# Patient Record
Sex: FEMALE | Race: BLACK OR AFRICAN AMERICAN | NOT HISPANIC OR LATINO | Employment: OTHER | URBAN - METROPOLITAN AREA
[De-identification: names, ages, dates, MRNs, and addresses within clinical notes are randomized per-mention and may not be internally consistent; named-entity substitution may affect disease eponyms.]

---

## 2017-03-10 ENCOUNTER — OFFICE VISIT (OUTPATIENT)
Dept: URGENT CARE | Facility: CLINIC | Age: 47
End: 2017-03-10
Payer: MEDICARE

## 2017-03-10 VITALS
SYSTOLIC BLOOD PRESSURE: 96 MMHG | HEART RATE: 72 BPM | WEIGHT: 95 LBS | RESPIRATION RATE: 12 BRPM | OXYGEN SATURATION: 94 % | HEIGHT: 60 IN | BODY MASS INDEX: 18.65 KG/M2 | DIASTOLIC BLOOD PRESSURE: 60 MMHG | TEMPERATURE: 98.1 F

## 2017-03-10 DIAGNOSIS — H04.201 EYE TEARING, RIGHT: ICD-10-CM

## 2017-03-10 PROCEDURE — 4004F PT TOBACCO SCREEN RCVD TLK: CPT | Mod: 8P | Performed by: NURSE PRACTITIONER

## 2017-03-10 PROCEDURE — 99213 OFFICE O/P EST LOW 20 MIN: CPT | Performed by: NURSE PRACTITIONER

## 2017-03-10 PROCEDURE — G8432 DEP SCR NOT DOC, RNG: HCPCS | Performed by: NURSE PRACTITIONER

## 2017-03-10 PROCEDURE — G8420 CALC BMI NORM PARAMETERS: HCPCS | Performed by: NURSE PRACTITIONER

## 2017-03-10 PROCEDURE — G8484 FLU IMMUNIZE NO ADMIN: HCPCS | Performed by: NURSE PRACTITIONER

## 2017-03-10 ASSESSMENT — ENCOUNTER SYMPTOMS
DIZZINESS: 0
CHILLS: 0
FEVER: 0
PHOTOPHOBIA: 0
MYALGIAS: 0
SHORTNESS OF BREATH: 0
HEADACHES: 1
EYE REDNESS: 0
DIARRHEA: 0
NAUSEA: 0
DOUBLE VISION: 0
BLURRED VISION: 0
VOMITING: 0
COUGH: 0
PALPITATIONS: 0
EYE DISCHARGE: 1

## 2017-03-11 NOTE — PROGRESS NOTES
Subjective:      Gayla Escudero is a 46 y.o. female who presents with Conjunctivitis    Chief Complaint   Patient presents with   • Conjunctivitis     pink, discharge, watery since thursday afternoon      Started a few days ago   Watering from eye   no trauma  No vision changes  No FB            Conjunctivitis  Associated symptoms include headaches. Pertinent negatives include no chest pain, chills, coughing, fever, myalgias, nausea, rash or vomiting.       Review of Systems   Constitutional: Negative for fever, chills and malaise/fatigue.   Eyes: Positive for discharge. Negative for blurred vision, double vision, photophobia and redness.   Respiratory: Negative for cough and shortness of breath.    Cardiovascular: Negative for chest pain and palpitations.   Gastrointestinal: Negative for nausea, vomiting and diarrhea.   Musculoskeletal: Negative for myalgias.   Skin: Negative for rash.   Neurological: Positive for headaches. Negative for dizziness.     Past Medical History   Diagnosis Date   • Muscular disease      Family history reviewed and not related to this visit  Social History     Social History   • Marital Status: Single     Spouse Name: N/A   • Number of Children: N/A   • Years of Education: N/A     Occupational History   • Not on file.     Social History Main Topics   • Smoking status: Not on file   • Smokeless tobacco: Not on file   • Alcohol Use: Not on file   • Drug Use: Not on file   • Sexual Activity: Not on file     Other Topics Concern   • Not on file     Social History Narrative   • No narrative on file            Objective:     BP 96/60 mmHg  Pulse 72  Temp(Src) 36.7 °C (98.1 °F)  Resp 12  Ht 1.524 m (5')  Wt 43.092 kg (95 lb)  BMI 18.55 kg/m2  SpO2 94%  Breastfeeding? No     Physical Exam   Constitutional: She is oriented to person, place, and time. She appears well-developed and well-nourished. No distress.   HENT:   Head: Normocephalic and atraumatic.   Eyes: Conjunctivae and lids are  normal. Right eye exhibits discharge (slight increase tears). Right eye exhibits no exudate. No foreign body present in the right eye. Right conjunctiva is not injected. Right conjunctiva has no hemorrhage. Right eye exhibits normal extraocular motion.   Musculoskeletal: Normal range of motion.   Neurological: She is alert and oriented to person, place, and time.   Skin: Skin is warm and dry.   Psychiatric: She has a normal mood and affect. Her behavior is normal. Judgment and thought content normal.   Nursing note and vitals reviewed.              Assessment/Plan:   1. Eye tearing, right  Saline drop  Discussed unable to test eye pressures in urgent care  Needs to be done at ED or eye Md  No obvious source of tearing from external exam   To ed or eye MD    Please make an appointment for follow up with your primary care provider or make appointment to establish with a primary care. Return for any perception of change in condition, worsening of symptoms, such as perception of worse pain, worsening fever, not getting better, or any other concerns. Please go to the nearest emergency room for any shortness of breath or chest pain or for any of the emergent conditions we have discussed. Patient and/or family states understanding to plan of care, and discharge instructions. Different diagnosis were discussed and signs/symptoms were discussed to educate on.

## 2017-04-06 ENCOUNTER — OFFICE VISIT (OUTPATIENT)
Dept: URGENT CARE | Facility: CLINIC | Age: 47
End: 2017-04-06
Payer: MEDICARE

## 2017-04-06 VITALS
TEMPERATURE: 98 F | SYSTOLIC BLOOD PRESSURE: 110 MMHG | DIASTOLIC BLOOD PRESSURE: 70 MMHG | HEART RATE: 72 BPM | OXYGEN SATURATION: 95 % | RESPIRATION RATE: 18 BRPM

## 2017-04-06 DIAGNOSIS — J02.9 PHARYNGITIS, UNSPECIFIED ETIOLOGY: ICD-10-CM

## 2017-04-06 LAB
INT CON NEG: NORMAL
INT CON POS: NORMAL
S PYO AG THROAT QL: NEGATIVE

## 2017-04-06 PROCEDURE — G8420 CALC BMI NORM PARAMETERS: HCPCS | Performed by: FAMILY MEDICINE

## 2017-04-06 PROCEDURE — 99214 OFFICE O/P EST MOD 30 MIN: CPT | Performed by: FAMILY MEDICINE

## 2017-04-06 PROCEDURE — 1036F TOBACCO NON-USER: CPT | Performed by: FAMILY MEDICINE

## 2017-04-06 PROCEDURE — G8432 DEP SCR NOT DOC, RNG: HCPCS | Performed by: FAMILY MEDICINE

## 2017-04-06 PROCEDURE — 87880 STREP A ASSAY W/OPTIC: CPT | Performed by: FAMILY MEDICINE

## 2017-04-06 RX ORDER — AMOXICILLIN 400 MG/5ML
400 POWDER, FOR SUSPENSION ORAL 2 TIMES DAILY
Qty: 1 QUANTITY SUFFICIENT | Refills: 0 | Status: SHIPPED | OUTPATIENT
Start: 2017-04-06 | End: 2017-04-16

## 2017-04-06 NOTE — MR AVS SNAPSHOT
Gayla Escudero   2017 11:30 AM   Office Visit   MRN: [id number]    Department:  [facility]   Dept Phone:  [phone]    Description:  Female : 1970   Provider:  Ruth Rushing D.O.           Reason for Visit     Pharyngitis           Allergies as of 2017     Allergen Noted Reactions    Tramadol 2014       Effects her MD      You were diagnosed with     Pharyngitis, unspecified etiology   [[id number]]         Vital Signs     Blood Pressure Pulse Temperature Respirations Oxygen Saturation Smoking Status    110/70 mmHg 72 36.7 °C (98 °F) 18 95% Never Smoker       Basic Information     Date Of Birth Sex Race Ethnicity Preferred Language    1970 Female Black or  Non- English      Health Maintenance        Date Due Completion Dates    IMM DTaP/Tdap/Td Vaccine (1 - Tdap) 1989 ---    PAP SMEAR 1991 ---    MAMMOGRAM 2010 ---            Results     POCT Rapid Strep A      Component    Rapid Strep Screen    NEGATIVE    Internal Control Positive    Valid    Internal Control Negative    Valid                        Current Immunizations     Tuberculin Skin Test 2015  4:25 PM      Below and/or attached are the medications your provider expects you to take. Review all of your home medications and newly ordered medications with your provider and/or pharmacist. Follow medication instructions as directed by your provider and/or pharmacist. Please keep your medication list with you and share with your provider. Update the information when medications are discontinued, doses are changed, or new medications (including over-the-counter products) are added; and carry medication information at all times in the event of emergency situations     Allergies:  TRAMADOL - (reactions not documented)               Medications  Valid as of: 2017 - 11:51 AM    Generic Name Brand Name Tablet Size Instructions for use    Amoxicillin (Recon Susp) AMOXIL 400  MG/5ML Take 5 mL by mouth 2 times a day for 10 days.        Cyclobenzaprine HCl (Tab) FLEXERIL 10 MG Take 1 Tab by mouth 3 times a day as needed.        Promethazine HCl (Syrup) PHENERGAN 6.25 MG/5ML Take 5 mL by mouth 4 times a day as needed for Nausea/Vomiting.        .                 Medicines prescribed today were sent to:     [id number] - [place], [place] - [address]    [address] [place] [address]    Phone: [phone] Fax: [phone]    Open 24 Hours?: No      Medication refill instructions:       If your prescription bottle indicates you have medication refills left, it is not necessary to call your provider’s office. Please contact your pharmacy and they will refill your medication.    If your prescription bottle indicates you do not have any refills left, you may request refills at any time through one of the following ways: The online [employer] system (except Urgent Care), by calling your provider’s office, or by asking your pharmacy to contact your provider’s office with a refill request. Medication refills are processed only during regular business hours and may not be available until the next business day. Your provider may request additional information or to have a follow-up visit with you prior to refilling your medication.   *Please Note: Medication refills are assigned a new Rx number when refilled electronically. Your pharmacy may indicate that no refills were authorized even though a new prescription for the same medication is available at the pharmacy. Please request the medicine by name with the pharmacy before contacting your provider for a refill.           [employer] Access Code: Activation code not generated  Current [employer] Status: Active

## 2017-04-06 NOTE — PROGRESS NOTES
HPI: Gayla Escudero is a 46 y.o. female who presents with   Chief Complaint   Patient presents with   • Pharyngitis    sore throat, headache, vomiting for 2 days. No fevers, some body aches have been present. No significant cough or nasal congestion    Worsened by: activity, laying supine at night, first thing in the morning, when exposed to outside allergens  Improved by: OTC symptomatic medictions      PMH:  has a past medical history of Muscular disease.  MEDS:   Current outpatient prescriptions:   •  cyclobenzaprine (FLEXERIL) 10 MG Tab, Take 1 Tab by mouth 3 times a day as needed., Disp: 30 Tab, Rfl: 0  •  promethazine (PHENERGAN) 6.25 MG/5ML Syrup, Take 5 mL by mouth 4 times a day as needed for Nausea/Vomiting., Disp: 120 mL, Rfl: 0  ALLERGIES:   Allergies   Allergen Reactions   • Tramadol      Effects her MD     SURGHX: No past surgical history on file.  SOCHX:    FH: Family history was reviewed, no pertinent findings to report    PE:  Vitals  Gen AOx4, NAD  HEENT: moist mucus membranes, no pain or pressure with percussion of frontal, maxillary or ethmoid sinuses.  Bilateral conjunciva clear without erythema or exudate,  Bilateral TM's clear without bulge, fluid or loss of landmarks, moderate pharyngeal erythema without tonsillar exudate but with bilateral tonsillar enlargement present  Neck: supple, no cervical lymphadenopathy, no signs of menigismus  CV/PULM: RRR no murmurs, no rales ronchi or wheezes, no signs of resp distress  Abd soft nontender, bs present  Skin no rashes  Extremities -c/c/e  Neuro appropriate affect,     Diagnostics: Rapid strep negative    A/P  1. Pharyngitis, unspecified etiology  POCT Rapid Strep A    amoxicillin (AMOXIL) 400 MG/5ML suspension     Follow-up with primary care provider within 4-5 days, emergency room precautions discussed.  Patient and/or family appears understanding of information.

## 2017-07-16 ENCOUNTER — OFFICE VISIT (OUTPATIENT)
Dept: URGENT CARE | Facility: CLINIC | Age: 47
End: 2017-07-16
Payer: MEDICARE

## 2017-07-16 VITALS
WEIGHT: 95 LBS | RESPIRATION RATE: 12 BRPM | TEMPERATURE: 98.4 F | SYSTOLIC BLOOD PRESSURE: 100 MMHG | OXYGEN SATURATION: 95 % | HEART RATE: 72 BPM | BODY MASS INDEX: 18.65 KG/M2 | HEIGHT: 60 IN | DIASTOLIC BLOOD PRESSURE: 62 MMHG

## 2017-07-16 DIAGNOSIS — L72.3 SEBACEOUS CYST OF RIGHT AXILLA: ICD-10-CM

## 2017-07-16 PROCEDURE — 99213 OFFICE O/P EST LOW 20 MIN: CPT | Performed by: NURSE PRACTITIONER

## 2017-07-16 RX ORDER — CLINDAMYCIN HYDROCHLORIDE 300 MG/1
300 CAPSULE ORAL 3 TIMES DAILY
Qty: 30 CAP | Refills: 0 | Status: SHIPPED | OUTPATIENT
Start: 2017-07-16 | End: 2017-07-26

## 2017-07-16 ASSESSMENT — ENCOUNTER SYMPTOMS: FEVER: 0

## 2017-07-16 NOTE — MR AVS SNAPSHOT
Gayla Escudero   2017 1:30 PM   Office Visit   MRN: [id number]    Department:  [facility]   Dept Phone:  [phone]    Description:  Female : 1970   Provider:  THANH Varghese           Reason for Visit     Cyst or abscess under armpit and back, hurting      Allergies as of 2017     Allergen Noted Reactions    Tramadol 2014       Effects her MD      You were diagnosed with     Sebaceous cyst of right axilla   [[id number]]         Vital Signs     Blood Pressure Pulse Temperature Respirations Height Weight    100/62 mmHg 72 36.9 °C (98.4 °F) 12 1.524 m (5') 43.092 kg (95 lb)    Body Mass Index Oxygen Saturation Breastfeeding? Smoking Status          18.55 kg/m2 95% No Never Smoker         Basic Information     Date Of Birth Sex Race Ethnicity Preferred Language    1970 Female Black or  Non- English      Health Maintenance        Date Due Completion Dates    IMM DTaP/Tdap/Td Vaccine (1 - Tdap) 1989 ---    PAP SMEAR 1991 ---    MAMMOGRAM 2010 ---    IMM INFLUENZA (1) 2017 ---            Current Immunizations     Tuberculin Skin Test 2015  4:25 PM      Below and/or attached are the medications your provider expects you to take. Review all of your home medications and newly ordered medications with your provider and/or pharmacist. Follow medication instructions as directed by your provider and/or pharmacist. Please keep your medication list with you and share with your provider. Update the information when medications are discontinued, doses are changed, or new medications (including over-the-counter products) are added; and carry medication information at all times in the event of emergency situations     Allergies:  TRAMADOL - (reactions not documented)               Medications  Valid as of: 2017 -  2:11 PM    Generic Name Brand Name Tablet Size Instructions for use    Clindamycin HCl (Cap) CLEOCIN 300 MG Take 1  Cap by mouth 3 times a day for 10 days.        Cyclobenzaprine HCl (Tab) FLEXERIL 10 MG Take 1 Tab by mouth 3 times a day as needed.        Promethazine HCl (Syrup) PHENERGAN 6.25 MG/5ML Take 5 mL by mouth 4 times a day as needed for Nausea/Vomiting.        .                 Medicines prescribed today were sent to:     [facility][id number] - [place], [place] - [address]    [address] [place] [address]    Phone: [phone] Fax: [phone]    Open 24 Hours?: No      Medication refill instructions:       If your prescription bottle indicates you have medication refills left, it is not necessary to call your provider’s office. Please contact your pharmacy and they will refill your medication.    If your prescription bottle indicates you do not have any refills left, you may request refills at any time through one of the following ways: The online [employer] system (except Urgent Care), by calling your provider’s office, or by asking your pharmacy to contact your provider’s office with a refill request. Medication refills are processed only during regular business hours and may not be available until the next business day. Your provider may request additional information or to have a follow-up visit with you prior to refilling your medication.   *Please Note: Medication refills are assigned a new Rx number when refilled electronically. Your pharmacy may indicate that no refills were authorized even though a new prescription for the same medication is available at the pharmacy. Please request the medicine by name with the pharmacy before contacting your provider for a refill.           [employer] Access Code: Activation code not generated  Current [employer] Status: Active

## 2017-07-16 NOTE — PROGRESS NOTES
Subjective:      Gayla Escudero is a 46 y.o. female who presents with Cyst            Cyst  This is a new problem. Episode onset: She reports that she has had a cyst under her right arm for several weeks and it is very painful. She also thinks that she has one on her back over her right shoulder blade but that one comes and goes. The problem occurs constantly. The problem has been gradually worsening. Pertinent negatives include no fever. Associated symptoms comments: Denies any drainage from the cyst under her arm or the one on her back. Exacerbated by: touching the cyst is painful or moving her right arm too much. She has tried nothing for the symptoms.       Review of Systems   Constitutional: Negative for fever.   Skin:        Cyst right armpit and right shoulder   All other systems reviewed and are negative.    Past Medical History   Diagnosis Date   • Muscular disease     No past surgical history on file.   Social History     Social History   • Marital Status: Single     Spouse Name: N/A   • Number of Children: N/A   • Years of Education: N/A     Occupational History   • Not on file.     Social History Main Topics   • Smoking status: Never Smoker    • Smokeless tobacco: Never Used   • Alcohol Use: Not on file   • Drug Use: Not on file   • Sexual Activity: Not on file     Other Topics Concern   • Not on file     Social History Narrative          Objective:     /62 mmHg  Pulse 72  Temp(Src) 36.9 °C (98.4 °F)  Resp 12  Ht 1.524 m (5')  Wt 43.092 kg (95 lb)  BMI 18.55 kg/m2  SpO2 95%  Breastfeeding? No     Physical Exam   Constitutional: She is oriented to person, place, and time. Vital signs are normal. She appears well-developed and well-nourished.   HENT:   Head: Normocephalic and atraumatic.   Eyes: EOM are normal. Pupils are equal, round, and reactive to light.   Neck: Normal range of motion.   Cardiovascular: Normal rate and regular rhythm.    Pulmonary/Chest: Effort normal.   Musculoskeletal:  Normal range of motion.   Neurological: She is alert and oriented to person, place, and time.   Skin: Skin is warm and dry.        Psychiatric: She has a normal mood and affect. Her speech is normal and behavior is normal. Thought content normal.   Vitals reviewed.              Assessment/Plan:     1. Sebaceous cyst of right axilla  - clindamycin (CLEOCIN) 300 MG Cap; Take 1 Cap by mouth 3 times a day for 10 days.  Dispense: 30 Cap; Refill: 0    Discussed with patient that the cyst present in her axilla is very small and I would not recommend an I&D today. She states she had a larger cyst in her left armpit a few years ago and that one was drained but it was much bigger than the one she has now. I advised her that a course of antibiotics should help to resolve the cyst, if it does not improve return to .   I chose clinda for her tx because she needs small pills or suspension, I discussed with her that she can check with the pharmacist to see if she can break open these caps and take the powder with food, she V/U and agrees  If she has any issue with the ABX I advised her to contact me, she understands  Warm compresses to right axilla 3 times per day  Supportive care, differential diagnoses, and indications for immediate follow-up discussed with patient.    Pathogenesis of diagnosis discussed including typical length and natural progression.      Instructed to return to  or nearest emergency department if symptoms fail to improve, for any change in condition, further concerns, or new concerning symptoms.  Patient states understanding of the plan of care and discharge instructions.

## 2017-07-26 ENCOUNTER — TELEPHONE (OUTPATIENT)
Dept: MEDICAL GROUP | Facility: MEDICAL CENTER | Age: 47
End: 2017-07-26

## 2017-07-26 NOTE — TELEPHONE ENCOUNTER
NEW PATIENT VISIT PRE-VISIT PLANNING    1.  E[employer] Patient is checked in Patient Demographics? NO    2.  Immunizations were updated in Epic using WebIZ?: No WebIZ record       •  Web Iz Recommendations:     3.  Is this appointment scheduled as a Hospital Follow-Up? No    4.  Patient is due for the following Health Maintenance Topics:   Health Maintenance Due   Topic Date Due   • Annual Wellness Visit  1970   • IMM DTaP/Tdap/Td Vaccine (1 - Tdap) 12/06/1989   • PAP SMEAR  12/06/1991   • MAMMOGRAM  12/06/2010           5.  Reviewed/Updated the following with patient:       •   Preferred Pharmacy? NO       •   Preferred Lab? NO       •   Medications? NO       •   Social History? NO       •   Family History? NO    6.  Updated Care Team?       •   DME Company (gait device, O2, CPAP, etc.) NO       •   Other Specialists (eye doctor, derm, GYN, cardiology, endo, etc): NO    7.  Patient was informed to arrive 15 min prior to their scheduled appointment and bring in their medication bottles.

## 2017-07-27 ENCOUNTER — OFFICE VISIT (OUTPATIENT)
Dept: MEDICAL GROUP | Facility: MEDICAL CENTER | Age: 47
End: 2017-07-27
Payer: MEDICARE

## 2017-07-27 VITALS
SYSTOLIC BLOOD PRESSURE: 112 MMHG | WEIGHT: 100 LBS | OXYGEN SATURATION: 98 % | HEART RATE: 82 BPM | DIASTOLIC BLOOD PRESSURE: 70 MMHG | BODY MASS INDEX: 19.63 KG/M2 | HEIGHT: 60 IN | TEMPERATURE: 98.2 F

## 2017-07-27 DIAGNOSIS — R22.31 MASS OF RIGHT AXILLA: ICD-10-CM

## 2017-07-27 DIAGNOSIS — Z76.89 ENCOUNTER TO ESTABLISH CARE: ICD-10-CM

## 2017-07-27 DIAGNOSIS — Z12.31 VISIT FOR SCREENING MAMMOGRAM: ICD-10-CM

## 2017-07-27 PROCEDURE — 99214 OFFICE O/P EST MOD 30 MIN: CPT | Performed by: PHYSICIAN ASSISTANT

## 2017-07-27 RX ORDER — SULFAMETHOXAZOLE AND TRIMETHOPRIM 200; 40 MG/5ML; MG/5ML
8 SUSPENSION ORAL 2 TIMES DAILY
Qty: 460 ML | Refills: 0 | Status: SHIPPED | OUTPATIENT
Start: 2017-07-27 | End: 2017-08-06

## 2017-07-27 RX ORDER — SULFAMETHOXAZOLE AND TRIMETHOPRIM 200; 40 MG/5ML; MG/5ML
8 SUSPENSION ORAL 2 TIMES DAILY
Qty: 460 ML | Refills: 0 | Status: SHIPPED | OUTPATIENT
Start: 2017-07-27 | End: 2017-07-27 | Stop reason: SDUPTHER

## 2017-07-27 RX ORDER — SULFAMETHOXAZOLE AND TRIMETHOPRIM 200; 40 MG/5ML; MG/5ML
8 SUSPENSION ORAL 2 TIMES DAILY
Qty: 180 ML | Refills: 0 | Status: SHIPPED | OUTPATIENT
Start: 2017-07-27 | End: 2017-07-27 | Stop reason: SDUPTHER

## 2017-07-27 ASSESSMENT — PATIENT HEALTH QUESTIONNAIRE - PHQ9: CLINICAL INTERPRETATION OF PHQ2 SCORE: 0

## 2017-07-27 NOTE — PROGRESS NOTES
Subjective:   Gayla Escudero is a 46 y.o. female here today for painful area of her axilla on the right side and shoulder area.    Mass of right axilla  This is a 46-year-old female accompanied by her . She is here today playing of pain of her right exam for a few days. Also has pain on her back of the right shoulder. Complains of tenderness. She's had similar issues and abscesses on her body in the past. She stated when she had one that was drained. Denies any fevers.    Visit for screening mammogram  She's never had a mammogram.       Current medicines (including changes today)  Current Outpatient Prescriptions   Medication Sig Dispense Refill   • sulfamethoxazole-trimethoprim 200-40 mg/5 mL (BACTRIM,SEPTRA) 200-40 MG/5ML Suspension Take 23 mL by mouth 2 times a day for 10 days. 460 mL 0   • cyclobenzaprine (FLEXERIL) 10 MG Tab Take 1 Tab by mouth 3 times a day as needed. 30 Tab 0   • promethazine (PHENERGAN) 6.25 MG/5ML Syrup Take 5 mL by mouth 4 times a day as needed for Nausea/Vomiting. 120 mL 0     No current facility-administered medications for this visit.     She  has a past medical history of Muscular disease.    ROS   No chest pain, no shortness of breath, no abdominal pain and all other systems were reviewed and are negative.       Objective:     Blood pressure 112/70, pulse 82, temperature 36.8 °C (98.2 °F), height 1.524 m (5'), weight 45.36 kg (100 lb), SpO2 98 %. Body mass index is 19.53 kg/(m^2).   Physical Exam:  Constitutional: Alert, no distress.  Skin: Warm, dry, good turgor, no rashes in visible areas. There is a tender superficial nonerythematous mass that is at approximately 12:00 position of the axilla. Areas approximately 4 mm in diameter. Slight induration. No flocculence noted. On the back right shoulder there is some acne scarring with no significant lesion but do feel an area that is tender for her is approximately 3-4 mm in diameter with slight induration but no flocculence.  Eye:  Equal, round and reactive, conjunctiva clear, lids normal.  ENMT: Lips without lesions, good dentition, oropharynx clear.  Neck: Trachea midline, no masses.   Lymph: No cervical or supraclavicular lymphadenopathy  Respiratory: Unlabored respiratory effort, lungs appear clear, no wheezes.  Cardiovascular: Regular rate and rhythm, no edema.  Psych: Alert and oriented x3, normal affect and mood.        Assessment and Plan:   The following treatment plan was discussed    1. Mass of right axilla  Acute, new onset condition. Given the potential for developing a firm cold will provide Bactrim as directed for 10 days. She does not swallow tablets and will provide her liquid course. Advised to apply warm compresses. A follow-up at urgent care or return for possible I&D if needed.  - sulfamethoxazole-trimethoprim 200-40 mg/5 mL (BACTRIM,SEPTRA) 200-40 MG/5ML Suspension; Take 23 mL by mouth 2 times a day for 10 days.  Dispense: 460 mL; Refill: 0    2. Visit for screening mammogram  Order mammogram.  - MA-SCREEN MAMMO W/CAD-BILAT    3. Encounter to establish care  We'll obtain previous medical records.      Followup: Return if symptoms worsen or fail to improve.    Please note that this dictation was created using voice recognition software. I have made every reasonable attempt to correct obvious errors, but I expect that there are errors of grammar and possibly content that I did not discover before finalizing the note.

## 2017-07-27 NOTE — ASSESSMENT & PLAN NOTE
This is a 46-year-old female accompanied by her . She is here today playing of pain of her right exam for a few days. Also has pain on her back of the right shoulder. Complains of tenderness. She's had similar issues and abscesses on her body in the past. She stated when she had one that was drained. Denies any fevers.

## 2017-07-27 NOTE — Clinical Note
[facility]  Adithya Martinez PA-C  [address]  [place] [address]  Fax: [phone]   Authorization for Release/Disclosure of   Protected Health Information   Name: GAYLA ESCUDERO : 1970 SSN: [id number]   Address: [address] [place] [address] Phone:    [phone] (home)    I authorize the entity listed below to release/disclose the PHI below to:   [facility]/Adithya Martinez PA-C and Adithya Martinez PA-C   Provider or Entity Name:     Address   City, State, Zip   Phone:      Fax:     Reason for request: continuity of care   Information to be released:    [  ] LAST COLONOSCOPY,  including any PATH REPORT and follow-up  [  ] LAST FIT/COLOGUARD RESULT [  ] LAST DEXA  [  ] LAST MAMMOGRAM  [  ] LAST PAP  [  ] LAST LABS [  ] RETINA EXAM REPORT  [  ] IMMUNIZATION RECORDS  [ XXX ] Release all info      [  ] Check here and initial the line next to each item to release ALL health information INCLUDING  _____ Care and treatment for drug and / or alcohol abuse  _____ HIV testing, infection status, or AIDS  _____ Genetic Testing    DATES OF SERVICE OR TIME PERIOD TO BE DISCLOSED: _____________  I understand and acknowledge that:  * This Authorization may be revoked at any time by you in writing, except if your health information has already been used or disclosed.  * Your health information that will be used or disclosed as a result of you signing this authorization could be re-disclosed by the recipient. If this occurs, your re-disclosed health information may no longer be protected by State or Federal laws.  * You may refuse to sign this Authorization. Your refusal will not affect your ability to obtain treatment.  * This Authorization becomes effective upon signing and will  on (date) __________.      If no date is indicated, this Authorization will  one (1) year from the signature date.    Name: Gayla Escudero    Signature:   Date:     2017       PLEASE FAX REQUESTED RECORDS  BACK TO: [phone]

## 2017-07-27 NOTE — MR AVS SNAPSHOT
Gayla Escudero   2017 2:00 PM   Office Visit   MRN: [id number]    Department:  [place] [address]   Dept Phone:  [phone]    Description:  Female : 1970   Provider:  Adithya Martinez PA-C           Reason for Visit     Establish Care     Abscess under right armpit and back       Allergies as of 2017     Allergen Noted Reactions    Tramadol 2014       Effects her MD      You were diagnosed with     Mass of right axilla   [0926714]       Visit for screening mammogram   [[id number]]       Encounter to establish care   [[id number]]         Vital Signs     Blood Pressure Pulse Temperature Height Weight Body Mass Index    112/70 mmHg 82 36.8 °C (98.2 °F) 1.524 m (5') 45.36 kg (100 lb) 19.53 kg/m2    Oxygen Saturation Smoking Status                98% Never Smoker           Basic Information     Date Of Birth Sex Race Ethnicity Preferred Language    1970 Female Black or  Non- English      Problem List              ICD-10-CM Priority Class Noted - Resolved    Mass of right axilla R22.31   2017 - Present    Visit for screening mammogram Z12.31   2017 - Present      Health Maintenance        Date Due Completion Dates    IMM DTaP/Tdap/Td Vaccine (1 - Tdap) 1989 ---    PAP SMEAR 1991 ---    MAMMOGRAM 2010 ---    IMM INFLUENZA (1) 2017 ---            Current Immunizations     Tuberculin Skin Test 2015  4:25 PM      Below and/or attached are the medications your provider expects you to take. Review all of your home medications and newly ordered medications with your provider and/or pharmacist. Follow medication instructions as directed by your provider and/or pharmacist. Please keep your medication list with you and share with your provider. Update the information when medications are discontinued, doses are changed, or new medications (including over-the-counter products) are added; and carry medication information at all times in the event of  emergency situations     Allergies:  TRAMADOL - (reactions not documented)               Medications  Valid as of: July 27, 2017 -  2:55 PM    Generic Name Brand Name Tablet Size Instructions for use    Cyclobenzaprine HCl (Tab) FLEXERIL 10 MG Take 1 Tab by mouth 3 times a day as needed.        Promethazine HCl (Syrup) PHENERGAN 6.25 MG/5ML Take 5 mL by mouth 4 times a day as needed for Nausea/Vomiting.        Sulfamethoxazole-Trimethoprim (Suspension) BACTRIM,SEPTRA 200-40 MG/5ML Take 23 mL by mouth 2 times a day for 10 days.        .                 Medicines prescribed today were sent to:     [facility]/PHARMACY #[id number] - [place], [place] - [address]    [address] [facility] [place] [address]    Phone: [phone] Fax: [phone]    Open 24 Hours?: No      Medication refill instructions:       If your prescription bottle indicates you have medication refills left, it is not necessary to call your provider’s office. Please contact your pharmacy and they will refill your medication.    If your prescription bottle indicates you do not have any refills left, you may request refills at any time through one of the following ways: The online Glider system (except Urgent Care), by calling your provider’s office, or by asking your pharmacy to contact your provider’s office with a refill request. Medication refills are processed only during regular business hours and may not be available until the next business day. Your provider may request additional information or to have a follow-up visit with you prior to refilling your medication.   *Please Note: Medication refills are assigned a new Rx number when refilled electronically. Your pharmacy may indicate that no refills were authorized even though a new prescription for the same medication is available at the pharmacy. Please request the medicine by name with the pharmacy before contacting your provider for a refill.           Glider Access Code: Activation code not  generated  Current MyChart Status: Active

## 2017-08-06 ENCOUNTER — HOSPITAL ENCOUNTER (OUTPATIENT)
Facility: MEDICAL CENTER | Age: 47
End: 2017-08-06
Attending: NURSE PRACTITIONER
Payer: MEDICARE

## 2017-08-06 ENCOUNTER — OFFICE VISIT (OUTPATIENT)
Dept: URGENT CARE | Facility: CLINIC | Age: 47
End: 2017-08-06
Payer: MEDICARE

## 2017-08-06 VITALS
RESPIRATION RATE: 14 BRPM | BODY MASS INDEX: 19.63 KG/M2 | WEIGHT: 100 LBS | DIASTOLIC BLOOD PRESSURE: 74 MMHG | SYSTOLIC BLOOD PRESSURE: 116 MMHG | OXYGEN SATURATION: 97 % | HEART RATE: 84 BPM | HEIGHT: 60 IN | TEMPERATURE: 98.1 F

## 2017-08-06 DIAGNOSIS — L02.411 ABSCESS OF AXILLA, RIGHT: ICD-10-CM

## 2017-08-06 PROCEDURE — 87205 SMEAR GRAM STAIN: CPT

## 2017-08-06 PROCEDURE — 87070 CULTURE OTHR SPECIMN AEROBIC: CPT

## 2017-08-06 PROCEDURE — 10060 I&D ABSCESS SIMPLE/SINGLE: CPT | Performed by: NURSE PRACTITIONER

## 2017-08-06 PROCEDURE — 99999 PR NO CHARGE: CPT | Performed by: NURSE PRACTITIONER

## 2017-08-06 RX ORDER — AMOXICILLIN 400 MG/5ML
90 POWDER, FOR SUSPENSION ORAL 2 TIMES DAILY
Qty: 600 ML | Refills: 0 | Status: SHIPPED | OUTPATIENT
Start: 2017-08-06 | End: 2017-08-16

## 2017-08-06 ASSESSMENT — ENCOUNTER SYMPTOMS
MYALGIAS: 1
WEAKNESS: 0
FEVER: 0
BRUISES/BLEEDS EASILY: 0
CHILLS: 0
TREMORS: 0
DIZZINESS: 0

## 2017-08-06 ASSESSMENT — PAIN SCALES - GENERAL: PAINLEVEL: 10=SEVERE PAIN

## 2017-08-06 NOTE — PROGRESS NOTES
Subjective:      Gayla Escudero is a 46 y.o. female who presents with Cyst            Cyst  Associated symptoms include myalgias. Pertinent negatives include no chills, fever, rash or weakness.   Gayla is a 46 year old female who is here for right axilla lump x 1 month. H/o axilla abscess. Has been seen by her PCP and placed on Bactrim but states stopped taking due to increase in size 3 days ago. Did not contact PCP. Denies fever, drainage from site. Painful to touch. Was seen in UC and placed on Clindamycin x 10 days. Lump returned 2 weeks ago and was seen by PCP. No I&D in last month.    PMH:  has a past medical history of Muscular disease.  MEDS:   Current outpatient prescriptions:   •  amoxicillin (AMOXIL) 400 MG/5ML suspension, Take 25.5 mL by mouth 2 times a day for 10 days., Disp: 600 mL, Rfl: 0  •  sulfamethoxazole-trimethoprim 200-40 mg/5 mL (BACTRIM,SEPTRA) 200-40 MG/5ML Suspension, Take 23 mL by mouth 2 times a day for 10 days., Disp: 460 mL, Rfl: 0  •  cyclobenzaprine (FLEXERIL) 10 MG Tab, Take 1 Tab by mouth 3 times a day as needed., Disp: 30 Tab, Rfl: 0  •  promethazine (PHENERGAN) 6.25 MG/5ML Syrup, Take 5 mL by mouth 4 times a day as needed for Nausea/Vomiting., Disp: 120 mL, Rfl: 0  ALLERGIES:   Allergies   Allergen Reactions   • Codeine Vomiting and Nausea     N&V   • Tramadol      Effects her MD     SURGHX: History reviewed. No pertinent past surgical history.  SOCHX:  reports that she has never smoked. She has never used smokeless tobacco. She reports that she does not drink alcohol or use illicit drugs.  FH: Family history was reviewed, no pertinent findings to report      Review of Systems   Constitutional: Negative for fever, chills and malaise/fatigue.   Musculoskeletal: Positive for myalgias.   Skin: Negative for itching and rash.   Neurological: Negative for dizziness, tremors and weakness.   Endo/Heme/Allergies: Does not bruise/bleed easily.   All other systems reviewed and are  "negative.         Objective:     /74 mmHg  Pulse 84  Temp(Src) 36.7 °C (98.1 °F)  Resp 14  Ht 1.524 m (5')  Wt 45.36 kg (100 lb)  BMI 19.53 kg/m2  SpO2 97%  Breastfeeding? No     Physical Exam   Constitutional: She is oriented to person, place, and time. Vital signs are normal. She appears well-developed and well-nourished. She is active and cooperative.  Non-toxic appearance. She does not have a sickly appearance. She does not appear ill. No distress.   HENT:   Head: Normocephalic.   Eyes: EOM are normal. Pupils are equal, round, and reactive to light.   Neck: Normal range of motion. Neck supple.   Cardiovascular: Normal rate.    Pulmonary/Chest: Effort normal.   Neurological: She is alert and oriented to person, place, and time.   Skin: Skin is warm, dry and intact. No abrasion, no bruising, no burn, no ecchymosis, no laceration, no lesion and no rash noted. She is not diaphoretic. There is erythema.   Red raised soft lump to right axilla with TTP at site. Procedure: Incision and Drainage  -Risks, benefits, and alternatives discussed. Risks including infection, bleeding, nerve damage, and poor cosmetic outcome  -Sterile technique throughout  -Local anesthesia without 2% lidocaine   -Incision with #11 blade into fluctuant area with purulent material expressed  -Culture obtained and packaged for lab  -Cavity probed and any loculations bluntly taken down with hemostat  -Irrigated copiously with NS  -Packed with 1/4\" gauze  -Minimal bleeding with good hemostasis achieved  -The patient tolerated the procedure well       Vitals reviewed.              Assessment/Plan:     1. Abscess of axilla, right    - CULTURE WOUND W/ GRAM STAIN; Future  - amoxicillin (AMOXIL) 400 MG/5ML suspension; Take 25.5 mL by mouth 2 times a day for 10 days.  Dispense: 600 mL; Refill: 0    Do not pick at or open lesion  May use cool compress at site prn for discomfort and decrease swelling  May use Ibuprofen/tylenol as " anti-inflammatory and discomfort  Continue antibiotic until finished unless told otherwise  May use pain meds as directed  Keep area clean, wash around site and any drainage around site with mild soap and water  Keep covered with bandage to prevent dirt or debris into wounds and to capture any discharge  Monitor for worsening skin infection with increased swelling, redness, fever, pain  Recheck in 2 days

## 2017-08-06 NOTE — MR AVS SNAPSHOT
Gayla Escudero   2017 11:30 AM   Office Visit   MRN: [id number]    Department:  [facility]   Dept Phone:  [phone]    Description:  Female : 1970   Provider:  SHRUTHI Delvalle           Reason for Visit     Cyst x2 months abcess in rt armpit      Allergies as of 2017     Allergen Noted Reactions    Codeine 2017   Vomiting, Nausea    N&V    Tramadol 2014       Effects her MD      You were diagnosed with     Abscess of axilla, right   [[id number]]         Vital Signs     Blood Pressure Pulse Temperature Respirations Height Weight    116/74 mmHg 84 36.7 °C (98.1 °F) 14 1.524 m (5') 45.36 kg (100 lb)    Body Mass Index Oxygen Saturation Breastfeeding? Smoking Status          19.53 kg/m2 97% No Never Smoker         Basic Information     Date Of Birth Sex Race Ethnicity Preferred Language    1970 Female Black or  Non- English      Your appointments     Sep 12, 2017  3:20 PM   MA SCRN10 with KARLA ROMERO MG 1   [facility] [facility] [facility] ([place])    [address]  [place] [address]   [phone]           No deodorant, powder, perfume or lotion under the arm or breast area.              Problem List              ICD-10-CM Priority Class Noted - Resolved    Mass of right axilla R22.31   2017 - Present    Visit for screening mammogram Z12.31   2017 - Present      Health Maintenance        Date Due Completion Dates    IMM DTaP/Tdap/Td Vaccine (1 - Tdap) 1989 ---    PAP SMEAR 1991 ---    MAMMOGRAM 2010 ---    IMM INFLUENZA (1) 2017 ---            Current Immunizations     Tuberculin Skin Test 2015  4:25 PM      Below and/or attached are the medications your provider expects you to take. Review all of your home medications and newly ordered medications with your provider and/or pharmacist. Follow medication instructions as directed by your provider and/or pharmacist. Please keep  your medication list with you and share with your provider. Update the information when medications are discontinued, doses are changed, or new medications (including over-the-counter products) are added; and carry medication information at all times in the event of emergency situations     Allergies:  CODEINE - Vomiting,Nausea     TRAMADOL - (reactions not documented)               Medications  Valid as of: August 06, 2017 -  1:09 PM    Generic Name Brand Name Tablet Size Instructions for use    Amoxicillin (Recon Susp) AMOXIL 400 MG/5ML Take 25.5 mL by mouth 2 times a day for 10 days.        Cyclobenzaprine HCl (Tab) FLEXERIL 10 MG Take 1 Tab by mouth 3 times a day as needed.        Promethazine HCl (Syrup) PHENERGAN 6.25 MG/5ML Take 5 mL by mouth 4 times a day as needed for Nausea/Vomiting.        Sulfamethoxazole-Trimethoprim (Suspension) BACTRIM,SEPTRA 200-40 MG/5ML Take 23 mL by mouth 2 times a day for 10 days.        .                 Medicines prescribed today were sent to:     [facility]/PHARMACY #[id number] - [place], [place] - [address] [address]    Phone: [phone] Fax: [phone]    Open 24 Hours?: No      Medication refill instructions:       If your prescription bottle indicates you have medication refills left, it is not necessary to call your provider’s office. Please contact your pharmacy and they will refill your medication.    If your prescription bottle indicates you do not have any refills left, you may request refills at any time through one of the following ways: The online Conmio system (except Urgent Care), by calling your provider’s office, or by asking your pharmacy to contact your provider’s office with a refill request. Medication refills are processed only during regular business hours and may not be available until the next business day. Your provider may request additional information or to have a follow-up visit with you prior to refilling your medication.      *Please Note: Medication refills are assigned a new Rx number when refilled electronically. Your pharmacy may indicate that no refills were authorized even though a new prescription for the same medication is available at the pharmacy. Please request the medicine by name with the pharmacy before contacting your provider for a refill.        Your To Do List     Future Labs/Procedures Complete By Expires    CULTURE WOUND W/ GRAM STAIN  As directed 8/7/2018         MyChart Access Code: Activation code not generated  Current MyChart Status: Active

## 2017-08-07 ENCOUNTER — TELEPHONE (OUTPATIENT)
Dept: URGENT CARE | Facility: CLINIC | Age: 47
End: 2017-08-07

## 2017-08-07 PROBLEM — Z74.09 IMPAIRED MOBILITY AND ADLS: Status: ACTIVE | Noted: 2017-08-07

## 2017-08-07 PROBLEM — G71.11 MYOTONIC MUSCULAR DYSTROPHY (HCC): Status: ACTIVE | Noted: 2017-08-07

## 2017-08-07 PROBLEM — Z78.9 IMPAIRED MOBILITY AND ADLS: Status: ACTIVE | Noted: 2017-08-07

## 2017-08-07 LAB
GRAM STN SPEC: NORMAL
SIGNIFICANT IND 70042: NORMAL
SITE SITE: NORMAL
SOURCE SOURCE: NORMAL

## 2017-08-08 ENCOUNTER — OFFICE VISIT (OUTPATIENT)
Dept: URGENT CARE | Facility: CLINIC | Age: 47
End: 2017-08-08
Payer: MEDICARE

## 2017-08-08 VITALS
RESPIRATION RATE: 15 BRPM | DIASTOLIC BLOOD PRESSURE: 78 MMHG | TEMPERATURE: 97.8 F | WEIGHT: 100 LBS | SYSTOLIC BLOOD PRESSURE: 110 MMHG | OXYGEN SATURATION: 97 % | BODY MASS INDEX: 19.63 KG/M2 | HEART RATE: 71 BPM | HEIGHT: 60 IN

## 2017-08-08 DIAGNOSIS — Z51.89 WOUND CHECK, ABSCESS: ICD-10-CM

## 2017-08-08 LAB
BACTERIA WND AEROBE CULT: NORMAL
GRAM STN SPEC: NORMAL
SIGNIFICANT IND 70042: NORMAL
SITE SITE: NORMAL
SOURCE SOURCE: NORMAL

## 2017-08-08 PROCEDURE — 99024 POSTOP FOLLOW-UP VISIT: CPT | Performed by: PHYSICIAN ASSISTANT

## 2017-08-08 NOTE — TELEPHONE ENCOUNTER
"Wound culture NEG but  indicates \"rare gram positive cocci\" and \"many WBCs\", amoxi appropriate for this. Patient requested liquid form of antibiotic at time of exam. Had been on Bactrim with no improvement and prefers not to take Clindamycin caps because difficulty with swallowing.  "

## 2017-08-08 NOTE — MR AVS SNAPSHOT
Gayla Escudero   2017 1:45 PM   Office Visit   MRN: [id number]    Department:  [facility]   Dept Phone:  [phone]    Description:  Female : 1970   Provider:  Teresa Fernandes PA-C           Reason for Visit     Wound Check re-check and repack if need wound from       Allergies as of 2017     Allergen Noted Reactions    Codeine 2017   Vomiting, Nausea    N&V    Tramadol 2014       Effects her MD      You were diagnosed with     Wound check, abscess   [[id number]]         Vital Signs     Blood Pressure Pulse Temperature Respirations Height Weight    110/78 mmHg 71 36.6 °C (97.8 °F) 15 1.524 m (5') 45.36 kg (100 lb)    Body Mass Index Oxygen Saturation Smoking Status             19.53 kg/m2 97% Never Smoker          Basic Information     Date Of Birth Sex Race Ethnicity Preferred Language    1970 Female Black or  Non- English      Your appointments     Sep 12, 2017  3:20 PM   MA SCRN10 with KARLA ROMERO MG 1   [facility] [facility] ([place])    [address]  [place] [address]   [phone]           No deodorant, powder, perfume or lotion under the arm or breast area.              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 Review all of your home medications and newly ordered medications  with your provider and/or pharmacist. Follow medication instructions as directed by your provider and/or pharmacist. Please keep your medication list with you and share with your provider. Update the information when medications are discontinued, doses are changed, or new medications (including over-the-counter products) are added; and carry medication information at all times in the event of emergency situations     Allergies:  CODEINE - Vomiting,Nausea     TRAMADOL - (reactions not documented)               Medications  Valid as of: August 08, 2017 -  2:31 PM    Generic Name Brand Name Tablet Size Instructions for use    Amoxicillin (Recon Susp) AMOXIL 400 MG/5ML Take 25.5 mL by mouth 2 times a day for 10 days.        Cyclobenzaprine HCl (Tab) FLEXERIL 10 MG Take 1 Tab by mouth 3 times a day as needed.        Promethazine HCl (Syrup) PHENERGAN 6.25 MG/5ML Take 5 mL by mouth 4 times a day as needed for Nausea/Vomiting.        .                 Medicines prescribed today were sent to:     [facility]/PHARMACY #[id number] - [place], [place] - [address] [address]    Phone: [phone] Fax: [phone]    Open 24 Hours?: No      Medication refill instructions:       If your prescription bottle indicates you have medication refills left, it is not necessary to call your provider’s office. Please contact your pharmacy and they will refill your medication.    If your prescription bottle indicates you do not have any refills left, you may request refills at any time through one of the following ways: The online Jukely system (except Urgent Care), by calling your provider’s office, or by asking your pharmacy to contact your provider’s office with a refill request. Medication refills are processed only during regular business hours and may not be available until the next business day. Your provider may request additional information or to have a follow-up visit with you prior to refilling your medication.    *Please Note: Medication refills are assigned a new Rx number when refilled electronically. Your pharmacy may indicate that no refills were authorized even though a new prescription for the same medication is available at the pharmacy. Please request the medicine by name with the pharmacy before contacting your provider for a refill.           Sqoothart Access Code: Activation code not generated  Current docBeat Status: Active

## 2017-08-08 NOTE — PROGRESS NOTES
Patient presents to urgent care for wound check of abscess on right Axilla. Patient was seen 2 days ago, had I&D performed, wound culture sent, and placed on 10 day course of amoxicillin. Patient states she has been taking the antibiotics as prescribed. No worsening to pain to the site, but not much improved. Inspection reveals abscess present in right axilla with packing present. No surrounding erythema or edema present. Packing removed and abscess flushed with normal saline. No active drainage. No need to repack at today's visit. Abscess was dressed with non-adherent pad and tegaderm. Keep in place for 24 hours. Wound care discussed. Wound culture was negative. Patient should continue course of antibiotics to prevent any secondary infection. Call or return to office if symptoms persist or worsen. The patient demonstrated a good understanding and agreed with the treatment plan.

## 2017-08-11 ENCOUNTER — TELEPHONE (OUTPATIENT)
Dept: MEDICAL GROUP | Facility: MEDICAL CENTER | Age: 47
End: 2017-08-11

## 2017-08-11 NOTE — TELEPHONE ENCOUNTER
Phone Number Called: [phone]    Message: Called and left message for Alicia at [facility] letting her know I faxed the referral orders to our referral department and that as soon as I hear something back I will call and let her know.    Left Message for patient to call back: yes

## 2017-08-17 NOTE — TELEPHONE ENCOUNTER
I had sent the [facility] referrals to the referral department, but they need the provider to place the orders as [facility] [place]. Notes on the referrals is scanned into media.

## 2017-08-18 ENCOUNTER — TELEPHONE (OUTPATIENT)
Dept: MEDICAL GROUP | Facility: MEDICAL CENTER | Age: 47
End: 2017-08-18

## 2017-08-18 DIAGNOSIS — Z74.09 IMPAIRED MOBILITY AND ADLS: ICD-10-CM

## 2017-08-18 DIAGNOSIS — Z78.9 IMPAIRED MOBILITY AND ADLS: ICD-10-CM

## 2017-08-18 DIAGNOSIS — G71.11 MYOTONIC MUSCULAR DYSTROPHY (HCC): ICD-10-CM

## 2017-08-18 DIAGNOSIS — R01.1 HEART MURMUR: ICD-10-CM

## 2017-08-24 ENCOUNTER — APPOINTMENT (OUTPATIENT)
Dept: PHYSICAL THERAPY | Facility: REHABILITATION | Age: 47
End: 2017-08-24
Attending: PHYSICIAN ASSISTANT
Payer: MEDICARE

## 2017-08-28 ENCOUNTER — HOSPITAL ENCOUNTER (OUTPATIENT)
Dept: PHYSICAL THERAPY | Facility: REHABILITATION | Age: 47
End: 2017-08-28
Attending: PHYSICIAN ASSISTANT
Payer: MEDICARE

## 2017-08-28 PROCEDURE — G8978 MOBILITY CURRENT STATUS: HCPCS | Mod: CL

## 2017-08-28 PROCEDURE — G8979 MOBILITY GOAL STATUS: HCPCS | Mod: CK

## 2017-08-28 PROCEDURE — 97162 PT EVAL MOD COMPLEX 30 MIN: CPT

## 2017-08-28 PROCEDURE — 97110 THERAPEUTIC EXERCISES: CPT

## 2017-08-30 ENCOUNTER — HOSPITAL ENCOUNTER (OUTPATIENT)
Dept: PHYSICAL THERAPY | Facility: REHABILITATION | Age: 47
End: 2017-08-30
Attending: PHYSICIAN ASSISTANT
Payer: MEDICARE

## 2017-08-30 PROCEDURE — 97110 THERAPEUTIC EXERCISES: CPT

## 2017-08-31 ENCOUNTER — TELEPHONE (OUTPATIENT)
Dept: SLEEP MEDICINE | Facility: MEDICAL CENTER | Age: 47
End: 2017-08-31

## 2017-08-31 NOTE — TELEPHONE ENCOUNTER
Received call from Lora @ [facility].  [phone]    She has sent over 2 separate referrals requesting a sleep study be completed through our facility, but I don't see any scanned in.  Called over to the pulmonary office and asked for a better fax number for sleep referrals, advised [phone].    Lora will resend referral a 3rd time and I will call her once the referral is scanned in and scheduled.

## 2017-09-01 ENCOUNTER — TELEPHONE (OUTPATIENT)
Dept: PULMONOLOGY | Facility: HOSPICE | Age: 47
End: 2017-09-01

## 2017-09-01 NOTE — TELEPHONE ENCOUNTER
1. Caller Name: Max Kareem                      Call Back Number: [phone]    2. Message: She wanted to know if we received the fax on this patient. I left her a voicemail letting her know that we have 15 pgs scanned into the  for the patient. Should she have any questions to contact our office.    3. Patient approves office to leave a detailed voicemail/MyChart message: N\A

## 2017-09-05 ENCOUNTER — HOSPITAL ENCOUNTER (OUTPATIENT)
Dept: PHYSICAL THERAPY | Facility: REHABILITATION | Age: 47
End: 2017-09-05
Attending: PHYSICIAN ASSISTANT
Payer: MEDICARE

## 2017-09-05 PROCEDURE — 97110 THERAPEUTIC EXERCISES: CPT

## 2017-09-06 ENCOUNTER — HOSPITAL ENCOUNTER (OUTPATIENT)
Dept: OCCUPATIONAL THERAPY | Facility: REHABILITATION | Age: 47
End: 2017-09-06
Attending: PHYSICIAN ASSISTANT
Payer: MEDICARE

## 2017-09-06 PROCEDURE — 97530 THERAPEUTIC ACTIVITIES: CPT

## 2017-09-06 PROCEDURE — 97166 OT EVAL MOD COMPLEX 45 MIN: CPT

## 2017-09-06 PROCEDURE — G8982 BODY POS GOAL STATUS: HCPCS | Mod: CJ

## 2017-09-06 PROCEDURE — G8981 BODY POS CURRENT STATUS: HCPCS | Mod: CK

## 2017-09-07 ENCOUNTER — APPOINTMENT (OUTPATIENT)
Dept: PHYSICAL THERAPY | Facility: REHABILITATION | Age: 47
End: 2017-09-07
Attending: PHYSICIAN ASSISTANT
Payer: MEDICARE

## 2017-09-12 ENCOUNTER — HOSPITAL ENCOUNTER (OUTPATIENT)
Dept: RADIOLOGY | Facility: MEDICAL CENTER | Age: 47
End: 2017-09-12
Attending: PHYSICIAN ASSISTANT
Payer: MEDICARE

## 2017-09-12 PROCEDURE — G0202 SCR MAMMO BI INCL CAD: HCPCS

## 2017-09-13 ENCOUNTER — HOSPITAL ENCOUNTER (OUTPATIENT)
Dept: PHYSICAL THERAPY | Facility: REHABILITATION | Age: 47
End: 2017-09-13
Attending: PHYSICIAN ASSISTANT
Payer: MEDICARE

## 2017-09-13 ENCOUNTER — HOSPITAL ENCOUNTER (OUTPATIENT)
Dept: OCCUPATIONAL THERAPY | Facility: REHABILITATION | Age: 47
End: 2017-09-13
Attending: PHYSICIAN ASSISTANT
Payer: MEDICARE

## 2017-09-13 PROCEDURE — 97110 THERAPEUTIC EXERCISES: CPT

## 2017-09-14 ENCOUNTER — TELEPHONE (OUTPATIENT)
Dept: MEDICAL GROUP | Facility: MEDICAL CENTER | Age: 47
End: 2017-09-14

## 2017-09-14 NOTE — TELEPHONE ENCOUNTER
Phone Number Called: [phone] (home)       Message: Left msg for pt to call back     Left Message for patient to call back: yes

## 2017-09-14 NOTE — TELEPHONE ENCOUNTER
----- Message from Adithya Martinez P.A.-C. sent at 9/13/2017  6:26 PM PDT -----  Please contact Gayla.  Mammogram without evidence of malignancy.  Follow-up in one year for repeat mammogram.  Thank you.    Adithya

## 2017-09-15 NOTE — TELEPHONE ENCOUNTER
Phone Number Called: [phone] (home)     Message: Left msg for patient to call back.     Left Message for patient to call back: yes

## 2017-09-18 ENCOUNTER — APPOINTMENT (OUTPATIENT)
Dept: PHYSICAL THERAPY | Facility: REHABILITATION | Age: 47
End: 2017-09-18
Attending: PHYSICIAN ASSISTANT
Payer: MEDICARE

## 2017-09-18 NOTE — TELEPHONE ENCOUNTER
Phone Number Called: [phone] (home)       Message: Informed patient of results.        Left Message for patient to call back: yes

## 2017-09-20 ENCOUNTER — APPOINTMENT (OUTPATIENT)
Dept: PHYSICAL THERAPY | Facility: REHABILITATION | Age: 47
End: 2017-09-20
Attending: PHYSICIAN ASSISTANT
Payer: MEDICARE

## 2017-09-25 ENCOUNTER — APPOINTMENT (OUTPATIENT)
Dept: SLEEP MEDICINE | Facility: MEDICAL CENTER | Age: 47
End: 2017-09-25
Payer: MEDICARE

## 2017-09-26 ENCOUNTER — OFFICE VISIT (OUTPATIENT)
Dept: CARDIOLOGY | Facility: MEDICAL CENTER | Age: 47
End: 2017-09-26
Payer: MEDICARE

## 2017-09-26 VITALS
BODY MASS INDEX: 19.26 KG/M2 | OXYGEN SATURATION: 95 % | WEIGHT: 102 LBS | DIASTOLIC BLOOD PRESSURE: 50 MMHG | HEART RATE: 72 BPM | HEIGHT: 61 IN | SYSTOLIC BLOOD PRESSURE: 84 MMHG

## 2017-09-26 DIAGNOSIS — G71.11 MYOTONIC MUSCULAR DYSTROPHY (HCC): ICD-10-CM

## 2017-09-26 DIAGNOSIS — Z78.9 IMPAIRED MOBILITY AND ADLS: ICD-10-CM

## 2017-09-26 DIAGNOSIS — R01.1 HEART MURMUR: ICD-10-CM

## 2017-09-26 DIAGNOSIS — Z74.09 IMPAIRED MOBILITY AND ADLS: ICD-10-CM

## 2017-09-26 PROCEDURE — 99203 OFFICE O/P NEW LOW 30 MIN: CPT | Performed by: INTERNAL MEDICINE

## 2017-09-26 ASSESSMENT — ENCOUNTER SYMPTOMS
PALPITATIONS: 0
HEADACHES: 0
BRUISES/BLEEDS EASILY: 0
SHORTNESS OF BREATH: 0
CHILLS: 0
FEVER: 0
NAUSEA: 0
BLURRED VISION: 0
DIZZINESS: 0
COUGH: 0
SPEECH CHANGE: 1
NERVOUS/ANXIOUS: 0
PND: 0
EYE DISCHARGE: 0
HEARTBURN: 0
MYALGIAS: 0
DEPRESSION: 0
FOCAL WEAKNESS: 1

## 2017-09-26 NOTE — PROGRESS NOTES
Subjective:   Gayla Escudero is a 46 y.o. 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  She's been told that her echocardiogram is abnormal greater than 5 years ago.  She no symptoms of heart disease as best as I can tell.    Today's office EKG demonstrates mild first-degree AV block, otherwise normal    Family history: She is . 2 of her children have myotonic dystrophy one of whom . The 3rd child does not have it  Past Medical History:   Diagnosis Date   • Muscular disease      No past surgical history on file.  No family history on file.  History   Smoking Status   • Never Smoker   Smokeless Tobacco   • Never Used     Allergies   Allergen Reactions   • Codeine Vomiting and Nausea     N&V   • Tramadol      Effects her MD     Outpatient Encounter Prescriptions as of 2017   Medication Sig Dispense Refill   • cyclobenzaprine (FLEXERIL) 10 MG Tab Take 1 Tab by mouth 3 times a day as needed. 30 Tab 0   • promethazine (PHENERGAN) 6.25 MG/5ML Syrup Take 5 mL by mouth 4 times a day as needed for Nausea/Vomiting. 120 mL 0     No facility-administered encounter medications on file as of 2017.      Review of Systems   Constitutional: Negative for chills, fever and malaise/fatigue.   Eyes: Negative for blurred vision and discharge.   Respiratory: Negative for cough and shortness of breath.    Cardiovascular: Negative for chest pain, palpitations, leg swelling and PND.   Gastrointestinal: Negative for heartburn and nausea.   Genitourinary: Negative for dysuria and urgency.   Musculoskeletal: Negative for myalgias.   Skin: Negative for itching and rash.   Neurological: Positive for speech change and focal weakness. Negative for dizziness and headaches.   Endo/Heme/Allergies: Negative for environmental allergies. Does not bruise/bleed easily.  "  Psychiatric/Behavioral: Negative for depression. The patient is not nervous/anxious.         Objective:   BP (!) 84/50   Pulse 72   Ht 1.549 m (5' 1\")   Wt 46.3 kg (102 lb)   SpO2 95%   BMI 19.27 kg/m²     Physical Exam   Constitutional: She is oriented to person, place, and time. She appears well-developed and well-nourished.   Abnormal facies. Somewhat slurred speech   HENT:   Head: Normocephalic and atraumatic.   Eyes: Conjunctivae and EOM are normal. No scleral icterus.   Neck: Neck supple. No JVD present. No thyromegaly present.   Cardiovascular: Normal rate, regular rhythm and normal heart sounds.  Exam reveals no gallop and no friction rub.    No murmur heard.  Pulmonary/Chest: Effort normal and breath sounds normal. No respiratory distress. She has no wheezes. She has no rales. She exhibits no tenderness.   Abdominal: Soft. Bowel sounds are normal. She exhibits no distension and no mass. There is no tenderness.   Neurological: She is alert and oriented to person, place, and time. Coordination normal.   Slow movements with limitation   Skin: Skin is warm and dry. No rash noted. No pallor.   Psychiatric: She has a normal mood and affect. Her behavior is normal. Judgment and thought content normal.       Assessment:     1. Heart murmur     2. Impaired mobility and ADLs     3. Myotonic muscular dystrophy (CMS-HCC)  [facility] EKG (Clinic Performed)    Echocardiogram Comp w/o Cont       Medical Decision Making:  Today's Assessment / Status / Plan:   I'll order echocardiogram.  Follow-up depending on above results.  Thank you for this consult  "

## 2017-09-26 NOTE — LETTER
[facility]   [address]   [address]  [place] [address]  Phone: [phone]  Fax: [phone]              Gayla Escudero  1970    Encounter Date: 2017    Eliel Foster M.D.          PROGRESS NOTE:  Subjective:   Gayla Escudero is a 46 y.o. 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  She's been told that her echocardiogram is abnormal greater than 5 years ago.  She no symptoms of heart disease as best as I can tell.    Today's office EKG demonstrates mild first-degree AV block, otherwise normal    Family history: She is . 2 of her children have myotonic dystrophy one of whom . The 3rd child does not have it  Past Medical History:   Diagnosis Date   • Muscular disease      No past surgical history on file.  No family history on file.  History   Smoking Status   • Never Smoker   Smokeless Tobacco   • Never Used     Allergies   Allergen Reactions   • Codeine Vomiting and Nausea     N&V   • Tramadol      Effects her MD     Outpatient Encounter Prescriptions as of 2017   Medication Sig Dispense Refill   • cyclobenzaprine (FLEXERIL) 10 MG Tab Take 1 Tab by mouth 3 times a day as needed. 30 Tab 0   • promethazine (PHENERGAN) 6.25 MG/5ML Syrup Take 5 mL by mouth 4 times a day as needed for Nausea/Vomiting. 120 mL 0     No facility-administered encounter medications on file as of 2017.      Review of Systems   Constitutional: Negative for chills, fever and malaise/fatigue.   Eyes: Negative for blurred vision and discharge.   Respiratory: Negative for cough and shortness of breath.    Cardiovascular: Negative for chest pain, palpitations, leg swelling and PND.   Gastrointestinal: Negative for heartburn and nausea.   Genitourinary: Negative for dysuria  "and urgency.   Musculoskeletal: Negative for myalgias.   Skin: Negative for itching and rash.   Neurological: Positive for speech change and focal weakness. Negative for dizziness and headaches.   Endo/Heme/Allergies: Negative for environmental allergies. Does not bruise/bleed easily.   Psychiatric/Behavioral: Negative for depression. The patient is not nervous/anxious.         Objective:   BP (!) 84/50   Pulse 72   Ht 1.549 m (5' 1\")   Wt 46.3 kg (102 lb)   SpO2 95%   BMI 19.27 kg/m²      Physical Exam   Constitutional: She is oriented to person, place, and time. She appears well-developed and well-nourished.   Abnormal facies. Somewhat slurred speech   HENT:   Head: Normocephalic and atraumatic.   Eyes: Conjunctivae and EOM are normal. No scleral icterus.   Neck: Neck supple. No JVD present. No thyromegaly present.   Cardiovascular: Normal rate, regular rhythm and normal heart sounds.  Exam reveals no gallop and no friction rub.    No murmur heard.  Pulmonary/Chest: Effort normal and breath sounds normal. No respiratory distress. She has no wheezes. She has no rales. She exhibits no tenderness.   Abdominal: Soft. Bowel sounds are normal. She exhibits no distension and no mass. There is no tenderness.   Neurological: She is alert and oriented to person, place, and time. Coordination normal.   Slow movements with limitation   Skin: Skin is warm and dry. No rash noted. No pallor.   Psychiatric: She has a normal mood and affect. Her behavior is normal. Judgment and thought content normal.       Assessment:     1. Heart murmur     2. Impaired mobility and ADLs     3. 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  Follow-up depending on above results.  Thank you for this consult      WENDIE Wagner.-C.  [address]  [address]  [place] " [address]  VIA In Basket

## 2017-09-27 ENCOUNTER — APPOINTMENT (OUTPATIENT)
Dept: PHYSICAL THERAPY | Facility: REHABILITATION | Age: 47
End: 2017-09-27
Attending: PHYSICIAN ASSISTANT
Payer: MEDICARE

## 2017-09-27 ENCOUNTER — HOSPITAL ENCOUNTER (OUTPATIENT)
Dept: OCCUPATIONAL THERAPY | Facility: REHABILITATION | Age: 47
End: 2017-09-27
Attending: PHYSICIAN ASSISTANT
Payer: MEDICARE

## 2017-09-27 PROCEDURE — 97110 THERAPEUTIC EXERCISES: CPT

## 2017-09-27 PROCEDURE — 97112 NEUROMUSCULAR REEDUCATION: CPT

## 2017-09-28 LAB — EKG IMPRESSION: NORMAL

## 2017-09-29 ENCOUNTER — APPOINTMENT (OUTPATIENT)
Dept: CARDIOLOGY | Facility: MEDICAL CENTER | Age: 47
End: 2017-09-29
Attending: INTERNAL MEDICINE
Payer: MEDICARE

## 2017-10-09 ENCOUNTER — HOSPITAL ENCOUNTER (OUTPATIENT)
Dept: PHYSICAL THERAPY | Facility: REHABILITATION | Age: 47
End: 2017-10-09
Attending: PHYSICIAN ASSISTANT
Payer: MEDICARE

## 2017-10-09 ENCOUNTER — HOSPITAL ENCOUNTER (OUTPATIENT)
Dept: CARDIOLOGY | Facility: MEDICAL CENTER | Age: 47
End: 2017-10-09
Attending: INTERNAL MEDICINE
Payer: MEDICARE

## 2017-10-09 DIAGNOSIS — G71.11 MYOTONIC MUSCULAR DYSTROPHY (HCC): ICD-10-CM

## 2017-10-09 PROCEDURE — 97110 THERAPEUTIC EXERCISES: CPT

## 2017-10-09 PROCEDURE — 93306 TTE W/DOPPLER COMPLETE: CPT

## 2017-10-10 LAB
LV EJECT FRACT  99904: 65
LV EJECT FRACT MOD 2C 99903: 72.86
LV EJECT FRACT MOD 4C 99902: 65.07
LV EJECT FRACT MOD BP 99901: 67.44

## 2017-10-11 ENCOUNTER — APPOINTMENT (OUTPATIENT)
Dept: OCCUPATIONAL THERAPY | Facility: REHABILITATION | Age: 47
End: 2017-10-11
Attending: PHYSICIAN ASSISTANT
Payer: MEDICARE

## 2017-10-12 ENCOUNTER — OCCUPATIONAL THERAPY (OUTPATIENT)
Dept: OCCUPATIONAL THERAPY | Facility: REHABILITATION | Age: 47
End: 2017-10-12
Attending: PHYSICIAN ASSISTANT
Payer: MEDICARE

## 2017-10-12 ENCOUNTER — PHYSICAL THERAPY (OUTPATIENT)
Dept: PHYSICAL THERAPY | Facility: REHABILITATION | Age: 47
End: 2017-10-12
Attending: PHYSICIAN ASSISTANT
Payer: MEDICARE

## 2017-10-12 DIAGNOSIS — G71.00 MUSCULAR DYSTROPHY (HCC): ICD-10-CM

## 2017-10-12 DIAGNOSIS — I43 DILATED CARDIOMYOPATHY SECONDARY TO MYOTONIC DYSTROPHY (HCC): ICD-10-CM

## 2017-10-12 DIAGNOSIS — Z74.09 REDUCED MOBILITY: ICD-10-CM

## 2017-10-12 DIAGNOSIS — G71.11 DILATED CARDIOMYOPATHY SECONDARY TO MYOTONIC DYSTROPHY (HCC): ICD-10-CM

## 2017-10-12 DIAGNOSIS — R26.9 ABNORMALITY OF GAIT AND MOBILITY: ICD-10-CM

## 2017-10-12 PROCEDURE — 97112 NEUROMUSCULAR REEDUCATION: CPT

## 2017-10-12 PROCEDURE — 97530 THERAPEUTIC ACTIVITIES: CPT

## 2017-10-12 PROCEDURE — 97110 THERAPEUTIC EXERCISES: CPT

## 2017-10-12 NOTE — LETTER
October 12, 2017    Adithya Martinez P.A.-C.  [address]  [address]  [facility] [address]      Re:  Gayla Rangeljoshua Escudero          Dear Dr. Martinez.    As you may recall, we have been seeing Gayla Escudero for physical therapy for balance, gait, and strengthening secondary to MD. For therapy to continue, Medicare requires physician review of the treatment plan.  Please review the attached summary of the patient's progress and our plan for continued therapy, and verify that you agree therapy should continue by signing attached document and sending back to our office.     It was a pleasure participating in your patient's care.  Please feel free to contact me if you have any questions or if I can be of any further assistance to your patients.        Sincerely,          Corazon Carter, PT, DPT    No Recipients               Outpatient Physical Therapy  DAILY TREATMENT     [facility] Physical [address]  [address]  [facility] [address]  Phone:  [phone]  Fax:  [phone]    Date: 10/12/2017    Patient: Gayla Escudero  YOB: 1970  MRN: [id number]     Time Calculation  Start time: 0932  Stop time: 1005 Time Calculation (min): 33 minutes     Chief Complaint: Difficulty Walking and Loss Of Balance    Visit #: 2    Subjective:   Activities of Daily Living:     Patient reported ADL status: Feeling good, had one close fall this weekend. Having difficulty with stairs in home.       Objective      Therapeutic Exercises:     1. Nustep  , level 4 x 6 minutes     2. Supine Marching , 2 x10 , emphasis on trunk stabilization and breathing with exercise    3. Stair training , ascend/descend 1 flight , Recommend sidestepping with control to decrease knee hyperextension and increase safety with mobility        Assessment, Response and Plan:   Impairments: abnormal gait, abnormal muscle tone, impaired functional mobility and limited ADL's    Assessment details:  Patient demonstrated  improved ability with sit ->stand with UE support and improving endurance with activity . Patient may benefit from orthotic consult for potential benefit to gait. Recommend one more trial of prefab AFO prior to referral to ensure need. She continues to demonstrate weakness of LE's especially extensors with functional activity.    Plan:   Therapy options:  Physical therapy treatment to continue        Outpatient Physical Therapy  NEUROLOGICAL PROGRESS SUMMARY NOTE      [facility]  [address]  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 Abnormality of gait and mobility R26.9   2. Muscular dystrophy (CMS-[facility]) G71.0       Subjective   Patient has been tolerating PT well and feels the falls of falls has decreased with therapy services.    Objective  Exercises/Treatment     Treatments have been focusing on strength, balance, and gait training. Patient now is using a Hurrycane for long distance ambulation. She still continues to demonstrate LE weakness with increased bilateral hip IR and bilateral knee hyperextension with gait.     Assessment/Response/Plan      The patient will benefit from continuing PT tx for additional 8 weeks with 1-2 visits with the goals of:    1) TUG < 30 seconds  2) Independent HEP   3) No falls in >two weeks   4) Supervised sit to stand with  no arm support  5)Addison sit to stand with arm support      Functional Limitation G-Codes and Severity Modifiers  Current:  [id number]   Goal:  [id number]   Discharge:         Referring provider co-signature:  I have reviewed this plan of care and my co-signature certifies the need for services.  Certification Dates:   From 10/12/17     To 12/12/17    Physician Signature: ________________________________ Date: ______________

## 2017-10-12 NOTE — OP THERAPY DAILY TREATMENT
"Outpatient Occupational Therapy  DAILY TREATMENT     [facility] Occupational Therapy [address]  [address]  [place] [address]  Phone:  [phone]  Fax:  [phone]    Date: 10/12/2017    Patient: Gayla Escudero  YOB: 1970  MRN: [id number]     Time Calculation  Start time: 0900  Stop time: 0930 Time Calculation (min): 30 minutes     Chief Complaint: Hand Weakness; Loss Of Balance; and Self Care Duties    Visit #: 4    Subjective \"I  had 1 loss of balance last weekend but my  caught me\"    Objective:  Occupational Therapy Exercises and Treatments  Therapeutic Treatments and Modalities:    1. Neuro re-education, 15, standing, static postural stabilization exercises standing with feet apart/together while incorporating head and arm movements, EO/EC    2. Functional training, 15, bilateral hands, transport of water with wash cloth from 1 basin to another 20 reps each hand then bilaterally, tongue depressor exercises flipping inward 20 reps, rolling 1lb ball down BLE and return to neutral with B front arm raise      Assessment, Response and Plan:   Impairments: abnormal ADL function, abnormal muscle firing, abnormal muscle tone, activity intolerance, fine motor function and impaired physical strength    Assessment details:  Pt making good progress today with bilateral UE strength and motor control for ADLs.  Standing balance improved as she is able to tolerate an increase in challenges to sensory systems with decreased postural sway.  Pt able to stand feet together with EC for 15 seconds before a posterior-rightward LOB.  HEP updated with good understanding.    Plan:   Frequency:  1x week  Duration in visits:  6  Duration in weeks:  6        "

## 2017-10-12 NOTE — OP THERAPY PROGRESS SUMMARY
Outpatient Physical Therapy  NEUROLOGICAL PROGRESS SUMMARY NOTE      [facility] Physical Therapy [facility]  [address]  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 Abnormality of gait and mobility R26.9   2. Muscular dystrophy (CMS-HCC) G71.0       Subjective   Patient has been tolerating PT well and feels the falls of falls has decreased with therapy services.    Objective  Exercises/Treatment     Treatments have been focusing on strength, balance, and gait training. Patient now is using a Hurrycane for long distance ambulation. She still continues to demonstrate LE weakness with increased bilateral hip IR and bilateral knee hyperextension with gait.     Assessment/Response/Plan      The patient will benefit from continuing PT tx for additional 8 weeks with 1-2 visits with the goals of:    1) TUG < 30 seconds  2) Independent HEP   3) No falls in >two weeks   4) Supervised sit to stand with no arm support  5)Addison sit to stand with arm support      Functional Limitation G-Codes and Severity Modifiers  Current:  [id number]   Goal:  [id number]   Discharge:         Referring provider co-signature:  I have reviewed this plan of care and my co-signature certifies the need for services.  Certification Dates:   From 10/12/17     To 12/12/17    Physician Signature: ________________________________  Date: ______________

## 2017-10-12 NOTE — OP THERAPY DAILY TREATMENT
Outpatient Physical Therapy  DAILY TREATMENT     [facility] Physical Therapy [address]  [address]  [place] [address]  Phone:  [phone]  Fax:  [phone]    Date: 10/12/2017    Patient: Gayla Escudero  YOB: 1970  MRN: [id number]     Time Calculation  Start time: 0932  Stop time: 1005 Time Calculation (min): 33 minutes     Chief Complaint: Difficulty Walking and Loss Of Balance    Visit #: 2    Subjective:   Activities of Daily Living:     Patient reported ADL status: Feeling good, had one close fall this weekend. Having difficulty with stairs in home.       Objective      Therapeutic Exercises:     1. Nustep  , level 4 x 6 minutes     2. Supine Marching , 2 x10 , emphasis on trunk stabilization and breathing with exercise    3. Stair training , ascend/descend 1 flight , Recommend sidestepping with control to decrease knee hyperextension and increase safety with mobility        Assessment, Response and Plan:   Impairments: abnormal gait, abnormal muscle tone, impaired functional mobility and limited ADL's    Assessment details:  Patient demonstrated improved ability with sit ->stand with UE support and improving endurance with activity . Patient may benefit from orthotic consult for potential benefit to gait. Recommend one more trial of prefab AFO prior to referral to ensure need. She continues to demonstrate weakness of LE's especially extensors with functional activity.    Plan:   Therapy options:  Physical therapy treatment to continue

## 2017-10-12 NOTE — LETTER
October 12, 2017    Adithya Martinez P.A.-C.  [address]  [address]  [place] [address]      Re:  Gayla Cliffordnes          Dear Dr. Martinez.    As you may recall, we have been seeing Gayla Escudero for physical therapy with emphasis on gait, balance, and strengthening secondary to MD. For therapy to continue, Medicare requires physician review of the treatment plan.  Please review the attached summary of the patient's progress and our plan for continued therapy, and verify that you agree therapy should continue by signing attached document and sending back to our office.     It was a pleasure participating in your patient's care.  Please feel free to contact me if you have any questions or if I can be of any further assistance to your patients.        Sincerely,          Corazon Carter, PT, DPT    No Recipients               Outpatient Physical Therapy  DAILY TREATMENT     [facility] Physical [address]  [address]  [place] [address]  Phone:  [phone]  Fax:  [phone]    Date: 10/12/2017    Patient: Gayla Escudero  YOB: 1970  MRN: [id number]     Time Calculation             Chief Complaint: Difficulty Walking and Loss Of Balance    Visit #: 2    Subjective:   Activities of Daily Living:     Patient reported ADL status: Feeling good, had one close fall this weekend. Having difficulty with stairs in home.       Objective      Therapeutic Exercises:     1. Nustep  , level 4 x 6 minutes     2. Supine Marching , 2 x10 , emphasis on trunk stabilization and breathing with exercise    3. Stair training , ascend/descend 1 flight , Recommend sidestepping with control to decrease knee hyperextension and increase safety with mobility        Assessment, Response and Plan:   Impairments: abnormal gait, abnormal muscle tone, impaired functional mobility and limited ADL's    Assessment details:  Patient demonstrated improved ability with sit ->stand with UE support and  improving endurance with activity . Patient may benefit from orthotic consult for potential benefit to gait. Recommend one more trial of prefab AFO prior to referral to ensure need. She continues to demonstrate weakness of LE's especially extensors with functional activity.    Plan:   Therapy options:  Physical therapy treatment to continue        Outpatient Physical Therapy  NEUROLOGICAL PROGRESS SUMMARY NOTE      [facility] Physical Therapy [facility]  [address]  [address]  [place] [address]  Phone:  [phone]  Fax:  [phone]    Date of Visit: 10/12/2017    Patient: Gayla Escudero  YOB: 1970  MRN: [id number]     Referring Provider: Not In [facility] Provider  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 Abnormality of gait and mobility R26.9   2. Muscular dystrophy (CMS-HCC) G71.0       Subjective   Patient has been tolerating PT well and feels the falls of falls has decreased with therapy services.    Objective  Exercises/Treatment     Treatments have been focusing on strength, balance, and gait training. Patient now is using a Hurrycane for long distance ambulation. She still continues to demonstrate LE weakness with increased bilateral hip IR and bilateral knee hyperextension with gait.     Assessment/Response/Plan      The patient will benefit from continuing PT tx for additional 8 weeks with 1-2 visits with the goals of:    1) TUG < 30 seconds  2) Independent HEP   3) No falls in >two weeks   4) Supervised sit to stand with no arm support  5)Addison sit to stand with arm support       Functional Limitation G-Codes and Severity Modifiers  Current:  [id number]   Goal:  [id number]   Discharge:         Referring provider co-signature:  I have reviewed this plan of care and my co-signature certifies the need for services.  Certification Dates:   From 10/12/17     To 12/12/17    Physician Signature: ________________________________ Date: ______________

## 2017-10-16 ENCOUNTER — PHYSICAL THERAPY (OUTPATIENT)
Dept: PHYSICAL THERAPY | Facility: REHABILITATION | Age: 47
End: 2017-10-16
Attending: PHYSICIAN ASSISTANT
Payer: MEDICARE

## 2017-10-16 DIAGNOSIS — R26.9 GAIT ABNORMALITY: ICD-10-CM

## 2017-10-16 DIAGNOSIS — R26.89 BALANCE DISORDER: ICD-10-CM

## 2017-10-16 PROCEDURE — 97116 GAIT TRAINING THERAPY: CPT | Mod: XU

## 2017-10-16 PROCEDURE — 97530 THERAPEUTIC ACTIVITIES: CPT

## 2017-10-16 NOTE — OP THERAPY DAILY TREATMENT
Outpatient Physical Therapy  DAILY TREATMENT     [facility] Physical Therapy [address]  [address]  [place] [address]  Phone:  [phone]  Fax:  [phone]    Date: 10/16/2017    Patient: Gayla Escudero  YOB: 1970  MRN: [id number]     Time Calculation  Start time: 0900  Stop time: 0930 Time Calculation (min): 30 minutes     Chief Complaint: Loss Of Balance and Difficulty Walking    Visit #: 3   Pain: 0/10 reported before and after tx    Subjective Patient had fall this past weekend, going up bottom step in home, caught by . Reports she has not been practicing ascend/descend stairs sideways.     Objective   Sit to stand close supervised with bilateral arm rests, L LE  In extension with stand.     Treatments:    1. Therex, Nustep  level 3 5 minutes , Discussed safe home options for stairs, recommended additional small step to decrease height on bottom step for safety, Shuttle 3 cords (double leg), 3x 15 (5 minutes)    3. Gait training, Trialed pre-ruben AFO , 2 x15 ft , modA, patient unable to stance, with 3+ LOB with movement , Trialed loftstrand crutches for additional support , Key, Gait 30 ft no AD , Supervised, patient demos bilateral knee hyperextension , 15 minutes       Assessment/Response/Plan  Patient has more difficulty with tone today and sit to stand transfers, requiring close supervision with arm rests and Key without. Patient continues to demonstrate LE weakness and decrease balance limiting patient ability to safely navigate the stairs.

## 2017-10-18 ENCOUNTER — APPOINTMENT (OUTPATIENT)
Dept: PHYSICAL THERAPY | Facility: REHABILITATION | Age: 47
End: 2017-10-18
Attending: PHYSICIAN ASSISTANT
Payer: MEDICARE

## 2017-10-18 ENCOUNTER — OCCUPATIONAL THERAPY (OUTPATIENT)
Dept: OCCUPATIONAL THERAPY | Facility: REHABILITATION | Age: 47
End: 2017-10-18
Attending: PHYSICIAN ASSISTANT
Payer: MEDICARE

## 2017-10-18 DIAGNOSIS — I43 DILATED CARDIOMYOPATHY SECONDARY TO MYOTONIC DYSTROPHY (HCC): ICD-10-CM

## 2017-10-18 DIAGNOSIS — G71.11 DILATED CARDIOMYOPATHY SECONDARY TO MYOTONIC DYSTROPHY (HCC): ICD-10-CM

## 2017-10-18 PROCEDURE — 97110 THERAPEUTIC EXERCISES: CPT

## 2017-10-18 NOTE — OP THERAPY DAILY TREATMENT
"Outpatient Occupational Therapy  DAILY TREATMENT     [facility] Occupational [address]  [address]  [place] [address]  Phone:  [phone]  Fax:  [phone]    Date: 10/18/2017    Patient: Gayla Escudero  YOB: 1970  MRN: [id number]     Time Calculation  Start time: 0430  Stop time: 0500 Time Calculation (min): 30 minutes     Chief Complaint: Hand Weakness    Visit #: 5    Subjective \"I've been doing the hand exercises\"    Objective:  Occupational Therapy Exercises and Treatments  Therapeutic Exercises:   Exercises:    1. Bilateral  strengthening, 15 reps, 2 sets, 3 second hold, hand exerciser and pink foam    2. Bilateral isolated digit flexion, 20 reps, 3 second hold, IF and MF    3. Bilateral gross digit extension, 10 reps, 3 second hold, using stockinette sleeve    4. Bilateral pinch strengthening, 10 reps, 3 sets, 5 second hold, 2pt, 3pt, lateral pinch.  using red clip        Assessment, Response and Plan:   Impairments: abnormal ADL function, abnormal muscle firing, abnormal muscle tone, activity intolerance, fine motor function and impaired physical strength    Assessment details:  Pt making good progress today with bilateral hand strength focused on  and pinch for ADLs and IADLs.  Issued pink foam and stockinette for there ex.   HEP updated with good understanding.    Plan:   Frequency:  1x week  Duration in visits:  5  Duration in weeks:  5        "

## 2017-10-19 ENCOUNTER — PHYSICAL THERAPY (OUTPATIENT)
Dept: PHYSICAL THERAPY | Facility: REHABILITATION | Age: 47
End: 2017-10-19
Attending: PHYSICIAN ASSISTANT
Payer: MEDICARE

## 2017-10-19 DIAGNOSIS — G71.00 MUSCULAR DYSTROPHY (HCC): ICD-10-CM

## 2017-10-19 DIAGNOSIS — R26.9 ABNORMALITY OF GAIT AND MOBILITY: ICD-10-CM

## 2017-10-19 PROCEDURE — 97110 THERAPEUTIC EXERCISES: CPT

## 2017-10-19 PROCEDURE — 97112 NEUROMUSCULAR REEDUCATION: CPT

## 2017-10-19 NOTE — OP THERAPY DAILY TREATMENT
Outpatient Physical Therapy  DAILY TREATMENT     [facility] Physical [address]  [address]  [place] [address]  Phone:  [phone]  Fax:  [phone]    Date: 10/19/2017    Patient: Gayla Escudero  YOB: 1970  MRN: [id number]     Time Calculation  Start time: 1105  Stop time: 1135 Time Calculation (min): 30 minutes     Chief Complaint: Difficulty Walking (weakness of LE )    Visit #: 4    Subjective Feeling tired this week with multiple MD appts, no falls since last tx.     Objective    Treatments:    1. Therex, Shuttle adduction squeeze with ball 2 x 15, 2 cords 2 x 15 leg press     2. Therex, lumbar stabilization with cuff, knee fall out x 20 , patient tends to bias into lumbar extension     3. Neuro re-education, Balance on airex, double leg 15 second increments , sit to stand on 28 inch surface with cueing to decreased excessive knee hyperextension x 10 and increase lumbar/thoracic extension to neutral with anterior WS       Assessment, Response and Plan:   Assessment details:  Patient appears to be having more difficulty with sit to stand out of standard chair, started compensating again with trunk rotation. Tolerated exercises within session well, but with decreased endurance requires rest breaks. Poor balance and trunk stabilization.  Patient reported no pain within session today. Patient will continue to work with PT to decrease fall risk.

## 2017-10-25 ENCOUNTER — APPOINTMENT (OUTPATIENT)
Dept: PHYSICAL THERAPY | Facility: REHABILITATION | Age: 47
End: 2017-10-25
Attending: PHYSICIAN ASSISTANT
Payer: MEDICARE

## 2017-10-27 ENCOUNTER — APPOINTMENT (OUTPATIENT)
Dept: PHYSICAL THERAPY | Facility: REHABILITATION | Age: 47
End: 2017-10-27
Attending: PHYSICIAN ASSISTANT
Payer: MEDICARE

## 2017-10-31 ENCOUNTER — PHYSICAL THERAPY (OUTPATIENT)
Dept: PHYSICAL THERAPY | Facility: REHABILITATION | Age: 47
End: 2017-10-31
Attending: PHYSICIAN ASSISTANT
Payer: MEDICARE

## 2017-10-31 DIAGNOSIS — G71.00 MUSCULAR DYSTROPHY (HCC): ICD-10-CM

## 2017-10-31 DIAGNOSIS — R26.9 ABNORMALITY OF GAIT AND MOBILITY: ICD-10-CM

## 2017-10-31 PROCEDURE — G8978 MOBILITY CURRENT STATUS: HCPCS | Mod: CL

## 2017-10-31 PROCEDURE — G8979 MOBILITY GOAL STATUS: HCPCS | Mod: CK

## 2017-10-31 PROCEDURE — 97110 THERAPEUTIC EXERCISES: CPT

## 2017-10-31 NOTE — OP THERAPY DAILY TREATMENT
Outpatient Physical Therapy  DAILY TREATMENT     [facility] Physical [address].  [address]  [place] [address]  Phone:  [phone]  Fax:  [phone]    Date: 10/31/2017    Patient: Gayla Escudero  YOB: 1970  MRN: [id number]     Time Calculation             Chief Complaint: Loss Of Balance (LE weakness )    Visit #: 10      Subjective Followed up with Muscular dystrophy MD last week. Per patient and , Dr Hatch wrote referral for orthotics.     Objective      Therapeutic Exercises:     1. Nustep x 8 minutes level 4     2. Shuttle 4 cords , 2x 20 reps, cueing to avoid knee hyperextension     3. Knee curls with ball , 2 x15     4. Bridging with ball , 1x10     5. Balance , tandem stance alterating leading LE , requires one hand for support     Exercise Summary: 30 minutes         Assessment, Response and Plan:   Assessment details:  Plan to assess TUG next visit. Patient tolerated treatment well and slowly making some functional gains. Continues to still demonstrate poor balance and knee hyperextension L > R side.           Dr. Kimball (MD physician at [facility])   [phone]

## 2017-10-31 NOTE — OP THERAPY PROGRESS SUMMARY
Outpatient Physical Therapy  PROGRESS SUMMARY NOTE      [facility] Physical Therapy [facility]  [address]  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 Abnormality of gait and mobility R26.9   2. Muscular dystrophy (CMS-HCC) G71.0       Subjective  Patient reports decreased fall since beginning PT     Objective   Patient currently ambulating with SPC  In community   Exercises/Treatment   Please see PT notes, tx emphasis in trunk, LE strength, and balance     Assessment/Response/Plan  Patient has been seen for ten visits and demonstrating some functional gains with decreased falls and supervised sit to stand. Patient will continue to benefit from skilled PT to work with orthotist for potential AFO's, improve TUG score <28 seconds and improve functional safety     Recommend additional 6-8 session to work on above goals.    Functional Limitation G-Codes and Severity Modifiers  Current: Mobility: Current (): CL   Goal: Mobility: Goal (): CK   Discharge:         Referring provider co-signature:  I have reviewed this plan of care and my co-signature certifies the need for services.  Certification Dates:   From 10/31/2017       To 1/31/17    Physician Signature: _Adithya Martinez PA-C_______________________________ Date: ________11/2/17______

## 2017-11-02 ENCOUNTER — PHYSICAL THERAPY (OUTPATIENT)
Dept: PHYSICAL THERAPY | Facility: REHABILITATION | Age: 47
End: 2017-11-02
Attending: PHYSICIAN ASSISTANT
Payer: MEDICARE

## 2017-11-02 ENCOUNTER — OCCUPATIONAL THERAPY (OUTPATIENT)
Dept: OCCUPATIONAL THERAPY | Facility: REHABILITATION | Age: 47
End: 2017-11-02
Attending: PHYSICIAN ASSISTANT
Payer: MEDICARE

## 2017-11-02 DIAGNOSIS — I43 DILATED CARDIOMYOPATHY SECONDARY TO MYOTONIC DYSTROPHY (HCC): ICD-10-CM

## 2017-11-02 DIAGNOSIS — R26.9 ABNORMALITY OF GAIT AND MOBILITY: ICD-10-CM

## 2017-11-02 DIAGNOSIS — R26.89 BALANCE DISORDER: ICD-10-CM

## 2017-11-02 DIAGNOSIS — G71.00 MUSCULAR DYSTROPHY (HCC): ICD-10-CM

## 2017-11-02 DIAGNOSIS — G71.11 DILATED CARDIOMYOPATHY SECONDARY TO MYOTONIC DYSTROPHY (HCC): ICD-10-CM

## 2017-11-02 PROCEDURE — 97110 THERAPEUTIC EXERCISES: CPT

## 2017-11-02 NOTE — OP THERAPY DAILY TREATMENT
"Outpatient Occupational Therapy  DAILY TREATMENT     [facility] Therapy [address]  [address]  [place] [address]  Phone:  [phone]  Fax:  [phone]    Date: 11/02/2017    Patient: Gayla Escudero  YOB: 1970  MRN: [id number]     Time Calculation  Start time: 1030  Stop time: 1100 Time Calculation (min): 30 minutes     Chief Complaint: Hand Weakness and Extremity Weakness    Visit #: 6    Subjective \"I'm feeling pretty good\"    Objective:  Occupational Therapy Exercises and Treatments  Therapeutic Treatments and Modalities:    1. Therex, 30 minutes, see below    Treatment Summary:  BUE strengthening there ex using 1lb weight: wrist flex/ext, supination/pronation, elbow flex/ext, sh ER/IR, AA ff 20 reps each.  BH ligament tightening 10 reps 5 second hold  Intrinsic plus/minus with cues to avoid hyperextension of RH MCP joint duiring intrinsic minus.  FMC ther ex: flipping over coins, picking up 11 coins in each hand, holding 7 coins in 1 hand followed by translation from palm to tip      Assessment, Response and Plan:   Impairments: abnormal ADL function, abnormal muscle firing, abnormal muscle tone, activity intolerance, fine motor function and impaired physical strength    Assessment details:  Pt making good progress today with bilateral hand strength and fine motor control for daily routine.  HEP updated with good understanding.    Plan:   Frequency:  1x week  Duration in visits:  4  Duration in weeks:  4  Discussed with:  Patient        "

## 2017-11-02 NOTE — OP THERAPY DAILY TREATMENT
Outpatient Physical Therapy  DAILY TREATMENT     [facility] Physical [address]  [address]  [place] [address]  Phone:  [phone]  Fax:  [phone]    Date: 11/02/2017    Patient: Gayla Escudero  YOB: 1970  MRN: [id number]     Time Calculation  Start time: 1110  Stop time: 1140 Time Calculation (min): 30 minutes     Chief Complaint: Difficulty Walking (LE weakness )    Visit #: 6    Subjective No changes from last appt.,     Patient reports no pain before or during the appt.     Objective  TUG with 19 inch standard chair 33 seconds with 3 tries to stand and use of SPC   - with height adjusted to 22 inches, patient TUG score 19 seconds with SPC     Ankle AROM for L DF neutral , PROM 5 degrees DF     R DF 4 degrees, PROM 10 degrees     Therapeutic Exercises:     1. Nustep 6 minutes level 3     2. Shuttle 4 cords , 2x 25 reps, cueing to avoid knee hyperextension, trialed 5 cords but patient unable to tolerate     3. Repeated sit to stand from 22 inch to 19 inches with no UE support , x 15 cueing to increased thoracic/lumbar extension to neutral and anterior WS forward , Recommend patient practicing at 21 inch height for HEP     Exercise Summary: 30 minutes         Assessment, Response and Plan:   Assessment details:  Patient tolerated session but continues to be limited with LE strength and seated surfaces below 21 inches requiring for patient to compensate getting out of the chair with thoracic rotation or hyperextension of knees. Recommend continued strengthening and follow up regarding potential orthotic consult.     Plan:   Plan details:  Reassess sit to stand from 20 inch surface

## 2017-11-07 ENCOUNTER — OCCUPATIONAL THERAPY (OUTPATIENT)
Dept: OCCUPATIONAL THERAPY | Facility: REHABILITATION | Age: 47
End: 2017-11-07
Attending: PHYSICIAN ASSISTANT
Payer: MEDICARE

## 2017-11-07 ENCOUNTER — PHYSICAL THERAPY (OUTPATIENT)
Dept: PHYSICAL THERAPY | Facility: REHABILITATION | Age: 47
End: 2017-11-07
Attending: PHYSICIAN ASSISTANT
Payer: MEDICARE

## 2017-11-07 DIAGNOSIS — I43 DILATED CARDIOMYOPATHY SECONDARY TO MYOTONIC DYSTROPHY (HCC): ICD-10-CM

## 2017-11-07 DIAGNOSIS — R26.9 ABNORMALITY OF GAIT AND MOBILITY: ICD-10-CM

## 2017-11-07 DIAGNOSIS — R26.89 BALANCE DISORDER: ICD-10-CM

## 2017-11-07 DIAGNOSIS — G71.00 MUSCULAR DYSTROPHY (HCC): ICD-10-CM

## 2017-11-07 DIAGNOSIS — G71.11 DILATED CARDIOMYOPATHY SECONDARY TO MYOTONIC DYSTROPHY (HCC): ICD-10-CM

## 2017-11-07 PROCEDURE — 97110 THERAPEUTIC EXERCISES: CPT

## 2017-11-07 NOTE — OP THERAPY DAILY TREATMENT
"Outpatient Occupational Therapy  DAILY TREATMENT     [facility] Occupational Therapy [address]  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 Therex, 30 minutes, see below    Treatment Summary:  BUE strengthening there ex using hand gripper 3 bands 10 reps with 3 second hold.  2lb weight: wrist flex/ext g.e., supination/pronation, elbow flex, sh ER/IR, AA ff with 1lb 20 reps, circumduction with 2lbs x 10 reps each direction.  Elbow extension with 1lb weight x 10 reps  Isolated digit extension palm flat 10 reps each digit, digit abduction/adduction, opposition, palms down for gross digit flexion/extension 1/2 range to maintain tightened ligaments.      Assessment, Response and Plan:   Impairments: abnormal ADL function, abnormal muscle firing, abnormal muscle tone, activity intolerance, fine motor function and impaired physical strength    Assessment details:  Pt making good progress today with bilateral UE strength and motor control for IADLs and work-related activities.  HEP updated with good understanding.    Plan:   Frequency:  1x week  Duration in visits:  3  Duration in weeks:  3  Discussed with:  Patient        "

## 2017-11-07 NOTE — OP THERAPY DAILY TREATMENT
Outpatient Physical Therapy   NEUROLOGICAL DAILY TREATMENT     [facility] Physical Therapy [address]  [address]  [place] [address]  Phone:  [phone]  Fax:  [phone]    Date: 11/07/2017    Patient: Gayla Escudero  YOB: 1970  MRN: [id number]     Time Calculation  Start time: 1430  Stop time: 1500 Time Calculation (min): 30 minutes     Chief Complaint: Loss Of Balance    Visit #: 7    Subjective   Patient reports no issues since last visit. Discussed with patient to have referral for bilateral AFO sent to [facility].     Objective       Therapeutic Exercises:     1. Nustep x 6 minutes  , level 4     2. Sit to stand x 10 reps, 20.5 inch height     3. Sitting balance on airex , shoulder flexion x 10 with ball, lateral rotation x 10, catch with ball x 10     4. Standing with lateral rotation, x 8 , +LOB to R     5. Ascend/descend to flight of stairs , light Key cueing to decrease knee hyperextension and control movement    Exercise Summary: 30 minutes         Assessment, Response and Plan:   Assessment details:  Patient able to tolerate .5 inch decrease with seat height. Difficult for patient to tolerate standing balance and exercises without knee hyperextension for stability vs. Muscle control. Plan to follow up with [facility] for co-consult with orthotic evaluation.

## 2017-11-09 ENCOUNTER — PHYSICAL THERAPY (OUTPATIENT)
Dept: PHYSICAL THERAPY | Facility: REHABILITATION | Age: 47
End: 2017-11-09
Attending: PHYSICIAN ASSISTANT
Payer: MEDICARE

## 2017-11-09 ENCOUNTER — OCCUPATIONAL THERAPY (OUTPATIENT)
Dept: OCCUPATIONAL THERAPY | Facility: REHABILITATION | Age: 47
End: 2017-11-09
Attending: PHYSICIAN ASSISTANT
Payer: MEDICARE

## 2017-11-09 DIAGNOSIS — G71.00 MUSCULAR DYSTROPHY (HCC): ICD-10-CM

## 2017-11-09 DIAGNOSIS — R26.9 ABNORMALITY OF GAIT AND MOBILITY: ICD-10-CM

## 2017-11-09 DIAGNOSIS — R26.89 BALANCE DISORDER: ICD-10-CM

## 2017-11-09 DIAGNOSIS — I43 DILATED CARDIOMYOPATHY SECONDARY TO MYOTONIC DYSTROPHY (HCC): ICD-10-CM

## 2017-11-09 DIAGNOSIS — G71.11 DILATED CARDIOMYOPATHY SECONDARY TO MYOTONIC DYSTROPHY (HCC): ICD-10-CM

## 2017-11-09 PROCEDURE — 97110 THERAPEUTIC EXERCISES: CPT

## 2017-11-09 NOTE — OP THERAPY DAILY TREATMENT
"Outpatient Occupational Therapy  DAILY TREATMENT     [facility] Occupational Therapy [address]  [address]  [place] [address]  Phone:  [phone]  Fax:  [phone]    Date: 11/09/2017    Patient: Gayla Escudero  YOB: 1970  MRN: [id number]     Time Calculation  Start time: 0300  Stop time: 0330 Time Calculation (min): 30 minutes     Chief Complaint: Hand Weakness and Extremity Weakness    Visit #: 8    Subjective \"Nothing new, my right leg hurts\"    Objective:  Occupational Therapy Exercises and Treatments  Therapeutic Treatments and Modalities:    1. Therex, 30 minutes, see below    Treatment Summary:   strengthening using orange studded ball 2 sets of 20 for tightening of ligaments.  Rolling red foam vertically with digits in extension x 30 reps.  Palms flat, gross digit flex/ext, finger tapping individual with focus on avoiding hyperextension at DIPs.  Orange ball sh abd combined with elbow flex/ext x 20 and diagonal patterns, x 10 reps.  Pron/supination, wrist flex/ext, elbow flex/ext to opposite shoulder x 10 reps, all with 1lb weight.            Assessment, Response and Plan:   Impairments: abnormal ADL function, abnormal muscle firing, abnormal muscle tone, activity intolerance, fine motor function and impaired physical strength    Assessment details:  Pt making good progress today with bilateral UE strength and motor control for IADLs and work-related activities.  Ther ex and activities incorporating principles of joint protection, tightening of ligaments, and avoidance of hyperextension.  HEP updated with good understanding.    Plan:   Therapy options:  Occupational therapy treatment to continue  Planned therapy interventions:  Therapeutic exercise, therapeutic activities, home exercise program, ADL self/home management, patient education and neuromuscular re-education  Frequency:  1x week  Duration in visits:  2  Duration in weeks:  2  Discussed with:  " Patient

## 2017-11-09 NOTE — OP THERAPY DAILY TREATMENT
Outpatient Physical Therapy   NEUROLOGICAL DAILY TREATMENT     [facility] Physical [address]  [address]  [place] [address]  Phone:  [phone]  Fax:  [phone]    Date: 11/09/2017    Patient: Gayla Escudero  YOB: 1970  MRN: [id number]     Time Calculation (Patient 10 minutes late to appt)   Start time: 1440  Stop time: 1500 Time Calculation (min): 20 minutes     Chief Complaint: Loss Of Balance (LE weakness )    Visit #: 8  Patient ten minutes late to appt, only seen for 20 minutes.     Subjective   Patient had close call with fall but  caught her.     Objective   Noticing increased R knee hyperextension today with gait.     Trialed 5 ft with R posterior leaf AFO, patient +LOB modA to balance to avoid fall.       Therapeutic Exercises:     1. Nustep x 5:40 level 5 changed to 4 , Reported some pain in R LE 7/10 sharp pain and then 0/10     2. Isometric quad sets , 2 x 10 each side with 5 seconds hold     3. Quadruped , rock forward/back x 10, progressing to alternating UE lift 3 seconds     4. Bridging , 5 second hold x 15         Assessment, Response and Plan:   Assessment details:  Tolerated exercises today with intermittent R knee pain. Recommending collaboration with orthotic vendor on bracing secondary to patient using knee hyperextension as stability.     Plan continue with LE strengthening, gait, and balance as tolerated.

## 2017-11-16 ENCOUNTER — PHYSICAL THERAPY (OUTPATIENT)
Dept: PHYSICAL THERAPY | Facility: REHABILITATION | Age: 47
End: 2017-11-16
Attending: PHYSICIAN ASSISTANT
Payer: MEDICARE

## 2017-11-16 ENCOUNTER — OCCUPATIONAL THERAPY (OUTPATIENT)
Dept: OCCUPATIONAL THERAPY | Facility: REHABILITATION | Age: 47
End: 2017-11-16
Attending: PHYSICIAN ASSISTANT
Payer: MEDICARE

## 2017-11-16 DIAGNOSIS — G71.00 MUSCULAR DYSTROPHY (HCC): ICD-10-CM

## 2017-11-16 DIAGNOSIS — R26.89 BALANCE DISORDER: ICD-10-CM

## 2017-11-16 DIAGNOSIS — R26.9 ABNORMALITY OF GAIT AND MOBILITY: ICD-10-CM

## 2017-11-16 DIAGNOSIS — Z74.09 REDUCED MOBILITY: ICD-10-CM

## 2017-11-16 PROCEDURE — 97110 THERAPEUTIC EXERCISES: CPT

## 2017-11-16 PROCEDURE — 97530 THERAPEUTIC ACTIVITIES: CPT

## 2017-11-16 NOTE — OP THERAPY DAILY TREATMENT
"  Outpatient Occupational Therapy  DAILY TREATMENT     [facility] Occupational Therapy [address]  [address]  [place] [address]  Phone:  [phone]  Fax:  [phone]    Date: 11/16/2017    Patient: Gayla Escudero  YOB: 1970  MRN: [id number]     Time Calculation  Start time: 0300  Stop time: 0330 Time Calculation (min): 30 minutes     Chief Complaint: Extremity Weakness and Hand Weakness    Visit #: 9    SUBJECTIVE:  \"I fell last Friday night and hurt my left ankle\"    OBJECTIVE:  Current objective measures:    strength:  Left: 6lbs   Right: 7lbs    Pinch strength  Left/Right  2-pt: 3lbs/3lbs  3-pt: 5lbs/3lbs  Lateral: 6lbs/6lbs  BUE strength 4-/5  Sitting balance static/dynamic: Fair+/Fair+,  Standing balance static/dynamic: Fair/Fair-  Opposition WFL   Quick Dash: 28% Disability    Therapeutic Treatments and Modalities:    1. Therex, 30 minutes minutes, see below    Therapeutic Treatments and Modalities Summary: BUE strengthening ther ex using hand gripper 3 bands 20 reps. Pinch strengthening red clip with yellow band around tip x 10 reps 3 second hold lateral, 3-pt, and 2-pt.   Re-assessment of physical skills.      ASSESSMENT:   Response to treatment: Pt has actively participated in skilled OT program and has made good progress with BUE strength, dexterity, and activity tolerance to perform ADLs, IADLs, and return to work.  Pt has met the majority of the LTGs set in her initial plan of care and at this point is safe to d/c to HEP.  Pt will continue to work on balance with PT.  D/C OT.    PLAN/RECOMMENDATIONS:   Plan for treatment: discharge patient due to accomplished goals.   Planned interventions for next visit: D/C to HEP.        "

## 2017-11-17 NOTE — OP THERAPY DAILY TREATMENT
Outpatient Physical Therapy  DAILY TREATMENT     [facility] Physical Therapy [address]  [address]  [place] [address]  Phone:  [phone]  Fax:  [phone]    Date: 11/16/2017    Patient: Gayla Escudero  YOB: 1970  MRN: [id number]     Time Calculation  Start time: 1530  Stop time: 1605 Time Calculation (min): 35 minutes     Chief Complaint: Loss Of Balance    Visit #: 9    SUBJECTIVE:  Patient reports fall last week at a hotel while walking downhill     OBJECTIVE:      Therapeutic Treatments and Modalities:     1. Therapeutic Activities (CPT 97129)  Met with patient and Petra Sarah (orthotist) from [facility] for consultation on possible AFO's per MD order. Reviewed gait close S with SPC, patient continues demonstrate bilateral knee hyperextension with gait and stance. When pre-fabricated AFO placed on to mimic decrease knee hyperextension, patient unable to ambulate >2ft without modA with SPC. At this time, orthotist able to offer patient to options for bracing, trial custom AFO with building tolerance scheduling, or locked KAFO.       Pain rating before treatment: 0  Pain rating after treatment: 0    ASSESSMENT:   Response to treatment: Patient considering options. At this time, I feel concerned with patient fall risk for AFO's secondary to patient using stability of bilateral knee hyperextension. Discussed with patient and  speaking with Khalida MD in D for potential other recommendations. Called and left a message at the [facility] clinic.     PLAN/RECOMMENDATIONS:   Plan for treatment: therapy treatment to continue next visit.  Planned interventions for next visit: continue with current treatment, neuromuscular re-education (CPT 01798), therapeutic activities (CPT 47452) and therapeutic exercise (CPT 22721).

## 2017-11-17 NOTE — OP THERAPY DISCHARGE SUMMARY
Outpatient Occupational Therapy  DISCHARGE SUMMARY NOTE    [facility] Occupational Therapy [address]  [address]  [place] [address]  Phone:  [phone]  Fax:  [phone]    Date of Visit: 11/16/2017    Patient: Gayla Escudero  YOB: 1970  MRN: [id number]     Referring Provider: Adithya Martinez P.A.-C.  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];Other reduced mobility [Z74.09]     Occupational Therapy Occurrence Codes    Date of onset of impairment:  1/15/1986   Date of occupational therapy care plan established or reviewed:  9/6/17   Date of occupational therapy treatment started:  9/6/17          Functional Limitation G-Codes and Severity Modifiers  Goal:  [id number]   Discharge:  [id number]       Your patient is being discharged from Physical Therapy with the following comments:   · Goals partially met  · Progress plateau    Comments:  See daily note comments from last visit on 11/16/17    Limitations Remaining:  See daily note comments from last visit on 11/16/17    Recommendations:  Pt is safe to d/c to HEP.    Keshawn Medina MS,OTR/L    Date: 11/17/2017

## 2017-11-22 ENCOUNTER — PHYSICAL THERAPY (OUTPATIENT)
Dept: PHYSICAL THERAPY | Facility: REHABILITATION | Age: 47
End: 2017-11-22
Attending: PHYSICIAN ASSISTANT
Payer: MEDICARE

## 2017-11-22 DIAGNOSIS — G71.00 MUSCULAR DYSTROPHY (HCC): ICD-10-CM

## 2017-11-22 PROCEDURE — 97110 THERAPEUTIC EXERCISES: CPT

## 2017-11-22 NOTE — OP THERAPY DAILY TREATMENT
Outpatient Physical Therapy   NEUROLOGICAL DAILY TREATMENT     [facility] Physical Therapy [address]  [address]  [place] [address]  Phone:  [phone]  Fax:  [phone]    Date: 11/22/2017    Patient: Gayla Escudero  YOB: 1970  MRN: [id number]     Time Calculation (Patient 10 minutes late to appt)   Start time: 1530  Stop time: 1600 Time Calculation (min): 30 minutes     Chief Complaint: No chief complaint on file.    Visit #: 10      Subjective   Patient had close call with fall but  caught her.     Objective       Therapeutic Exercises (CPT 24431):     Total treatment time spent on Therapeutic Exercises: 30 minutes.      1. Nu Step , x6 min, L3    2. SAQ, 2x10x5 sec ea    3. Quadruped , rock forward/back x 10, progressing to alternating UE lift 3 seconds     4. Bridging w/ball add & TB abd, 10x5 sec ea        Assessment, Response and Plan:   Assessment details:    Tolerated ther ex w/o increased c/o knee pain. Relies on R knee hyperestension.    Plan:   Plan details:  Continue with LE strengthening, gait, and balance as tolerated.

## 2017-11-27 ENCOUNTER — PHYSICAL THERAPY (OUTPATIENT)
Dept: PHYSICAL THERAPY | Facility: REHABILITATION | Age: 47
End: 2017-11-27
Attending: PHYSICIAN ASSISTANT
Payer: MEDICARE

## 2017-11-27 DIAGNOSIS — G71.00 MUSCULAR DYSTROPHY (HCC): ICD-10-CM

## 2017-11-27 DIAGNOSIS — R26.89 BALANCE DISORDER: ICD-10-CM

## 2017-11-27 DIAGNOSIS — R26.9 ABNORMALITY OF GAIT AND MOBILITY: ICD-10-CM

## 2017-11-27 PROCEDURE — 97110 THERAPEUTIC EXERCISES: CPT

## 2017-11-27 NOTE — OP THERAPY DAILY TREATMENT
Outpatient Physical Therapy  DAILY TREATMENT     [facility] Physical [address]  [address]  [place] [address]  Phone:  [phone]  Fax:  [phone]    Date: 11/27/2017    Patient: Gayla Escudero  YOB: 1970  MRN: [id number]     Time Calculation  Start time: 1130  Stop time: 1205 Time Calculation (min): 35 minutes     Chief Complaint: Unsteady Gait (decreased LE strength )    Visit #: 16   SUBJECTIVE:  Patient report no falls since last visit.    OBJECTIVE:  Current objective measures:   Tug with standard chair plus 2 inches: 2 trials 29 seconds with Hurrycane  With standard chair:   29 seconds with Hurrycane      Therapeutic Exercises (CPT 08546):     1. Nustep 7 minutes level 3     2. Heel slides 1 x 15 each side     3. Short arc quad set 2 x 15 , at end range ankle DF/PF 2x 10 reps     4. Knee curls on ball , 2 x 15 reps     5. Bridging, intially on ball, then LE in hooklying on mat , 2x 10, Patient reported back pain with use of stability ball, subsided in different position.         Pain rating before treatment: 0  Pain rating after treatment: 0    ASSESSMENT:   Response to treatment: Patient tolerated treatment well, continues to have LE and trunk weakness but demonstrated some improvement with TUG score today.    PLAN/RECOMMENDATIONS:   Plan for treatment: therapy treatment to continue next visit.  Planned interventions for next visit: continue with current treatment.

## 2017-11-29 ENCOUNTER — PHYSICAL THERAPY (OUTPATIENT)
Dept: PHYSICAL THERAPY | Facility: REHABILITATION | Age: 47
End: 2017-11-29
Attending: PHYSICIAN ASSISTANT
Payer: MEDICARE

## 2017-11-29 DIAGNOSIS — R26.89 BALANCE DISORDER: ICD-10-CM

## 2017-11-29 DIAGNOSIS — R26.9 ABNORMALITY OF GAIT AND MOBILITY: ICD-10-CM

## 2017-11-29 DIAGNOSIS — G71.00 MUSCULAR DYSTROPHY (HCC): ICD-10-CM

## 2017-11-29 PROCEDURE — 97110 THERAPEUTIC EXERCISES: CPT

## 2017-11-29 NOTE — OP THERAPY DAILY TREATMENT
Outpatient Physical Therapy  DAILY TREATMENT     [facility] Physical [address]  [address]  [place] [address]  Phone:  [phone]  Fax:  [phone]    Date: 11/29/2017    Patient: Gayla Escudero  YOB: 1970  MRN: [id number]     Time Calculation  Start time: 1140  Stop time: 1210 Time Calculation (min): 30 minutes     Chief Complaint: Unsteady Gait (LE/trunk weakness)    Visit #: 12    SUBJECTIVE:  Patient reports no falls since last visit.     OBJECTIVE:  Gait supervised with [employer], 150 ft with 1 LOB patient able to self recover.      Therapeutic Exercises (CPT 23083):     1. Nustep , 8 minutes, level 3     2. Clamshells , 2x 10 , cueing for hip position and avoid posterior pelvic rotation     3. 90/90 hip/knee flexion in supine with stability balls , knee flexion x 10 reps     4. 90/90 hip/knee flexion in supine with stability balls , marching 2 x 10 reps     5. Quadruped with anterior WS , 2 x 1 minute      Therapeutic Exercise Summary: 30 minutes, with review of patient HEP         Pain rating before treatment: 0  Pain rating after treatment: 0    ASSESSMENT:   Response to treatment: Patient tolerated tx, requires rest breaks throughout. While discussing HEP, patient reports decreased consistency with HEP. Recommend patient to continue HEP even if she need to decrease reps to maximize strength potential.     PLAN/RECOMMENDATIONS:   Plan for treatment: therapy treatment to continue next visit. Call Dr. Kimball regarding AFO consult potentially at [facility].   Planned interventions for next visit: continue with current treatment.

## 2017-12-07 ENCOUNTER — PHYSICAL THERAPY (OUTPATIENT)
Dept: PHYSICAL THERAPY | Facility: REHABILITATION | Age: 47
End: 2017-12-07
Attending: PHYSICIAN ASSISTANT
Payer: MEDICARE

## 2017-12-07 DIAGNOSIS — R26.9 ABNORMALITY OF GAIT AND MOBILITY: ICD-10-CM

## 2017-12-07 DIAGNOSIS — G71.00 MUSCULAR DYSTROPHY (HCC): ICD-10-CM

## 2017-12-07 DIAGNOSIS — R26.89 BALANCE DISORDER: ICD-10-CM

## 2017-12-07 PROCEDURE — 97110 THERAPEUTIC EXERCISES: CPT | Mod: KX

## 2017-12-07 NOTE — OP THERAPY DAILY TREATMENT
Outpatient Physical Therapy  DAILY TREATMENT     [facility] Physical [address]  [address]  [place] [address]  Phone:  [phone]  Fax:  [phone]    Date: 12/07/2017    Patient: Gayla Escudero  YOB: 1970  MRN: [id number]     Time Calculation  Start time: 1535  Stop time: 1605 Time Calculation (min): 30 minutes     Chief Complaint: Unsteady Gait    Visit #: 13    SUBJECTIVE:  Patient reports no falls, able to complete 6-7 reps of clamshell before unable to continue secondary to feeling tired. Patient reports stairs are getting easier and ability to get up from chair.     OBJECTIVE:         Therapeutic Exercises (CPT 34182):     1. Nustep 7 minutes , level 4     2. Clamshells , 2 x 10 each side , improved control of pelvic position     3. Trunk stabilization with cuff , with bent knee fall out x 10 each side     4. Trunk stabilization with cuff , marching 2 x 2 minute increments , cueing to decrease lumbar extension     Patient given HEP with trunk stabilization     Pain rating before treatment: 0  Pain rating after treatment: 0    ASSESSMENT:   Response to treatment: Patient tolerated treatment, difficulty with maintain neutral spine and poor hip disassociation with LE's. Patient continues to demonstrate knee hyperextension with gait as her stability mechanism with SPC (hurrycane). Plan to reassess goals and progress next session    PLAN/RECOMMENDATIONS:   Plan for treatment: therapy treatment to continue next visit.  Planned interventions for next visit: continue with current treatment.

## 2017-12-12 ENCOUNTER — APPOINTMENT (OUTPATIENT)
Dept: PHYSICAL THERAPY | Facility: REHABILITATION | Age: 47
End: 2017-12-12
Attending: PHYSICIAN ASSISTANT
Payer: MEDICARE

## 2017-12-14 ENCOUNTER — TELEPHONE (OUTPATIENT)
Dept: PHYSICAL THERAPY | Facility: REHABILITATION | Age: 47
End: 2017-12-14

## 2017-12-14 ENCOUNTER — APPOINTMENT (OUTPATIENT)
Dept: PHYSICAL THERAPY | Facility: REHABILITATION | Age: 47
End: 2017-12-14
Attending: PHYSICIAN ASSISTANT
Payer: MEDICARE

## 2017-12-14 NOTE — OP THERAPY DISCHARGE SUMMARY
Outpatient Physical Therapy  DISCHARGE SUMMARY NOTE      [facility] Physical Therapy [address]  [address]  [place] [address]  Phone:  [phone]  Fax:  [phone]        Patient Name:  Gayla Escudero  :  1970  MR#:  [id number]    HICN:         Visits: 18           Cancel/No-Show: 3    Diagnosis/ICD-10: Z74.05    Referring Provider: Adithya Martinez P.A.-C.      SOC Date: 17      Onset Date: chronic       Your patient is being discharged from Physical therapy with the following comments:  Pt. cancelled or missed more than 2 scheduled appointments/non-compliant      Comments:   Gayla has been seen for 18 visits, unfortunately she has had a no-show and some late cancels requiring us to refer her back to her MD. If Gayla wants to continue PT, she will need a new referral.         Limitations/Remaining: Gait with Hurrycane, Gayla continues to bias into bilateral knee hyperextension for stability with gait. Orthotist was consulted for AFO, but patient unable to tolerate AFO trial with increased fall risk.         Recommendations:  D/C from outpatient at this time. Patient to speak with MD about new referral if she wants to continue PT services.       Corazon Carter, PT, DPT

## 2017-12-19 ENCOUNTER — APPOINTMENT (OUTPATIENT)
Dept: PHYSICAL THERAPY | Facility: REHABILITATION | Age: 47
End: 2017-12-19
Attending: PHYSICIAN ASSISTANT
Payer: MEDICARE

## 2017-12-21 ENCOUNTER — APPOINTMENT (OUTPATIENT)
Dept: PHYSICAL THERAPY | Facility: REHABILITATION | Age: 47
End: 2017-12-21
Attending: PHYSICIAN ASSISTANT
Payer: MEDICARE

## 2017-12-26 ENCOUNTER — APPOINTMENT (OUTPATIENT)
Dept: PHYSICAL THERAPY | Facility: REHABILITATION | Age: 47
End: 2017-12-26
Attending: PHYSICIAN ASSISTANT
Payer: MEDICARE

## 2017-12-28 ENCOUNTER — APPOINTMENT (OUTPATIENT)
Dept: PHYSICAL THERAPY | Facility: REHABILITATION | Age: 47
End: 2017-12-28
Attending: PHYSICIAN ASSISTANT
Payer: MEDICARE

## 2018-01-15 ENCOUNTER — SLEEP CENTER VISIT (OUTPATIENT)
Dept: SLEEP MEDICINE | Facility: MEDICAL CENTER | Age: 48
End: 2018-01-15
Payer: MEDICARE

## 2018-01-15 VITALS
BODY MASS INDEX: 19.83 KG/M2 | SYSTOLIC BLOOD PRESSURE: 100 MMHG | DIASTOLIC BLOOD PRESSURE: 72 MMHG | RESPIRATION RATE: 16 BRPM | HEART RATE: 84 BPM | OXYGEN SATURATION: 95 % | WEIGHT: 105 LBS | HEIGHT: 61 IN

## 2018-01-15 DIAGNOSIS — F51.04 CHRONIC INSOMNIA: ICD-10-CM

## 2018-01-15 DIAGNOSIS — R01.1 HEART MURMUR: ICD-10-CM

## 2018-01-15 DIAGNOSIS — G47.30 SLEEP DISORDER BREATHING: ICD-10-CM

## 2018-01-15 DIAGNOSIS — G71.11 MYOTONIC MUSCULAR DYSTROPHY (HCC): ICD-10-CM

## 2018-01-15 PROCEDURE — 99204 OFFICE O/P NEW MOD 45 MIN: CPT | Performed by: FAMILY MEDICINE

## 2018-01-15 NOTE — PATIENT INSTRUCTIONS
Stimulus control (Ref: UpToDate)    Patients with insomnia may associate their bed and bedroom with the fear of not sleeping or other arousing events, rather than the more pleasurable anticipation of sleep. The longer one stays in bed trying to sleep, the stronger the association becomes. This perpetuates the difficulty falling asleep.Stimulus control therapy is a strategy whose purpose is to disrupt this association by enhancing the likelihood of sleep . Patients should not go to bed until they are sleepy and should use the bed primarily for sleep (and not for reading, watching television, eating, or worrying). They should not spend more than 20 minutes in bed awake. If they are awake after 20 minutes, they should leave the bedroom and engage in a relaxing activity, such as reading or listening to soothing music. Patients should not engage in activities that stimulate them or reward them for being awake in the middle of the night, such as eating or watching television. In addition, they should not return to bed until they are tired and feel ready to sleep. If they return to bed and still cannot sleep within 20 minutes, the process should be repeated. An alarm should be set to wake the patient at the same time every morning, including weekends. Daytime naps are not allowed.    Sleep hygiene (ref: UpToDate)  ?Sleep as long as necessary to feel rested (usually seven to eight hours for adults) and then get out of bed  ?Maintain a regular sleep schedule, particularly a regular wake-up time in the morning  ?Try not to force sleep  ?Avoid caffeinated beverages after lunch  ?Avoid alcohol near bedtime (eg, late afternoon and evening)  ?Avoid smoking or other nicotine intake, particularly during the evening  ?Adjust the bedroom environment as needed to decrease stimuli (eg, reduce ambient light, turn off the television or radio)  ?Avoid use of light-emitting screens  (laptops, tablets, smartphones, Kangsheng Chuangxiangooks) 2 hours before  bedtime   ?Resolve concerns or worries before bedtime  ?Exercise regularly for at least 20 minutes, preferably more than four to five hours prior to bedtime.  ?Avoid daytime naps, especially if they are longer than 20 to 30 minutes or occur late in the day

## 2018-01-15 NOTE — PROGRESS NOTES
"     [facility]  Consult Note     Date: 1/15/2018 / Time: 9:15 AM    Patient ID:   Name:             Gayla Escudero   YOB: 1970  Age:                 47 y.o.  female   MRN:               [id number]      Thank you for requesting a sleep medicine consultation on Gayla Escudero at the sleep center. She presents today with the chief complaints of early morning headaches and occasional excessive daytime sleepiness (EDS) . 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     HISTORY OF PRESENT ILLNESS:       At night,  Gayla Escudero goes to bed around 4-5 am on weekdays and around on weekends. She gets out of bed at 6-8 on weekdays and on weekends. She  averages 3 hrs of sleep on a good night and 3 hrs on a bad night. She falls asleep within 60-90 minutes. She awakens 1-2 times a night due to leg pains and no obvious reason. It takes her hours to fall back asleep. During that time She lies in bed.  She  does not use the bed only for sleeping. During this time she watches TV.     Kota is not aware of snoring/witnessed apneas/gasping or choking in sleep. 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 She  denies any symptoms of restless legs syndrome such as an \"urge to move\"  She  legs in the evening or bedtime. She  denies any symptoms of narcolepsy such as sleep paralysis or cataplexy, or any symptoms to suggest parasomnias such as sleep walking or acting out of dreams. She  has not used any medications for her sleep problem.    Overall,  Connor finds her sleep refreshing. When She  wakes up in the morning, She  does does feel tired. In terms of  excessive daytime sleepiness,  She complains of sleepiness while reading or watching TV.   She  Does take regular naps. The naps are usually 15-20 min long.      REVIEW OF SYSTEMS:       Constitutional: Denies fevers, Denies weight " "changes  Eyes: Denies changes in vision, no eye pain  Ears/Nose/Throat/Mouth: Denies nasal congestion or sore throat   Cardiovascular: Denies chest pain or palpitations   Respiratory: Denies shortness of breath , Denies cough  Gastrointestinal/Hepatic: Denies abdominal pain, nausea, vomiting, diarrhea, constipation or GI bleeding   Genitourinary: Denies bladder dysfunction, dysuria or frequency  Musculoskeletal/Rheum: Denies  joint pain and swelling   Skin/Breast: Denies rash, denies breast lumps or discharge  Neurological: Denies headache, confusion, memory loss or focal weakness/parasthesias  Psychiatric: denies mood disorder     Comprehensive review of systems form is reviewed with the patient and is attached in the EMR.     PMH:  has a past medical history of Muscular disease and Muscular dystrophy (CMS-[facility]).  MEDS:   Current Outpatient Prescriptions:   •  cyclobenzaprine (FLEXERIL) 10 MG Tab, Take 1 Tab by mouth 3 times a day as needed., Disp: 30 Tab, Rfl: 0  •  promethazine (PHENERGAN) 6.25 MG/5ML Syrup, Take 5 mL by mouth 4 times a day as needed for Nausea/Vomiting., Disp: 120 mL, Rfl: 0  ALLERGIES:   Allergies   Allergen Reactions   • Codeine Vomiting and Nausea     N&V   • Tramadol      Effects her MD     SURGHX:   Past Surgical History:   Procedure Laterality Date   • HYSTERECTOMY LAPAROSCOPY       SOCHX:  reports that she has never smoked. She has never used smokeless tobacco. She reports that she does not drink alcohol or use drugs..   FH: History reviewed. No pertinent family history.        Physical Exam:  Vitals/ General Appearance:   Weight/BMI: Body mass index is 19.84 kg/m².  Blood pressure 100/72, pulse 84, resp. rate 16, height 1.549 m (5' 1\"), weight 47.6 kg (105 lb), SpO2 95 %.  Vitals:    01/15/18 0913   BP: 100/72   Pulse: 84   Resp: 16   SpO2: 95%   Weight: 47.6 kg (105 lb)   Height: 1.549 m (5' 1\")       Pt. is alert and oriented to time, place and person. Cooperative and in no apparent " distress.       1. Head: Atraumatic, normocephalic. There is no mandibular hypoplasia or maxillary over-jut.   2. Ears: Normal tympanic membrane and no discharge  3. Nose: No inferior turbinate hypertophy, no septal deviation, no polyp.   4. Throat: Oropharynx appears crowded in that the palate is overhanging (Malam Anuradha scale 4. Tonsils are not enlarged, uvula is large, pharynx not inflamed. Tongue is large. She has intact dentition and oral hygiene is fair.  5. Neck: Supple. No thyromegaly  6. Chest: Trachea central, no spine deformity (Scoliosis/Lordosis/Kyphosis)  7. Lungs auscultation: B/L good air entry, vesicular breath sounds, no adventitious sounds  8. Heart auscultation: 1st and 2nd heart sounds normal, regular rhythm. No appreciable murmur.  9. Abdomen: Soft, non tender, no organomegaly. Bowel sounds present  10. Extremities:  no clubbing, no pedal edema.   Peripheral pulses felt.  11. Skin: No rash  12. NEUROLOGICAL EXAMINATION: On neurological exam, the patient was alert and oriented x3. speech was clear and fluent without dysarthria.  Cranial nerve exam II through XII was normal. Motor exam revealed decreased bulk, tone and strength 4/5 on right side and 3+/4- on left side, which was symmetrical in the upper and lower extremities bilaterally.    INVESTIGATIONS:   10/10/17 ECHO: Normal ECHO with Left ventricular ejection fraction is visually estimated to be 65%    ASSESSMENT AND PLAN     1. She  has symptoms of Obstructive Sleep Apnea (JOSÉ). She  has excessive daytime sleepiness that interferes with activites of daily living. She  risk factors for JOSÉ include crowded oropharynx.     The pathophysiology of JOSÉ and the increased risk of cardiovascular morbidity from untreated JOSÉ is discussed in detail with the patient   We have discussed diagnostic options including in-laboratory, attended polysomnography and home sleep testing. We have also discussed treatment options including airway pressurization,  reconstructive otolaryngologic surgery, dental appliances and weight management.     Subsequently,treatment options will be discussed based on the diagnostic study. Meanwhile, She is urged to avoid supine sleep, weight gain and alcoholic beverages since all of these can worsen JOSÉ. She is cautioned against drowsy driving. If She feels sleepy while driving, She must pull over for a break/nap, rather than persist on the road, in the interest of She own safety and that of others on the road.  Plan    She  will be scheduled for an overnight PSG to assess sleep related breathing disorder.    2. Regarding Myotonic Muscular Distrophy she is managed by [facility]. She is currently enrolled in PT. As per pt she has mild SOB on excertion and had a walking stress test and did not qualify for supplemental oxygen. Results not available to be reviewed.    3. The evolution of chronic insomnia due to medical condition is discussed in detail. The importance of cognitive restructuring and behavior modification therapy is emphasized for long-term improvement rather than the use of hypnotic agents, which may offer short term relief but may lead to the development of tolerance and side effects with prolonged use. Evening exercise, wind-down before bedtime, and stimulus control (leaving the bed when unable to fall back asleep at night) are explained.      Plan   -Handouts on stimulus control and sleep hygiene are given and a couple of titles of books on insomnia are recommended, written by behavioral psychologists.   - Referral to Dr. Ángela Ramos for CBT-i    4. Regarding treatment of other past medical problems and general health maintenance,  She is urged to follow up with PCP.    F/U after PSG

## 2018-01-23 ENCOUNTER — SLEEP STUDY (OUTPATIENT)
Dept: SLEEP MEDICINE | Facility: MEDICAL CENTER | Age: 48
End: 2018-01-23
Attending: FAMILY MEDICINE
Payer: MEDICARE

## 2018-01-23 DIAGNOSIS — G47.30 SLEEP DISORDER BREATHING: ICD-10-CM

## 2018-01-23 DIAGNOSIS — R01.1 HEART MURMUR: ICD-10-CM

## 2018-01-23 DIAGNOSIS — F51.04 CHRONIC INSOMNIA: ICD-10-CM

## 2018-01-23 DIAGNOSIS — G71.11 MYOTONIC MUSCULAR DYSTROPHY (HCC): ICD-10-CM

## 2018-01-23 PROCEDURE — 95810 POLYSOM 6/> YRS 4/> PARAM: CPT | Performed by: FAMILY MEDICINE

## 2018-01-24 NOTE — PROCEDURES
Clinical Comments:  The patient underwent a comprehensive polysomnogram using the standard montage for measurement of parameters of sleep, respiratory events, movement abnormalities, heart rate and rhythm. A microphone was used to monitor snoring.      INTERPRETATION:  The study was started at 8:59 PM.  The total recording time was 480.8 minutes with a sleep period of 430.5 minutes and the total sleep time was 369.5 minutes with a sleep efficiency of 76.8%.  The sleep latency was 50.3 minutes, and REM latency was 93.0 minutes.  The patient experienced 87 arousals in total, for an arousal index of 14.1    RESPIRATORY: The patient had 12 apneas in total.  Of these, 3 were obstructive apneas, and 9 were central apneas.  This resulted in an apnea index (AI) of 1.9.  The patient had 39 hypopneas, for a hypopnea index of 6.3.  The overall AHI was 8.3, while the AHI during Stage R sleep was 11.2.  AHI while supine was N/A.    OXIMETRY: Oxygen saturation monitoring showed a mean SpO2 of 91.5%, with a minimum oxygen saturation of 80.0%.  Oxygen saturations were less than or = 89% for 11.7 minutes of sleep time.    CARDIAC: The highest heart rate during the recording was 96.0 beats per minute.  The average heart rate during sleep was 63.3 bpm.    LIMB MOVEMENTS: There were a total of 80 PLMs during sleep, of which 13 were PLMs arousals.  This resulted in a PLMS index of 13.0.    Technical summary:The patient underwent a diagnostic polysomnogram. This was a 16 channel montage study to include a 6 channel EEG, a 2 channel EOG, and chin EMG, left and right leg EMG, a snore channel, a nasal pressure transducer, and a nasal oral airflow semester.   Respiratory effort was assessed with the use of a thoracic and abdominal monitor and overnight oximetry was obtained. Audio and video recordings were reviewed. This was a fully attended study and sleep stage scoring was performed. The test was technically adequate.     General sleep  summary:  General sleep summary:  During the overnight study, the sleep latency was 50 min which is prolonged. The REM latency was 75 min which is normal. The total sleep time was 369 min and sleep efficiency was 76% which is decreased.  Sleep stage proportions showed decreased REM sleep and increased WASo 61 min.  In regards to sleep quality there was a mild degree of sleep fragmentation as shown by the arousal index of 14 an hour which is increased. The arousals were due to spontaneous arousal.    Respiratory summary: During the overnight study, the patient demonstrated mild obstructive sleep apnea as shown by the apnea hypopnea index of 8.3/hr. There was a total of 12 apneas and hypopneas and 39 respiratory effort related arousals. In the supine position the respiratory disturbance index was 0 an hour and in the non-supine position the respiratory disturbance index was 10.1 per hour. The respiratory events were associated with O2 marshall of 80% and averahe O2 saturation of 91%. There was mild snoring. The patient demonstrated a NSR with an average heartbeat of 64 bpm.     Periodic limb movement summary: The patient demonstrated an normal degree of periodic limb movements as shown by the PLM index of 0 per hour.      Impression:  1.  Mild obstructive sleep apnea  2.  Sleep hypoxia      Recommendations:  Recommend the patient return for a CPAP titration. In some cases alternative treatment options may prove effective in resolving sleep apnea and these options include upper airway surgery, the use of a dental orthotic or weight loss and positional therapy. Clinical correlation is required. In general patients with sleep apnea are advised to avoid alcohol and sedatives and to not operate a motor vehicle while drowsy and are at a greater risk for cardiovascular disease.

## 2018-01-26 ENCOUNTER — SLEEP STUDY (OUTPATIENT)
Dept: SLEEP MEDICINE | Facility: MEDICAL CENTER | Age: 48
End: 2018-01-26
Attending: FAMILY MEDICINE
Payer: MEDICARE

## 2018-01-26 ENCOUNTER — SLEEP CENTER VISIT (OUTPATIENT)
Dept: SLEEP MEDICINE | Facility: MEDICAL CENTER | Age: 48
End: 2018-01-26
Payer: MEDICARE

## 2018-01-26 VITALS
WEIGHT: 106 LBS | SYSTOLIC BLOOD PRESSURE: 100 MMHG | TEMPERATURE: 97.3 F | RESPIRATION RATE: 16 BRPM | OXYGEN SATURATION: 95 % | HEART RATE: 84 BPM | HEIGHT: 61 IN | BODY MASS INDEX: 20.01 KG/M2 | DIASTOLIC BLOOD PRESSURE: 70 MMHG

## 2018-01-26 DIAGNOSIS — G47.33 OSA (OBSTRUCTIVE SLEEP APNEA): ICD-10-CM

## 2018-01-26 DIAGNOSIS — G71.11 MYOTONIC MUSCULAR DYSTROPHY (HCC): ICD-10-CM

## 2018-01-26 DIAGNOSIS — F51.04 CHRONIC INSOMNIA: ICD-10-CM

## 2018-01-26 PROCEDURE — 99214 OFFICE O/P EST MOD 30 MIN: CPT | Performed by: FAMILY MEDICINE

## 2018-01-26 PROCEDURE — 95811 POLYSOM 6/>YRS CPAP 4/> PARM: CPT | Performed by: INTERNAL MEDICINE

## 2018-01-26 NOTE — PROGRESS NOTES
[facility] Sleep Center Follow Up Note     Date: 1/26/2018 / Time: 10:56 AM    Patient ID:   Name:             Gayla Escudero   YOB: 1970  Age:                 47 y.o.  female   MRN:               [id number]      Thank you for requesting a sleep medicine consultation on Gayla Escudero at the sleep center. She presents today with the chief complaints of JOSÉ and PSG follow up.     HISTORY OF PRESENT ILLNESS:       Pt is currently not on PAP therapy. She goes to sleep around **4-5 am on weekdays and around on weekends. She gets out of bed at 6-8 on weekdays and on weekends. She  averages 3 hrs of sleep on a good night and 3 hrs on a bad night. She falls asleep within 60-90 minutes. She is using CPAP most days of the week.PSG on 1/23/17  mild obstructive sleep apnea as shown by the apnea hypopnea index of 8.3/hr. There was a total of 12 apneas and hypopneas and 39 respiratory effort related arousals. In the supine position the respiratory disturbance index was 0 an hour and in the non-supine position the respiratory disturbance index was 10.1 per hour. The respiratory events were associated with O2 marshall of 80% and averahe O2 saturation of 91%.        REVIEW OF SYSTEMS:       Constitutional: Denies fevers, Denies weight changes  Eyes: Denies changes in vision, no eye pain  Ears/Nose/Throat/Mouth: Denies nasal congestion or sore throat   Cardiovascular: Denies chest pain or palpitations   Respiratory: Denies shortness of breath , Denies cough  Gastrointestinal/Hepatic: Denies abdominal pain, nausea, vomiting, diarrhea, constipation or GI bleeding   Genitourinary: Denies bladder dysfunction, dysuria or frequency  Musculoskeletal/Rheum: Denies  joint pain and swelling   Skin/Breast: Denies rash,   Neurological: Denies headache, confusion, memory loss or focal weakness/parasthesias  Psychiatric: denies mood disorder     Comprehensive review of systems form is reviewed with the patient and is  "attached in the EMR.     PMH:  has a past medical history of Muscular disease and Muscular dystrophy (CMS-[facility]).  MEDS:   Current Outpatient Prescriptions:   •  cyclobenzaprine (FLEXERIL) 10 MG Tab, Take 1 Tab by mouth 3 times a day as needed., Disp: 30 Tab, Rfl: 0  •  promethazine (PHENERGAN) 6.25 MG/5ML Syrup, Take 5 mL by mouth 4 times a day as needed for Nausea/Vomiting., Disp: 120 mL, Rfl: 0  ALLERGIES:   Allergies   Allergen Reactions   • Codeine Vomiting and Nausea     N&V   • Tramadol      Effects her MD     SURGHX:   Past Surgical History:   Procedure Laterality Date   • HYSTERECTOMY LAPAROSCOPY       SOCHX:  reports that she has never smoked. She has never used smokeless tobacco. She reports that she does not drink alcohol or use drugs..  FH: History reviewed. No pertinent family history.      Physical Exam:  Vitals/ General Appearance:   Weight/BMI: Body mass index is 20.03 kg/m².  Blood pressure 100/70, pulse 84, temperature 36.3 °C (97.3 °F), resp. rate 16, height 1.549 m (5' 1\"), weight 48.1 kg (106 lb), SpO2 95 %.  Vitals:    01/26/18 1041   BP: 100/70   Pulse: 84   Resp: 16   Temp: 36.3 °C (97.3 °F)   SpO2: 95%   Weight: 48.1 kg (106 lb)   Height: 1.549 m (5' 1\")       Pt. is alert and oriented to time, place and person. Cooperative and in no apparent distress.       1. Head: Atraumatic, normocephalic. There is no mandibular hypoplasia or maxillary over-jut.   2. Ears: Normal tympanic membrane and no discharge  3. Nose: No inferior turbinate hypertophy, no septal deviation, no polyp.   4. Throat: Oropharynx appears crowded in that the palate is overhanging (Malam Anuradha scale 4. Tonsils are not enlarged, uvula is large, pharynx not inflamed. Tongue is large. She has intact dentition and oral hygiene is fair.  5. Neck: Supple. No thyromegaly  6. Chest: Trachea central, no spine deformity (Scoliosis/Lordosis/Kyphosis)  7. Lungs auscultation: B/L good air entry, vesicular breath sounds, no adventitious " sounds  8. Heart auscultation: 1st and 2nd heart sounds normal, regular rhythm. No appreciable murmur.  9. Abdomen: Soft, non tender, no organomegaly. Bowel sounds present  10. Extremities:  no clubbing, no pedal edema.   Peripheral pulses felt.  11. Skin: No rash  12. NEUROLOGICAL EXAMINATION: On neurological exam, the patient was alert and oriented x3. speech was clear and fluent without dysarthria.  Cranial nerve exam II through XII was normal. Motor exam revealed decreased bulk, tone and strength 4/5 on right side and 3+/4- on left side, which was symmetrical in the upper and lower extremities bilaterally.        INVESTIGATIONS:           ASSESSMENT AND PLAN     1.Obstructive Sleep Apnea (JOSÉ).    The pathophysiology of JOSÉ and the increased risk of cardiovascular morbidity from untreated JOSÉ is discussed in detail with the patient.    She is urged to avoid supine sleep, weight gain and alcoholic beverages since all of these can worsen JOSÉ. She is cautioned against drowsy driving. If She feels sleepy while driving, She must pull over for a break/nap, rather than persist on the road, in the interest of She own safety and that of others on the road.   - Auto CPAP vs overnight CPAP titration. After informed discussion CPAP titration was ordered  -   - PSG  was reviewed and discussed with the pt      2. Regarding Myotonic Muscular Distrophy she is managed by  [facility]. She is currently enrolled in PT. As per pt she has mild SOB on excertion and had a walking stress test and did not qualify for supplemental oxygen. Results not available to be reviewed.     3. The evolution of chronic insomnia due to medical condition is discussed in detail. The importance of cognitive restructuring and behavior modification therapy is emphasized for long-term improvement rather than the use of hypnotic agents, which may offer short term relief but may lead to the development of tolerance and side effects with prolonged use. Evening  exercise, wind-down before bedtime, and stimulus control (leaving the bed when unable to fall back asleep at night) are explained.                  Plan              -Handouts on stimulus control and sleep hygiene are given and a couple of titles of books on insomnia are recommended, written by behavioral psychologists.              - she has not seen Dr. Ramos yet

## 2018-01-29 NOTE — PROCEDURES
Clinical Comments:  The patient underwent an overnight CPAP titration using the standard montage for measurement of parameters of sleep, respiratory events, movement abnormalities, heart rate and rhythm. A microphone was used to monitor snoring.      INTERPRETATION:  Lights out was recorded at 8:57pm. The total recording time was 486.4 minutes with a sleep period of 400.6 minutes and the total sleep time was 296.0 minutes with a sleep efficiency of 60.9%.  The sleep latency was 85.8 minutes, and REM latency was 108.5 minutes.  The patient experienced 48 arousals in total, for an arousal index of 9.7    RESPIRATORY: The patient had 0 apneas in total.  Of these, 0 were obstructive apneas, and 0 were central apneas.  This resulted in an apnea index (AI) of 0.0.  The patient had 39 hypopneas, for a hypopnea index of 7.9.  The overall AHI was 7.9, while the AHI during Stage R sleep was 12.6.  AHI while supine was N/A.    OXIMETRY: Oxygen saturation monitoring showed a mean SpO2 of 94.3%, with a minimum oxygen saturation of 85.0%.  Oxygen saturations were less than or = 89% for 4.6 minutes of sleep time.    CARDIAC: The highest heart rate during the recording was 86.0 beats per minute.  The average heart rate during sleep was 66.3 bpm.    LIMB MOVEMENTS: There were a total of 0 PLMs during sleep, of which 0 were PLMs arousals.  This resulted in a PLMS index of 0.0.      CPAP was tried from 5-13cm H2O    Interpretation:    This was an overnight positive airway pressure titration polysomnogram. The patient had had a recent polysomnogram revealing mild sleep apnea occurring in the setting of muscular dystrophy. The patient chose to use a small Simplus mask and heated humidification. The technician initiated treatment with CPAP at 5 cm and increased the pressure to a maximum of 13 cm water pressure. The patient did best on CPAP at 12 cm with the achievement of supine sleep. The resultant apnea hypopnea index was 3.8. All the  events were hypopneas at this setting. The minimum saturation was 87% and the mean saturation was 93.8%.    Recommendation:    Recommend CPAP at 12 cm using a mask of choice and heated humidification followed by clinical and data card  review in 6 weeks.

## 2018-02-02 ENCOUNTER — SLEEP CENTER VISIT (OUTPATIENT)
Dept: SLEEP MEDICINE | Facility: MEDICAL CENTER | Age: 48
End: 2018-02-02
Payer: MEDICARE

## 2018-02-02 VITALS
BODY MASS INDEX: 19.83 KG/M2 | WEIGHT: 105 LBS | HEIGHT: 61 IN | SYSTOLIC BLOOD PRESSURE: 110 MMHG | HEART RATE: 78 BPM | DIASTOLIC BLOOD PRESSURE: 64 MMHG | OXYGEN SATURATION: 95 % | RESPIRATION RATE: 16 BRPM

## 2018-02-02 DIAGNOSIS — G71.11 MYOTONIC MUSCULAR DYSTROPHY (HCC): ICD-10-CM

## 2018-02-02 DIAGNOSIS — G47.33 OSA (OBSTRUCTIVE SLEEP APNEA): ICD-10-CM

## 2018-02-02 DIAGNOSIS — F51.04 CHRONIC INSOMNIA: ICD-10-CM

## 2018-02-02 PROCEDURE — 99214 OFFICE O/P EST MOD 30 MIN: CPT | Performed by: FAMILY MEDICINE

## 2018-02-02 NOTE — PROGRESS NOTES
[facility] Sleep Center Follow Up Note     Date: 2/2/2018 / Time: 10:05 AM    Patient ID:   Name:             Gayla Escudero   YOB: 1970  Age:                 47 y.o.  female   MRN:               [id number]      Thank you for requesting a sleep medicine consultation on Gayla Escudero at the sleep center. She presents today with the chief complaints of JOSÉ follow up.     HISTORY OF PRESENT ILLNESS:       Pt is currently not on PAP theray. No change in sleep schedule. She goes to sleep around 4-5 am on weekdays and around on weekends. She gets out of bed at 6-8 on weekdays and on weekends. She  averages 3 hrs of sleep on a good night and 3 hrs on a bad night. She falls asleep within 60-90 minutes. She is using CPAP most days of the week.PSG on 1/23/17  mild obstructive sleep apnea as shown by the apnea hypopnea index of 8.3/hr.  She had CPAP titrationon 1/26/18  initiated treatment with CPAP at 5 cm and increased the pressure to a maximum of 13 cm water pressure. The patient did best on CPAP at 12 cm with the achievement of supine sleep. The resultant apnea hypopnea index was 3.8. All the events were hypopneas at this setting. The minimum saturation was 87% and the mean saturation was 93.8%.      REVIEW OF SYSTEMS:       Constitutional: Denies fevers, Denies weight changes  Eyes: Denies changes in vision, no eye pain  Ears/Nose/Throat/Mouth: Denies nasal congestion or sore throat   Cardiovascular: Denies chest pain or palpitations   Respiratory: Denies shortness of breath , Denies cough  Gastrointestinal/Hepatic: Denies abdominal pain, nausea, vomiting, diarrhea, constipation or GI bleeding   Genitourinary: Denies bladder dysfunction, dysuria or frequency  Musculoskeletal/Rheum: Denies  joint pain and swelling   Skin/Breast: Denies rash,   Neurological: Denies headache, confusion, memory loss or focal weakness/parasthesias  Psychiatric: denies mood disorder     Comprehensive review of systems  "form is reviewed with the patient and is attached in the EMR.     PMH:  has a past medical history of Muscular disease and Muscular dystrophy (CMS-[facility]).  MEDS:   Current Outpatient Prescriptions:   •  cyclobenzaprine (FLEXERIL) 10 MG Tab, Take 1 Tab by mouth 3 times a day as needed., Disp: 30 Tab, Rfl: 0  •  promethazine (PHENERGAN) 6.25 MG/5ML Syrup, Take 5 mL by mouth 4 times a day as needed for Nausea/Vomiting., Disp: 120 mL, Rfl: 0  ALLERGIES:   Allergies   Allergen Reactions   • Codeine Vomiting and Nausea     N&V   • Tramadol      Effects her MD     SURGHX:   Past Surgical History:   Procedure Laterality Date   • HYSTERECTOMY LAPAROSCOPY       SOCHX:  reports that she has never smoked. She has never used smokeless tobacco. She reports that she does not drink alcohol or use drugs..  FH:   Family History   Problem Relation Age of Onset   • Sleep Apnea Daughter          Physical Exam:  Vitals/ General Appearance:   Weight/BMI: Body mass index is 19.84 kg/m².  Blood pressure 110/64, pulse 78, resp. rate 16, height 1.549 m (5' 1\"), weight 47.6 kg (105 lb), SpO2 95 %.  Vitals:    02/02/18 0955   BP: 110/64   Pulse: 78   Resp: 16   SpO2: 95%   Weight: 47.6 kg (105 lb)   Height: 1.549 m (5' 1\")       Pt. is alert and oriented to time, place and person. Cooperative and in no apparent distress.       1. Head: Atraumatic, normocephalic.   2. Ears: Normal tympanic membrane and no discharge  3. Nose: No inferior turbinate hypertophy, no septal deviation, no polyp.   4. Throat: Oropharynx appears crowded in that the palate is overhanging (Malam Anuradha scale 4. Tonsils are not enlarged, uvula is large, pharynx not inflamed. Tongue is large.   5. Neck: Supple. No thyromegaly  6. Chest: Trachea central,   7. Lungs auscultation: B/L good air entry, vesicular breath sounds, no adventitious sounds  8. Heart auscultation: 1st and 2nd heart sounds normal, regular rhythm.   9. Abdomen: Soft, non tender, no organomegaly. Bowel sounds " present  10. Extremities:  no clubbing, no pedal edema.   Peripheral pulses felt.  11. Skin: No rash  12. NEUROLOGICAL EXAMINATION: On neurological exam, the patient was alert and oriented x3. speech was clear and fluent without dysarthria.  Cranial nerve exam II through XII was normal. Motor exam revealed decreased bulk, tone and strength 4/5 on right side and 3+/4- on left side, which was symmetrical in the upper and lower extremities bilaterally.    INVESTIGATIONS:           ASSESSMENT AND PLAN     1.Obstructive Sleep Apnea (JOSÉ).    The pathophysiology of JOSÉ and the increased risk of cardiovascular morbidity from untreated JOSÉ is discussed in detail with the patient.      She is urged to avoid supine sleep, weight gain and alcoholic beverages since all of these can worsen JOSÉ. She is cautioned against drowsy driving. If She feels sleepy while driving, She must pull over for a break/nap, rather than persist on the road, in the interest of She own safety and that of others on the road.   Plan   - CPAP 12 cm with small simplus mask ordered today based on the titration study   - F/u in 6 weeks to assess the efficiacy of recommended pressure    -  Sleep study was reviewed and discussed with the pt   - compliance was reinforced    - consider overnight pulse ox the recommended pressure due to low O2 marshall on the night of the study     2.  Regarding treatment of other past medical problems and general health maintenance,  She is urged to follow up with PCP.

## 2018-03-21 ENCOUNTER — HOSPITAL ENCOUNTER (OUTPATIENT)
Facility: MEDICAL CENTER | Age: 48
End: 2018-03-22
Attending: EMERGENCY MEDICINE | Admitting: HOSPITALIST
Payer: MEDICARE

## 2018-03-21 ENCOUNTER — APPOINTMENT (OUTPATIENT)
Dept: RADIOLOGY | Facility: MEDICAL CENTER | Age: 48
End: 2018-03-21
Attending: EMERGENCY MEDICINE
Payer: MEDICARE

## 2018-03-21 ENCOUNTER — RESOLUTE PROFESSIONAL BILLING HOSPITAL PROF FEE (OUTPATIENT)
Dept: HOSPITALIST | Facility: MEDICAL CENTER | Age: 48
End: 2018-03-21
Payer: MEDICARE

## 2018-03-21 DIAGNOSIS — R07.9 ACUTE CHEST PAIN: ICD-10-CM

## 2018-03-21 LAB
ALBUMIN SERPL BCP-MCNC: 4.7 G/DL (ref 3.2–4.9)
ALBUMIN/GLOB SERPL: 1.5 G/DL
ALP SERPL-CCNC: 38 U/L (ref 30–99)
ALT SERPL-CCNC: 14 U/L (ref 2–50)
ANION GAP SERPL CALC-SCNC: 14 MMOL/L (ref 0–11.9)
APTT PPP: 24.5 SEC (ref 24.7–36)
AST SERPL-CCNC: 22 U/L (ref 12–45)
BASOPHILS # BLD AUTO: 0.6 % (ref 0–1.8)
BASOPHILS # BLD: 0.03 K/UL (ref 0–0.12)
BILIRUB SERPL-MCNC: 0.9 MG/DL (ref 0.1–1.5)
BUN SERPL-MCNC: 6 MG/DL (ref 8–22)
CALCIUM SERPL-MCNC: 10.9 MG/DL (ref 8.5–10.5)
CHLORIDE SERPL-SCNC: 106 MMOL/L (ref 96–112)
CO2 SERPL-SCNC: 24 MMOL/L (ref 20–33)
CREAT SERPL-MCNC: 0.57 MG/DL (ref 0.5–1.4)
DEPRECATED D DIMER PPP IA-ACNC: 1126 NG/ML(D-DU)
EOSINOPHIL # BLD AUTO: 0.08 K/UL (ref 0–0.51)
EOSINOPHIL NFR BLD: 1.6 % (ref 0–6.9)
ERYTHROCYTE [DISTWIDTH] IN BLOOD BY AUTOMATED COUNT: 40 FL (ref 35.9–50)
GLOBULIN SER CALC-MCNC: 3.2 G/DL (ref 1.9–3.5)
GLUCOSE SERPL-MCNC: 97 MG/DL (ref 65–99)
HCT VFR BLD AUTO: 45.6 % (ref 37–47)
HGB BLD-MCNC: 15.4 G/DL (ref 12–16)
IMM GRANULOCYTES # BLD AUTO: 0.02 K/UL (ref 0–0.11)
IMM GRANULOCYTES NFR BLD AUTO: 0.4 % (ref 0–0.9)
INR PPP: 1.02 (ref 0.87–1.13)
LYMPHOCYTES # BLD AUTO: 2.76 K/UL (ref 1–4.8)
LYMPHOCYTES NFR BLD: 56.4 % (ref 22–41)
MCH RBC QN AUTO: 29.7 PG (ref 27–33)
MCHC RBC AUTO-ENTMCNC: 33.8 G/DL (ref 33.6–35)
MCV RBC AUTO: 87.9 FL (ref 81.4–97.8)
MONOCYTES # BLD AUTO: 0.3 K/UL (ref 0–0.85)
MONOCYTES NFR BLD AUTO: 6.1 % (ref 0–13.4)
NEUTROPHILS # BLD AUTO: 1.7 K/UL (ref 2–7.15)
NEUTROPHILS NFR BLD: 34.9 % (ref 44–72)
NRBC # BLD AUTO: 0 K/UL
NRBC BLD-RTO: 0 /100 WBC
PLATELET # BLD AUTO: 267 K/UL (ref 164–446)
PMV BLD AUTO: 10.2 FL (ref 9–12.9)
POTASSIUM SERPL-SCNC: 4 MMOL/L (ref 3.6–5.5)
PROT SERPL-MCNC: 7.9 G/DL (ref 6–8.2)
PROTHROMBIN TIME: 13.1 SEC (ref 12–14.6)
RBC # BLD AUTO: 5.19 M/UL (ref 4.2–5.4)
SODIUM SERPL-SCNC: 144 MMOL/L (ref 135–145)
T4 FREE SERPL-MCNC: 1.14 NG/DL (ref 0.53–1.43)
TROPONIN I SERPL-MCNC: <0.01 NG/ML (ref 0–0.04)
TSH SERPL DL<=0.005 MIU/L-ACNC: 1.38 UIU/ML (ref 0.38–5.33)
WBC # BLD AUTO: 4.9 K/UL (ref 4.8–10.8)

## 2018-03-21 PROCEDURE — 700117 HCHG RX CONTRAST REV CODE 255: Performed by: EMERGENCY MEDICINE

## 2018-03-21 PROCEDURE — 71045 X-RAY EXAM CHEST 1 VIEW: CPT

## 2018-03-21 PROCEDURE — 96372 THER/PROPH/DIAG INJ SC/IM: CPT | Mod: XU

## 2018-03-21 PROCEDURE — G0378 HOSPITAL OBSERVATION PER HR: HCPCS

## 2018-03-21 PROCEDURE — 85379 FIBRIN DEGRADATION QUANT: CPT

## 2018-03-21 PROCEDURE — 99220 PR INITIAL OBSERVATION CARE,LEVL III: CPT | Performed by: HOSPITALIST

## 2018-03-21 PROCEDURE — 700111 HCHG RX REV CODE 636 W/ 250 OVERRIDE (IP): Performed by: HOSPITALIST

## 2018-03-21 PROCEDURE — 80053 COMPREHEN METABOLIC PANEL: CPT

## 2018-03-21 PROCEDURE — 84439 ASSAY OF FREE THYROXINE: CPT

## 2018-03-21 PROCEDURE — 85610 PROTHROMBIN TIME: CPT

## 2018-03-21 PROCEDURE — 85730 THROMBOPLASTIN TIME PARTIAL: CPT

## 2018-03-21 PROCEDURE — 84484 ASSAY OF TROPONIN QUANT: CPT

## 2018-03-21 PROCEDURE — 84443 ASSAY THYROID STIM HORMONE: CPT

## 2018-03-21 PROCEDURE — 85025 COMPLETE CBC W/AUTO DIFF WBC: CPT

## 2018-03-21 PROCEDURE — 99285 EMERGENCY DEPT VISIT HI MDM: CPT

## 2018-03-21 PROCEDURE — 71275 CT ANGIOGRAPHY CHEST: CPT

## 2018-03-21 RX ORDER — ASPIRIN 81 MG/1
324 TABLET, CHEWABLE ORAL DAILY
Status: DISCONTINUED | OUTPATIENT
Start: 2018-03-22 | End: 2018-03-22 | Stop reason: HOSPADM

## 2018-03-21 RX ORDER — HEPARIN SODIUM 5000 [USP'U]/ML
5000 INJECTION, SOLUTION INTRAVENOUS; SUBCUTANEOUS EVERY 8 HOURS
Status: DISCONTINUED | OUTPATIENT
Start: 2018-03-21 | End: 2018-03-22 | Stop reason: HOSPADM

## 2018-03-21 RX ORDER — ASPIRIN 325 MG
325 TABLET ORAL DAILY
Status: DISCONTINUED | OUTPATIENT
Start: 2018-03-22 | End: 2018-03-22 | Stop reason: HOSPADM

## 2018-03-21 RX ORDER — POLYETHYLENE GLYCOL 3350 17 G/17G
1 POWDER, FOR SOLUTION ORAL
Status: DISCONTINUED | OUTPATIENT
Start: 2018-03-21 | End: 2018-03-22 | Stop reason: HOSPADM

## 2018-03-21 RX ORDER — PROMETHAZINE HYDROCHLORIDE 25 MG/1
12.5-25 TABLET ORAL EVERY 4 HOURS PRN
Status: DISCONTINUED | OUTPATIENT
Start: 2018-03-21 | End: 2018-03-22 | Stop reason: HOSPADM

## 2018-03-21 RX ORDER — ONDANSETRON 2 MG/ML
4 INJECTION INTRAMUSCULAR; INTRAVENOUS ONCE
Status: DISCONTINUED | OUTPATIENT
Start: 2018-03-21 | End: 2018-03-22 | Stop reason: HOSPADM

## 2018-03-21 RX ORDER — ASPIRIN 300 MG/1
300 SUPPOSITORY RECTAL DAILY
Status: DISCONTINUED | OUTPATIENT
Start: 2018-03-22 | End: 2018-03-22 | Stop reason: HOSPADM

## 2018-03-21 RX ORDER — AMOXICILLIN 250 MG
2 CAPSULE ORAL 2 TIMES DAILY
Status: DISCONTINUED | OUTPATIENT
Start: 2018-03-21 | End: 2018-03-22 | Stop reason: HOSPADM

## 2018-03-21 RX ORDER — BISACODYL 10 MG
10 SUPPOSITORY, RECTAL RECTAL
Status: DISCONTINUED | OUTPATIENT
Start: 2018-03-21 | End: 2018-03-22 | Stop reason: HOSPADM

## 2018-03-21 RX ORDER — ACETAMINOPHEN 325 MG/1
650 TABLET ORAL EVERY 6 HOURS PRN
Status: DISCONTINUED | OUTPATIENT
Start: 2018-03-21 | End: 2018-03-22 | Stop reason: HOSPADM

## 2018-03-21 RX ORDER — NITROGLYCERIN 0.4 MG/1
0.4 TABLET SUBLINGUAL ONCE
Status: DISPENSED | OUTPATIENT
Start: 2018-03-21 | End: 2018-03-22

## 2018-03-21 RX ORDER — ONDANSETRON 2 MG/ML
4 INJECTION INTRAMUSCULAR; INTRAVENOUS EVERY 4 HOURS PRN
Status: DISCONTINUED | OUTPATIENT
Start: 2018-03-21 | End: 2018-03-22 | Stop reason: HOSPADM

## 2018-03-21 RX ORDER — PROMETHAZINE HYDROCHLORIDE 25 MG/1
12.5-25 SUPPOSITORY RECTAL EVERY 4 HOURS PRN
Status: DISCONTINUED | OUTPATIENT
Start: 2018-03-21 | End: 2018-03-22 | Stop reason: HOSPADM

## 2018-03-21 RX ORDER — ONDANSETRON 4 MG/1
4 TABLET, ORALLY DISINTEGRATING ORAL EVERY 4 HOURS PRN
Status: DISCONTINUED | OUTPATIENT
Start: 2018-03-21 | End: 2018-03-22 | Stop reason: HOSPADM

## 2018-03-21 RX ADMIN — IOHEXOL 60 ML: 350 INJECTION, SOLUTION INTRAVENOUS at 17:29

## 2018-03-21 RX ADMIN — HEPARIN SODIUM 5000 UNITS: 5000 INJECTION, SOLUTION INTRAVENOUS; SUBCUTANEOUS at 23:45

## 2018-03-21 ASSESSMENT — PAIN SCALES - GENERAL: PAINLEVEL_OUTOF10: 6

## 2018-03-21 NOTE — ED TRIAGE NOTES
PT was BIB  for chest pain. Patient was found to be in distress and chest pain. Code blue was called but patient never lost consciousness. Pt changed to gown, placed on monitors. erp at BS. Attempting to find IV

## 2018-03-22 ENCOUNTER — APPOINTMENT (OUTPATIENT)
Dept: RADIOLOGY | Facility: MEDICAL CENTER | Age: 48
End: 2018-03-22
Attending: HOSPITALIST
Payer: MEDICARE

## 2018-03-22 VITALS
WEIGHT: 98.33 LBS | BODY MASS INDEX: 18.56 KG/M2 | HEIGHT: 61 IN | DIASTOLIC BLOOD PRESSURE: 64 MMHG | HEART RATE: 95 BPM | RESPIRATION RATE: 19 BRPM | OXYGEN SATURATION: 99 % | TEMPERATURE: 97.8 F | SYSTOLIC BLOOD PRESSURE: 102 MMHG

## 2018-03-22 PROBLEM — R07.9 CHEST PAIN: Status: ACTIVE | Noted: 2018-03-22

## 2018-03-22 PROBLEM — R00.2 PALPITATIONS: Status: ACTIVE | Noted: 2018-03-22

## 2018-03-22 PROBLEM — R79.89 D-DIMER, ELEVATED: Status: ACTIVE | Noted: 2018-03-22

## 2018-03-22 LAB
ALBUMIN SERPL BCP-MCNC: 3.7 G/DL (ref 3.2–4.9)
ALBUMIN/GLOB SERPL: 1.5 G/DL
ALP SERPL-CCNC: 37 U/L (ref 30–99)
ALT SERPL-CCNC: 12 U/L (ref 2–50)
ANION GAP SERPL CALC-SCNC: 9 MMOL/L (ref 0–11.9)
ANION GAP SERPL CALC-SCNC: 9 MMOL/L (ref 0–11.9)
AST SERPL-CCNC: 17 U/L (ref 12–45)
BASOPHILS # BLD AUTO: 0.5 % (ref 0–1.8)
BASOPHILS # BLD: 0.03 K/UL (ref 0–0.12)
BILIRUB SERPL-MCNC: 0.6 MG/DL (ref 0.1–1.5)
BUN SERPL-MCNC: 5 MG/DL (ref 8–22)
BUN SERPL-MCNC: 8 MG/DL (ref 8–22)
CALCIUM SERPL-MCNC: 6.8 MG/DL (ref 8.5–10.5)
CALCIUM SERPL-MCNC: 9.2 MG/DL (ref 8.5–10.5)
CHLORIDE SERPL-SCNC: 112 MMOL/L (ref 96–112)
CHLORIDE SERPL-SCNC: 122 MMOL/L (ref 96–112)
CHOLEST SERPL-MCNC: 106 MG/DL (ref 100–199)
CO2 SERPL-SCNC: 17 MMOL/L (ref 20–33)
CO2 SERPL-SCNC: 26 MMOL/L (ref 20–33)
CREAT SERPL-MCNC: 0.34 MG/DL (ref 0.5–1.4)
CREAT SERPL-MCNC: 0.51 MG/DL (ref 0.5–1.4)
EKG IMPRESSION: NORMAL
EOSINOPHIL # BLD AUTO: 0.07 K/UL (ref 0–0.51)
EOSINOPHIL NFR BLD: 1.1 % (ref 0–6.9)
ERYTHROCYTE [DISTWIDTH] IN BLOOD BY AUTOMATED COUNT: 40 FL (ref 35.9–50)
GLOBULIN SER CALC-MCNC: 2.5 G/DL (ref 1.9–3.5)
GLUCOSE SERPL-MCNC: 78 MG/DL (ref 65–99)
GLUCOSE SERPL-MCNC: 94 MG/DL (ref 65–99)
HCT VFR BLD AUTO: 40.2 % (ref 37–47)
HDLC SERPL-MCNC: 36 MG/DL
HGB BLD-MCNC: 13.4 G/DL (ref 12–16)
IMM GRANULOCYTES # BLD AUTO: 0.01 K/UL (ref 0–0.11)
IMM GRANULOCYTES NFR BLD AUTO: 0.2 % (ref 0–0.9)
LDLC SERPL CALC-MCNC: 55 MG/DL
LV EJECT FRACT  99904: 55
LV EJECT FRACT MOD 2C 99903: 54.03
LV EJECT FRACT MOD 4C 99902: 51.05
LV EJECT FRACT MOD BP 99901: 51.01
LYMPHOCYTES # BLD AUTO: 2.51 K/UL (ref 1–4.8)
LYMPHOCYTES NFR BLD: 38.9 % (ref 22–41)
MCH RBC QN AUTO: 29.3 PG (ref 27–33)
MCHC RBC AUTO-ENTMCNC: 33.3 G/DL (ref 33.6–35)
MCV RBC AUTO: 87.8 FL (ref 81.4–97.8)
MONOCYTES # BLD AUTO: 0.42 K/UL (ref 0–0.85)
MONOCYTES NFR BLD AUTO: 6.5 % (ref 0–13.4)
NEUTROPHILS # BLD AUTO: 3.42 K/UL (ref 2–7.15)
NEUTROPHILS NFR BLD: 52.8 % (ref 44–72)
NRBC # BLD AUTO: 0 K/UL
NRBC BLD-RTO: 0 /100 WBC
PLATELET # BLD AUTO: 252 K/UL (ref 164–446)
PMV BLD AUTO: 10.4 FL (ref 9–12.9)
POTASSIUM SERPL-SCNC: 2.9 MMOL/L (ref 3.6–5.5)
POTASSIUM SERPL-SCNC: 3 MMOL/L (ref 3.6–5.5)
PROT SERPL-MCNC: 6.2 G/DL (ref 6–8.2)
RBC # BLD AUTO: 4.58 M/UL (ref 4.2–5.4)
SODIUM SERPL-SCNC: 147 MMOL/L (ref 135–145)
SODIUM SERPL-SCNC: 148 MMOL/L (ref 135–145)
TRIGL SERPL-MCNC: 73 MG/DL (ref 0–149)
TROPONIN I SERPL-MCNC: <0.01 NG/ML (ref 0–0.04)
TROPONIN I SERPL-MCNC: <0.01 NG/ML (ref 0–0.04)
WBC # BLD AUTO: 6.5 K/UL (ref 4.8–10.8)

## 2018-03-22 PROCEDURE — 96372 THER/PROPH/DIAG INJ SC/IM: CPT | Mod: XU

## 2018-03-22 PROCEDURE — A9502 TC99M TETROFOSMIN: HCPCS

## 2018-03-22 PROCEDURE — 85025 COMPLETE CBC W/AUTO DIFF WBC: CPT

## 2018-03-22 PROCEDURE — 93005 ELECTROCARDIOGRAM TRACING: CPT | Performed by: HOSPITALIST

## 2018-03-22 PROCEDURE — 93005 ELECTROCARDIOGRAM TRACING: CPT | Performed by: NURSE PRACTITIONER

## 2018-03-22 PROCEDURE — 84484 ASSAY OF TROPONIN QUANT: CPT

## 2018-03-22 PROCEDURE — 700105 HCHG RX REV CODE 258: Performed by: NURSE PRACTITIONER

## 2018-03-22 PROCEDURE — 93970 EXTREMITY STUDY: CPT

## 2018-03-22 PROCEDURE — 93306 TTE W/DOPPLER COMPLETE: CPT

## 2018-03-22 PROCEDURE — 93010 ELECTROCARDIOGRAM REPORT: CPT | Mod: 76 | Performed by: INTERNAL MEDICINE

## 2018-03-22 PROCEDURE — G0378 HOSPITAL OBSERVATION PER HR: HCPCS

## 2018-03-22 PROCEDURE — 93306 TTE W/DOPPLER COMPLETE: CPT | Mod: 26 | Performed by: INTERNAL MEDICINE

## 2018-03-22 PROCEDURE — 700111 HCHG RX REV CODE 636 W/ 250 OVERRIDE (IP): Performed by: HOSPITALIST

## 2018-03-22 PROCEDURE — 80061 LIPID PANEL: CPT

## 2018-03-22 PROCEDURE — 700102 HCHG RX REV CODE 250 W/ 637 OVERRIDE(OP): Performed by: NURSE PRACTITIONER

## 2018-03-22 PROCEDURE — 700102 HCHG RX REV CODE 250 W/ 637 OVERRIDE(OP): Performed by: INTERNAL MEDICINE

## 2018-03-22 PROCEDURE — 80053 COMPREHEN METABOLIC PANEL: CPT

## 2018-03-22 PROCEDURE — 99217 PR OBSERVATION CARE DISCHARGE: CPT | Performed by: HOSPITALIST

## 2018-03-22 PROCEDURE — 80048 BASIC METABOLIC PNL TOTAL CA: CPT

## 2018-03-22 PROCEDURE — A9270 NON-COVERED ITEM OR SERVICE: HCPCS | Performed by: INTERNAL MEDICINE

## 2018-03-22 PROCEDURE — 36415 COLL VENOUS BLD VENIPUNCTURE: CPT

## 2018-03-22 PROCEDURE — A9270 NON-COVERED ITEM OR SERVICE: HCPCS | Performed by: NURSE PRACTITIONER

## 2018-03-22 PROCEDURE — 700111 HCHG RX REV CODE 636 W/ 250 OVERRIDE (IP)

## 2018-03-22 RX ORDER — ALPRAZOLAM 0.5 MG/1
0.5 TABLET ORAL
Status: DISCONTINUED | OUTPATIENT
Start: 2018-03-22 | End: 2018-03-22 | Stop reason: HOSPADM

## 2018-03-22 RX ORDER — POTASSIUM CHLORIDE 20 MEQ/1
40 TABLET, EXTENDED RELEASE ORAL ONCE
Status: DISCONTINUED | OUTPATIENT
Start: 2018-03-22 | End: 2018-03-22 | Stop reason: HOSPADM

## 2018-03-22 RX ORDER — NITROGLYCERIN 0.4 MG/1
0.4 TABLET SUBLINGUAL
Status: DISCONTINUED | OUTPATIENT
Start: 2018-03-22 | End: 2018-03-22 | Stop reason: HOSPADM

## 2018-03-22 RX ORDER — SODIUM CHLORIDE 450 MG/100ML
INJECTION, SOLUTION INTRAVENOUS CONTINUOUS
Status: DISCONTINUED | OUTPATIENT
Start: 2018-03-22 | End: 2018-03-22 | Stop reason: HOSPADM

## 2018-03-22 RX ORDER — REGADENOSON 0.08 MG/ML
INJECTION, SOLUTION INTRAVENOUS
Status: COMPLETED
Start: 2018-03-22 | End: 2018-03-22

## 2018-03-22 RX ORDER — MORPHINE SULFATE 4 MG/ML
4 INJECTION, SOLUTION INTRAMUSCULAR; INTRAVENOUS EVERY 4 HOURS PRN
Status: DISCONTINUED | OUTPATIENT
Start: 2018-03-22 | End: 2018-03-22 | Stop reason: HOSPADM

## 2018-03-22 RX ADMIN — REGADENOSON 0.4 MG: 0.08 INJECTION, SOLUTION INTRAVENOUS at 11:52

## 2018-03-22 RX ADMIN — HEPARIN SODIUM 5000 UNITS: 5000 INJECTION, SOLUTION INTRAVENOUS; SUBCUTANEOUS at 05:54

## 2018-03-22 RX ADMIN — SODIUM CHLORIDE: 4.5 INJECTION, SOLUTION INTRAVENOUS at 09:19

## 2018-03-22 RX ADMIN — NITROGLYCERIN 0.4 MG: 0.4 TABLET SUBLINGUAL at 01:45

## 2018-03-22 RX ADMIN — ALPRAZOLAM 0.5 MG: 0.5 TABLET ORAL at 09:13

## 2018-03-22 ASSESSMENT — PAIN SCALES - GENERAL
PAINLEVEL_OUTOF10: 4
PAINLEVEL_OUTOF10: 0
PAINLEVEL_OUTOF10: 7
PAINLEVEL_OUTOF10: 0
PAINLEVEL_OUTOF10: ASSUMED PAIN PRESENT
PAINLEVEL_OUTOF10: 2

## 2018-03-22 ASSESSMENT — ENCOUNTER SYMPTOMS
SPEECH CHANGE: 0
EYE DISCHARGE: 0
DIZZINESS: 0
WEAKNESS: 0
CONSTIPATION: 0
SHORTNESS OF BREATH: 0
HEADACHES: 0
NECK PAIN: 0
PALPITATIONS: 1
CLAUDICATION: 0
EYE PAIN: 0
FEVER: 0
BACK PAIN: 0
SENSORY CHANGE: 0
VOMITING: 0
BRUISES/BLEEDS EASILY: 0
DIARRHEA: 0
FOCAL WEAKNESS: 0
WHEEZING: 0
HEMOPTYSIS: 0
DEPRESSION: 0
NAUSEA: 0
DIAPHORESIS: 0
MYALGIAS: 0
LOSS OF CONSCIOUSNESS: 0
COUGH: 0
CHILLS: 0
SPUTUM PRODUCTION: 0
ABDOMINAL PAIN: 0
SORE THROAT: 0

## 2018-03-22 ASSESSMENT — COPD QUESTIONNAIRES
COPD SCREENING SCORE: 1
DURING THE PAST 4 WEEKS HOW MUCH DID YOU FEEL SHORT OF BREATH: NONE/LITTLE OF THE TIME
DO YOU EVER COUGH UP ANY MUCUS OR PHLEGM?: YES, A FEW DAYS A WEEK OR MONTH
HAVE YOU SMOKED AT LEAST 100 CIGARETTES IN YOUR ENTIRE LIFE: NO/DON'T KNOW

## 2018-03-22 ASSESSMENT — LIFESTYLE VARIABLES
DO YOU DRINK ALCOHOL: NO
SUBSTANCE_ABUSE: 0
EVER_SMOKED: NEVER

## 2018-03-22 ASSESSMENT — PAIN SCALES - WONG BAKER
WONGBAKER_NUMERICALRESPONSE: HURTS A LITTLE MORE
WONGBAKER_NUMERICALRESPONSE: DOESN'T HURT AT ALL
WONGBAKER_NUMERICALRESPONSE: HURTS JUST A LITTLE BIT

## 2018-03-22 NOTE — ASSESSMENT & PLAN NOTE
Treated at [facility]  Son also with muscular dystrophy  Uses [facility] baseline, going to PT/OT.

## 2018-03-22 NOTE — ED NOTES
Pt denies taking any OTC or prescription medications  No herbal supplements  Allergies reviewed  No ABX in last month

## 2018-03-22 NOTE — ED PROVIDER NOTES
"ED Provider Note    CHIEF COMPLAINT  Chief Complaint   Patient presents with   • Chest Pain       HPI  Gayla Escudero is a 47 y.o. female who presents as a \"code blue\", having come in and entrance to the hospital becoming ill and going to the floor.  There was no injury or fall.  The patient's  stated they were at home and the patient had sudden onset of chest pain approximately one hour previously.  There arrived to the hospital and came in the wrong entrance, looking for the ER.  From here the patient was placed on a gurney and brought emergently to the ER.  She is tachypneic, complaining of severe anterior chest pain.  Pain is worse with deep breath.  Patient has history of muscular dystrophy, states she is evaluated by the cardiologists in [place] every 6 months.  Patient had evaluation by cardiology last October which included echocardiogram.  No cough or fever.    REVIEW OF SYSTEMS    Constitutional: Lightheaded  Respiratory: Shortness of breath, pleuritic chest pain  Cardiac: No syncope.  Gastrointestinal: No abdominal pain or vomiting  Musculoskeletal: History of muscular dystrophy  Neurologic: No headache       All other systems are negative.       PAST MEDICAL HISTORY  Past Medical History:   Diagnosis Date   • Muscular disease    • Muscular dystrophy (CMS-[facility])        FAMILY HISTORY  Family History   Problem Relation Age of Onset   • Sleep Apnea Daughter        SOCIAL HISTORY  Social History     Social History   • Marital status:      Spouse name: N/A   • Number of children: N/A   • Years of education: N/A     Social History Main Topics   • Smoking status: Never Smoker   • Smokeless tobacco: Never Used   • Alcohol use No   • Drug use: No   • Sexual activity: Not on file     Other Topics Concern   • Not on file     Social History Narrative   • No narrative on file       SURGICAL HISTORY  Past Surgical History:   Procedure Laterality Date   • HYSTERECTOMY LAPAROSCOPY         CURRENT " "MEDICATIONS  Home Medications     Reviewed by Estevan Garcia (Pharmacy Tech) on 03/21/18 at 1848  Med List Status: Complete   Medication Last Dose Status        Patient Eddi Taking any Medications                       ALLERGIES  Allergies   Allergen Reactions   • Codeine Vomiting and Nausea     N&V   • Tramadol      Effects her MD       PHYSICAL EXAM  VITAL SIGNS: Pulse 76   Temp 36.9 °C (98.4 °F)   Ht 1.499 m (4' 11\")   Wt 43.1 kg (95 lb)   SpO2 100%   BMI 19.19 kg/m²   Constitutional: Ill appearance.   HENT: No facial swelling, mucous membranes sticky  Eyes: Anicteric, no conjunctivitis.     Cardiovascular: Tachycardic heart rate, Normal rhythm  Pulmonary:  No wheezing, No rales.  Moderate air movement bilateral  Gastrointestinal: Soft, No tenderness, No distention  Skin: Warm, Dry, No cyanosis.   Neurologic: Speech is clear, follows commands, facial expression is symmetrical.   strength equal  Psychiatric: Anxious.   Musculoskeletal: Lower sternal tenderness.    EKG/Labs  EKG Interpretation    Interpreted by emergency department physician    Rhythm: normal sinus   Rate: normal  ST Segments: no acute change  T Waves: inversion in  v1, v2 and v3  Q Waves: nonspecific    Clinical Impression: T-wave changes in the anterior precordial leads.    Results for orders placed or performed during the hospital encounter of 03/21/18   CBC WITH DIFFERENTIAL   Result Value Ref Range    WBC 4.9 4.8 - 10.8 K/uL    RBC 5.19 4.20 - 5.40 M/uL    Hemoglobin 15.4 12.0 - 16.0 g/dL    Hematocrit 45.6 37.0 - 47.0 %    MCV 87.9 81.4 - 97.8 fL    MCH 29.7 27.0 - 33.0 pg    MCHC 33.8 33.6 - 35.0 g/dL    RDW 40.0 35.9 - 50.0 fL    Platelet Count 267 164 - 446 K/uL    MPV 10.2 9.0 - 12.9 fL    Neutrophils-Polys 34.90 (L) 44.00 - 72.00 %    Lymphocytes 56.40 (H) 22.00 - 41.00 %    Monocytes 6.10 0.00 - 13.40 %    Eosinophils 1.60 0.00 - 6.90 %    Basophils 0.60 0.00 - 1.80 %    Immature Granulocytes 0.40 0.00 - 0.90 %    " Nucleated RBC 0.00 /100 WBC    Neutrophils (Absolute) 1.70 (L) 2.00 - 7.15 K/uL    Lymphs (Absolute) 2.76 1.00 - 4.80 K/uL    Monos (Absolute) 0.30 0.00 - 0.85 K/uL    Eos (Absolute) 0.08 0.00 - 0.51 K/uL    Baso (Absolute) 0.03 0.00 - 0.12 K/uL    Immature Granulocytes (abs) 0.02 0.00 - 0.11 K/uL    NRBC (Absolute) 0.00 K/uL   COMP METABOLIC PANEL   Result Value Ref Range    Sodium 144 135 - 145 mmol/L    Potassium 4.0 3.6 - 5.5 mmol/L    Chloride 106 96 - 112 mmol/L    Co2 24 20 - 33 mmol/L    Anion Gap 14.0 (H) 0.0 - 11.9    Glucose 97 65 - 99 mg/dL    Bun 6 (L) 8 - 22 mg/dL    Creatinine 0.57 0.50 - 1.40 mg/dL    Calcium 10.9 (H) 8.5 - 10.5 mg/dL    AST(SGOT) 22 12 - 45 U/L    ALT(SGPT) 14 2 - 50 U/L    Alkaline Phosphatase 38 30 - 99 U/L    Total Bilirubin 0.9 0.1 - 1.5 mg/dL    Albumin 4.7 3.2 - 4.9 g/dL    Total Protein 7.9 6.0 - 8.2 g/dL    Globulin 3.2 1.9 - 3.5 g/dL    A-G Ratio 1.5 g/dL   PROTHROMBIN TIME   Result Value Ref Range    PT 13.1 12.0 - 14.6 sec    INR 1.02 0.87 - 1.13   APTT   Result Value Ref Range    APTT 24.5 (L) 24.7 - 36.0 sec   D-DIMER   Result Value Ref Range    D-Dimer Screen 1126 (H) <250 ng/mL(D-DU)   TROPONIN   Result Value Ref Range    Troponin I <0.01 0.00 - 0.04 ng/mL   TSH   Result Value Ref Range    TSH 1.380 0.380 - 5.330 uIU/mL   FREE THYROXINE   Result Value Ref Range    Free T-4 1.14 0.53 - 1.43 ng/dL   ESTIMATED GFR   Result Value Ref Range    GFR If African American >60 >60 mL/min/1.73 m 2    GFR If Non African American >60 >60 mL/min/1.73 m 2         RADIOLOGY/PROCEDURES  CT-CTA CHEST PULMONARY ARTERY W/ RECONS   Final Result      No evidence of pulmonary embolus.            DX-CHEST-PORTABLE (1 VIEW)   Final Result      No evidence of acute cardiopulmonary process.            COURSE & MEDICAL DECISION MAKING  Pertinent Labs & Imaging studies reviewed. (See chart for details)  Patient had port chest pain, sudden onset with shortness of breath concerning for pulmonary  embolism.  Her d-dimer is positive.  CT scan of the chest was obtained which was negative.  Patient's pain is now nearly resolved.  She was initially written for pain medication which she has refused.  I spoke with her muscular dystrophy clinic in [place], they have sent the patient's information out.  Her most recent echocardiogram in October did not show evidence of muscular dystrophy effecting heart according to conversation I had with on-call cardiology Dr. Vera.  Patient has negative troponin.  She looks much more comfortable at this point.  Plan for admission for ongoing workup and medical stabilization.    FINAL IMPRESSION     1. Acute chest pain           Critical care time 35 minutes         Electronically signed by: Herbie Robb, 3/21/2018 9:48 PM

## 2018-03-22 NOTE — PROGRESS NOTES
Patient feeling anxious, complaints of chest pain, shortness of breath. Rapid labored breathing noted. Oxygen @ 2lpm/NC given. Notified MD with orders made and implemented.

## 2018-03-22 NOTE — PROGRESS NOTES
Called into patients room at 0127 complaining of chest pain, HR at 145, RR 40. Stat  EKG ordered. Dr martin paged and returned call. Nitro tablet and morphine ordered.  Patient received one dose of nitro at 0145 and had pain relief from that dose.  AM labs sent down to lab.

## 2018-03-22 NOTE — DISCHARGE INSTRUCTIONS
Discharge Instructions    Discharged to home by car with relative. Discharged via wheelchair, hospital escort: Yes.  Special equipment needed: Not Applicable    Be sure to schedule a follow-up appointment with your primary care doctor or any specialists as instructed.     Discharge Plan:   Diet Plan: Discussed  Activity Level: Discussed  Confirmed Follow up Appointment: Patient to Call and Schedule Appointment  Confirmed Symptoms Management: Discussed  Medication Reconciliation Updated: Yes  Influenza Vaccine Indication: Patient Refuses    I understand that a diet low in cholesterol, fat, and sodium is recommended for good health. Unless I have been given specific instructions below for another diet, I accept this instruction as my diet prescription.   Other diet: low fat    Special Instructions: None    · Is patient discharged on Warfarin / Coumadin?   No     Depression / Suicide Risk    As you are discharged from this [facility] facility, it is important to learn how to keep safe from harming yourself.    Recognize the warning signs:  · Abrupt changes in personality, positive or negative- including increase in energy   · Giving away possessions  · Change in eating patterns- significant weight changes-  positive or negative  · Change in sleeping patterns- unable to sleep or sleeping all the time   · Unwillingness or inability to communicate  · Depression  · Unusual sadness, discouragement and loneliness  · Talk of wanting to die  · Neglect of personal appearance   · Rebelliousness- reckless behavior  · Withdrawal from people/activities they love  · Confusion- inability to concentrate     If you or a loved one observes any of these behaviors or has concerns about self-harm, here's what you can do:  · Talk about it- your feelings and reasons for harming yourself  · Remove any means that you might use to hurt yourself (examples: pills, rope, extension cords, firearm)  · Get professional help from the community  (Mental Health, Substance Abuse, psychological counseling)  · Do not be alone:Call your Safe Contact- someone whom you trust who will be there for you.  · Call your local CRISIS HOTLINE 898-6889 or 672-328-3680  · Call your local Children's Mobile Crisis Response Team [place] (099) 061-1703 or www.[employer]  · Call the toll free National Suicide Prevention Hotlines   · National Suicide Prevention Lifeline 465-760-AYPV (5438)  · National Hope Line Network 800-SUICIDE (077-6075)

## 2018-03-22 NOTE — PROGRESS NOTES
Received  bedside report from Efrain BASHIR. Assumed pt care. Pt is SR on monitor. Pt sleeping. No distress noted.
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
## 2018-03-22 NOTE — DISCHARGE SUMMARY
CHIEF COMPLAINT ON ADMISSION  Chest pain.    HPI & HOSPITAL COURSE  This is a 47 y.o. year old female with a past medical history of muscular dystrophy here with new onset midsternal chest pain. On admission the patient also had a near syncopal episode while in the ED. The patient was worked up for ACS. Troponins remain negative from admission. EKG was stable. Cardiac stress test was negative for inducible ischemia. Echocardiogram revealed an LVEF of 55% with no other acute abnormality. CTA was negative for PE. Lower extremity duplex was negative for DVT. Over admission the patient's chest pain completely resolved. She did exhibit intermittent anxious behaviors which may be attributing to her episodes of chest pain. She is also tender to palpation on her chest and therefore a musculoskeletal origin is also considered. At this time the patient is stable and has remained with stable vital signs. She has no further need for acute intervention will be discharged home in stable condition.    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 Chest pain  2. Palpitations    FOLLOW UP  Future Appointments  Date Time Provider Department Center   5/7/2018 10:20 AM Yaquelin Brown M.D. [facility] None       MEDICATIONS ON DISCHARGE  There are no discharge medications for this patient.       DIET  Regular diet.    ACTIVITY  As tolerated.    LABORATORY  Lab Results   Component Value Date/Time    SODIUM 147 (H) 03/22/2018 03:13 AM    POTASSIUM 3.0 (L) 03/22/2018 03:13 AM    CHLORIDE 112 03/22/2018 03:13 AM    CO2 26 03/22/2018 03:13 AM    GLUCOSE 94 03/22/2018 03:13 AM    BUN 8 03/22/2018 03:13 AM    CREATININE 0.51 03/22/2018 03:13 AM        Lab Results   Component Value Date/Time    WBC 6.5 03/22/2018 02:11 AM     HEMOGLOBIN 13.4 03/22/2018 02:11 AM    HEMATOCRIT 40.2 03/22/2018 02:11 AM    PLATELETCT 252 03/22/2018 02:11 AM        Total time of the discharge process was 38 minutes.

## 2018-03-22 NOTE — ASSESSMENT & PLAN NOTE
No history of cardiac disease  Last echo EF 65%  Reproducible sternal border pain, but myriad of symptoms including near syncope at presentation to triage, palpitations.  NM stress echo ordered  Echo  Troponin trend  EKG sr [id number] t wave inversion lead V3 only.  Repeat EKG.  ASA daily

## 2018-03-22 NOTE — ED NOTES
Pt refused pain medications at this time due to a decrease in pain. Will hold medications until needed

## 2018-03-22 NOTE — H&P
Hospital Medicine History and Physical    Date of Service  3/22/2018    Chief Complaint  Chief Complaint   Patient presents with   • Chest Pain       History of Presenting Illness  47 y.o. female who presented 3/21/2018 with muscular dystrophy treated by  Kareem, no prior cardiac disease presented with chest pain midsternum that awoke patient from sleep at 0330 a.m   She states that she has been caring for her 2 month old grandson whom she has custody.  She went back to sleep and again awoke with sternal chest pain.  She has reproducible sternal border pain on exam.  She did however collapse at triage when her  presented.  Code blue called, but no loss of pulse or breathing.  Patient states she feels her heart racing occasionally.  CTA chest negative for PE.  She uses a FWW at home at baseline, going to PT.  Has JOSÉ on CPAP qhs, did not bring with her.  Primary Care Physician  Adithya Martinez P.A.-C.    Consultants  none    Code Status  full    Review of Systems  Review of Systems   Constitutional: Negative for chills, diaphoresis, fever and malaise/fatigue.   HENT: Negative for congestion and sore throat.    Eyes: Negative for pain and discharge.   Respiratory: Negative for cough, hemoptysis, sputum production, shortness of breath and wheezing.    Cardiovascular: Positive for chest pain and palpitations. Negative for claudication and leg swelling.   Gastrointestinal: Negative for abdominal pain, constipation, diarrhea, melena, nausea and vomiting.   Genitourinary: Negative for dysuria, frequency and urgency.   Musculoskeletal: Negative for back pain, joint pain, myalgias and neck pain.   Skin: Negative for itching and rash.   Neurological: Negative for dizziness, sensory change, speech change, focal weakness, loss of consciousness, weakness and headaches.   Endo/Heme/Allergies: Does not bruise/bleed easily.   Psychiatric/Behavioral: Negative for depression, substance abuse and suicidal ideas.        Past  Medical History  Past Medical History:   Diagnosis Date   • Muscular disease    • Muscular dystrophy (CMS-HCC)    • JOSÉ on CPAP        Surgical History  Past Surgical History:   Procedure Laterality Date   • HYSTERECTOMY LAPAROSCOPY         Medications  No current facility-administered medications on file prior to encounter.      No current outpatient prescriptions on file prior to encounter.       Family History  Family History   Problem Relation Age of Onset   • Sleep Apnea Daughter        Social History  Social History   Substance Use Topics   • Smoking status: Never Smoker   • Smokeless tobacco: Never Used   • Alcohol use No       Allergies  Allergies   Allergen Reactions   • Codeine Vomiting and Nausea     N&V   • Tramadol      Effects her MD        Physical Exam  Laboratory   Hemodynamics  Temp (24hrs), Av.9 °C (98.5 °F), Min:36.8 °C (98.3 °F), Max:37 °C (98.6 °F)   Temperature: 36.9 °C (98.5 °F)  Pulse  Av.2  Min: 68  Max: 122 Heart Rate (Monitored): 76  Blood Pressure: (!) 99/67, NIBP: (!) 95/61      Respiratory      Respiration: 16, Pulse Oximetry: 99 %, O2 Daily Delivery Respiratory : Room Air with O2 Available        RUL Breath Sounds: Clear, RML Breath Sounds: Diminished, RLL Breath Sounds: Diminished, GREGORY Breath Sounds: Clear, LLL Breath Sounds: Diminished    Physical Exam   Constitutional: She is oriented to person, place, and time. She appears well-developed and well-nourished. No distress.   HENT:   Head: Normocephalic and atraumatic.   Nose: Nose normal.   Mouth/Throat: Oropharynx is clear and moist. No oropharyngeal exudate.   Eyes: Conjunctivae and EOM are normal. Pupils are equal, round, and reactive to light. Right eye exhibits no discharge. Left eye exhibits no discharge. No scleral icterus.   Neck: Normal range of motion. Neck supple. No JVD present. No tracheal deviation present. No thyromegaly present.   Cardiovascular: Normal rate, regular rhythm, normal heart sounds and intact  distal pulses.  Exam reveals no gallop and no friction rub.    No murmur heard.  Pulmonary/Chest: Effort normal and breath sounds normal. No stridor. No respiratory distress. She has no wheezes. She has no rales. She exhibits tenderness (sternal border tenderness with palpation).   Abdominal: Soft. Bowel sounds are normal. She exhibits no distension and no mass. There is no tenderness. There is no rebound and no guarding.   Musculoskeletal: Normal range of motion. She exhibits no edema or tenderness.   Lymphadenopathy:     She has no cervical adenopathy.   Neurological: She is alert and oriented to person, place, and time. No cranial nerve deficit. She exhibits normal muscle tone. Coordination normal.   Droopy right eyelid, chronic.   Skin: Skin is warm and dry. No rash noted. She is not diaphoretic. No erythema.   Psychiatric: She has a normal mood and affect. Her behavior is normal. Judgment and thought content normal.   Nursing note and vitals reviewed.      Recent Labs      03/21/18   1548  03/22/18   0211   WBC  4.9  6.5   RBC  5.19  4.58   HEMOGLOBIN  15.4  13.4   HEMATOCRIT  45.6  40.2   MCV  87.9  87.8   MCH  29.7  29.3   MCHC  33.8  33.3*   RDW  40.0  40.0   PLATELETCT  267  252   MPV  10.2  10.4     Recent Labs      03/21/18   1548  03/22/18   0151  03/22/18   0313   SODIUM  144  148*  147*   POTASSIUM  4.0  2.9*  3.0*   CHLORIDE  106  122*  112   CO2  24  17*  26   GLUCOSE  97  78  94   BUN  6*  5*  8   CREATININE  0.57  0.34*  0.51   CALCIUM  10.9*  6.8*  9.2     Recent Labs      03/21/18   1548  03/22/18   0151  03/22/18   0313   ALTSGPT  14   --   12   ASTSGOT  22   --   17   ALKPHOSPHAT  38   --   37   TBILIRUBIN  0.9   --   0.6   GLUCOSE  97  78  94     Recent Labs      03/21/18   1548   APTT  24.5*   INR  1.02         Recent Labs      03/22/18   0151   TRIGLYCERIDE  73   HDL  36*   LDL  55     Lab Results   Component Value Date    TROPONINI <0.01 03/22/2018     Urinalysis:  No results found for:  SPECGRAVITY, GLUCOSEUR, KETONES, NITRITE, WBCURINE, RBCURINE, BACTERIA, EPITHELCELL     Imaging  CXR negative, CTA chest no PE.   Assessment/Plan     I anticipate this patient is appropriate for observation status at this time.    * Chest pain- (present on admission)   Assessment & Plan    No history of cardiac disease  Last echo EF 65%  Reproducible sternal border pain, but myriad of symptoms including near syncope at presentation to triage, palpitations.  NM stress echo ordered  Echo  Troponin trend  EKG sr [id number] t wave inversion lead V3 only.  Repeat EKG.  ASA daily        D-dimer, elevated- (present on admission)   Assessment & Plan    1126  CTA negative for PE  U/s LE ordered        Palpitations   Assessment & Plan    Sinus tachycardia on exam, monitor.        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            VTE prophylaxis: heparin.

## 2018-03-22 NOTE — ED NOTES
Spoke with admitting doctor to clarify lab orders, troponin can be drawn with other repeat labs at 0300.

## 2018-03-22 NOTE — ED NOTES
"Nurse introduced self to pt. Pt states she \"feels so much better.\" Denies needing any pain medicine. Denies SOB, states CP is minimal. Updated on plan of care, what was to be expected. Pt expressed understanding. Call light within reach, siderail up x1 for safety. Visitor at bedside.     "

## 2018-03-22 NOTE — PROGRESS NOTES
0259 Lab called with critical calcium of 6.8. MD paged and read back results.  Dr Latham reordered CMP and troponin draw. Labs resent down to lab at 0318.

## 2018-03-27 ENCOUNTER — PATIENT OUTREACH (OUTPATIENT)
Dept: HEALTH INFORMATION MANAGEMENT | Facility: OTHER | Age: 48
End: 2018-03-27

## 2018-03-27 NOTE — PROGRESS NOTES
1. Attempt #: 1    2. HealthConnect Verified: yes    3. Verify PCP: yes    4. Care Team Updated:       •   DME Company (gait device, O2, CPAP, etc.): YES       •   Other Specialists (eye doctor, derm, GYN, cardiology, endo, etc): YES    5.  Reviewed/Updated the following with patient:       •   Communication Preference Obtained? YES       •   Preferred Pharmacy? YES       •   Preferred Lab? YES       •   Family History (document living status of immediate family members and if + hx of cancer, diabetes, hypertension, hyperlipidemia, heart attack, stroke) YES. Was Abstract Encounter opened and chart updated? YES    6. [employer] Activation: already active    7. [employer] Aidan: no    8. Annual Wellness Visit Scheduling  Scheduling Status:Scheduled      9. Care Gap Scheduling (Attempt to Schedule EACH Overdue Care Gap!)     Health Maintenance Due   Topic Date Due   • Annual Wellness Visit  1970   • IMM DTaP/Tdap/Td Vaccine (1 - Tdap) 12/06/1989   • IMM PNEUMOCOCCAL 19-64 (ADULT) MEDIUM RISK SERIES (1 of 1 - PPSV23) 12/06/1989   • PAP SMEAR  12/06/1991   • IMM INFLUENZA (1) 09/01/2017        Scheduled patient for Annual Wellness Visit    10. Patient was advised: “This is a free wellness visit. The provider will screen for medical conditions to help you stay healthy. If you have other concerns to address you may be asked to discuss these at a separate visit or there may be an additional fee.”     11. Patient was informed to arrive 15 min prior to their scheduled appointment and bring in their medication bottles.

## 2018-03-29 ENCOUNTER — OFFICE VISIT (OUTPATIENT)
Dept: MEDICAL GROUP | Facility: MEDICAL CENTER | Age: 48
End: 2018-03-29
Payer: MEDICARE

## 2018-03-29 VITALS
TEMPERATURE: 98.1 F | DIASTOLIC BLOOD PRESSURE: 64 MMHG | SYSTOLIC BLOOD PRESSURE: 86 MMHG | BODY MASS INDEX: 18.98 KG/M2 | OXYGEN SATURATION: 97 % | RESPIRATION RATE: 16 BRPM | HEART RATE: 73 BPM | HEIGHT: 61 IN | WEIGHT: 100.53 LBS

## 2018-03-29 DIAGNOSIS — Z09 HOSPITAL DISCHARGE FOLLOW-UP: ICD-10-CM

## 2018-03-29 DIAGNOSIS — R07.89 ATYPICAL CHEST PAIN: ICD-10-CM

## 2018-03-29 DIAGNOSIS — F41.0 PANIC ATTACK: ICD-10-CM

## 2018-03-29 DIAGNOSIS — I95.9 HYPOTENSION, UNSPECIFIED HYPOTENSION TYPE: ICD-10-CM

## 2018-03-29 DIAGNOSIS — R00.2 PALPITATIONS: ICD-10-CM

## 2018-03-29 PROCEDURE — 99214 OFFICE O/P EST MOD 30 MIN: CPT | Performed by: PHYSICIAN ASSISTANT

## 2018-03-29 NOTE — ASSESSMENT & PLAN NOTE
This is a 47-year-old female who was seen the other day at the emergency department for chest pain. Midst lower sternal chest pain. She was hospitalized and spent 2 days. Woke up one morning. Denies radiation. Complains of associated palpitations. Shortness of breath. In the ER she was given nitroglycerin and Xanax. She states Xanax made her feel good. She continues with symptoms. Denies currently that her heart is racing but states it was earlier. During her evaluation at the ED she had a stress test that was negative. Echocardiogram showed an ejection fraction of 55%. She has an appointment at [place] [facility] in May. Blood pressure continues to be low. She complains of significant fatigue. For her chest discomfort she's been taking Tylenol and using her CPAP machine but with little relief.

## 2018-03-29 NOTE — PROGRESS NOTES
"Subjective:   Gayla Escudero is a 47 y.o. female here today for hospitalization discharge for chest pain.    Atypical chest pain  This is a 47-year-old female who was seen the other day at the emergency department for chest pain. Midst lower sternal chest pain. She was hospitalized and spent 2 days. Woke up one morning. Denies radiation. Complains of associated palpitations. Shortness of breath. In the ER she was given nitroglycerin and Xanax. She states Xanax made her feel good. She continues with symptoms. Denies currently that her heart is racing but states it was earlier. During her evaluation at the ED she had a stress test that was negative. Echocardiogram showed an ejection fraction of 55%. She has an appointment at [place] [facility] in May. Blood pressure continues to be low. She complains of significant fatigue. For her chest discomfort she's been taking Tylenol and using her CPAP machine but with little relief.       Current medicines (including changes today)  Current Outpatient Prescriptions   Medication Sig Dispense Refill   • Acetaminophen (TYLENOL PO) Take  by mouth.       No current facility-administered medications for this visit.      She  has a past medical history of Muscular disease; Muscular dystrophy (CMS-[facility]); and JOSÉ on CPAP.    Social History and Family History were reviewed and updated.    ROS   No shortness of breath, no abdominal pain and all other systems were reviewed and are negative.       Objective:     Blood pressure (!) 86/64, pulse 73, temperature 36.7 °C (98.1 °F), resp. rate 16, height 1.549 m (5' 1\"), weight 45.6 kg (100 lb 8.5 oz), SpO2 97 %. Body mass index is 18.99 kg/m².   Physical Exam:  Constitutional: Alert, no distress.  Skin: Warm, dry, good turgor, no rashes in visible areas.  Eye: Equal, round and reactive, conjunctiva clear, lids normal.  ENMT: Lips without lesions, good dentition, oropharynx clear.  Neck: Trachea midline, no masses.   Lymph: No cervical " or supraclavicular lymphadenopathy  Respiratory: Unlabored respiratory effort, lungs clear to auscultation, no wheezes, no ronchi.  Cardiovascular: Normal S1, S2, no murmur, no edema.  Psych: Alert and oriented x3, normal affect and mood.        Assessment and Plan:   The following treatment plan was discussed    1. Atypical chest pain  Acute, new onset condition. Unknown etiology but likely secondary to anxiety. But given the concerns with heart palpitations, hypertension I referred her to cardiology. She repeatedly asked for prescription for Xanax. I refused. I have referred her to psychiatry to establish care. Advised her I will not provide her any medication such as a benzodiazepine given her MD and her hypertensive status.  - REFERRAL TO CARDIOLOGY  - REFERRAL TO PSYCHIATRY    2. Palpitations  Acute, new onset condition. Currently asymptomatic. Referred to cardiology to establish care.  - REFERRAL TO CARDIOLOGY    3. Hypotension, unspecified hypotension type  Chronic condition. May be causing her fatigue. Referred to cardiology.  - REFERRAL TO CARDIOLOGY    4. Panic attack  Acute, new onset condition. Refer to psychiatry to establish care. Once again refused any benzodiazepines given her chronic conditions. I did consider hydroxyzine but that will make her more fatigued.  - REFERRAL TO PSYCHIATRY    5. Hospital discharge follow-up  Stable.      Followup: No Follow-up on file.    Please note that this dictation was created using voice recognition software. I have made every reasonable attempt to correct obvious errors, but I expect that there are errors of grammar and possibly content that I did not discover before finalizing the note.

## 2018-04-02 ENCOUNTER — TELEPHONE (OUTPATIENT)
Dept: MEDICAL GROUP | Facility: MEDICAL CENTER | Age: 48
End: 2018-04-02

## 2018-04-02 NOTE — TELEPHONE ENCOUNTER
Future Appointments       Provider Department Center    4/10/2018 3:00 PM Adithya Martinez P.A.-C.; [facility]  [facility]    5/1/2018 8:20 AM Sarai Arreola M.D. [facility]     5/7/2018 10:20 AM Yaquelin Brown M.D. [facility]       ANNUAL WELLNESS VISIT PRE-VISIT PLANNING WITH OUTREACH    1.  Immunizations were updated in Epic using WebIZ?:No WebIZ record       •  WebIZ Recommendations: n/a       •  Is patient due for Tdap? n/a       •  Is patient due for Shingles?n/a    2.  MDX printed for Provider? NO

## 2018-04-10 ENCOUNTER — OFFICE VISIT (OUTPATIENT)
Dept: MEDICAL GROUP | Facility: MEDICAL CENTER | Age: 48
End: 2018-04-10
Payer: MEDICARE

## 2018-04-10 VITALS
SYSTOLIC BLOOD PRESSURE: 90 MMHG | HEART RATE: 85 BPM | WEIGHT: 100.97 LBS | RESPIRATION RATE: 16 BRPM | DIASTOLIC BLOOD PRESSURE: 62 MMHG | TEMPERATURE: 98 F | HEIGHT: 61 IN | BODY MASS INDEX: 19.06 KG/M2 | OXYGEN SATURATION: 95 %

## 2018-04-10 DIAGNOSIS — R10.13 DYSPEPSIA: ICD-10-CM

## 2018-04-10 DIAGNOSIS — I95.9 HYPOTENSION, UNSPECIFIED HYPOTENSION TYPE: ICD-10-CM

## 2018-04-10 DIAGNOSIS — G71.11 MYOTONIC MUSCULAR DYSTROPHY (HCC): ICD-10-CM

## 2018-04-10 DIAGNOSIS — R07.89 ATYPICAL CHEST PAIN: ICD-10-CM

## 2018-04-10 DIAGNOSIS — Z00.00 PREVENTATIVE HEALTH CARE: ICD-10-CM

## 2018-04-10 DIAGNOSIS — Z00.00 ENCOUNTER FOR MEDICARE ANNUAL WELLNESS EXAM: ICD-10-CM

## 2018-04-10 DIAGNOSIS — G47.33 OSA (OBSTRUCTIVE SLEEP APNEA): ICD-10-CM

## 2018-04-10 DIAGNOSIS — R01.1 HEART MURMUR: ICD-10-CM

## 2018-04-10 PROBLEM — R00.2 PALPITATIONS: Status: RESOLVED | Noted: 2018-03-22 | Resolved: 2018-04-10

## 2018-04-10 PROBLEM — F51.04 CHRONIC INSOMNIA: Status: RESOLVED | Noted: 2018-01-15 | Resolved: 2018-04-10

## 2018-04-10 PROBLEM — Z12.31 VISIT FOR SCREENING MAMMOGRAM: Status: RESOLVED | Noted: 2017-07-27 | Resolved: 2018-04-10

## 2018-04-10 PROBLEM — R22.31 MASS OF RIGHT AXILLA: Status: RESOLVED | Noted: 2017-07-27 | Resolved: 2018-04-10

## 2018-04-10 PROBLEM — R79.89 D-DIMER, ELEVATED: Status: RESOLVED | Noted: 2018-03-22 | Resolved: 2018-04-10

## 2018-04-10 PROBLEM — F41.0 PANIC ATTACK: Status: RESOLVED | Noted: 2018-03-29 | Resolved: 2018-04-10

## 2018-04-10 PROCEDURE — G0438 PPPS, INITIAL VISIT: HCPCS | Performed by: PHYSICIAN ASSISTANT

## 2018-04-10 ASSESSMENT — ACTIVITIES OF DAILY LIVING (ADL): BATHING_REQUIRES_ASSISTANCE: 0

## 2018-04-10 ASSESSMENT — PATIENT HEALTH QUESTIONNAIRE - PHQ9: CLINICAL INTERPRETATION OF PHQ2 SCORE: 0

## 2018-04-10 NOTE — PROGRESS NOTES
Chief Complaint   Patient presents with   • Annual Exam         HPI:  Gayla is a 47 y.o. here for Medicare Annual Wellness Visit. She does complain of abdominal upset for approximately 2 months after she had some panic attacks. No more panic attacks. She will wake up with abdominal pain. Pain will continue even after she eats. She denies any heartburn or reflux. No difficulty swallowing. Chest pain in the past but that has resolved. She has a follow-up soon with cardiology. Also is seeing pulmonary for sleep apnea. Also has an appointment in the near future to see psychiatry. Has not yet established care here in [place] with a neurologist.        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       No current facility-administered medications for this visit.         Patient is taking medications as noted in medication list.  Current supplements as per medication list.     Allergies: Codeine and Tramadol    Current social contact/activities: Spend time with family.   Patient's perception of their health: fair    Is patient current with immunizations? No, due for PNEUMOVAX (PPSV23) and TDAP. Patient is interested in receiving NONE today.    She  reports that she has never smoked. She has never used smokeless tobacco. She reports that she does not drink alcohol or use drugs.  Counseling given: Not Answered        DPA/Advanced directive: Patient does not  have an Advanced Directive.  A packet and workshop information was given on Advanced Directives.    ROS:    Gait: Uses a walker   Ostomy: no   Other tubes: no   Amputations: no   Chronic oxygen use yes, USes CPAP    Last eye exam 02/2018   Wears hearing aids: no   : Denies any urinary leakage during the last 6 months          Depression Screening    Little interest or pleasure in doing things?  0 - not at all  Feeling down, depressed, or hopeless? 0 - not at all  Patient Health Questionnaire Score: 0    If depressive symptoms identified deferred to follow up visit unless specifically addressed in assessment and plan.    Interpretation of PHQ-9 Total Score   Score Severity   1-4 No Depression   5-9 Mild Depression   10-14 Moderate Depression   15-19 Moderately Severe Depression   20-27 Severe Depression    Screening for Cognitive Impairment    Three Minute Recall (apple, watch, catherine)  3/3    Draw clock face with all 12 numbers set to the hand to show 10 minutes past 11 o'clock   1/5  If cognitive concerns identified, deferred for follow up unless specifically addressed in assessment and plan.    Fall Risk Assessment    Has the patient had two or more falls in the last year or any fall with injury in the last year?  No  If fall risk identified, deferred for follow up unless specifically addressed in assessment and plan.    Safety Assessment    Throw rugs on floor.  Yes  Handrails on all stairs.  Yes  Good lighting in all hallways.  Yes  Difficulty hearing.  No  Patient counseled about all safety risks that were identified.    Functional Assessment ADLs    Are there any barriers preventing you from cooking for yourself or meeting nutritional needs?  No.    Are there any barriers preventing you from driving safely or obtaining transportation?  Yes.    Are there any barriers preventing you from using a telephone or calling for help?  No.    Are there any barriers preventing you from shopping?  No.    Are there any  "barriers preventing you from taking care of your own finances?  No.    Are there any barriers preventing you from managing your medications?  No.    Are there any barriers preventing you from showering, bathing or dressing yourself?  No.    Are you currently engaging any exercise or physical activity?  Yes.  PT at home daily 30-40 mins     Health Maintenance Summary                Annual Wellness Visit Overdue 1970     IMM DTaP/Tdap/Td Vaccine Overdue 12/6/1989     IMM PNEUMOCOCCAL 19-64 (ADULT) MEDIUM RISK SERIES Overdue 12/6/1989     PAP SMEAR Overdue 12/6/1991     IMM INFLUENZA Next Due 9/1/2018     MAMMOGRAM Next Due 9/13/2018      Done 9/13/2017      Patient has more history with this topic...          Patient Care Team:  Adithya Martinez P.A.-C. as PCP - General (Family Medicine)  Valeria Kimball R.N.    Social History   Substance Use Topics   • Smoking status: Never Smoker   • Smokeless tobacco: Never Used   • Alcohol use No     Family History   Problem Relation Age of Onset   • No Known Problems Mother    • No Known Problems Father    • Sleep Apnea Daughter    • No Known Problems Sister    • No Known Problems Brother    • No Known Problems Sister      She  has a past medical history of Muscular disease; Muscular dystrophy (CMS-HCC); and JOSÉ on CPAP.   Past Surgical History:   Procedure Laterality Date   • HYSTERECTOMY LAPAROSCOPY             Exam:     Blood pressure (!) 90/62, pulse 85, temperature 36.7 °C (98 °F), resp. rate 16, height 1.549 m (5' 1\"), weight 45.8 kg (100 lb 15.5 oz), SpO2 95 %. Body mass index is 19.08 kg/m².    Hearing good.    Dentition poor  Alert, oriented in no acute distress.  Eye contact is good, speech goal directed, affect calm      Assessment and Plan. The following treatment and monitoring plan is recommended:  #1. Muscular dystrophy. Stable. Refer to neurology to establish care. #2. Atypical chest pain. Stable. Referred to cardiology. #3. Dyspepsia. Chronic condition. " Unstable. Referred to GI to establish care. #4. Hypotension. Unknown etiology. Referred to cardiology. #5. Obstructive sleep apnea. Chronic condition. Stable. Follow with pulmonology. Continue CPAP use. #6. Heart murmur. Chronic condition. Follow-up with cardiology.        Services suggested: No services needed at this time  Health Care Screening: Age-appropriate preventive services recommended by USPTF and ACIP covered by Medicare were discussed today. Services ordered if indicated and agreed upon by the patient.  Referrals offered: PT/OT/Nutrition counseling/Behavioral Health/Smoking cessation as per orders if indicated.    Discussion today about general wellness and lifestyle habits:    · Prevent falls and reduce trip hazards; Cautioned about securing or removing rugs.  · Have a working fire alarm and carbon monoxide detector;   · Engage in regular physical activity and social activities       Follow-up: No Follow-up on file.     Addendum: IAdithya PA-C, saw this patient on April 10, 2018. I am the author of this note.

## 2018-05-01 ENCOUNTER — OFFICE VISIT (OUTPATIENT)
Dept: CARDIOLOGY | Facility: MEDICAL CENTER | Age: 48
End: 2018-05-01
Payer: MEDICARE

## 2018-05-01 ENCOUNTER — TELEPHONE (OUTPATIENT)
Dept: CARDIOLOGY | Facility: MEDICAL CENTER | Age: 48
End: 2018-05-01

## 2018-05-01 VITALS
HEIGHT: 61 IN | DIASTOLIC BLOOD PRESSURE: 60 MMHG | BODY MASS INDEX: 18.88 KG/M2 | SYSTOLIC BLOOD PRESSURE: 100 MMHG | HEART RATE: 62 BPM | WEIGHT: 100 LBS | OXYGEN SATURATION: 95 %

## 2018-05-01 DIAGNOSIS — R01.1 HEART MURMUR: ICD-10-CM

## 2018-05-01 DIAGNOSIS — G47.33 OSA (OBSTRUCTIVE SLEEP APNEA): ICD-10-CM

## 2018-05-01 DIAGNOSIS — I95.89 OTHER SPECIFIED HYPOTENSION: ICD-10-CM

## 2018-05-01 DIAGNOSIS — G71.11 MYOTONIC MUSCULAR DYSTROPHY (HCC): ICD-10-CM

## 2018-05-01 DIAGNOSIS — R07.89 ATYPICAL CHEST PAIN: ICD-10-CM

## 2018-05-01 DIAGNOSIS — R00.2 PALPITATIONS: ICD-10-CM

## 2018-05-01 PROCEDURE — 99214 OFFICE O/P EST MOD 30 MIN: CPT | Performed by: INTERNAL MEDICINE

## 2018-05-01 ASSESSMENT — ENCOUNTER SYMPTOMS
BACK PAIN: 1
BLURRED VISION: 1
FALLS: 1
WEAKNESS: 1
NERVOUS/ANXIOUS: 1
MYALGIAS: 1
ABDOMINAL PAIN: 1
PALPITATIONS: 1
DIARRHEA: 1
CONSTIPATION: 1
CLAUDICATION: 1
ORTHOPNEA: 1

## 2018-05-01 NOTE — LETTER
[facility]   [address], [address]  [place] [address]  Phone: [phone]  Fax: [phone]              Gayla Escudero  1970    Encounter Date: 2018    Sarai Arreola M.D.          PROGRESS NOTE:  Subjective:   Chief Complaint:   Chief Complaint   Patient presents with   • Palpitations     new patient        Gayla Escudero is a 47 y.o. female who presents today for further evaluation of palpitations and atypical chest pain.  She has myotonic muscular dystrophy diagnosed at age 16.  She has been evaluated by the neuromuscular multi disciplinary clinic at [facility] and is under the care of Dr. Marion Kimball.  She has a structurally normal heart by echocardiogram 2018 and essentially normal EKG.  She has had a normal Regadenosine nuclear perfusion stress test also in 2018.  She has not had Holter monitoring.  She is at risk for cardiomyopathy and arrhythmias given her muscular dystrophy but fortunately has a structurally normal heart.    She experienced as palpitations where her heart will beat forcefully and somewhat rapidly and is associated with some chest discomfort.  It does not make her particularly short of breath.  She does not get dizzy or lightheaded.  She has lower blood pressure but this does not manifest in syncope or presyncope.  We reviewed her family history.  There is no history of unexpected death though 2 of her brothers  from trauma.  Her daughter  in infancy from what sounds like congenital heart disease at 2 months old.  She has a son who also has muscular dystrophy.    She is accompanied today by Adryan who is very supportive in her care.    DATA REVIEWED by me:  ECG 2018  Sinus rhythm, rate 87, first-degree AV delay, anterior T-wave inversion    EKG 2017 sinus rhythm, rate 63, first-degree AV delay, anterior T-wave inversion    CT PE study 2018  No evidence  of pulmonary embolism, no incidental finding    Nuclear stress test 2018  No evidence of ischemia or infarct    Echo 2018  Normal LV size and function, EF 55%, RVSP 25 mmHg, mild TR    Most recent labs:     2018 hemoglobin 13.4, platelets 252, sodium 147, potassium 3, creatinine 0.51, LFTs normal  Total cholesterol 106, triglycerides 73, HDL 36, LDL 55, troponin normal ×2    Past Medical History:   Diagnosis Date   • Muscular disease    • Muscular dystrophy (HCC)    • JOSÉ on CPAP      Past Surgical History:   Procedure Laterality Date   • HYSTERECTOMY LAPAROSCOPY       Family History   Problem Relation Age of Onset   • No Known Problems Mother    • No Known Problems Father    • Sleep Apnea Daughter    • No Known Problems Sister    • No Known Problems Brother      Musc dys also   • No Known Problems Sister    • No Known Problems Brother      Trauma, was shot   • No Known Problems Brother      Car accident   • Other Son      Muscular dystrophy   • Heart Disease Daughter       at 2 months congenital heart disease     Social History     Social History   • Marital status:      Spouse name: N/A   • Number of children: N/A   • Years of education: N/A     Occupational History   • Not on file.     Social History Main Topics   • Smoking status: Never Smoker   • Smokeless tobacco: Never Used   • Alcohol use No   • Drug use: No   • Sexual activity: Yes     Partners: Male     Other Topics Concern   • Not on file     Social History Narrative   • No narrative on file     Allergies   Allergen Reactions   • Codeine Vomiting and Nausea     N&V   • Tramadol      Effects her MD       Current Outpatient Prescriptions   Medication Sig Dispense Refill   • Acetaminophen (TYLENOL PO) Take  by mouth.       No current facility-administered medications for this visit.        Review of Systems   Constitutional: Positive for malaise/fatigue.   Eyes: Positive for blurred vision.   Cardiovascular: Positive  "for chest pain, palpitations, orthopnea and claudication.   Gastrointestinal: Positive for abdominal pain, constipation and diarrhea.   Musculoskeletal: Positive for back pain, falls and myalgias.   Neurological: Positive for weakness.   Psychiatric/Behavioral: The patient is nervous/anxious.      All others systems reviewed and negative.     Objective:     Blood pressure 100/60, pulse 62, height 1.549 m (5' 1\"), weight 45.4 kg (100 lb), SpO2 95 %. Body mass index is 18.89 kg/m².    Physical Exam   General: No acute distress. Well nourished.  HEENT: EOM grossly intact, no scleral icterus, no pharyngeal erythema.   Neck:  No JVD, no bruits, trachea midline  CVS: RRR. Normal S1, S2. No M/R/G. No LE edema.  2+ radial pulses, 2+ DT pulses  Resp: CTAB. No wheezing or crackles/rhonchi. Normal respiratory effort.  Abdomen: Soft, NT, no tracy hepatomegaly.  MSK/Ext: No clubbing or cyanosis.  Skin: Warm and dry, no rashes.  Neurological: CN III-XII grossly intact.  Ambulates using a rolling walker, mildly reduced strength of the lower extremities  Psych: A&O x 3, appropriate affect, good judgement      Assessment:     1. Atypical chest pain     2. Palpitations  RIH ZIO PATCH MONITOR   3. Myotonic muscular dystrophy (HCC)     4. Heart murmur     5. JOSÉ (obstructive sleep apnea)     6. Other specified hypotension         Medical Decision Making:  Today's Assessment / Status / Plan:     1.  Myotonic muscular dystrophy since age 16  2.  Palpitations, low risk features.  Structurally normal heart.   3.  Atypical chest pain, normal perfusion stress testing  4.  JOSÉ requiring CPAP   5. Cough assist machine    -We will proceed with Zio patch monitoring which will give us up to 2 weeks of data to screen for rhythm changes.  I will call her with the results and send them on to Dr. Kimball.  -I have asked her to return in 1 year but to return sooner if she has any change in palpitations or other cardiac symptoms.    Return in about 1 " year (around 5/1/2019).    It is my pleasure to participate in the care of Ms. Escudero.  Please do not hesitate to contact me with questions or concerns.    Sarai Arreola MD, [facility]  Cardiologist [facility]    Please note that this dictation was created using voice recognition software. I have made every reasonable attempt to correct obvious errors, but it is possible there are errors of grammar and possibly content that I did not discover before finalizing the note.      Adithya Martinez, P.A.-C.  [address]  [address]  [place] [id number]  VIA In Basket

## 2018-05-01 NOTE — TELEPHONE ENCOUNTER
Attempted to call Dr. Marion Kimball clinic @ [phone] to get fax number. Unable to reach  at this time. Will call back.    ----- Message from Sarai Arreola M.D. sent at 5/1/2018  9:22 AM PDT -----  Gayla sees a specialist at [facility] by the name of Marion Kimball.  There is a note under media.  Please take a copy of my consult letter to the PCP and fax it over to the neuromuscular clinic.  Thanks!

## 2018-05-01 NOTE — PROGRESS NOTES
Subjective:   Chief Complaint:   Chief Complaint   Patient presents with   • Palpitations     new patient        Gayla Escudero is a 47 y.o. female who presents today for further evaluation of palpitations and atypical chest pain.  She has myotonic muscular dystrophy diagnosed at age 16.  She has been evaluated by the neuromuscular multi disciplinary clinic at [facility] and is under the care of Dr. Marion Kimball.  She has a structurally normal heart by echocardiogram 2018 and essentially normal EKG.  She has had a normal Regadenosine nuclear perfusion stress test also in 2018.  She has not had Holter monitoring.  She is at risk for cardiomyopathy and arrhythmias given her muscular dystrophy but fortunately has a structurally normal heart.    She experienced as palpitations where her heart will beat forcefully and somewhat rapidly and is associated with some chest discomfort.  It does not make her particularly short of breath.  She does not get dizzy or lightheaded.  She has lower blood pressure but this does not manifest in syncope or presyncope.  We reviewed her family history.  There is no history of unexpected death though 2 of her brothers  from trauma.  Her daughter  in infancy from what sounds like congenital heart disease at 2 months old.  She has a son who also has muscular dystrophy.    She is accompanied today by Adryan who is very supportive in her care.    DATA REVIEWED by me:  ECG 2018  Sinus rhythm, rate 87, first-degree AV delay, anterior T-wave inversion    EKG 2017 sinus rhythm, rate 63, first-degree AV delay, anterior T-wave inversion    CT PE study 2018  No evidence of pulmonary embolism, no incidental finding    Nuclear stress test 2018  No evidence of ischemia or infarct    Echo 2018  Normal LV size and function, EF 55%, RVSP 25 mmHg, mild TR    Most recent labs:     2018 hemoglobin 13.4, platelets  252, sodium 147, potassium 3, creatinine 0.51, LFTs normal  Total cholesterol 106, triglycerides 73, HDL 36, LDL 55, troponin normal ×2    Past Medical History:   Diagnosis Date   • Muscular disease    • Muscular dystrophy (HCC)    • JOSÉ on CPAP      Past Surgical History:   Procedure Laterality Date   • HYSTERECTOMY LAPAROSCOPY       Family History   Problem Relation Age of Onset   • No Known Problems Mother    • No Known Problems Father    • Sleep Apnea Daughter    • No Known Problems Sister    • No Known Problems Brother      Musc dys also   • No Known Problems Sister    • No Known Problems Brother      Trauma, was shot   • No Known Problems Brother      Car accident   • Other Son      Muscular dystrophy   • Heart Disease Daughter       at 2 months congenital heart disease     Social History     Social History   • Marital status:      Spouse name: N/A   • Number of children: N/A   • Years of education: N/A     Occupational History   • Not on file.     Social History Main Topics   • Smoking status: Never Smoker   • Smokeless tobacco: Never Used   • Alcohol use No   • Drug use: No   • Sexual activity: Yes     Partners: Male     Other Topics Concern   • Not on file     Social History Narrative   • No narrative on file     Allergies   Allergen Reactions   • Codeine Vomiting and Nausea     N&V   • Tramadol      Effects her MD       Current Outpatient Prescriptions   Medication Sig Dispense Refill   • Acetaminophen (TYLENOL PO) Take  by mouth.       No current facility-administered medications for this visit.        Review of Systems   Constitutional: Positive for malaise/fatigue.   Eyes: Positive for blurred vision.   Cardiovascular: Positive for chest pain, palpitations, orthopnea and claudication.   Gastrointestinal: Positive for abdominal pain, constipation and diarrhea.   Musculoskeletal: Positive for back pain, falls and myalgias.   Neurological: Positive for weakness.   Psychiatric/Behavioral: The  "patient is nervous/anxious.      All others systems reviewed and negative.     Objective:     Blood pressure 100/60, pulse 62, height 1.549 m (5' 1\"), weight 45.4 kg (100 lb), SpO2 95 %. Body mass index is 18.89 kg/m².    Physical Exam   General: No acute distress. Well nourished.  HEENT: EOM grossly intact, no scleral icterus, no pharyngeal erythema.   Neck:  No JVD, no bruits, trachea midline  CVS: RRR. Normal S1, S2. No M/R/G. No LE edema.  2+ radial pulses, 2+ DT pulses  Resp: CTAB. No wheezing or crackles/rhonchi. Normal respiratory effort.  Abdomen: Soft, NT, no tracy hepatomegaly.  MSK/Ext: No clubbing or cyanosis.  Skin: Warm and dry, no rashes.  Neurological: CN III-XII grossly intact.  Ambulates using a rolling walker, mildly reduced strength of the lower extremities  Psych: A&O x 3, appropriate affect, good judgement      Assessment:     1. Atypical chest pain     2. Palpitations  RIH ZIO PATCH MONITOR   3. Myotonic muscular dystrophy (HCC)     4. Heart murmur     5. JOSÉ (obstructive sleep apnea)     6. Other specified hypotension         Medical Decision Making:  Today's Assessment / Status / Plan:     1.  Myotonic muscular dystrophy since age 16  2.  Palpitations, low risk features.  Structurally normal heart.   3.  Atypical chest pain, normal perfusion stress testing  4.  JOSÉ requiring CPAP   5. Cough assist machine    -We will proceed with Zio patch monitoring which will give us up to 2 weeks of data to screen for rhythm changes.  I will call her with the results and send them on to Dr. Kimball.  -I have asked her to return in 1 year but to return sooner if she has any change in palpitations or other cardiac symptoms.    Return in about 1 year (around 5/1/2019).    It is my pleasure to participate in the care of Ms. Escudero.  Please do not hesitate to contact me with questions or concerns.    Sarai Arreola MD, [facility]  Cardiologist [facility]    Please note that this " dictation was created using voice recognition software. I have made every reasonable attempt to correct obvious errors, but it is possible there are errors of grammar and possibly content that I did not discover before finalizing the note.

## 2018-05-04 ENCOUNTER — OFFICE VISIT (OUTPATIENT)
Dept: NEUROLOGY | Facility: MEDICAL CENTER | Age: 48
End: 2018-05-04
Payer: MEDICARE

## 2018-05-04 VITALS
HEIGHT: 61 IN | TEMPERATURE: 97.2 F | OXYGEN SATURATION: 98 % | SYSTOLIC BLOOD PRESSURE: 96 MMHG | HEART RATE: 98 BPM | RESPIRATION RATE: 14 BRPM | DIASTOLIC BLOOD PRESSURE: 60 MMHG

## 2018-05-04 DIAGNOSIS — G71.11 MYOTONIC DYSTROPHY (HCC): ICD-10-CM

## 2018-05-04 DIAGNOSIS — G71.11 MYOTONIC MUSCULAR DYSTROPHY (HCC): ICD-10-CM

## 2018-05-04 PROCEDURE — 99358 PROLONG SERVICE W/O CONTACT: CPT | Performed by: PSYCHIATRY & NEUROLOGY

## 2018-05-04 PROCEDURE — 99204 OFFICE O/P NEW MOD 45 MIN: CPT | Performed by: PSYCHIATRY & NEUROLOGY

## 2018-05-04 NOTE — LETTER
May 4, 2018        Re: Gayla Escudero  : 1970    To Whom It May Concern:    Ms. Escudero is under my care for myotonic muscular dystrophy. Due to her medical condition, she has difficulty walking. It is my medical opinion that she needs a ramp to be able to safely get into her apartment housing.     Please contact my office with further questions. [phone].       Sincerely,        Christal Sanchez DO, MPH  NPI [id number]

## 2018-05-07 ENCOUNTER — SLEEP CENTER VISIT (OUTPATIENT)
Dept: SLEEP MEDICINE | Facility: MEDICAL CENTER | Age: 48
End: 2018-05-07
Payer: MEDICARE

## 2018-05-07 ENCOUNTER — APPOINTMENT (OUTPATIENT)
Dept: SLEEP MEDICINE | Facility: MEDICAL CENTER | Age: 48
End: 2018-05-07
Attending: FAMILY MEDICINE
Payer: MEDICARE

## 2018-05-07 VITALS
HEIGHT: 61 IN | DIASTOLIC BLOOD PRESSURE: 72 MMHG | OXYGEN SATURATION: 92 % | WEIGHT: 100 LBS | RESPIRATION RATE: 15 BRPM | SYSTOLIC BLOOD PRESSURE: 100 MMHG | HEART RATE: 83 BPM | BODY MASS INDEX: 18.88 KG/M2

## 2018-05-07 DIAGNOSIS — G47.33 OSA (OBSTRUCTIVE SLEEP APNEA): ICD-10-CM

## 2018-05-07 DIAGNOSIS — G71.11 MYOTONIC MUSCULAR DYSTROPHY (HCC): ICD-10-CM

## 2018-05-07 PROCEDURE — 99213 OFFICE O/P EST LOW 20 MIN: CPT | Performed by: FAMILY MEDICINE

## 2018-05-07 NOTE — PROGRESS NOTES
[facility] Follow Up Note     Date: 5/7/2018 / Time: 10:33 AM    Patient ID:   Name:             Gayla Escudero   YOB: 1970  Age:                 47 y.o.  female   MRN:               [id number]      Thank you for requesting a sleep medicine consultation on Gayla Escudero at the sleep center. She presents today with the chief complaints of JOSÉ follow up.     HISTORY OF PRESENT ILLNESS:       Pt is currently on CPAP 12 cm. No change in sleep schedule. She goes to sleep around 8-10 pm on weekdays and around on weekends. She gets out of bed at 5-6 am . She  averages 7 hrs of sleep on a good night She is using CPAP most days of the week. Pt reports 4 hrs of average nightly use of CPAP. Pt denies snoring, gasping,choking.Pt also denies significant mask leak that is interfering with sleep.      The 30 day compliance was downloaded which shows adequate compliance with more that 4 hr usage about 97%. The AHI is has improved to 0.3/hr. The mask leak is normal. The symptoms of excessive daytime, snoring and gasping has improved. However she is having really hard time dealing with the CPAP pressure her MMD physican is recommending BiPAP instead of CPAP. Which is a feasible option for pressure intolerability and areophagia.         SLEEP HISTORY   .PSG on 1/23/17  mild obstructive sleep apnea as shown by the apnea hypopnea index of 8.3/hr.  She had CPAP titrationon 1/26/18  initiated treatment with CPAP at 5 cm and increased the pressure to a maximum of 13 cm water pressure. The patient did best on CPAP at 12 cm with the achievement of supine sleep. The resultant apnea hypopnea index was 3.8. All the events were hypopneas at this setting. The minimum saturation was 87% and the mean saturation was 93.8%.      REVIEW OF SYSTEMS:       Constitutional: Denies fevers, Denies weight changes  Eyes: Denies changes in vision, no eye pain  Ears/Nose/Throat/Mouth: Denies nasal congestion or sore  throat   Cardiovascular: Denies chest pain or palpitations   Respiratory: Denies shortness of breath , Denies cough  Gastrointestinal/Hepatic: Denies abdominal pain, nausea, vomiting, diarrhea, constipation or GI bleeding   Genitourinary: Denies bladder dysfunction, dysuria or frequency  Musculoskeletal/Rheum: Denies  joint pain and swelling   Skin/Breast: Denies rash,   Neurological: Denies headache, confusion, memory loss or focal weakness/parasthesias  Psychiatric: denies mood disorder     Comprehensive review of systems form is reviewed with the patient and is attached in the EMR.     PMH:  has a past medical history of Anemia; Cataract; Heart murmur; Muscular disease; Muscular dystrophy (HCC); and JOSÉ on CPAP.  MEDS:   Current Outpatient Prescriptions:   •  Acetaminophen (TYLENOL PO), Take  by mouth., Disp: , Rfl:   ALLERGIES:   Allergies   Allergen Reactions   • Codeine Vomiting and Nausea     N&V   • Tramadol      Effects her MD     SURGHX:   Past Surgical History:   Procedure Laterality Date   • HYSTERECTOMY LAPAROSCOPY       SOCHX:  reports that she has never smoked. She has never used smokeless tobacco. She reports that she does not drink alcohol or use drugs..  FH:   Family History   Problem Relation Age of Onset   • No Known Problems Mother    • No Known Problems Father    • Sleep Apnea Daughter    • No Known Problems Sister    • No Known Problems Brother      Musc dys also   • No Known Problems Sister    • No Known Problems Brother      Trauma, was shot   • No Known Problems Brother      Car accident   • Other Son      Muscular dystrophy   • Heart Disease Daughter       at 2 months congenital heart disease         Physical Exam:  Vitals/ General Appearance:   Weight/BMI: There is no height or weight on file to calculate BMI.  There were no vitals taken for this visit.  There were no vitals filed for this visit.    Pt. is alert and oriented to time, place and person. Cooperative and in no apparent  distress.     1. Head: Atraumatic, normocephalic.   2. Ears: Normal tympanic membrane and no discharge  3. Nose: No inferior turbinate hypertophy, no septal deviation, no polyp.   4. Throat: Oropharynx appears crowded in that the palate is overhanging (Malam Anuradha scale 4. Tonsils are not enlarged, uvula is large, pharynx not inflamed. Tongue is large.   5. Neck: Supple. No thyromegaly  6. Chest: Trachea central,   7. Lungs auscultation: B/L good air entry, vesicular breath sounds, no adventitious sounds  8. Heart auscultation: 1st and 2nd heart sounds normal, regular rhythm.   9. Abdomen: Soft, non tender, no organomegaly. Bowel sounds present  10. Extremities:  no clubbing, no pedal edema.   Peripheral pulses felt.  11. Skin: No rash    INVESTIGATIONS:           ASSESSMENT AND PLAN     1.Obstructive Sleep Apnea (JOSÉ).The symptoms of excessive daytime, snoring and gasping has improved      The pathophysiology of JOSÉ and the increased risk of cardiovascular morbidity from untreated JOSÉ is discussed in detail with the patient.      She is urged to avoid supine sleep, weight gain and alcoholic beverages since all of these can worsen JOSÉ. She is cautioned against drowsy driving. If She feels sleepy while driving, She must pull over for a break/nap, rather than persist on the road, in the interest of She own safety and that of others on the road.   Plan   - Continue CPAP at 12 cm    - she is having really hard time dealing with the CPAP pressure her MMD physican is recommending BiPAP instead of CPAP. Which is  a feasible option for pressure intolerability and areophagia. BiPAP titration is ordered today.   - compliance download was reviewed and discussed with the pt   - compliance was reinforced     2.Regarding treatment of other past medical problems and general health maintenance,  She is urged to follow up with PCP.

## 2018-05-08 ENCOUNTER — APPOINTMENT (OUTPATIENT)
Dept: SLEEP MEDICINE | Facility: MEDICAL CENTER | Age: 48
End: 2018-05-08
Payer: MEDICARE

## 2018-05-08 ENCOUNTER — HOSPITAL ENCOUNTER (OUTPATIENT)
Dept: LAB | Facility: MEDICAL CENTER | Age: 48
End: 2018-05-08
Attending: PHYSICIAN ASSISTANT
Payer: MEDICARE

## 2018-05-08 ENCOUNTER — HOSPITAL ENCOUNTER (OUTPATIENT)
Dept: RADIOLOGY | Facility: MEDICAL CENTER | Age: 48
End: 2018-05-08
Attending: PHYSICIAN ASSISTANT
Payer: MEDICARE

## 2018-05-08 DIAGNOSIS — R10.84 ABDOMINAL PAIN, GENERALIZED: ICD-10-CM

## 2018-05-08 DIAGNOSIS — K59.00 CONSTIPATION, UNSPECIFIED CONSTIPATION TYPE: ICD-10-CM

## 2018-05-08 LAB — TSH SERPL DL<=0.005 MIU/L-ACNC: 0.68 UIU/ML (ref 0.38–5.33)

## 2018-05-08 PROCEDURE — 86140 C-REACTIVE PROTEIN: CPT

## 2018-05-08 PROCEDURE — 84443 ASSAY THYROID STIM HORMONE: CPT

## 2018-05-08 PROCEDURE — 74018 RADEX ABDOMEN 1 VIEW: CPT

## 2018-05-08 PROCEDURE — 36415 COLL VENOUS BLD VENIPUNCTURE: CPT

## 2018-05-09 LAB — CRP SERPL HS-MCNC: 0.27 MG/DL (ref 0–0.75)

## 2018-05-15 ENCOUNTER — NON-PROVIDER VISIT (OUTPATIENT)
Dept: CARDIOLOGY | Facility: MEDICAL CENTER | Age: 48
End: 2018-05-15
Payer: MEDICARE

## 2018-05-15 ENCOUNTER — TELEPHONE (OUTPATIENT)
Dept: CARDIOLOGY | Facility: MEDICAL CENTER | Age: 48
End: 2018-05-15

## 2018-05-15 DIAGNOSIS — R00.2 PALPITATIONS: ICD-10-CM

## 2018-05-15 DIAGNOSIS — I49.1 PREMATURE ATRIAL CONTRACTION: ICD-10-CM

## 2018-05-15 DIAGNOSIS — I49.8 VENTRICULAR BIGEMINY: ICD-10-CM

## 2018-05-15 DIAGNOSIS — I49.3 PVC (PREMATURE VENTRICULAR CONTRACTION): ICD-10-CM

## 2018-05-15 NOTE — PROGRESS NOTES
CC: Myotonic muscular dystrophy      HPI:    Gayla Escudero is a 47 y.o. female who presents today in initial neurologic consultation for previously diagnosed myotonic muscular dystrophy. She is accompanied by her  to today's visit. She was referred by her primary care provider Adithya Martinez P.A.-C, and regularly attends the [facility] comprehensive muscular dystrophy clinic in [place] where she is cared for by Dr. Kimball. The reason she is seeing me today is to establish care with a local neurologist in [place], who can help in her long-term management.    Ms. Escudero and her  moved to [place], [place], from [place], [place], 4-1/2 years ago. She was diagnosed with myotonic muscular dystrophy 16 years ago, and experiences most of her weakness in her arms, hands, and legs. She started using a walker 2 months ago after experiencing frequent falls.    I have reviewed her outside medical records from [facility] and will recap salient points here. She has developed sequela characteristic for this disease, including severe restrictive lung disease and nocturnal hypoventilation for which she requires continuous positive airway pressure overnight with cough assist, cardiomyopathy with history of arrhythmia, and bilateral cataracts which have been removed. She ambulates mainly with the assistance of a Rollator walker, but is in the process of obtaining a power wheelchair.    On March 21st, 2018, Ms. Escudero was admitted to [facility] after experiencing new onset midsternal chest pain. It is documented in the emergency department notes that she had a near syncopal episode while in the ED. She was admitted for further workup including a cardiac stress test which was negative for inducible ischemia, troponins which remained negative throughout her admission, and an echocardiogram which showed LVEF of 55% with no other abnormalities. CTA of the chest was negative for a pulmonary embolus, and lower  "extremity DVTs were negative. She was noted to have anxiety throughout her admission. Her sodium was mildly elevated at 147, and her potassium was low at 3.0. She was discharged with a plan for ongoing monitoring with a [place]. She has an appointment at [place] etiology and management.      ROS:   Constitutional: No fevers or chills. +fatigue.   Eyes: No eye pain. Experiences +double and blurry vision at times  ENT: + dysphagia, denies hearing loss.  Respiratory: No cough or shortness of breath.  Cardiovascular: No recurrent episodes of chest pain or palpitations since discharge.  GI: No nausea, vomiting, or diarrhea.  : No urinary incontinence or dysuria.  Musculoskeletal: No joint swelling or arthralgias.  Skin: No skin rashes.  Neuro: No headaches, numbness or tingling.  Endocrine: No heat or cold intolerance.   Psych: +anxiety.        Past Medical History:   Past Medical History:   Diagnosis Date   • Anemia    • Cataract    • Heart murmur    • Muscular disease    • Muscular dystrophy (HCC)    • JOSÉ on CPAP        Past Surgical History:   Past Surgical History:   Procedure Laterality Date   • HYSTERECTOMY LAPAROSCOPY         Social History:   Social History     Social History   • Marital status:      Spouse name: N/A   • Number of children: N/A   • Years of education: N/A     Occupational History   • Not on file.     Social History Main Topics   • Smoking status: Never Smoker   • Smokeless tobacco: Never Used   • Alcohol use No   • Drug use: No   • Sexual activity: Yes     Partners: Male     Other Topics Concern   • Not on file     Social History Narrative   • No narrative on file       Allergies:   Allergies   Allergen Reactions   • Codeine Vomiting and Nausea     N&V   • Tramadol      Effects her MD     Encounter Vitals  Standard Vitals  Vitals  Blood Pressure: (!) 96/60  Temperature: 36.2 °C (97.2 °F)  Pulse: 98  Respiration: 14  Pulse Oximetry: 98 %  Height: 154.9 cm (5' 1\")  Encounter " "Vitals  Temperature: 36.2 °C (97.2 °F)  Temp Source: Temporal  Blood Pressure: (!) 96/60  BP Location: Left, Upper Arm  Patient BP Position: Sitting  Pulse: 98  Respiration: 14  Pulse Oximetry: 98 %  Weight Source: Stand Up Scale  Height: 154.9 cm (5' 1\")    Physical Exam:   Constitutional:  female with obvious facial muscle weakness, mild frontal balding.  Cardiovascular: RRR, with no murmurs, rubs or gallops. No carotid bruits.   Respiratory: Lungs CTA B/L, no W/R/R.   Abdomen: Soft non-tender to Palpation. Non-distended.  Extremities: No peripheral edema, pedal pulses intact.   Skin: Warm, dry, intact. No rashes observed.  Eyes: Sclera anicteric. Bilateral cataracts.   Funduscopic: Optic discs flat with no evidence of papilledema or pallor.   Neurologic:   Mental Status: Awake, alert, oriented x 3.   Speech: No dysarthria, fluency is intact, speech muscle weakness is evident.   Memory: Able to recall 3 words at 1 minute and 5 minutes. Able to recall recent and remote events accurately.    Concentration: Attentive. Able to focus on history and follow multi-step commands.              Fund of Knowledge: Appropriate   Cranial Nerves:    CN II: PERRL. No afferent pupillary defect.    CN III, IV, VI: Good eye closure, EOMI.     CN V: Facial sensation intact and symmetric.     CN VII: Has weakness of bilateral facial muscles.     CN VIII: Hearing intact.     CN IX and X: Decreased gag reflex.    CN XI: Symmetric shoulder shrug.     CN XII: Tongue midline.    Sensory: Intact light touch, vibration and temperature.    Coordination: No evidence of ataxia/pastpointing on finger to nose testing.   Babinski: Toes downgoing bilaterally.   Movements: No tremors observed.     Strength (R/L):     Deltoid: 4-/4-   Biceps: 4-/4-    Triceps: 4-/4-   Wrist extensors: 4-/4-   Finger Flexors: 4/4   Finger abductors: 4/4      Hip Flexors: 3/3   Hip Adductors: 3/3   Knee Flexors: 3/3   Knee Extensors: 3/3   DF: 3/3   PF: " 3/3     Reflxes (R/L):   Biceps: 1+/1+   Triceps: 1+/1+   Brachioradialis: 1+/1+   Patella: 1+/1+   Achilles: 1+/1+   Gait: Unsteady    Musculoskeletal:    Tone: Normal tone. + myotonia. Could not elicit percussion myotonia in bilateral wrists.   Joints: No swelling.     Labs:  Results for ANIBAL CLARK (MRN [id number]) as of 2018 10:06   Ref. Range 2018 12:56   TSH Latest Ref Range: 0.380 - 5.330 uIU/mL 0.680   Stat C-Reactive Protein Latest Ref Range: 0.00 - 0.75 mg/dL 0.27       Imaging:   Abdominal x-ray from May 8, 2018  No acute findings.    Nuclear medicine cardiac stress test from 2018   Myocardial Perfusion   Report   NUCLEAR IMAGING INTERPRETATION   No reversible defects that would indicate ischemia.    ECG INTERPRETATION   Negative stress ECG for ischemia.    EKG from 2018  [facility]     Test Date:  2018   Pt Name:    ANIBAL CLARK                  Department: [facility]   MRN:        [id number]                      Room:       T212   Gender:     Female                       Technician: [facility]   :        1970                   Requested By:JOSH MARVIN   Order #:    [id number]                    Reading MD: Eliel Trujillo MD     Measurements   Intervals                                Axis   Rate:       87                           P:   TX:                                      QRS:        22   QRSD:       72                           T:          46   QT:         384   QTc:        462     Interpretive Statements   TECHNICALLY POOR TRACING.   SINUS RHYTHM   FIRST DEGREE AV BLOCK   Compared to ECG 2018 05:40:10   No significant change.     Transthoracic echo from 2018  Transthoracic  Echo Report  Echocardiography Laboratory  CONCLUSIONS  Structurally normal mitral valve without significant stenosis or   regurgitation.  Structurally normal aortic valve without significant stenosis or   regurgitation.  Mild tricuspid regurgitation.  Right  ventricular systolic pressure is estimated to be 25 mmHg.  Normal left ventricular systolic function.  Left ventricular ejection fraction is visually estimated to be 55%.  Normal diastolic function.  Compared to the echo of 10/09/2017, ( five months ago), the LV EF is   decreased, but remains normal.    Lower extremity venous duplex from March 22, 2018  FINDINGS:   Bilateral lower extremities:   Complete color filling and compressibility with normal venous flow dynamics    including spontaneous flow, response to augmentation maneuvers, and    respiratory phasicity.    No evidence of superficial or deep venous thrombosis.       Assessment/Plan:  Myotonic muscular dystrophy (CMS-HCC) (HCC)  Ms. Escudero is a very pleasant 47-year-old woman diagnosed with myotonic muscular dystrophy at age 31, who has experienced sequela from this genetic neuromuscular condition including bilateral cataracts status post removal, restrictive lung disease for which she is on nightly CPAP, and who experienced a recent episode of chest pain with a negative cardiac workup that included a nuclear medicine stress test, echocardiogram, EKG, and troponins. I discussed with her that while I am not a neuromuscular trained neurologist, I would be happy to be her point of contact locally. We discussed her recent falls, and I recommended a wheelchair seating evaluation for her through [facility]. Referral to physical therapy was placed at this visit. Since she will be following up at [facility] and the coming months, I suggested follow up in September, sooner if she has any immediate needs arise.     Plan:  1. Wheelchair seating evaluation ordered at today's visit  2. She will continue to see Dr. Kimball at [facility]'s muscular dystrophy specialty clinic  3. Follow up with cardiology at [facility] given her recent hospitalization for chest pain-arrhythmia versus anxiety.    Thank you for allowing me to participate in the care of this patient.    CC: Adithya  MIKE Martinez Dr., MD        An additional 35 minutes outside of direct face to face time was spent reviewing outside medical records from [facility] and [facility].     Christal Sanchez D.O., M.P.H  MS specialist.   Board Certified Neurologist.  Neurology Clerkship Director, [facility].    Neurology,  [facility].   Tel: [phone]  Fax: [phone]      CC: MIKE Pedroza Dr., MD

## 2018-05-18 NOTE — ASSESSMENT & PLAN NOTE
Ms. Escudero is a very pleasant 47-year-old woman diagnosed with myotonic muscular dystrophy at age 31, who has experienced sequela from this genetic neuromuscular condition including bilateral cataracts status post removal, restrictive lung disease for which she is on nightly CPAP, and who experienced a recent episode of chest pain with a negative cardiac workup that included a nuclear medicine stress test, echocardiogram, EKG, and troponins. I discussed with her that while I am not a neuromuscular trained neurologist, I would be happy to be her point of contact locally. We discussed her recent falls, and I recommended a wheelchair seating evaluation for her through [facility]. Referral to physical therapy was placed at this visit. Since she will be following up at [facility] and the coming months, I suggested follow up in September, sooner if she has any immediate needs arise.     Plan:  1. Wheelchair seating evaluation ordered at today's visit  2. She will continue to see Dr. Kimball at [facility]'s muscular dystrophy specialty clinic  3. Follow up with cardiology at [facility] given her recent hospitalization for chest pain-arrhythmia versus anxiety.    Thank you for allowing me to participate in the care of this patient.

## 2018-05-31 ENCOUNTER — APPOINTMENT (OUTPATIENT)
Dept: MEDICAL GROUP | Facility: MEDICAL CENTER | Age: 48
End: 2018-05-31
Payer: MEDICARE

## 2018-06-07 ENCOUNTER — PHYSICAL THERAPY (OUTPATIENT)
Dept: PHYSICAL THERAPY | Facility: REHABILITATION | Age: 48
End: 2018-06-07
Attending: PSYCHIATRY & NEUROLOGY
Payer: MEDICARE

## 2018-06-07 DIAGNOSIS — R26.9 ABNORMALITY OF GAIT AND MOBILITY: ICD-10-CM

## 2018-06-07 DIAGNOSIS — R26.9 GAIT ABNORMALITY: ICD-10-CM

## 2018-06-07 DIAGNOSIS — G71.00 MUSCULAR DYSTROPHY (HCC): ICD-10-CM

## 2018-06-07 DIAGNOSIS — R26.89 BALANCE DISORDER: ICD-10-CM

## 2018-06-07 PROCEDURE — 97542 WHEELCHAIR MNGMENT TRAINING: CPT

## 2018-06-07 PROCEDURE — G8978 MOBILITY CURRENT STATUS: HCPCS | Mod: CM

## 2018-06-07 PROCEDURE — G8979 MOBILITY GOAL STATUS: HCPCS | Mod: CM

## 2018-06-07 PROCEDURE — G8980 MOBILITY D/C STATUS: HCPCS | Mod: CM

## 2018-06-07 ASSESSMENT — ENCOUNTER SYMPTOMS
PAIN SCALE AT HIGHEST: 8
PAIN SCALE: 0
PAIN SCALE AT LOWEST: 0
LOWER EXTREMITY EDEMA: 0
QUALITY: ACHING

## 2018-06-08 ASSESSMENT — BALANCE ASSESSMENTS
WEIGHT SHIFT SITTING: FAIR
WEIGHT SHIFT STANDING: POOR
BALANCE - SITTING STATIC: FAIR
BALANCE - STANDING STATIC: POOR +
BALANCE - STANDING DYNAMIC: POOR -
BALANCE - SITTING DYNAMIC: FAIR -

## 2018-06-08 ASSESSMENT — ACTIVITIES OF DAILY LIVING (ADL)
AMBULATION_WITHOUT_ASSISTIVE_DEVICE: UNABLE
AMBULATION_WITH_ASSISTIVE_DEVICE: CONTACT GUARD ASSIST

## 2018-06-12 NOTE — TELEPHONE ENCOUNTER
Spoke with the patient regarding her Zio patch.  The patient stated that she mailed back her monitor on Friday 06/08/2018.  >Pending results.

## 2018-06-25 PROCEDURE — 0298T PR EXT ECG > 48HR TO 21 DAY REVIEW AND INTERPRETATN: CPT | Performed by: INTERNAL MEDICINE

## 2018-06-25 PROCEDURE — 0296T PR EXT ECG > 48HR TO 21 DAY RCRD W/CONECT INTL RCRD: CPT | Performed by: INTERNAL MEDICINE

## 2018-06-26 ENCOUNTER — TELEPHONE (OUTPATIENT)
Dept: CARDIOLOGY | Facility: MEDICAL CENTER | Age: 48
End: 2018-06-26

## 2018-06-26 NOTE — TELEPHONE ENCOUNTER
L/m for pt to call back to discuss zio patch results per Dr. Arreola.     Will fax results to Dr. [facility] Kimball @[phone] per LS request.    ----- Message from Sarai Arreola M.D. sent at 6/25/2018 10:28 PM PDT -----  I read a ziopatch for this pt today but the results didn't come back to me and I don't see my result note in her chart.  She didn't have any rhythm changes and rare PVCs. Please let her know it looked ok and when you find the results fax them to her neuro specialist at [facility], name is in the sticky note.  Can you see the sticky notes that I make?  LS

## 2018-07-05 NOTE — TELEPHONE ENCOUNTER
[employer] message sent to pt educating her on Zio patch results.     Copy faxed to Dr. Marion Hatch's per Dr. Arreola @[phone]   Preceptor Attestation:   Patient seen and discussed with the resident. Assessment and plan reviewed with resident and agreed upon.   Supervising Physician:  Dori Streeter MD  [facility]

## 2018-07-19 ENCOUNTER — OFFICE VISIT (OUTPATIENT)
Dept: URGENT CARE | Facility: CLINIC | Age: 48
End: 2018-07-19
Payer: MEDICARE

## 2018-07-19 VITALS
HEIGHT: 61 IN | SYSTOLIC BLOOD PRESSURE: 90 MMHG | RESPIRATION RATE: 14 BRPM | TEMPERATURE: 98.3 F | WEIGHT: 94 LBS | HEART RATE: 81 BPM | BODY MASS INDEX: 17.75 KG/M2 | OXYGEN SATURATION: 97 % | DIASTOLIC BLOOD PRESSURE: 78 MMHG

## 2018-07-19 DIAGNOSIS — G43.809 OTHER MIGRAINE WITHOUT STATUS MIGRAINOSUS, NOT INTRACTABLE: ICD-10-CM

## 2018-07-19 PROCEDURE — 99214 OFFICE O/P EST MOD 30 MIN: CPT | Mod: 25 | Performed by: NURSE PRACTITIONER

## 2018-07-19 PROCEDURE — 99999 PR NO CHARGE: CPT | Performed by: NURSE PRACTITIONER

## 2018-07-19 RX ORDER — KETOROLAC TROMETHAMINE 30 MG/ML
60 INJECTION, SOLUTION INTRAMUSCULAR; INTRAVENOUS ONCE
Status: COMPLETED | OUTPATIENT
Start: 2018-07-19 | End: 2018-07-19

## 2018-07-19 RX ORDER — DEXAMETHASONE SODIUM PHOSPHATE 4 MG/ML
4 INJECTION, SOLUTION INTRA-ARTICULAR; INTRALESIONAL; INTRAMUSCULAR; INTRAVENOUS; SOFT TISSUE ONCE
Status: COMPLETED | OUTPATIENT
Start: 2018-07-19 | End: 2018-07-19

## 2018-07-19 RX ORDER — DIPHENHYDRAMINE HYDROCHLORIDE 50 MG/ML
25 INJECTION INTRAMUSCULAR; INTRAVENOUS ONCE
Status: COMPLETED | OUTPATIENT
Start: 2018-07-19 | End: 2018-07-19

## 2018-07-19 RX ADMIN — DEXAMETHASONE SODIUM PHOSPHATE 4 MG: 4 INJECTION, SOLUTION INTRA-ARTICULAR; INTRALESIONAL; INTRAMUSCULAR; INTRAVENOUS; SOFT TISSUE at 10:18

## 2018-07-19 RX ADMIN — KETOROLAC TROMETHAMINE 60 MG: 30 INJECTION, SOLUTION INTRAMUSCULAR; INTRAVENOUS at 10:19

## 2018-07-19 RX ADMIN — DIPHENHYDRAMINE HYDROCHLORIDE 25 MG: 50 INJECTION INTRAMUSCULAR; INTRAVENOUS at 10:20

## 2018-07-19 ASSESSMENT — ENCOUNTER SYMPTOMS
SHORTNESS OF BREATH: 0
CHILLS: 0
EYE WATERING: 0
MIGRAINE HEADACHES: 1
SEIZURES: 0
FEVER: 0
SINUS PRESSURE: 1
VISUAL CHANGE: 0
SORE THROAT: 0
PHOTOPHOBIA: 1
FACIAL SWEATING: 0
EYE PAIN: 0
SENSORY CHANGE: 0
TREMORS: 0
BLURRED VISION: 0
NAUSEA: 1
TINGLING: 0
HEADACHES: 1
SPEECH CHANGE: 0
VOMITING: 0
DIZZINESS: 0
MYALGIAS: 0
SCALP TENDERNESS: 0
FOCAL WEAKNESS: 0
LOSS OF CONSCIOUSNESS: 0

## 2018-07-19 NOTE — PROGRESS NOTES
Subjective:     Gayla Escudero is a 47 y.o. female who presents for Headache (mostly on left side above left eye, x2days, suspects sinus)  Patient presents clinic today with complaints of a headache localized on the left side. She is complaining of light sensitivity, nausea, sensitivity to sound. Patient does have a history of migraines. She has been taking Tylenol which helps for a little while, however headache returns. She denies any confusion, abnormal behavior. She is alert and oriented ×4.     Headache    This is a new problem. Episode onset: 2 days. The problem occurs constantly. The problem has been unchanged. The pain is located in the left unilateral region. The pain does not radiate. The pain quality is similar to prior headaches. The quality of the pain is described as throbbing. The pain is at a severity of 8/10. The pain is moderate. Associated symptoms include nausea, photophobia and sinus pressure. Pertinent negatives include no blurred vision, dizziness, drainage, eye pain, eye watering, facial sweating, fever, scalp tenderness, seizures, sore throat, tingling, tinnitus, visual change or vomiting. The symptoms are aggravated by bright light. She has tried acetaminophen for the symptoms. The treatment provided mild relief. Her past medical history is significant for migraine headaches. There is no history of immunosuppression. (MD)     Past Medical History:   Diagnosis Date   • Anemia    • Cataract    • Heart murmur    • Muscular disease    • Muscular dystrophy (HCC)    • JOSÉ on CPAP      Past Surgical History:   Procedure Laterality Date   • HYSTERECTOMY LAPAROSCOPY       Social History     Social History   • Marital status:      Spouse name: N/A   • Number of children: N/A   • Years of education: N/A     Occupational History   • Not on file.     Social History Main Topics   • Smoking status: Never Smoker   • Smokeless tobacco: Never Used   • Alcohol use No   • Drug use: No   •  "Sexual activity: Yes     Partners: Male     Other Topics Concern   • Not on file     Social History Narrative   • No narrative on file      Family History   Problem Relation Age of Onset   • No Known Problems Mother    • No Known Problems Father    • Sleep Apnea Daughter    • No Known Problems Sister    • No Known Problems Brother      Musc dys also   • No Known Problems Sister    • No Known Problems Brother      Trauma, was shot   • No Known Problems Brother      Car accident   • Other Son      Muscular dystrophy   • Heart Disease Daughter       at 2 months congenital heart disease    Review of Systems   Constitutional: Negative for chills and fever.   HENT: Positive for sinus pressure. Negative for sore throat and tinnitus.    Eyes: Positive for photophobia. Negative for blurred vision and pain.   Respiratory: Negative for shortness of breath.    Cardiovascular: Negative for chest pain.   Gastrointestinal: Positive for nausea. Negative for vomiting.   Genitourinary: Negative for hematuria.   Musculoskeletal: Negative for myalgias.   Skin: Negative for rash.   Neurological: Positive for headaches. Negative for dizziness, tingling, tremors, sensory change, speech change, focal weakness, seizures and loss of consciousness.     Allergies   Allergen Reactions   • Codeine Vomiting and Nausea     N&V   • Tramadol      Effects her MD      Objective:   BP (!) 90/78   Pulse 81   Temp 36.8 °C (98.3 °F)   Resp 14   Ht 1.549 m (5' 1\")   Wt 42.6 kg (94 lb)   SpO2 97%   BMI 17.76 kg/m²   Physical Exam   Constitutional: She is oriented to person, place, and time. She appears well-developed and well-nourished. No distress.   HENT:   Head: Normocephalic and atraumatic.   Eyes: Conjunctivae and EOM are normal. Pupils are equal, round, and reactive to light.   Cardiovascular: Normal rate and regular rhythm.    No murmur heard.  Pulmonary/Chest: Effort normal and breath sounds normal. No respiratory distress.   Abdominal: " Soft. She exhibits no distension. There is no tenderness.   Neurological: She is alert and oriented to person, place, and time. She has normal reflexes. She is not disoriented. She displays atrophy. No cranial nerve deficit or sensory deficit. GCS eye subscore is 4. GCS verbal subscore is 5. GCS motor subscore is 6.   Skin: Skin is warm and dry.   Psychiatric: She has a normal mood and affect.         Assessment/Plan:   Assessment    1. Other migraine without status migrainosus, not intractable  - dexamethasone (DECADRON) injection 4 mg; 1 mL by Intramuscular route Once.  - ketorolac (TORADOL) injection 60 mg; 2 mL by Intramuscular route Once.  - diphenhydrAMINE (BENADRYL) injection 25 mg; 0.5 mL by Intramuscular route Once.    Neuro exam unremarkable. Advised patient to use Tylenol and/or ibuprofen when necessary. Increase fluid intake.    Differential diagnosis, natural history, supportive care, and indications for immediate follow-up discussed.

## 2018-07-24 ENCOUNTER — HOSPITAL ENCOUNTER (EMERGENCY)
Facility: MEDICAL CENTER | Age: 48
End: 2018-07-24
Attending: EMERGENCY MEDICINE
Payer: MEDICARE

## 2018-07-24 ENCOUNTER — APPOINTMENT (OUTPATIENT)
Dept: RADIOLOGY | Facility: MEDICAL CENTER | Age: 48
End: 2018-07-24
Attending: EMERGENCY MEDICINE
Payer: MEDICARE

## 2018-07-24 VITALS
OXYGEN SATURATION: 95 % | BODY MASS INDEX: 17.73 KG/M2 | RESPIRATION RATE: 18 BRPM | TEMPERATURE: 98.7 F | HEIGHT: 61 IN | WEIGHT: 93.92 LBS | DIASTOLIC BLOOD PRESSURE: 85 MMHG | HEART RATE: 86 BPM | SYSTOLIC BLOOD PRESSURE: 142 MMHG

## 2018-07-24 DIAGNOSIS — G43.001 MIGRAINE WITHOUT AURA AND WITH STATUS MIGRAINOSUS, NOT INTRACTABLE: ICD-10-CM

## 2018-07-24 DIAGNOSIS — R74.01 TRANSAMINITIS: ICD-10-CM

## 2018-07-24 LAB
ALBUMIN SERPL BCP-MCNC: 4.6 G/DL (ref 3.2–4.9)
ALBUMIN/GLOB SERPL: 1 G/DL
ALP SERPL-CCNC: 239 U/L (ref 30–99)
ALT SERPL-CCNC: 288 U/L (ref 2–50)
ANION GAP SERPL CALC-SCNC: 14 MMOL/L (ref 0–11.9)
APTT PPP: 25.8 SEC (ref 24.7–36)
AST SERPL-CCNC: 136 U/L (ref 12–45)
BASOPHILS # BLD AUTO: 0.2 % (ref 0–1.8)
BASOPHILS # BLD: 0.02 K/UL (ref 0–0.12)
BILIRUB SERPL-MCNC: 0.8 MG/DL (ref 0.1–1.5)
BUN SERPL-MCNC: 9 MG/DL (ref 8–22)
CALCIUM SERPL-MCNC: 10.7 MG/DL (ref 8.5–10.5)
CHLORIDE SERPL-SCNC: 104 MMOL/L (ref 96–112)
CO2 SERPL-SCNC: 24 MMOL/L (ref 20–33)
CREAT SERPL-MCNC: 0.59 MG/DL (ref 0.5–1.4)
EOSINOPHIL # BLD AUTO: 0.03 K/UL (ref 0–0.51)
EOSINOPHIL NFR BLD: 0.3 % (ref 0–6.9)
ERYTHROCYTE [DISTWIDTH] IN BLOOD BY AUTOMATED COUNT: 41.2 FL (ref 35.9–50)
GLOBULIN SER CALC-MCNC: 4.6 G/DL (ref 1.9–3.5)
GLUCOSE SERPL-MCNC: 82 MG/DL (ref 65–99)
HCT VFR BLD AUTO: 42 % (ref 37–47)
HGB BLD-MCNC: 13.9 G/DL (ref 12–16)
IMM GRANULOCYTES # BLD AUTO: 0.03 K/UL (ref 0–0.11)
IMM GRANULOCYTES NFR BLD AUTO: 0.3 % (ref 0–0.9)
INR PPP: 1.06 (ref 0.87–1.13)
LYMPHOCYTES # BLD AUTO: 1.75 K/UL (ref 1–4.8)
LYMPHOCYTES NFR BLD: 19.9 % (ref 22–41)
MCH RBC QN AUTO: 29.3 PG (ref 27–33)
MCHC RBC AUTO-ENTMCNC: 33.1 G/DL (ref 33.6–35)
MCV RBC AUTO: 88.6 FL (ref 81.4–97.8)
MONOCYTES # BLD AUTO: 0.36 K/UL (ref 0–0.85)
MONOCYTES NFR BLD AUTO: 4.1 % (ref 0–13.4)
NEUTROPHILS # BLD AUTO: 6.61 K/UL (ref 2–7.15)
NEUTROPHILS NFR BLD: 75.2 % (ref 44–72)
NRBC # BLD AUTO: 0 K/UL
NRBC BLD-RTO: 0 /100 WBC
PLATELET # BLD AUTO: 371 K/UL (ref 164–446)
PMV BLD AUTO: 9.2 FL (ref 9–12.9)
POTASSIUM SERPL-SCNC: 3.8 MMOL/L (ref 3.6–5.5)
PROT SERPL-MCNC: 9.2 G/DL (ref 6–8.2)
PROTHROMBIN TIME: 13.5 SEC (ref 12–14.6)
RBC # BLD AUTO: 4.74 M/UL (ref 4.2–5.4)
SODIUM SERPL-SCNC: 142 MMOL/L (ref 135–145)
WBC # BLD AUTO: 8.8 K/UL (ref 4.8–10.8)

## 2018-07-24 PROCEDURE — 96375 TX/PRO/DX INJ NEW DRUG ADDON: CPT

## 2018-07-24 PROCEDURE — 85025 COMPLETE CBC W/AUTO DIFF WBC: CPT

## 2018-07-24 PROCEDURE — 700105 HCHG RX REV CODE 258: Performed by: EMERGENCY MEDICINE

## 2018-07-24 PROCEDURE — 96374 THER/PROPH/DIAG INJ IV PUSH: CPT

## 2018-07-24 PROCEDURE — 70450 CT HEAD/BRAIN W/O DYE: CPT

## 2018-07-24 PROCEDURE — 85610 PROTHROMBIN TIME: CPT

## 2018-07-24 PROCEDURE — 36415 COLL VENOUS BLD VENIPUNCTURE: CPT

## 2018-07-24 PROCEDURE — 700111 HCHG RX REV CODE 636 W/ 250 OVERRIDE (IP): Performed by: EMERGENCY MEDICINE

## 2018-07-24 PROCEDURE — 80053 COMPREHEN METABOLIC PANEL: CPT

## 2018-07-24 PROCEDURE — 99284 EMERGENCY DEPT VISIT MOD MDM: CPT

## 2018-07-24 PROCEDURE — 85730 THROMBOPLASTIN TIME PARTIAL: CPT

## 2018-07-24 RX ORDER — METOCLOPRAMIDE HYDROCHLORIDE 5 MG/ML
10 INJECTION INTRAMUSCULAR; INTRAVENOUS ONCE
Status: COMPLETED | OUTPATIENT
Start: 2018-07-24 | End: 2018-07-24

## 2018-07-24 RX ORDER — SODIUM CHLORIDE 9 MG/ML
1000 INJECTION, SOLUTION INTRAVENOUS ONCE
Status: COMPLETED | OUTPATIENT
Start: 2018-07-24 | End: 2018-07-24

## 2018-07-24 RX ORDER — SUMATRIPTAN 50 MG/1
50 TABLET, FILM COATED ORAL
Qty: 10 TAB | Refills: 3 | Status: SHIPPED | OUTPATIENT
Start: 2018-07-24 | End: 2020-01-01

## 2018-07-24 RX ORDER — DIPHENHYDRAMINE HYDROCHLORIDE 50 MG/ML
25 INJECTION INTRAMUSCULAR; INTRAVENOUS ONCE
Status: COMPLETED | OUTPATIENT
Start: 2018-07-24 | End: 2018-07-24

## 2018-07-24 RX ORDER — KETOROLAC TROMETHAMINE 30 MG/ML
30 INJECTION, SOLUTION INTRAMUSCULAR; INTRAVENOUS ONCE
Status: COMPLETED | OUTPATIENT
Start: 2018-07-24 | End: 2018-07-24

## 2018-07-24 RX ADMIN — DIPHENHYDRAMINE HYDROCHLORIDE 25 MG: 50 INJECTION, SOLUTION INTRAMUSCULAR; INTRAVENOUS at 11:21

## 2018-07-24 RX ADMIN — SODIUM CHLORIDE 1000 ML: 9 INJECTION, SOLUTION INTRAVENOUS at 11:20

## 2018-07-24 RX ADMIN — KETOROLAC TROMETHAMINE 30 MG: 30 INJECTION, SOLUTION INTRAMUSCULAR at 11:22

## 2018-07-24 RX ADMIN — METOCLOPRAMIDE 10 MG: 5 INJECTION, SOLUTION INTRAMUSCULAR; INTRAVENOUS at 11:22

## 2018-07-24 ASSESSMENT — PAIN SCALES - GENERAL
PAINLEVEL_OUTOF10: 4
PAINLEVEL_OUTOF10: 10

## 2018-07-24 NOTE — ED PROVIDER NOTES
ED Provider Note    CHIEF COMPLAINT  Chief Complaint   Patient presents with   • Sent from Urgent Care   • Head Ache     since yesterday AM   • N/V       HPI  Gayla Escudero is a 47 y.o. female who presents for evaluation of acute onset of headache. Headache started slowly yesterday became maximal this morning. She went to urgent care and was referred here. She has a history of migraines and reports that this is more severe but does have the associated photophobia and sound sensitivity and nausea. She has no known history of brain tumor or aneurysm. She has a medical history as listed below. No report of head trauma no seizure activity no focal numbness weakness or tingling to the arms legs or face    REVIEW OF SYSTEMS  See HPI for further details. No fevers or neck stiffness rash numbness tingling weakness All other systems are negative.     PAST MEDICAL HISTORY  Past Medical History:   Diagnosis Date   • Anemia    • Cataract    • Heart murmur    • Muscular disease    • Muscular dystrophy (HCC)    • JOSÉ on CPAP        FAMILY HISTORY  No history of aneurysm    SOCIAL HISTORY  Social History     Social History   • Marital status:      Spouse name: N/A   • Number of children: N/A   • Years of education: N/A     Social History Main Topics   • Smoking status: Never Smoker   • Smokeless tobacco: Never Used   • Alcohol use No   • Drug use: No   • Sexual activity: Yes     Partners: Male     Other Topics Concern   • Not on file     Social History Narrative   • No narrative on file     Denies IV drugs   SURGICAL HISTORY  Past Surgical History:   Procedure Laterality Date   • HYSTERECTOMY LAPAROSCOPY         CURRENT MEDICATIONS  No current facility-administered medications for this encounter.     Current Outpatient Prescriptions:   •  aspirin (ASA) 81 MG Chew Tab chewable tablet, Take 81 mg by mouth every day., Disp: , Rfl:   •  Acetaminophen (TYLENOL PO), Take  by mouth., Disp: , Rfl:  "      ALLERGIES  Allergies   Allergen Reactions   • Codeine Vomiting and Nausea     N&V   • Tramadol      Effects her MD       PHYSICAL EXAM  VITAL SIGNS: /85   Pulse 87   Temp 36.6 °C (97.9 °F) (Temporal)   Resp 20 Comment: crying  Ht 1.549 m (5' 1\")   Wt 42.6 kg (93 lb 14.7 oz)   SpO2 94%   BMI 17.75 kg/m²       Constitutional: Patient appears uncomfortable   HENT: Normocephalic, Atraumatic, Bilateral external ears normal, dry mucous membranes, No oral exudates, Nose normal.   Eyes: PERRLA, pupils 4-2 bilaterally EOMI, Conjunctiva normal, No discharge.   Neck: Normal range of motion, No tenderness, Supple, No stridor.   Lymphatic: No lymphadenopathy noted.   Cardiovascular: Tachycardic Normal rhythm, No murmurs, No rubs, No gallops.   Thorax & Lungs: Normal breath sounds, No respiratory distress, No wheezing, No chest tenderness.   Abdomen: Bowel sounds normal, Soft, No tenderness, No masses, No pulsatile masses.   Skin: Warm, Dry, No erythema, No rash.   Back: No tenderness, No CVA tenderness.   Extremities: Intact distal pulses, No edema, No tenderness, No cyanosis, No clubbing.   Neurologic: Alert & oriented x 3, Normal motor function, Normal sensory function, No focal deficits noted. Cranial nerves II through XII grossly intact no pronator drift  Psychiatric: Anxious.     Results for orders placed or performed during the hospital encounter of 07/24/18   CBC WITH DIFFERENTIAL   Result Value Ref Range    WBC 8.8 4.8 - 10.8 K/uL    RBC 4.74 4.20 - 5.40 M/uL    Hemoglobin 13.9 12.0 - 16.0 g/dL    Hematocrit 42.0 37.0 - 47.0 %    MCV 88.6 81.4 - 97.8 fL    MCH 29.3 27.0 - 33.0 pg    MCHC 33.1 (L) 33.6 - 35.0 g/dL    RDW 41.2 35.9 - 50.0 fL    Platelet Count 371 164 - 446 K/uL    MPV 9.2 9.0 - 12.9 fL    Neutrophils-Polys 75.20 (H) 44.00 - 72.00 %    Lymphocytes 19.90 (L) 22.00 - 41.00 %    Monocytes 4.10 0.00 - 13.40 %    Eosinophils 0.30 0.00 - 6.90 %    Basophils 0.20 0.00 - 1.80 %    Immature " Granulocytes 0.30 0.00 - 0.90 %    Nucleated RBC 0.00 /100 WBC    Neutrophils (Absolute) 6.61 2.00 - 7.15 K/uL    Lymphs (Absolute) 1.75 1.00 - 4.80 K/uL    Monos (Absolute) 0.36 0.00 - 0.85 K/uL    Eos (Absolute) 0.03 0.00 - 0.51 K/uL    Baso (Absolute) 0.02 0.00 - 0.12 K/uL    Immature Granulocytes (abs) 0.03 0.00 - 0.11 K/uL    NRBC (Absolute) 0.00 K/uL   COMP METABOLIC PANEL   Result Value Ref Range    Sodium 142 135 - 145 mmol/L    Potassium 3.8 3.6 - 5.5 mmol/L    Chloride 104 96 - 112 mmol/L    Co2 24 20 - 33 mmol/L    Anion Gap 14.0 (H) 0.0 - 11.9    Glucose 82 65 - 99 mg/dL    Bun 9 8 - 22 mg/dL    Creatinine 0.59 0.50 - 1.40 mg/dL    Calcium 10.7 (H) 8.5 - 10.5 mg/dL    AST(SGOT) 136 (H) 12 - 45 U/L    ALT(SGPT) 288 (H) 2 - 50 U/L    Alkaline Phosphatase 239 (H) 30 - 99 U/L    Total Bilirubin 0.8 0.1 - 1.5 mg/dL    Albumin 4.6 3.2 - 4.9 g/dL    Total Protein 9.2 (H) 6.0 - 8.2 g/dL    Globulin 4.6 (H) 1.9 - 3.5 g/dL    A-G Ratio 1.0 g/dL   PROTHROMBIN TIME   Result Value Ref Range    PT 13.5 12.0 - 14.6 sec    INR 1.06 0.87 - 1.13   APTT   Result Value Ref Range    APTT 25.8 24.7 - 36.0 sec   ESTIMATED GFR   Result Value Ref Range    GFR If African American >60 >60 mL/min/1.73 m 2    GFR If Non African American >60 >60 mL/min/1.73 m 2        RADIOLOGY/PROCEDURES  CT-HEAD W/O   Final Result      No acute intracranial abnormality is identified.      There are periventricular and subcortical white matter changes present.  This finding is nonspecific and could be from previous small vessel ischemia, demyelination, or gliosis. Further evaluation can be obtained with MRI.      Paranasal sinus disease.          COURSE & MEDICAL DECISION MAKING  Pertinent Labs & Imaging studies reviewed. (See chart for details)  An IV was established. The patient presented here with severe headache. It became maximal this morning and was not maximal in onset suggesting against subarachnoid. She does have a history of migraines and  she has a history no strong suggestion severe migraine. She was given IV Reglan and Benadryl and Toradol and observed for about 2 hours. She feels much better. Particular after IV fluids she felt much better with the standard treatment for migraine type headache. Here her CT scan does not demonstrate any intracranial hemorrhage. She does have incidental transaminitis he has no abdominal pain no evidence of liver disease. I will  her that she needs follow-up with her PCP for this. We'll discharge her home with some Imitrex and recommended follow-up with her PCP    FINAL IMPRESSION  1. Severe migraine headache, non-intractable         Electronically signed by: Anders Ash, 7/24/2018 11:26 AM

## 2018-07-24 NOTE — ED TRIAGE NOTES
"Gayla Etienne [place]  Chief Complaint   Patient presents with   • Sent from Urgent Care   • Head Ache     since yesterday AM   • N/V     Pt ambulatory to triage with above complaint. Pt's  states they were sent from  today for further workup. Pt was seen for similar s/s last week at . Pt denies change in vision. Pt reports sensitivity to light.     /85   Pulse (!) 126   Temp 36.6 °C (97.9 °F) (Temporal)   Resp 20 Comment: crying  Ht 1.549 m (5' 1\")   Wt 42.6 kg (93 lb 14.7 oz)   SpO2 95%   BMI 17.75 kg/m²     Pt informed of triage process and encouraged to notify staff of any changes or concerns. Pt verbalized understanding of instructions. Pt placed back in lobby.     "

## 2018-07-24 NOTE — DISCHARGE INSTRUCTIONS
Migraine Headache  You have slight abnormality in your liver enzyme test today. He need to follow up with her PCP in one week for repeat evaluation  A migraine headache is an intense, throbbing pain on one side or both sides of the head. Migraines may also cause other symptoms, such as nausea, vomiting, and sensitivity to light and noise.  What are the causes?  Doing or taking certain things may also trigger migraines, such as:  · Alcohol.  · Smoking.  · Medicines, such as:  ¨ Medicine used to treat chest pain (nitroglycerine).  ¨ Birth control pills.  ¨ Estrogen pills.  ¨ Certain blood pressure medicines.  · Aged cheeses, chocolate, or caffeine.  · Foods or drinks that contain nitrates, glutamate, aspartame, or tyramine.  · Physical activity.  Other things that may trigger a migraine include:  · Menstruation.  · Pregnancy.  · Hunger.  · Stress, lack of sleep, too much sleep, or fatigue.  · Weather changes.  What increases the risk?  The following factors may make you more likely to experience migraine headaches:  · Age. Risk increases with age.  · Family history of migraine headaches.  · Being .  · Depression and anxiety.  · Obesity.  · Being a woman.  · Having a hole in the heart (patent foramen ovale) or other heart problems.  What are the signs or symptoms?  The main symptom of this condition is pulsating or throbbing pain. Pain may:  · Happen in any area of the head, such as on one side or both sides.  · Interfere with daily activities.  · Get worse with physical activity.  · Get worse with exposure to bright lights or loud noises.  Other symptoms may include:  · Nausea.  · Vomiting.  · Dizziness.  · General sensitivity to bright lights, loud noises, or smells.  Before you get a migraine, you may get warning signs that a migraine is developing (aura). An aura may include:  · Seeing flashing lights or having blind spots.  · Seeing bright spots, halos, or zigzag lines.  · Having tunnel vision or blurred  vision.  · Having numbness or a tingling feeling.  · Having trouble talking.  · Having muscle weakness.  How is this diagnosed?  A migraine headache can be diagnosed based on:  · Your symptoms.  · A physical exam.  · Tests, such as CT scan or MRI of the head. These imaging tests can help rule out other causes of headaches.  · Taking fluid from the spine (lumbar puncture) and analyzing it (cerebrospinal fluid analysis, or CSF analysis).  How is this treated?  A migraine headache is usually treated with medicines that:  · Relieve pain.  · Relieve nausea.  · Prevent migraines from coming back.  Treatment may also include:  · Acupuncture.  · Lifestyle changes like avoiding foods that trigger migraines.  Follow these instructions at home:  Medicines  · Take over-the-counter and prescription medicines only as told by your health care provider.  · Do not drive or use heavy machinery while taking prescription pain medicine.  · To prevent or treat constipation while you are taking prescription pain medicine, your health care provider may recommend that you:  ¨ Drink enough fluid to keep your urine clear or pale yellow.  ¨ Take over-the-counter or prescription medicines.  ¨ Eat foods that are high in fiber, such as fresh fruits and vegetables, whole grains, and beans.  ¨ Limit foods that are high in fat and processed sugars, such as fried and sweet foods.  Lifestyle  · Avoid alcohol use.  · Do not use any products that contain nicotine or tobacco, such as cigarettes and e-cigarettes. If you need help quitting, ask your health care provider.  · Get at least 8 hours of sleep every night.  · Limit your stress.  General instructions  · Keep a journal to find out what may trigger your migraine headaches. For example, write down:  ¨ What you eat and drink.  ¨ How much sleep you get.  ¨ Any change to your diet or medicines.  · If you have a migraine:  ¨ Avoid things that make your symptoms worse, such as bright lights.  ¨ It may  help to lie down in a dark, quiet room.  ¨ Do not drive or use heavy machinery.  ¨ Ask your health care provider what activities are safe for you while you are experiencing symptoms.  · Keep all follow-up visits as told by your health care provider. This is important.  Contact a health care provider if:  · You develop symptoms that are different or more severe than your usual migraine symptoms.  Get help right away if:  · Your migraine becomes severe.  · You have a fever.  · You have a stiff neck.  · You have vision loss.  · Your muscles feel weak or like you cannot control them.  · You start to lose your balance often.  · You develop trouble walking.  · You faint.  This information is not intended to replace advice given to you by your health care provider. Make sure you discuss any questions you have with your health care provider.  Document Released: 12/18/2006 Document Revised: 07/07/2017 Document Reviewed: 06/05/2017  [employer] Interactive Patient Education © 2017 [employer]

## 2018-07-24 NOTE — ED NOTES
Discharge instructions given to Pt. She was wheeled out to the lobby by family. No assistance required.

## 2018-07-26 ENCOUNTER — APPOINTMENT (OUTPATIENT)
Dept: RADIOLOGY | Facility: MEDICAL CENTER | Age: 48
DRG: 690 | End: 2018-07-26
Attending: EMERGENCY MEDICINE
Payer: MEDICARE

## 2018-07-26 ENCOUNTER — HOSPITAL ENCOUNTER (EMERGENCY)
Facility: MEDICAL CENTER | Age: 48
DRG: 690 | End: 2018-07-26
Attending: EMERGENCY MEDICINE
Payer: MEDICARE

## 2018-07-26 VITALS
OXYGEN SATURATION: 96 % | HEIGHT: 60 IN | TEMPERATURE: 97.6 F | RESPIRATION RATE: 16 BRPM | BODY MASS INDEX: 18.44 KG/M2 | DIASTOLIC BLOOD PRESSURE: 85 MMHG | WEIGHT: 93.92 LBS | SYSTOLIC BLOOD PRESSURE: 133 MMHG | HEART RATE: 65 BPM

## 2018-07-26 DIAGNOSIS — G43.809 OTHER MIGRAINE WITHOUT STATUS MIGRAINOSUS, NOT INTRACTABLE: ICD-10-CM

## 2018-07-26 DIAGNOSIS — R10.84 GENERALIZED ABDOMINAL PAIN: ICD-10-CM

## 2018-07-26 DIAGNOSIS — N30.00 ACUTE CYSTITIS WITHOUT HEMATURIA: ICD-10-CM

## 2018-07-26 DIAGNOSIS — R79.89 ELEVATED LFTS: ICD-10-CM

## 2018-07-26 LAB
ALBUMIN SERPL BCP-MCNC: 3.9 G/DL (ref 3.2–4.9)
ALBUMIN/GLOB SERPL: 1.2 G/DL
ALP SERPL-CCNC: 168 U/L (ref 30–99)
ALT SERPL-CCNC: 141 U/L (ref 2–50)
ANION GAP SERPL CALC-SCNC: 9 MMOL/L (ref 0–11.9)
APPEARANCE UR: CLEAR
AST SERPL-CCNC: 46 U/L (ref 12–45)
BACTERIA #/AREA URNS HPF: ABNORMAL /HPF
BASOPHILS # BLD AUTO: 0.4 % (ref 0–1.8)
BASOPHILS # BLD: 0.03 K/UL (ref 0–0.12)
BILIRUB SERPL-MCNC: 0.5 MG/DL (ref 0.1–1.5)
BILIRUB UR QL STRIP.AUTO: NEGATIVE
BUN SERPL-MCNC: 6 MG/DL (ref 8–22)
CALCIUM SERPL-MCNC: 9.6 MG/DL (ref 8.5–10.5)
CHLORIDE SERPL-SCNC: 110 MMOL/L (ref 96–112)
CO2 SERPL-SCNC: 25 MMOL/L (ref 20–33)
COLOR UR: YELLOW
CREAT SERPL-MCNC: 0.51 MG/DL (ref 0.5–1.4)
EOSINOPHIL # BLD AUTO: 0.12 K/UL (ref 0–0.51)
EOSINOPHIL NFR BLD: 1.7 % (ref 0–6.9)
EPI CELLS #/AREA URNS HPF: ABNORMAL /HPF
ERYTHROCYTE [DISTWIDTH] IN BLOOD BY AUTOMATED COUNT: 41.1 FL (ref 35.9–50)
GLOBULIN SER CALC-MCNC: 3.3 G/DL (ref 1.9–3.5)
GLUCOSE SERPL-MCNC: 88 MG/DL (ref 65–99)
GLUCOSE UR STRIP.AUTO-MCNC: NEGATIVE MG/DL
HCT VFR BLD AUTO: 36.2 % (ref 37–47)
HGB BLD-MCNC: 11.8 G/DL (ref 12–16)
HYALINE CASTS #/AREA URNS LPF: ABNORMAL /LPF
IMM GRANULOCYTES # BLD AUTO: 0.05 K/UL (ref 0–0.11)
IMM GRANULOCYTES NFR BLD AUTO: 0.7 % (ref 0–0.9)
KETONES UR STRIP.AUTO-MCNC: NEGATIVE MG/DL
LEUKOCYTE ESTERASE UR QL STRIP.AUTO: ABNORMAL
LIPASE SERPL-CCNC: 10 U/L (ref 11–82)
LYMPHOCYTES # BLD AUTO: 2.08 K/UL (ref 1–4.8)
LYMPHOCYTES NFR BLD: 28.7 % (ref 22–41)
MCH RBC QN AUTO: 28.7 PG (ref 27–33)
MCHC RBC AUTO-ENTMCNC: 32.6 G/DL (ref 33.6–35)
MCV RBC AUTO: 88.1 FL (ref 81.4–97.8)
MICRO URNS: ABNORMAL
MONOCYTES # BLD AUTO: 0.64 K/UL (ref 0–0.85)
MONOCYTES NFR BLD AUTO: 8.8 % (ref 0–13.4)
NEUTROPHILS # BLD AUTO: 4.33 K/UL (ref 2–7.15)
NEUTROPHILS NFR BLD: 59.7 % (ref 44–72)
NITRITE UR QL STRIP.AUTO: POSITIVE
NRBC # BLD AUTO: 0 K/UL
NRBC BLD-RTO: 0 /100 WBC
PH UR STRIP.AUTO: 6.5 [PH]
PLATELET # BLD AUTO: 351 K/UL (ref 164–446)
PMV BLD AUTO: 9.1 FL (ref 9–12.9)
POTASSIUM SERPL-SCNC: 3.9 MMOL/L (ref 3.6–5.5)
PROT SERPL-MCNC: 7.2 G/DL (ref 6–8.2)
PROT UR QL STRIP: NEGATIVE MG/DL
RBC # BLD AUTO: 4.11 M/UL (ref 4.2–5.4)
RBC # URNS HPF: ABNORMAL /HPF
RBC UR QL AUTO: NEGATIVE
SODIUM SERPL-SCNC: 144 MMOL/L (ref 135–145)
SP GR UR STRIP.AUTO: 1.01
UROBILINOGEN UR STRIP.AUTO-MCNC: 0.2 MG/DL
WBC # BLD AUTO: 7.3 K/UL (ref 4.8–10.8)
WBC #/AREA URNS HPF: ABNORMAL /HPF

## 2018-07-26 PROCEDURE — 700102 HCHG RX REV CODE 250 W/ 637 OVERRIDE(OP): Performed by: EMERGENCY MEDICINE

## 2018-07-26 PROCEDURE — 87086 URINE CULTURE/COLONY COUNT: CPT

## 2018-07-26 PROCEDURE — 96375 TX/PRO/DX INJ NEW DRUG ADDON: CPT

## 2018-07-26 PROCEDURE — 700111 HCHG RX REV CODE 636 W/ 250 OVERRIDE (IP): Performed by: EMERGENCY MEDICINE

## 2018-07-26 PROCEDURE — 99285 EMERGENCY DEPT VISIT HI MDM: CPT

## 2018-07-26 PROCEDURE — 83690 ASSAY OF LIPASE: CPT

## 2018-07-26 PROCEDURE — 87077 CULTURE AEROBIC IDENTIFY: CPT

## 2018-07-26 PROCEDURE — 85025 COMPLETE CBC W/AUTO DIFF WBC: CPT

## 2018-07-26 PROCEDURE — 81001 URINALYSIS AUTO W/SCOPE: CPT

## 2018-07-26 PROCEDURE — A9270 NON-COVERED ITEM OR SERVICE: HCPCS | Performed by: EMERGENCY MEDICINE

## 2018-07-26 PROCEDURE — 76705 ECHO EXAM OF ABDOMEN: CPT

## 2018-07-26 PROCEDURE — 700117 HCHG RX CONTRAST REV CODE 255: Performed by: EMERGENCY MEDICINE

## 2018-07-26 PROCEDURE — 87186 SC STD MICRODIL/AGAR DIL: CPT

## 2018-07-26 PROCEDURE — 74177 CT ABD & PELVIS W/CONTRAST: CPT

## 2018-07-26 PROCEDURE — 80053 COMPREHEN METABOLIC PANEL: CPT

## 2018-07-26 PROCEDURE — 96374 THER/PROPH/DIAG INJ IV PUSH: CPT

## 2018-07-26 RX ORDER — DIPHENHYDRAMINE HYDROCHLORIDE 50 MG/ML
25 INJECTION INTRAMUSCULAR; INTRAVENOUS ONCE
Status: COMPLETED | OUTPATIENT
Start: 2018-07-26 | End: 2018-07-26

## 2018-07-26 RX ORDER — KETOROLAC TROMETHAMINE 30 MG/ML
15 INJECTION, SOLUTION INTRAMUSCULAR; INTRAVENOUS ONCE
Status: COMPLETED | OUTPATIENT
Start: 2018-07-26 | End: 2018-07-26

## 2018-07-26 RX ORDER — NITROFURANTOIN 25; 75 MG/1; MG/1
100 CAPSULE ORAL 2 TIMES DAILY
Qty: 14 CAP | Refills: 0 | Status: SHIPPED | OUTPATIENT
Start: 2018-07-26 | End: 2018-07-27

## 2018-07-26 RX ORDER — NITROFURANTOIN 25; 75 MG/1; MG/1
100 CAPSULE ORAL ONCE
Status: COMPLETED | OUTPATIENT
Start: 2018-07-26 | End: 2018-07-26

## 2018-07-26 RX ORDER — METOCLOPRAMIDE HYDROCHLORIDE 5 MG/ML
10 INJECTION INTRAMUSCULAR; INTRAVENOUS ONCE
Status: COMPLETED | OUTPATIENT
Start: 2018-07-26 | End: 2018-07-26

## 2018-07-26 RX ORDER — ONDANSETRON 4 MG/1
4 TABLET, ORALLY DISINTEGRATING ORAL EVERY 6 HOURS PRN
Qty: 10 TAB | Refills: 0 | Status: SHIPPED | OUTPATIENT
Start: 2018-07-26 | End: 2020-01-01 | Stop reason: SDUPTHER

## 2018-07-26 RX ADMIN — NITROFURANTOIN (MONOHYDRATE/MACROCRYSTALS) 100 MG: 75; 25 CAPSULE ORAL at 09:10

## 2018-07-26 RX ADMIN — DIPHENHYDRAMINE HYDROCHLORIDE 25 MG: 50 INJECTION, SOLUTION INTRAMUSCULAR; INTRAVENOUS at 05:49

## 2018-07-26 RX ADMIN — KETOROLAC TROMETHAMINE 15 MG: 30 INJECTION, SOLUTION INTRAMUSCULAR at 05:49

## 2018-07-26 RX ADMIN — IOHEXOL 100 ML: 350 INJECTION, SOLUTION INTRAVENOUS at 07:50

## 2018-07-26 RX ADMIN — METOCLOPRAMIDE 10 MG: 5 INJECTION, SOLUTION INTRAMUSCULAR; INTRAVENOUS at 05:49

## 2018-07-26 ASSESSMENT — PAIN SCALES - GENERAL: PAINLEVEL_OUTOF10: 10

## 2018-07-26 NOTE — DISCHARGE INSTRUCTIONS
Please call your primary care physician this morning to schedule a follow-up appointment for abdominal recheck.  Return to the emergency department if you develop new or worsening symptoms including recurrent headache, recurrent abdominal pain, fevers, vomiting that does not go away completely with 1 or 2 doses of Zofran, or if you have any further concerns.  Please see your primary care, or return to this emergency department within 8-12 hours for recheck if your abdominal pain returns.        Abdominal Pain, Women  Abdominal (stomach, pelvic, or belly) pain can be caused by many things. It is important to tell your doctor:  · The location of the pain.  · Does it come and go or is it present all the time?  · Are there things that start the pain (eating certain foods, exercise)?  · Are there other symptoms associated with the pain (fever, nausea, vomiting, diarrhea)?  All of this is helpful to know when trying to find the cause of the pain.  CAUSES   · Stomach: virus or bacteria infection, or ulcer.  · Intestine: appendicitis (inflamed appendix), regional ileitis (Crohn's disease), ulcerative colitis (inflamed colon), irritable bowel syndrome, diverticulitis (inflamed diverticulum of the colon), or cancer of the stomach or intestine.  · Gallbladder disease or stones in the gallbladder.  · Kidney disease, kidney stones, or infection.  · Pancreas infection or cancer.  · Fibromyalgia (pain disorder).  · Diseases of the female organs:  · Uterus: fibroid (non-cancerous) tumors or infection.  · Fallopian tubes: infection or tubal pregnancy.  · Ovary: cysts or tumors.  · Pelvic adhesions (scar tissue).  · Endometriosis (uterus lining tissue growing in the pelvis and on the pelvic organs).  · Pelvic congestion syndrome (female organs filling up with blood just before the menstrual period).  · Pain with the menstrual period.  · Pain with ovulation (producing an egg).  · Pain with an IUD (intrauterine device, birth control)  in the uterus.  · Cancer of the female organs.  · Functional pain (pain not caused by a disease, may improve without treatment).  · Psychological pain.  · Depression.  DIAGNOSIS   Your doctor will decide the seriousness of your pain by doing an examination.  · Blood tests.  · X-rays.  · Ultrasound.  · CT scan (computed tomography, special type of X-ray).  · MRI (magnetic resonance imaging).  · Cultures, for infection.  · Barium enema (dye inserted in the large intestine, to better view it with X-rays).  · Colonoscopy (looking in intestine with a lighted tube).  · Laparoscopy (minor surgery, looking in abdomen with a lighted tube).  · Major abdominal exploratory surgery (looking in abdomen with a large incision).  TREATMENT   The treatment will depend on the cause of the pain.   · Many cases can be observed and treated at home.  · Over-the-counter medicines recommended by your caregiver.  · Prescription medicine.  · Antibiotics, for infection.  · Birth control pills, for painful periods or for ovulation pain.  · Hormone treatment, for endometriosis.  · Nerve blocking injections.  · Physical therapy.  · Antidepressants.  · Counseling with a psychologist or psychiatrist.  · Minor or major surgery.  HOME CARE INSTRUCTIONS   · Do not take laxatives, unless directed by your caregiver.  · Take over-the-counter pain medicine only if ordered by your caregiver. Do not take aspirin because it can cause an upset stomach or bleeding.  · Try a clear liquid diet (broth or water) as ordered by your caregiver. Slowly move to a bland diet, as tolerated, if the pain is related to the stomach or intestine.  · Have a thermometer and take your temperature several times a day, and record it.  · Bed rest and sleep, if it helps the pain.  · Avoid sexual intercourse, if it causes pain.  · Avoid stressful situations.  · Keep your follow-up appointments and tests, as your caregiver orders.  · If the pain does not go away with medicine or  surgery, you may try:  · Acupuncture.  · Relaxation exercises (yoga, meditation).  · Group therapy.  · Counseling.  SEEK MEDICAL CARE IF:   · You notice certain foods cause stomach pain.  · Your home care treatment is not helping your pain.  · You need stronger pain medicine.  · You want your IUD removed.  · You feel faint or lightheaded.  · You develop nausea and vomiting.  · You develop a rash.  · You are having side effects or an allergy to your medicine.  SEEK IMMEDIATE MEDICAL CARE IF:   · Your pain does not go away or gets worse.  · You have a fever.  · Your pain is felt only in portions of the abdomen. The right side could possibly be appendicitis. The left lower portion of the abdomen could be colitis or diverticulitis.  · You are passing blood in your stools (bright red or black tarry stools, with or without vomiting).  · You have blood in your urine.  · You develop chills, with or without a fever.  · You pass out.  MAKE SURE YOU:   · Understand these instructions.  · Will watch your condition.  · Will get help right away if you are not doing well or get worse.  Document Released: 10/14/2008 Document Revised: 03/11/2013 Document Reviewed: 11/04/2010  [employer]® Patient Information ©2014 [employer].      Urinary Tract Infection, Adult  Introduction  A urinary tract infection (UTI) is an infection of any part of the urinary tract. The urinary tract includes the:  · Kidneys.  · Ureters.  · Bladder.  · Urethra.  These organs make, store, and get rid of pee (urine) in the body.  Follow these instructions at home:  · Take over-the-counter and prescription medicines only as told by your doctor.  · If you were prescribed an antibiotic medicine, take it as told by your doctor. Do not stop taking the antibiotic even if you start to feel better.  · Avoid the following drinks:  ¨ Alcohol.  ¨ Caffeine.  ¨ Tea.  ¨ Carbonated drinks.  · Drink enough fluid to keep your pee clear or pale yellow.  · Keep all follow-up  visits as told by your doctor. This is important.  · Make sure to:  ¨ Empty your bladder often and completely. Do not to hold pee for long periods of time.  ¨ Empty your bladder before and after sex.  ¨ Wipe from front to back after a bowel movement if you are female. Use each tissue one time when you wipe.  Contact a doctor if:  · You have back pain.  · You have a fever.  · You feel sick to your stomach (nauseous).  · You throw up (vomit).  · Your symptoms do not get better after 3 days.  · Your symptoms go away and then come back.  Get help right away if:  · You have very bad back pain.  · You have very bad lower belly (abdominal) pain.  · You are throwing up and cannot keep down any medicines or water.  This information is not intended to replace advice given to you by your health care provider. Make sure you discuss any questions you have with your health care provider.  Document Released: 06/05/2009 Document Revised: 05/25/2017 Document Reviewed: 11/07/2016  © 2017 [employer]

## 2018-07-26 NOTE — ED PROVIDER NOTES
ED Provider Note  Chief Complaint:   Headache, abdominal pain    HPI:  Gayla Escudero is a 47 y.o. female who presents with headache, abdominal pain, and vomiting.  She was seen in this emergency department 7/24/18 for headache.  CT head was negative at that time.  She was treated with Reglan, Benadryl, and Toradol with resolution of symptoms.  She states that this morning her headache recurred.  Headache is described as mild, similar in quality to her previous headache.  Her primary concern is associated abdominal pain, nausea, and vomiting that occurred today.  The symptoms occurred shortly prior to arrival.  She has been trying to take medications for her symptoms, however she has not been able to keep any of them down due to vomiting.  She describes pain localized to the left side of the abdomen.  She has a past surgical history of hysterectomy, no prior appendectomy, no prior cholecystectomy.  She is unable to identify any exacerbating or alleviating factors.    She has not had any episodes of diarrhea, has been constipated for 2-3 days.  Denies associated abdominal bloating.  No associated fevers.     She does have a past medical history of muscular dystrophy.    Review of Systems:  See HPI for pertinent positives and negatives. All other systems negative.    Past Medical History:   has a past medical history of Anemia; Cataract; Heart murmur; Muscular disease; Muscular dystrophy (HCC); and JOSÉ on CPAP.    Social History:  Social History     Social History Main Topics   • Smoking status: Never Smoker   • Smokeless tobacco: Never Used   • Alcohol use No   • Drug use: No   • Sexual activity: Yes     Partners: Male       Surgical History:   has a past surgical history that includes hysterectomy laparoscopy.    Current Medications:  Home Medications    **Home medications have not yet been reviewed for this encounter**         Allergies:  Allergies   Allergen Reactions   • Codeine Vomiting and Nausea     N&V    • Tramadol      Effects her MD       Physical Exam:  Vital Signs: /85   Pulse 65   Temp 36.4 °C (97.6 °F)   Resp 16   Ht 1.524 m (5')   Wt 42.6 kg (93 lb 14.7 oz)   SpO2 100%   BMI 18.34 kg/m²   Constitutional: Alert, no acute distress  HENT: Moist mucus membranes, normal posterior pharynx, no intraoral lesions  Eyes: Pupils equal and reactive, normal conjunctiva  Neck: Supple, normal range of motion, no stridor  Cardiovascular: Extremities are warm and well perfused, no murmur appreciated, normal cardiac auscultation  Pulmonary: No respiratory distress, normal work of breathing, no accessory muscule usage, breath sounds clear and equal bilaterally  Abdomen: Soft, moderate tenderness to palpation diffusely, no localizable tenderness to the right upper quadrant nor right lower quadrant, abdomen is nondistended, no overlying skin changes  Skin: Warm, dry, no rashes or lesions  Musculoskeletal: Normal range of motion in all extremities, no swelling or deformity noted  Neurologic: Alert, oriented, normal speech, normal motor function  Psychiatric: Normal and appropriate mood and affect    Medical records reviewed for continuity of care.  Patient seen in this emergency department 7/24/18 for headache.  She was referred to the emergency department from urgent care.  Reports a history of migraines.  She was treated with Reglan, Benadryl, and Toradol with significant improvement.  CT head is negative for intracranial hemorrhage.  Incidental transaminitis noted, patient counseled to follow-up with her primary care physician for this.  Discharged home on Imitrex.    Labs:  Labs Reviewed   CBC WITH DIFFERENTIAL - Abnormal; Notable for the following:        Result Value    RBC 4.11 (*)     Hemoglobin 11.8 (*)     Hematocrit 36.2 (*)     MCHC 32.6 (*)     All other components within normal limits   COMP METABOLIC PANEL - Abnormal; Notable for the following:     Bun 6 (*)     AST(SGOT) 46 (*)     ALT(SGPT) 141 (*)      Alkaline Phosphatase 168 (*)     All other components within normal limits   LIPASE - Abnormal; Notable for the following:     Lipase 10 (*)     All other components within normal limits   ESTIMATED GFR   URINALYSIS,CULTURE IF INDICATED       Radiology:  CT-ABDOMEN-PELVIS WITH    (Results Pending)   US-GALLBLADDER    (Results Pending)          ED Medications Administered:  Medications   metoclopramide (REGLAN) injection 10 mg (10 mg Intravenous Given 7/26/18 0549)   ketorolac (TORADOL) injection 15 mg (15 mg Intravenous Given 7/26/18 0549)   diphenhydrAMINE (BENADRYL) injection 25 mg (25 mg Intravenous Given 7/26/18 0549)       Differential diagnosis:  Appendicitis, cholecystitis, pancreatitis, migraine headache, gastroenteritis, electrolyte abnormality    MDM:  History and physical exam as documented above.  Patient presents with abdominal pain and migraine headache.  She was seen in this emergency department on the 24th for migraine, treated successfully with Reglan, Toradol, and Benadryl.  On physical exam, I am not able to localize distinct abdominal tenderness to the right upper quadrant nor right lower quadrant.  Appendicitis and cholecystitis remain on my differential.    On laboratory evaluation she continues to have elevated liver enzymes, though they are improved from 7/24/18.  AST improved from 136-46, ALT improved from 288-141, alk phos improved from 239-160.  Lipase is negative, no evidence of pancreatitis.  She does have a normal white blood count, she is afebrile, no evidence of infectious etiology.    Her symptoms were treated with Reglan, Toradol, and Benadryl.  On reassessment, her abdominal pain and headaches have significantly improved.  She is now able to tolerate oral fluids.  Headache has completely resolved.  She is much more comfortable on my reassessment.    At this time, ultrasound read and CT scan are pending.  If these reveal no acute pathology, I do believe she will be stable for  discharge home.  I discussed the plan for discharge and primary care follow-up with her.  We will discharge her with a prescription for Zofran that she may take if her nausea recurs.  Instructed her to return to the emergency department if she does have recurrent nausea that does not improve with Zofran.  Additionally instructed to return if she develops new or worsening symptoms including fevers, recurrent abdominal pain, recurrent headaches, or any further concerns.    Blood pressure today is greater than 120/80, patient is instructed to follow up with primary care provider for blood pressure recheck.    Disposition:  Anticipate discharge home if normal ultrasound and CT imaging    Final Impression:  1. Other migraine without status migrainosus, not intractable    2. Generalized abdominal pain        Electronically signed by: Michelle Singh, 7/26/2018 7:36 AM

## 2018-07-26 NOTE — ED TRIAGE NOTES
Chief Complaint   Patient presents with   • N/V     Patient unable to eat or take any of her medication.    • Migraine     was seen here 2 days ago for migraines and is unable to take medicaion.    • Abdominal Pain       Patient wheeled into triage room. Patient unable to stand on scale due to weakness and exacerbation of her musclar dystrophy at this time. Patient woke up this morning art 0130 vomiting, with abdominal and head pain.     Patient placed back in lobby and educated on triage plan.

## 2018-07-27 ENCOUNTER — APPOINTMENT (OUTPATIENT)
Dept: RADIOLOGY | Facility: MEDICAL CENTER | Age: 48
DRG: 690 | End: 2018-07-27
Attending: EMERGENCY MEDICINE
Payer: MEDICARE

## 2018-07-27 ENCOUNTER — HOSPITAL ENCOUNTER (INPATIENT)
Facility: MEDICAL CENTER | Age: 48
LOS: 5 days | DRG: 690 | End: 2018-08-02
Attending: EMERGENCY MEDICINE | Admitting: HOSPITALIST
Payer: MEDICARE

## 2018-07-27 DIAGNOSIS — G71.11 MYOTONIC MUSCULAR DYSTROPHY (HCC): ICD-10-CM

## 2018-07-27 DIAGNOSIS — R10.9 ABDOMINAL PAIN, UNSPECIFIED ABDOMINAL LOCATION: ICD-10-CM

## 2018-07-27 DIAGNOSIS — R51.9 ACUTE NONINTRACTABLE HEADACHE, UNSPECIFIED HEADACHE TYPE: ICD-10-CM

## 2018-07-27 DIAGNOSIS — N30.00 ACUTE CYSTITIS WITHOUT HEMATURIA: ICD-10-CM

## 2018-07-27 DIAGNOSIS — R10.30 LOWER ABDOMINAL PAIN: ICD-10-CM

## 2018-07-27 LAB
ALBUMIN SERPL BCP-MCNC: 3.8 G/DL (ref 3.2–4.9)
ALBUMIN/GLOB SERPL: 1.1 G/DL
ALP SERPL-CCNC: 151 U/L (ref 30–99)
ALT SERPL-CCNC: 92 U/L (ref 2–50)
ANION GAP SERPL CALC-SCNC: 14 MMOL/L (ref 0–11.9)
APPEARANCE UR: CLEAR
AST SERPL-CCNC: 32 U/L (ref 12–45)
BACTERIA #/AREA URNS HPF: ABNORMAL /HPF
BASOPHILS # BLD AUTO: 0.3 % (ref 0–1.8)
BASOPHILS # BLD: 0.03 K/UL (ref 0–0.12)
BILIRUB SERPL-MCNC: 0.6 MG/DL (ref 0.1–1.5)
BILIRUB UR QL STRIP.AUTO: NEGATIVE
BUN SERPL-MCNC: 6 MG/DL (ref 8–22)
CALCIUM SERPL-MCNC: 9.8 MG/DL (ref 8.5–10.5)
CHLORIDE SERPL-SCNC: 108 MMOL/L (ref 96–112)
CO2 SERPL-SCNC: 22 MMOL/L (ref 20–33)
COLOR UR: YELLOW
CREAT SERPL-MCNC: 0.59 MG/DL (ref 0.5–1.4)
EOSINOPHIL # BLD AUTO: 0.05 K/UL (ref 0–0.51)
EOSINOPHIL NFR BLD: 0.6 % (ref 0–6.9)
EPI CELLS #/AREA URNS HPF: ABNORMAL /HPF
ERYTHROCYTE [DISTWIDTH] IN BLOOD BY AUTOMATED COUNT: 41.7 FL (ref 35.9–50)
GLOBULIN SER CALC-MCNC: 3.4 G/DL (ref 1.9–3.5)
GLUCOSE SERPL-MCNC: 89 MG/DL (ref 65–99)
GLUCOSE UR STRIP.AUTO-MCNC: NEGATIVE MG/DL
HCG UR QL: NEGATIVE
HCT VFR BLD AUTO: 35.6 % (ref 37–47)
HGB BLD-MCNC: 11.7 G/DL (ref 12–16)
HYALINE CASTS #/AREA URNS LPF: ABNORMAL /LPF
IMM GRANULOCYTES # BLD AUTO: 0.09 K/UL (ref 0–0.11)
IMM GRANULOCYTES NFR BLD AUTO: 1 % (ref 0–0.9)
KETONES UR STRIP.AUTO-MCNC: 40 MG/DL
LEUKOCYTE ESTERASE UR QL STRIP.AUTO: ABNORMAL
LYMPHOCYTES # BLD AUTO: 2.43 K/UL (ref 1–4.8)
LYMPHOCYTES NFR BLD: 27 % (ref 22–41)
MCH RBC QN AUTO: 29 PG (ref 27–33)
MCHC RBC AUTO-ENTMCNC: 32.9 G/DL (ref 33.6–35)
MCV RBC AUTO: 88.3 FL (ref 81.4–97.8)
MICRO URNS: ABNORMAL
MONOCYTES # BLD AUTO: 0.79 K/UL (ref 0–0.85)
MONOCYTES NFR BLD AUTO: 8.8 % (ref 0–13.4)
NEUTROPHILS # BLD AUTO: 5.61 K/UL (ref 2–7.15)
NEUTROPHILS NFR BLD: 62.3 % (ref 44–72)
NITRITE UR QL STRIP.AUTO: NEGATIVE
NRBC # BLD AUTO: 0 K/UL
NRBC BLD-RTO: 0 /100 WBC
PH UR STRIP.AUTO: 5 [PH]
PLATELET # BLD AUTO: 385 K/UL (ref 164–446)
PMV BLD AUTO: 9.3 FL (ref 9–12.9)
POTASSIUM SERPL-SCNC: 3.9 MMOL/L (ref 3.6–5.5)
PROT SERPL-MCNC: 7.2 G/DL (ref 6–8.2)
PROT UR QL STRIP: NEGATIVE MG/DL
RBC # BLD AUTO: 4.03 M/UL (ref 4.2–5.4)
RBC # URNS HPF: ABNORMAL /HPF
RBC UR QL AUTO: NEGATIVE
SODIUM SERPL-SCNC: 144 MMOL/L (ref 135–145)
SP GR UR STRIP.AUTO: 1.01
TROPONIN I SERPL-MCNC: <0.01 NG/ML (ref 0–0.04)
UROBILINOGEN UR STRIP.AUTO-MCNC: 0.2 MG/DL
WBC # BLD AUTO: 9 K/UL (ref 4.8–10.8)
WBC #/AREA URNS HPF: ABNORMAL /HPF

## 2018-07-27 PROCEDURE — 81001 URINALYSIS AUTO W/SCOPE: CPT

## 2018-07-27 PROCEDURE — 304561 HCHG STAT O2

## 2018-07-27 PROCEDURE — 36415 COLL VENOUS BLD VENIPUNCTURE: CPT

## 2018-07-27 PROCEDURE — 85379 FIBRIN DEGRADATION QUANT: CPT

## 2018-07-27 PROCEDURE — 99285 EMERGENCY DEPT VISIT HI MDM: CPT

## 2018-07-27 PROCEDURE — 96375 TX/PRO/DX INJ NEW DRUG ADDON: CPT

## 2018-07-27 PROCEDURE — 96365 THER/PROPH/DIAG IV INF INIT: CPT

## 2018-07-27 PROCEDURE — 84484 ASSAY OF TROPONIN QUANT: CPT

## 2018-07-27 PROCEDURE — 87086 URINE CULTURE/COLONY COUNT: CPT

## 2018-07-27 PROCEDURE — 85025 COMPLETE CBC W/AUTO DIFF WBC: CPT

## 2018-07-27 PROCEDURE — 700102 HCHG RX REV CODE 250 W/ 637 OVERRIDE(OP): Performed by: EMERGENCY MEDICINE

## 2018-07-27 PROCEDURE — 76830 TRANSVAGINAL US NON-OB: CPT

## 2018-07-27 PROCEDURE — 96376 TX/PRO/DX INJ SAME DRUG ADON: CPT

## 2018-07-27 PROCEDURE — 80053 COMPREHEN METABOLIC PANEL: CPT

## 2018-07-27 PROCEDURE — 700105 HCHG RX REV CODE 258: Performed by: EMERGENCY MEDICINE

## 2018-07-27 PROCEDURE — 700111 HCHG RX REV CODE 636 W/ 250 OVERRIDE (IP): Performed by: EMERGENCY MEDICINE

## 2018-07-27 PROCEDURE — 81025 URINE PREGNANCY TEST: CPT

## 2018-07-27 PROCEDURE — A9270 NON-COVERED ITEM OR SERVICE: HCPCS | Performed by: EMERGENCY MEDICINE

## 2018-07-27 RX ORDER — SODIUM CHLORIDE 9 MG/ML
1000 INJECTION, SOLUTION INTRAVENOUS ONCE
Status: COMPLETED | OUTPATIENT
Start: 2018-07-27 | End: 2018-07-27

## 2018-07-27 RX ORDER — CEPHALEXIN 500 MG/1
500 CAPSULE ORAL 2 TIMES DAILY
Qty: 10 CAP | Refills: 0 | Status: SHIPPED | OUTPATIENT
Start: 2018-07-27 | End: 2018-07-27

## 2018-07-27 RX ORDER — CEPHALEXIN 250 MG/5ML
250 POWDER, FOR SUSPENSION ORAL 4 TIMES DAILY
Qty: 100 ML | Refills: 0 | Status: SHIPPED | OUTPATIENT
Start: 2018-07-27 | End: 2018-08-01

## 2018-07-27 RX ORDER — DIPHENHYDRAMINE HCL 25 MG
25 TABLET ORAL ONCE
Status: COMPLETED | OUTPATIENT
Start: 2018-07-27 | End: 2018-07-27

## 2018-07-27 RX ORDER — MORPHINE SULFATE 4 MG/ML
4 INJECTION, SOLUTION INTRAMUSCULAR; INTRAVENOUS
Status: DISCONTINUED | OUTPATIENT
Start: 2018-07-27 | End: 2018-07-28

## 2018-07-27 RX ADMIN — CEFTRIAXONE SODIUM 1 G: 1 INJECTION, POWDER, FOR SOLUTION INTRAMUSCULAR; INTRAVENOUS at 19:50

## 2018-07-27 RX ADMIN — PROCHLORPERAZINE EDISYLATE 10 MG: 5 INJECTION INTRAMUSCULAR; INTRAVENOUS at 18:29

## 2018-07-27 RX ADMIN — MORPHINE SULFATE 4 MG: 4 INJECTION INTRAVENOUS at 18:29

## 2018-07-27 RX ADMIN — SODIUM CHLORIDE 1000 ML: 9 INJECTION, SOLUTION INTRAVENOUS at 18:29

## 2018-07-27 RX ADMIN — MORPHINE SULFATE 4 MG: 4 INJECTION INTRAVENOUS at 21:16

## 2018-07-27 RX ADMIN — SODIUM CHLORIDE 1000 ML: 9 INJECTION, SOLUTION INTRAVENOUS at 23:26

## 2018-07-27 RX ADMIN — DIPHENHYDRAMINE HCL 25 MG: 25 TABLET ORAL at 18:29

## 2018-07-27 ASSESSMENT — PAIN SCALES - GENERAL: PAINLEVEL_OUTOF10: 7

## 2018-07-27 NOTE — ED TRIAGE NOTES
Pt c/o RUQ abdominal pain that began yesterday and can be described as sharp. LBM was a few days ago. Pt was seen here yesterday for migraine and diagnosed with UTI. SO states that the pain caused the pt to have a GLF in which she hit her head causing a headache, denies mid line neck or back pain. Pt is not on blood thinners. Per chart review pt was seen here on 7/24 and 7/26 for similar sx.     Chief Complaint   Patient presents with   • RUQ Pain   • GLF     Pt placed in lobby, updated on triage process. Pt educated to notified RN or triage tech if changes in condition.

## 2018-07-28 ENCOUNTER — APPOINTMENT (OUTPATIENT)
Dept: RADIOLOGY | Facility: MEDICAL CENTER | Age: 48
DRG: 690 | End: 2018-07-28
Attending: EMERGENCY MEDICINE
Payer: MEDICARE

## 2018-07-28 ENCOUNTER — APPOINTMENT (OUTPATIENT)
Dept: RADIOLOGY | Facility: MEDICAL CENTER | Age: 48
DRG: 690 | End: 2018-07-28
Attending: INTERNAL MEDICINE
Payer: MEDICARE

## 2018-07-28 PROBLEM — D64.9 NORMOCYTIC ANEMIA: Status: ACTIVE | Noted: 2018-07-28

## 2018-07-28 PROBLEM — N30.00 ACUTE CYSTITIS WITHOUT HEMATURIA: Status: ACTIVE | Noted: 2018-07-28

## 2018-07-28 LAB
BACTERIA UR CULT: ABNORMAL
BACTERIA UR CULT: ABNORMAL
DEPRECATED D DIMER PPP IA-ACNC: 1058 NG/ML(D-DU)
EKG IMPRESSION: NORMAL
SIGNIFICANT IND 70042: ABNORMAL
SITE SITE: ABNORMAL
SOURCE SOURCE: ABNORMAL

## 2018-07-28 PROCEDURE — 93005 ELECTROCARDIOGRAM TRACING: CPT | Performed by: INTERNAL MEDICINE

## 2018-07-28 PROCEDURE — 700102 HCHG RX REV CODE 250 W/ 637 OVERRIDE(OP): Performed by: HOSPITALIST

## 2018-07-28 PROCEDURE — 700111 HCHG RX REV CODE 636 W/ 250 OVERRIDE (IP): Performed by: HOSPITALIST

## 2018-07-28 PROCEDURE — 700102 HCHG RX REV CODE 250 W/ 637 OVERRIDE(OP): Performed by: INTERNAL MEDICINE

## 2018-07-28 PROCEDURE — 700117 HCHG RX CONTRAST REV CODE 255: Performed by: INTERNAL MEDICINE

## 2018-07-28 PROCEDURE — 93010 ELECTROCARDIOGRAM REPORT: CPT | Performed by: INTERNAL MEDICINE

## 2018-07-28 PROCEDURE — 99223 1ST HOSP IP/OBS HIGH 75: CPT | Performed by: HOSPITALIST

## 2018-07-28 PROCEDURE — A9270 NON-COVERED ITEM OR SERVICE: HCPCS | Performed by: INTERNAL MEDICINE

## 2018-07-28 PROCEDURE — 770006 HCHG ROOM/CARE - MED/SURG/GYN SEMI*

## 2018-07-28 PROCEDURE — A9270 NON-COVERED ITEM OR SERVICE: HCPCS | Performed by: HOSPITALIST

## 2018-07-28 PROCEDURE — 700105 HCHG RX REV CODE 258: Performed by: HOSPITALIST

## 2018-07-28 PROCEDURE — 71045 X-RAY EXAM CHEST 1 VIEW: CPT

## 2018-07-28 PROCEDURE — 71275 CT ANGIOGRAPHY CHEST: CPT

## 2018-07-28 RX ORDER — AMOXICILLIN 250 MG
2 CAPSULE ORAL 2 TIMES DAILY
Status: DISCONTINUED | OUTPATIENT
Start: 2018-07-28 | End: 2018-08-02 | Stop reason: HOSPADM

## 2018-07-28 RX ORDER — PROMETHAZINE HYDROCHLORIDE 12.5 MG/1
12.5-25 SUPPOSITORY RECTAL EVERY 4 HOURS PRN
Status: DISCONTINUED | OUTPATIENT
Start: 2018-07-28 | End: 2018-08-02 | Stop reason: HOSPADM

## 2018-07-28 RX ORDER — ACETAMINOPHEN 325 MG/1
650 TABLET ORAL EVERY 6 HOURS PRN
Status: DISCONTINUED | OUTPATIENT
Start: 2018-07-28 | End: 2018-08-02 | Stop reason: HOSPADM

## 2018-07-28 RX ORDER — LORAZEPAM 1 MG/1
0.5 TABLET ORAL ONCE
Status: COMPLETED | OUTPATIENT
Start: 2018-07-28 | End: 2018-07-28

## 2018-07-28 RX ORDER — ONDANSETRON 2 MG/ML
4 INJECTION INTRAMUSCULAR; INTRAVENOUS EVERY 4 HOURS PRN
Status: DISCONTINUED | OUTPATIENT
Start: 2018-07-28 | End: 2018-08-02 | Stop reason: HOSPADM

## 2018-07-28 RX ORDER — PROMETHAZINE HYDROCHLORIDE 25 MG/1
12.5-25 TABLET ORAL EVERY 4 HOURS PRN
Status: DISCONTINUED | OUTPATIENT
Start: 2018-07-28 | End: 2018-08-02 | Stop reason: HOSPADM

## 2018-07-28 RX ORDER — CLONIDINE HYDROCHLORIDE 0.1 MG/1
0.1 TABLET ORAL EVERY 6 HOURS PRN
Status: DISCONTINUED | OUTPATIENT
Start: 2018-07-28 | End: 2018-08-02 | Stop reason: HOSPADM

## 2018-07-28 RX ORDER — SODIUM CHLORIDE 9 MG/ML
INJECTION, SOLUTION INTRAVENOUS CONTINUOUS
Status: DISCONTINUED | OUTPATIENT
Start: 2018-07-28 | End: 2018-08-02 | Stop reason: HOSPADM

## 2018-07-28 RX ORDER — BISACODYL 10 MG
10 SUPPOSITORY, RECTAL RECTAL
Status: DISCONTINUED | OUTPATIENT
Start: 2018-07-28 | End: 2018-08-02 | Stop reason: HOSPADM

## 2018-07-28 RX ORDER — ASPIRIN 81 MG/1
81 TABLET, CHEWABLE ORAL DAILY
Status: DISCONTINUED | OUTPATIENT
Start: 2018-07-28 | End: 2018-07-28

## 2018-07-28 RX ORDER — POLYETHYLENE GLYCOL 3350 17 G/17G
1 POWDER, FOR SOLUTION ORAL
Status: DISCONTINUED | OUTPATIENT
Start: 2018-07-28 | End: 2018-08-02 | Stop reason: HOSPADM

## 2018-07-28 RX ORDER — ONDANSETRON 4 MG/1
4 TABLET, ORALLY DISINTEGRATING ORAL EVERY 4 HOURS PRN
Status: DISCONTINUED | OUTPATIENT
Start: 2018-07-28 | End: 2018-08-02 | Stop reason: HOSPADM

## 2018-07-28 RX ORDER — SUMATRIPTAN 50 MG/1
50 TABLET, FILM COATED ORAL
Status: DISCONTINUED | OUTPATIENT
Start: 2018-07-28 | End: 2018-07-29

## 2018-07-28 RX ADMIN — SUMATRIPTAN SUCCINATE 50 MG: 50 TABLET ORAL at 22:50

## 2018-07-28 RX ADMIN — CEFTRIAXONE SODIUM 2 G: 2 INJECTION, POWDER, FOR SOLUTION INTRAMUSCULAR; INTRAVENOUS at 05:28

## 2018-07-28 RX ADMIN — SODIUM CHLORIDE: 9 INJECTION, SOLUTION INTRAVENOUS at 03:07

## 2018-07-28 RX ADMIN — IOHEXOL 75 ML: 350 INJECTION, SOLUTION INTRAVENOUS at 10:32

## 2018-07-28 RX ADMIN — STANDARDIZED SENNA CONCENTRATE AND DOCUSATE SODIUM 2 TABLET: 8.6; 5 TABLET, FILM COATED ORAL at 18:48

## 2018-07-28 RX ADMIN — ACETAMINOPHEN 650 MG: 325 TABLET, FILM COATED ORAL at 22:50

## 2018-07-28 RX ADMIN — LORAZEPAM 0.5 MG: 1 TABLET ORAL at 14:12

## 2018-07-28 ASSESSMENT — LIFESTYLE VARIABLES
EVER_SMOKED: NEVER
ALCOHOL_USE: NO

## 2018-07-28 ASSESSMENT — ENCOUNTER SYMPTOMS
CHILLS: 1
RESPIRATORY NEGATIVE: 1
VOMITING: 0
EYES NEGATIVE: 1
CARDIOVASCULAR NEGATIVE: 1
MUSCULOSKELETAL NEGATIVE: 1
PSYCHIATRIC NEGATIVE: 1
HEADACHES: 1
NEUROLOGICAL NEGATIVE: 1
SHORTNESS OF BREATH: 0
ABDOMINAL PAIN: 1

## 2018-07-28 ASSESSMENT — COGNITIVE AND FUNCTIONAL STATUS - GENERAL
MOBILITY SCORE: 16
CLIMB 3 TO 5 STEPS WITH RAILING: A LOT
STANDING UP FROM CHAIR USING ARMS: A LOT
DRESSING REGULAR LOWER BODY CLOTHING: A LOT
DRESSING REGULAR UPPER BODY CLOTHING: A LITTLE
HELP NEEDED FOR BATHING: A LITTLE
SUGGESTED CMS G CODE MODIFIER MOBILITY: CK
TOILETING: A LOT
MOVING TO AND FROM BED TO CHAIR: A LITTLE
MOVING FROM LYING ON BACK TO SITTING ON SIDE OF FLAT BED: A LITTLE
SUGGESTED CMS G CODE MODIFIER DAILY ACTIVITY: CK
DAILY ACTIVITIY SCORE: 18
WALKING IN HOSPITAL ROOM: A LOT

## 2018-07-28 ASSESSMENT — PATIENT HEALTH QUESTIONNAIRE - PHQ9
2. FEELING DOWN, DEPRESSED, IRRITABLE, OR HOPELESS: NOT AT ALL
1. LITTLE INTEREST OR PLEASURE IN DOING THINGS: NOT AT ALL
1. LITTLE INTEREST OR PLEASURE IN DOING THINGS: NOT AT ALL
SUM OF ALL RESPONSES TO PHQ9 QUESTIONS 1 AND 2: 0
SUM OF ALL RESPONSES TO PHQ9 QUESTIONS 1 AND 2: 0
1. LITTLE INTEREST OR PLEASURE IN DOING THINGS: NOT AT ALL
2. FEELING DOWN, DEPRESSED, IRRITABLE, OR HOPELESS: NOT AT ALL
2. FEELING DOWN, DEPRESSED, IRRITABLE, OR HOPELESS: NOT AT ALL
SUM OF ALL RESPONSES TO PHQ9 QUESTIONS 1 AND 2: 0

## 2018-07-28 ASSESSMENT — PAIN SCALES - GENERAL
PAINLEVEL_OUTOF10: 0
PAINLEVEL_OUTOF10: 6
PAINLEVEL_OUTOF10: 8
PAINLEVEL_OUTOF10: ASSUMED PAIN PRESENT

## 2018-07-28 NOTE — ASSESSMENT & PLAN NOTE
Likely of chronic disease.  No evidence of bleed.  Transfuse if needed for hemoglobin less than 7 gm/dL

## 2018-07-28 NOTE — PROGRESS NOTES
Patient admitted after midnight by my partner Dr. Gilbert, please refer to his H&P for further details.  This is a 47-year-old female admitted for urinary tract infection after failing outpatient antibiotic therapy.  Continue patient on IV ceftriaxone and monitor culture results.  Patient noted to have elevated d-dimer, CTA chest negative for any acute findings including pulmonary embolism.

## 2018-07-28 NOTE — ASSESSMENT & PLAN NOTE
With failure of recent outpatient antibiotic regimen.  Previous urine cultures positive for Klebsiella pneumoniae, transitioned from IV ceftriaxone to p.o. ciprofloxacin.  Repeat urine culture does show mixed skin prince

## 2018-07-28 NOTE — ED NOTES
Pt was getting ready to leave. She began to complain of dizziness, shortness of breath, feeling that her heart was racing. VS assessed. ERP notified of patient concerns.

## 2018-07-28 NOTE — PROGRESS NOTES
Walking by the room and MCKAY Dow for Dr Schwartz at bedside. Pt CO of CP at this moment. P/MCKAY Dow order STAT EKG. VS as follows: OP=061/89, SW=395, O2=98 on 2L w/RR= 24-28 (pt crying). MCKAY Dow has notified Dr Schwartz.

## 2018-07-28 NOTE — ED NOTES
Patient has been provided written and verbal education for headache, abdominal pain and urinary tract infection. She has been instructed to return to ED if there is no symptoms improvement after taking antibiotics, if she developed fever, chills, NV, fainting and other worsenimg s/s.

## 2018-07-28 NOTE — PROGRESS NOTES
Report received from Genny BASHIR in ED. Pt arrived to unit at approximately 0240 via Pt transport. Pt was changed into a gown and toileted. She is now sleeping and appears comfortable at this time. Bed is in the lowest position, upper bed rails are up, bed alarm is in place, and call light and personal belongings are within reach.     Two RN skin check done with Rashmi BASHIR. Skin is unremarkable at this time and without signs of skin breakdown.

## 2018-07-28 NOTE — H&P
Hospital Medicine History & Physical Note    Date of Service  7/28/2018    Primary Care Physician  Adithya Martinez P.A.-C.    Consultants  none    Code Status  Full code     Chief Complaint  Abdominal pain     History of Presenting Illness  47 y.o. female with history of myotonic muscular dystrophy which is stable, anemia also stable, was in her usual state of health until approximately 3 days prior to admission, when she began to have abdominal pain, she reports that the pain is in her lower abdomen, does not radiate anywhere, and is associated with worsening during urination.  She denies any fever or chills.  She visited the emergency department a few other times, and subsequently was given oral antibiotic therapy for a urinary tract infection.  She has been compliant with this therapy, however she has noted no improvement in her symptoms.  She subsequently presented for further evaluation.    Review of Systems  Review of Systems   Constitutional: Positive for chills.   HENT: Negative.    Eyes: Negative.    Respiratory: Negative.    Cardiovascular: Negative.    Gastrointestinal: Positive for abdominal pain.   Genitourinary: Positive for dysuria.   Musculoskeletal: Negative.    Skin: Negative.    Neurological: Negative.    Endo/Heme/Allergies: Negative.    Psychiatric/Behavioral: Negative.        Past Medical History   has a past medical history of Anemia; Cataract; Heart murmur; Muscular disease; Muscular dystrophy (HCC); and JOSÉ on CPAP.    Surgical History   has a past surgical history that includes hysterectomy laparoscopy.     Family History  family history includes Heart Disease in her daughter; No Known Problems in her brother, brother, brother, father, mother, sister, and sister; Other in her son; Sleep Apnea in her daughter.     Social History   reports that she has never smoked. She has never used smokeless tobacco. She reports that she does not drink alcohol or use drugs.    Allergies  Allergies    Allergen Reactions   • Codeine Vomiting and Nausea     N&V   • Tramadol      Effects her MD       Medications  Prior to Admission Medications   Prescriptions Last Dose Informant Patient Reported? Taking?   Acetaminophen (TYLENOL PO) 5/1/2018  Yes No   Sig: Take  by mouth.   SUMAtriptan (IMITREX) 50 MG Tab   No No   Sig: Take 1 Tab by mouth Once PRN for Migraine for up to 1 dose.   aspirin (ASA) 81 MG Chew Tab chewable tablet 7/24/2018  Yes No   Sig: Take 81 mg by mouth every day.   ondansetron (ZOFRAN ODT) 4 MG TABLET DISPERSIBLE   No No   Sig: Take 1 Tab by mouth every 6 hours as needed for Nausea.      Facility-Administered Medications: None       Physical Exam  Blood Pressure: 129/65   Temperature: 37.1 °C (98.7 °F)   Pulse: 88   Respiration: 20   Pulse Oximetry: 100 %     Physical Exam   Constitutional: She is oriented to person, place, and time. She appears well-developed and well-nourished. No distress.   HENT:   Head: Normocephalic and atraumatic.   Eyes: Pupils are equal, round, and reactive to light. Conjunctivae are normal.   Neck: Normal range of motion. Neck supple. No tracheal deviation present. No thyromegaly present.   Cardiovascular: Normal rate, regular rhythm and normal heart sounds.  Exam reveals no gallop and no friction rub.    No murmur heard.  Pulmonary/Chest: Effort normal and breath sounds normal. No respiratory distress. She has no wheezes. She has no rales.   Abdominal: Soft. Bowel sounds are normal. She exhibits no distension. There is no tenderness. There is no rebound.   Musculoskeletal: Normal range of motion. She exhibits no edema.   Lymphadenopathy:     She has no cervical adenopathy.   Neurological: She is alert and oriented to person, place, and time. No cranial nerve deficit. She exhibits abnormal muscle tone.   Skin: Skin is warm and dry. She is not diaphoretic.   Psychiatric: She has a normal mood and affect.       Laboratory:  Recent Labs      07/26/18   0546  07/27/18   0734    WBC  7.3  9.0   RBC  4.11*  4.03*   HEMOGLOBIN  11.8*  11.7*   HEMATOCRIT  36.2*  35.6*   MCV  88.1  88.3   MCH  28.7  29.0   MCHC  32.6*  32.9*   RDW  41.1  41.7   PLATELETCT  351  385   MPV  9.1  9.3     Recent Labs      07/26/18   0546  07/27/18   1819   SODIUM  144  144   POTASSIUM  3.9  3.9   CHLORIDE  110  108   CO2  25  22   GLUCOSE  88  89   BUN  6*  6*   CREATININE  0.51  0.59   CALCIUM  9.6  9.8     Recent Labs      07/26/18   0546  07/27/18   1819   ALTSGPT  141*  92*   ASTSGOT  46*  32   ALKPHOSPHAT  168*  151*   TBILIRUBIN  0.5  0.6   LIPASE  10*   --    GLUCOSE  88  89                 Lab Results   Component Value Date    TROPONINI <0.01 07/27/2018       Urinalysis:    Lab Results   Component Value Date    SPECGRAVITY 1.009 07/27/2018    GLUCOSEUR Negative 07/27/2018    KETONES 40 (A) 07/27/2018    NITRITE Negative 07/27/2018    WBCURINE 20-50 (A) 07/27/2018    RBCURINE 0-2 07/27/2018    BACTERIA Moderate (A) 07/27/2018    EPITHELCELL Few 07/27/2018        Imaging:  US-GYN-PELVIS TRANSVAGINAL   Final Result      1. Hysterectomy. No acute sonographic abnormality in the pelvis.   2. Unremarkable ovaries with small ovarian/paraovarian cysts.      DX-CHEST-PORTABLE (1 VIEW)    (Results Pending)         Assessment/Plan:  I anticipate this patient will require at least two midnights for appropriate medical management, necessitating inpatient admission.    * Acute cystitis without hematuria   Assessment & Plan    With failure of recent outpatient antibiotic regimen.  Culture does show gram-negative lactose fermenting rods, however speciation is still pending.  Start intravenous Rocephin, monitor.        Normocytic anemia   Assessment & Plan    Likely of chronic disease.  No evidence of bleed.  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  Monitor            VTE prophylaxis: SCD, Lovenox

## 2018-07-28 NOTE — ED PROVIDER NOTES
Scribed for Tai Vera M.D. by Angel Alvarez. 7/27/2018  5:33 PM      CHIEF COMPLAINT  Chief Complaint   Patient presents with   • RUQ Pain   • GLF       HPI    Gayla Escudero is a 47 y.o. female with a history of muscular dystrophy presenting with constant 10/10 sharp right upper quadrant pain onset 3 days ago. Patient confirms associated headache. Patient denies any vomiting, numbness, chest pain, shortness of breath, dysuria, vaginal bleeding, or vaginal discharge. Patient states that she fell back and hit her head. Patient denies any loss of consciousness before falling. Patient confirms that a bowel movement exacerbates her pain, but denies any alleviating factors.      REVIEW OF SYSTEMS  See HPI for further details. C    Review of Systems   Respiratory: Negative for shortness of breath.    Cardiovascular: Negative for chest pain.   Gastrointestinal: Positive for abdominal pain. Negative for vomiting.   Genitourinary: Negative for dysuria.   Neurological: Positive for headaches.   All other systems reviewed and are negative.      PAST MEDICAL HISTORY   has a past medical history of Anemia; Cataract; Heart murmur; Muscular disease; Muscular dystrophy (HCC); and JOSÉ on CPAP.    SOCIAL HISTORY  Social History     Social History Main Topics   • Smoking status: Never Smoker   • Smokeless tobacco: Never Used   • Alcohol use No   • Drug use: No   • Sexual activity: Yes     Partners: Male       SURGICAL HISTORY   has a past surgical history that includes hysterectomy laparoscopy.    CURRENT MEDICATIONS    Current Facility-Administered Medications:   •  morphine (pf) 4 mg/ml injection 4 mg, 4 mg, Intravenous, Q30 MIN PRN, Tai Vera M.D., 4 mg at 07/27/18 2119    Current Outpatient Prescriptions:   •  cephALEXin (KEFLEX) 250 MG/5ML Recon Susp, Take 5 mL by mouth 4 times a day for 5 days., Disp: 100 mL, Rfl: 0  •  ondansetron (ZOFRAN ODT) 4 MG TABLET DISPERSIBLE, Take 1 Tab by mouth every 6  hours as needed for Nausea., Disp: 10 Tab, Rfl: 0  •  aspirin (ASA) 81 MG Chew Tab chewable tablet, Take 81 mg by mouth every day., Disp: , Rfl:   •  SUMAtriptan (IMITREX) 50 MG Tab, Take 1 Tab by mouth Once PRN for Migraine for up to 1 dose., Disp: 10 Tab, Rfl: 3  •  Acetaminophen (TYLENOL PO), Take  by mouth., Disp: , Rfl:       ALLERGIES  Allergies   Allergen Reactions   • Codeine Vomiting and Nausea     N&V   • Tramadol      Effects her MD       PHYSICAL EXAM  VITAL SIGNS: /65   Pulse (!) 110   Temp 37.1 °C (98.7 °F)   Resp 18   Ht 1.524 m (5')   Wt 42.2 kg (93 lb)   SpO2 97%   BMI 18.16 kg/m²    Physical Exam   Constitutional: She is oriented to person, place, and time and well-developed, well-nourished, and in no distress.   HENT:   Head: Normocephalic and atraumatic.   Nose: Nose normal. No nasal septal hematoma.   Mouth/Throat: Oropharynx is clear and moist.   Eyes: Pupils are equal, round, and reactive to light. Conjunctivae and EOM are normal.   Neck: Normal range of motion. Neck supple.   Cardiovascular: Normal rate, regular rhythm, normal heart sounds and intact distal pulses.    Pulmonary/Chest: Effort normal and breath sounds normal.   Abdominal: Soft. Bowel sounds are normal. There is tenderness in the left lower quadrant. There is no rebound, no guarding and negative Puente's sign.   Musculoskeletal: Normal range of motion.   Neurological: She is alert and oriented to person, place, and time.   Skin: Skin is warm and dry.   Psychiatric: Mood, memory, affect and judgment normal.       DIAGNOSTIC STUDIES / PROCEDURES    LABORATORY  Results for orders placed or performed during the hospital encounter of 07/27/18   CBC WITH DIFFERENTIAL   Result Value Ref Range    WBC 9.0 4.8 - 10.8 K/uL    RBC 4.03 (L) 4.20 - 5.40 M/uL    Hemoglobin 11.7 (L) 12.0 - 16.0 g/dL    Hematocrit 35.6 (L) 37.0 - 47.0 %    MCV 88.3 81.4 - 97.8 fL    MCH 29.0 27.0 - 33.0 pg    MCHC 32.9 (L) 33.6 - 35.0 g/dL    RDW  41.7 35.9 - 50.0 fL    Platelet Count 385 164 - 446 K/uL    MPV 9.3 9.0 - 12.9 fL    Neutrophils-Polys 62.30 44.00 - 72.00 %    Lymphocytes 27.00 22.00 - 41.00 %    Monocytes 8.80 0.00 - 13.40 %    Eosinophils 0.60 0.00 - 6.90 %    Basophils 0.30 0.00 - 1.80 %    Immature Granulocytes 1.00 (H) 0.00 - 0.90 %    Nucleated RBC 0.00 /100 WBC    Neutrophils (Absolute) 5.61 2.00 - 7.15 K/uL    Lymphs (Absolute) 2.43 1.00 - 4.80 K/uL    Monos (Absolute) 0.79 0.00 - 0.85 K/uL    Eos (Absolute) 0.05 0.00 - 0.51 K/uL    Baso (Absolute) 0.03 0.00 - 0.12 K/uL    Immature Granulocytes (abs) 0.09 0.00 - 0.11 K/uL    NRBC (Absolute) 0.00 K/uL   COMP METABOLIC PANEL   Result Value Ref Range    Sodium 144 135 - 145 mmol/L    Potassium 3.9 3.6 - 5.5 mmol/L    Chloride 108 96 - 112 mmol/L    Co2 22 20 - 33 mmol/L    Anion Gap 14.0 (H) 0.0 - 11.9    Glucose 89 65 - 99 mg/dL    Bun 6 (L) 8 - 22 mg/dL    Creatinine 0.59 0.50 - 1.40 mg/dL    Calcium 9.8 8.5 - 10.5 mg/dL    AST(SGOT) 32 12 - 45 U/L    ALT(SGPT) 92 (H) 2 - 50 U/L    Alkaline Phosphatase 151 (H) 30 - 99 U/L    Total Bilirubin 0.6 0.1 - 1.5 mg/dL    Albumin 3.8 3.2 - 4.9 g/dL    Total Protein 7.2 6.0 - 8.2 g/dL    Globulin 3.4 1.9 - 3.5 g/dL    A-G Ratio 1.1 g/dL   TROPONIN   Result Value Ref Range    Troponin I <0.01 0.00 - 0.04 ng/mL   URINALYSIS CULTURE, IF INDICATED   Result Value Ref Range    Color Yellow     Character Clear     Specific Gravity 1.009 <1.035    Ph 5.0 5.0 - 8.0    Glucose Negative Negative mg/dL    Ketones 40 (A) Negative mg/dL    Protein Negative Negative mg/dL    Bilirubin Negative Negative    Urobilinogen, Urine 0.2 Negative    Nitrite Negative Negative    Leukocyte Esterase Moderate (A) Negative    Occult Blood Negative Negative    Micro Urine Req Microscopic    ESTIMATED GFR   Result Value Ref Range    GFR If African American >60 >60 mL/min/1.73 m 2    GFR If Non African American >60 >60 mL/min/1.73 m 2   URINE MICROSCOPIC (W/UA)   Result Value Ref  Range    WBC 20-50 (A) /hpf    RBC 0-2 /hpf    Bacteria Moderate (A) None /hpf    Epithelial Cells Few /hpf    Hyaline Cast 3-5 (A) /lpf   POC URINE PREGNANCY   Result Value Ref Range    POC Urine Pregnancy Test Negative Negative         RADIOLOGY    US-GYN-PELVIS TRANSVAGINAL   Final Result      1. Hysterectomy. No acute sonographic abnormality in the pelvis.   2. Unremarkable ovaries with small ovarian/paraovarian cysts.      DX-CHEST-PORTABLE (1 VIEW)    (Results Pending)             CHART REVIEW  Pertinent medical chart information was reviewed and reveals: Multiple recent ED visits for similar complaints with negative workup including CT abdomen and CT head.  Was prescribed antibiotics for UTI yesterday.    COURSE & MEDICAL DECISION MAKING  Pertinent Labs & Imaging studies reviewed. (See chart for details)    Vitals:    07/27/18 2100 07/27/18 2200 07/27/18 2217 07/27/18 2225   BP:    129/65   Pulse: 87 72  83   Resp:    20   Temp:   37.1 °C (98.7 °F)    SpO2: 100% 99%  98%   Weight:       Height:         5:33 PM Ordered for US-OB pelvis transvaginal, CBC, CMP, Troponin, UA culture, POC UA, POC urine pregnancy for evaluation. Patient received IV fluids for treatment of headache and tachycardia. Patient treated with Compazine 10 mg, Benadryl 25 mg, and Morphine 4 mg.    Patient demonstrates improvement after receiving IV fluids.    47-year-old female with history of Muscular dystrophy presenting for continued lower quadrant abdominal pain and headache.  Multiple recent ED visits a negative workup including CT imaging of abdomen pelvis, also CT head.  Currently without any new signs or symptoms.  Exam without signs of peritonitis, mild lower quadrant pain.  No red flag symptoms for headache, nothing to suggest intracranial hemorrhage, meningitis, or other serious pathology.  CT head 3 days ago negative.  No focal neurological deficits at this time, no evidence of stroke.  Labs unremarkable, ultrasound negative for  pelvic pathology.  Doubt serious surgical pathology with normal CT yesterday no change in current symptoms.  Urinalysis remains positive for infection, no evidence of pyonephritis this time, given IV Rocephin in the department.  EKG normal, troponin negative, unlikely atypical presentation of angina.  Also given IV fluids and headache treatment.  Much improved after this therapy.  Observed for several hours, no changes in vital signs or status other than improvement of symptoms.  Low suspicion for serious illness at this time.  Advised stopping Macrobid and starting Keflex, given prescription.  Given strict return precautions and care instructions.  Advised PCP follow-up in 1-2 days.      Addendum:   10:30 PM  When attempting to leave the room on discharge, patient described feeling lightheaded and weak, heart racing.  Vital signs normal at this time.  I evaluated the patient and did not find any new focal findings.  Will attempt to hydrate further with IV fluids and reassess.    11:45 AM  Patient with minimal improvement symptoms.  Per RN, had prolonged episode of hypoxia, placed on supplemental oxygen.  With persistence of symptoms, hypoxia of unknown origin, consulted medicine for admission.  D-dimer and chest x-ray ordered.    12:08 AM  Discussed patient with Dr. Gilbert, will admit for observation.    DISPOSITION  Admit in stable condition to medicine    FINAL IMPRESSION  Visit Diagnoses     ICD-10-CM   1. Lower abdominal pain R10.30   2. Acute nonintractable headache, unspecified headache type R51   3. Acute cystitis without hematuria N30.00   4.  Hypoxia         Electronically signed by: Angel Alvarez, 7/27/2018 5:21 PM    The note accurately reflects work and decisions made by me.  Tai Vera  7/27/2018  9:54 PM

## 2018-07-29 PROBLEM — R63.6 UNDERWEIGHT: Status: ACTIVE | Noted: 2018-07-29

## 2018-07-29 LAB
ANION GAP SERPL CALC-SCNC: 7 MMOL/L (ref 0–11.9)
BACTERIA UR CULT: NORMAL
BASOPHILS # BLD AUTO: 0.5 % (ref 0–1.8)
BASOPHILS # BLD: 0.03 K/UL (ref 0–0.12)
BUN SERPL-MCNC: 5 MG/DL (ref 8–22)
CALCIUM SERPL-MCNC: 9.1 MG/DL (ref 8.5–10.5)
CHLORIDE SERPL-SCNC: 111 MMOL/L (ref 96–112)
CO2 SERPL-SCNC: 28 MMOL/L (ref 20–33)
CREAT SERPL-MCNC: 0.53 MG/DL (ref 0.5–1.4)
EOSINOPHIL # BLD AUTO: 0.09 K/UL (ref 0–0.51)
EOSINOPHIL NFR BLD: 1.5 % (ref 0–6.9)
ERYTHROCYTE [DISTWIDTH] IN BLOOD BY AUTOMATED COUNT: 42 FL (ref 35.9–50)
GLUCOSE SERPL-MCNC: 94 MG/DL (ref 65–99)
HCT VFR BLD AUTO: 30.8 % (ref 37–47)
HGB BLD-MCNC: 10.1 G/DL (ref 12–16)
IMM GRANULOCYTES # BLD AUTO: 0.09 K/UL (ref 0–0.11)
IMM GRANULOCYTES NFR BLD AUTO: 1.5 % (ref 0–0.9)
LYMPHOCYTES # BLD AUTO: 1.7 K/UL (ref 1–4.8)
LYMPHOCYTES NFR BLD: 27.4 % (ref 22–41)
MCH RBC QN AUTO: 29 PG (ref 27–33)
MCHC RBC AUTO-ENTMCNC: 32.8 G/DL (ref 33.6–35)
MCV RBC AUTO: 88.5 FL (ref 81.4–97.8)
MONOCYTES # BLD AUTO: 0.55 K/UL (ref 0–0.85)
MONOCYTES NFR BLD AUTO: 8.9 % (ref 0–13.4)
NEUTROPHILS # BLD AUTO: 3.74 K/UL (ref 2–7.15)
NEUTROPHILS NFR BLD: 60.2 % (ref 44–72)
NRBC # BLD AUTO: 0 K/UL
NRBC BLD-RTO: 0 /100 WBC
PLATELET # BLD AUTO: 292 K/UL (ref 164–446)
PMV BLD AUTO: 9 FL (ref 9–12.9)
POTASSIUM SERPL-SCNC: 3.5 MMOL/L (ref 3.6–5.5)
RBC # BLD AUTO: 3.48 M/UL (ref 4.2–5.4)
SIGNIFICANT IND 70042: NORMAL
SITE SITE: NORMAL
SODIUM SERPL-SCNC: 146 MMOL/L (ref 135–145)
SOURCE SOURCE: NORMAL
WBC # BLD AUTO: 6.2 K/UL (ref 4.8–10.8)

## 2018-07-29 PROCEDURE — 80048 BASIC METABOLIC PNL TOTAL CA: CPT

## 2018-07-29 PROCEDURE — A9270 NON-COVERED ITEM OR SERVICE: HCPCS | Performed by: HOSPITALIST

## 2018-07-29 PROCEDURE — 83036 HEMOGLOBIN GLYCOSYLATED A1C: CPT

## 2018-07-29 PROCEDURE — 700111 HCHG RX REV CODE 636 W/ 250 OVERRIDE (IP): Performed by: HOSPITALIST

## 2018-07-29 PROCEDURE — 36415 COLL VENOUS BLD VENIPUNCTURE: CPT

## 2018-07-29 PROCEDURE — 99232 SBSQ HOSP IP/OBS MODERATE 35: CPT | Performed by: INTERNAL MEDICINE

## 2018-07-29 PROCEDURE — A9270 NON-COVERED ITEM OR SERVICE: HCPCS | Performed by: INTERNAL MEDICINE

## 2018-07-29 PROCEDURE — 85025 COMPLETE CBC W/AUTO DIFF WBC: CPT

## 2018-07-29 PROCEDURE — 700102 HCHG RX REV CODE 250 W/ 637 OVERRIDE(OP): Performed by: HOSPITALIST

## 2018-07-29 PROCEDURE — 770006 HCHG ROOM/CARE - MED/SURG/GYN SEMI*

## 2018-07-29 PROCEDURE — 700102 HCHG RX REV CODE 250 W/ 637 OVERRIDE(OP): Performed by: INTERNAL MEDICINE

## 2018-07-29 PROCEDURE — 700105 HCHG RX REV CODE 258: Performed by: HOSPITALIST

## 2018-07-29 RX ORDER — SUMATRIPTAN 50 MG/1
25 TABLET, FILM COATED ORAL EVERY 12 HOURS PRN
Status: DISCONTINUED | OUTPATIENT
Start: 2018-07-29 | End: 2018-07-30

## 2018-07-29 RX ORDER — CIPROFLOXACIN 500 MG/1
500 TABLET, FILM COATED ORAL EVERY 12 HOURS
Status: DISCONTINUED | OUTPATIENT
Start: 2018-07-29 | End: 2018-08-02 | Stop reason: HOSPADM

## 2018-07-29 RX ORDER — BUTALBITAL, ACETAMINOPHEN AND CAFFEINE 50; 325; 40 MG/1; MG/1; MG/1
1 TABLET ORAL EVERY 6 HOURS PRN
Status: DISCONTINUED | OUTPATIENT
Start: 2018-07-29 | End: 2018-08-02 | Stop reason: HOSPADM

## 2018-07-29 RX ORDER — HYDROCODONE BITARTRATE AND ACETAMINOPHEN 5; 325 MG/1; MG/1
1-2 TABLET ORAL EVERY 6 HOURS PRN
Status: DISCONTINUED | OUTPATIENT
Start: 2018-07-29 | End: 2018-07-31

## 2018-07-29 RX ORDER — POTASSIUM CHLORIDE 20 MEQ/1
40 TABLET, EXTENDED RELEASE ORAL ONCE
Status: COMPLETED | OUTPATIENT
Start: 2018-07-29 | End: 2018-07-29

## 2018-07-29 RX ADMIN — POTASSIUM CHLORIDE 40 MEQ: 1500 TABLET, EXTENDED RELEASE ORAL at 08:23

## 2018-07-29 RX ADMIN — STANDARDIZED SENNA CONCENTRATE AND DOCUSATE SODIUM 2 TABLET: 8.6; 5 TABLET, FILM COATED ORAL at 17:33

## 2018-07-29 RX ADMIN — SODIUM CHLORIDE: 9 INJECTION, SOLUTION INTRAVENOUS at 00:57

## 2018-07-29 RX ADMIN — ONDANSETRON 4 MG: 4 TABLET, ORALLY DISINTEGRATING ORAL at 09:48

## 2018-07-29 RX ADMIN — SUMATRIPTAN SUCCINATE 25 MG: 50 TABLET, FILM COATED ORAL at 12:28

## 2018-07-29 RX ADMIN — CIPROFLOXACIN HYDROCHLORIDE 500 MG: 500 TABLET, FILM COATED ORAL at 09:48

## 2018-07-29 RX ADMIN — CEFTRIAXONE SODIUM 2 G: 2 INJECTION, POWDER, FOR SOLUTION INTRAMUSCULAR; INTRAVENOUS at 05:30

## 2018-07-29 RX ADMIN — HYDROCODONE BITARTRATE AND ACETAMINOPHEN 2 TABLET: 5; 325 TABLET ORAL at 21:47

## 2018-07-29 RX ADMIN — ONDANSETRON HYDROCHLORIDE 4 MG: 2 INJECTION, SOLUTION INTRAMUSCULAR; INTRAVENOUS at 15:37

## 2018-07-29 RX ADMIN — ONDANSETRON 4 MG: 4 TABLET, ORALLY DISINTEGRATING ORAL at 21:50

## 2018-07-29 RX ADMIN — CIPROFLOXACIN HYDROCHLORIDE 500 MG: 500 TABLET, FILM COATED ORAL at 17:33

## 2018-07-29 RX ADMIN — ACETAMINOPHEN 650 MG: 325 TABLET, FILM COATED ORAL at 09:48

## 2018-07-29 RX ADMIN — HYDROCODONE BITARTRATE AND ACETAMINOPHEN 1 TABLET: 5; 325 TABLET ORAL at 15:42

## 2018-07-29 RX ADMIN — ONDANSETRON 4 MG: 4 TABLET, ORALLY DISINTEGRATING ORAL at 15:42

## 2018-07-29 RX ADMIN — BUTALBITAL, ACETAMINOPHEN, AND CAFFEINE 1 TABLET: 50; 325; 40 TABLET ORAL at 15:42

## 2018-07-29 ASSESSMENT — ENCOUNTER SYMPTOMS
ABDOMINAL PAIN: 1
CONSTIPATION: 0
MYALGIAS: 1
FLANK PAIN: 0
FEVER: 0
VOMITING: 0
COUGH: 0
DEPRESSION: 0
NAUSEA: 1
SHORTNESS OF BREATH: 0
TINGLING: 0
BLOOD IN STOOL: 0
CHILLS: 0
NERVOUS/ANXIOUS: 0
DIZZINESS: 0

## 2018-07-29 ASSESSMENT — PATIENT HEALTH QUESTIONNAIRE - PHQ9
2. FEELING DOWN, DEPRESSED, IRRITABLE, OR HOPELESS: NOT AT ALL
SUM OF ALL RESPONSES TO PHQ9 QUESTIONS 1 AND 2: 0
1. LITTLE INTEREST OR PLEASURE IN DOING THINGS: NOT AT ALL

## 2018-07-29 ASSESSMENT — PAIN SCALES - GENERAL
PAINLEVEL_OUTOF10: ASSUMED PAIN PRESENT
PAINLEVEL_OUTOF10: 5
PAINLEVEL_OUTOF10: 8

## 2018-07-29 NOTE — PROGRESS NOTES
[facility] Hospitalist Progress Note    Date of Service: 2018    Chief Complaint  47 y.o. female admitted 2018 with urinary tract infection after failing outpatient antibiotic course    Interval Problem Update  Complains of nausea and diffuse abdominal pain this morning, unable to tolerate breakfast.  Patient's previous urine culture positive for Klebsiella, IV ceftriaxone discontinued and transitioned to p.o. ciprofloxacin based on sensitivities.  Repeat urine culture shows mixed skin prince.  Plan of care discussed with patient and  at bedside and all questions answered.    Consultants/Specialty  None    Disposition  Home once medically improved        Review of Systems   Constitutional: Negative for chills and fever.   HENT: Negative.    Respiratory: Negative for cough and shortness of breath.    Cardiovascular: Negative for chest pain.   Gastrointestinal: Positive for abdominal pain and nausea. Negative for blood in stool, constipation, melena and vomiting.   Genitourinary: Negative for dysuria, flank pain, frequency and hematuria.   Musculoskeletal: Positive for myalgias. Negative for joint pain.   Skin: Negative.    Neurological: Negative for dizziness and tingling.   Psychiatric/Behavioral: Negative for depression. The patient is not nervous/anxious.       Physical Exam  Laboratory/Imaging   Hemodynamics  Temp (24hrs), Av °C (98.6 °F), Min:36.8 °C (98.2 °F), Max:37.6 °C (99.6 °F)   Temperature: 36.9 °C (98.4 °F)  Pulse  Av.4  Min: 33  Max: 110    Blood Pressure: 116/79      Respiratory      Respiration: 16, Pulse Oximetry: 99 %        RUL Breath Sounds: Clear, RML Breath Sounds: Clear, RLL Breath Sounds: Clear, GREGORY Breath Sounds: Clear, LLL Breath Sounds: Clear    Fluids    Intake/Output Summary (Last 24 hours) at 18 1152  Last data filed at 18   Gross per 24 hour   Intake              370 ml   Output                0 ml   Net              370 ml       Nutrition  Orders  Placed This Encounter   Procedures   • Diet Order Regular     Standing Status:   Standing     Number of Occurrences:   1     Order Specific Question:   Diet:     Answer:   Regular [1]     Physical Exam   Constitutional: She is oriented to person, place, and time.   Thin, frail female, chronically ill appearing   HENT:   Head: Normocephalic and atraumatic.   Right Ear: External ear normal.   Left Ear: External ear normal.   Eyes: Conjunctivae are normal. Right eye exhibits no discharge. Left eye exhibits no discharge.   Neck: Normal range of motion. Neck supple.   Cardiovascular: Normal rate, regular rhythm and normal heart sounds.    Pulmonary/Chest: Effort normal and breath sounds normal. No respiratory distress.   Abdominal: Soft. She exhibits no mass. There is tenderness. There is no rebound and no guarding.   Musculoskeletal: She exhibits no edema.   Neurological: She is oriented to person, place, and time.   Skin: Skin is warm and dry.       Recent Labs      07/27/18 1819  07/29/18   0114   WBC  9.0  6.2   RBC  4.03*  3.48*   HEMOGLOBIN  11.7*  10.1*   HEMATOCRIT  35.6*  30.8*   MCV  88.3  88.5   MCH  29.0  29.0   MCHC  32.9*  32.8*   RDW  41.7  42.0   PLATELETCT  385  292   MPV  9.3  9.0     Recent Labs      07/27/18   1819  07/29/18   0114   SODIUM  144  146*   POTASSIUM  3.9  3.5*   CHLORIDE  108  111   CO2  22  28   GLUCOSE  89  94   BUN  6*  5*   CREATININE  0.59  0.53   CALCIUM  9.8  9.1                      Assessment/Plan     * Acute cystitis without hematuria   Assessment & Plan    With failure of recent outpatient antibiotic regimen.  Previous urine cultures positive for Klebsiella pneumoniae, transitioned from IV ceftriaxone to p.o. ciprofloxacin.  Repeat urine culture does show mixed skin prince            Underweight   Assessment & Plan    Nutrition consulted         Normocytic anemia   Assessment & Plan    Likely of chronic disease.  No evidence of bleed.  Transfuse if needed for hemoglobin less  than 7 gm/dL          Myotonic muscular dystrophy (HCC)- (present on admission)   Assessment & Plan    Stable.  Monitor          Quality-Core Measures   Reviewed items::  Labs reviewed and Medications reviewed  Mehta catheter::  No Mehta  DVT prophylaxis pharmacological::  Enoxaparin (Lovenox)  Antibiotics:  Treating active infection/contamination beyond 24 hours perioperative coverage

## 2018-07-29 NOTE — DIETARY
"Nutrition services: Day 1 of admit.  Gayla Escudero is a 47 y.o. female with admitting DX of UTI.  Consult received for failure to thrive.  Pt also noted with a low BMI (18.16).  Pt appears thin but nourished.  RD was able to speak with pt at bedside regarding appetite, PO intake, and wt history.  Pt reported that her appetite is \"so-so\" and she eats little off of her meal tray.  She denies any current n/v/d or abdominal pain.  Pt reports that her UBW is 95 lbs and wt upon current admit is 93 lbs.  We reviewed snacks at this time.  Pt had no further needs or questions.    Assessment:  Height: 152.4 cm (5')  Weight: 42.2 kg (93 lb)  Body mass index is 18.16 kg/m². - Underweight.  Diet/Intake: Regular.  Per chart, pt consumed <25% x 2 and 50-75% x 1 meal.    Evaluation:   1. Pt noted with acute cystitis without hematuria, normocytic anemia, and myotonic muscular dystrophy.  2. Pt's wt appears to be fairly stable.  3. Pt noted with dark circles around orbital region.  4. Labs and meds reviewed.  5. Last BM: 7/25.    Recommendations/Plan:  1. Magic cups three times a day with meals.  2. Encourage intake of meals and supplements.  3. Document intake of all meals and suppplements as % taken in ADL's to provide interdisciplinary communication across all shifts.   4. If PO intake remains chronically poor, pt may benefit from nutrition support.   5. Monitor weight.  6. Nutrition rep will continue to see patient for ongoing meal and snack preferences.     RD following.            "

## 2018-07-29 NOTE — PROGRESS NOTES
IV infiltrated, attempted to reinsert IV c use of vein finder  however reinsertion failed, unable to find another nurse to attempt reinsertion. Will have to endorse IV insertion to AM nurse.

## 2018-07-29 NOTE — PROGRESS NOTES
Paged Dr Schwartz to obtain another order for Imitrex, pt reporting migraine. PRN order was a 1x order and was given 07/28/18 at 2250. Awaiting call back.

## 2018-07-29 NOTE — CARE PLAN
Problem: Communication  Goal: The ability to communicate needs accurately and effectively will improve  Outcome: PROGRESSING AS EXPECTED      Problem: Safety  Goal: Will remain free from injury  Outcome: PROGRESSING AS EXPECTED      Problem: Infection  Goal: Will remain free from infection  Outcome: PROGRESSING AS EXPECTED      Problem: Knowledge Deficit  Goal: Knowledge of disease process/condition, treatment plan, diagnostic tests, and medications will improve  Outcome: PROGRESSING AS EXPECTED      Problem: Pain Management  Goal: Pain level will decrease to patient's comfort goal  Outcome: PROGRESSING AS EXPECTED

## 2018-07-29 NOTE — PROGRESS NOTES
Pt continues to CO of abd pain and migraine despite pain med. Paged Dr Schwartz to report and see what else we can give.

## 2018-07-29 NOTE — CARE PLAN
rec'd report from MCKAY Hatch at 0653 and assumed care of this pt. Pt appears to be sleeping well w/no ss of distress noted at this time. RR E/U. Safety measures in place, call light w/in reach.

## 2018-07-30 ENCOUNTER — PATIENT OUTREACH (OUTPATIENT)
Dept: HEALTH INFORMATION MANAGEMENT | Facility: OTHER | Age: 48
End: 2018-07-30

## 2018-07-30 PROBLEM — G43.909 MIGRAINE: Status: ACTIVE | Noted: 2018-07-30

## 2018-07-30 PROCEDURE — 770006 HCHG ROOM/CARE - MED/SURG/GYN SEMI*

## 2018-07-30 PROCEDURE — 700102 HCHG RX REV CODE 250 W/ 637 OVERRIDE(OP): Performed by: INTERNAL MEDICINE

## 2018-07-30 PROCEDURE — 99232 SBSQ HOSP IP/OBS MODERATE 35: CPT | Performed by: INTERNAL MEDICINE

## 2018-07-30 PROCEDURE — 700111 HCHG RX REV CODE 636 W/ 250 OVERRIDE (IP): Performed by: HOSPITALIST

## 2018-07-30 PROCEDURE — A9270 NON-COVERED ITEM OR SERVICE: HCPCS | Performed by: INTERNAL MEDICINE

## 2018-07-30 RX ORDER — SUMATRIPTAN 50 MG/1
50 TABLET, FILM COATED ORAL EVERY 12 HOURS PRN
Status: DISCONTINUED | OUTPATIENT
Start: 2018-07-30 | End: 2018-08-02 | Stop reason: HOSPADM

## 2018-07-30 RX ADMIN — HYDROCODONE BITARTRATE AND ACETAMINOPHEN 2 TABLET: 5; 325 TABLET ORAL at 11:33

## 2018-07-30 RX ADMIN — HYDROCODONE BITARTRATE AND ACETAMINOPHEN 2 TABLET: 5; 325 TABLET ORAL at 18:07

## 2018-07-30 RX ADMIN — CIPROFLOXACIN HYDROCHLORIDE 500 MG: 500 TABLET, FILM COATED ORAL at 05:20

## 2018-07-30 RX ADMIN — SUMATRIPTAN SUCCINATE 50 MG: 50 TABLET ORAL at 09:13

## 2018-07-30 RX ADMIN — ONDANSETRON 4 MG: 4 TABLET, ORALLY DISINTEGRATING ORAL at 08:16

## 2018-07-30 RX ADMIN — CIPROFLOXACIN HYDROCHLORIDE 500 MG: 500 TABLET, FILM COATED ORAL at 17:05

## 2018-07-30 RX ADMIN — BUTALBITAL, ACETAMINOPHEN, AND CAFFEINE 1 TABLET: 50; 325; 40 TABLET ORAL at 07:49

## 2018-07-30 RX ADMIN — ENOXAPARIN SODIUM 40 MG: 100 INJECTION SUBCUTANEOUS at 05:20

## 2018-07-30 ASSESSMENT — ENCOUNTER SYMPTOMS
DEPRESSION: 0
FLANK PAIN: 0
CHILLS: 0
NERVOUS/ANXIOUS: 0
HEADACHES: 1
DIZZINESS: 0
VOMITING: 1
CONSTIPATION: 0
SHORTNESS OF BREATH: 0
ABDOMINAL PAIN: 1
COUGH: 0
BLOOD IN STOOL: 0
NAUSEA: 1
MYALGIAS: 1
FEVER: 0
TINGLING: 0

## 2018-07-30 ASSESSMENT — PAIN SCALES - GENERAL
PAINLEVEL_OUTOF10: 0
PAINLEVEL_OUTOF10: 10
PAINLEVEL_OUTOF10: 5
PAINLEVEL_OUTOF10: 0
PAINLEVEL_OUTOF10: 7
PAINLEVEL_OUTOF10: 1

## 2018-07-30 ASSESSMENT — PATIENT HEALTH QUESTIONNAIRE - PHQ9
1. LITTLE INTEREST OR PLEASURE IN DOING THINGS: NOT AT ALL
SUM OF ALL RESPONSES TO PHQ9 QUESTIONS 1 AND 2: 0
2. FEELING DOWN, DEPRESSED, IRRITABLE, OR HOPELESS: NOT AT ALL

## 2018-07-30 NOTE — PROGRESS NOTES
Bedside report received from MCKAY Case. Pt A&O X 4.  On room air oxygen. VSS.    Voiding via bedside commode.      Pt calls appropriately. Pt sitting up in bed, pt appears emotional.   Plan of care discussed including pain management, mobilization, safety.   Bed alarm in place.   Hourly rounding in place.    Call light and belongings at bedside and within reach.    Bed in lowest position and locked.

## 2018-07-30 NOTE — CARE PLAN
Problem: Safety  Goal: Will remain free from falls  Outcome: PROGRESSING AS EXPECTED   Treaded socks in place, bed in the lowest position, bed alarm on, call light and belongings within reach, pt call for assistance appropriately

## 2018-07-30 NOTE — PROGRESS NOTES
[facility] Hospitalist Progress Note    Date of Service: 2018    Chief Complaint  47 y.o. female admitted 2018 with urinary tract infection after failing outpatient antibiotic course    Interval Problem Update  She complains of nausea with 1 episode of non bloody emesis and diffuse abdominal pain this morning. Patient also complains of headache with photophobia this morning.   Plan of care discussed with patient and  at bedside and all questions answered.    Consultants/Specialty  None    Disposition  Home once medically improved        Review of Systems   Constitutional: Negative for chills and fever.   HENT: Negative.    Respiratory: Negative for cough and shortness of breath.    Cardiovascular: Negative for chest pain.   Gastrointestinal: Positive for abdominal pain, nausea and vomiting. Negative for blood in stool, constipation and melena.   Genitourinary: Negative for dysuria, flank pain, frequency and hematuria.   Musculoskeletal: Positive for myalgias. Negative for joint pain.   Skin: Negative.    Neurological: Positive for headaches. Negative for dizziness and tingling.   Psychiatric/Behavioral: Negative for depression. The patient is not nervous/anxious.       Physical Exam  Laboratory/Imaging   Hemodynamics  Temp (24hrs), Av °C (98.6 °F), Min:36.7 °C (98 °F), Max:37.3 °C (99.1 °F)   Temperature: 37.1 °C (98.8 °F)  Pulse  Av.9  Min: 33  Max: 110    Blood Pressure: 106/71      Respiratory      Respiration: 16, Pulse Oximetry: 96 %        RUL Breath Sounds: Clear, RML Breath Sounds: Clear, RLL Breath Sounds: Clear, GREGORY Breath Sounds: Clear, LLL Breath Sounds: Clear    Fluids  No intake or output data in the 24 hours ending 18 1044    Nutrition  Orders Placed This Encounter   Procedures   • Diet Order Regular     Standing Status:   Standing     Number of Occurrences:   1     Order Specific Question:   Diet:     Answer:   Regular [1]     Physical Exam   Constitutional: She is  oriented to person, place, and time. No distress.   Thin, frail female, chronically ill appearing   HENT:   Head: Normocephalic and atraumatic.   Right Ear: External ear normal.   Left Ear: External ear normal.   Eyes: Conjunctivae are normal. Right eye exhibits no discharge. Left eye exhibits no discharge.   Neck: Normal range of motion. Neck supple. No tracheal deviation present.   Cardiovascular: Normal rate, regular rhythm and normal heart sounds.    Pulmonary/Chest: Effort normal and breath sounds normal. No stridor. No respiratory distress.   Abdominal: Soft. She exhibits no mass. There is tenderness. There is no rebound and no guarding.   Musculoskeletal: She exhibits no edema.   Neurological: She is oriented to person, place, and time.   Skin: Skin is warm and dry. She is not diaphoretic.   Psychiatric: She has a normal mood and affect.       Recent Labs      07/27/18   1819  07/29/18   0114   WBC  9.0  6.2   RBC  4.03*  3.48*   HEMOGLOBIN  11.7*  10.1*   HEMATOCRIT  35.6*  30.8*   MCV  88.3  88.5   MCH  29.0  29.0   MCHC  32.9*  32.8*   RDW  41.7  42.0   PLATELETCT  385  292   MPV  9.3  9.0     Recent Labs      07/27/18   1819  07/29/18   0114   SODIUM  144  146*   POTASSIUM  3.9  3.5*   CHLORIDE  108  111   CO2  22  28   GLUCOSE  89  94   BUN  6*  5*   CREATININE  0.59  0.53   CALCIUM  9.8  9.1                      Assessment/Plan     * Acute cystitis without hematuria   Assessment & Plan    With failure of recent outpatient antibiotic regimen.  Previous urine cultures positive for Klebsiella pneumoniae, transitioned from IV ceftriaxone to p.o. ciprofloxacin.  Repeat urine culture does show mixed skin prince            Migraine   Assessment & Plan    On imitrex and fioricet prn         Underweight   Assessment & Plan    Nutrition consulted         Normocytic anemia   Assessment & Plan    Likely of chronic disease.  No evidence of bleed.  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  Monitor          Quality-Core Measures   Reviewed items::  Labs reviewed and Medications reviewed  Mehta catheter::  No Mehta  DVT prophylaxis pharmacological::  Enoxaparin (Lovenox)  Antibiotics:  Treating active infection/contamination beyond 24 hours perioperative coverage

## 2018-07-30 NOTE — CARE PLAN
Problem: Communication  Goal: The ability to communicate needs accurately and effectively will improve  Outcome: PROGRESSING AS EXPECTED  Patient calls appropriately and states needs are met after interactions and interventions

## 2018-07-30 NOTE — CARE PLAN
Problem: Safety  Goal: Will remain free from injury  Outcome: PROGRESSING AS EXPECTED  Call light within reach, pt calls for assistance appropriately. Bed alarm in place for safety. Bed in low position and locked, upper bedside rails up, proper mobility signs placed, personal possessions within reach, treaded socks on. Hourly rounding in place.      Problem: Pain Management  Goal: Pain level will decrease to patient's comfort goal  Outcome: PROGRESSING AS EXPECTED  Pain medication given PRN per MAR and heat pack applied for adequate pain relief.

## 2018-07-31 ENCOUNTER — APPOINTMENT (OUTPATIENT)
Dept: RADIOLOGY | Facility: MEDICAL CENTER | Age: 48
DRG: 690 | End: 2018-07-31
Attending: INTERNAL MEDICINE
Payer: MEDICARE

## 2018-07-31 PROBLEM — R10.9 ABDOMINAL PAIN: Status: ACTIVE | Noted: 2018-07-31

## 2018-07-31 LAB
ANION GAP SERPL CALC-SCNC: 9 MMOL/L (ref 0–11.9)
BUN SERPL-MCNC: 5 MG/DL (ref 8–22)
CALCIUM SERPL-MCNC: 9.7 MG/DL (ref 8.5–10.5)
CHLORIDE SERPL-SCNC: 106 MMOL/L (ref 96–112)
CO2 SERPL-SCNC: 26 MMOL/L (ref 20–33)
CREAT SERPL-MCNC: 0.51 MG/DL (ref 0.5–1.4)
EST. AVERAGE GLUCOSE BLD GHB EST-MCNC: 97 MG/DL
GLUCOSE SERPL-MCNC: 94 MG/DL (ref 65–99)
HBA1C MFR BLD: 5 % (ref 0–5.6)
POTASSIUM SERPL-SCNC: 3.8 MMOL/L (ref 3.6–5.5)
SODIUM SERPL-SCNC: 141 MMOL/L (ref 135–145)

## 2018-07-31 PROCEDURE — A9270 NON-COVERED ITEM OR SERVICE: HCPCS | Performed by: HOSPITALIST

## 2018-07-31 PROCEDURE — 74177 CT ABD & PELVIS W/CONTRAST: CPT

## 2018-07-31 PROCEDURE — 700102 HCHG RX REV CODE 250 W/ 637 OVERRIDE(OP): Performed by: INTERNAL MEDICINE

## 2018-07-31 PROCEDURE — 36415 COLL VENOUS BLD VENIPUNCTURE: CPT

## 2018-07-31 PROCEDURE — 700102 HCHG RX REV CODE 250 W/ 637 OVERRIDE(OP): Performed by: HOSPITALIST

## 2018-07-31 PROCEDURE — 700111 HCHG RX REV CODE 636 W/ 250 OVERRIDE (IP): Performed by: HOSPITALIST

## 2018-07-31 PROCEDURE — 80048 BASIC METABOLIC PNL TOTAL CA: CPT

## 2018-07-31 PROCEDURE — A9270 NON-COVERED ITEM OR SERVICE: HCPCS | Performed by: INTERNAL MEDICINE

## 2018-07-31 PROCEDURE — 99232 SBSQ HOSP IP/OBS MODERATE 35: CPT | Performed by: INTERNAL MEDICINE

## 2018-07-31 PROCEDURE — 770006 HCHG ROOM/CARE - MED/SURG/GYN SEMI*

## 2018-07-31 RX ORDER — KETOROLAC TROMETHAMINE 30 MG/ML
15 INJECTION, SOLUTION INTRAMUSCULAR; INTRAVENOUS ONCE
Status: COMPLETED | OUTPATIENT
Start: 2018-07-31 | End: 2018-07-31

## 2018-07-31 RX ORDER — HYDROCODONE BITARTRATE AND ACETAMINOPHEN 5; 325 MG/1; MG/1
1-2 TABLET ORAL EVERY 4 HOURS PRN
Status: DISCONTINUED | OUTPATIENT
Start: 2018-07-31 | End: 2018-08-02 | Stop reason: HOSPADM

## 2018-07-31 RX ADMIN — CIPROFLOXACIN HYDROCHLORIDE 500 MG: 500 TABLET, FILM COATED ORAL at 06:00

## 2018-07-31 RX ADMIN — KETOROLAC TROMETHAMINE 15 MG: 30 INJECTION, SOLUTION INTRAMUSCULAR at 21:59

## 2018-07-31 RX ADMIN — HYDROCODONE BITARTRATE AND ACETAMINOPHEN 2 TABLET: 5; 325 TABLET ORAL at 15:29

## 2018-07-31 RX ADMIN — HYDROCODONE BITARTRATE AND ACETAMINOPHEN 1 TABLET: 5; 325 TABLET ORAL at 01:55

## 2018-07-31 RX ADMIN — HYDROCODONE BITARTRATE AND ACETAMINOPHEN 2 TABLET: 5; 325 TABLET ORAL at 20:17

## 2018-07-31 RX ADMIN — ONDANSETRON 4 MG: 4 TABLET, ORALLY DISINTEGRATING ORAL at 12:33

## 2018-07-31 RX ADMIN — HYDROCODONE BITARTRATE AND ACETAMINOPHEN 2 TABLET: 5; 325 TABLET ORAL at 08:11

## 2018-07-31 RX ADMIN — ENOXAPARIN SODIUM 40 MG: 100 INJECTION SUBCUTANEOUS at 06:00

## 2018-07-31 RX ADMIN — CIPROFLOXACIN HYDROCHLORIDE 500 MG: 500 TABLET, FILM COATED ORAL at 17:12

## 2018-07-31 RX ADMIN — STANDARDIZED SENNA CONCENTRATE AND DOCUSATE SODIUM 2 TABLET: 8.6; 5 TABLET, FILM COATED ORAL at 06:00

## 2018-07-31 ASSESSMENT — ENCOUNTER SYMPTOMS
ABDOMINAL PAIN: 1
NERVOUS/ANXIOUS: 0
FLANK PAIN: 0
DEPRESSION: 0
NAUSEA: 1
CONSTIPATION: 0
TINGLING: 0
DIZZINESS: 0
FEVER: 0
VOMITING: 1
CHILLS: 0
SHORTNESS OF BREATH: 0
MYALGIAS: 1
COUGH: 0
BLOOD IN STOOL: 0

## 2018-07-31 ASSESSMENT — PAIN SCALES - GENERAL
PAINLEVEL_OUTOF10: 3
PAINLEVEL_OUTOF10: 8
PAINLEVEL_OUTOF10: 9
PAINLEVEL_OUTOF10: 9
PAINLEVEL_OUTOF10: 6
PAINLEVEL_OUTOF10: 7
PAINLEVEL_OUTOF10: 3
PAINLEVEL_OUTOF10: 9

## 2018-07-31 ASSESSMENT — PATIENT HEALTH QUESTIONNAIRE - PHQ9
2. FEELING DOWN, DEPRESSED, IRRITABLE, OR HOPELESS: NOT AT ALL
1. LITTLE INTEREST OR PLEASURE IN DOING THINGS: NOT AT ALL
SUM OF ALL RESPONSES TO PHQ9 QUESTIONS 1 AND 2: 0

## 2018-07-31 NOTE — CARE PLAN
Problem: Safety  Goal: Will remain free from falls     Intervention: Implement fall precautions  Bed is in the lowest position, call light and belongings are within reach, treaded socks are on, DME is in the bathroom, bed alarm is on, rails are up, and hourly rounding is in place.

## 2018-07-31 NOTE — PROGRESS NOTES
[facility]ist Progress Note    Date of Service: 2018    Chief Complaint  47 y.o. female admitted 2018 with urinary tract infection after failing outpatient antibiotic course    Interval Problem Update  Pt seen and examined, she continues to complain of abdominal pain, also with nausea.   Afebrile.     Consultants/Specialty  None    Disposition  Home once medically improved        Review of Systems   Constitutional: Negative for chills and fever.   HENT: Negative.    Respiratory: Negative for cough and shortness of breath.    Cardiovascular: Negative for chest pain.   Gastrointestinal: Positive for abdominal pain, nausea and vomiting. Negative for blood in stool, constipation and melena.   Genitourinary: Negative for dysuria, flank pain, frequency and hematuria.   Musculoskeletal: Positive for myalgias. Negative for joint pain.   Skin: Negative.    Neurological: Negative for dizziness and tingling.   Psychiatric/Behavioral: Negative for depression. The patient is not nervous/anxious.       Physical Exam  Laboratory/Imaging   Hemodynamics  Temp (24hrs), Av.7 °C (98.1 °F), Min:36.1 °C (97 °F), Max:37.4 °C (99.4 °F)   Temperature: 36.8 °C (98.2 °F)  Pulse  Av.2  Min: 33  Max: 110    Blood Pressure: 105/72      Respiratory      Respiration: 16, Pulse Oximetry: 94 %        RUL Breath Sounds: Clear, RML Breath Sounds: Clear, RLL Breath Sounds: Clear, GREGORY Breath Sounds: Clear, LLL Breath Sounds: Clear    Fluids    Intake/Output Summary (Last 24 hours) at 18 1600  Last data filed at 18 0100   Gross per 24 hour   Intake              100 ml   Output              200 ml   Net             -100 ml       Nutrition  Orders Placed This Encounter   Procedures   • Diet Order Regular     Standing Status:   Standing     Number of Occurrences:   1     Order Specific Question:   Diet:     Answer:   Regular [1]     Physical Exam   Constitutional: She is oriented to person, place, and time. No distress.    Thin, frail female, chronically ill appearing   HENT:   Head: Normocephalic and atraumatic.   Right Ear: External ear normal.   Left Ear: External ear normal.   Eyes: Conjunctivae are normal. Right eye exhibits no discharge. Left eye exhibits no discharge.   Neck: Normal range of motion. Neck supple. No tracheal deviation present.   Cardiovascular: Normal rate, regular rhythm and normal heart sounds.    Pulmonary/Chest: Effort normal and breath sounds normal. No stridor. No respiratory distress.   Abdominal: Soft. She exhibits no mass. There is tenderness. There is no rebound and no guarding.   Musculoskeletal: She exhibits no edema.   Neurological: She is oriented to person, place, and time.   Skin: Skin is warm and dry. She is not diaphoretic.   Psychiatric: She has a normal mood and affect.       Recent Labs      07/29/18   0114   WBC  6.2   RBC  3.48*   HEMOGLOBIN  10.1*   HEMATOCRIT  30.8*   MCV  88.5   MCH  29.0   MCHC  32.8*   RDW  42.0   PLATELETCT  292   MPV  9.0     Recent Labs      07/29/18   0114  07/31/18   0105   SODIUM  146*  141   POTASSIUM  3.5*  3.8   CHLORIDE  111  106   CO2  28  26   GLUCOSE  94  94   BUN  5*  5*   CREATININE  0.53  0.51   CALCIUM  9.1  9.7                      Assessment/Plan     * Acute cystitis without hematuria   Assessment & Plan    With failure of recent outpatient antibiotic regimen.  Previous urine cultures positive for Klebsiella pneumoniae, transitioned from IV ceftriaxone to p.o. ciprofloxacin.  Repeat urine culture does show mixed skin prince            Abdominal pain   Assessment & Plan    Also with some nausea  will check a CT abdomen,   Monitor         Migraine   Assessment & Plan    On imitrex and fioricet prn         Underweight   Assessment & Plan    Nutrition consulted         Normocytic anemia   Assessment & Plan    Likely of chronic disease.  No evidence of bleed.  Transfuse if needed for hemoglobin less than 7 gm/dL          Myotonic muscular dystrophy  (HCC)- (present on admission)   Assessment & Plan    Stable.  Monitor          Quality-Core Measures   Reviewed items::  Labs reviewed and Medications reviewed  Mehta catheter::  No Mehta  DVT prophylaxis pharmacological::  Enoxaparin (Lovenox)  Antibiotics:  Treating active infection/contamination beyond 24 hours perioperative coverage

## 2018-07-31 NOTE — PROGRESS NOTES
Patient stated she was feeling dizzy, all vss   PLaced on 2 L NC and repositioned     Stated she is feeling better   Patient now resting comfortably

## 2018-07-31 NOTE — CARE PLAN
Problem: Safety  Goal: Will remain free from falls  Outcome: PROGRESSING AS EXPECTED   Treaded socks in place, bed in the lowest position, bed alarm on, call light and belongings within reach, pt call for assistance appropriately    Problem: Urinary Elimination:  Goal: Ability to reestablish a normal urinary elimination pattern will improve  Outcome: PROGRESSING AS EXPECTED  Patient has no bouts of incontinence. Patient calls appropriately and is a 1x assist to the commode.

## 2018-07-31 NOTE — PROGRESS NOTES
Assumed Care at 1900  VSS  No complaints of pain at this time   Call light within reach.  Patient resting comfortably

## 2018-07-31 NOTE — CARE PLAN
Problem: Psychosocial Needs:  Goal: Level of anxiety will decrease    Intervention: Identify and develop with patient strategies to cope with anxiety triggers  Encouraged patient to discuss emotional state , appeared anxious, was frustrated , after speaking , patient was calm and breathing without issue

## 2018-08-01 ENCOUNTER — HOME HEALTH ADMISSION (OUTPATIENT)
Dept: HOME HEALTH SERVICES | Facility: HOME HEALTHCARE | Age: 48
End: 2018-08-01
Payer: MEDICARE

## 2018-08-01 LAB
ANION GAP SERPL CALC-SCNC: 8 MMOL/L (ref 0–11.9)
BUN SERPL-MCNC: 6 MG/DL (ref 8–22)
CALCIUM SERPL-MCNC: 10 MG/DL (ref 8.5–10.5)
CHLORIDE SERPL-SCNC: 102 MMOL/L (ref 96–112)
CO2 SERPL-SCNC: 29 MMOL/L (ref 20–33)
CREAT SERPL-MCNC: 0.63 MG/DL (ref 0.5–1.4)
ERYTHROCYTE [DISTWIDTH] IN BLOOD BY AUTOMATED COUNT: 42.8 FL (ref 35.9–50)
GLUCOSE SERPL-MCNC: 87 MG/DL (ref 65–99)
HCT VFR BLD AUTO: 36.8 % (ref 37–47)
HGB BLD-MCNC: 11.8 G/DL (ref 12–16)
MCH RBC QN AUTO: 28.7 PG (ref 27–33)
MCHC RBC AUTO-ENTMCNC: 32.1 G/DL (ref 33.6–35)
MCV RBC AUTO: 89.5 FL (ref 81.4–97.8)
PLATELET # BLD AUTO: 333 K/UL (ref 164–446)
PMV BLD AUTO: 8.9 FL (ref 9–12.9)
POTASSIUM SERPL-SCNC: 3.8 MMOL/L (ref 3.6–5.5)
RBC # BLD AUTO: 4.11 M/UL (ref 4.2–5.4)
SODIUM SERPL-SCNC: 139 MMOL/L (ref 135–145)
WBC # BLD AUTO: 4.6 K/UL (ref 4.8–10.8)

## 2018-08-01 PROCEDURE — 85027 COMPLETE CBC AUTOMATED: CPT

## 2018-08-01 PROCEDURE — 80048 BASIC METABOLIC PNL TOTAL CA: CPT

## 2018-08-01 PROCEDURE — 700102 HCHG RX REV CODE 250 W/ 637 OVERRIDE(OP): Performed by: INTERNAL MEDICINE

## 2018-08-01 PROCEDURE — 700102 HCHG RX REV CODE 250 W/ 637 OVERRIDE(OP): Performed by: HOSPITALIST

## 2018-08-01 PROCEDURE — A9270 NON-COVERED ITEM OR SERVICE: HCPCS | Performed by: INTERNAL MEDICINE

## 2018-08-01 PROCEDURE — 36415 COLL VENOUS BLD VENIPUNCTURE: CPT

## 2018-08-01 PROCEDURE — A9270 NON-COVERED ITEM OR SERVICE: HCPCS | Performed by: HOSPITALIST

## 2018-08-01 PROCEDURE — 770006 HCHG ROOM/CARE - MED/SURG/GYN SEMI*

## 2018-08-01 PROCEDURE — 99232 SBSQ HOSP IP/OBS MODERATE 35: CPT | Performed by: INTERNAL MEDICINE

## 2018-08-01 RX ADMIN — HYDROCODONE BITARTRATE AND ACETAMINOPHEN 1 TABLET: 5; 325 TABLET ORAL at 20:14

## 2018-08-01 RX ADMIN — HYDROCODONE BITARTRATE AND ACETAMINOPHEN 2 TABLET: 5; 325 TABLET ORAL at 03:19

## 2018-08-01 RX ADMIN — CIPROFLOXACIN HYDROCHLORIDE 500 MG: 500 TABLET, FILM COATED ORAL at 21:57

## 2018-08-01 RX ADMIN — CIPROFLOXACIN HYDROCHLORIDE 500 MG: 500 TABLET, FILM COATED ORAL at 05:01

## 2018-08-01 ASSESSMENT — PATIENT HEALTH QUESTIONNAIRE - PHQ9
1. LITTLE INTEREST OR PLEASURE IN DOING THINGS: NOT AT ALL
SUM OF ALL RESPONSES TO PHQ9 QUESTIONS 1 AND 2: 0
2. FEELING DOWN, DEPRESSED, IRRITABLE, OR HOPELESS: NOT AT ALL
1. LITTLE INTEREST OR PLEASURE IN DOING THINGS: NOT AT ALL
SUM OF ALL RESPONSES TO PHQ9 QUESTIONS 1 AND 2: 0

## 2018-08-01 ASSESSMENT — ENCOUNTER SYMPTOMS
FLANK PAIN: 0
DEPRESSION: 0
CHILLS: 0
NERVOUS/ANXIOUS: 0
DIZZINESS: 0
FEVER: 0
COUGH: 0
BLOOD IN STOOL: 0
MYALGIAS: 1
SHORTNESS OF BREATH: 0
TINGLING: 0
ABDOMINAL PAIN: 1
VOMITING: 1
NAUSEA: 1
CONSTIPATION: 0

## 2018-08-01 ASSESSMENT — PAIN SCALES - GENERAL
PAINLEVEL_OUTOF10: 5
PAINLEVEL_OUTOF10: 3
PAINLEVEL_OUTOF10: 4
PAINLEVEL_OUTOF10: 7
PAINLEVEL_OUTOF10: 4
PAINLEVEL_OUTOF10: 6
PAINLEVEL_OUTOF10: 3

## 2018-08-01 NOTE — CARE PLAN
Problem: Safety  Goal: Will remain free from falls  Outcome: PROGRESSING AS EXPECTED   Treaded socks in place, bed in the lowest position, bed alarm on, call light and belongings within reach, pt call for assistance appropriately     Problem: Pain Management  Goal: Pain level will decrease to patient's comfort goal  Outcome: PROGRESSING AS EXPECTED  Medicated patient per MAR for pain. Encouraged multimodal interventions as well such as heat, imagery, positioning, and relaxation

## 2018-08-01 NOTE — PROGRESS NOTES
[facility] Hospitalist Progress Note    Date of Service: 2018    Chief Complaint  47 y.o. female admitted 2018 with urinary tract infection after failing outpatient antibiotic course    Interval Problem Update  Pt seen and examined, still with some LLQ pain, better controlled today, CT abdomen, neg for acute issues,   No overnight events     Consultants/Specialty  None    Disposition  Home once medically improved        Review of Systems   Constitutional: Negative for chills and fever.   HENT: Negative.    Respiratory: Negative for cough and shortness of breath.    Cardiovascular: Negative for chest pain.   Gastrointestinal: Positive for abdominal pain, nausea and vomiting. Negative for blood in stool, constipation and melena.   Genitourinary: Negative for dysuria, flank pain, frequency and hematuria.   Musculoskeletal: Positive for myalgias. Negative for joint pain.   Skin: Negative.    Neurological: Negative for dizziness and tingling.   Psychiatric/Behavioral: Negative for depression. The patient is not nervous/anxious.       Physical Exam  Laboratory/Imaging   Hemodynamics  Temp (24hrs), Av °C (98.6 °F), Min:36.5 °C (97.7 °F), Max:37.7 °C (99.8 °F)   Temperature: 36.5 °C (97.7 °F)  Pulse  Av.1  Min: 33  Max: 110    Blood Pressure: (!) 85/57      Respiratory      Respiration: 16, Pulse Oximetry: 100 %        RUL Breath Sounds: Clear, RML Breath Sounds: Clear, RLL Breath Sounds: Clear, GREGORY Breath Sounds: Clear, LLL Breath Sounds: Clear    Fluids  No intake or output data in the 24 hours ending 18 1430    Nutrition  Orders Placed This Encounter   Procedures   • Diet Order Regular     Standing Status:   Standing     Number of Occurrences:   1     Order Specific Question:   Diet:     Answer:   Regular [1]     Physical Exam   Constitutional: She is oriented to person, place, and time. No distress.   Thin, frail female, chronically ill appearing   HENT:   Head: Normocephalic and atraumatic.   Right  Ear: External ear normal.   Left Ear: External ear normal.   Eyes: Conjunctivae are normal. Right eye exhibits no discharge. Left eye exhibits no discharge.   Neck: Normal range of motion. Neck supple. No tracheal deviation present.   Cardiovascular: Normal rate, regular rhythm and normal heart sounds.    Pulmonary/Chest: Effort normal and breath sounds normal. No stridor. No respiratory distress.   Abdominal: Soft. She exhibits no mass. There is tenderness. There is no rebound and no guarding.   Musculoskeletal: She exhibits no edema.   Neurological: She is oriented to person, place, and time.   Skin: Skin is warm and dry. She is not diaphoretic.   Psychiatric: She has a normal mood and affect.       Recent Labs      08/01/18   0652   WBC  4.6*   RBC  4.11*   HEMOGLOBIN  11.8*   HEMATOCRIT  36.8*   MCV  89.5   MCH  28.7   MCHC  32.1*   RDW  42.8   PLATELETCT  333   MPV  8.9*     Recent Labs      07/31/18   0105  08/01/18   0237   SODIUM  141  139   POTASSIUM  3.8  3.8   CHLORIDE  106  102   CO2  26  29   GLUCOSE  94  87   BUN  5*  6*   CREATININE  0.51  0.63   CALCIUM  9.7  10.0                      Assessment/Plan     * Acute cystitis without hematuria   Assessment & Plan    With failure of recent outpatient antibiotic regimen.  Previous urine cultures positive for Klebsiella pneumoniae, transitioned from IV ceftriaxone to p.o. ciprofloxacin.  Repeat urine culture does show mixed skin prince            Abdominal pain   Assessment & Plan    Also with some nausea  CT abdomen neg for acute issues   Monitor         Migraine   Assessment & Plan    On imitrex and fioricet prn         Underweight   Assessment & Plan    Nutrition consulted         Normocytic anemia   Assessment & Plan    Likely of chronic disease.  No evidence of bleed.  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  Monitor          Quality-Core  Measures   Reviewed items::  Labs reviewed and Medications reviewed  Mehta catheter::  No Mehta  DVT prophylaxis pharmacological::  Enoxaparin (Lovenox)  Antibiotics:  Treating active infection/contamination beyond 24 hours perioperative coverage

## 2018-08-01 NOTE — CARE PLAN
Problem: Safety  Goal: Will remain free from falls  Outcome: PROGRESSING AS EXPECTED  Treaded socks in place, bed in the lowest position, bed alarm on, call light and belongings within reach, pt call for assistance appropriately    Problem: Pain Management  Goal: Pain level will decrease to patient's comfort goal  Outcome: PROGRESSING AS EXPECTED  Administered pain medications per MAR, teach non pharmacological techniques such as relaxation, imagery

## 2018-08-01 NOTE — DISCHARGE PLANNING
Anticipated Discharge Disposition: Home    Action: LSW met w/ pt at bedside to explain and receive choice for Home Health. Pt chose 1) [facility] 2) [facility]. LSW faxed to [facility].    Barriers to Discharge: None    Plan: TBD

## 2018-08-01 NOTE — DISCHARGE PLANNING
Received Choice form at 1539 from Jeff)  Agency/Facility Name: [facility]  Referral sent per Choice form @ 0554

## 2018-08-01 NOTE — FACE TO FACE
Face to Face Supporting Documentation - Home Health    The encounter with this patient was in whole or in part the primary reason for home health admission.    Date of encounter:   Patient:                    MRN:                       YOB: 2018  Gayla Escudero  [id number]  1970     Home health to see patient for:  Physical Therapy evaluation and treatment and Occupational therapy evaluation and treatment    Homebound status evidenced by:  Need the aid of supportive devices such as crutches, canes, wheelchairs or walkers. Leaving home requires a considerable and taxing effort. There is a normal inability to leave the home.    Community Physician to provide follow up care: Adithya Martinez P.A.-C.     Optional Interventions? No      I certify the face to face encounter for this home health care referral meets the CMS requirements and the encounter/clinical assessment with the patient was, in whole, or in part, for the medical condition(s) listed above, which is the primary reason for home health care. Based on my clinical findings: the service(s) are medically necessary, support the need for home health care, and the homebound criteria are met.  I certify that this patient has had a face to face encounter by myself.  Suze Norton M.D. - NPI: [id number]

## 2018-08-01 NOTE — DISCHARGE PLANNING
ATTN: Case Management  RE: Referral for Home Health                We would like to take this opportunity to thank you for submitting a referral for your patient to continue care with [facility]. Our skilled team is dedicated to helping all patients recover and gain independence in the home setting.            As of 8/1/2018, we have accepted the above patient into our service. A [facility] clinician will be out to see the patient within 48 hours to conduct our initial visit. If you have any questions or concerns regarding the patient’s transition to Home Health, please do not hesitate to contact us. We are open for referrals 7 days a week from 8AM to 5PM at [phone].      We look forward to collaborating with you,  [facility] Team

## 2018-08-01 NOTE — DISCHARGE PLANNING
Agency/Facility Name: [facility]  Outcome: Patient accepted.

## 2018-08-01 NOTE — PROGRESS NOTES
Per CT scan tech, pt's IV infiltrated.  Pt returned to room escorted by transport.  Pt requests not to attempt reinserting IV tonight.  Will forward info to oncoming shift.

## 2018-08-02 VITALS
BODY MASS INDEX: 18.26 KG/M2 | WEIGHT: 93 LBS | SYSTOLIC BLOOD PRESSURE: 92 MMHG | OXYGEN SATURATION: 94 % | HEIGHT: 60 IN | RESPIRATION RATE: 16 BRPM | DIASTOLIC BLOOD PRESSURE: 60 MMHG | HEART RATE: 91 BPM | TEMPERATURE: 97.8 F

## 2018-08-02 LAB
ANION GAP SERPL CALC-SCNC: 10 MMOL/L (ref 0–11.9)
BUN SERPL-MCNC: 10 MG/DL (ref 8–22)
CALCIUM SERPL-MCNC: 9.3 MG/DL (ref 8.5–10.5)
CHLORIDE SERPL-SCNC: 103 MMOL/L (ref 96–112)
CO2 SERPL-SCNC: 27 MMOL/L (ref 20–33)
CREAT SERPL-MCNC: 0.71 MG/DL (ref 0.5–1.4)
ERYTHROCYTE [DISTWIDTH] IN BLOOD BY AUTOMATED COUNT: 42.6 FL (ref 35.9–50)
GLUCOSE SERPL-MCNC: 82 MG/DL (ref 65–99)
HCT VFR BLD AUTO: 34.1 % (ref 37–47)
HGB BLD-MCNC: 11.2 G/DL (ref 12–16)
MCH RBC QN AUTO: 29.2 PG (ref 27–33)
MCHC RBC AUTO-ENTMCNC: 32.8 G/DL (ref 33.6–35)
MCV RBC AUTO: 88.8 FL (ref 81.4–97.8)
PLATELET # BLD AUTO: 306 K/UL (ref 164–446)
PMV BLD AUTO: 9.2 FL (ref 9–12.9)
POTASSIUM SERPL-SCNC: 4 MMOL/L (ref 3.6–5.5)
RBC # BLD AUTO: 3.84 M/UL (ref 4.2–5.4)
SODIUM SERPL-SCNC: 140 MMOL/L (ref 135–145)
WBC # BLD AUTO: 5.8 K/UL (ref 4.8–10.8)

## 2018-08-02 PROCEDURE — 85027 COMPLETE CBC AUTOMATED: CPT

## 2018-08-02 PROCEDURE — 700102 HCHG RX REV CODE 250 W/ 637 OVERRIDE(OP): Performed by: INTERNAL MEDICINE

## 2018-08-02 PROCEDURE — 700102 HCHG RX REV CODE 250 W/ 637 OVERRIDE(OP): Performed by: HOSPITALIST

## 2018-08-02 PROCEDURE — 99239 HOSP IP/OBS DSCHRG MGMT >30: CPT | Performed by: INTERNAL MEDICINE

## 2018-08-02 PROCEDURE — A9270 NON-COVERED ITEM OR SERVICE: HCPCS | Performed by: HOSPITALIST

## 2018-08-02 PROCEDURE — 36415 COLL VENOUS BLD VENIPUNCTURE: CPT

## 2018-08-02 PROCEDURE — A9270 NON-COVERED ITEM OR SERVICE: HCPCS | Performed by: INTERNAL MEDICINE

## 2018-08-02 PROCEDURE — 80048 BASIC METABOLIC PNL TOTAL CA: CPT

## 2018-08-02 RX ORDER — CIPROFLOXACIN 500 MG/1
500 TABLET, FILM COATED ORAL EVERY 12 HOURS
Qty: 4 TAB | Refills: 0 | Status: SHIPPED | OUTPATIENT
Start: 2018-08-02 | End: 2018-08-10

## 2018-08-02 RX ORDER — HYDROCODONE BITARTRATE AND ACETAMINOPHEN 5; 325 MG/1; MG/1
1-2 TABLET ORAL EVERY 6 HOURS PRN
Qty: 12 TAB | Refills: 0 | Status: SHIPPED | OUTPATIENT
Start: 2018-08-02 | End: 2018-08-05

## 2018-08-02 RX ADMIN — HYDROCODONE BITARTRATE AND ACETAMINOPHEN 1 TABLET: 5; 325 TABLET ORAL at 09:11

## 2018-08-02 RX ADMIN — HYDROCODONE BITARTRATE AND ACETAMINOPHEN 1 TABLET: 5; 325 TABLET ORAL at 03:35

## 2018-08-02 RX ADMIN — CIPROFLOXACIN HYDROCHLORIDE 500 MG: 500 TABLET, FILM COATED ORAL at 09:11

## 2018-08-02 ASSESSMENT — PAIN SCALES - GENERAL
PAINLEVEL_OUTOF10: 5
PAINLEVEL_OUTOF10: 6

## 2018-08-02 NOTE — DISCHARGE INSTRUCTIONS
Discharge Instructions    Discharged to home by car with relative. Discharged via wheelchair, hospital escort: Yes.  Special equipment needed: Not Applicable    Be sure to schedule a follow-up appointment with your primary care doctor or any specialists as instructed.     Discharge Plan:   Diet Plan: Discussed  Activity Level: Discussed  Confirmed Follow up Appointment: Patient to Call and Schedule Appointment  Confirmed Symptoms Management: Discussed  Medication Reconciliation Updated: Yes  Influenza Vaccine Indication: Patient Refuses    I understand that a diet low in cholesterol, fat, and sodium is recommended for good health. Unless I have been given specific instructions below for another diet, I accept this instruction as my diet prescription.   Other diet: regular    Special Instructions: None    · Is patient discharged on Warfarin / Coumadin?   No     Depression / Suicide Risk    As you are discharged from this [facility] facility, it is important to learn how to keep safe from harming yourself.    Recognize the warning signs:  · Abrupt changes in personality, positive or negative- including increase in energy   · Giving away possessions  · Change in eating patterns- significant weight changes-  positive or negative  · Change in sleeping patterns- unable to sleep or sleeping all the time   · Unwillingness or inability to communicate  · Depression  · Unusual sadness, discouragement and loneliness  · Talk of wanting to die  · Neglect of personal appearance   · Rebelliousness- reckless behavior  · Withdrawal from people/activities they love  · Confusion- inability to concentrate     If you or a loved one observes any of these behaviors or has concerns about self-harm, here's what you can do:  · Talk about it- your feelings and reasons for harming yourself  · Remove any means that you might use to hurt yourself (examples: pills, rope, extension cords, firearm)  · Get professional help from the community  (Mental Health, Substance Abuse, psychological counseling)  · Do not be alone:Call your Safe Contact- someone whom you trust who will be there for you.  · Call your local CRISIS HOTLINE 678-6455 or 417-660-7527  · Call your local Children's Mobile Crisis Response Team [place] (045) 703-4826 or www.[employer]  · Call the toll free National Suicide Prevention Hotlines   · National Suicide Prevention Lifeline 266-559-JGJJ (2656)  · [employer] Hope Line Network 800-SUICIDE (142-3418)        Urinary Tract Infection, Adult  Introduction  A urinary tract infection (UTI) is an infection of any part of the urinary tract. The urinary tract includes the:  · Kidneys.  · Ureters.  · Bladder.  · Urethra.  These organs make, store, and get rid of pee (urine) in the body.  Follow these instructions at home:  · Take over-the-counter and prescription medicines only as told by your doctor.  · If you were prescribed an antibiotic medicine, take it as told by your doctor. Do not stop taking the antibiotic even if you start to feel better.  · Avoid the following drinks:  ¨ Alcohol.  ¨ Caffeine.  ¨ Tea.  ¨ Carbonated drinks.  · Drink enough fluid to keep your pee clear or pale yellow.  · Keep all follow-up visits as told by your doctor. This is important.  · Make sure to:  ¨ Empty your bladder often and completely. Do not to hold pee for long periods of time.  ¨ Empty your bladder before and after sex.  ¨ Wipe from front to back after a bowel movement if you are female. Use each tissue one time when you wipe.  Contact a doctor if:  · You have back pain.  · You have a fever.  · You feel sick to your stomach (nauseous).  · You throw up (vomit).  · Your symptoms do not get better after 3 days.  · Your symptoms go away and then come back.  Get help right away if:  · You have very bad back pain.  · You have very bad lower belly (abdominal) pain.  · You are throwing up and cannot keep down any medicines or water.  This information is  not intended to replace advice given to you by your health care provider. Make sure you discuss any questions you have with your health care provider.  Document Released: 06/05/2009 Document Revised: 05/25/2017 Document Reviewed: 11/07/2016  © 2017 [employer]      Abdominal Pain, Women  Abdominal (stomach, pelvic, or belly) pain can be caused by many things. It is important to tell your doctor:  · The location of the pain.  · Does it come and go or is it present all the time?  · Are there things that start the pain (eating certain foods, exercise)?  · Are there other symptoms associated with the pain (fever, nausea, vomiting, diarrhea)?  All of this is helpful to know when trying to find the cause of the pain.  CAUSES   · Stomach: virus or bacteria infection, or ulcer.  · Intestine: appendicitis (inflamed appendix), regional ileitis (Crohn's disease), ulcerative colitis (inflamed colon), irritable bowel syndrome, diverticulitis (inflamed diverticulum of the colon), or cancer of the stomach or intestine.  · Gallbladder disease or stones in the gallbladder.  · Kidney disease, kidney stones, or infection.  · Pancreas infection or cancer.  · Fibromyalgia (pain disorder).  · Diseases of the female organs:  · Uterus: fibroid (non-cancerous) tumors or infection.  · Fallopian tubes: infection or tubal pregnancy.  · Ovary: cysts or tumors.  · Pelvic adhesions (scar tissue).  · Endometriosis (uterus lining tissue growing in the pelvis and on the pelvic organs).  · Pelvic congestion syndrome (female organs filling up with blood just before the menstrual period).  · Pain with the menstrual period.  · Pain with ovulation (producing an egg).  · Pain with an IUD (intrauterine device, birth control) in the uterus.  · Cancer of the female organs.  · Functional pain (pain not caused by a disease, may improve without treatment).  · Psychological pain.  · Depression.  DIAGNOSIS   Your doctor will decide the seriousness of your pain by  doing an examination.  · Blood tests.  · X-rays.  · Ultrasound.  · CT scan (computed tomography, special type of X-ray).  · MRI (magnetic resonance imaging).  · Cultures, for infection.  · Barium enema (dye inserted in the large intestine, to better view it with X-rays).  · Colonoscopy (looking in intestine with a lighted tube).  · Laparoscopy (minor surgery, looking in abdomen with a lighted tube).  · Major abdominal exploratory surgery (looking in abdomen with a large incision).  TREATMENT   The treatment will depend on the cause of the pain.   · Many cases can be observed and treated at home.  · Over-the-counter medicines recommended by your caregiver.  · Prescription medicine.  · Antibiotics, for infection.  · Birth control pills, for painful periods or for ovulation pain.  · Hormone treatment, for endometriosis.  · Nerve blocking injections.  · Physical therapy.  · Antidepressants.  · Counseling with a psychologist or psychiatrist.  · Minor or major surgery.  HOME CARE INSTRUCTIONS   · Do not take laxatives, unless directed by your caregiver.  · Take over-the-counter pain medicine only if ordered by your caregiver. Do not take aspirin because it can cause an upset stomach or bleeding.  · Try a clear liquid diet (broth or water) as ordered by your caregiver. Slowly move to a bland diet, as tolerated, if the pain is related to the stomach or intestine.  · Have a thermometer and take your temperature several times a day, and record it.  · Bed rest and sleep, if it helps the pain.  · Avoid sexual intercourse, if it causes pain.  · Avoid stressful situations.  · Keep your follow-up appointments and tests, as your caregiver orders.  · If the pain does not go away with medicine or surgery, you may try:  · Acupuncture.  · Relaxation exercises (yoga, meditation).  · Group therapy.  · Counseling.  SEEK MEDICAL CARE IF:   · You notice certain foods cause stomach pain.  · Your home care treatment is not helping your  pain.  · You need stronger pain medicine.  · You want your IUD removed.  · You feel faint or lightheaded.  · You develop nausea and vomiting.  · You develop a rash.  · You are having side effects or an allergy to your medicine.  SEEK IMMEDIATE MEDICAL CARE IF:   · Your pain does not go away or gets worse.  · You have a fever.  · Your pain is felt only in portions of the abdomen. The right side could possibly be appendicitis. The left lower portion of the abdomen could be colitis or diverticulitis.  · You are passing blood in your stools (bright red or black tarry stools, with or without vomiting).  · You have blood in your urine.  · You develop chills, with or without a fever.  · You pass out.  MAKE SURE YOU:   · Understand these instructions.  · Will watch your condition.  · Will get help right away if you are not doing well or get worse.  Document Released: 10/14/2008 Document Revised: 03/11/2013 Document Reviewed: 11/04/2010  [employer]® Patient Information ©2014 [employer].    General Headache Without Cause  Introduction  A headache is pain or discomfort felt around the head or neck area. There are many causes and types of headaches. In some cases, the cause may not be found.  Follow these instructions at home:  Managing pain  Take over-the-counter and prescription medicines only as told by your doctor.  Lie down in a dark, quiet room when you have a headache.  If directed, apply ice to the head and neck area:  Put ice in a plastic bag.  Place a towel between your skin and the bag.  Leave the ice on for 20 minutes, 2-3 times per day.  Use a heating pad or hot shower to apply heat to the head and neck area as told by your doctor.  Keep lights dim if bright lights bother you or make your headaches worse.  Eating and drinking  Eat meals on a regular schedule.  Lessen how much alcohol you drink.  Lessen how much caffeine you drink, or stop drinking caffeine.  General instructions  Keep all follow-up visits as told  by your doctor. This is important.  Keep a journal to find out if certain things bring on headaches. For example, write down:  What you eat and drink.  How much sleep you get.  Any change to your diet or medicines.  Relax by getting a massage or doing other relaxing activities.  Lessen stress.  Sit up straight. Do not tighten (tense) your muscles.  Do not use tobacco products. This includes cigarettes, chewing tobacco, or e-cigarettes. If you need help quitting, ask your doctor.  Exercise regularly as told by your doctor.  Get enough sleep. This often means 7-9 hours of sleep.  Contact a doctor if:  Your symptoms are not helped by medicine.  You have a headache that feels different than the other headaches.  You feel sick to your stomach (nauseous) or you throw up (vomit).  You have a fever.  Get help right away if:  Your headache becomes really bad.  You keep throwing up.  You have a stiff neck.  You have trouble seeing.  You have trouble speaking.  You have pain in the eye or ear.  Your muscles are weak or you lose muscle control.  You lose your balance or have trouble walking.  You feel like you will pass out (faint) or you pass out.  You have confusion.  This information is not intended to replace advice given to you by your health care provider. Make sure you discuss any questions you have with your health care provider.  Document Released: 09/26/2009 Document Revised: 05/25/2017 Document Reviewed: 04/11/2016  © 2017 Elsevier

## 2018-08-02 NOTE — DISCHARGE SUMMARY
Discharge Summary    CHIEF COMPLAINT ON ADMISSION  Chief Complaint   Patient presents with   • RUQ Pain   • GLF       Reason for Admission  Ground Level Fall; Pain back of he*     Admission Date  7/27/2018    CODE STATUS  Prior    HPI & HOSPITAL COURSE  This is a 47 y.o. female with history of myotonic muscular dystrophy which is stable, anemia also stable, was in her usual state of health until approximately 3 days prior to admission, when she began to have abdominal pain, she reports that the pain is in her lower abdomen, does not radiate anywhere, and is associated with worsening during urination. Pt was found to have a UTI. Pt was admitted for further treatment. She was started on IV abx and also an Ct abdomen was done and was negative for acute issues.  Pt symptoms improved. Her outpt urine culture did grew klebsiella, but the one here in the hospital have remained negative, she was switched to cipro.  Pt will be discharged home today     The patient met 2-midnight criteria for an inpatient stay at the time of discharge.    Discharge Date  8/2/2018        DISCHARGE DIAGNOSES  Principal Problem:    Acute cystitis without hematuria POA: Unknown  Active Problems:    Myotonic muscular dystrophy (HCC) POA: Yes    Normocytic anemia POA: Unknown    Underweight POA: Unknown    Migraine POA: Unknown    Abdominal pain POA: Unknown  Resolved Problems:    * No resolved hospital problems. *      FOLLOW UP  Future Appointments  Date Time Provider Department Center   8/3/2018 9:20 AM JUSTICE [facility]   9/14/2018 11:00 AM Yaquelin Brown M.D. [facility] None     Adithya Martinez P.A.-C.  [address]  [address]  [facility] [id number] [phone]    Schedule an appointment as soon as possible for a visit in 2 days      [place] [address] [address] [facility].  [facility] [address] [phone]          MEDICATIONS ON DISCHARGE     Medication List      START taking these medications      Instructions    ciprofloxacin 500 MG Tabs  Commonly known as:  CIPRO   Take 1 Tab by mouth every 12 hours.  Dose:  500 mg     HYDROcodone-acetaminophen 5-325 MG Tabs per tablet  Commonly known as:  NORCO   Take 1-2 Tabs by mouth every 6 hours as needed for up to 3 days.  Dose:  1-2 Tab        CONTINUE taking these medications      Instructions   ondansetron 4 MG Tbdp  Commonly known as:  ZOFRAN ODT   Take 1 Tab by mouth every 6 hours as needed for Nausea.  Dose:  4 mg     SUMAtriptan 50 MG Tabs  Commonly known as:  IMITREX   Take 1 Tab by mouth Once PRN for Migraine for up to 1 dose.  Dose:  50 mg        STOP taking these medications    nitrofurantoin monohydr macro 100 MG Caps  Commonly known as:  MACROBID        ASK your doctor about these medications      Instructions   cephALEXin 250 MG/5ML Susr  Commonly known as:  KEFLEX  Ask about: Should I take this medication?   Take 5 mL by mouth 4 times a day for 5 days.  Dose:  250 mg            Allergies  Allergies   Allergen Reactions   • Codeine Vomiting and Nausea     N&V   • Tramadol      Effects her MD       DIET  No orders of the defined types were placed in this encounter.      ACTIVITY  As tolerated.      CONSULTATIONS  None     PROCEDURES  None     LABORATORY  Lab Results   Component Value Date    SODIUM 140 08/02/2018    POTASSIUM 4.0 08/02/2018    CHLORIDE 103 08/02/2018    CO2 27 08/02/2018    GLUCOSE 82 08/02/2018    BUN 10 08/02/2018    CREATININE 0.71 08/02/2018        Lab Results   Component Value Date    WBC 5.8 08/02/2018    HEMOGLOBIN 11.2 (L) 08/02/2018    HEMATOCRIT 34.1 (L) 08/02/2018    PLATELETCT 306 08/02/2018        Total time of the discharge process exceeds 41 minutes.

## 2018-08-02 NOTE — PROGRESS NOTES
"BP low this am. Rechecked with an outcome of 92/60. Spoke with spouse who informed this RN that her baseline BP is low he stated usually \"somewhere in the 80's over 50's.\" MD updated  "

## 2018-08-02 NOTE — PROGRESS NOTES
Pt discharged home with [facility], escorted out by nursing. All personal belongings sent with the patient upon discharge. Discharge instructions and prescriptions explained with verbal understanding provided by the patient. All needs met and all questions answered.

## 2018-08-02 NOTE — CARE PLAN
Problem: Safety  Goal: Will remain free from falls    Intervention: Implement fall precautions  Pt calls appropriately, treaded socks in use. Personal belongings and call light with in reach and bed is locked in lowest position. Hourly rounding in place      Problem: Pain Management  Goal: Pain level will decrease to patient's comfort goal    Intervention: Follow pain managment plan developed in collaboration with patient and Interdisciplinary Team  Pain managed with x1 5/325 tab of Norco. Requested appropriately

## 2018-08-02 NOTE — CARE PLAN
Problem: Pain Management  Goal: Pain level will decrease to patient's comfort goal  Outcome: PROGRESSING AS EXPECTED  Pt medicated per MAR, reports adequate pain relief with PRN pain medications.    Problem: Mobility  Goal: Risk for activity intolerance will decrease  Outcome: PROGRESSING SLOWER THAN EXPECTED  Pt encouraged to ambulate to the restroom to void, verbalized that she is too weak, uses bedside commode instead.

## 2018-08-03 ENCOUNTER — PATIENT OUTREACH (OUTPATIENT)
Dept: HEALTH INFORMATION MANAGEMENT | Facility: OTHER | Age: 48
End: 2018-08-03

## 2018-08-04 ENCOUNTER — HOME CARE VISIT (OUTPATIENT)
Dept: HOME HEALTH SERVICES | Facility: HOME HEALTHCARE | Age: 48
End: 2018-08-04

## 2018-08-04 ENCOUNTER — HOME CARE VISIT (OUTPATIENT)
Dept: HOME HEALTH SERVICES | Facility: HOME HEALTHCARE | Age: 48
End: 2018-08-04
Payer: MEDICARE

## 2018-08-06 ENCOUNTER — PATIENT OUTREACH (OUTPATIENT)
Dept: HEALTH INFORMATION MANAGEMENT | Facility: OTHER | Age: 48
End: 2018-08-06

## 2018-08-10 ENCOUNTER — OFFICE VISIT (OUTPATIENT)
Dept: URGENT CARE | Facility: CLINIC | Age: 48
End: 2018-08-10
Payer: MEDICARE

## 2018-08-10 ENCOUNTER — HOSPITAL ENCOUNTER (OUTPATIENT)
Facility: MEDICAL CENTER | Age: 48
End: 2018-08-10
Attending: NURSE PRACTITIONER
Payer: MEDICARE

## 2018-08-10 VITALS
RESPIRATION RATE: 14 BRPM | HEART RATE: 71 BPM | BODY MASS INDEX: 18.26 KG/M2 | HEIGHT: 60 IN | SYSTOLIC BLOOD PRESSURE: 98 MMHG | TEMPERATURE: 97.7 F | OXYGEN SATURATION: 95 % | DIASTOLIC BLOOD PRESSURE: 54 MMHG | WEIGHT: 93 LBS

## 2018-08-10 DIAGNOSIS — R30.0 DYSURIA: ICD-10-CM

## 2018-08-10 DIAGNOSIS — N39.0 URINARY TRACT INFECTION WITHOUT HEMATURIA, SITE UNSPECIFIED: ICD-10-CM

## 2018-08-10 DIAGNOSIS — R10.31 RIGHT LOWER QUADRANT ABDOMINAL PAIN: ICD-10-CM

## 2018-08-10 LAB
APPEARANCE UR: NORMAL
BILIRUB UR STRIP-MCNC: NEGATIVE MG/DL
COLOR UR AUTO: YELLOW
GLUCOSE UR STRIP.AUTO-MCNC: NEGATIVE MG/DL
KETONES UR STRIP.AUTO-MCNC: NEGATIVE MG/DL
LEUKOCYTE ESTERASE UR QL STRIP.AUTO: NORMAL
NITRITE UR QL STRIP.AUTO: NEGATIVE
PH UR STRIP.AUTO: 5.5 [PH] (ref 5–8)
PROT UR QL STRIP: NEGATIVE MG/DL
RBC UR QL AUTO: NORMAL
SP GR UR STRIP.AUTO: 1.02
UROBILINOGEN UR STRIP-MCNC: 0.2 MG/DL

## 2018-08-10 PROCEDURE — 81002 URINALYSIS NONAUTO W/O SCOPE: CPT | Performed by: NURSE PRACTITIONER

## 2018-08-10 PROCEDURE — 87086 URINE CULTURE/COLONY COUNT: CPT

## 2018-08-10 PROCEDURE — 99213 OFFICE O/P EST LOW 20 MIN: CPT | Performed by: NURSE PRACTITIONER

## 2018-08-10 RX ORDER — NITROFURANTOIN 25; 75 MG/1; MG/1
100 CAPSULE ORAL EVERY 12 HOURS
Qty: 10 CAP | Refills: 0 | Status: SHIPPED | OUTPATIENT
Start: 2018-08-10 | End: 2018-08-10

## 2018-08-10 RX ORDER — LEVOFLOXACIN 500 MG/1
500 TABLET, FILM COATED ORAL DAILY
Qty: 7 TAB | Refills: 0 | Status: SHIPPED | OUTPATIENT
Start: 2018-08-10 | End: 2018-08-17

## 2018-08-10 ASSESSMENT — ENCOUNTER SYMPTOMS
NAUSEA: 0
ABDOMINAL PAIN: 1
EYE PAIN: 0
SHORTNESS OF BREATH: 0
CHILLS: 0
FLANK PAIN: 0
FATIGUE: 1
SWOLLEN GLANDS: 0
FEVER: 0
DIZZINESS: 0
VOMITING: 0
MYALGIAS: 0
SORE THROAT: 0

## 2018-08-10 NOTE — PROGRESS NOTES
Subjective:     Gayla Escudero is a 47 y.o. female who presents for GI Problem ((R) side pain, was in the hospital last week for a UTI, pain has not gone away. )  Patient presents clinic today with continued complaints of right lower quadrant pain, urinary tract infection symptoms. Patient was discharged from the hospital 7/27. Patient was diagnosed with the UTI from Klebsiella pneumonia. Patient also having right lower quadrant pain which is CT scan was complete finding no acute findings. No signs of appendicitis however high density material in the proximal appendix without dilation or stranding was present. Patient still complaining of right lower quadrant pain at this time. Patient is not currently on antibiotics. Patient feels an acute urinary tract infection has not cleared. Patient fatigued, eating minimally. Patient denies fevers, flank pain. Patient has a scheduled follow-up appointment on the 14th with PCP.     UTI   This is a recurrent problem. The current episode started in the past 7 days. The problem occurs constantly. The problem has been unchanged. Associated symptoms include abdominal pain, fatigue and urinary symptoms. Pertinent negatives include no chest pain, chills, congestion, fever, myalgias, nausea, rash, sore throat, swollen glands or vomiting. Nothing aggravates the symptoms. Treatments tried: abx. The treatment provided no relief.     Past Medical History:   Diagnosis Date   • Anemia    • Cataract    • Heart murmur    • Muscular disease    • Muscular dystrophy (HCC)    • JOSÉ on CPAP      Past Surgical History:   Procedure Laterality Date   • HYSTERECTOMY LAPAROSCOPY       Social History     Social History   • Marital status:      Spouse name: N/A   • Number of children: N/A   • Years of education: N/A     Occupational History   • Not on file.     Social History Main Topics   • Smoking status: Never Smoker   • Smokeless tobacco: Never Used   • Alcohol use No   • Drug use: No    • Sexual activity: Yes     Partners: Male     Other Topics Concern   • Not on file     Social History Narrative   • No narrative on file      Family History   Problem Relation Age of Onset   • No Known Problems Mother    • No Known Problems Father    • Sleep Apnea Daughter    • No Known Problems Sister    • No Known Problems Brother         Musc dys also   • No Known Problems Sister    • No Known Problems Brother         Trauma, was shot   • No Known Problems Brother         Car accident   • Other Son         Muscular dystrophy   • Heart Disease Daughter          at 2 months congenital heart disease    Review of Systems   Constitutional: Positive for fatigue. Negative for chills and fever.   HENT: Negative for congestion and sore throat.    Eyes: Negative for pain.   Respiratory: Negative for shortness of breath.    Cardiovascular: Negative for chest pain.   Gastrointestinal: Positive for abdominal pain. Negative for nausea and vomiting.   Genitourinary: Negative for dysuria, flank pain, frequency, hematuria and urgency.   Musculoskeletal: Negative for myalgias.   Skin: Negative for rash.   Neurological: Negative for dizziness.     Allergies   Allergen Reactions   • Codeine Vomiting and Nausea     N&V   • Tramadol      Effects her MD      Objective:   BP (!) 98/54   Pulse 71   Temp 36.5 °C (97.7 °F)   Resp 14   Ht 1.524 m (5')   Wt 42.2 kg (93 lb)   SpO2 95%   BMI 18.16 kg/m²   Physical Exam   Constitutional: She is oriented to person, place, and time. She appears well-developed and well-nourished. No distress.   HENT:   Head: Normocephalic and atraumatic.   Eyes: Pupils are equal, round, and reactive to light. Conjunctivae and EOM are normal.   Cardiovascular: Normal rate and regular rhythm.    No murmur heard.  Pulmonary/Chest: Effort normal and breath sounds normal. No respiratory distress.   Abdominal: Soft. She exhibits no distension. There is tenderness in the right lower quadrant. There is  rigidity and guarding. There is no rebound, no CVA tenderness, no tenderness at McBurney's point and negative Puente's sign.   Neurological: She is alert and oriented to person, place, and time. She has normal reflexes. No sensory deficit.   Skin: Skin is warm and dry.   Psychiatric: She has a normal mood and affect.         Assessment/Plan:   Assessment      1. Dysuria  POCT Urinalysis    Urine Culture    levoFLOXacin (LEVAQUIN) 500 MG tablet    DISCONTINUED: nitrofurantoin monohydr macro (MACROBID) 100 MG Cap   2. Urinary tract infection without hematuria, site unspecified  levoFLOXacin (LEVAQUIN) 500 MG tablet   3. Right lower quadrant abdominal pain  REFERRAL TO GASTROENTEROLOGY         Urinalysis indicating infection still present. Will start patient on antibiotics at this time. Patient still with right lower quadrant pain however CT was negative for acute appendicitis.  Will place referral for patient to follow up with GI specialist for evaluation of abdominal pain continuous. Patient advised to follow up with PCP as scheduled on the 14th. Strict ER precautions advised. Patient appears to be fatigued. Encouraged increasing fluid and electrolytes.    Patient given precautionary s/sx that mandate immediate follow up and evaluation in the ED. Advised of risks of not doing so.    DDX, Supportive care, and indications for immediate follow-up discussed with patient.    Instructed to return to clinic or nearest emergency department if we are not available for any change in condition, further concerns, or worsening of symptoms.    The patient demonstrated a good understanding and agreed with the treatment plan.

## 2018-08-12 ENCOUNTER — TELEPHONE (OUTPATIENT)
Dept: URGENT CARE | Facility: CLINIC | Age: 48
End: 2018-08-12

## 2018-08-12 LAB
BACTERIA UR CULT: NORMAL
SIGNIFICANT IND 70042: NORMAL
SITE SITE: NORMAL
SOURCE SOURCE: NORMAL

## 2018-08-12 NOTE — TELEPHONE ENCOUNTER
Call and discussed urine culture results with patient. Advised patient bacterial infection not present at this time and Levaquin is not indicated. Patient verbalizes she is still having right lower quadrant pain and what she took a pain pill this morning which has alleviated some of her pain. Patient is scheduled to follow up with her PCP 8/14. Advised patient follow up as scheduled. He placed referral for gastroenterology follow-up as well advised patient to call and make appointment at this time. Differential diagnosis, natural history, supportive care, and indications for immediate follow-up discussed.

## 2018-08-14 ENCOUNTER — OFFICE VISIT (OUTPATIENT)
Dept: MEDICAL GROUP | Facility: MEDICAL CENTER | Age: 48
End: 2018-08-14
Payer: MEDICARE

## 2018-08-14 VITALS
SYSTOLIC BLOOD PRESSURE: 94 MMHG | HEIGHT: 60 IN | WEIGHT: 90.39 LBS | OXYGEN SATURATION: 95 % | HEART RATE: 78 BPM | DIASTOLIC BLOOD PRESSURE: 58 MMHG | TEMPERATURE: 97.2 F | BODY MASS INDEX: 17.75 KG/M2

## 2018-08-14 DIAGNOSIS — R63.0 ANOREXIA: ICD-10-CM

## 2018-08-14 DIAGNOSIS — G71.11 MYOTONIC MUSCULAR DYSTROPHY (HCC): ICD-10-CM

## 2018-08-14 DIAGNOSIS — R13.14 PHARYNGOESOPHAGEAL DYSPHAGIA: ICD-10-CM

## 2018-08-14 DIAGNOSIS — R10.11 RIGHT UPPER QUADRANT ABDOMINAL PAIN: ICD-10-CM

## 2018-08-14 DIAGNOSIS — R63.6 UNDERWEIGHT: ICD-10-CM

## 2018-08-14 PROBLEM — N30.00 ACUTE CYSTITIS WITHOUT HEMATURIA: Status: RESOLVED | Noted: 2018-07-28 | Resolved: 2018-08-14

## 2018-08-14 PROBLEM — Z00.00 ENCOUNTER FOR MEDICARE ANNUAL WELLNESS EXAM: Status: RESOLVED | Noted: 2018-04-10 | Resolved: 2018-08-14

## 2018-08-14 PROBLEM — R10.9 ABDOMINAL PAIN: Status: RESOLVED | Noted: 2018-07-31 | Resolved: 2018-08-14

## 2018-08-14 PROBLEM — R07.89 ATYPICAL CHEST PAIN: Status: RESOLVED | Noted: 2018-03-22 | Resolved: 2018-08-14

## 2018-08-14 PROCEDURE — 99214 OFFICE O/P EST MOD 30 MIN: CPT | Performed by: PHYSICIAN ASSISTANT

## 2018-08-14 RX ORDER — ACETAMINOPHEN 325 MG/1
650 TABLET ORAL EVERY 4 HOURS PRN
COMMUNITY
End: 2020-01-01

## 2018-08-14 NOTE — PROGRESS NOTES
Subjective:   Gayla Escudero is a 47 y.o. female here today for follow-up hospitalization for right mid and upper quadrant abdominal pain which is currently chronic.    Underweight  Currently she is taking a over-the-counter caloric shake. Weight has dropped 3 pounds the past 4 days. 4 pounds the past month. She tries to eat multiple times a day. Doesn't eat very much. Doesn't feel hungry.    Right upper quadrant abdominal pain  This is a 47-year-old female accompanied by her , Adryan. She has muscular dystrophy. He is followed by the clinic at [facility]. Also saw neurology in May. Has been seen recently in the hospital and hospitalized for abdominal pain. Pain is on the right side of her abdomen. Symptoms are worse after urinating as well as at nighttime. She has had lack of appetite because of this. She states today she took some Tylenol but vomited. She had a full workup with imaging which returned within normal limits. No acute findings. She did have chest x-ray as well as shows some possible atelectasis. She denies any fevers. She has a bowel movement daily. She was seen at GI in the past and these symptoms were diagnosed as constipation. She states that MiraLAX cause worsening concerns. They do not believe that she is constipated.       Current medicines (including changes today)  Current Outpatient Prescriptions   Medication Sig Dispense Refill   • acetaminophen (TYLENOL) 325 MG Tab Take 650 mg by mouth every four hours as needed.     • levoFLOXacin (LEVAQUIN) 500 MG tablet Take 1 Tab by mouth every day for 7 days. 7 Tab 0   • ondansetron (ZOFRAN ODT) 4 MG TABLET DISPERSIBLE Take 1 Tab by mouth every 6 hours as needed for Nausea. 10 Tab 0   • SUMAtriptan (IMITREX) 50 MG Tab Take 1 Tab by mouth Once PRN for Migraine for up to 1 dose. 10 Tab 3     No current facility-administered medications for this visit.      She  has a past medical history of Anemia; Cataract; Heart murmur; Muscular disease;  Muscular dystrophy (HCC); and JOSÉ on CPAP.    Social History and Family History were reviewed and updated.    ROS   No chest pain, no shortness of breath and all other systems were reviewed and are negative.       Objective:     Blood pressure (!) 94/58, pulse 78, temperature 36.2 °C (97.2 °F), height 1.524 m (5'), weight 41 kg (90 lb 6.2 oz), SpO2 95 %. Body mass index is 17.65 kg/m².   Physical Exam:  Constitutional: Alert, no distress.  Skin: Warm, dry, good turgor, no rashes in visible areas.  Eye: Equal, round and reactive, conjunctiva clear, lids normal.  ENMT: Lips without lesions, good dentition, oropharynx clear.  Neck: Trachea midline, no masses.   Lymph: No cervical or supraclavicular lymphadenopathy  Respiratory: Unlabored respiratory effort, lungs clear to auscultation, no wheezes, no ronchi.  Cardiovascular: Normal S1, S2, no murmur, no edema.  Abdomen: Soft, generalized tenderness, no masses.  Psych: Alert and oriented x3, normal affect and mood.        Assessment and Plan:   The following treatment plan was discussed    1. Right upper quadrant abdominal pain  Chronic condition with recent exacerbation. Advised that labs and imaging were within normal limits. Unknown etiology. Inquired regarding depression or anxiety but she declined those conditions. Advised to contact gastroenterologist to make an appointment with Dr. Stern. Do not believe that her symptoms are secondary to constipation.    2. Anorexia  New onset condition. Likely secondary to abdominal pain. Advise continue to push fluid. Continue caloric shake.    3. Underweight  Chronic condition. Currently BMI of 17.65. Monitor closely.    4. Myotonic muscular dystrophy (HCC)  Chronic condition. Follow with  Kareem. Advised to contact neurology to follow with Dr. Sanchez. Follow-up appointment was not made after her last appointment.    Patient was seen for 25 minutes face to face of which > 50% of appointment time was spent on counseling and  coordination of care regarding the above.    Followup: Return in about 3 months (around 11/14/2018), or if symptoms worsen or fail to improve.    Addendum: Reviewed specialist previous note regarding her muscular dystrophy status. In their he was requested of me to order an SLP and swallow evaluation for dysphagia. Specialist is concerned about aspiration pneumonia. I contacted her  and advised him to contact [facility] scheduling to make that appointment.      Please note that this dictation was created using voice recognition software. I have made every reasonable attempt to correct obvious errors, but I expect that there are errors of grammar and possibly content that I did not discover before finalizing the note.

## 2018-08-14 NOTE — ASSESSMENT & PLAN NOTE
This is a 47-year-old female accompanied by her , Adryan. She has muscular dystrophy. He is followed by the clinic at [facility]. Also saw neurology in May. Has been seen recently in the hospital and hospitalized for abdominal pain. Pain is on the right side of her abdomen. Symptoms are worse after urinating as well as at nighttime. She has had lack of appetite because of this. She states today she took some Tylenol but vomited. She had a full workup with imaging which returned within normal limits. No acute findings. She did have chest x-ray as well as shows some possible atelectasis. She denies any fevers. She has a bowel movement daily. She was seen at GI in the past and these symptoms were diagnosed as constipation. She states that MiraLAX cause worsening concerns. They do not believe that she is constipated.

## 2018-08-14 NOTE — ASSESSMENT & PLAN NOTE
Currently she is taking a over-the-counter caloric shake. Weight has dropped 3 pounds the past 4 days. 4 pounds the past month. She tries to eat multiple times a day. Doesn't eat very much. Doesn't feel hungry.

## 2018-08-14 NOTE — ASSESSMENT & PLAN NOTE
This is a 47-year-old female accompanied by her , Adryan. She has muscular dystrophy. He is followed by the clinic at [facility]. Also saw neurology in May. Has been seen recently in the hospital and hospitalized for abdominal pain. Pain is on the right side of her abdomen. Symptoms are worse after urinating as well as at nighttime. She has had lack of appetite because of this. She states today she took some Tylenol but vomited. She had a full workup with imaging which returned within normal limits. No acute findings. She did have chest x-ray as well as shows some possible atelectasis. She denies any fevers. She has a bowel movement daily. She was seen at GI in the past and these symptoms were diagnosed as constipation. She states that MiraLAX cause worsening concerns. They do not believe that she is constipated.

## 2018-09-03 ENCOUNTER — PATIENT OUTREACH (OUTPATIENT)
Dept: HEALTH INFORMATION MANAGEMENT | Facility: OTHER | Age: 48
End: 2018-09-03

## 2018-09-05 ENCOUNTER — APPOINTMENT (OUTPATIENT)
Dept: SPEECH THERAPY | Facility: REHABILITATION | Age: 48
End: 2018-09-05
Attending: PHYSICIAN ASSISTANT
Payer: MEDICARE

## 2018-09-06 NOTE — PROGRESS NOTES
CC: Myotonic Muscular Dystrophy      HPI:    Gayla Escudero is a 47 y.o. female who presents today in Neurological follow up for myotonic dystrophy. She is again accompanied by her  to today's visit.    Ms. Escudero was last seen 5/4/18 at which time I had referred her for a power wheelchair. She went for a seating evaluation but the documentation was insufficient for her insurance to approve the chair.    She was recently in the hospital because of migraines and abdominal pain. Notes from a primary care visit note that she was treated for a Klebsiella UTI and discharged on 7/27. CT Abdomen showed no acute findings, but she continued to have abdominal pain and so was treated with additional antibiotics.     She never had migraines prior to this admission to the ER. She had gone to urgent care three days in a row for the issues and finally went to the ER on that third day. Throbbing in quality over her left eye. +nausea, +vomiting. She was Rx'ed Imitrex. She takes tylenol and lies down in a dark place and it seems to go away.          ROS:   Constitutional: No fevers or chills.  Eyes: No blurry vision or eye pain.  ENT: No dysphagia or hearing loss.  Respiratory: No cough or shortness of breath.  Cardiovascular: No chest pain or palpitations.  GI: No nausea, vomiting, or diarrhea.  : No urinary incontinence or dysuria.  Musculoskeletal: No joint swelling or arthralgias.  Skin: No skin rashes.  Neuro: + headaches, no dizziness, or tremors.  Endocrine: No heat or cold intolerance. No polydipsia or polyuria.  Psych: No depression or anxiety.  Heme/Lymph: No easy bruising or swollen lymph nodes.      Past Medical History:   Past Medical History:   Diagnosis Date   • Anemia    • Cataract    • Heart murmur    • Muscular disease    • Muscular dystrophy (HCC)    • JOSÉ on CPAP        Past Surgical History:   Past Surgical History:   Procedure Laterality Date   • HYSTERECTOMY LAPAROSCOPY         Social History:    Social History     Social History   • Marital status:      Spouse name: N/A   • Number of children: N/A   • Years of education: N/A     Occupational History   • Not on file.     Social History Main Topics   • Smoking status: Never Smoker   • Smokeless tobacco: Never Used   • Alcohol use No   • Drug use: No   • Sexual activity: Yes     Partners: Male     Other Topics Concern   • Not on file     Social History Narrative   • No narrative on file       Family History:   Family History   Problem Relation Age of Onset   • No Known Problems Mother    • No Known Problems Father    • Sleep Apnea Daughter    • No Known Problems Sister    • No Known Problems Brother         Musc dys also   • No Known Problems Sister    • No Known Problems Brother         Trauma, was shot   • No Known Problems Brother         Car accident   • Other Son         Muscular dystrophy   • Heart Disease Daughter          at 2 months congenital heart disease       Allergies:   Allergies   Allergen Reactions   • Codeine Vomiting and Nausea     N&V   • Tramadol      Effects her MD       Physical Exam:     Ambulatory Vitals  Encounter Vitals  Temperature: 36.4 °C (97.6 °F)  Temp Source: Temporal  Blood Pressure: (!) 96/62  BP Location: Upper Arm  Patient BP Position: Sitting  Pulse: 89  Pulse Oximetry: 93 %  Weight: 40.8 kg (90 lb)  Weight Source: (!) Stated Weight  Height: 152.4 cm (5')  BMI (Calculated): 17.58    Constitutional: Facial appearance of myotonic dystrophy. Frontal balding.   Cardiovascular: RRR, with no murmurs, rubs or gallops. No carotid bruits.   Respiratory: Lungs CTA B/L, no W/R/R.   Abdomen: Soft Non-tender to Palpation. Non-distended.  Extremities: No peripheral edema, pedal pulses intact.   Skin: Warm, dry, intact. No rashes observed.  Eyes: Sclera anicteric  Neurologic:   Mental Status: Awake, alert, oriented x 3.   Speech: Fluent with normal prosody.   Memory: Able to recall recent and remote events accurately.     Concentration: Attentive. Able to focus on history and follow multi-step commands.              Fund of Knowledge: Appropriate   Cranial Nerves:    CN II: PERRL. No afferent pupillary defect.    CN III, IV, VI: Good eye closure, EOMI.     CN V: Facial sensation intact and symmetric.     CN VII: Bilateral facial weakness consistent with diagnosis.    CN VIII: Hearing intact.     CN IX and X: Palate elevates symmetrically. Normal gag reflex.    CN XI: Symmetric shoulder shrug.     CN XII: Tongue midline.    Sensory: Intact light touch, vibration and temperature.    Coordination: No evidence of past-pointing on finger to nose testing, no dysdiadochokinesia.           Babinski: Toes downgoing bilaterally.   Romberg: Deferred   Movements: No tremors observed.   Musculoskeletal:   Strength (R/L):                         Deltoid: 4-/4-              Biceps: 4-/4-                 Triceps: 4-/4-              Wrist extensors: 4-/4-              Finger Flexors: 4/4              Finger abductors: 4/4                            Hip Flexors: 3/3              Hip Adductors: 3/3              Knee Flexors: 3/3              Knee Extensors: 3/3              DF: 3/3              PF: 3/3                 Reflxes (R/L):              Biceps: 1+/1+              Triceps: 1+/1+              Brachioradialis: 1+/1+              Patella: 1+/1+              Achilles: 1+/1+   Gait: Steady, narrow based.    Tone: Normal bulk and tone.   Joints: No swelling.     Imaging:   CT Abdomen/Pelvis from 7/31/18    1. No acute inflammatory change identified in the abdomen or pelvis.  2. Partially visualized patchy bibasilar opacities, atelectasis versus consolidation.  3. Small hiatal hernia.  4. High density material in the proximal appendix but there is no dilatation or stranding.    Assessment/Plan:  Myotonic muscular dystrophy (HCC)  48 yo F with h/o myotonic dystrophy, diffuse weakness and muscle wasting, having frequent falls. I have noted the  documents from Ms. Escudero' recent evaluation at [facility] neuromuscular clinic from 8/9/18, and she is being followed closely in terms of her respiratory function for which she is on CPAP (followed by Dr. Gambino here in [place]), spasticity for which she is on magnesium and tonic water. Their expertise and recommendations are greatly appreciated. She will see them again in November.       Ms. Escudero requires the use of a wheelchair at this point.  She cannot stand or walk on her own. After witnessing her attempt to stand on her own, I am concerned for her safety without a wheelchair. She needs her 's help with transfers, toiletting, and most functions which require ambulation.  I have written her a letter in support of her living situation so that she may be accommodated with a ramp or let out of her lease so that she can find a suitable living arrangement that would allow a wheelchair.       [facility] Phone number [phone] Tayla  At patient's request will fax results to Demetrio Salmeron     Greater than 50% of this 40 minute face to face encounter was devoted to disease state counseling on myotonic muscular dystrophy and coordination of care.  Please see above assessment and plan for discussion.       Christal Sanchez D.O., M.P.H  MS specialist.   Board Certified Neurologist.  Neurology Clerkship Director, [facility].    Neurology,  [facility].   Tel: [phone]  Fax: [phone]

## 2018-09-07 ENCOUNTER — OFFICE VISIT (OUTPATIENT)
Dept: NEUROLOGY | Facility: MEDICAL CENTER | Age: 48
End: 2018-09-07
Payer: MEDICARE

## 2018-09-07 VITALS
OXYGEN SATURATION: 93 % | SYSTOLIC BLOOD PRESSURE: 96 MMHG | WEIGHT: 90 LBS | TEMPERATURE: 97.6 F | HEART RATE: 89 BPM | HEIGHT: 60 IN | DIASTOLIC BLOOD PRESSURE: 62 MMHG | BODY MASS INDEX: 17.67 KG/M2

## 2018-09-07 DIAGNOSIS — G71.11 MYOTONIC MUSCULAR DYSTROPHY (HCC): ICD-10-CM

## 2018-09-07 DIAGNOSIS — G43.809 OTHER MIGRAINE WITHOUT STATUS MIGRAINOSUS, NOT INTRACTABLE: ICD-10-CM

## 2018-09-07 PROCEDURE — 99215 OFFICE O/P EST HI 40 MIN: CPT | Performed by: PSYCHIATRY & NEUROLOGY

## 2018-09-07 RX ORDER — ZOLMITRIPTAN 2.5 MG/1
1 SPRAY, METERED NASAL PRN
Qty: 1 EACH | Refills: 2 | Status: SHIPPED | OUTPATIENT
Start: 2018-09-07 | End: 2020-01-01

## 2018-09-07 NOTE — LETTER
September 7, 2018        To Whom It May Concern:    Ms. Gayla Escudero is under my care for muscular dystrophy. She is significantly disabled by her illness and now requires the use of a motorized wheelchair for ambulation. This equipment is much too heavy to be lifted into her home and would require installation of a ramp. Please make reasonable accommodations for her in this regard. If this cannot be done, please allow her out of her lease so that she can secure a new lease where this can be accommodated.       Thank You,      Christal Sanchez D.O., M.P.H  MS specialist.   Board Certified Neurologist.  Neurology Clerkship Director, [facility].    Neurology,  [facility].   Tel: [phone]  Fax: [phone]

## 2018-09-07 NOTE — ASSESSMENT & PLAN NOTE
48 yo F with h/o myotonic dystrophy, diffuse weakness and muscle wasting, having frequent falls. I have noted the documents from Ms. Escudero' recent evaluation at [facility] neuromuscular clinic from 8/9/18, and she is being followed closely in terms of her respiratory function for which she is on CPAP (followed by Dr. Gambino here in [place]), spasticity for which she is on magnesium and tonic water. Their expertise and recommendations are greatly appreciated. She will see them again in November.       Ms. Escudero requires the use of a wheelchair at this point.  She cannot stand or walk on her own. After witnessing her attempt to stand on her own, I am concerned for her safety without a wheelchair. She needs her 's help with transfers, toiletting, and most functions which require ambulation.  I have written her a letter in support of her living situation so that she may be accommodated with a ramp or let out of her lease so that she can find a suitable living arrangement that would allow a wheelchair.

## 2018-09-07 NOTE — PROGRESS NOTES
Patient Gayla Escudero discharged on 8/2/18. [facility] Patient Advocate assisted with discharge orders including one PCP appointment. Patient is scheduled for a sleep medicine appointment on 9/14/18, and a GI appointment on 11/05/18. Patient was discharged with [facility], but declined services.

## 2018-09-10 DIAGNOSIS — G43.909 MIGRAINE WITHOUT STATUS MIGRAINOSUS, NOT INTRACTABLE, UNSPECIFIED MIGRAINE TYPE: ICD-10-CM

## 2018-09-10 RX ORDER — SUMATRIPTAN 20 MG/1
1 SPRAY NASAL PRN
Qty: 1 EACH | Refills: 3 | Status: SHIPPED | OUTPATIENT
Start: 2018-09-10 | End: 2020-01-01

## 2018-09-12 ENCOUNTER — TELEPHONE (OUTPATIENT)
Dept: SLEEP MEDICINE | Facility: MEDICAL CENTER | Age: 48
End: 2018-09-12

## 2018-09-12 ENCOUNTER — SPEECH THERAPY (OUTPATIENT)
Dept: SPEECH THERAPY | Facility: REHABILITATION | Age: 48
End: 2018-09-12
Payer: MEDICARE

## 2018-09-12 DIAGNOSIS — R13.12 OROPHARYNGEAL DYSPHAGIA: ICD-10-CM

## 2018-09-12 DIAGNOSIS — R47.1 DYSARTHRIA: ICD-10-CM

## 2018-09-12 PROCEDURE — 92610 EVALUATE SWALLOWING FUNCTION: CPT

## 2018-09-12 PROCEDURE — G8998 SWALLOW D/C STATUS: HCPCS | Mod: CM

## 2018-09-12 PROCEDURE — G8996 SWALLOW CURRENT STATUS: HCPCS | Mod: CL

## 2018-09-12 PROCEDURE — 92523 SPEECH SOUND LANG COMPREHEN: CPT

## 2018-09-12 PROCEDURE — G8997 SWALLOW GOAL STATUS: HCPCS | Mod: CM

## 2018-09-12 ASSESSMENT — ENCOUNTER SYMPTOMS: NO PATIENT REPORTED PAIN: 1

## 2018-09-12 NOTE — TELEPHONE ENCOUNTER
ALEXANDRM for pt regarding the [facility] apt she has on 9/14/18 with Dr. Brown to go over  SS Results. It look like the pt no showed on 6/13 and I do not see that she completed that. I advise pt to give us a call back so we can reschedule her apt.

## 2018-09-12 NOTE — OP THERAPY EVALUATION
"  Outpatient Speech Therapy  INITIAL EVALUATION    [facility] [place] [address]  [address]  [place] [address]  Phone:  [phone]  Fax:  [phone]    Date of Evaluation: 09/12/2018    Patient: Gayla Escudero  YOB: 1970  MRN: [id number]     Referring Provider: Not In [facility] Provider  [address] 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 Oropharyngeal dysphagia R13.12   2. Dysarthria R47.1     Subjective:   Reason for Therapy:     Reason For Evaluation:  Dysphagia and Dysarthria    Onset Date:  8/10/2018    Onset Description:  Patient referred to Speech Therapy per physician recommendation secondary to concern regarding patient risk of aspiration pneumonia.   Social Support:     Accompanied By:  Spouse    Patient Mental Status:  Alert  Pain:     no pain reported    Therapy History:     Previous Treatments and Dates:  Patient spouse reports that patient follow up with therapy recommendations poor stating, \"she will tell me no when we get home\" regarding therapy recommendations addressed as part of speech therapy initial evaluation.     Previous Diet:  Mechanical Soft Foods and Thin Liquids    Current Diet:  Mechanical Soft Foods and Thin Liquids    Nutritional Concerns Restrictions and Allergies:  While patient states that she has \"no diet restrictions\" further evaluation and consultation revealed that patient necessitates " "\"softer foods\" that are \"easier to chew.\"  A review of diet textures revealed patient consuming solids consistent with mechanical soft solids. No restrictions on liquids with patient consuming thins; however, intake of liquids reported to be \"one water bottle a day.\"     Hearing:  No Deficits    Vision:  Eye Glasses (patient reports \"no longer wears\" because \"they do not help\".)    Dentition:  Complete Dentition  Additional Subjective Comments:      Patient arrived to evaluation with her  who provided case history information in combination with patient.  Patient and  report no recent changes to swallow function; however, concern regarding patient adequate intake of liquids mentioned.     Past Medical History:   Diagnosis Date   • Anemia    • Cataract    • Heart murmur    • Muscular disease    • Muscular dystrophy (HCC)    • JOSÉ on CPAP      Past Surgical History:   Procedure Laterality Date   • HYSTERECTOMY LAPAROSCOPY       Objective:   Treatments/Interventions Performed:  Dysphagia treatment, Compensatory strategy training, Patient/Caregiver education and Speech/Language treatment    Speech Therapy Assessment:     Cognitive Linguistic Assessment:     Patient insight to safety awareness: Moderate      Speech Mechanism Assessment:     Patient voice description: Clear    Laterality of patient facial weakness: Left (slight)    Patient's oral movements are voluntary and coordination: Moderate    Patient exhibits articulatory precision: Severe    Patient exhibits single word intelligibility: Moderate    Patient exhibits sentence level intelligibility: Severe    Patient dysarthria: Moderate    Nasality is minimal    Patient uses adequate breath support: No    Patient breath rate: Slow    Oral Motor Status:     Labial strength and control for patient: Poor    Lingual strength and control for patient: Poor    Patient saliva management: GoodPatient oral sensation and awareness: Fair    Patient awareness of " swallow problem: Fair    Previous modified barium swallow: No    Oral Phase Assessment:     Types of food within functional limits: Mechanical Soft and Thin Liquid    Patient stasis and residue: Mechanical Soft    Laterality of patient's difficulty chewing: Left    Liquid wash to clear residue: Thin Liquid    Pocketing in lateral sulci: Right    Oral phase comments: Patient mastication limited to right side.  Oral residue present with mechanical soft solid trial resulting in need for tongue sweep and liquid wash to clear.  Deficits in oral sensation suggested as evidenced by need for verbal cues to completely clear oral cavity post second swallow with mechanical soft solids.     Pharyngeal Phase Assessment:     Portions of the Other (Swallowing Ability and Function Evaluation (SAFE)) are used.    Delayed Swallow    Food types within functional limits: Mechanical Soft, Thin Liquid and Puree    Speech Therapy Plan :   Prognosis & Recommendations  Impression Summary:  Patient presented to speech therapy with concern regarding swallow and speech secondary to a diagnosis of myotonic muscular dystrophy. Results of formal bedside assessment of swallow function through administration of the Swallowing Ability and Function Evaluation (SAFE) revealed moderate deficits in the areas of physical examination/ oral motor function and oral phase of swallow.  Results of administration of the [place] Dysarthria Assessment revealed moderate deficits in the areas of:  Reflex, respiration, lips, laryngeal and mild-moderate deficits in the areas of:  Jaw, Palate and tongue.  It should be noted that silent aspiration can not be ruled out as part of a bedside swallow evaluation.  Given patient diagnosis, participation in a formal evaluation of swallow through MBSS may be considered. Based on assessment results, patient could benefit from participation in direct, skilled speech therapy addressing deficits in dysphagia, dysarthria to  improve swallow, speech deficits to improve swallow and communication skill.   Prognosis:  Good  Compensatory swallow strategies:  Upright position 90 degrees during meal and 45 minutes after meal, Reduce bolus size, Finger/tongue sweep to clear pocketing, Multiple swallows, Alternate liquids/solids, Place food in right side and No talking while eating  Diet Recommendation:  Mechanical Soft Foods  Liquid Recommendation:  Thin Liquid  Medication Administration:  Patient states that she currently takes all medication in liquid form.   Goals  Short Term Goals:  1) Patient will use safe swallow strategies to improve safety in swallow 90% accuracy independent.  2) Patient will state and complete oral motor program to improve safety in swallow 90% accuracy verbal cues/ prompts as necessary.  3) Patient will state and utilize clear speech strategies to improve intelligibility 92% accuracy independent.   Short Term Goal Duration (Weeks):  2-4 weeks  Long Term Goals:  1) Patient will demonstrate safety in swallow of mechanical soft solids, thin liquids given participation in formal swallow evaluation through MBSS per physician recommendations.   2) Patient will improve speech communication through use of compensatory speech strategies as educated/ trained/ implemented in speech therapy sessions 92% accuracy.   Long Term Goal Duration (Weeks):  4-6 weeks  Therapy Recommendations  Recommendation:  Individual Speech Therapy, Modified Barium Swallow Study and Motor Speech Treatment,  Planned Therapy Interventions:  Dysphagia treatment, Compensatory Strategy Training, Home Program and Patient/Caregiver Education,   Frequency:  2x week  Duration (in weeks):  4      Functional Limitation G-Codes and Severity Modifiers     Current:  CL   Goal:  CM       Referring provider co-signature:  I have reviewed this plan of care and my co-signature certifies the need for services.  Certification Dates:   From 9/12.2018     To  10/15.2018    Physician Signature: ________________________________ Date: ______________

## 2018-09-14 NOTE — TELEPHONE ENCOUNTER
VM received at the sleep center, patient called regarding rescheduling appt she missed.  [phone]      PT called scheduled ss 9/24/18 patient aware of time, date and location. fv with Dr. Brown 12/20/18 pt placed on wait list. Pt declined sleeping aid

## 2018-09-19 ENCOUNTER — SPEECH THERAPY (OUTPATIENT)
Dept: SPEECH THERAPY | Facility: REHABILITATION | Age: 48
End: 2018-09-19
Payer: MEDICARE

## 2018-09-19 DIAGNOSIS — R47.1 DYSARTHRIA: ICD-10-CM

## 2018-09-19 PROCEDURE — 92507 TX SP LANG VOICE COMM INDIV: CPT

## 2018-09-19 NOTE — OP THERAPY DAILY TREATMENT
"  Outpatient Speech Therapy  DAILY TREATMENT     [facility] [address]  [address]  [place] [address]  Phone:  [phone]  Fax:  [phone]    Date: 09/19/2018    Patient: Gayla Escudero  YOB: 1970  MRN: [id number]     Time Calculation  Start time: 1300  Stop time: 1330 Time Calculation (min): 30 minutes     Chief Complaint: Poor Speech    Visit #: 2    Subjective:   Reason for Therapy:     Reason For Evaluation:  Dysarthria  Progress Factors:     Progression:  Staying the same  Additional Subjective Comments:      Patient arrived on time for scheduled speech therapy session with .  Patient stated that she is implementing home program.   Patient  disagreed stating, \"she argues with me when I try to get her to practice.\" Patient with complaints of \"very tired lately\" throughout skilled intervention session.       Objective:   Treatments/Interventions Performed:  Speech/Language treatment, Patient/Caregiver education and Compensatory strategy training  Treatment Intervention tool(s) used:  Speech therapy targeted dysarthria through participation in structured OMEX addressing lip/ tongue, strength and coordination:  completed x10 with fair accuracy.  Structured word production targeted in minimal pairs targeting initial /b/:  Completed with 50% accuracy with reduced articulatory precision for bilabial sounds noted.  Patient demonstrated low volume with  complaints of \"I have to tell her to yell\".  1-5 voice scale/ rating chart established for use at home to address deficits in volume and attempt to increase awareness of soft versus loud speech.  Patient required direct cues to increase volume with use of direct visual cues.  Exercises to reinforce breath support for speech introduced requiring direct imitation prompts/ instruction/ cues to complete.          Speech Therapy Assessment:     Speech Mechanism Assessment:     Patient exhibits " articulatory precision: Moderate    Patient exhibits single word intelligibility: Minimal    Patient exhibits sentence level intelligibility: Moderate    Patient uses adequate breath support: No    Patient breath rate: Fast      Speech Therapy Plan :   Therapy Recommendations  Recommendation: Individual Speech Therapy,  Planned Therapy Interventions:  Home Program, Patient/Caregiver Education, Compensatory Strategy Training, Speech/Language training and Dysphagia treatment,

## 2018-09-21 ENCOUNTER — APPOINTMENT (OUTPATIENT)
Dept: SPEECH THERAPY | Facility: REHABILITATION | Age: 48
End: 2018-09-21
Payer: MEDICARE

## 2018-09-24 ENCOUNTER — SLEEP STUDY (OUTPATIENT)
Dept: SLEEP MEDICINE | Facility: MEDICAL CENTER | Age: 48
End: 2018-09-24
Attending: FAMILY MEDICINE
Payer: MEDICARE

## 2018-09-24 DIAGNOSIS — G71.11 MYOTONIC MUSCULAR DYSTROPHY (HCC): ICD-10-CM

## 2018-09-24 DIAGNOSIS — G47.33 OSA (OBSTRUCTIVE SLEEP APNEA): ICD-10-CM

## 2018-09-24 PROCEDURE — 95811 POLYSOM 6/>YRS CPAP 4/> PARM: CPT | Performed by: FAMILY MEDICINE

## 2018-09-26 NOTE — PROCEDURES
Clinical Comments:  The patient underwent a BIPAP titration using the standard montage for measurement of parameters of sleep, respiratory events, movement abnormalities, heart rate and rhythm. A microphone was used to monitor snoring.      INTERPRETATION:  The study was started at 9:49 PM.  The total recording time was 483.0 minutes with a sleep period of 467.2 minutes and the total sleep time was 237.0 minutes with a sleep efficiency of 49.1%.  The sleep latency was 15.7 minutes, and REM latency was 70.5 minutes.  The patient experienced 39 arousals in total, for an arousal index of 9.9    RESPIRATORY: The patient had 147 apneas in total.  Of these, 60 were obstructive apneas, and 87 were central apneas.  This resulted in an apnea index (AI) of 37.2.  The patient had 23 hypopneas, for a hypopnea index of 5.8.  The overall AHI was 43.0, while the AHI during Stage R sleep was 33.5.  AHI while supine was 43.0.    OXIMETRY: Oxygen saturation monitoring showed a mean SpO2 of 97.0%, with a minimum oxygen saturation of 0.0%.  Oxygen saturations were less than or = 89% for 2.2 minutes of sleep time.    CARDIAC: The highest heart rate during the recording was 81.0 beats per minute.  The average heart rate during sleep was 61.6 bpm.    LIMB MOVEMENTS: There were a total of 7 PLMs during sleep, of which 0 were PLMs arousals.  This resulted in a PLMS index of 1.8.    BIPAP was tried from 11/7 to 13/9cm H2O.  AVAPS was tried at EPAP:4, PS Max/Min:13/9cm H2O with a tidal volume of 420 and backup rate of 10bpm.      Technical summary: The patient underwent a BiPAP/ AVAPS titration.  This was a 16 channel montage study to include a 6 channel EEG, a 2 channel EOG, and chin EMG, left and right leg EMG, a snore channel, and a CFLOW pressure transducer.   Respiratory effort was assessed with the use of a thoracic and abdominal monitor and overnight oximetry was obtained. Audio and video recordings were reviewed. This was a fully  attended study and sleep stage scoring was performed. The test was technically adequate.    General sleep summary:  During the overnight study, the sleep latency was 15 min which is normal. The REM latency was 52 min which is decreased. The total sleep time was 237 min and sleep efficiency was 49 % which is decreased.  Sleep stage proportions showed decreased REM sleep and increased WASO of 230 min.  In regards to sleep quality there was a mild degree of sleep fragmentation as shown by the arousal index of 9 an hour. The arousals were due to spontaneous arousal.    CPAP Titration:  The PAP titration was initiated with BiPAP 11/7 cm of water and the pressure which was slowly titrated up in an attempt to eliminate sleep disordered breathing and snoring. During this titration study she had 51% of apneas being central apneas. 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 AVAPS was tried at EPAP min 4 cm IPAP min/mas 6/20 cm and Vt 420. At this final pressure the patient was observed in the supine position and in the REM sleep stage. The apnea hypopnea index improved to 0 per hour and O2 marshall 92%. The average O2 stauration was 98%. She spent 1 % of sleep time below 89% O2 saturation. Snoring was resolved. There were no significant periodic limb movements.  The patient demonstrated NSr and an average heart rate of 63 beats per minute.  There was no ventricular ectopy or tachyarrhythmias. The patient utilized small simplus mask with heated humidification. The CPAP was well-tolerated and there were minimal air leaks. No supplemental oxygen was required.    Impression:  1.  JOSÉ  2.  Myotonic muscular dystrophy   3.  Successful AVAPS titration       Recommendations:  I recommend AVAPS EPAP min 4 cm IPAP min/mas 6/20 cm and Vt 420, back up rate 11 BPM. Recommended 30 day compliance download to assess the efficacy of the recommended pressure and compliance for further  outpatient monitoring and management of CPAP therapy. In some cases alternative treatment options may prove effective in resolving sleep apnea and these options include upper airway surgery, the use of a dental orthotic or weight loss and positional therapy. Clinical correlation is required. In general patients with sleep apnea are advised to avoid alcohol and sedatives and to not operate a motor vehicle while drowsy and are at a greater risk for cardiovascular disease.

## 2018-09-28 ENCOUNTER — SPEECH THERAPY (OUTPATIENT)
Dept: SPEECH THERAPY | Facility: REHABILITATION | Age: 48
End: 2018-09-28
Payer: MEDICARE

## 2018-09-28 DIAGNOSIS — R47.1 DYSARTHRIA: ICD-10-CM

## 2018-09-28 PROCEDURE — 92507 TX SP LANG VOICE COMM INDIV: CPT

## 2018-09-28 NOTE — OP THERAPY DAILY TREATMENT
"  Outpatient Speech Therapy  DAILY TREATMENT     [facility] [address]  [address]  [place] [address]  Phone:  [phone]  Fax:  [phone]    Date: 09/28/2018    Patient: Gayla Escudero  YOB: 1970  MRN: [id number]     Time Calculation  Start time: 1405  Stop time: 1435 Time Calculation (min): 30 minutes     Chief Complaint: Other (speech \"too quiet\" per )    Visit #: 3    Subjective:   Reason for Therapy:     Reason For Evaluation:  Dysarthria  Social Support:     Accompanied By:  Spouse    Patient Mental Status:  Alert and Responsive  Additional Subjective Comments:      Patient reports \"migraine\" was reason for missed therapy session earlier in week.  Patient with increased willingness to participate in skilled intervention addressing speech production, voice; however, patient insight into speech changes resulting in reduced intelligibility fair.       Objective:   Treatments/Interventions Performed:  Speech/Language treatment, Patient/Caregiver education and Compensatory strategy training  Treatment Intervention tool(s) used:  Speech therapy targeted dysarthria through participation in structured OMEX addressing lip/ tongue, strength and coordination:  completed x10 with fair accuracy.  Compensatory strategy to improve accuracy of articulatory precision:  Overexaggeration educated/ trained with structured practice in minimal pairs to bilabials at single word level: 90% accuracy direct imitation prompts/ instruction/ cues.   Use of 1-5 voice scale/ rating chart continued to address volume and attempt to increase awareness of soft versus loud speech.  Patient demonstrated improvements in volume from residual volume to one level louder (e.g. Level 2 to level 3 on voice scale) given direct cues to increase breath prior to speech to improve breath support.  Breathing exercises initiated with maximum phonation time /ah/ completed to 6-14 seconds, /ee/ " completed to 7-14 seconds to reinforce deep breaths prior to speech.          Speech Therapy Assessment:     Speech Mechanism Assessment:     Patient exhibits articulatory precision: Moderate    Patient exhibits single word intelligibility: Supervision Required (presented in minimal pairs for bilabial sounds)    Patient dysarthria: Moderate    Patient uses adequate breath support: No  Speech mechanism comments: Breathing exercises initiated to address inhalation/ exhalation with patient asked to complete home exercise program to address as educated/ trained during speech therapy session.       Speech Therapy Plan :   Therapy Recommendations  Recommendation: Individual Speech Therapy,  Planned Therapy Interventions:  Home Program, Patient/Caregiver Education, Compensatory Strategy Training and Speech/Language training,

## 2018-10-03 ENCOUNTER — SPEECH THERAPY (OUTPATIENT)
Dept: SPEECH THERAPY | Facility: REHABILITATION | Age: 48
End: 2018-10-03
Payer: MEDICARE

## 2018-10-03 DIAGNOSIS — R47.1 DYSARTHRIA: ICD-10-CM

## 2018-10-03 PROCEDURE — 92507 TX SP LANG VOICE COMM INDIV: CPT

## 2018-10-03 NOTE — OP THERAPY DAILY TREATMENT
"  Outpatient Speech Therapy  DAILY TREATMENT     [facility] Speech [address]  [address]  [place] [address]  Phone:  [phone]  Fax:  [phone]    Date: 10/03/2018    Patient: Gayla Escudero  YOB: 1970  MRN: [id number]     Time Calculation  Start time: 1300  Stop time: 1330 Time Calculation (min): 30 minutes     Chief Complaint: Poor Speech (\"to talk so others can hear\" per patient )    Visit #: 4    Subjective:   Reason for Therapy:     Reason For Evaluation:  Dysarthria  Social Support:     Accompanied By:  Spouse (present and supportive)    Patient Mental Status:  Alert and Responsive  Progress Factors:     Progression:  Getting Better  Additional Subjective Comments:      Patient required encouragement throughout session to participate in structured speech therapy addressing speech production skills.       Objective:   Treatments/Interventions Performed:  Speech/Language treatment, Patient/Caregiver education and Compensatory strategy training         Speech Therapy Assessment:     Speech Mechanism Assessment:     Patient voice description: Reduced Intensity (Improved with use of direct visual support through use of loudness chart)  Speech mechanism comments: Oral motor exercises completed addressing tongue coordination/ strength with patient provided with handout regarding exercises to complete as part of home exercise program.  OMEX addressing tongue were completed x10 to various exercise with fair coordination, strength suggested. Breath support/ vocal loudness addressed through structured exercise completion of sustained phonation /ah/ to 5-11 seconds; /ee/ 8-11 seconds.  Patient demonstrating breath support for production of single word to level 3 on vocal loudness chart with 90% accuracy given verbal prompts/ instruction/ cues as necessary.  Limited carryover in use of deep breaths to increase breath support during conversational speech noted. "       Speech Therapy Plan :   Therapy Recommendations  Recommendation:  Individual Speech Therapy and Motor Speech Treatment,  Planned Therapy Interventions:  Home Program, Patient/Caregiver Education, Compensatory Strategy Training and Speech/Language training,

## 2018-10-10 ENCOUNTER — SPEECH THERAPY (OUTPATIENT)
Dept: SPEECH THERAPY | Facility: REHABILITATION | Age: 48
End: 2018-10-10
Payer: MEDICARE

## 2018-10-10 DIAGNOSIS — R47.1 DYSARTHRIA: ICD-10-CM

## 2018-10-10 PROCEDURE — G9186 MOTOR SPEECH GOAL STATUS: HCPCS | Mod: CJ

## 2018-10-10 PROCEDURE — G8999 MOTOR SPEECH CURRENT STATUS: HCPCS | Mod: CK

## 2018-10-10 PROCEDURE — 92507 TX SP LANG VOICE COMM INDIV: CPT

## 2018-10-11 DIAGNOSIS — Z91.81 RISK FOR FALLS: ICD-10-CM

## 2018-10-11 DIAGNOSIS — Z74.09 IMPAIRED MOBILITY AND ADLS: ICD-10-CM

## 2018-10-11 DIAGNOSIS — Z78.9 IMPAIRED MOBILITY AND ADLS: ICD-10-CM

## 2018-10-11 DIAGNOSIS — G71.11 MYOTONIC MUSCULAR DYSTROPHY (HCC): ICD-10-CM

## 2018-10-11 NOTE — OP THERAPY PROGRESS SUMMARY
"  Outpatient Speech Therapy  PROGRESS NOTE      [facility]  [address]  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 Dysarthria R47.1       Subjective:   Reason for Therapy:     Reason For Evaluation:  Dysphagia and Dysarthria  Social Support:     Accompanied By:  Spouse (present to speech therapy session and supportive)    Patient Mental Status:  Alert and Responsive  Progress Factors:     Progression:  Getting Better  Therapy History:     Previous Treatments and Dates:  Patient has been participating in direct, skilled speech therapy intervention addressing signs and symptoms of oropharyngeal dysphagia, dysarthria to improve patient safety and swallow and independence in communication skills with a variety of communication partners.   Additional Subjective Comments:      Patient has demonstrated participation and motivation to improve swallow, speech and language skills through following of home exercise program as recommended by speech therapy.       Objective:   Treatments/Interventions Performed:  Dysphagia treatment, Speech/Language treatment, Patient/Caregiver education and Compensatory strategy training  Treatment Intervention tool(s) used:  " Speech therapy has targeted improvements in speech and swallow through participation in direct, skilled speech therapy through completion of structured oral motor coordination and strengthening exercises targeting tongue, breath support and implementation of traditional dysphagia therapy techniques.       Speech Therapy Assessment:     Speech Mechanism Assessment:     Patient's oral movements are voluntary and coordination: Minimal    Patient exhibits articulatory precision: Minimal    Patient exhibits single word intelligibility: Minimal    Patient exhibits sentence level intelligibility: Moderate    Patient dysarthria: Moderate    Oral Motor Status:     Labial strength and control for patient: Fair    Lingual strength and control for patient: Fair    Patient saliva management: GoodPatient oral sensation and awareness: Good    Patient awareness of swallow problem: Fair      Speech Therapy Plan :   Prognosis & Recommendations  Impression Summary:  Based on progress demonstrated during the 30 day intervention period, patient continues to necessitate participation in direct, skilled speech therapy to address deficits and continue to train compensatory strategies of speech and swallow.  Patient to continue with goals/ objectives as outlined in initial evaluation.   Prognosis:  Good  Therapy Recommendations  Recommendation:  Individual Speech Therapy,  Frequency:  1x week  Duration (in weeks):  4        Functional Limitation G-Codes and Severity Modifiers     Current:  CK   Goal:  CJ       Referring provider co-signature:  I have reviewed this plan of care and my co-signature certifies the need for services.  Certification Dates:   From 10/15/2018     To 11/15/2018    Physician Signature: ________________________________ Date: ______________

## 2018-10-11 NOTE — OP THERAPY DAILY TREATMENT
"  Outpatient Speech Therapy  DAILY TREATMENT     [facility] [address].  [address]  [place] [address]  Phone:  [phone]  Fax:  [phone]    Date: 10/10/2018    Patient: Gayla Escudero  YOB: 1970  MRN: [id number]     Time Calculation  Start time: 1305  Stop time: 1335 Time Calculation (min): 30 minutes     Chief Complaint: Poor Speech (\"I need to talk louder\")    Visit #: 5    Subjective:   Reason for Therapy:     Reason For Evaluation:  Dysarthria  Social Support:     Accompanied By:  Spouse ( present and supportive)    Patient Mental Status:  Alert and Responsive  Progress Factors:     Progression:  Getting Better  Additional Subjective Comments:      Patient reports \"fall on Friday\" as reason for missing scheduled speech therapy appointment.  Patient reported \"no injury or changes\" as a result of fall.  Physician alerted with referral to PT recommended.       Objective:   Treatments/Interventions Performed:  Speech/Language treatment, Patient/Caregiver education and Compensatory strategy training         Speech Therapy Assessment:     Speech Mechanism Assessment:     Patient's oral movements are voluntary and coordination: Minimal    Patient exhibits articulatory precision: Minimal    Patient exhibits sentence level intelligibility: Moderate  Speech mechanism comments: Breath support continues to impact patient intelligibility beyond a single word, simple phrase level. Strategies to address improving breath support educated/ trained with patient necessitating direct cues for \"deep breaths\" throughout skilled therapy session to increase accuracy in voice production/ speech production. Use of direct visual support continued to increase understanding of vocal loudness.        Speech Therapy Plan :   Therapy Recommendations  Recommendation: Individual Speech Therapy,  Planned Therapy Interventions:  Home Program, Patient/Caregiver Education, " Compensatory Strategy Training and Speech/Language training,

## 2018-10-18 ENCOUNTER — SPEECH THERAPY (OUTPATIENT)
Dept: SPEECH THERAPY | Facility: REHABILITATION | Age: 48
End: 2018-10-18
Payer: MEDICARE

## 2018-10-18 DIAGNOSIS — R47.1 DYSARTHRIA: ICD-10-CM

## 2018-10-18 PROCEDURE — 92507 TX SP LANG VOICE COMM INDIV: CPT

## 2018-10-18 NOTE — OP THERAPY DAILY TREATMENT
"  Outpatient Speech Therapy  DAILY TREATMENT     [facility] [address].  [address]  [place] [address]  Phone:  [phone]  Fax:  [phone]    Date: 10/18/2018    Patient: Gayla Escudero  YOB: 1970  MRN: [id number]     Time Calculation  Start time: 0932  Stop time: 1000 Time Calculation (min): 28 minutes     Chief Complaint: Poor Speech (\"to get to 4 (louder) and stay there\")    Visit #: 6    Subjective:   Reason for Therapy:     Reason For Evaluation:  Speech/Language  Social Support:     Accompanied By:  Spouse ( present and supportive)    Patient Mental Status:  Alert and Responsive  Progress Factors:     Progression:  Getting Better  Additional Subjective Comments:      Patient arrived 2 minutes late for scheduled speech therapy appointment.  Patient demonstrating improved motivation and carryover of strategies educated/ trained during skilled speech therapy sessions.       Objective:   Treatments/Interventions Performed:  Speech/Language treatment, Patient/Caregiver education and Compensatory strategy training         Speech Therapy Assessment:     Speech Mechanism Assessment:   Speech mechanism comments: Oral motor exercises completed addressing tongue coordination/ strength with patient provided with handout regarding exercises to complete as part of home exercise program.  OMEX addressing tongue were completed x10 to various exercise with fair coordination, strength suggested. Breath support/ vocal loudness addressed through structured exercise completion of sustained phonation /ah/ to 11 seconds; /ee/ 12 seconds x 5 trials.  Patient demonstrating breath support for production of single word to level 3 on vocal loudness chart with 100% accuracy independent.  Progressing to sentences:  scaffolded practice with focus on accuracy in production of bilabials: completed to 95% accuracy, min verbal only cues.  Improved use of deep breaths noted.  " Carryover of slow rate, overexaggeration of articulators to simple conversation requires direct cues.       Speech Therapy Plan :   Therapy Recommendations  Recommendation: Individual Speech Therapy,  Planned Therapy Interventions:  Home Program, Compensatory Strategy Training, Patient/Caregiver Education and Speech/Language training,

## 2018-10-24 ENCOUNTER — SPEECH THERAPY (OUTPATIENT)
Dept: SPEECH THERAPY | Facility: REHABILITATION | Age: 48
End: 2018-10-24
Payer: MEDICARE

## 2018-10-24 DIAGNOSIS — R47.1 DYSARTHRIA: ICD-10-CM

## 2018-10-24 PROCEDURE — 92507 TX SP LANG VOICE COMM INDIV: CPT

## 2018-10-24 PROCEDURE — 92526 ORAL FUNCTION THERAPY: CPT

## 2018-10-24 NOTE — OP THERAPY DAILY TREATMENT
"  Outpatient Speech Therapy  DAILY TREATMENT     [address]  [address]  [place] [address]  Phone:  [phone]  Fax:  [phone]    Date: 10/24/2018    Patient: Gayla Escudero  YOB: 1970  MRN: [id number]     Time Calculation  Start time: 0930  Stop time: 1000 Time Calculation (min): 30 minutes     Chief Complaint: Poor Speech (\"I think people can understanding me\") and Dysphagia (\"I think my swallowing is good\")    Visit #: 7    Subjective:   Reason for Therapy:     Reason For Evaluation:  Dysphagia and Dysarthria  Social Support:     Accompanied By:  Spouse ( present and supportive)    Patient Mental Status:  Alert and Responsive  Additional Subjective Comments:      Patient reports follow up with doctor who referred to speech therapy November 4, 2018. Patient reporting completion of home exercise program independent.  No recent changes to medical history reported.       Objective:   Treatments/Interventions Performed:  Speech/Language treatment, Dysphagia treatment, Patient/Caregiver education and Compensatory strategy training  Treatment Intervention tool(s) used:  Skilled speech therapy targeted: patient participation in structured OMEX targeting tongue strength/ coordination, participation in therapist directed trial: mechanical soft solids.  Overexaggeration/ overarticulation of bilabial sounds to phrase progressing to simple sentence level completed to: 90% accuracy independent level with appropriate loudness demonstrated to talking level 90% accuracy.          Speech Therapy Assessment:     Speech Mechanism Assessment:     Patient voice description: Reduced Intensity (clear quality)    Patient exhibits articulatory precision: Minimal  Speech mechanism comments: Use of overexaggeration/ overarticulation strategy used during structured word production. Patient independent in stating strategies of \"speech clear\" and \"loud\" during " exercise.     Pharyngeal Phase Assessment:     Pharyngeal phase comments: Patient participated in therapist directed trial of mechanical soft solids (mixed canned fruit) with patient demonstrating no overt signs or symptoms of aspiration with clear vocal quality maintained all trials.  Patient provided with education/ reinforcement of compensatory swallow strategies with focus on strong chew reinforced.  Patient verbalized understanding.       Speech Therapy Plan :   Therapy Recommendations  Recommendation: Individual Speech Therapy,  Planned Therapy Interventions:  Home Program, Compensatory Strategy Training, Patient/Caregiver Education, Dysphagia treatment and Speech/Language training,

## 2018-10-31 ENCOUNTER — SPEECH THERAPY (OUTPATIENT)
Dept: SPEECH THERAPY | Facility: REHABILITATION | Age: 48
End: 2018-10-31
Payer: MEDICARE

## 2018-10-31 DIAGNOSIS — R47.1 DYSARTHRIA: ICD-10-CM

## 2018-10-31 PROCEDURE — 92507 TX SP LANG VOICE COMM INDIV: CPT

## 2018-10-31 NOTE — OP THERAPY DAILY TREATMENT
"  Outpatient Speech Therapy  DAILY TREATMENT     [facility] [address].  [address]  [place] [address]  Phone:  [phone]  Fax:  [phone]    Date: 10/31/2018    Patient: Gayla Escudero  YOB: 1970  MRN: [id number]     Time Calculation  Start time: 1000  Stop time: 1030 Time Calculation (min): 30 minutes     Chief Complaint: Poor Speech (increased accuracy in sound production)    Visit #: 8    Subjective:   Reason for Therapy:     Reason For Evaluation:  Dysarthria  Social Support:     Accompanied By:  Spouse ( present and supportive)    Patient Mental Status:  Alert and Responsive  Progress Factors:     Progression:  Getting Better  Additional Subjective Comments:      Patient reporting speech \"is fine\" with improvements in use of adequate breath support for phrase/ simple sentence level speech noted.  Patient reporting completion of home exercise program independent.  No recent changes to medical history reported.      Objective:   Treatments/Interventions Performed:  Speech/Language treatment, Patient/Caregiver education and Compensatory strategy training  Treatment Intervention tool(s) used:  Skilled speech therapy targeted: sustained phonation /ah/ maintained to:  10-12 seconds x5 trials.  Overexaggeration/ overarticulation targeted during production of Tongue Twisters:  92% accuracy to independent level.          Speech Therapy Assessment:     Speech Mechanism Assessment:     Patient voice description: Clear    Patient exhibits articulatory precision: Minimal (Requires cues to slow speech to overexaggerate speech productions)      Speech Therapy Plan :   Therapy Recommendations  Recommendation: Individual Speech Therapy,  Planned Therapy Interventions:  Speech/Language training, Compensatory Strategy Training, Home Program and Patient/Caregiver Education,               "

## 2018-11-06 ENCOUNTER — APPOINTMENT (OUTPATIENT)
Dept: PHYSICAL THERAPY | Facility: REHABILITATION | Age: 48
End: 2018-11-06
Attending: PHYSICIAN ASSISTANT
Payer: MEDICARE

## 2018-11-14 ENCOUNTER — APPOINTMENT (OUTPATIENT)
Dept: PHYSICAL THERAPY | Facility: REHABILITATION | Age: 48
End: 2018-11-14
Attending: PHYSICIAN ASSISTANT
Payer: MEDICARE

## 2018-11-26 ENCOUNTER — TELEPHONE (OUTPATIENT)
Dept: SPEECH THERAPY | Facility: REHABILITATION | Age: 48
End: 2018-11-26

## 2018-11-26 NOTE — OP THERAPY DISCHARGE SUMMARY
Outpatient Speech Therapy  DISCHARGE SUMMARY NOTE      [facility] [address].  [address]  [place] [address]  Phone:  [phone]  Fax:  [phone]        Patient Name:  Gayla Escudero  :  1970  MR#:  [id number]    HICN:       Visits:8         Cancel/No-Show: 2    Diagnosis/ICD-10: Oropharyngeal dysphagia/ Dysarthria    Referring Provider:Marion Kimball M.D.     SOC Date:2018    Onset Date: 8/10/2018      Your patient is being discharged from Speech therapy with the following comments:        Comments:  Patient participated in direct, outpatient speech therapy services addressing signs and symptoms of oropharyngeal dysphagia requiring diet modifications to mechanical soft solids, thin liquids in addition to addressing signs and symptoms of dysarthria resulting in poor speech intelligibility.  Unfortunately, patient was a late cancel, no call/ no show for 2 or more visits and per clinic policy patient is being referred back to primary care provider at this time.   Patient did demonstrated progress with outpatient speech therapy services as evidenced by the following:    Goals  Short Term Goals:  1) Patient will use safe swallow strategies to improve safety in swallow 90% accuracy independent:  GOAL MET.   2) Patient will state and complete oral motor program to improve safety in swallow 90% accuracy verbal cues/ prompts as necessary: GOAL MET.   3) Patient will state and utilize clear speech strategies to improve intelligibility 92% accuracy independent:  GOAL MET for structured speech production tasks with SLP, patient with limited carryover to independent speech production.   Short Term Goal Duration (Weeks):  2-4 weeks  Long Term Goals:  1) Patient will demonstrate safety in swallow of mechanical soft solids, thin liquids given participation in formal swallow evaluation through MBSS per physician recommendations.   2) Patient will improve speech communication  through use of compensatory speech strategies as educated/ trained/ implemented in speech therapy sessions 92% accuracy.   Long Term Goal Duration (Weeks):  4-6 weeks        Limitations/Remaining:  Patient continues to necessitate continuation of a mechanical soft solid, thin liquid diet secondary to limitations in oral motor coordination negatively impacting coordination and strength of rotary chew and oral bolus management of regular solids. Patient encouraged at last session seen to continue with oral motor coordination and strengthening exercises in addition to completion of speech exercises designed to address speech intelligibility.        Recommendations:  Per recommendations of initial evaluation, patient may benefit from participation in formal evaluation of swallow through MBSS to rule out silent aspiration. It is recommended patient continue with home exercise program as outlined and practiced as part of outpatient speech therapy sessions.         Marion Salmeron, SLP

## 2018-12-05 ENCOUNTER — APPOINTMENT (OUTPATIENT)
Dept: PHYSICAL THERAPY | Facility: REHABILITATION | Age: 48
End: 2018-12-05
Attending: PHYSICIAN ASSISTANT
Payer: MEDICARE

## 2018-12-12 ENCOUNTER — APPOINTMENT (OUTPATIENT)
Dept: PHYSICAL THERAPY | Facility: REHABILITATION | Age: 48
End: 2018-12-12
Attending: PHYSICIAN ASSISTANT
Payer: MEDICARE

## 2018-12-19 ENCOUNTER — APPOINTMENT (OUTPATIENT)
Dept: PHYSICAL THERAPY | Facility: REHABILITATION | Age: 48
End: 2018-12-19
Attending: PHYSICIAN ASSISTANT
Payer: MEDICARE

## 2018-12-26 ENCOUNTER — APPOINTMENT (OUTPATIENT)
Dept: PHYSICAL THERAPY | Facility: REHABILITATION | Age: 48
End: 2018-12-26
Attending: PHYSICIAN ASSISTANT
Payer: MEDICARE

## 2019-03-19 ENCOUNTER — APPOINTMENT (OUTPATIENT)
Dept: BEHAVIORAL HEALTH | Facility: CLINIC | Age: 49
End: 2019-03-19
Payer: MEDICARE

## 2019-04-17 ENCOUNTER — APPOINTMENT (OUTPATIENT)
Dept: SLEEP MEDICINE | Facility: MEDICAL CENTER | Age: 49
End: 2019-04-17
Payer: MEDICARE

## 2019-04-22 ENCOUNTER — SLEEP CENTER VISIT (OUTPATIENT)
Dept: SLEEP MEDICINE | Facility: MEDICAL CENTER | Age: 49
End: 2019-04-22
Payer: MEDICARE

## 2019-04-22 VITALS
HEIGHT: 60 IN | RESPIRATION RATE: 14 BRPM | DIASTOLIC BLOOD PRESSURE: 60 MMHG | HEART RATE: 85 BPM | WEIGHT: 97 LBS | OXYGEN SATURATION: 95 % | BODY MASS INDEX: 19.04 KG/M2 | SYSTOLIC BLOOD PRESSURE: 100 MMHG

## 2019-04-22 DIAGNOSIS — G47.33 OSA (OBSTRUCTIVE SLEEP APNEA): ICD-10-CM

## 2019-04-22 PROCEDURE — 99213 OFFICE O/P EST LOW 20 MIN: CPT | Performed by: FAMILY MEDICINE

## 2019-04-22 NOTE — PROGRESS NOTES
[facility] Follow Up Note     Date: 4/22/2019 / Time: 2:43 PM    Patient ID:   Name:             Gayla Escudero   YOB: 1970  Age:                 48 y.o.  female   MRN:               [id number]      Thank you for requesting a sleep medicine consultation on Gayla Escudero at the sleep center. She presents today with the chief complaints of JOSÉ follow up. BiPAP and AVAPS titration was ordered per her MDD physician.    HISTORY OF PRESENT ILLNESS:       Pt is currently on Auto CPAP 8-12 cm. No change in sleep or medical hx. She goes to sleep around 8-9 pm and wakes up around 4-5 am. She is getting about 6-7 hrs of sleep on a good night and about 5 hr of sleep on a bad night. The bad nights are about 2 per week. Overall, she doesnot finds her sleep refreshing.  She does take regular naps. The naps are usually 60-90 min long.    She is using CPAP on most nights of the week. Pt reports 3 hrs of average nightly use of CPAP. Pt denies snoring, gasping,choking.Pt also denies significant mask leak that is interfering with sleep. The 30 day compliance was downloaded which shows inadequate compliance with more that 4 hr usage about 37%. The AHI is has improved to 0.3/hr. The mask leak is normal. The symptoms of excessive daytime.. She continues to have pressure intolerability leading to inadequate compliance.     Since her last visit she had BiPAP/ AVAPS titration,  initiated with BiPAP 11/7 cm of water and the pressure which was slowly titrated up in an attempt to eliminate sleep disordered breathing and snoring. During this titration study she had 51% of apneas being central apneas. 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 AVAPS was tried at EPAP min 4 cm IPAP min/mas 6/20 cm and Vt 420. At this final pressure the patient was observed in the supine position and in the REM sleep stage. The apnea hypopnea index improved to 0  per hour and O2 marshall 92%. The average O2 stauration was 98%. She spent 1 % of sleep time below 89% O2 saturation    SLEEP HISTORY   .PSG on 1/23/17  mild obstructive sleep apnea as shown by the apnea hypopnea index of 8.3/hr.  She had CPAP titrationon 1/26/18 initiated treatment with CPAP at 5 cm and increased the pressure to a maximum of 13 cm water pressure. The patient did best on CPAP at 12 cm with the achievement of supine sleep. The resultant apnea hypopnea index was 3.8. All the events were hypopneas at this setting. The minimum saturation was 87% and the mean saturation was 93.8%.      REVIEW OF SYSTEMS:       Constitutional: Denies fevers, Denies weight changes  Eyes: Denies changes in vision, no eye pain  Ears/Nose/Throat/Mouth: Denies nasal congestion or sore throat   Cardiovascular: Denies chest pain or palpitations   Respiratory: Denies shortness of breath , Denies cough  Gastrointestinal/Hepatic: Denies abdominal pain, nausea, vomiting, diarrhea, constipation or GI bleeding   Genitourinary: Deniesdysuria or frequency  Musculoskeletal/Rheum: Denies  joint pain and swelling   Skin/Breast: Denies rash,   Neurological: Denies headache, confusion, memory loss or focal weakness/parasthesias  Psychiatric: denies mood disorder   Sleep: + snoring and EDS    Comprehensive review of systems form is reviewed with the patient and is attached in the EMR.     PMH:  has a past medical history of Anemia; Cataract; Heart murmur; Muscular disease; Muscular dystrophy; and JOSÉ on CPAP.  MEDS:   Current Outpatient Prescriptions:   •  sumatriptan (IMITREX) 20 MG/ACT nasal spray, Spray 1 Spray in nose as needed for Migraine., Disp: 1 Each, Rfl: 3  •  ZOLMitriptan 2.5 MG Solution, Spray 1 Spray in nose as needed. (Patient not taking: Reported on 4/22/2019), Disp: 1 Each, Rfl: 2  •  acetaminophen (TYLENOL) 325 MG Tab, Take 650 mg by mouth every four hours as needed., Disp: , Rfl:   •  ondansetron (ZOFRAN ODT) 4 MG TABLET  DISPERSIBLE, Take 1 Tab by mouth every 6 hours as needed for Nausea., Disp: 10 Tab, Rfl: 0  •  SUMAtriptan (IMITREX) 50 MG Tab, Take 1 Tab by mouth Once PRN for Migraine for up to 1 dose. (Patient not taking: Reported on 2019), Disp: 10 Tab, Rfl: 3  ALLERGIES:   Allergies   Allergen Reactions   • Codeine Vomiting and Nausea     N&V   • Tramadol      Effects her MD     SURGHX:   Past Surgical History:   Procedure Laterality Date   • HYSTERECTOMY LAPAROSCOPY       SOCHX:  reports that she has never smoked. She has never used smokeless tobacco. She reports that she does not drink alcohol or use drugs..  FH:   Family History   Problem Relation Age of Onset   • No Known Problems Mother    • No Known Problems Father    • Sleep Apnea Daughter    • No Known Problems Sister    • No Known Problems Brother         Musc dys also   • No Known Problems Sister    • No Known Problems Brother         Trauma, was shot   • No Known Problems Brother         Car accident   • Other Son         Muscular dystrophy   • Heart Disease Daughter          at 2 months congenital heart disease         Physical Exam:  Vitals/ General Appearance:   Weight/BMI: Body mass index is 18.94 kg/m².  /60 (BP Location: Left arm, Patient Position: Sitting, BP Cuff Size: Adult)   Pulse 85   Resp 14   Ht 1.524 m (5')   Wt 44 kg (97 lb)   SpO2 95%   Vitals:    19 1438   BP: 100/60   BP Location: Left arm   Patient Position: Sitting   BP Cuff Size: Adult   Pulse: 85   Resp: 14   SpO2: 95%   Weight: 44 kg (97 lb)   Height: 1.524 m (5')       Pt. is alert and oriented to time, place and person. Cooperative and in no apparent distress.       1. Head: Atraumatic, normocephalic.   2. Ears: Normal tympanic membrane and no discharge  3. Nose: No inferior turbinate hypertophy   4. Throat: Oropharynx appears crowded in that the palate is overhanging   5. Neck: Supple. No thyromegaly  6. Chest: Trachea central, no spine deformity   7. Lungs  auscultation: B/L good air entry, vesicular breath sounds, no adventitious sounds  8. Heart auscultation: 1st and 2nd heart sounds normal, regular rhythm. No appreciable murmur.  9. . Extremities: no clubbing, no pedal edema.  10. Skin: No rash  11. NEUROLOGICAL EXAMINATION: On neurological exam, the patient was alert and oriented x3. speech was clear and fluent without dysarthria.      ASSESSMENT AND PLAN     1. Sleep Apnea      The pathophysiology of sleep anea and the increased risk of cardiovascular morbidity from untreated sleep apnea is discussed in detail with the patient.      She is urged to avoid supine sleep, weight gain and alcoholic beverages since all of these can worsen sleep apnea. She is cautioned against drowsy driving. If She feels sleepy while driving, She must pull over for a break/nap, rather than persist on the road, in the interest of She own safety and that of others on the road.   Plan   - Continue CPAP at 8-12 cm with FFM mask    - best tolerated pressure was AVAPS EPAP min 4 cm IPAP min/mas 6/20 cm and Vt 420. Will hold off on ordering  AVAPS until I speak to Dr. Marion Kimball at [facility] ([phone]). Message was left right after the appointment to Dr. Kimball office    - F/u in 8-10 weeks to assess the efficiacy of recommended pressure    - compliance download was reviewed and discussed with the pt   - compliance was reinforced     2. Regarding treatment of other past medical problems and general health maintenance,  She is urged to follow up with PCP.

## 2019-06-12 ENCOUNTER — APPOINTMENT (OUTPATIENT)
Dept: BEHAVIORAL HEALTH | Facility: CLINIC | Age: 49
End: 2019-06-12
Payer: MEDICARE

## 2020-01-01 ENCOUNTER — APPOINTMENT (OUTPATIENT)
Dept: RADIOLOGY | Facility: MEDICAL CENTER | Age: 50
DRG: 091 | End: 2020-01-01
Attending: STUDENT IN AN ORGANIZED HEALTH CARE EDUCATION/TRAINING PROGRAM
Payer: MEDICARE

## 2020-01-01 ENCOUNTER — APPOINTMENT (OUTPATIENT)
Dept: RADIOLOGY | Facility: MEDICAL CENTER | Age: 50
End: 2020-01-01
Attending: EMERGENCY MEDICINE
Payer: MEDICARE

## 2020-01-01 ENCOUNTER — HOSPITAL ENCOUNTER (EMERGENCY)
Facility: MEDICAL CENTER | Age: 50
End: 2020-05-09
Attending: EMERGENCY MEDICINE
Payer: MEDICARE

## 2020-01-01 ENCOUNTER — APPOINTMENT (OUTPATIENT)
Dept: RADIOLOGY | Facility: MEDICAL CENTER | Age: 50
DRG: 091 | End: 2020-01-01
Attending: PSYCHIATRY & NEUROLOGY
Payer: MEDICARE

## 2020-01-01 ENCOUNTER — APPOINTMENT (OUTPATIENT)
Dept: RADIOLOGY | Facility: MEDICAL CENTER | Age: 50
DRG: 091 | End: 2020-01-01
Attending: INTERNAL MEDICINE
Payer: MEDICARE

## 2020-01-01 ENCOUNTER — HOSPITAL ENCOUNTER (INPATIENT)
Facility: MEDICAL CENTER | Age: 50
LOS: 59 days | DRG: 091 | End: 2021-01-11
Attending: EMERGENCY MEDICINE | Admitting: HOSPITALIST
Payer: MEDICARE

## 2020-01-01 ENCOUNTER — APPOINTMENT (OUTPATIENT)
Dept: RADIOLOGY | Facility: MEDICAL CENTER | Age: 50
DRG: 091 | End: 2020-01-01
Attending: EMERGENCY MEDICINE
Payer: MEDICARE

## 2020-01-01 ENCOUNTER — APPOINTMENT (OUTPATIENT)
Dept: RADIOLOGY | Facility: MEDICAL CENTER | Age: 50
DRG: 091 | End: 2020-01-01
Attending: NURSE PRACTITIONER
Payer: MEDICARE

## 2020-01-01 ENCOUNTER — APPOINTMENT (OUTPATIENT)
Dept: RADIOLOGY | Facility: MEDICAL CENTER | Age: 50
DRG: 091 | End: 2020-01-01
Attending: FAMILY MEDICINE
Payer: MEDICARE

## 2020-01-01 ENCOUNTER — APPOINTMENT (OUTPATIENT)
Dept: RADIOLOGY | Facility: MEDICAL CENTER | Age: 50
End: 2020-01-01
Attending: INTERNAL MEDICINE
Payer: MEDICARE

## 2020-01-01 ENCOUNTER — HOSPITAL ENCOUNTER (EMERGENCY)
Facility: MEDICAL CENTER | Age: 50
End: 2020-02-17
Attending: EMERGENCY MEDICINE
Payer: MEDICARE

## 2020-01-01 ENCOUNTER — HOSPITAL ENCOUNTER (OUTPATIENT)
Facility: MEDICAL CENTER | Age: 50
End: 2020-09-16
Attending: EMERGENCY MEDICINE | Admitting: HOSPITALIST
Payer: MEDICARE

## 2020-01-01 ENCOUNTER — APPOINTMENT (OUTPATIENT)
Dept: RADIOLOGY | Facility: MEDICAL CENTER | Age: 50
DRG: 091 | End: 2020-01-01
Attending: HOSPITALIST
Payer: MEDICARE

## 2020-01-01 ENCOUNTER — HOSPITAL ENCOUNTER (EMERGENCY)
Facility: MEDICAL CENTER | Age: 50
End: 2020-09-30
Attending: EMERGENCY MEDICINE
Payer: MEDICARE

## 2020-01-01 ENCOUNTER — HOSPITAL ENCOUNTER (EMERGENCY)
Facility: MEDICAL CENTER | Age: 50
End: 2020-02-20
Attending: EMERGENCY MEDICINE
Payer: MEDICARE

## 2020-01-01 ENCOUNTER — OFFICE VISIT (OUTPATIENT)
Dept: MEDICAL GROUP | Facility: MEDICAL CENTER | Age: 50
End: 2020-01-01
Payer: MEDICARE

## 2020-01-01 ENCOUNTER — HOSPITAL ENCOUNTER (OUTPATIENT)
Dept: LAB | Facility: MEDICAL CENTER | Age: 50
End: 2020-07-03
Attending: PHYSICIAN ASSISTANT
Payer: MEDICARE

## 2020-01-01 ENCOUNTER — TELEMEDICINE (OUTPATIENT)
Dept: MEDICAL GROUP | Facility: MEDICAL CENTER | Age: 50
End: 2020-01-01
Payer: MEDICARE

## 2020-01-01 ENCOUNTER — HOSPITAL ENCOUNTER (EMERGENCY)
Facility: MEDICAL CENTER | Age: 50
End: 2020-07-30
Attending: EMERGENCY MEDICINE
Payer: MEDICARE

## 2020-01-01 ENCOUNTER — APPOINTMENT (OUTPATIENT)
Dept: CARDIOLOGY | Facility: MEDICAL CENTER | Age: 50
DRG: 091 | End: 2020-01-01
Attending: STUDENT IN AN ORGANIZED HEALTH CARE EDUCATION/TRAINING PROGRAM
Payer: MEDICARE

## 2020-01-01 ENCOUNTER — OFFICE VISIT (OUTPATIENT)
Dept: URGENT CARE | Facility: PHYSICIAN GROUP | Age: 50
End: 2020-01-01
Payer: MEDICARE

## 2020-01-01 ENCOUNTER — HOSPITAL ENCOUNTER (EMERGENCY)
Facility: MEDICAL CENTER | Age: 50
End: 2020-10-12
Attending: EMERGENCY MEDICINE
Payer: MEDICARE

## 2020-01-01 VITALS
HEART RATE: 85 BPM | BODY MASS INDEX: 18.88 KG/M2 | WEIGHT: 100 LBS | RESPIRATION RATE: 10 BRPM | SYSTOLIC BLOOD PRESSURE: 113 MMHG | HEIGHT: 61 IN | TEMPERATURE: 97.5 F | OXYGEN SATURATION: 98 % | DIASTOLIC BLOOD PRESSURE: 63 MMHG

## 2020-01-01 VITALS
SYSTOLIC BLOOD PRESSURE: 109 MMHG | RESPIRATION RATE: 16 BRPM | WEIGHT: 97 LBS | DIASTOLIC BLOOD PRESSURE: 70 MMHG | BODY MASS INDEX: 19.04 KG/M2 | HEIGHT: 60 IN | HEART RATE: 93 BPM | TEMPERATURE: 98.4 F | OXYGEN SATURATION: 95 %

## 2020-01-01 VITALS
OXYGEN SATURATION: 99 % | HEART RATE: 79 BPM | DIASTOLIC BLOOD PRESSURE: 60 MMHG | BODY MASS INDEX: 18.88 KG/M2 | RESPIRATION RATE: 14 BRPM | SYSTOLIC BLOOD PRESSURE: 80 MMHG | WEIGHT: 100 LBS | TEMPERATURE: 98.2 F | HEIGHT: 61 IN

## 2020-01-01 VITALS
SYSTOLIC BLOOD PRESSURE: 109 MMHG | DIASTOLIC BLOOD PRESSURE: 66 MMHG | HEART RATE: 79 BPM | OXYGEN SATURATION: 100 % | RESPIRATION RATE: 16 BRPM | TEMPERATURE: 98.8 F | BODY MASS INDEX: 18.69 KG/M2 | WEIGHT: 98.99 LBS | HEIGHT: 61 IN

## 2020-01-01 VITALS
TEMPERATURE: 97.6 F | WEIGHT: 90 LBS | RESPIRATION RATE: 16 BRPM | HEIGHT: 61 IN | DIASTOLIC BLOOD PRESSURE: 68 MMHG | SYSTOLIC BLOOD PRESSURE: 107 MMHG | OXYGEN SATURATION: 95 % | BODY MASS INDEX: 16.99 KG/M2 | HEART RATE: 80 BPM

## 2020-01-01 VITALS
HEART RATE: 84 BPM | OXYGEN SATURATION: 97 % | DIASTOLIC BLOOD PRESSURE: 56 MMHG | SYSTOLIC BLOOD PRESSURE: 98 MMHG | RESPIRATION RATE: 14 BRPM | TEMPERATURE: 98 F | WEIGHT: 100 LBS | BODY MASS INDEX: 18.88 KG/M2 | HEIGHT: 61 IN

## 2020-01-01 VITALS
DIASTOLIC BLOOD PRESSURE: 63 MMHG | HEIGHT: 60 IN | BODY MASS INDEX: 17.44 KG/M2 | TEMPERATURE: 98.6 F | WEIGHT: 88.85 LBS | SYSTOLIC BLOOD PRESSURE: 106 MMHG | HEART RATE: 84 BPM | RESPIRATION RATE: 16 BRPM | OXYGEN SATURATION: 90 %

## 2020-01-01 VITALS — WEIGHT: 89 LBS | BODY MASS INDEX: 16.8 KG/M2 | HEIGHT: 61 IN

## 2020-01-01 VITALS
DIASTOLIC BLOOD PRESSURE: 68 MMHG | BODY MASS INDEX: 16.98 KG/M2 | SYSTOLIC BLOOD PRESSURE: 101 MMHG | WEIGHT: 89.95 LBS | HEIGHT: 61 IN | OXYGEN SATURATION: 97 % | RESPIRATION RATE: 21 BRPM | HEART RATE: 98 BPM | TEMPERATURE: 97.3 F

## 2020-01-01 VITALS
BODY MASS INDEX: 18.88 KG/M2 | RESPIRATION RATE: 16 BRPM | TEMPERATURE: 98.4 F | OXYGEN SATURATION: 93 % | WEIGHT: 100 LBS | HEART RATE: 73 BPM | SYSTOLIC BLOOD PRESSURE: 98 MMHG | HEIGHT: 61 IN | DIASTOLIC BLOOD PRESSURE: 60 MMHG

## 2020-01-01 DIAGNOSIS — E86.0 DEHYDRATION: ICD-10-CM

## 2020-01-01 DIAGNOSIS — K59.04 CHRONIC IDIOPATHIC CONSTIPATION: ICD-10-CM

## 2020-01-01 DIAGNOSIS — K59.00 CONSTIPATION, UNSPECIFIED CONSTIPATION TYPE: ICD-10-CM

## 2020-01-01 DIAGNOSIS — R10.11 RIGHT UPPER QUADRANT ABDOMINAL PAIN: ICD-10-CM

## 2020-01-01 DIAGNOSIS — G71.00 MUSCULAR DYSTROPHY (HCC): ICD-10-CM

## 2020-01-01 DIAGNOSIS — R10.84 GENERALIZED ABDOMINAL PAIN: ICD-10-CM

## 2020-01-01 DIAGNOSIS — K86.1 OTHER CHRONIC PANCREATITIS (HCC): ICD-10-CM

## 2020-01-01 DIAGNOSIS — R10.9 ABDOMINAL PAIN, UNSPECIFIED ABDOMINAL LOCATION: ICD-10-CM

## 2020-01-01 DIAGNOSIS — G89.29 OTHER CHRONIC PAIN: ICD-10-CM

## 2020-01-01 DIAGNOSIS — A08.4 VIRAL ENTERITIS: ICD-10-CM

## 2020-01-01 DIAGNOSIS — R10.9 RIGHT FLANK DISCOMFORT: ICD-10-CM

## 2020-01-01 DIAGNOSIS — R74.01 TRANSAMINITIS: ICD-10-CM

## 2020-01-01 DIAGNOSIS — S09.90XA CLOSED HEAD INJURY, INITIAL ENCOUNTER: ICD-10-CM

## 2020-01-01 DIAGNOSIS — G43.909 MIGRAINE WITHOUT STATUS MIGRAINOSUS, NOT INTRACTABLE, UNSPECIFIED MIGRAINE TYPE: ICD-10-CM

## 2020-01-01 DIAGNOSIS — R13.14 PHARYNGOESOPHAGEAL DYSPHAGIA: ICD-10-CM

## 2020-01-01 DIAGNOSIS — R10.9 CHRONIC ABDOMINAL PAIN: ICD-10-CM

## 2020-01-01 DIAGNOSIS — K85.90 ACUTE PANCREATITIS, UNSPECIFIED COMPLICATION STATUS, UNSPECIFIED PANCREATITIS TYPE: ICD-10-CM

## 2020-01-01 DIAGNOSIS — G89.29 CHRONIC ABDOMINAL PAIN: ICD-10-CM

## 2020-01-01 DIAGNOSIS — R10.31 RIGHT LOWER QUADRANT ABDOMINAL PAIN: ICD-10-CM

## 2020-01-01 DIAGNOSIS — Z91.81 RISK FOR FALLS: ICD-10-CM

## 2020-01-01 DIAGNOSIS — G71.11 MYOTONIC MUSCULAR DYSTROPHY (HCC): ICD-10-CM

## 2020-01-01 LAB
ALBUMIN SERPL BCP-MCNC: 2.4 G/DL (ref 3.2–4.9)
ALBUMIN SERPL BCP-MCNC: 2.4 G/DL (ref 3.2–4.9)
ALBUMIN SERPL BCP-MCNC: 2.8 G/DL (ref 3.2–4.9)
ALBUMIN SERPL BCP-MCNC: 3 G/DL (ref 3.2–4.9)
ALBUMIN SERPL BCP-MCNC: 3.5 G/DL (ref 3.2–4.9)
ALBUMIN SERPL BCP-MCNC: 3.6 G/DL (ref 3.2–4.9)
ALBUMIN SERPL BCP-MCNC: 3.9 G/DL (ref 3.2–4.9)
ALBUMIN SERPL BCP-MCNC: 4 G/DL (ref 3.2–4.9)
ALBUMIN SERPL BCP-MCNC: 4 G/DL (ref 3.2–4.9)
ALBUMIN SERPL BCP-MCNC: 4.1 G/DL (ref 3.2–4.9)
ALBUMIN SERPL BCP-MCNC: 4.4 G/DL (ref 3.2–4.9)
ALBUMIN/GLOB SERPL: 0.9 G/DL
ALBUMIN/GLOB SERPL: 1 G/DL
ALBUMIN/GLOB SERPL: 1.2 G/DL
ALBUMIN/GLOB SERPL: 1.3 G/DL
ALBUMIN/GLOB SERPL: 1.5 G/DL
ALBUMIN/GLOB SERPL: 1.5 G/DL
ALBUMIN/GLOB SERPL: 1.6 G/DL
ALP SERPL-CCNC: 32 U/L (ref 30–99)
ALP SERPL-CCNC: 36 U/L (ref 30–99)
ALP SERPL-CCNC: 38 U/L (ref 30–99)
ALP SERPL-CCNC: 42 U/L (ref 30–99)
ALP SERPL-CCNC: 48 U/L (ref 30–99)
ALP SERPL-CCNC: 49 U/L (ref 30–99)
ALP SERPL-CCNC: 51 U/L (ref 30–99)
ALP SERPL-CCNC: 52 U/L (ref 30–99)
ALP SERPL-CCNC: 55 U/L (ref 30–99)
ALP SERPL-CCNC: 56 U/L (ref 30–99)
ALP SERPL-CCNC: 61 U/L (ref 30–99)
ALP SERPL-CCNC: 62 U/L (ref 30–99)
ALP SERPL-CCNC: 80 U/L (ref 30–99)
ALP SERPL-CCNC: 98 U/L (ref 30–99)
ALT SERPL-CCNC: 12 U/L (ref 2–50)
ALT SERPL-CCNC: 12 U/L (ref 2–50)
ALT SERPL-CCNC: 137 U/L (ref 2–50)
ALT SERPL-CCNC: 22 U/L (ref 2–50)
ALT SERPL-CCNC: 35 U/L (ref 2–50)
ALT SERPL-CCNC: 45 U/L (ref 2–50)
ALT SERPL-CCNC: 59 U/L (ref 2–50)
ALT SERPL-CCNC: 63 U/L (ref 2–50)
ALT SERPL-CCNC: 7 U/L (ref 2–50)
ALT SERPL-CCNC: 7 U/L (ref 2–50)
ALT SERPL-CCNC: 8 U/L (ref 2–50)
ALT SERPL-CCNC: 9 U/L (ref 2–50)
ALT SERPL-CCNC: 94 U/L (ref 2–50)
ALT SERPL-CCNC: <5 U/L (ref 2–50)
AMMONIA PLAS-SCNC: 26 UMOL/L (ref 11–45)
AMPHET UR QL SCN: NEGATIVE
ANION GAP SERPL CALC-SCNC: 10 MMOL/L (ref 7–16)
ANION GAP SERPL CALC-SCNC: 11 MMOL/L (ref 7–16)
ANION GAP SERPL CALC-SCNC: 11 MMOL/L (ref 7–16)
ANION GAP SERPL CALC-SCNC: 12 MMOL/L (ref 7–16)
ANION GAP SERPL CALC-SCNC: 14 MMOL/L (ref 7–16)
ANION GAP SERPL CALC-SCNC: 14 MMOL/L (ref 7–16)
ANION GAP SERPL CALC-SCNC: 15 MMOL/L (ref 7–16)
ANION GAP SERPL CALC-SCNC: 15 MMOL/L (ref 7–16)
ANION GAP SERPL CALC-SCNC: 19 MMOL/L (ref 0–11.9)
ANION GAP SERPL CALC-SCNC: 4 MMOL/L (ref 7–16)
ANION GAP SERPL CALC-SCNC: 5 MMOL/L (ref 7–16)
ANION GAP SERPL CALC-SCNC: 6 MMOL/L (ref 0–11.9)
ANION GAP SERPL CALC-SCNC: 6 MMOL/L (ref 7–16)
ANION GAP SERPL CALC-SCNC: 6 MMOL/L (ref 7–16)
ANION GAP SERPL CALC-SCNC: 7 MMOL/L (ref 7–16)
ANION GAP SERPL CALC-SCNC: 7 MMOL/L (ref 7–16)
ANION GAP SERPL CALC-SCNC: 8 MMOL/L (ref 7–16)
ANION GAP SERPL CALC-SCNC: 9 MMOL/L (ref 7–16)
ANISOCYTOSIS BLD QL SMEAR: ABNORMAL
ANISOCYTOSIS BLD QL SMEAR: ABNORMAL
APPEARANCE UR: ABNORMAL
APPEARANCE UR: CLEAR
AST SERPL-CCNC: 15 U/L (ref 12–45)
AST SERPL-CCNC: 159 U/L (ref 12–45)
AST SERPL-CCNC: 17 U/L (ref 12–45)
AST SERPL-CCNC: 180 U/L (ref 12–45)
AST SERPL-CCNC: 20 U/L (ref 12–45)
AST SERPL-CCNC: 20 U/L (ref 12–45)
AST SERPL-CCNC: 21 U/L (ref 12–45)
AST SERPL-CCNC: 22 U/L (ref 12–45)
AST SERPL-CCNC: 22 U/L (ref 12–45)
AST SERPL-CCNC: 33 U/L (ref 12–45)
AST SERPL-CCNC: 35 U/L (ref 12–45)
AST SERPL-CCNC: 48 U/L (ref 12–45)
AST SERPL-CCNC: 48 U/L (ref 12–45)
AST SERPL-CCNC: 77 U/L (ref 12–45)
BACTERIA #/AREA URNS HPF: ABNORMAL /HPF
BACTERIA #/AREA URNS HPF: ABNORMAL /HPF
BACTERIA BLD CULT: NORMAL
BACTERIA CSF CULT: NORMAL
BARBITURATES UR QL SCN: NEGATIVE
BASE EXCESS BLDA CALC-SCNC: 0 MMOL/L (ref -4–3)
BASE EXCESS BLDA CALC-SCNC: 2 MMOL/L (ref -4–3)
BASE EXCESS BLDA CALC-SCNC: 3 MMOL/L (ref -4–3)
BASOPHILS # BLD AUTO: 0 % (ref 0–1.8)
BASOPHILS # BLD AUTO: 0 % (ref 0–1.8)
BASOPHILS # BLD AUTO: 0.2 % (ref 0–1.8)
BASOPHILS # BLD AUTO: 0.2 % (ref 0–1.8)
BASOPHILS # BLD AUTO: 0.3 % (ref 0–1.8)
BASOPHILS # BLD AUTO: 0.3 % (ref 0–1.8)
BASOPHILS # BLD AUTO: 0.4 % (ref 0–1.8)
BASOPHILS # BLD AUTO: 0.5 % (ref 0–1.8)
BASOPHILS # BLD AUTO: 0.6 % (ref 0–1.8)
BASOPHILS # BLD AUTO: 0.6 % (ref 0–1.8)
BASOPHILS # BLD AUTO: 0.9 % (ref 0–1.8)
BASOPHILS # BLD AUTO: 0.9 % (ref 0–1.8)
BASOPHILS # BLD AUTO: 1.1 % (ref 0–1.8)
BASOPHILS # BLD: 0 K/UL (ref 0–0.12)
BASOPHILS # BLD: 0 K/UL (ref 0–0.12)
BASOPHILS # BLD: 0.02 K/UL (ref 0–0.12)
BASOPHILS # BLD: 0.03 K/UL (ref 0–0.12)
BASOPHILS # BLD: 0.04 K/UL (ref 0–0.12)
BENZODIAZ UR QL SCN: NEGATIVE
BILIRUB CONJ SERPL-MCNC: <0.2 MG/DL (ref 0.1–0.5)
BILIRUB INDIRECT SERPL-MCNC: NORMAL MG/DL (ref 0–1)
BILIRUB SERPL-MCNC: 0.3 MG/DL (ref 0.1–1.5)
BILIRUB SERPL-MCNC: 0.4 MG/DL (ref 0.1–1.5)
BILIRUB SERPL-MCNC: 0.5 MG/DL (ref 0.1–1.5)
BILIRUB SERPL-MCNC: 0.8 MG/DL (ref 0.1–1.5)
BILIRUB SERPL-MCNC: 0.9 MG/DL (ref 0.1–1.5)
BILIRUB UR QL STRIP.AUTO: NEGATIVE
BLOOD CULTURE HOLD CXBCH: NORMAL
BODY TEMPERATURE: ABNORMAL CENTIGRADE
BUN SERPL-MCNC: 10 MG/DL (ref 8–22)
BUN SERPL-MCNC: 11 MG/DL (ref 8–22)
BUN SERPL-MCNC: 2 MG/DL (ref 8–22)
BUN SERPL-MCNC: 3 MG/DL (ref 8–22)
BUN SERPL-MCNC: 4 MG/DL (ref 8–22)
BUN SERPL-MCNC: 5 MG/DL (ref 8–22)
BUN SERPL-MCNC: 5 MG/DL (ref 8–22)
BUN SERPL-MCNC: 6 MG/DL (ref 8–22)
BUN SERPL-MCNC: 8 MG/DL (ref 8–22)
BUN SERPL-MCNC: 9 MG/DL (ref 8–22)
BURR CELLS/RBC NFR CSF MANUAL: 0 %
BZE UR QL SCN: NEGATIVE
C GATTII+NEOFOR DNA CSF QL NAA+NON-PROBE: NOT DETECTED
CA-I SERPL-SCNC: 1.3 MMOL/L (ref 1.1–1.3)
CA-I SERPL-SCNC: 1.4 MMOL/L (ref 1.1–1.3)
CALCIUM SERPL-MCNC: 10.1 MG/DL (ref 8.5–10.5)
CALCIUM SERPL-MCNC: 10.2 MG/DL (ref 8.5–10.5)
CALCIUM SERPL-MCNC: 6.3 MG/DL (ref 8.5–10.5)
CALCIUM SERPL-MCNC: 8.5 MG/DL (ref 8.5–10.5)
CALCIUM SERPL-MCNC: 8.6 MG/DL (ref 8.5–10.5)
CALCIUM SERPL-MCNC: 8.7 MG/DL (ref 8.5–10.5)
CALCIUM SERPL-MCNC: 8.8 MG/DL (ref 8.5–10.5)
CALCIUM SERPL-MCNC: 8.8 MG/DL (ref 8.5–10.5)
CALCIUM SERPL-MCNC: 8.9 MG/DL (ref 8.5–10.5)
CALCIUM SERPL-MCNC: 9 MG/DL (ref 8.5–10.5)
CALCIUM SERPL-MCNC: 9.4 MG/DL (ref 8.5–10.5)
CALCIUM SERPL-MCNC: 9.5 MG/DL (ref 8.5–10.5)
CALCIUM SERPL-MCNC: 9.6 MG/DL (ref 8.5–10.5)
CALCIUM SERPL-MCNC: 9.7 MG/DL (ref 8.5–10.5)
CALCIUM SERPL-MCNC: 9.9 MG/DL (ref 8.5–10.5)
CALCIUM SERPL-MCNC: 9.9 MG/DL (ref 8.5–10.5)
CANNABINOIDS UR QL SCN: NEGATIVE
CHLORIDE SERPL-SCNC: 101 MMOL/L (ref 96–112)
CHLORIDE SERPL-SCNC: 104 MMOL/L (ref 96–112)
CHLORIDE SERPL-SCNC: 104 MMOL/L (ref 96–112)
CHLORIDE SERPL-SCNC: 105 MMOL/L (ref 96–112)
CHLORIDE SERPL-SCNC: 106 MMOL/L (ref 96–112)
CHLORIDE SERPL-SCNC: 107 MMOL/L (ref 96–112)
CHLORIDE SERPL-SCNC: 108 MMOL/L (ref 96–112)
CHLORIDE SERPL-SCNC: 108 MMOL/L (ref 96–112)
CHLORIDE SERPL-SCNC: 109 MMOL/L (ref 96–112)
CHLORIDE SERPL-SCNC: 110 MMOL/L (ref 96–112)
CHLORIDE SERPL-SCNC: 110 MMOL/L (ref 96–112)
CHLORIDE SERPL-SCNC: 111 MMOL/L (ref 96–112)
CHLORIDE SERPL-SCNC: 111 MMOL/L (ref 96–112)
CHLORIDE SERPL-SCNC: 112 MMOL/L (ref 96–112)
CHLORIDE SERPL-SCNC: 112 MMOL/L (ref 96–112)
CHLORIDE SERPL-SCNC: 114 MMOL/L (ref 96–112)
CHLORIDE SERPL-SCNC: 114 MMOL/L (ref 96–112)
CHLORIDE SERPL-SCNC: 115 MMOL/L (ref 96–112)
CHLORIDE SERPL-SCNC: 115 MMOL/L (ref 96–112)
CHLORIDE SERPL-SCNC: 117 MMOL/L (ref 96–112)
CHLORIDE SERPL-SCNC: 123 MMOL/L (ref 96–112)
CHLORIDE SERPL-SCNC: 97 MMOL/L (ref 96–112)
CHLORIDE SERPL-SCNC: 98 MMOL/L (ref 96–112)
CHOLEST SERPL-MCNC: 157 MG/DL (ref 100–199)
CK SERPL-CCNC: 204 U/L (ref 0–154)
CLARITY CSF: CLEAR
CMV DNA CSF QL NAA+NON-PROBE: NOT DETECTED
CO2 SERPL-SCNC: 21 MMOL/L (ref 20–33)
CO2 SERPL-SCNC: 23 MMOL/L (ref 20–33)
CO2 SERPL-SCNC: 24 MMOL/L (ref 20–33)
CO2 SERPL-SCNC: 24 MMOL/L (ref 20–33)
CO2 SERPL-SCNC: 25 MMOL/L (ref 20–33)
CO2 SERPL-SCNC: 25 MMOL/L (ref 20–33)
CO2 SERPL-SCNC: 26 MMOL/L (ref 20–33)
CO2 SERPL-SCNC: 27 MMOL/L (ref 20–33)
CO2 SERPL-SCNC: 28 MMOL/L (ref 20–33)
CO2 SERPL-SCNC: 29 MMOL/L (ref 20–33)
CO2 SERPL-SCNC: 30 MMOL/L (ref 20–33)
CO2 SERPL-SCNC: 31 MMOL/L (ref 20–33)
CO2 SERPL-SCNC: 31 MMOL/L (ref 20–33)
COLOR CSF: COLORLESS
COLOR SPUN CSF: COLORLESS
COLOR UR: YELLOW
COVID ORDER STATUS COVID19: NORMAL
COVID ORDER STATUS COVID19: NORMAL
CREAT SERPL-MCNC: 0.18 MG/DL (ref 0.5–1.4)
CREAT SERPL-MCNC: 0.2 MG/DL (ref 0.5–1.4)
CREAT SERPL-MCNC: 0.23 MG/DL (ref 0.5–1.4)
CREAT SERPL-MCNC: 0.24 MG/DL (ref 0.5–1.4)
CREAT SERPL-MCNC: 0.25 MG/DL (ref 0.5–1.4)
CREAT SERPL-MCNC: 0.27 MG/DL (ref 0.5–1.4)
CREAT SERPL-MCNC: 0.28 MG/DL (ref 0.5–1.4)
CREAT SERPL-MCNC: 0.3 MG/DL (ref 0.5–1.4)
CREAT SERPL-MCNC: 0.3 MG/DL (ref 0.5–1.4)
CREAT SERPL-MCNC: 0.32 MG/DL (ref 0.5–1.4)
CREAT SERPL-MCNC: 0.34 MG/DL (ref 0.5–1.4)
CREAT SERPL-MCNC: 0.34 MG/DL (ref 0.5–1.4)
CREAT SERPL-MCNC: 0.35 MG/DL (ref 0.5–1.4)
CREAT SERPL-MCNC: 0.38 MG/DL (ref 0.5–1.4)
CREAT SERPL-MCNC: 0.4 MG/DL (ref 0.5–1.4)
CREAT SERPL-MCNC: 0.4 MG/DL (ref 0.5–1.4)
CREAT SERPL-MCNC: 0.43 MG/DL (ref 0.5–1.4)
CREAT SERPL-MCNC: 0.48 MG/DL (ref 0.5–1.4)
CREAT SERPL-MCNC: 0.49 MG/DL (ref 0.5–1.4)
CREAT SERPL-MCNC: 0.52 MG/DL (ref 0.5–1.4)
CREAT SERPL-MCNC: 0.54 MG/DL (ref 0.5–1.4)
CREAT SERPL-MCNC: 0.58 MG/DL (ref 0.5–1.4)
CREAT SERPL-MCNC: 0.59 MG/DL (ref 0.5–1.4)
CREAT SERPL-MCNC: 0.7 MG/DL (ref 0.5–1.4)
CREAT SERPL-MCNC: <0.17 MG/DL (ref 0.5–1.4)
CRP SERPL HS-MCNC: 0.32 MG/DL (ref 0–0.75)
CRP SERPL HS-MCNC: 3.15 MG/DL (ref 0–0.75)
E COLI K1 DNA CSF QL NAA+NON-PROBE: NOT DETECTED
EKG IMPRESSION: NORMAL
EOSINOPHIL # BLD AUTO: 0 K/UL (ref 0–0.51)
EOSINOPHIL # BLD AUTO: 0.02 K/UL (ref 0–0.51)
EOSINOPHIL # BLD AUTO: 0.02 K/UL (ref 0–0.51)
EOSINOPHIL # BLD AUTO: 0.03 K/UL (ref 0–0.51)
EOSINOPHIL # BLD AUTO: 0.03 K/UL (ref 0–0.51)
EOSINOPHIL # BLD AUTO: 0.04 K/UL (ref 0–0.51)
EOSINOPHIL # BLD AUTO: 0.04 K/UL (ref 0–0.51)
EOSINOPHIL # BLD AUTO: 0.05 K/UL (ref 0–0.51)
EOSINOPHIL # BLD AUTO: 0.06 K/UL (ref 0–0.51)
EOSINOPHIL # BLD AUTO: 0.07 K/UL (ref 0–0.51)
EOSINOPHIL # BLD AUTO: 0.07 K/UL (ref 0–0.51)
EOSINOPHIL # BLD AUTO: 0.08 K/UL (ref 0–0.51)
EOSINOPHIL # BLD AUTO: 0.1 K/UL (ref 0–0.51)
EOSINOPHIL NFR BLD: 0 % (ref 0–6.9)
EOSINOPHIL NFR BLD: 0.3 % (ref 0–6.9)
EOSINOPHIL NFR BLD: 0.3 % (ref 0–6.9)
EOSINOPHIL NFR BLD: 0.4 % (ref 0–6.9)
EOSINOPHIL NFR BLD: 0.5 % (ref 0–6.9)
EOSINOPHIL NFR BLD: 0.6 % (ref 0–6.9)
EOSINOPHIL NFR BLD: 0.9 % (ref 0–6.9)
EOSINOPHIL NFR BLD: 1.1 % (ref 0–6.9)
EOSINOPHIL NFR BLD: 1.1 % (ref 0–6.9)
EOSINOPHIL NFR BLD: 1.2 % (ref 0–6.9)
EOSINOPHIL NFR BLD: 1.3 % (ref 0–6.9)
EOSINOPHIL NFR BLD: 1.6 % (ref 0–6.9)
EOSINOPHIL NFR BLD: 1.6 % (ref 0–6.9)
EOSINOPHIL NFR BLD: 1.7 % (ref 0–6.9)
EPI CELLS #/AREA URNS HPF: ABNORMAL /HPF
EPI CELLS #/AREA URNS HPF: ABNORMAL /HPF
ERYTHROCYTE [DISTWIDTH] IN BLOOD BY AUTOMATED COUNT: 40.7 FL (ref 35.9–50)
ERYTHROCYTE [DISTWIDTH] IN BLOOD BY AUTOMATED COUNT: 41.2 FL (ref 35.9–50)
ERYTHROCYTE [DISTWIDTH] IN BLOOD BY AUTOMATED COUNT: 43 FL (ref 35.9–50)
ERYTHROCYTE [DISTWIDTH] IN BLOOD BY AUTOMATED COUNT: 43.2 FL (ref 35.9–50)
ERYTHROCYTE [DISTWIDTH] IN BLOOD BY AUTOMATED COUNT: 43.6 FL (ref 35.9–50)
ERYTHROCYTE [DISTWIDTH] IN BLOOD BY AUTOMATED COUNT: 44 FL (ref 35.9–50)
ERYTHROCYTE [DISTWIDTH] IN BLOOD BY AUTOMATED COUNT: 44.7 FL (ref 35.9–50)
ERYTHROCYTE [DISTWIDTH] IN BLOOD BY AUTOMATED COUNT: 45.5 FL (ref 35.9–50)
ERYTHROCYTE [DISTWIDTH] IN BLOOD BY AUTOMATED COUNT: 45.6 FL (ref 35.9–50)
ERYTHROCYTE [DISTWIDTH] IN BLOOD BY AUTOMATED COUNT: 46.2 FL (ref 35.9–50)
ERYTHROCYTE [DISTWIDTH] IN BLOOD BY AUTOMATED COUNT: 47.1 FL (ref 35.9–50)
ERYTHROCYTE [DISTWIDTH] IN BLOOD BY AUTOMATED COUNT: 47.2 FL (ref 35.9–50)
ERYTHROCYTE [DISTWIDTH] IN BLOOD BY AUTOMATED COUNT: 47.2 FL (ref 35.9–50)
ERYTHROCYTE [DISTWIDTH] IN BLOOD BY AUTOMATED COUNT: 47.5 FL (ref 35.9–50)
ERYTHROCYTE [DISTWIDTH] IN BLOOD BY AUTOMATED COUNT: 47.8 FL (ref 35.9–50)
ERYTHROCYTE [DISTWIDTH] IN BLOOD BY AUTOMATED COUNT: 48.2 FL (ref 35.9–50)
ERYTHROCYTE [DISTWIDTH] IN BLOOD BY AUTOMATED COUNT: 48.2 FL (ref 35.9–50)
ERYTHROCYTE [DISTWIDTH] IN BLOOD BY AUTOMATED COUNT: 48.6 FL (ref 35.9–50)
ERYTHROCYTE [DISTWIDTH] IN BLOOD BY AUTOMATED COUNT: 49.8 FL (ref 35.9–50)
ERYTHROCYTE [DISTWIDTH] IN BLOOD BY AUTOMATED COUNT: 54.4 FL (ref 35.9–50)
ERYTHROCYTE [DISTWIDTH] IN BLOOD BY AUTOMATED COUNT: 59.7 FL (ref 35.9–50)
ERYTHROCYTE [SEDIMENTATION RATE] IN BLOOD BY WESTERGREN METHOD: 8 MM/HOUR (ref 0–20)
ETHANOL BLD-MCNC: <10.1 MG/DL (ref 0–10.1)
EV RNA CSF QL NAA+NON-PROBE: NOT DETECTED
FLUAV RNA SPEC QL NAA+PROBE: NEGATIVE
FLUAV RNA SPEC QL NAA+PROBE: NEGATIVE
FLUBV RNA SPEC QL NAA+PROBE: NEGATIVE
FLUBV RNA SPEC QL NAA+PROBE: NEGATIVE
GLOBULIN SER CALC-MCNC: 2.4 G/DL (ref 1.9–3.5)
GLOBULIN SER CALC-MCNC: 2.5 G/DL (ref 1.9–3.5)
GLOBULIN SER CALC-MCNC: 2.5 G/DL (ref 1.9–3.5)
GLOBULIN SER CALC-MCNC: 2.6 G/DL (ref 1.9–3.5)
GLOBULIN SER CALC-MCNC: 2.9 G/DL (ref 1.9–3.5)
GLOBULIN SER CALC-MCNC: 2.9 G/DL (ref 1.9–3.5)
GLOBULIN SER CALC-MCNC: 3 G/DL (ref 1.9–3.5)
GLOBULIN SER CALC-MCNC: 3.2 G/DL (ref 1.9–3.5)
GLOBULIN SER CALC-MCNC: 3.2 G/DL (ref 1.9–3.5)
GLOBULIN SER CALC-MCNC: 3.3 G/DL (ref 1.9–3.5)
GLOBULIN SER CALC-MCNC: 3.5 G/DL (ref 1.9–3.5)
GLUCOSE BLD-MCNC: 102 MG/DL (ref 65–99)
GLUCOSE BLD-MCNC: 103 MG/DL (ref 65–99)
GLUCOSE BLD-MCNC: 104 MG/DL (ref 65–99)
GLUCOSE BLD-MCNC: 104 MG/DL (ref 65–99)
GLUCOSE BLD-MCNC: 105 MG/DL (ref 65–99)
GLUCOSE BLD-MCNC: 114 MG/DL (ref 65–99)
GLUCOSE BLD-MCNC: 116 MG/DL (ref 65–99)
GLUCOSE BLD-MCNC: 116 MG/DL (ref 65–99)
GLUCOSE BLD-MCNC: 117 MG/DL (ref 65–99)
GLUCOSE BLD-MCNC: 119 MG/DL (ref 65–99)
GLUCOSE BLD-MCNC: 119 MG/DL (ref 65–99)
GLUCOSE BLD-MCNC: 121 MG/DL (ref 65–99)
GLUCOSE BLD-MCNC: 121 MG/DL (ref 65–99)
GLUCOSE BLD-MCNC: 128 MG/DL (ref 65–99)
GLUCOSE BLD-MCNC: 132 MG/DL (ref 65–99)
GLUCOSE BLD-MCNC: 168 MG/DL (ref 65–99)
GLUCOSE BLD-MCNC: 170 MG/DL (ref 65–99)
GLUCOSE BLD-MCNC: 177 MG/DL (ref 65–99)
GLUCOSE BLD-MCNC: 220 MG/DL (ref 65–99)
GLUCOSE BLD-MCNC: 227 MG/DL (ref 65–99)
GLUCOSE BLD-MCNC: 264 MG/DL (ref 65–99)
GLUCOSE BLD-MCNC: 324 MG/DL (ref 65–99)
GLUCOSE BLD-MCNC: 45 MG/DL (ref 65–99)
GLUCOSE BLD-MCNC: 66 MG/DL (ref 65–99)
GLUCOSE BLD-MCNC: 70 MG/DL (ref 65–99)
GLUCOSE BLD-MCNC: 70 MG/DL (ref 65–99)
GLUCOSE BLD-MCNC: 73 MG/DL (ref 65–99)
GLUCOSE BLD-MCNC: 77 MG/DL (ref 65–99)
GLUCOSE BLD-MCNC: 79 MG/DL (ref 65–99)
GLUCOSE BLD-MCNC: 80 MG/DL (ref 65–99)
GLUCOSE BLD-MCNC: 81 MG/DL (ref 65–99)
GLUCOSE BLD-MCNC: 81 MG/DL (ref 65–99)
GLUCOSE BLD-MCNC: 82 MG/DL (ref 65–99)
GLUCOSE BLD-MCNC: 83 MG/DL (ref 65–99)
GLUCOSE BLD-MCNC: 83 MG/DL (ref 65–99)
GLUCOSE BLD-MCNC: 85 MG/DL (ref 65–99)
GLUCOSE BLD-MCNC: 87 MG/DL (ref 65–99)
GLUCOSE BLD-MCNC: 88 MG/DL (ref 65–99)
GLUCOSE BLD-MCNC: 90 MG/DL (ref 65–99)
GLUCOSE BLD-MCNC: 92 MG/DL (ref 65–99)
GLUCOSE BLD-MCNC: 94 MG/DL (ref 65–99)
GLUCOSE BLD-MCNC: 96 MG/DL (ref 65–99)
GLUCOSE CSF-MCNC: 70 MG/DL (ref 40–80)
GLUCOSE SERPL-MCNC: 104 MG/DL (ref 65–99)
GLUCOSE SERPL-MCNC: 109 MG/DL (ref 65–99)
GLUCOSE SERPL-MCNC: 110 MG/DL (ref 65–99)
GLUCOSE SERPL-MCNC: 114 MG/DL (ref 65–99)
GLUCOSE SERPL-MCNC: 116 MG/DL (ref 65–99)
GLUCOSE SERPL-MCNC: 129 MG/DL (ref 65–99)
GLUCOSE SERPL-MCNC: 139 MG/DL (ref 65–99)
GLUCOSE SERPL-MCNC: 196 MG/DL (ref 65–99)
GLUCOSE SERPL-MCNC: 51 MG/DL (ref 65–99)
GLUCOSE SERPL-MCNC: 62 MG/DL (ref 65–99)
GLUCOSE SERPL-MCNC: 72 MG/DL (ref 65–99)
GLUCOSE SERPL-MCNC: 73 MG/DL (ref 65–99)
GLUCOSE SERPL-MCNC: 76 MG/DL (ref 65–99)
GLUCOSE SERPL-MCNC: 76 MG/DL (ref 65–99)
GLUCOSE SERPL-MCNC: 77 MG/DL (ref 65–99)
GLUCOSE SERPL-MCNC: 77 MG/DL (ref 65–99)
GLUCOSE SERPL-MCNC: 79 MG/DL (ref 65–99)
GLUCOSE SERPL-MCNC: 79 MG/DL (ref 65–99)
GLUCOSE SERPL-MCNC: 81 MG/DL (ref 65–99)
GLUCOSE SERPL-MCNC: 82 MG/DL (ref 65–99)
GLUCOSE SERPL-MCNC: 86 MG/DL (ref 65–99)
GLUCOSE SERPL-MCNC: 88 MG/DL (ref 65–99)
GLUCOSE SERPL-MCNC: 88 MG/DL (ref 65–99)
GLUCOSE SERPL-MCNC: 89 MG/DL (ref 65–99)
GLUCOSE SERPL-MCNC: 90 MG/DL (ref 65–99)
GLUCOSE SERPL-MCNC: 92 MG/DL (ref 65–99)
GLUCOSE SERPL-MCNC: 94 MG/DL (ref 65–99)
GLUCOSE UR STRIP.AUTO-MCNC: 250 MG/DL
GLUCOSE UR STRIP.AUTO-MCNC: NEGATIVE MG/DL
GP B STREP DNA CSF QL NAA+NON-PROBE: NOT DETECTED
GRAM STN SPEC: NORMAL
GRAM STN SPEC: NORMAL
HAEM INFLU DNA CSF QL NAA+NON-PROBE: NOT DETECTED
HBV SURFACE AB SERPL IA-ACNC: 9.99 MIU/ML (ref 0–10)
HBV SURFACE AG SER QL: NORMAL
HCG SERPL QL: NEGATIVE
HCG SERPL QL: NEGATIVE
HCO3 BLDA-SCNC: 25 MMOL/L (ref 17–25)
HCO3 BLDA-SCNC: 26 MMOL/L (ref 17–25)
HCO3 BLDA-SCNC: 29 MMOL/L (ref 17–25)
HCT VFR BLD AUTO: 28.5 % (ref 37–47)
HCT VFR BLD AUTO: 28.9 % (ref 37–47)
HCT VFR BLD AUTO: 30.6 % (ref 37–47)
HCT VFR BLD AUTO: 31 % (ref 37–47)
HCT VFR BLD AUTO: 31.4 % (ref 37–47)
HCT VFR BLD AUTO: 32.8 % (ref 37–47)
HCT VFR BLD AUTO: 33.4 % (ref 37–47)
HCT VFR BLD AUTO: 36.5 % (ref 37–47)
HCT VFR BLD AUTO: 37.1 % (ref 37–47)
HCT VFR BLD AUTO: 37.2 % (ref 37–47)
HCT VFR BLD AUTO: 37.3 % (ref 37–47)
HCT VFR BLD AUTO: 38.1 % (ref 37–47)
HCT VFR BLD AUTO: 38.2 % (ref 37–47)
HCT VFR BLD AUTO: 38.7 % (ref 37–47)
HCT VFR BLD AUTO: 39.9 % (ref 37–47)
HCT VFR BLD AUTO: 40.5 % (ref 37–47)
HCT VFR BLD AUTO: 41.4 % (ref 37–47)
HCT VFR BLD AUTO: 41.7 % (ref 37–47)
HCT VFR BLD AUTO: 42.8 % (ref 37–47)
HCT VFR BLD AUTO: 43.6 % (ref 37–47)
HCT VFR BLD AUTO: 44.1 % (ref 37–47)
HCV AB SER QL: NORMAL
HDLC SERPL-MCNC: 70 MG/DL
HGB BLD-MCNC: 10.1 G/DL (ref 12–16)
HGB BLD-MCNC: 10.5 G/DL (ref 12–16)
HGB BLD-MCNC: 10.7 G/DL (ref 12–16)
HGB BLD-MCNC: 11.2 G/DL (ref 12–16)
HGB BLD-MCNC: 11.8 G/DL (ref 12–16)
HGB BLD-MCNC: 11.8 G/DL (ref 12–16)
HGB BLD-MCNC: 12 G/DL (ref 12–16)
HGB BLD-MCNC: 12.1 G/DL (ref 12–16)
HGB BLD-MCNC: 12.2 G/DL (ref 12–16)
HGB BLD-MCNC: 12.5 G/DL (ref 12–16)
HGB BLD-MCNC: 12.7 G/DL (ref 12–16)
HGB BLD-MCNC: 12.8 G/DL (ref 12–16)
HGB BLD-MCNC: 13.3 G/DL (ref 12–16)
HGB BLD-MCNC: 13.4 G/DL (ref 12–16)
HGB BLD-MCNC: 13.6 G/DL (ref 12–16)
HGB BLD-MCNC: 13.7 G/DL (ref 12–16)
HGB BLD-MCNC: 13.9 G/DL (ref 12–16)
HGB BLD-MCNC: 9 G/DL (ref 12–16)
HGB BLD-MCNC: 9.2 G/DL (ref 12–16)
HGB BLD-MCNC: 9.6 G/DL (ref 12–16)
HGB BLD-MCNC: 9.9 G/DL (ref 12–16)
HHV6 DNA CSF QL NAA+NON-PROBE: NOT DETECTED
HIV 1+2 AB+HIV1 P24 AG SERPL QL IA: NORMAL
HSV1 DNA CSF QL NAA+NON-PROBE: NOT DETECTED
HSV1 DNA SPEC QL NAA+PROBE: NEGATIVE
HSV1+2 IGM CSF-ACNC: 0.45 IV
HSV2 DNA CSF QL NAA+NON-PROBE: NOT DETECTED
HSV2 DNA SPEC QL NAA+PROBE: NEGATIVE
HYALINE CASTS #/AREA URNS LPF: ABNORMAL /LPF
HYALINE CASTS #/AREA URNS LPF: ABNORMAL /LPF
IMM GRANULOCYTES # BLD AUTO: 0 K/UL (ref 0–0.11)
IMM GRANULOCYTES # BLD AUTO: 0.01 K/UL (ref 0–0.11)
IMM GRANULOCYTES # BLD AUTO: 0.01 K/UL (ref 0–0.11)
IMM GRANULOCYTES # BLD AUTO: 0.02 K/UL (ref 0–0.11)
IMM GRANULOCYTES # BLD AUTO: 0.03 K/UL (ref 0–0.11)
IMM GRANULOCYTES # BLD AUTO: 0.03 K/UL (ref 0–0.11)
IMM GRANULOCYTES # BLD AUTO: 0.04 K/UL (ref 0–0.11)
IMM GRANULOCYTES # BLD AUTO: 0.06 K/UL (ref 0–0.11)
IMM GRANULOCYTES # BLD AUTO: 0.08 K/UL (ref 0–0.11)
IMM GRANULOCYTES NFR BLD AUTO: 0 % (ref 0–0.9)
IMM GRANULOCYTES NFR BLD AUTO: 0.2 % (ref 0–0.9)
IMM GRANULOCYTES NFR BLD AUTO: 0.3 % (ref 0–0.9)
IMM GRANULOCYTES NFR BLD AUTO: 0.4 % (ref 0–0.9)
IMM GRANULOCYTES NFR BLD AUTO: 0.5 % (ref 0–0.9)
IMM GRANULOCYTES NFR BLD AUTO: 0.6 % (ref 0–0.9)
IMM GRANULOCYTES NFR BLD AUTO: 0.6 % (ref 0–0.9)
IMM GRANULOCYTES NFR BLD AUTO: 1.1 % (ref 0–0.9)
IMM GRANULOCYTES NFR BLD AUTO: 1.3 % (ref 0–0.9)
INHALED O2 FLOW RATE: 6 L/MIN (ref 2–10)
INR PPP: 1.1 (ref 0.87–1.13)
KETONES UR STRIP.AUTO-MCNC: 15 MG/DL
KETONES UR STRIP.AUTO-MCNC: 80 MG/DL
KETONES UR STRIP.AUTO-MCNC: NEGATIVE MG/DL
L MONOCYTOG DNA CSF QL NAA+NON-PROBE: NOT DETECTED
LACTATE BLD-SCNC: 0.9 MMOL/L (ref 0.5–2)
LACTATE BLD-SCNC: 1 MMOL/L (ref 0.5–2)
LACTATE BLD-SCNC: 1.1 MMOL/L (ref 0.5–2)
LACTATE BLD-SCNC: 1.2 MMOL/L (ref 0.5–2)
LACTATE BLD-SCNC: 1.5 MMOL/L (ref 0.5–2)
LACTATE BLD-SCNC: 1.5 MMOL/L (ref 0.5–2)
LACTATE BLD-SCNC: 2.9 MMOL/L (ref 0.5–2)
LACTATE BLD-SCNC: 3.4 MMOL/L (ref 0.5–2)
LACTATE BLD-SCNC: 3.6 MMOL/L (ref 0.5–2)
LACTATE BLD-SCNC: 3.7 MMOL/L (ref 0.5–2)
LACTATE BLD-SCNC: 4 MMOL/L (ref 0.5–2)
LDLC SERPL CALC-MCNC: 72 MG/DL
LEUKOCYTE ESTERASE UR QL STRIP.AUTO: ABNORMAL
LEUKOCYTE ESTERASE UR QL STRIP.AUTO: ABNORMAL
LEUKOCYTE ESTERASE UR QL STRIP.AUTO: NEGATIVE
LIPASE SERPL-CCNC: 15 U/L (ref 11–82)
LIPASE SERPL-CCNC: 163 U/L (ref 11–82)
LIPASE SERPL-CCNC: 20 U/L (ref 11–82)
LIPASE SERPL-CCNC: 22 U/L (ref 11–82)
LIPASE SERPL-CCNC: 29 U/L (ref 11–82)
LIPASE SERPL-CCNC: 35 U/L (ref 11–82)
LIPASE SERPL-CCNC: 39 U/L (ref 11–82)
LIPASE SERPL-CCNC: 7 U/L (ref 11–82)
LV EJECT FRACT  99904: 65
LV EJECT FRACT MOD 2C 99903: 74.49
LV EJECT FRACT MOD 4C 99902: 72.17
LV EJECT FRACT MOD BP 99901: 71.86
LYMPHOCYTES # BLD AUTO: 0.22 K/UL (ref 1–4.8)
LYMPHOCYTES # BLD AUTO: 0.46 K/UL (ref 1–4.8)
LYMPHOCYTES # BLD AUTO: 0.6 K/UL (ref 1–4.8)
LYMPHOCYTES # BLD AUTO: 0.99 K/UL (ref 1–4.8)
LYMPHOCYTES # BLD AUTO: 1.01 K/UL (ref 1–4.8)
LYMPHOCYTES # BLD AUTO: 1.04 K/UL (ref 1–4.8)
LYMPHOCYTES # BLD AUTO: 1.11 K/UL (ref 1–4.8)
LYMPHOCYTES # BLD AUTO: 1.17 K/UL (ref 1–4.8)
LYMPHOCYTES # BLD AUTO: 1.28 K/UL (ref 1–4.8)
LYMPHOCYTES # BLD AUTO: 1.29 K/UL (ref 1–4.8)
LYMPHOCYTES # BLD AUTO: 1.31 K/UL (ref 1–4.8)
LYMPHOCYTES # BLD AUTO: 1.4 K/UL (ref 1–4.8)
LYMPHOCYTES # BLD AUTO: 1.41 K/UL (ref 1–4.8)
LYMPHOCYTES # BLD AUTO: 1.66 K/UL (ref 1–4.8)
LYMPHOCYTES # BLD AUTO: 1.72 K/UL (ref 1–4.8)
LYMPHOCYTES # BLD AUTO: 1.82 K/UL (ref 1–4.8)
LYMPHOCYTES # BLD AUTO: 1.93 K/UL (ref 1–4.8)
LYMPHOCYTES # BLD AUTO: 2.09 K/UL (ref 1–4.8)
LYMPHOCYTES # BLD AUTO: 2.63 K/UL (ref 1–4.8)
LYMPHOCYTES NFR BLD: 10.6 % (ref 22–41)
LYMPHOCYTES NFR BLD: 12.2 % (ref 22–41)
LYMPHOCYTES NFR BLD: 14 % (ref 22–41)
LYMPHOCYTES NFR BLD: 17.6 % (ref 22–41)
LYMPHOCYTES NFR BLD: 18.5 % (ref 22–41)
LYMPHOCYTES NFR BLD: 2.5 % (ref 22–41)
LYMPHOCYTES NFR BLD: 2.6 % (ref 22–41)
LYMPHOCYTES NFR BLD: 20.5 % (ref 22–41)
LYMPHOCYTES NFR BLD: 20.8 % (ref 22–41)
LYMPHOCYTES NFR BLD: 21.1 % (ref 22–41)
LYMPHOCYTES NFR BLD: 27.8 % (ref 22–41)
LYMPHOCYTES NFR BLD: 30.8 % (ref 22–41)
LYMPHOCYTES NFR BLD: 33.7 % (ref 22–41)
LYMPHOCYTES NFR BLD: 34.8 % (ref 22–41)
LYMPHOCYTES NFR BLD: 35.3 % (ref 22–41)
LYMPHOCYTES NFR BLD: 35.3 % (ref 22–41)
LYMPHOCYTES NFR BLD: 37.9 % (ref 22–41)
LYMPHOCYTES NFR BLD: 46.1 % (ref 22–41)
LYMPHOCYTES NFR BLD: 9.7 % (ref 22–41)
LYMPHOCYTES NFR CSF: 40 %
MAGNESIUM SERPL-MCNC: 1.5 MG/DL (ref 1.5–2.5)
MAGNESIUM SERPL-MCNC: 1.7 MG/DL (ref 1.5–2.5)
MAGNESIUM SERPL-MCNC: 1.8 MG/DL (ref 1.5–2.5)
MAGNESIUM SERPL-MCNC: 2 MG/DL (ref 1.5–2.5)
MAGNESIUM SERPL-MCNC: 2.1 MG/DL (ref 1.5–2.5)
MANUAL DIFF BLD: NORMAL
MCH RBC QN AUTO: 28.8 PG (ref 27–33)
MCH RBC QN AUTO: 28.9 PG (ref 27–33)
MCH RBC QN AUTO: 29 PG (ref 27–33)
MCH RBC QN AUTO: 29 PG (ref 27–33)
MCH RBC QN AUTO: 29.2 PG (ref 27–33)
MCH RBC QN AUTO: 29.2 PG (ref 27–33)
MCH RBC QN AUTO: 29.3 PG (ref 27–33)
MCH RBC QN AUTO: 29.4 PG (ref 27–33)
MCH RBC QN AUTO: 29.5 PG (ref 27–33)
MCH RBC QN AUTO: 29.6 PG (ref 27–33)
MCH RBC QN AUTO: 29.6 PG (ref 27–33)
MCH RBC QN AUTO: 29.7 PG (ref 27–33)
MCH RBC QN AUTO: 29.7 PG (ref 27–33)
MCH RBC QN AUTO: 29.8 PG (ref 27–33)
MCH RBC QN AUTO: 30 PG (ref 27–33)
MCH RBC QN AUTO: 30 PG (ref 27–33)
MCH RBC QN AUTO: 30.2 PG (ref 27–33)
MCHC RBC AUTO-ENTMCNC: 30.7 G/DL (ref 33.6–35)
MCHC RBC AUTO-ENTMCNC: 30.8 G/DL (ref 33.6–35)
MCHC RBC AUTO-ENTMCNC: 31 G/DL (ref 33.6–35)
MCHC RBC AUTO-ENTMCNC: 31.1 G/DL (ref 33.6–35)
MCHC RBC AUTO-ENTMCNC: 31.4 G/DL (ref 33.6–35)
MCHC RBC AUTO-ENTMCNC: 31.5 G/DL (ref 33.6–35)
MCHC RBC AUTO-ENTMCNC: 31.6 G/DL (ref 33.6–35)
MCHC RBC AUTO-ENTMCNC: 31.7 G/DL (ref 33.6–35)
MCHC RBC AUTO-ENTMCNC: 31.8 G/DL (ref 33.6–35)
MCHC RBC AUTO-ENTMCNC: 31.8 G/DL (ref 33.6–35)
MCHC RBC AUTO-ENTMCNC: 31.9 G/DL (ref 33.6–35)
MCHC RBC AUTO-ENTMCNC: 31.9 G/DL (ref 33.6–35)
MCHC RBC AUTO-ENTMCNC: 32.2 G/DL (ref 33.6–35)
MCHC RBC AUTO-ENTMCNC: 32.2 G/DL (ref 33.6–35)
MCHC RBC AUTO-ENTMCNC: 32.3 G/DL (ref 33.6–35)
MCHC RBC AUTO-ENTMCNC: 32.4 G/DL (ref 33.6–35)
MCHC RBC AUTO-ENTMCNC: 32.5 G/DL (ref 33.6–35)
MCHC RBC AUTO-ENTMCNC: 32.6 G/DL (ref 33.6–35)
MCHC RBC AUTO-ENTMCNC: 33.6 G/DL (ref 33.6–35)
MCV RBC AUTO: 89.2 FL (ref 81.4–97.8)
MCV RBC AUTO: 90.2 FL (ref 81.4–97.8)
MCV RBC AUTO: 90.4 FL (ref 81.4–97.8)
MCV RBC AUTO: 90.7 FL (ref 81.4–97.8)
MCV RBC AUTO: 91.1 FL (ref 81.4–97.8)
MCV RBC AUTO: 91.8 FL (ref 81.4–97.8)
MCV RBC AUTO: 91.9 FL (ref 81.4–97.8)
MCV RBC AUTO: 92.2 FL (ref 81.4–97.8)
MCV RBC AUTO: 92.3 FL (ref 81.4–97.8)
MCV RBC AUTO: 92.6 FL (ref 81.4–97.8)
MCV RBC AUTO: 92.7 FL (ref 81.4–97.8)
MCV RBC AUTO: 92.9 FL (ref 81.4–97.8)
MCV RBC AUTO: 92.9 FL (ref 81.4–97.8)
MCV RBC AUTO: 93.3 FL (ref 81.4–97.8)
MCV RBC AUTO: 93.4 FL (ref 81.4–97.8)
MCV RBC AUTO: 93.8 FL (ref 81.4–97.8)
MCV RBC AUTO: 95 FL (ref 81.4–97.8)
MCV RBC AUTO: 95.1 FL (ref 81.4–97.8)
MCV RBC AUTO: 96.3 FL (ref 81.4–97.8)
METHADONE UR QL SCN: NEGATIVE
MICRO URNS: ABNORMAL
MICRO URNS: NORMAL
MICRO URNS: NORMAL
MICROCYTES BLD QL SMEAR: ABNORMAL
MONOCYTES # BLD AUTO: 0.1 K/UL (ref 0–0.85)
MONOCYTES # BLD AUTO: 0.24 K/UL (ref 0–0.85)
MONOCYTES # BLD AUTO: 0.27 K/UL (ref 0–0.85)
MONOCYTES # BLD AUTO: 0.32 K/UL (ref 0–0.85)
MONOCYTES # BLD AUTO: 0.32 K/UL (ref 0–0.85)
MONOCYTES # BLD AUTO: 0.33 K/UL (ref 0–0.85)
MONOCYTES # BLD AUTO: 0.36 K/UL (ref 0–0.85)
MONOCYTES # BLD AUTO: 0.38 K/UL (ref 0–0.85)
MONOCYTES # BLD AUTO: 0.41 K/UL (ref 0–0.85)
MONOCYTES # BLD AUTO: 0.43 K/UL (ref 0–0.85)
MONOCYTES # BLD AUTO: 0.44 K/UL (ref 0–0.85)
MONOCYTES # BLD AUTO: 0.49 K/UL (ref 0–0.85)
MONOCYTES # BLD AUTO: 0.5 K/UL (ref 0–0.85)
MONOCYTES # BLD AUTO: 0.53 K/UL (ref 0–0.85)
MONOCYTES # BLD AUTO: 0.54 K/UL (ref 0–0.85)
MONOCYTES # BLD AUTO: 0.55 K/UL (ref 0–0.85)
MONOCYTES # BLD AUTO: 0.71 K/UL (ref 0–0.85)
MONOCYTES # BLD AUTO: 0.79 K/UL (ref 0–0.85)
MONOCYTES # BLD AUTO: 0.93 K/UL (ref 0–0.85)
MONOCYTES NFR BLD AUTO: 1.1 % (ref 0–13.4)
MONOCYTES NFR BLD AUTO: 10.4 % (ref 0–13.4)
MONOCYTES NFR BLD AUTO: 11.3 % (ref 0–13.4)
MONOCYTES NFR BLD AUTO: 11.3 % (ref 0–13.4)
MONOCYTES NFR BLD AUTO: 3.5 % (ref 0–13.4)
MONOCYTES NFR BLD AUTO: 4.4 % (ref 0–13.4)
MONOCYTES NFR BLD AUTO: 4.7 % (ref 0–13.4)
MONOCYTES NFR BLD AUTO: 5.3 % (ref 0–13.4)
MONOCYTES NFR BLD AUTO: 5.8 % (ref 0–13.4)
MONOCYTES NFR BLD AUTO: 6.1 % (ref 0–13.4)
MONOCYTES NFR BLD AUTO: 6.2 % (ref 0–13.4)
MONOCYTES NFR BLD AUTO: 6.6 % (ref 0–13.4)
MONOCYTES NFR BLD AUTO: 6.7 % (ref 0–13.4)
MONOCYTES NFR BLD AUTO: 7.3 % (ref 0–13.4)
MONOCYTES NFR BLD AUTO: 7.5 % (ref 0–13.4)
MONOCYTES NFR BLD AUTO: 8.5 % (ref 0–13.4)
MONOCYTES NFR BLD AUTO: 8.7 % (ref 0–13.4)
MONOCYTES NFR BLD AUTO: 8.9 % (ref 0–13.4)
MONOCYTES NFR BLD AUTO: 8.9 % (ref 0–13.4)
MORPHOLOGY BLD-IMP: NORMAL
MRSA DNA SPEC QL NAA+PROBE: NORMAL
N MEN DNA CSF QL NAA+NON-PROBE: NOT DETECTED
NEUTROPHILS # BLD AUTO: 1.72 K/UL (ref 2–7.15)
NEUTROPHILS # BLD AUTO: 1.8 K/UL (ref 2–7.15)
NEUTROPHILS # BLD AUTO: 16.23 K/UL (ref 2–7.15)
NEUTROPHILS # BLD AUTO: 2.27 K/UL (ref 2–7.15)
NEUTROPHILS # BLD AUTO: 2.54 K/UL (ref 2–7.15)
NEUTROPHILS # BLD AUTO: 2.9 K/UL (ref 2–7.15)
NEUTROPHILS # BLD AUTO: 3.5 K/UL (ref 2–7.15)
NEUTROPHILS # BLD AUTO: 3.66 K/UL (ref 2–7.15)
NEUTROPHILS # BLD AUTO: 3.83 K/UL (ref 2–7.15)
NEUTROPHILS # BLD AUTO: 4 K/UL (ref 2–7.15)
NEUTROPHILS # BLD AUTO: 4.1 K/UL (ref 2–7.15)
NEUTROPHILS # BLD AUTO: 4.13 K/UL (ref 2–7.15)
NEUTROPHILS # BLD AUTO: 5.15 K/UL (ref 2–7.15)
NEUTROPHILS # BLD AUTO: 5.18 K/UL (ref 2–7.15)
NEUTROPHILS # BLD AUTO: 5.56 K/UL (ref 2–7.15)
NEUTROPHILS # BLD AUTO: 5.67 K/UL (ref 2–7.15)
NEUTROPHILS # BLD AUTO: 7.41 K/UL (ref 2–7.15)
NEUTROPHILS # BLD AUTO: 8.42 K/UL (ref 2–7.15)
NEUTROPHILS # BLD AUTO: 9.42 K/UL (ref 2–7.15)
NEUTROPHILS NFR BLD: 44.6 % (ref 44–72)
NEUTROPHILS NFR BLD: 49.5 % (ref 44–72)
NEUTROPHILS NFR BLD: 51.8 % (ref 44–72)
NEUTROPHILS NFR BLD: 52.2 % (ref 44–72)
NEUTROPHILS NFR BLD: 55.8 % (ref 44–72)
NEUTROPHILS NFR BLD: 56.3 % (ref 44–72)
NEUTROPHILS NFR BLD: 59.1 % (ref 44–72)
NEUTROPHILS NFR BLD: 61.9 % (ref 44–72)
NEUTROPHILS NFR BLD: 67.5 % (ref 44–72)
NEUTROPHILS NFR BLD: 71 % (ref 44–72)
NEUTROPHILS NFR BLD: 71.9 % (ref 44–72)
NEUTROPHILS NFR BLD: 74.7 % (ref 44–72)
NEUTROPHILS NFR BLD: 74.8 % (ref 44–72)
NEUTROPHILS NFR BLD: 78.5 % (ref 44–72)
NEUTROPHILS NFR BLD: 79.6 % (ref 44–72)
NEUTROPHILS NFR BLD: 82.1 % (ref 44–72)
NEUTROPHILS NFR BLD: 83.5 % (ref 44–72)
NEUTROPHILS NFR BLD: 90.4 % (ref 44–72)
NEUTROPHILS NFR BLD: 96 % (ref 44–72)
NEUTROPHILS NFR CSF: 60 %
NEUTS BAND NFR BLD MANUAL: 0.9 % (ref 0–10)
NEUTS BAND NFR BLD MANUAL: 1.7 % (ref 0–10)
NEUTS BAND NFR BLD MANUAL: 1.8 % (ref 0–10)
NITRITE UR QL STRIP.AUTO: NEGATIVE
NRBC # BLD AUTO: 0 K/UL
NRBC BLD-RTO: 0 /100 WBC
OPIATES UR QL SCN: POSITIVE
OXYCODONE UR QL SCN: POSITIVE
PARECHOVIRUS A RNA CSF QL NAA+NON-PROBE: NOT DETECTED
PCO2 BLDA: 41.2 MMHG (ref 26–37)
PCO2 BLDA: 41.8 MMHG (ref 26–37)
PCO2 BLDA: 51.4 MMHG (ref 26–37)
PCP UR QL SCN: NEGATIVE
PH BLDA: 7.37 [PH] (ref 7.4–7.5)
PH BLDA: 7.39 [PH] (ref 7.4–7.5)
PH BLDA: 7.43 [PH] (ref 7.4–7.5)
PH UR STRIP.AUTO: 5 [PH] (ref 5–8)
PH UR STRIP.AUTO: 5.5 [PH] (ref 5–8)
PH UR STRIP.AUTO: 5.5 [PH] (ref 5–8)
PH UR STRIP.AUTO: 6 [PH] (ref 5–8)
PH UR STRIP.AUTO: 7.5 [PH] (ref 5–8)
PHOSPHATE SERPL-MCNC: 1.2 MG/DL (ref 2.5–4.5)
PHOSPHATE SERPL-MCNC: 1.5 MG/DL (ref 2.5–4.5)
PHOSPHATE SERPL-MCNC: 1.6 MG/DL (ref 2.5–4.5)
PHOSPHATE SERPL-MCNC: 1.8 MG/DL (ref 2.5–4.5)
PHOSPHATE SERPL-MCNC: 1.9 MG/DL (ref 2.5–4.5)
PHOSPHATE SERPL-MCNC: 2.4 MG/DL (ref 2.5–4.5)
PHOSPHATE SERPL-MCNC: 2.6 MG/DL (ref 2.5–4.5)
PHOSPHATE SERPL-MCNC: 2.9 MG/DL (ref 2.5–4.5)
PHOSPHATE SERPL-MCNC: 3.7 MG/DL (ref 2.5–4.5)
PLATELET # BLD AUTO: 104 K/UL (ref 164–446)
PLATELET # BLD AUTO: 126 K/UL (ref 164–446)
PLATELET # BLD AUTO: 133 K/UL (ref 164–446)
PLATELET # BLD AUTO: 149 K/UL (ref 164–446)
PLATELET # BLD AUTO: 160 K/UL (ref 164–446)
PLATELET # BLD AUTO: 170 K/UL (ref 164–446)
PLATELET # BLD AUTO: 190 K/UL (ref 164–446)
PLATELET # BLD AUTO: 191 K/UL (ref 164–446)
PLATELET # BLD AUTO: 202 K/UL (ref 164–446)
PLATELET # BLD AUTO: 213 K/UL (ref 164–446)
PLATELET # BLD AUTO: 220 K/UL (ref 164–446)
PLATELET # BLD AUTO: 225 K/UL (ref 164–446)
PLATELET # BLD AUTO: 229 K/UL (ref 164–446)
PLATELET # BLD AUTO: 238 K/UL (ref 164–446)
PLATELET # BLD AUTO: 240 K/UL (ref 164–446)
PLATELET # BLD AUTO: 242 K/UL (ref 164–446)
PLATELET # BLD AUTO: 242 K/UL (ref 164–446)
PLATELET # BLD AUTO: 260 K/UL (ref 164–446)
PLATELET # BLD AUTO: 271 K/UL (ref 164–446)
PLATELET # BLD AUTO: 283 K/UL (ref 164–446)
PLATELET # BLD AUTO: 316 K/UL (ref 164–446)
PLATELET BLD QL SMEAR: NORMAL
PMV BLD AUTO: 10 FL (ref 9–12.9)
PMV BLD AUTO: 10 FL (ref 9–12.9)
PMV BLD AUTO: 10.1 FL (ref 9–12.9)
PMV BLD AUTO: 10.1 FL (ref 9–12.9)
PMV BLD AUTO: 10.2 FL (ref 9–12.9)
PMV BLD AUTO: 10.3 FL (ref 9–12.9)
PMV BLD AUTO: 10.7 FL (ref 9–12.9)
PMV BLD AUTO: 10.7 FL (ref 9–12.9)
PMV BLD AUTO: 11.2 FL (ref 9–12.9)
PMV BLD AUTO: 11.5 FL (ref 9–12.9)
PMV BLD AUTO: 8.7 FL (ref 9–12.9)
PMV BLD AUTO: 9.5 FL (ref 9–12.9)
PMV BLD AUTO: 9.7 FL (ref 9–12.9)
PMV BLD AUTO: 9.9 FL (ref 9–12.9)
PO2 BLDA: 131.4 MMHG (ref 64–87)
PO2 BLDA: 135.4 MMHG (ref 64–87)
PO2 BLDA: 158.1 MMHG (ref 64–87)
POLYCHROMASIA BLD QL SMEAR: NORMAL
POTASSIUM SERPL-SCNC: 2.4 MMOL/L (ref 3.6–5.5)
POTASSIUM SERPL-SCNC: 3 MMOL/L (ref 3.6–5.5)
POTASSIUM SERPL-SCNC: 3.1 MMOL/L (ref 3.6–5.5)
POTASSIUM SERPL-SCNC: 3.1 MMOL/L (ref 3.6–5.5)
POTASSIUM SERPL-SCNC: 3.2 MMOL/L (ref 3.6–5.5)
POTASSIUM SERPL-SCNC: 3.5 MMOL/L (ref 3.6–5.5)
POTASSIUM SERPL-SCNC: 3.6 MMOL/L (ref 3.6–5.5)
POTASSIUM SERPL-SCNC: 3.7 MMOL/L (ref 3.6–5.5)
POTASSIUM SERPL-SCNC: 3.8 MMOL/L (ref 3.6–5.5)
POTASSIUM SERPL-SCNC: 3.8 MMOL/L (ref 3.6–5.5)
POTASSIUM SERPL-SCNC: 3.9 MMOL/L (ref 3.6–5.5)
POTASSIUM SERPL-SCNC: 3.9 MMOL/L (ref 3.6–5.5)
POTASSIUM SERPL-SCNC: 4 MMOL/L (ref 3.6–5.5)
POTASSIUM SERPL-SCNC: 4 MMOL/L (ref 3.6–5.5)
POTASSIUM SERPL-SCNC: 4.1 MMOL/L (ref 3.6–5.5)
POTASSIUM SERPL-SCNC: 4.2 MMOL/L (ref 3.6–5.5)
POTASSIUM SERPL-SCNC: 4.5 MMOL/L (ref 3.6–5.5)
POTASSIUM SERPL-SCNC: 4.5 MMOL/L (ref 3.6–5.5)
POTASSIUM SERPL-SCNC: 4.6 MMOL/L (ref 3.6–5.5)
POTASSIUM SERPL-SCNC: 4.7 MMOL/L (ref 3.6–5.5)
POTASSIUM SERPL-SCNC: 4.7 MMOL/L (ref 3.6–5.5)
POTASSIUM SERPL-SCNC: 5 MMOL/L (ref 3.6–5.5)
PREALB SERPL-MCNC: 7.9 MG/DL (ref 18–38)
PROCALCITONIN SERPL-MCNC: 0.19 NG/ML
PROCALCITONIN SERPL-MCNC: 0.33 NG/ML
PROCALCITONIN SERPL-MCNC: 0.53 NG/ML
PROCALCITONIN SERPL-MCNC: 1.98 NG/ML
PROPOXYPH UR QL SCN: NEGATIVE
PROT CSF-MCNC: 20 MG/DL (ref 15–45)
PROT SERPL-MCNC: 4.8 G/DL (ref 6–8.2)
PROT SERPL-MCNC: 5 G/DL (ref 6–8.2)
PROT SERPL-MCNC: 5.2 G/DL (ref 6–8.2)
PROT SERPL-MCNC: 5.5 G/DL (ref 6–8.2)
PROT SERPL-MCNC: 6 G/DL (ref 6–8.2)
PROT SERPL-MCNC: 6.4 G/DL (ref 6–8.2)
PROT SERPL-MCNC: 6.6 G/DL (ref 6–8.2)
PROT SERPL-MCNC: 6.8 G/DL (ref 6–8.2)
PROT SERPL-MCNC: 7 G/DL (ref 6–8.2)
PROT SERPL-MCNC: 7.2 G/DL (ref 6–8.2)
PROT SERPL-MCNC: 7.3 G/DL (ref 6–8.2)
PROT SERPL-MCNC: 7.3 G/DL (ref 6–8.2)
PROT SERPL-MCNC: 7.4 G/DL (ref 6–8.2)
PROT SERPL-MCNC: 7.6 G/DL (ref 6–8.2)
PROT UR QL STRIP: NEGATIVE MG/DL
PROTHROMBIN TIME: 13.9 SEC (ref 12–14.6)
RBC # BLD AUTO: 3 M/UL (ref 4.2–5.4)
RBC # BLD AUTO: 3.1 M/UL (ref 4.2–5.4)
RBC # BLD AUTO: 3.33 M/UL (ref 4.2–5.4)
RBC # BLD AUTO: 3.36 M/UL (ref 4.2–5.4)
RBC # BLD AUTO: 3.48 M/UL (ref 4.2–5.4)
RBC # BLD AUTO: 3.56 M/UL (ref 4.2–5.4)
RBC # BLD AUTO: 3.63 M/UL (ref 4.2–5.4)
RBC # BLD AUTO: 3.84 M/UL (ref 4.2–5.4)
RBC # BLD AUTO: 4.06 M/UL (ref 4.2–5.4)
RBC # BLD AUTO: 4.09 M/UL (ref 4.2–5.4)
RBC # BLD AUTO: 4.1 M/UL (ref 4.2–5.4)
RBC # BLD AUTO: 4.11 M/UL (ref 4.2–5.4)
RBC # BLD AUTO: 4.17 M/UL (ref 4.2–5.4)
RBC # BLD AUTO: 4.18 M/UL (ref 4.2–5.4)
RBC # BLD AUTO: 4.2 M/UL (ref 4.2–5.4)
RBC # BLD AUTO: 4.37 M/UL (ref 4.2–5.4)
RBC # BLD AUTO: 4.47 M/UL (ref 4.2–5.4)
RBC # BLD AUTO: 4.51 M/UL (ref 4.2–5.4)
RBC # BLD AUTO: 4.7 M/UL (ref 4.2–5.4)
RBC # BLD AUTO: 4.72 M/UL (ref 4.2–5.4)
RBC # BLD AUTO: 4.73 M/UL (ref 4.2–5.4)
RBC # CSF: 373 CELLS/UL
RBC # URNS HPF: ABNORMAL /HPF
RBC # URNS HPF: ABNORMAL /HPF
RBC BLD AUTO: NORMAL
RBC BLD AUTO: PRESENT
RBC BLD AUTO: PRESENT
RBC UR QL AUTO: NEGATIVE
RSV RNA SPEC QL NAA+PROBE: NEGATIVE
S PNEUM DNA CSF QL NAA+NON-PROBE: NOT DETECTED
SAO2 % BLDA: 97.9 % (ref 93–99)
SAO2 % BLDA: 98.2 % (ref 93–99)
SAO2 % BLDA: 98.7 % (ref 93–99)
SARS-COV-2 RNA RESP QL NAA+PROBE: NOTDETECTED
SARS-COV-2 RNA RESP QL NAA+PROBE: NOTDETECTED
SIGNIFICANT IND 70042: NORMAL
SITE SITE: NORMAL
SODIUM SERPL-SCNC: 139 MMOL/L (ref 135–145)
SODIUM SERPL-SCNC: 139 MMOL/L (ref 135–145)
SODIUM SERPL-SCNC: 140 MMOL/L (ref 135–145)
SODIUM SERPL-SCNC: 141 MMOL/L (ref 135–145)
SODIUM SERPL-SCNC: 142 MMOL/L (ref 135–145)
SODIUM SERPL-SCNC: 142 MMOL/L (ref 135–145)
SODIUM SERPL-SCNC: 143 MMOL/L (ref 135–145)
SODIUM SERPL-SCNC: 144 MMOL/L (ref 135–145)
SODIUM SERPL-SCNC: 145 MMOL/L (ref 135–145)
SODIUM SERPL-SCNC: 146 MMOL/L (ref 135–145)
SODIUM SERPL-SCNC: 147 MMOL/L (ref 135–145)
SODIUM SERPL-SCNC: 148 MMOL/L (ref 135–145)
SODIUM SERPL-SCNC: 149 MMOL/L (ref 135–145)
SODIUM SERPL-SCNC: 151 MMOL/L (ref 135–145)
SOURCE SOURCE: NORMAL
SP GR UR STRIP.AUTO: 1.01
SP GR UR STRIP.AUTO: 1.02
SP GR UR STRIP.AUTO: <=1.005
SPECIMEN SOURCE: NORMAL
SPECIMEN VOL CSF: 11 ML
TEST NAME 95000: NORMAL
TREPONEMA PALLIDUM IGG+IGM AB [PRESENCE] IN SERUM OR PLASMA BY IMMUNOASSAY: NORMAL
TRIGL SERPL-MCNC: 73 MG/DL (ref 0–149)
TSH SERPL DL<=0.005 MIU/L-ACNC: 1.21 UIU/ML (ref 0.38–5.33)
TUBE # CSF: 3
TUBE # CSF: 3
UROBILINOGEN UR STRIP.AUTO-MCNC: 0.2 MG/DL
VANCOMYCIN TROUGH SERPL-MCNC: 20.1 UG/ML (ref 10–20)
VANCOMYCIN TROUGH SERPL-MCNC: 8.3 UG/ML (ref 10–20)
VIT B12 SERPL-MCNC: 1457 PG/ML (ref 211–911)
VZV DNA CSF QL NAA+NON-PROBE: NOT DETECTED
WBC # BLD AUTO: 11.5 K/UL (ref 4.8–10.8)
WBC # BLD AUTO: 17.6 K/UL (ref 4.8–10.8)
WBC # BLD AUTO: 3.2 K/UL (ref 4.8–10.8)
WBC # BLD AUTO: 3.6 K/UL (ref 4.8–10.8)
WBC # BLD AUTO: 4.4 K/UL (ref 4.8–10.8)
WBC # BLD AUTO: 5.2 K/UL (ref 4.8–10.8)
WBC # BLD AUTO: 5.2 K/UL (ref 4.8–10.8)
WBC # BLD AUTO: 5.6 K/UL (ref 4.8–10.8)
WBC # BLD AUTO: 5.7 K/UL (ref 4.8–10.8)
WBC # BLD AUTO: 5.7 K/UL (ref 4.8–10.8)
WBC # BLD AUTO: 6.1 K/UL (ref 4.8–10.8)
WBC # BLD AUTO: 6.2 K/UL (ref 4.8–10.8)
WBC # BLD AUTO: 6.3 K/UL (ref 4.8–10.8)
WBC # BLD AUTO: 6.8 K/UL (ref 4.8–10.8)
WBC # BLD AUTO: 7.2 K/UL (ref 4.8–10.8)
WBC # BLD AUTO: 7.4 K/UL (ref 4.8–10.8)
WBC # BLD AUTO: 7.4 K/UL (ref 4.8–10.8)
WBC # BLD AUTO: 8.8 K/UL (ref 4.8–10.8)
WBC # BLD AUTO: 9.3 K/UL (ref 4.8–10.8)
WBC # CSF: 3 CELLS/UL (ref 0–10)
WBC #/AREA URNS HPF: ABNORMAL /HPF
WBC #/AREA URNS HPF: ABNORMAL /HPF

## 2020-01-01 PROCEDURE — 96374 THER/PROPH/DIAG INJ IV PUSH: CPT

## 2020-01-01 PROCEDURE — 84145 PROCALCITONIN (PCT): CPT

## 2020-01-01 PROCEDURE — A9270 NON-COVERED ITEM OR SERVICE: HCPCS | Performed by: STUDENT IN AN ORGANIZED HEALTH CARE EDUCATION/TRAINING PROGRAM

## 2020-01-01 PROCEDURE — A9270 NON-COVERED ITEM OR SERVICE: HCPCS | Performed by: NURSE PRACTITIONER

## 2020-01-01 PROCEDURE — A9270 NON-COVERED ITEM OR SERVICE: HCPCS | Performed by: FAMILY MEDICINE

## 2020-01-01 PROCEDURE — 86255 FLUORESCENT ANTIBODY SCREEN: CPT | Mod: 91

## 2020-01-01 PROCEDURE — 770001 HCHG ROOM/CARE - MED/SURG/GYN PRIV*

## 2020-01-01 PROCEDURE — A9270 NON-COVERED ITEM OR SERVICE: HCPCS | Performed by: GENERAL PRACTICE

## 2020-01-01 PROCEDURE — 700102 HCHG RX REV CODE 250 W/ 637 OVERRIDE(OP): Performed by: EMERGENCY MEDICINE

## 2020-01-01 PROCEDURE — 92526 ORAL FUNCTION THERAPY: CPT

## 2020-01-01 PROCEDURE — 80048 BASIC METABOLIC PNL TOTAL CA: CPT

## 2020-01-01 PROCEDURE — 700117 HCHG RX CONTRAST REV CODE 255: Performed by: STUDENT IN AN ORGANIZED HEALTH CARE EDUCATION/TRAINING PROGRAM

## 2020-01-01 PROCEDURE — 700102 HCHG RX REV CODE 250 W/ 637 OVERRIDE(OP): Performed by: FAMILY MEDICINE

## 2020-01-01 PROCEDURE — 700102 HCHG RX REV CODE 250 W/ 637 OVERRIDE(OP): Performed by: NURSE PRACTITIONER

## 2020-01-01 PROCEDURE — 76705 ECHO EXAM OF ABDOMEN: CPT

## 2020-01-01 PROCEDURE — 700101 HCHG RX REV CODE 250: Performed by: NURSE PRACTITIONER

## 2020-01-01 PROCEDURE — 97530 THERAPEUTIC ACTIVITIES: CPT

## 2020-01-01 PROCEDURE — 83605 ASSAY OF LACTIC ACID: CPT

## 2020-01-01 PROCEDURE — 85007 BL SMEAR W/DIFF WBC COUNT: CPT

## 2020-01-01 PROCEDURE — 85025 COMPLETE CBC W/AUTO DIFF WBC: CPT | Mod: 91

## 2020-01-01 PROCEDURE — 85025 COMPLETE CBC W/AUTO DIFF WBC: CPT

## 2020-01-01 PROCEDURE — 770020 HCHG ROOM/CARE - TELE (206)

## 2020-01-01 PROCEDURE — 96375 TX/PRO/DX INJ NEW DRUG ADDON: CPT

## 2020-01-01 PROCEDURE — 700105 HCHG RX REV CODE 258: Performed by: INTERNAL MEDICINE

## 2020-01-01 PROCEDURE — 770006 HCHG ROOM/CARE - MED/SURG/GYN SEMI*

## 2020-01-01 PROCEDURE — 87529 HSV DNA AMP PROBE: CPT | Mod: 91

## 2020-01-01 PROCEDURE — 700105 HCHG RX REV CODE 258: Performed by: STUDENT IN AN ORGANIZED HEALTH CARE EDUCATION/TRAINING PROGRAM

## 2020-01-01 PROCEDURE — 99231 SBSQ HOSP IP/OBS SF/LOW 25: CPT | Performed by: NURSE PRACTITIONER

## 2020-01-01 PROCEDURE — 36415 COLL VENOUS BLD VENIPUNCTURE: CPT

## 2020-01-01 PROCEDURE — A9270 NON-COVERED ITEM OR SERVICE: HCPCS | Performed by: EMERGENCY MEDICINE

## 2020-01-01 PROCEDURE — 700102 HCHG RX REV CODE 250 W/ 637 OVERRIDE(OP): Performed by: STUDENT IN AN ORGANIZED HEALTH CARE EDUCATION/TRAINING PROGRAM

## 2020-01-01 PROCEDURE — 84703 CHORIONIC GONADOTROPIN ASSAY: CPT

## 2020-01-01 PROCEDURE — 82962 GLUCOSE BLOOD TEST: CPT | Mod: 91

## 2020-01-01 PROCEDURE — 87483 CNS DNA AMP PROBE TYPE 12-25: CPT

## 2020-01-01 PROCEDURE — 76937 US GUIDE VASCULAR ACCESS: CPT

## 2020-01-01 PROCEDURE — 93005 ELECTROCARDIOGRAM TRACING: CPT | Performed by: NURSE PRACTITIONER

## 2020-01-01 PROCEDURE — 700101 HCHG RX REV CODE 250: Performed by: GENERAL PRACTICE

## 2020-01-01 PROCEDURE — 74022 RADEX COMPL AQT ABD SERIES: CPT

## 2020-01-01 PROCEDURE — 80048 BASIC METABOLIC PNL TOTAL CA: CPT | Mod: 91

## 2020-01-01 PROCEDURE — 83690 ASSAY OF LIPASE: CPT

## 2020-01-01 PROCEDURE — 700102 HCHG RX REV CODE 250 W/ 637 OVERRIDE(OP): Performed by: INTERNAL MEDICINE

## 2020-01-01 PROCEDURE — 82962 GLUCOSE BLOOD TEST: CPT

## 2020-01-01 PROCEDURE — 80053 COMPREHEN METABOLIC PANEL: CPT | Mod: 91

## 2020-01-01 PROCEDURE — 700111 HCHG RX REV CODE 636 W/ 250 OVERRIDE (IP): Performed by: HOSPITALIST

## 2020-01-01 PROCEDURE — 700105 HCHG RX REV CODE 258: Performed by: HOSPITALIST

## 2020-01-01 PROCEDURE — 700102 HCHG RX REV CODE 250 W/ 637 OVERRIDE(OP): Performed by: HOSPITALIST

## 2020-01-01 PROCEDURE — 700111 HCHG RX REV CODE 636 W/ 250 OVERRIDE (IP): Performed by: STUDENT IN AN ORGANIZED HEALTH CARE EDUCATION/TRAINING PROGRAM

## 2020-01-01 PROCEDURE — 700101 HCHG RX REV CODE 250: Performed by: INTERNAL MEDICINE

## 2020-01-01 PROCEDURE — 86341 ISLET CELL ANTIBODY: CPT

## 2020-01-01 PROCEDURE — 87040 BLOOD CULTURE FOR BACTERIA: CPT

## 2020-01-01 PROCEDURE — 99232 SBSQ HOSP IP/OBS MODERATE 35: CPT | Performed by: GENERAL PRACTICE

## 2020-01-01 PROCEDURE — 700117 HCHG RX CONTRAST REV CODE 255: Performed by: EMERGENCY MEDICINE

## 2020-01-01 PROCEDURE — 700101 HCHG RX REV CODE 250: Performed by: HOSPITALIST

## 2020-01-01 PROCEDURE — 700111 HCHG RX REV CODE 636 W/ 250 OVERRIDE (IP): Performed by: EMERGENCY MEDICINE

## 2020-01-01 PROCEDURE — 80053 COMPREHEN METABOLIC PANEL: CPT

## 2020-01-01 PROCEDURE — A9270 NON-COVERED ITEM OR SERVICE: HCPCS | Performed by: INTERNAL MEDICINE

## 2020-01-01 PROCEDURE — 700111 HCHG RX REV CODE 636 W/ 250 OVERRIDE (IP): Performed by: INTERNAL MEDICINE

## 2020-01-01 PROCEDURE — 97110 THERAPEUTIC EXERCISES: CPT

## 2020-01-01 PROCEDURE — 99223 1ST HOSP IP/OBS HIGH 75: CPT | Performed by: INTERNAL MEDICINE

## 2020-01-01 PROCEDURE — G0378 HOSPITAL OBSERVATION PER HR: HCPCS

## 2020-01-01 PROCEDURE — 94640 AIRWAY INHALATION TREATMENT: CPT

## 2020-01-01 PROCEDURE — 700105 HCHG RX REV CODE 258: Performed by: GENERAL PRACTICE

## 2020-01-01 PROCEDURE — 80307 DRUG TEST PRSMV CHEM ANLYZR: CPT

## 2020-01-01 PROCEDURE — 93005 ELECTROCARDIOGRAM TRACING: CPT | Performed by: INTERNAL MEDICINE

## 2020-01-01 PROCEDURE — 86694 HERPES SIMPLEX NES ANTBDY: CPT

## 2020-01-01 PROCEDURE — 81003 URINALYSIS AUTO W/O SCOPE: CPT

## 2020-01-01 PROCEDURE — 94760 N-INVAS EAR/PLS OXIMETRY 1: CPT

## 2020-01-01 PROCEDURE — 86706 HEP B SURFACE ANTIBODY: CPT | Mod: GA

## 2020-01-01 PROCEDURE — 99232 SBSQ HOSP IP/OBS MODERATE 35: CPT | Performed by: NURSE PRACTITIONER

## 2020-01-01 PROCEDURE — 82140 ASSAY OF AMMONIA: CPT

## 2020-01-01 PROCEDURE — U0003 INFECTIOUS AGENT DETECTION BY NUCLEIC ACID (DNA OR RNA); SEVERE ACUTE RESPIRATORY SYNDROME CORONAVIRUS 2 (SARS-COV-2) (CORONAVIRUS DISEASE [COVID-19]), AMPLIFIED PROBE TECHNIQUE, MAKING USE OF HIGH THROUGHPUT TECHNOLOGIES AS DESCRIBED BY CMS-2020-01-R: HCPCS

## 2020-01-01 PROCEDURE — C9803 HOPD COVID-19 SPEC COLLECT: HCPCS | Performed by: HOSPITALIST

## 2020-01-01 PROCEDURE — 96365 THER/PROPH/DIAG IV INF INIT: CPT

## 2020-01-01 PROCEDURE — 74177 CT ABD & PELVIS W/CONTRAST: CPT

## 2020-01-01 PROCEDURE — 700105 HCHG RX REV CODE 258: Performed by: EMERGENCY MEDICINE

## 2020-01-01 PROCEDURE — 82330 ASSAY OF CALCIUM: CPT

## 2020-01-01 PROCEDURE — 97116 GAIT TRAINING THERAPY: CPT

## 2020-01-01 PROCEDURE — 302146: Performed by: NURSE PRACTITIONER

## 2020-01-01 PROCEDURE — 96372 THER/PROPH/DIAG INJ SC/IM: CPT | Mod: XU

## 2020-01-01 PROCEDURE — 84100 ASSAY OF PHOSPHORUS: CPT

## 2020-01-01 PROCEDURE — 93306 TTE W/DOPPLER COMPLETE: CPT | Mod: 26 | Performed by: INTERNAL MEDICINE

## 2020-01-01 PROCEDURE — 99285 EMERGENCY DEPT VISIT HI MDM: CPT

## 2020-01-01 PROCEDURE — 99232 SBSQ HOSP IP/OBS MODERATE 35: CPT | Performed by: STUDENT IN AN ORGANIZED HEALTH CARE EDUCATION/TRAINING PROGRAM

## 2020-01-01 PROCEDURE — 99291 CRITICAL CARE FIRST HOUR: CPT | Performed by: HOSPITALIST

## 2020-01-01 PROCEDURE — 95819 EEG AWAKE AND ASLEEP: CPT | Mod: 26 | Performed by: PSYCHIATRY & NEUROLOGY

## 2020-01-01 PROCEDURE — 700101 HCHG RX REV CODE 250: Performed by: STUDENT IN AN ORGANIZED HEALTH CARE EDUCATION/TRAINING PROGRAM

## 2020-01-01 PROCEDURE — 80061 LIPID PANEL: CPT

## 2020-01-01 PROCEDURE — 97162 PT EVAL MOD COMPLEX 30 MIN: CPT

## 2020-01-01 PROCEDURE — 96376 TX/PRO/DX INJ SAME DRUG ADON: CPT

## 2020-01-01 PROCEDURE — 82803 BLOOD GASES ANY COMBINATION: CPT

## 2020-01-01 PROCEDURE — 85027 COMPLETE CBC AUTOMATED: CPT

## 2020-01-01 PROCEDURE — 83735 ASSAY OF MAGNESIUM: CPT

## 2020-01-01 PROCEDURE — 86780 TREPONEMA PALLIDUM: CPT

## 2020-01-01 PROCEDURE — 99232 SBSQ HOSP IP/OBS MODERATE 35: CPT | Performed by: PSYCHIATRY & NEUROLOGY

## 2020-01-01 PROCEDURE — 97530 THERAPEUTIC ACTIVITIES: CPT | Mod: CQ

## 2020-01-01 PROCEDURE — 99214 OFFICE O/P EST MOD 30 MIN: CPT | Performed by: PHYSICIAN ASSISTANT

## 2020-01-01 PROCEDURE — 99220 PR INITIAL OBSERVATION CARE,LEVL III: CPT | Mod: GC | Performed by: HOSPITALIST

## 2020-01-01 PROCEDURE — 84100 ASSAY OF PHOSPHORUS: CPT | Mod: 91

## 2020-01-01 PROCEDURE — 87205 SMEAR GRAM STAIN: CPT

## 2020-01-01 PROCEDURE — 97535 SELF CARE MNGMENT TRAINING: CPT

## 2020-01-01 PROCEDURE — 0241U HCHG SARS-COV-2 COVID-19 NFCT DS RESP RNA 4 TRGT MIC: CPT

## 2020-01-01 PROCEDURE — 97110 THERAPEUTIC EXERCISES: CPT | Mod: CQ

## 2020-01-01 PROCEDURE — 84157 ASSAY OF PROTEIN OTHER: CPT

## 2020-01-01 PROCEDURE — 99220 PR INITIAL OBSERVATION CARE,LEVL III: CPT | Performed by: HOSPITALIST

## 2020-01-01 PROCEDURE — 80202 ASSAY OF VANCOMYCIN: CPT

## 2020-01-01 PROCEDURE — A9270 NON-COVERED ITEM OR SERVICE: HCPCS | Performed by: HOSPITALIST

## 2020-01-01 PROCEDURE — A9576 INJ PROHANCE MULTIPACK: HCPCS | Performed by: STUDENT IN AN ORGANIZED HEALTH CARE EDUCATION/TRAINING PROGRAM

## 2020-01-01 PROCEDURE — A9576 INJ PROHANCE MULTIPACK: HCPCS | Performed by: PSYCHIATRY & NEUROLOGY

## 2020-01-01 PROCEDURE — 82945 GLUCOSE OTHER FLUID: CPT

## 2020-01-01 PROCEDURE — 62328 DX LMBR SPI PNXR W/FLUOR/CT: CPT

## 2020-01-01 PROCEDURE — 89051 BODY FLUID CELL COUNT: CPT

## 2020-01-01 PROCEDURE — 70450 CT HEAD/BRAIN W/O DYE: CPT

## 2020-01-01 PROCEDURE — 92523 SPEECH SOUND LANG COMPREHEN: CPT

## 2020-01-01 PROCEDURE — 87641 MR-STAPH DNA AMP PROBE: CPT

## 2020-01-01 PROCEDURE — 82550 ASSAY OF CK (CPK): CPT

## 2020-01-01 PROCEDURE — 84134 ASSAY OF PREALBUMIN: CPT

## 2020-01-01 PROCEDURE — 84443 ASSAY THYROID STIM HORMONE: CPT

## 2020-01-01 PROCEDURE — 99225 PR SUBSEQUENT OBSERVATION CARE,LEVEL II: CPT | Performed by: INTERNAL MEDICINE

## 2020-01-01 PROCEDURE — 93010 ELECTROCARDIOGRAM REPORT: CPT | Performed by: INTERNAL MEDICINE

## 2020-01-01 PROCEDURE — 99213 OFFICE O/P EST LOW 20 MIN: CPT | Performed by: FAMILY MEDICINE

## 2020-01-01 PROCEDURE — 97166 OT EVAL MOD COMPLEX 45 MIN: CPT

## 2020-01-01 PROCEDURE — 302152 K-PAD 12X17: Performed by: STUDENT IN AN ORGANIZED HEALTH CARE EDUCATION/TRAINING PROGRAM

## 2020-01-01 PROCEDURE — 85652 RBC SED RATE AUTOMATED: CPT

## 2020-01-01 PROCEDURE — 700102 HCHG RX REV CODE 250 W/ 637 OVERRIDE(OP): Performed by: GENERAL PRACTICE

## 2020-01-01 PROCEDURE — 99231 SBSQ HOSP IP/OBS SF/LOW 25: CPT | Performed by: STUDENT IN AN ORGANIZED HEALTH CARE EDUCATION/TRAINING PROGRAM

## 2020-01-01 PROCEDURE — 99217 PR OBSERVATION CARE DISCHARGE: CPT | Performed by: INTERNAL MEDICINE

## 2020-01-01 PROCEDURE — 95819 EEG AWAKE AND ASLEEP: CPT | Performed by: PSYCHIATRY & NEUROLOGY

## 2020-01-01 PROCEDURE — G0475 HIV COMBINATION ASSAY: HCPCS

## 2020-01-01 PROCEDURE — 05HY33Z INSERTION OF INFUSION DEVICE INTO UPPER VEIN, PERCUTANEOUS APPROACH: ICD-10-PCS | Performed by: NURSE PRACTITIONER

## 2020-01-01 PROCEDURE — 87070 CULTURE OTHR SPECIMN AEROBIC: CPT

## 2020-01-01 PROCEDURE — 700111 HCHG RX REV CODE 636 W/ 250 OVERRIDE (IP)

## 2020-01-01 PROCEDURE — 302136 NUTRITION PUMP: Performed by: STUDENT IN AN ORGANIZED HEALTH CARE EDUCATION/TRAINING PROGRAM

## 2020-01-01 PROCEDURE — 700117 HCHG RX CONTRAST REV CODE 255: Performed by: PSYCHIATRY & NEUROLOGY

## 2020-01-01 PROCEDURE — 93005 ELECTROCARDIOGRAM TRACING: CPT | Performed by: EMERGENCY MEDICINE

## 2020-01-01 PROCEDURE — 99226 PR SUBSEQUENT OBSERVATION CARE,LEVEL III: CPT | Performed by: INTERNAL MEDICINE

## 2020-01-01 PROCEDURE — 71046 X-RAY EXAM CHEST 2 VIEWS: CPT

## 2020-01-01 PROCEDURE — 700111 HCHG RX REV CODE 636 W/ 250 OVERRIDE (IP): Performed by: NURSE PRACTITIONER

## 2020-01-01 PROCEDURE — 96366 THER/PROPH/DIAG IV INF ADDON: CPT

## 2020-01-01 PROCEDURE — 99226 PR SUBSEQUENT OBSERVATION CARE,LEVEL III: CPT | Performed by: HOSPITALIST

## 2020-01-01 PROCEDURE — 99223 1ST HOSP IP/OBS HIGH 75: CPT | Performed by: PSYCHIATRY & NEUROLOGY

## 2020-01-01 PROCEDURE — 71045 X-RAY EXAM CHEST 1 VIEW: CPT

## 2020-01-01 PROCEDURE — 74018 RADEX ABDOMEN 1 VIEW: CPT

## 2020-01-01 PROCEDURE — 80076 HEPATIC FUNCTION PANEL: CPT

## 2020-01-01 PROCEDURE — 87340 HEPATITIS B SURFACE AG IA: CPT | Mod: GA

## 2020-01-01 PROCEDURE — 81001 URINALYSIS AUTO W/SCOPE: CPT

## 2020-01-01 PROCEDURE — 302146: Performed by: FAMILY MEDICINE

## 2020-01-01 PROCEDURE — 82607 VITAMIN B-12: CPT

## 2020-01-01 PROCEDURE — 302146: Performed by: STUDENT IN AN ORGANIZED HEALTH CARE EDUCATION/TRAINING PROGRAM

## 2020-01-01 PROCEDURE — 90791 PSYCH DIAGNOSTIC EVALUATION: CPT | Performed by: PSYCHOLOGIST

## 2020-01-01 PROCEDURE — 62270 DX LMBR SPI PNXR: CPT

## 2020-01-01 PROCEDURE — 86140 C-REACTIVE PROTEIN: CPT

## 2020-01-01 PROCEDURE — 92610 EVALUATE SWALLOWING FUNCTION: CPT

## 2020-01-01 PROCEDURE — 85610 PROTHROMBIN TIME: CPT

## 2020-01-01 PROCEDURE — 306637 DX-LUMBAR PUNCTURE FOR DIAGNOSIS

## 2020-01-01 PROCEDURE — 83605 ASSAY OF LACTIC ACID: CPT | Mod: 91

## 2020-01-01 PROCEDURE — 302131 K PAD MOTOR: Performed by: STUDENT IN AN ORGANIZED HEALTH CARE EDUCATION/TRAINING PROGRAM

## 2020-01-01 PROCEDURE — 009U3ZX DRAINAGE OF SPINAL CANAL, PERCUTANEOUS APPROACH, DIAGNOSTIC: ICD-10-PCS | Performed by: RADIOLOGY

## 2020-01-01 PROCEDURE — 70553 MRI BRAIN STEM W/O & W/DYE: CPT

## 2020-01-01 PROCEDURE — 93306 TTE W/DOPPLER COMPLETE: CPT

## 2020-01-01 PROCEDURE — 86803 HEPATITIS C AB TEST: CPT

## 2020-01-01 PROCEDURE — 700105 HCHG RX REV CODE 258: Performed by: NURSE PRACTITIONER

## 2020-01-01 PROCEDURE — 99233 SBSQ HOSP IP/OBS HIGH 50: CPT | Performed by: INTERNAL MEDICINE

## 2020-01-01 PROCEDURE — 99214 OFFICE O/P EST MOD 30 MIN: CPT | Mod: 95,CR | Performed by: PHYSICIAN ASSISTANT

## 2020-01-01 PROCEDURE — 96372 THER/PROPH/DIAG INJ SC/IM: CPT

## 2020-01-01 PROCEDURE — 87502 INFLUENZA DNA AMP PROBE: CPT

## 2020-01-01 RX ORDER — ONDANSETRON 2 MG/ML
4 INJECTION INTRAMUSCULAR; INTRAVENOUS ONCE
Status: COMPLETED | OUTPATIENT
Start: 2020-01-01 | End: 2020-01-01

## 2020-01-01 RX ORDER — MORPHINE SULFATE 10 MG/5ML
5-10 SOLUTION ORAL
Status: DISCONTINUED | OUTPATIENT
Start: 2020-01-01 | End: 2020-01-01

## 2020-01-01 RX ORDER — MORPHINE SULFATE 4 MG/ML
1-2 INJECTION, SOLUTION INTRAMUSCULAR; INTRAVENOUS EVERY 6 HOURS PRN
Status: CANCELLED | OUTPATIENT
Start: 2020-01-01

## 2020-01-01 RX ORDER — QUETIAPINE FUMARATE 25 MG/1
25 TABLET, FILM COATED ORAL NIGHTLY
Status: DISCONTINUED | OUTPATIENT
Start: 2020-01-01 | End: 2020-01-01

## 2020-01-01 RX ORDER — HYDROMORPHONE HYDROCHLORIDE 1 MG/ML
0.5 INJECTION, SOLUTION INTRAMUSCULAR; INTRAVENOUS; SUBCUTANEOUS EVERY 4 HOURS PRN
Status: DISCONTINUED | OUTPATIENT
Start: 2020-01-01 | End: 2020-01-01

## 2020-01-01 RX ORDER — MORPHINE SULFATE 4 MG/ML
2 INJECTION, SOLUTION INTRAMUSCULAR; INTRAVENOUS ONCE
Status: COMPLETED | OUTPATIENT
Start: 2020-01-01 | End: 2020-01-01

## 2020-01-01 RX ORDER — POLYETHYLENE GLYCOL 3350 17 G/17G
1 POWDER, FOR SOLUTION ORAL
Status: DISCONTINUED | OUTPATIENT
Start: 2020-01-01 | End: 2020-01-01

## 2020-01-01 RX ORDER — ONDANSETRON 4 MG/1
4 TABLET, ORALLY DISINTEGRATING ORAL EVERY 4 HOURS PRN
Status: DISCONTINUED | OUTPATIENT
Start: 2020-01-01 | End: 2020-01-01 | Stop reason: HOSPADM

## 2020-01-01 RX ORDER — BISACODYL 10 MG
10 SUPPOSITORY, RECTAL RECTAL
Status: DISCONTINUED | OUTPATIENT
Start: 2020-01-01 | End: 2020-01-01

## 2020-01-01 RX ORDER — DRONABINOL 2.5 MG/1
5 CAPSULE ORAL
Status: DISCONTINUED | OUTPATIENT
Start: 2020-01-01 | End: 2020-01-01

## 2020-01-01 RX ORDER — POLYETHYLENE GLYCOL 3350 17 G/17G
1 POWDER, FOR SOLUTION ORAL 2 TIMES DAILY
Status: DISCONTINUED | OUTPATIENT
Start: 2020-01-01 | End: 2020-01-01 | Stop reason: HOSPADM

## 2020-01-01 RX ORDER — HYDROXYZINE HYDROCHLORIDE 25 MG/1
25 TABLET, FILM COATED ORAL 3 TIMES DAILY PRN
Status: DISCONTINUED | OUTPATIENT
Start: 2020-01-01 | End: 2020-01-01

## 2020-01-01 RX ORDER — ACETAMINOPHEN 160 MG/1
480 BAR, CHEWABLE ORAL EVERY 4 HOURS PRN
COMMUNITY
End: 2020-01-01

## 2020-01-01 RX ORDER — MAGNESIUM SULFATE 1 G/100ML
1 INJECTION INTRAVENOUS ONCE
Status: COMPLETED | OUTPATIENT
Start: 2020-01-01 | End: 2020-01-01

## 2020-01-01 RX ORDER — DEXTROSE AND SODIUM CHLORIDE 5; .45 G/100ML; G/100ML
INJECTION, SOLUTION INTRAVENOUS CONTINUOUS
Status: DISCONTINUED | OUTPATIENT
Start: 2020-01-01 | End: 2020-01-01

## 2020-01-01 RX ORDER — ONDANSETRON 2 MG/ML
4 INJECTION INTRAMUSCULAR; INTRAVENOUS EVERY 4 HOURS PRN
Status: DISCONTINUED | OUTPATIENT
Start: 2020-01-01 | End: 2020-01-01 | Stop reason: HOSPADM

## 2020-01-01 RX ORDER — MORPHINE SULFATE 4 MG/ML
4 INJECTION, SOLUTION INTRAMUSCULAR; INTRAVENOUS
Status: COMPLETED | OUTPATIENT
Start: 2020-01-01 | End: 2020-01-01

## 2020-01-01 RX ORDER — DICYCLOMINE HCL 20 MG
20 TABLET ORAL EVERY 6 HOURS PRN
Qty: 60 TAB | Refills: 0 | Status: SHIPPED | OUTPATIENT
Start: 2020-01-01

## 2020-01-01 RX ORDER — POTASSIUM CHLORIDE 20 MEQ/1
40 TABLET, EXTENDED RELEASE ORAL ONCE
Status: COMPLETED | OUTPATIENT
Start: 2020-01-01 | End: 2020-01-01

## 2020-01-01 RX ORDER — AMOXICILLIN 250 MG
2 CAPSULE ORAL 2 TIMES DAILY
Status: DISCONTINUED | OUTPATIENT
Start: 2020-01-01 | End: 2020-01-01

## 2020-01-01 RX ORDER — ACETAMINOPHEN 500 MG
500-1000 TABLET ORAL EVERY 6 HOURS PRN
Status: DISCONTINUED | OUTPATIENT
Start: 2020-01-01 | End: 2020-01-01

## 2020-01-01 RX ORDER — POTASSIUM CHLORIDE 7.45 MG/ML
10 INJECTION INTRAVENOUS
Status: DISCONTINUED | OUTPATIENT
Start: 2020-01-01 | End: 2020-01-01

## 2020-01-01 RX ORDER — LORAZEPAM 1 MG/1
1 TABLET ORAL ONCE
Status: COMPLETED | OUTPATIENT
Start: 2020-01-01 | End: 2020-01-01

## 2020-01-01 RX ORDER — CEFTRIAXONE 1 G/1
1000 INJECTION, POWDER, FOR SOLUTION INTRAMUSCULAR; INTRAVENOUS EVERY 12 HOURS
Status: DISCONTINUED | OUTPATIENT
Start: 2020-01-01 | End: 2020-01-01

## 2020-01-01 RX ORDER — SODIUM CHLORIDE 9 MG/ML
500 INJECTION, SOLUTION INTRAVENOUS ONCE
Status: COMPLETED | OUTPATIENT
Start: 2020-01-01 | End: 2020-01-01

## 2020-01-01 RX ORDER — SODIUM CHLORIDE AND POTASSIUM CHLORIDE 150; 900 MG/100ML; MG/100ML
INJECTION, SOLUTION INTRAVENOUS CONTINUOUS
Status: DISCONTINUED | OUTPATIENT
Start: 2020-01-01 | End: 2020-01-01 | Stop reason: HOSPADM

## 2020-01-01 RX ORDER — MIDODRINE HYDROCHLORIDE 5 MG/1
5 TABLET ORAL
Status: DISCONTINUED | OUTPATIENT
Start: 2020-01-01 | End: 2020-01-01

## 2020-01-01 RX ORDER — MIDODRINE HYDROCHLORIDE 5 MG/1
2.5 TABLET ORAL
Status: DISCONTINUED | OUTPATIENT
Start: 2020-01-01 | End: 2020-01-01

## 2020-01-01 RX ORDER — OXYCODONE HYDROCHLORIDE 5 MG/1
2.5 TABLET ORAL EVERY 6 HOURS PRN
Status: DISCONTINUED | OUTPATIENT
Start: 2020-01-01 | End: 2020-01-01

## 2020-01-01 RX ORDER — SODIUM CHLORIDE 9 MG/ML
INJECTION, SOLUTION INTRAVENOUS
Status: ACTIVE
Start: 2020-01-01 | End: 2020-01-01

## 2020-01-01 RX ORDER — ONDANSETRON 2 MG/ML
4 INJECTION INTRAMUSCULAR; INTRAVENOUS EVERY 4 HOURS PRN
Status: DISCONTINUED | OUTPATIENT
Start: 2020-01-01 | End: 2020-01-01

## 2020-01-01 RX ORDER — SODIUM CHLORIDE, SODIUM LACTATE, POTASSIUM CHLORIDE, CALCIUM CHLORIDE 600; 310; 30; 20 MG/100ML; MG/100ML; MG/100ML; MG/100ML
INJECTION, SOLUTION INTRAVENOUS CONTINUOUS
Status: DISCONTINUED | OUTPATIENT
Start: 2020-01-01 | End: 2020-01-01

## 2020-01-01 RX ORDER — OXYCODONE HYDROCHLORIDE 5 MG/1
5 TABLET ORAL 2 TIMES DAILY PRN
Status: DISCONTINUED | OUTPATIENT
Start: 2020-01-01 | End: 2020-01-01 | Stop reason: HOSPADM

## 2020-01-01 RX ORDER — MICONAZOLE NITRATE 20 MG/G
CREAM TOPICAL 2 TIMES DAILY
Status: DISPENSED | OUTPATIENT
Start: 2020-01-01 | End: 2020-01-01

## 2020-01-01 RX ORDER — DRONABINOL 2.5 MG/1
2.5 CAPSULE ORAL
Status: DISCONTINUED | OUTPATIENT
Start: 2020-01-01 | End: 2020-01-01

## 2020-01-01 RX ORDER — MORPHINE SULFATE 4 MG/ML
1-2 INJECTION, SOLUTION INTRAMUSCULAR; INTRAVENOUS
Status: DISCONTINUED | OUTPATIENT
Start: 2020-01-01 | End: 2020-01-01

## 2020-01-01 RX ORDER — OXYCODONE HYDROCHLORIDE 5 MG/1
5 TABLET ORAL EVERY 8 HOURS PRN
Status: DISCONTINUED | OUTPATIENT
Start: 2020-01-01 | End: 2020-01-01

## 2020-01-01 RX ORDER — NICOTINE POLACRILEX 4 MG
LOZENGE BUCCAL
Status: CANCELLED | OUTPATIENT
Start: 2020-01-01

## 2020-01-01 RX ORDER — OXYCODONE HYDROCHLORIDE 5 MG/1
2.5 TABLET ORAL
Status: DISCONTINUED | OUTPATIENT
Start: 2020-01-01 | End: 2020-01-01

## 2020-01-01 RX ORDER — OMEPRAZOLE 20 MG/1
20 CAPSULE, DELAYED RELEASE ORAL DAILY
Status: DISCONTINUED | OUTPATIENT
Start: 2020-01-01 | End: 2020-01-01

## 2020-01-01 RX ORDER — POTASSIUM CHLORIDE 20 MEQ/1
20 TABLET, EXTENDED RELEASE ORAL DAILY
Status: DISCONTINUED | OUTPATIENT
Start: 2020-01-01 | End: 2020-01-01 | Stop reason: CLARIF

## 2020-01-01 RX ORDER — POTASSIUM CHLORIDE 20 MEQ/1
40 TABLET, EXTENDED RELEASE ORAL EVERY 4 HOURS
Status: COMPLETED | OUTPATIENT
Start: 2020-01-01 | End: 2020-01-01

## 2020-01-01 RX ORDER — FAMOTIDINE 20 MG/1
20 TABLET, FILM COATED ORAL 2 TIMES DAILY
Status: DISCONTINUED | OUTPATIENT
Start: 2020-01-01 | End: 2020-01-01

## 2020-01-01 RX ORDER — SODIUM CHLORIDE, SODIUM LACTATE, POTASSIUM CHLORIDE, CALCIUM CHLORIDE 600; 310; 30; 20 MG/100ML; MG/100ML; MG/100ML; MG/100ML
1000 INJECTION, SOLUTION INTRAVENOUS ONCE
Status: COMPLETED | OUTPATIENT
Start: 2020-01-01 | End: 2020-01-01

## 2020-01-01 RX ORDER — ONDANSETRON 4 MG/1
4 TABLET, ORALLY DISINTEGRATING ORAL EVERY 6 HOURS PRN
Qty: 10 TAB | Refills: 0 | Status: SHIPPED | OUTPATIENT
Start: 2020-01-01 | End: 2020-01-01

## 2020-01-01 RX ORDER — DEXTROSE MONOHYDRATE 25 G/50ML
50 INJECTION, SOLUTION INTRAVENOUS
Status: DISCONTINUED | OUTPATIENT
Start: 2020-01-01 | End: 2020-01-01

## 2020-01-01 RX ORDER — DEXTROSE MONOHYDRATE, SODIUM CHLORIDE, AND POTASSIUM CHLORIDE 50; 2.98; 4.5 G/1000ML; G/1000ML; G/1000ML
INJECTION, SOLUTION INTRAVENOUS CONTINUOUS
Status: DISCONTINUED | OUTPATIENT
Start: 2020-01-01 | End: 2020-01-01

## 2020-01-01 RX ORDER — POLYETHYLENE GLYCOL 3350 17 G/17G
17 POWDER, FOR SOLUTION ORAL DAILY
Status: ON HOLD | COMMUNITY
End: 2020-01-01 | Stop reason: SDUPTHER

## 2020-01-01 RX ORDER — KETOROLAC TROMETHAMINE 30 MG/ML
15 INJECTION, SOLUTION INTRAMUSCULAR; INTRAVENOUS EVERY 6 HOURS PRN
Status: DISCONTINUED | OUTPATIENT
Start: 2020-01-01 | End: 2020-01-01

## 2020-01-01 RX ORDER — SODIUM CHLORIDE 9 MG/ML
1000 INJECTION, SOLUTION INTRAVENOUS ONCE
Status: DISCONTINUED | OUTPATIENT
Start: 2020-01-01 | End: 2020-01-01

## 2020-01-01 RX ORDER — SODIUM CHLORIDE 9 MG/ML
INJECTION, SOLUTION INTRAVENOUS CONTINUOUS
Status: DISCONTINUED | OUTPATIENT
Start: 2020-01-01 | End: 2020-01-01

## 2020-01-01 RX ORDER — MORPHINE SULFATE 4 MG/ML
2 INJECTION, SOLUTION INTRAMUSCULAR; INTRAVENOUS
Status: DISCONTINUED | OUTPATIENT
Start: 2020-01-01 | End: 2020-01-01

## 2020-01-01 RX ORDER — QUETIAPINE FUMARATE 25 MG/1
12.5 TABLET, FILM COATED ORAL 2 TIMES DAILY
Status: DISCONTINUED | OUTPATIENT
Start: 2020-01-01 | End: 2020-01-01

## 2020-01-01 RX ORDER — DICYCLOMINE HCL 20 MG
20 TABLET ORAL EVERY 6 HOURS PRN
Status: DISCONTINUED | OUTPATIENT
Start: 2020-01-01 | End: 2020-01-01

## 2020-01-01 RX ORDER — MORPHINE SULFATE 4 MG/ML
4 INJECTION, SOLUTION INTRAMUSCULAR; INTRAVENOUS ONCE
Status: COMPLETED | OUTPATIENT
Start: 2020-01-01 | End: 2020-01-01

## 2020-01-01 RX ORDER — OXYCODONE HYDROCHLORIDE 5 MG/1
5 TABLET ORAL EVERY 6 HOURS PRN
Status: DISCONTINUED | OUTPATIENT
Start: 2020-01-01 | End: 2020-01-01

## 2020-01-01 RX ORDER — ONDANSETRON 2 MG/ML
4 INJECTION INTRAMUSCULAR; INTRAVENOUS ONCE
Status: DISCONTINUED | OUTPATIENT
Start: 2020-01-01 | End: 2020-01-01 | Stop reason: HOSPADM

## 2020-01-01 RX ORDER — QUETIAPINE FUMARATE 25 MG/1
25 TABLET, FILM COATED ORAL 2 TIMES DAILY
Status: DISCONTINUED | OUTPATIENT
Start: 2020-01-01 | End: 2020-01-01

## 2020-01-01 RX ORDER — PROCHLORPERAZINE EDISYLATE 5 MG/ML
5-10 INJECTION INTRAMUSCULAR; INTRAVENOUS EVERY 4 HOURS PRN
Status: DISCONTINUED | OUTPATIENT
Start: 2020-01-01 | End: 2020-01-01 | Stop reason: HOSPADM

## 2020-01-01 RX ORDER — DEXTROSE MONOHYDRATE, SODIUM CHLORIDE, AND POTASSIUM CHLORIDE 50; 1.49; 4.5 G/1000ML; G/1000ML; G/1000ML
INJECTION, SOLUTION INTRAVENOUS CONTINUOUS
Status: DISCONTINUED | OUTPATIENT
Start: 2020-01-01 | End: 2020-01-01

## 2020-01-01 RX ORDER — OXYCODONE HYDROCHLORIDE 10 MG/1
10 TABLET ORAL EVERY 4 HOURS PRN
Status: DISCONTINUED | OUTPATIENT
Start: 2020-01-01 | End: 2020-01-01

## 2020-01-01 RX ORDER — OXYCODONE HYDROCHLORIDE 5 MG/1
5 TABLET ORAL EVERY 4 HOURS PRN
Status: DISCONTINUED | OUTPATIENT
Start: 2020-01-01 | End: 2020-01-01

## 2020-01-01 RX ORDER — POTASSIUM CHLORIDE 20 MEQ/1
40 TABLET, EXTENDED RELEASE ORAL ONCE
Status: DISCONTINUED | OUTPATIENT
Start: 2020-01-01 | End: 2020-01-01

## 2020-01-01 RX ORDER — QUETIAPINE FUMARATE 25 MG/1
12.5 TABLET, FILM COATED ORAL NIGHTLY
Status: DISCONTINUED | OUTPATIENT
Start: 2020-01-01 | End: 2020-01-01

## 2020-01-01 RX ORDER — ONDANSETRON 4 MG/1
4 TABLET, ORALLY DISINTEGRATING ORAL EVERY 4 HOURS PRN
Status: DISCONTINUED | OUTPATIENT
Start: 2020-01-01 | End: 2020-01-01

## 2020-01-01 RX ORDER — SENNA AND DOCUSATE SODIUM 50; 8.6 MG/1; MG/1
1 TABLET, FILM COATED ORAL DAILY
Qty: 90 TAB | Refills: 0 | Status: SHIPPED | OUTPATIENT
Start: 2020-01-01 | End: 2020-01-01

## 2020-01-01 RX ORDER — DIPHENHYDRAMINE HYDROCHLORIDE 50 MG/ML
12.5-25 INJECTION INTRAMUSCULAR; INTRAVENOUS EVERY 6 HOURS PRN
Status: DISCONTINUED | OUTPATIENT
Start: 2020-01-01 | End: 2020-01-01

## 2020-01-01 RX ORDER — DEXTROSE, SODIUM CHLORIDE, SODIUM LACTATE, POTASSIUM CHLORIDE, AND CALCIUM CHLORIDE 5; .6; .31; .03; .02 G/100ML; G/100ML; G/100ML; G/100ML; G/100ML
INJECTION, SOLUTION INTRAVENOUS CONTINUOUS
Status: DISCONTINUED | OUTPATIENT
Start: 2020-01-01 | End: 2020-01-01

## 2020-01-01 RX ORDER — MAGNESIUM SULFATE HEPTAHYDRATE 40 MG/ML
2 INJECTION, SOLUTION INTRAVENOUS ONCE
Status: COMPLETED | OUTPATIENT
Start: 2020-01-01 | End: 2020-01-01

## 2020-01-01 RX ORDER — DICYCLOMINE HCL 20 MG
20 TABLET ORAL EVERY 6 HOURS PRN
Status: DISCONTINUED | OUTPATIENT
Start: 2020-01-01 | End: 2021-01-01 | Stop reason: HOSPADM

## 2020-01-01 RX ORDER — DEXTROSE AND SODIUM CHLORIDE 5; .9 G/100ML; G/100ML
INJECTION, SOLUTION INTRAVENOUS ONCE
Status: COMPLETED | OUTPATIENT
Start: 2020-01-01 | End: 2020-01-01

## 2020-01-01 RX ORDER — SODIUM CHLORIDE, SODIUM LACTATE, POTASSIUM CHLORIDE, AND CALCIUM CHLORIDE .6; .31; .03; .02 G/100ML; G/100ML; G/100ML; G/100ML
1000 INJECTION, SOLUTION INTRAVENOUS ONCE
Status: COMPLETED | OUTPATIENT
Start: 2020-01-01 | End: 2020-01-01

## 2020-01-01 RX ORDER — SODIUM CHLORIDE, SODIUM LACTATE, POTASSIUM CHLORIDE, AND CALCIUM CHLORIDE .6; .31; .03; .02 G/100ML; G/100ML; G/100ML; G/100ML
1000 INJECTION, SOLUTION INTRAVENOUS ONCE
Status: DISCONTINUED | OUTPATIENT
Start: 2020-01-01 | End: 2020-01-01

## 2020-01-01 RX ORDER — OXYCODONE HYDROCHLORIDE 5 MG/1
5 TABLET ORAL 2 TIMES DAILY PRN
Qty: 30 TAB | Refills: 0 | Status: SHIPPED | OUTPATIENT
Start: 2020-01-01 | End: 2020-01-01

## 2020-01-01 RX ORDER — LORAZEPAM 1 MG/1
1 TABLET ORAL EVERY 4 HOURS PRN
Status: DISCONTINUED | OUTPATIENT
Start: 2020-01-01 | End: 2020-01-01

## 2020-01-01 RX ORDER — ONDANSETRON 4 MG/1
4 TABLET, ORALLY DISINTEGRATING ORAL EVERY 4 HOURS PRN
Qty: 10 TAB | Refills: 0 | Status: SHIPPED | OUTPATIENT
Start: 2020-01-01

## 2020-01-01 RX ORDER — ACETAMINOPHEN 500 MG
500-1000 TABLET ORAL EVERY 6 HOURS PRN
Status: DISCONTINUED | OUTPATIENT
Start: 2020-01-01 | End: 2021-01-01 | Stop reason: HOSPADM

## 2020-01-01 RX ORDER — POLYETHYLENE GLYCOL 3350 17 G/17G
17 POWDER, FOR SOLUTION ORAL 2 TIMES DAILY
Qty: 30 EACH | Refills: 1 | Status: SHIPPED | OUTPATIENT
Start: 2020-01-01

## 2020-01-01 RX ORDER — LORAZEPAM 2 MG/ML
0.5 CONCENTRATE ORAL EVERY 4 HOURS PRN
Status: DISCONTINUED | OUTPATIENT
Start: 2020-01-01 | End: 2021-01-01 | Stop reason: HOSPADM

## 2020-01-01 RX ORDER — HYDROCODONE BITARTRATE AND ACETAMINOPHEN 5; 325 MG/1; MG/1
1 TABLET ORAL EVERY 6 HOURS PRN
Qty: 120 TAB | Refills: 0 | Status: SHIPPED | OUTPATIENT
Start: 2020-01-01 | End: 2020-01-01

## 2020-01-01 RX ORDER — KETOROLAC TROMETHAMINE 30 MG/ML
15 INJECTION, SOLUTION INTRAMUSCULAR; INTRAVENOUS ONCE
Status: COMPLETED | OUTPATIENT
Start: 2020-01-01 | End: 2020-01-01

## 2020-01-01 RX ORDER — SUMATRIPTAN 20 MG/1
1 SPRAY NASAL PRN
Qty: 1 EACH | Refills: 3 | Status: SHIPPED | OUTPATIENT
Start: 2020-01-01

## 2020-01-01 RX ORDER — NALOXONE HYDROCHLORIDE 0.4 MG/ML
0.4 INJECTION, SOLUTION INTRAMUSCULAR; INTRAVENOUS; SUBCUTANEOUS ONCE
Status: COMPLETED | OUTPATIENT
Start: 2020-01-01 | End: 2020-01-01

## 2020-01-01 RX ORDER — LIDOCAINE 50 MG/G
1 PATCH TOPICAL EVERY 24 HOURS
Status: COMPLETED | OUTPATIENT
Start: 2020-01-01 | End: 2020-01-01

## 2020-01-01 RX ORDER — ATROPINE SULFATE 10 MG/ML
2 SOLUTION/ DROPS OPHTHALMIC EVERY 4 HOURS PRN
Status: DISCONTINUED | OUTPATIENT
Start: 2020-01-01 | End: 2021-01-01 | Stop reason: HOSPADM

## 2020-01-01 RX ORDER — PROMETHAZINE HYDROCHLORIDE 25 MG/1
12.5-25 SUPPOSITORY RECTAL EVERY 4 HOURS PRN
Status: DISCONTINUED | OUTPATIENT
Start: 2020-01-01 | End: 2020-01-01 | Stop reason: HOSPADM

## 2020-01-01 RX ORDER — OXYCODONE HYDROCHLORIDE 5 MG/1
5 TABLET ORAL
Status: DISCONTINUED | OUTPATIENT
Start: 2020-01-01 | End: 2020-01-01

## 2020-01-01 RX ORDER — SODIUM CHLORIDE 9 MG/ML
1000 INJECTION, SOLUTION INTRAVENOUS ONCE
Status: COMPLETED | OUTPATIENT
Start: 2020-01-01 | End: 2020-01-01

## 2020-01-01 RX ORDER — DRONABINOL 2.5 MG/1
2.5 CAPSULE ORAL ONCE
Status: COMPLETED | OUTPATIENT
Start: 2020-01-01 | End: 2020-01-01

## 2020-01-01 RX ORDER — PIPERACILLIN SODIUM, TAZOBACTAM SODIUM 4; .5 G/20ML; G/20ML
INJECTION, POWDER, LYOPHILIZED, FOR SOLUTION INTRAVENOUS
Status: ACTIVE
Start: 2020-01-01 | End: 2020-01-01

## 2020-01-01 RX ORDER — HYDROCODONE BITARTRATE AND ACETAMINOPHEN 5; 325 MG/1; MG/1
1 TABLET ORAL EVERY 6 HOURS PRN
Qty: 15 TAB | Refills: 0 | Status: SHIPPED | OUTPATIENT
Start: 2020-01-01 | End: 2020-01-01 | Stop reason: SDUPTHER

## 2020-01-01 RX ORDER — POLYETHYLENE GLYCOL 3350 17 G/17G
17 POWDER, FOR SOLUTION ORAL DAILY
Qty: 3 EACH | Refills: 0 | Status: SHIPPED | OUTPATIENT
Start: 2020-01-01 | End: 2020-01-01

## 2020-01-01 RX ORDER — ACETAMINOPHEN 325 MG/1
650 TABLET ORAL EVERY 4 HOURS PRN
Status: DISCONTINUED | OUTPATIENT
Start: 2020-01-01 | End: 2020-01-01

## 2020-01-01 RX ORDER — ONDANSETRON 4 MG/1
4 TABLET, ORALLY DISINTEGRATING ORAL EVERY 4 HOURS PRN
Status: DISCONTINUED | OUTPATIENT
Start: 2020-01-01 | End: 2021-01-01 | Stop reason: HOSPADM

## 2020-01-01 RX ORDER — HYDROCODONE BITARTRATE AND ACETAMINOPHEN 5; 325 MG/1; MG/1
1 TABLET ORAL ONCE
Status: COMPLETED | OUTPATIENT
Start: 2020-01-01 | End: 2020-01-01

## 2020-01-01 RX ORDER — OMEPRAZOLE 20 MG/1
20 CAPSULE, DELAYED RELEASE ORAL DAILY
Qty: 30 CAP | Refills: 1 | Status: SHIPPED | OUTPATIENT
Start: 2020-01-01

## 2020-01-01 RX ORDER — LORAZEPAM 2 MG/ML
0.5 INJECTION INTRAMUSCULAR EVERY 4 HOURS PRN
Status: DISCONTINUED | OUTPATIENT
Start: 2020-01-01 | End: 2020-01-01

## 2020-01-01 RX ORDER — LORAZEPAM 2 MG/ML
1 INJECTION INTRAMUSCULAR
Status: ACTIVE | OUTPATIENT
Start: 2020-01-01 | End: 2020-01-01

## 2020-01-01 RX ORDER — CALCIUM CHLORIDE 100 MG/ML
1 INJECTION INTRAVENOUS; INTRAVENTRICULAR ONCE
Status: DISCONTINUED | OUTPATIENT
Start: 2020-01-01 | End: 2020-01-01

## 2020-01-01 RX ORDER — OMEPRAZOLE 20 MG/1
20 CAPSULE, DELAYED RELEASE ORAL 2 TIMES DAILY
Status: DISCONTINUED | OUTPATIENT
Start: 2020-01-01 | End: 2020-01-01 | Stop reason: HOSPADM

## 2020-01-01 RX ORDER — PROMETHAZINE HYDROCHLORIDE 25 MG/1
12.5-25 TABLET ORAL EVERY 4 HOURS PRN
Status: DISCONTINUED | OUTPATIENT
Start: 2020-01-01 | End: 2020-01-01 | Stop reason: HOSPADM

## 2020-01-01 RX ORDER — OXYCODONE HYDROCHLORIDE 10 MG/1
10 TABLET ORAL EVERY 8 HOURS PRN
Status: DISCONTINUED | OUTPATIENT
Start: 2020-01-01 | End: 2020-01-01

## 2020-01-01 RX ORDER — NALOXONE HYDROCHLORIDE 0.4 MG/ML
0.4 INJECTION, SOLUTION INTRAMUSCULAR; INTRAVENOUS; SUBCUTANEOUS PRN
Status: DISCONTINUED | OUTPATIENT
Start: 2020-01-01 | End: 2020-01-01

## 2020-01-01 RX ORDER — HALOPERIDOL 5 MG/ML
3 INJECTION INTRAMUSCULAR EVERY 4 HOURS PRN
Status: DISCONTINUED | OUTPATIENT
Start: 2020-01-01 | End: 2020-01-01

## 2020-01-01 RX ORDER — QUETIAPINE FUMARATE 25 MG/1
25 TABLET, FILM COATED ORAL DAILY
Status: DISCONTINUED | OUTPATIENT
Start: 2020-01-01 | End: 2020-01-01

## 2020-01-01 RX ORDER — NALOXONE HYDROCHLORIDE 0.4 MG/ML
0.4 INJECTION, SOLUTION INTRAMUSCULAR; INTRAVENOUS; SUBCUTANEOUS
Status: DISCONTINUED | OUTPATIENT
Start: 2020-01-01 | End: 2020-01-01

## 2020-01-01 RX ORDER — MORPHINE SULFATE 10 MG/5ML
5-10 SOLUTION ORAL
Status: DISCONTINUED | OUTPATIENT
Start: 2020-01-01 | End: 2021-01-01 | Stop reason: HOSPADM

## 2020-01-01 RX ORDER — SODIUM CHLORIDE, SODIUM LACTATE, POTASSIUM CHLORIDE, AND CALCIUM CHLORIDE .6; .31; .03; .02 G/100ML; G/100ML; G/100ML; G/100ML
30 INJECTION, SOLUTION INTRAVENOUS
Status: COMPLETED | OUTPATIENT
Start: 2020-01-01 | End: 2020-01-01

## 2020-01-01 RX ORDER — LIDOCAINE 50 MG/G
1 PATCH TOPICAL EVERY 24 HOURS
Status: DISCONTINUED | OUTPATIENT
Start: 2020-01-01 | End: 2021-01-01 | Stop reason: HOSPADM

## 2020-01-01 RX ORDER — ONDANSETRON 4 MG/1
4 TABLET, ORALLY DISINTEGRATING ORAL ONCE
Status: COMPLETED | OUTPATIENT
Start: 2020-01-01 | End: 2020-01-01

## 2020-01-01 RX ORDER — ALPRAZOLAM 0.25 MG/1
0.25 TABLET ORAL ONCE
Status: COMPLETED | OUTPATIENT
Start: 2020-01-01 | End: 2020-01-01

## 2020-01-01 RX ORDER — POTASSIUM CHLORIDE 7.45 MG/ML
10 INJECTION INTRAVENOUS
Status: DISPENSED | OUTPATIENT
Start: 2020-01-01 | End: 2020-01-01

## 2020-01-01 RX ORDER — CALCIUM GLUCONATE 94 MG/ML
1 INJECTION, SOLUTION INTRAVENOUS ONCE
Status: DISCONTINUED | OUTPATIENT
Start: 2020-01-01 | End: 2020-01-01

## 2020-01-01 RX ORDER — BACLOFEN 10 MG/1
10 TABLET ORAL 3 TIMES DAILY
Status: DISCONTINUED | OUTPATIENT
Start: 2020-01-01 | End: 2020-01-01

## 2020-01-01 RX ORDER — OXYCODONE HYDROCHLORIDE 5 MG/1
2.5 TABLET ORAL EVERY 8 HOURS PRN
Status: DISCONTINUED | OUTPATIENT
Start: 2020-01-01 | End: 2020-01-01

## 2020-01-01 RX ORDER — QUETIAPINE FUMARATE 25 MG/1
25 TABLET, FILM COATED ORAL NIGHTLY
Status: DISCONTINUED | OUTPATIENT
Start: 2020-01-01 | End: 2021-01-01 | Stop reason: HOSPADM

## 2020-01-01 RX ORDER — DOCUSATE SODIUM 100 MG/1
100 CAPSULE, LIQUID FILLED ORAL 2 TIMES DAILY
Qty: 60 CAP | Refills: 0 | Status: SHIPPED | OUTPATIENT
Start: 2020-01-01 | End: 2020-01-01

## 2020-01-01 RX ADMIN — MIDODRINE HYDROCHLORIDE 5 MG: 5 TABLET ORAL at 09:16

## 2020-01-01 RX ADMIN — ACETAMINOPHEN 650 MG: 325 TABLET, FILM COATED ORAL at 20:23

## 2020-01-01 RX ADMIN — MORPHINE SULFATE 10 MG: 10 SOLUTION ORAL at 16:17

## 2020-01-01 RX ADMIN — LIDOCAINE 1 PATCH: 50 PATCH TOPICAL at 12:03

## 2020-01-01 RX ADMIN — OXYCODONE HYDROCHLORIDE 10 MG: 10 TABLET ORAL at 02:13

## 2020-01-01 RX ADMIN — MIDODRINE HYDROCHLORIDE 5 MG: 5 TABLET ORAL at 12:02

## 2020-01-01 RX ADMIN — MORPHINE SULFATE 4 MG: 4 INJECTION INTRAVENOUS at 20:58

## 2020-01-01 RX ADMIN — POTASSIUM CHLORIDE: 149 INJECTION, SOLUTION, CONCENTRATE INTRAVENOUS at 16:26

## 2020-01-01 RX ADMIN — DRONABINOL 5 MG: 2.5 CAPSULE ORAL at 10:34

## 2020-01-01 RX ADMIN — SODIUM CHLORIDE, SODIUM LACTATE, POTASSIUM CHLORIDE, CALCIUM CHLORIDE AND DEXTROSE MONOHYDRATE: 5; 600; 310; 30; 20 INJECTION, SOLUTION INTRAVENOUS at 14:04

## 2020-01-01 RX ADMIN — ACYCLOVIR SODIUM 380 MG: 500 INJECTION, SOLUTION INTRAVENOUS at 14:06

## 2020-01-01 RX ADMIN — DRONABINOL 5 MG: 2.5 CAPSULE ORAL at 13:00

## 2020-01-01 RX ADMIN — QUETIAPINE FUMARATE 12.5 MG: 25 TABLET ORAL at 21:40

## 2020-01-01 RX ADMIN — POTASSIUM CHLORIDE AND SODIUM CHLORIDE: 900; 150 INJECTION, SOLUTION INTRAVENOUS at 18:36

## 2020-01-01 RX ADMIN — MORPHINE SULFATE 2 MG: 4 INJECTION INTRAVENOUS at 23:07

## 2020-01-01 RX ADMIN — LIDOCAINE 1 PATCH: 50 PATCH TOPICAL at 12:01

## 2020-01-01 RX ADMIN — FAMOTIDINE 20 MG: 10 INJECTION INTRAVENOUS at 20:56

## 2020-01-01 RX ADMIN — ACETAMINOPHEN 1000 MG: 500 TABLET ORAL at 20:24

## 2020-01-01 RX ADMIN — SODIUM CHLORIDE: 9 INJECTION, SOLUTION INTRAVENOUS at 13:48

## 2020-01-01 RX ADMIN — KETOROLAC TROMETHAMINE 15 MG: 30 INJECTION, SOLUTION INTRAMUSCULAR at 13:53

## 2020-01-01 RX ADMIN — MIDODRINE HYDROCHLORIDE 5 MG: 5 TABLET ORAL at 16:48

## 2020-01-01 RX ADMIN — ACETAMINOPHEN 650 MG: 325 TABLET, FILM COATED ORAL at 16:52

## 2020-01-01 RX ADMIN — LIDOCAINE 1 PATCH: 50 PATCH TOPICAL at 12:08

## 2020-01-01 RX ADMIN — ENOXAPARIN SODIUM 30 MG: 30 INJECTION SUBCUTANEOUS at 06:11

## 2020-01-01 RX ADMIN — QUETIAPINE FUMARATE 25 MG: 25 TABLET ORAL at 08:18

## 2020-01-01 RX ADMIN — ACYCLOVIR SODIUM 380 MG: 500 INJECTION, SOLUTION INTRAVENOUS at 05:33

## 2020-01-01 RX ADMIN — MIDODRINE HYDROCHLORIDE 2.5 MG: 5 TABLET ORAL at 08:19

## 2020-01-01 RX ADMIN — QUETIAPINE FUMARATE 12.5 MG: 25 TABLET ORAL at 06:00

## 2020-01-01 RX ADMIN — OXYCODONE HYDROCHLORIDE 5 MG: 5 TABLET ORAL at 20:47

## 2020-01-01 RX ADMIN — SODIUM CHLORIDE 500 ML: 9 INJECTION, SOLUTION INTRAVENOUS at 22:58

## 2020-01-01 RX ADMIN — QUETIAPINE FUMARATE 12.5 MG: 25 TABLET ORAL at 20:14

## 2020-01-01 RX ADMIN — DIBASIC SODIUM PHOSPHATE, MONOBASIC POTASSIUM PHOSPHATE AND MONOBASIC SODIUM PHOSPHATE 250 MG: 852; 155; 130 TABLET ORAL at 13:00

## 2020-01-01 RX ADMIN — SODIUM CHLORIDE: 9 INJECTION, SOLUTION INTRAVENOUS at 13:44

## 2020-01-01 RX ADMIN — ONDANSETRON 4 MG: 4 TABLET, ORALLY DISINTEGRATING ORAL at 23:20

## 2020-01-01 RX ADMIN — CEFTRIAXONE SODIUM 1 G: 1 INJECTION, POWDER, FOR SOLUTION INTRAMUSCULAR; INTRAVENOUS at 18:03

## 2020-01-01 RX ADMIN — ENOXAPARIN SODIUM 30 MG: 30 INJECTION SUBCUTANEOUS at 06:43

## 2020-01-01 RX ADMIN — LORAZEPAM 0.5 MG: 2 SOLUTION, CONCENTRATE ORAL at 19:54

## 2020-01-01 RX ADMIN — FENTANYL CITRATE 50 MCG: 50 INJECTION INTRAMUSCULAR; INTRAVENOUS at 22:37

## 2020-01-01 RX ADMIN — LIDOCAINE 1 PATCH: 50 PATCH TOPICAL at 17:28

## 2020-01-01 RX ADMIN — CALCIUM CHLORIDE 1000 MG: 100 INJECTION, SOLUTION INTRAVENOUS at 14:35

## 2020-01-01 RX ADMIN — DRONABINOL 5 MG: 2.5 CAPSULE ORAL at 12:08

## 2020-01-01 RX ADMIN — PIPERACILLIN AND TAZOBACTAM 4.5 G: 4; .5 INJECTION, POWDER, LYOPHILIZED, FOR SOLUTION INTRAVENOUS; PARENTERAL at 22:17

## 2020-01-01 RX ADMIN — FAMOTIDINE 20 MG: 20 TABLET, FILM COATED ORAL at 16:38

## 2020-01-01 RX ADMIN — LIDOCAINE 1 PATCH: 50 PATCH TOPICAL at 11:56

## 2020-01-01 RX ADMIN — LIDOCAINE 1 PATCH: 50 PATCH TOPICAL at 13:01

## 2020-01-01 RX ADMIN — MIDODRINE HYDROCHLORIDE 5 MG: 5 TABLET ORAL at 08:04

## 2020-01-01 RX ADMIN — CEFTRIAXONE SODIUM 1000 MG: 1 INJECTION, POWDER, FOR SOLUTION INTRAMUSCULAR; INTRAVENOUS at 18:28

## 2020-01-01 RX ADMIN — MORPHINE SULFATE 4 MG: 4 INJECTION INTRAVENOUS at 17:49

## 2020-01-01 RX ADMIN — ENOXAPARIN SODIUM 30 MG: 30 INJECTION SUBCUTANEOUS at 05:07

## 2020-01-01 RX ADMIN — OXYCODONE HYDROCHLORIDE 5 MG: 5 TABLET ORAL at 23:43

## 2020-01-01 RX ADMIN — ENOXAPARIN SODIUM 30 MG: 30 INJECTION SUBCUTANEOUS at 06:09

## 2020-01-01 RX ADMIN — ACYCLOVIR SODIUM 380 MG: 500 INJECTION, SOLUTION INTRAVENOUS at 13:50

## 2020-01-01 RX ADMIN — SODIUM CHLORIDE, SODIUM LACTATE, POTASSIUM CHLORIDE, CALCIUM CHLORIDE AND DEXTROSE MONOHYDRATE: 5; 600; 310; 30; 20 INJECTION, SOLUTION INTRAVENOUS at 20:34

## 2020-01-01 RX ADMIN — POTASSIUM CHLORIDE, DEXTROSE MONOHYDRATE AND SODIUM CHLORIDE: 150; 5; 450 INJECTION, SOLUTION INTRAVENOUS at 03:20

## 2020-01-01 RX ADMIN — LORAZEPAM 0.5 MG: 2 SOLUTION, CONCENTRATE ORAL at 02:46

## 2020-01-01 RX ADMIN — SODIUM CHLORIDE, SODIUM LACTATE, POTASSIUM CHLORIDE, CALCIUM CHLORIDE AND DEXTROSE MONOHYDRATE: 5; 600; 310; 30; 20 INJECTION, SOLUTION INTRAVENOUS at 06:09

## 2020-01-01 RX ADMIN — MORPHINE SULFATE 2 MG: 4 INJECTION INTRAVENOUS at 22:40

## 2020-01-01 RX ADMIN — QUETIAPINE FUMARATE 25 MG: 25 TABLET ORAL at 20:54

## 2020-01-01 RX ADMIN — DRONABINOL 5 MG: 2.5 CAPSULE ORAL at 17:02

## 2020-01-01 RX ADMIN — DRONABINOL 5 MG: 2.5 CAPSULE ORAL at 18:00

## 2020-01-01 RX ADMIN — PIPERACILLIN AND TAZOBACTAM 4.5 G: 4; .5 INJECTION, POWDER, LYOPHILIZED, FOR SOLUTION INTRAVENOUS; PARENTERAL at 22:46

## 2020-01-01 RX ADMIN — QUETIAPINE FUMARATE 12.5 MG: 25 TABLET ORAL at 19:37

## 2020-01-01 RX ADMIN — MORPHINE SULFATE 10 MG: 10 SOLUTION ORAL at 19:54

## 2020-01-01 RX ADMIN — MIDODRINE HYDROCHLORIDE 5 MG: 5 TABLET ORAL at 07:41

## 2020-01-01 RX ADMIN — OXYCODONE HYDROCHLORIDE 10 MG: 10 TABLET ORAL at 09:43

## 2020-01-01 RX ADMIN — LIDOCAINE 1 PATCH: 50 PATCH TOPICAL at 12:48

## 2020-01-01 RX ADMIN — QUETIAPINE FUMARATE 12.5 MG: 25 TABLET ORAL at 20:11

## 2020-01-01 RX ADMIN — QUETIAPINE FUMARATE 12.5 MG: 25 TABLET ORAL at 19:38

## 2020-01-01 RX ADMIN — LIDOCAINE 1 PATCH: 50 PATCH TOPICAL at 12:42

## 2020-01-01 RX ADMIN — KETOROLAC TROMETHAMINE 15 MG: 30 INJECTION, SOLUTION INTRAMUSCULAR at 21:19

## 2020-01-01 RX ADMIN — MORPHINE SULFATE 5 MG: 10 SOLUTION ORAL at 18:08

## 2020-01-01 RX ADMIN — LIDOCAINE 1 PATCH: 50 PATCH TOPICAL at 13:48

## 2020-01-01 RX ADMIN — OMEPRAZOLE 40 MG: KIT at 06:17

## 2020-01-01 RX ADMIN — SODIUM CHLORIDE, SODIUM LACTATE, POTASSIUM CHLORIDE, CALCIUM CHLORIDE AND DEXTROSE MONOHYDRATE: 5; 600; 310; 30; 20 INJECTION, SOLUTION INTRAVENOUS at 18:30

## 2020-01-01 RX ADMIN — QUETIAPINE FUMARATE 25 MG: 25 TABLET ORAL at 22:03

## 2020-01-01 RX ADMIN — POTASSIUM CHLORIDE 10 MEQ: 7.46 INJECTION, SOLUTION INTRAVENOUS at 00:46

## 2020-01-01 RX ADMIN — MICONAZOLE NITRATE: 20 CREAM TOPICAL at 04:14

## 2020-01-01 RX ADMIN — LIDOCAINE HYDROCHLORIDE 30 ML: 20 SOLUTION OROPHARYNGEAL at 09:59

## 2020-01-01 RX ADMIN — OXYCODONE HYDROCHLORIDE 10 MG: 10 TABLET ORAL at 16:54

## 2020-01-01 RX ADMIN — MORPHINE SULFATE 2 MG: 4 INJECTION INTRAVENOUS at 18:12

## 2020-01-01 RX ADMIN — OMEPRAZOLE 20 MG: 20 CAPSULE, DELAYED RELEASE ORAL at 05:50

## 2020-01-01 RX ADMIN — IOHEXOL 70 ML: 350 INJECTION, SOLUTION INTRAVENOUS at 23:40

## 2020-01-01 RX ADMIN — ACYCLOVIR SODIUM 380 MG: 500 INJECTION, SOLUTION INTRAVENOUS at 22:42

## 2020-01-01 RX ADMIN — MIDODRINE HYDROCHLORIDE 5 MG: 5 TABLET ORAL at 11:38

## 2020-01-01 RX ADMIN — LORAZEPAM 0.5 MG: 2 INJECTION INTRAMUSCULAR; INTRAVENOUS at 19:49

## 2020-01-01 RX ADMIN — MIDODRINE HYDROCHLORIDE 5 MG: 5 TABLET ORAL at 11:30

## 2020-01-01 RX ADMIN — FAMOTIDINE 20 MG: 20 TABLET, FILM COATED ORAL at 16:55

## 2020-01-01 RX ADMIN — MORPHINE SULFATE 1 MG: 4 INJECTION INTRAVENOUS at 11:16

## 2020-01-01 RX ADMIN — HALOPERIDOL LACTATE 3 MG: 5 INJECTION, SOLUTION INTRAMUSCULAR at 20:43

## 2020-01-01 RX ADMIN — DEXTROSE AND SODIUM CHLORIDE: 5; 900 INJECTION, SOLUTION INTRAVENOUS at 22:57

## 2020-01-01 RX ADMIN — KETOROLAC TROMETHAMINE 15 MG: 30 INJECTION, SOLUTION INTRAMUSCULAR at 03:53

## 2020-01-01 RX ADMIN — OXYCODONE HYDROCHLORIDE 5 MG: 5 TABLET ORAL at 15:58

## 2020-01-01 RX ADMIN — DRONABINOL 5 MG: 2.5 CAPSULE ORAL at 11:50

## 2020-01-01 RX ADMIN — MIDODRINE HYDROCHLORIDE 5 MG: 5 TABLET ORAL at 12:08

## 2020-01-01 RX ADMIN — VANCOMYCIN HYDROCHLORIDE 1000 MG: 500 INJECTION, POWDER, LYOPHILIZED, FOR SOLUTION INTRAVENOUS at 23:53

## 2020-01-01 RX ADMIN — POTASSIUM CHLORIDE 10 MEQ: 7.46 INJECTION, SOLUTION INTRAVENOUS at 02:04

## 2020-01-01 RX ADMIN — SODIUM CHLORIDE 1000 ML: 9 INJECTION, SOLUTION INTRAVENOUS at 08:36

## 2020-01-01 RX ADMIN — VANCOMYCIN HYDROCHLORIDE 750 MG: 500 INJECTION, POWDER, LYOPHILIZED, FOR SOLUTION INTRAVENOUS at 02:50

## 2020-01-01 RX ADMIN — SODIUM CHLORIDE: 9 INJECTION, SOLUTION INTRAVENOUS at 02:02

## 2020-01-01 RX ADMIN — ONDANSETRON 4 MG: 2 INJECTION INTRAMUSCULAR; INTRAVENOUS at 08:35

## 2020-01-01 RX ADMIN — QUETIAPINE FUMARATE 12.5 MG: 25 TABLET ORAL at 20:50

## 2020-01-01 RX ADMIN — QUETIAPINE FUMARATE 12.5 MG: 25 TABLET ORAL at 19:54

## 2020-01-01 RX ADMIN — LIDOCAINE 1 PATCH: 50 PATCH TOPICAL at 12:45

## 2020-01-01 RX ADMIN — LORAZEPAM 0.5 MG: 2 SOLUTION, CONCENTRATE ORAL at 17:32

## 2020-01-01 RX ADMIN — MIDODRINE HYDROCHLORIDE 5 MG: 5 TABLET ORAL at 11:36

## 2020-01-01 RX ADMIN — ENOXAPARIN SODIUM 30 MG: 30 INJECTION SUBCUTANEOUS at 18:35

## 2020-01-01 RX ADMIN — ACYCLOVIR SODIUM 380 MG: 500 INJECTION, SOLUTION INTRAVENOUS at 05:51

## 2020-01-01 RX ADMIN — LORAZEPAM 0.5 MG: 2 SOLUTION, CONCENTRATE ORAL at 10:12

## 2020-01-01 RX ADMIN — SODIUM CHLORIDE: 9 INJECTION, SOLUTION INTRAVENOUS at 16:08

## 2020-01-01 RX ADMIN — DRONABINOL 5 MG: 2.5 CAPSULE ORAL at 12:02

## 2020-01-01 RX ADMIN — ACETAMINOPHEN 650 MG: 325 TABLET, FILM COATED ORAL at 16:26

## 2020-01-01 RX ADMIN — FAMOTIDINE 20 MG: 20 TABLET, FILM COATED ORAL at 17:13

## 2020-01-01 RX ADMIN — OXYCODONE HYDROCHLORIDE 10 MG: 10 TABLET ORAL at 12:52

## 2020-01-01 RX ADMIN — SODIUM PHOSPHATE, MONOBASIC, MONOHYDRATE 30 MMOL: 276; 142 INJECTION, SOLUTION INTRAVENOUS at 09:43

## 2020-01-01 RX ADMIN — IOHEXOL 80 ML: 350 INJECTION, SOLUTION INTRAVENOUS at 00:27

## 2020-01-01 RX ADMIN — DRONABINOL 5 MG: 2.5 CAPSULE ORAL at 16:49

## 2020-01-01 RX ADMIN — ENOXAPARIN SODIUM 30 MG: 30 INJECTION SUBCUTANEOUS at 06:00

## 2020-01-01 RX ADMIN — POTASSIUM CHLORIDE, DEXTROSE MONOHYDRATE AND SODIUM CHLORIDE: 150; 5; 450 INJECTION, SOLUTION INTRAVENOUS at 18:24

## 2020-01-01 RX ADMIN — MICONAZOLE NITRATE: 20 CREAM TOPICAL at 06:43

## 2020-01-01 RX ADMIN — QUETIAPINE FUMARATE 12.5 MG: 25 TABLET ORAL at 19:46

## 2020-01-01 RX ADMIN — LORAZEPAM 0.5 MG: 2 SOLUTION, CONCENTRATE ORAL at 04:20

## 2020-01-01 RX ADMIN — FAMOTIDINE 20 MG: 20 TABLET, FILM COATED ORAL at 18:00

## 2020-01-01 RX ADMIN — QUETIAPINE FUMARATE 25 MG: 25 TABLET ORAL at 08:07

## 2020-01-01 RX ADMIN — POTASSIUM CHLORIDE 10 MEQ: 7.46 INJECTION, SOLUTION INTRAVENOUS at 23:51

## 2020-01-01 RX ADMIN — POTASSIUM CHLORIDE 10 MEQ: 7.46 INJECTION, SOLUTION INTRAVENOUS at 21:05

## 2020-01-01 RX ADMIN — LORAZEPAM 0.5 MG: 2 SOLUTION, CONCENTRATE ORAL at 13:12

## 2020-01-01 RX ADMIN — MORPHINE SULFATE 2 MG: 4 INJECTION INTRAVENOUS at 21:57

## 2020-01-01 RX ADMIN — ENOXAPARIN SODIUM 30 MG: 30 INJECTION SUBCUTANEOUS at 06:17

## 2020-01-01 RX ADMIN — OXYCODONE HYDROCHLORIDE 5 MG: 5 TABLET ORAL at 09:47

## 2020-01-01 RX ADMIN — MIDODRINE HYDROCHLORIDE 5 MG: 5 TABLET ORAL at 11:48

## 2020-01-01 RX ADMIN — DICYCLOMINE HYDROCHLORIDE 20 MG: 20 TABLET ORAL at 13:22

## 2020-01-01 RX ADMIN — POTASSIUM CHLORIDE 40 MEQ: 1500 TABLET, EXTENDED RELEASE ORAL at 10:17

## 2020-01-01 RX ADMIN — IOHEXOL 75 ML: 350 INJECTION, SOLUTION INTRAVENOUS at 19:52

## 2020-01-01 RX ADMIN — LIDOCAINE 1 PATCH: 50 PATCH TOPICAL at 13:44

## 2020-01-01 RX ADMIN — LORAZEPAM 0.5 MG: 2 SOLUTION, CONCENTRATE ORAL at 10:20

## 2020-01-01 RX ADMIN — MIDODRINE HYDROCHLORIDE 5 MG: 5 TABLET ORAL at 12:45

## 2020-01-01 RX ADMIN — MIDODRINE HYDROCHLORIDE 5 MG: 5 TABLET ORAL at 16:38

## 2020-01-01 RX ADMIN — CEFTRIAXONE SODIUM 1 G: 1 INJECTION, POWDER, FOR SOLUTION INTRAMUSCULAR; INTRAVENOUS at 06:09

## 2020-01-01 RX ADMIN — OXYCODONE HYDROCHLORIDE 5 MG: 5 TABLET ORAL at 23:08

## 2020-01-01 RX ADMIN — OMEPRAZOLE 20 MG: 20 CAPSULE, DELAYED RELEASE ORAL at 05:55

## 2020-01-01 RX ADMIN — DIBASIC SODIUM PHOSPHATE, MONOBASIC POTASSIUM PHOSPHATE AND MONOBASIC SODIUM PHOSPHATE 250 MG: 852; 155; 130 TABLET ORAL at 09:17

## 2020-01-01 RX ADMIN — SODIUM CHLORIDE, POTASSIUM CHLORIDE, SODIUM LACTATE AND CALCIUM CHLORIDE: 600; 310; 30; 20 INJECTION, SOLUTION INTRAVENOUS at 13:26

## 2020-01-01 RX ADMIN — QUETIAPINE FUMARATE 25 MG: 25 TABLET ORAL at 16:37

## 2020-01-01 RX ADMIN — MIDODRINE HYDROCHLORIDE 5 MG: 5 TABLET ORAL at 08:27

## 2020-01-01 RX ADMIN — MAGNESIUM SULFATE 1 G: 1 INJECTION INTRAVENOUS at 22:20

## 2020-01-01 RX ADMIN — DRONABINOL 5 MG: 2.5 CAPSULE ORAL at 16:38

## 2020-01-01 RX ADMIN — LIDOCAINE 1 PATCH: 50 PATCH TOPICAL at 12:00

## 2020-01-01 RX ADMIN — CEFTRIAXONE SODIUM 1 G: 1 INJECTION, POWDER, FOR SOLUTION INTRAMUSCULAR; INTRAVENOUS at 17:56

## 2020-01-01 RX ADMIN — MIDODRINE HYDROCHLORIDE 5 MG: 5 TABLET ORAL at 07:48

## 2020-01-01 RX ADMIN — LORAZEPAM 0.5 MG: 2 INJECTION INTRAMUSCULAR; INTRAVENOUS at 20:17

## 2020-01-01 RX ADMIN — DRONABINOL 5 MG: 2.5 CAPSULE ORAL at 11:20

## 2020-01-01 RX ADMIN — LORAZEPAM 0.5 MG: 2 INJECTION INTRAMUSCULAR; INTRAVENOUS at 21:53

## 2020-01-01 RX ADMIN — LIDOCAINE 1 PATCH: 50 PATCH TOPICAL at 11:30

## 2020-01-01 RX ADMIN — ACETAMINOPHEN 500 MG: 500 TABLET ORAL at 23:33

## 2020-01-01 RX ADMIN — POTASSIUM CHLORIDE 10 MEQ: 7.46 INJECTION, SOLUTION INTRAVENOUS at 22:16

## 2020-01-01 RX ADMIN — SODIUM CHLORIDE, SODIUM LACTATE, POTASSIUM CHLORIDE, CALCIUM CHLORIDE AND DEXTROSE MONOHYDRATE: 5; 600; 310; 30; 20 INJECTION, SOLUTION INTRAVENOUS at 11:28

## 2020-01-01 RX ADMIN — SODIUM CHLORIDE, SODIUM LACTATE, POTASSIUM CHLORIDE, CALCIUM CHLORIDE AND DEXTROSE MONOHYDRATE: 5; 600; 310; 30; 20 INJECTION, SOLUTION INTRAVENOUS at 16:18

## 2020-01-01 RX ADMIN — OMEPRAZOLE 20 MG: 20 CAPSULE, DELAYED RELEASE ORAL at 04:11

## 2020-01-01 RX ADMIN — QUETIAPINE FUMARATE 12.5 MG: 25 TABLET ORAL at 20:08

## 2020-01-01 RX ADMIN — POTASSIUM CHLORIDE, DEXTROSE MONOHYDRATE AND SODIUM CHLORIDE: 150; 5; 450 INJECTION, SOLUTION INTRAVENOUS at 13:55

## 2020-01-01 RX ADMIN — SODIUM CHLORIDE, POTASSIUM CHLORIDE, SODIUM LACTATE AND CALCIUM CHLORIDE: 600; 310; 30; 20 INJECTION, SOLUTION INTRAVENOUS at 23:14

## 2020-01-01 RX ADMIN — KETOROLAC TROMETHAMINE 15 MG: 30 INJECTION, SOLUTION INTRAMUSCULAR; INTRAVENOUS at 23:00

## 2020-01-01 RX ADMIN — LIDOCAINE 1 PATCH: 50 PATCH TOPICAL at 12:04

## 2020-01-01 RX ADMIN — CEFTRIAXONE SODIUM 2 G: 2 INJECTION, POWDER, FOR SOLUTION INTRAMUSCULAR; INTRAVENOUS at 21:45

## 2020-01-01 RX ADMIN — OXYCODONE HYDROCHLORIDE 5 MG: 5 TABLET ORAL at 08:52

## 2020-01-01 RX ADMIN — MAGNESIUM SULFATE 2 G: 2 INJECTION INTRAVENOUS at 10:40

## 2020-01-01 RX ADMIN — OXYCODONE HYDROCHLORIDE 5 MG: 5 TABLET ORAL at 15:07

## 2020-01-01 RX ADMIN — MIDODRINE HYDROCHLORIDE 5 MG: 5 TABLET ORAL at 17:02

## 2020-01-01 RX ADMIN — MORPHINE SULFATE 10 MG: 10 SOLUTION ORAL at 06:13

## 2020-01-01 RX ADMIN — QUETIAPINE FUMARATE 25 MG: 25 TABLET ORAL at 20:23

## 2020-01-01 RX ADMIN — OXYCODONE HYDROCHLORIDE 10 MG: 10 TABLET ORAL at 12:43

## 2020-01-01 RX ADMIN — IOHEXOL 100 ML: 350 INJECTION, SOLUTION INTRAVENOUS at 19:24

## 2020-01-01 RX ADMIN — SODIUM CHLORIDE: 9 INJECTION, SOLUTION INTRAVENOUS at 20:42

## 2020-01-01 RX ADMIN — DRONABINOL 5 MG: 2.5 CAPSULE ORAL at 11:58

## 2020-01-01 RX ADMIN — MIDODRINE HYDROCHLORIDE 5 MG: 5 TABLET ORAL at 18:29

## 2020-01-01 RX ADMIN — ONDANSETRON 4 MG: 2 INJECTION INTRAMUSCULAR; INTRAVENOUS at 22:12

## 2020-01-01 RX ADMIN — QUETIAPINE FUMARATE 12.5 MG: 25 TABLET ORAL at 20:30

## 2020-01-01 RX ADMIN — QUETIAPINE FUMARATE 12.5 MG: 25 TABLET ORAL at 22:42

## 2020-01-01 RX ADMIN — MORPHINE SULFATE 4 MG: 4 INJECTION INTRAVENOUS at 01:40

## 2020-01-01 RX ADMIN — BACLOFEN 10 MG: 10 TABLET ORAL at 18:43

## 2020-01-01 RX ADMIN — QUETIAPINE FUMARATE 25 MG: 25 TABLET ORAL at 21:23

## 2020-01-01 RX ADMIN — DRONABINOL 5 MG: 2.5 CAPSULE ORAL at 16:58

## 2020-01-01 RX ADMIN — CEFTRIAXONE SODIUM 2 G: 2 INJECTION, POWDER, FOR SOLUTION INTRAMUSCULAR; INTRAVENOUS at 11:14

## 2020-01-01 RX ADMIN — ENOXAPARIN SODIUM 30 MG: 30 INJECTION SUBCUTANEOUS at 04:27

## 2020-01-01 RX ADMIN — OMEPRAZOLE 20 MG: 20 CAPSULE, DELAYED RELEASE ORAL at 07:05

## 2020-01-01 RX ADMIN — SODIUM CHLORIDE, POTASSIUM CHLORIDE, SODIUM LACTATE AND CALCIUM CHLORIDE: 600; 310; 30; 20 INJECTION, SOLUTION INTRAVENOUS at 22:16

## 2020-01-01 RX ADMIN — QUETIAPINE FUMARATE 12.5 MG: 25 TABLET ORAL at 20:42

## 2020-01-01 RX ADMIN — LIDOCAINE 1 PATCH: 50 PATCH TOPICAL at 13:26

## 2020-01-01 RX ADMIN — DRONABINOL 2.5 MG: 2.5 CAPSULE ORAL at 18:50

## 2020-01-01 RX ADMIN — PIPERACILLIN AND TAZOBACTAM 4.5 G: 4; .5 INJECTION, POWDER, LYOPHILIZED, FOR SOLUTION INTRAVENOUS; PARENTERAL at 15:01

## 2020-01-01 RX ADMIN — OXYCODONE HYDROCHLORIDE 10 MG: 10 TABLET ORAL at 19:43

## 2020-01-01 RX ADMIN — LORAZEPAM 0.5 MG: 2 SOLUTION, CONCENTRATE ORAL at 08:19

## 2020-01-01 RX ADMIN — MORPHINE SULFATE 5 MG: 10 SOLUTION ORAL at 16:00

## 2020-01-01 RX ADMIN — VANCOMYCIN HYDROCHLORIDE 750 MG: 500 INJECTION, POWDER, LYOPHILIZED, FOR SOLUTION INTRAVENOUS at 14:35

## 2020-01-01 RX ADMIN — OXYCODONE HYDROCHLORIDE 5 MG: 5 TABLET ORAL at 18:50

## 2020-01-01 RX ADMIN — OXYCODONE HYDROCHLORIDE 5 MG: 5 TABLET ORAL at 08:20

## 2020-01-01 RX ADMIN — OXYCODONE HYDROCHLORIDE 10 MG: 10 TABLET ORAL at 02:26

## 2020-01-01 RX ADMIN — DRONABINOL 5 MG: 2.5 CAPSULE ORAL at 12:45

## 2020-01-01 RX ADMIN — POTASSIUM PHOSPHATE, MONOBASIC AND POTASSIUM PHOSPHATE, DIBASIC 30 MMOL: 224; 236 INJECTION, SOLUTION, CONCENTRATE INTRAVENOUS at 12:08

## 2020-01-01 RX ADMIN — BACLOFEN 10 MG: 10 TABLET ORAL at 23:44

## 2020-01-01 RX ADMIN — ACETAMINOPHEN 1000 MG: 500 TABLET ORAL at 08:41

## 2020-01-01 RX ADMIN — CEFTRIAXONE SODIUM 2 G: 2 INJECTION, POWDER, FOR SOLUTION INTRAMUSCULAR; INTRAVENOUS at 10:53

## 2020-01-01 RX ADMIN — BACLOFEN 10 MG: 10 TABLET ORAL at 05:51

## 2020-01-01 RX ADMIN — QUETIAPINE FUMARATE 12.5 MG: 25 TABLET ORAL at 19:29

## 2020-01-01 RX ADMIN — MORPHINE SULFATE 10 MG: 10 SOLUTION ORAL at 21:28

## 2020-01-01 RX ADMIN — OMEPRAZOLE 20 MG: 20 CAPSULE, DELAYED RELEASE ORAL at 04:27

## 2020-01-01 RX ADMIN — DRONABINOL 5 MG: 2.5 CAPSULE ORAL at 17:06

## 2020-01-01 RX ADMIN — QUETIAPINE FUMARATE 12.5 MG: 25 TABLET ORAL at 21:50

## 2020-01-01 RX ADMIN — ENOXAPARIN SODIUM 30 MG: 30 INJECTION SUBCUTANEOUS at 08:19

## 2020-01-01 RX ADMIN — POTASSIUM CHLORIDE, DEXTROSE MONOHYDRATE AND SODIUM CHLORIDE: 150; 5; 450 INJECTION, SOLUTION INTRAVENOUS at 16:21

## 2020-01-01 RX ADMIN — ENOXAPARIN SODIUM 30 MG: 30 INJECTION SUBCUTANEOUS at 06:01

## 2020-01-01 RX ADMIN — IOHEXOL 100 ML: 350 INJECTION, SOLUTION INTRAVENOUS at 19:30

## 2020-01-01 RX ADMIN — SODIUM CHLORIDE 1000 ML: 9 INJECTION, SOLUTION INTRAVENOUS at 21:30

## 2020-01-01 RX ADMIN — QUETIAPINE FUMARATE 12.5 MG: 25 TABLET ORAL at 20:33

## 2020-01-01 RX ADMIN — LORAZEPAM 0.5 MG: 2 INJECTION INTRAMUSCULAR; INTRAVENOUS at 08:22

## 2020-01-01 RX ADMIN — OXYCODONE HYDROCHLORIDE 5 MG: 5 TABLET ORAL at 09:17

## 2020-01-01 RX ADMIN — ONDANSETRON 4 MG: 2 INJECTION INTRAMUSCULAR; INTRAVENOUS at 20:59

## 2020-01-01 RX ADMIN — QUETIAPINE FUMARATE 12.5 MG: 25 TABLET ORAL at 19:01

## 2020-01-01 RX ADMIN — LIDOCAINE 1 PATCH: 50 PATCH TOPICAL at 11:38

## 2020-01-01 RX ADMIN — LIDOCAINE 1 PATCH: 50 PATCH TOPICAL at 10:34

## 2020-01-01 RX ADMIN — OXYCODONE HYDROCHLORIDE 10 MG: 10 TABLET ORAL at 18:29

## 2020-01-01 RX ADMIN — SODIUM CHLORIDE: 9 INJECTION, SOLUTION INTRAVENOUS at 11:22

## 2020-01-01 RX ADMIN — POTASSIUM CHLORIDE 40 MEQ: 1500 TABLET, EXTENDED RELEASE ORAL at 14:27

## 2020-01-01 RX ADMIN — CEFTRIAXONE SODIUM 2 G: 2 INJECTION, POWDER, FOR SOLUTION INTRAMUSCULAR; INTRAVENOUS at 10:26

## 2020-01-01 RX ADMIN — OXYCODONE HYDROCHLORIDE 5 MG: 5 TABLET ORAL at 12:49

## 2020-01-01 RX ADMIN — MORPHINE SULFATE 10 MG: 10 SOLUTION ORAL at 12:24

## 2020-01-01 RX ADMIN — MAGNESIUM CITRATE 296 ML: 1.75 LIQUID ORAL at 23:59

## 2020-01-01 RX ADMIN — ACYCLOVIR SODIUM 380 MG: 500 INJECTION, SOLUTION INTRAVENOUS at 21:15

## 2020-01-01 RX ADMIN — DIBASIC SODIUM PHOSPHATE, MONOBASIC POTASSIUM PHOSPHATE AND MONOBASIC SODIUM PHOSPHATE 250 MG: 852; 155; 130 TABLET ORAL at 10:40

## 2020-01-01 RX ADMIN — OXYCODONE HYDROCHLORIDE 5 MG: 5 TABLET ORAL at 16:05

## 2020-01-01 RX ADMIN — ENOXAPARIN SODIUM 30 MG: 30 INJECTION SUBCUTANEOUS at 05:57

## 2020-01-01 RX ADMIN — CEFTRIAXONE SODIUM 2 G: 2 INJECTION, POWDER, FOR SOLUTION INTRAMUSCULAR; INTRAVENOUS at 21:16

## 2020-01-01 RX ADMIN — DEXTROSE MONOHYDRATE 50 ML: 25 INJECTION, SOLUTION INTRAVENOUS at 13:35

## 2020-01-01 RX ADMIN — MORPHINE SULFATE 4 MG: 4 INJECTION INTRAVENOUS at 22:16

## 2020-01-01 RX ADMIN — OXYCODONE HYDROCHLORIDE 5 MG: 5 TABLET ORAL at 05:28

## 2020-01-01 RX ADMIN — BACLOFEN 10 MG: 10 TABLET ORAL at 09:48

## 2020-01-01 RX ADMIN — OXYCODONE HYDROCHLORIDE 5 MG: 5 TABLET ORAL at 11:38

## 2020-01-01 RX ADMIN — SODIUM CHLORIDE 1000 ML: 9 INJECTION, SOLUTION INTRAVENOUS at 21:52

## 2020-01-01 RX ADMIN — LIDOCAINE 1 PATCH: 50 PATCH TOPICAL at 11:41

## 2020-01-01 RX ADMIN — POTASSIUM CHLORIDE 40 MEQ: 20 TABLET, EXTENDED RELEASE ORAL at 07:45

## 2020-01-01 RX ADMIN — LORAZEPAM 0.5 MG: 2 SOLUTION, CONCENTRATE ORAL at 16:58

## 2020-01-01 RX ADMIN — QUETIAPINE FUMARATE 12.5 MG: 25 TABLET ORAL at 20:03

## 2020-01-01 RX ADMIN — OMEPRAZOLE 40 MG: KIT at 06:39

## 2020-01-01 RX ADMIN — POTASSIUM CHLORIDE, DEXTROSE MONOHYDRATE AND SODIUM CHLORIDE: 150; 5; 450 INJECTION, SOLUTION INTRAVENOUS at 05:44

## 2020-01-01 RX ADMIN — MIDODRINE HYDROCHLORIDE 5 MG: 5 TABLET ORAL at 07:59

## 2020-01-01 RX ADMIN — ACETAMINOPHEN 1000 MG: 500 TABLET ORAL at 17:44

## 2020-01-01 RX ADMIN — OXYCODONE HYDROCHLORIDE 5 MG: 5 TABLET ORAL at 02:58

## 2020-01-01 RX ADMIN — MORPHINE SULFATE 10 MG: 10 SOLUTION ORAL at 20:42

## 2020-01-01 RX ADMIN — ONDANSETRON 4 MG: 2 INJECTION INTRAMUSCULAR; INTRAVENOUS at 18:12

## 2020-01-01 RX ADMIN — POTASSIUM PHOSPHATE, MONOBASIC AND POTASSIUM PHOSPHATE, DIBASIC 30 MMOL: 224; 236 INJECTION, SOLUTION, CONCENTRATE INTRAVENOUS at 03:45

## 2020-01-01 RX ADMIN — FAMOTIDINE 20 MG: 20 TABLET, FILM COATED ORAL at 08:19

## 2020-01-01 RX ADMIN — ONDANSETRON 4 MG: 2 INJECTION INTRAMUSCULAR; INTRAVENOUS at 22:58

## 2020-01-01 RX ADMIN — ACETAMINOPHEN 1000 MG: 500 TABLET ORAL at 10:12

## 2020-01-01 RX ADMIN — MIDODRINE HYDROCHLORIDE 5 MG: 5 TABLET ORAL at 17:31

## 2020-01-01 RX ADMIN — QUETIAPINE FUMARATE 12.5 MG: 25 TABLET ORAL at 21:53

## 2020-01-01 RX ADMIN — FAMOTIDINE 20 MG: 20 TABLET, FILM COATED ORAL at 04:28

## 2020-01-01 RX ADMIN — ACYCLOVIR SODIUM 380 MG: 500 INJECTION, SOLUTION INTRAVENOUS at 21:07

## 2020-01-01 RX ADMIN — QUETIAPINE FUMARATE 12.5 MG: 25 TABLET ORAL at 20:13

## 2020-01-01 RX ADMIN — MORPHINE SULFATE 10 MG: 10 SOLUTION ORAL at 15:00

## 2020-01-01 RX ADMIN — SODIUM CHLORIDE, SODIUM LACTATE, POTASSIUM CHLORIDE, CALCIUM CHLORIDE AND DEXTROSE MONOHYDRATE: 5; 600; 310; 30; 20 INJECTION, SOLUTION INTRAVENOUS at 04:49

## 2020-01-01 RX ADMIN — FAMOTIDINE 20 MG: 20 TABLET, FILM COATED ORAL at 11:06

## 2020-01-01 RX ADMIN — LORAZEPAM 0.5 MG: 2 INJECTION INTRAMUSCULAR; INTRAVENOUS at 02:56

## 2020-01-01 RX ADMIN — FAMOTIDINE 20 MG: 20 TABLET, FILM COATED ORAL at 17:16

## 2020-01-01 RX ADMIN — DRONABINOL 5 MG: 2.5 CAPSULE ORAL at 17:31

## 2020-01-01 RX ADMIN — POTASSIUM CHLORIDE 40 MEQ: 1500 TABLET, EXTENDED RELEASE ORAL at 05:41

## 2020-01-01 RX ADMIN — MORPHINE SULFATE 2 MG: 4 INJECTION INTRAVENOUS at 19:56

## 2020-01-01 RX ADMIN — ACYCLOVIR SODIUM 380 MG: 500 INJECTION, SOLUTION INTRAVENOUS at 13:39

## 2020-01-01 RX ADMIN — LORAZEPAM 0.5 MG: 2 SOLUTION, CONCENTRATE ORAL at 01:51

## 2020-01-01 RX ADMIN — OXYCODONE HYDROCHLORIDE 10 MG: 10 TABLET ORAL at 08:59

## 2020-01-01 RX ADMIN — DRONABINOL 5 MG: 2.5 CAPSULE ORAL at 12:50

## 2020-01-01 RX ADMIN — ONDANSETRON 4 MG: 2 INJECTION INTRAMUSCULAR; INTRAVENOUS at 17:49

## 2020-01-01 RX ADMIN — MORPHINE SULFATE 10 MG: 10 SOLUTION ORAL at 00:24

## 2020-01-01 RX ADMIN — MORPHINE SULFATE 2 MG: 4 INJECTION INTRAVENOUS at 14:18

## 2020-01-01 RX ADMIN — LORAZEPAM 1 MG: 1 TABLET ORAL at 10:30

## 2020-01-01 RX ADMIN — OMEPRAZOLE 20 MG: 20 CAPSULE, DELAYED RELEASE ORAL at 06:01

## 2020-01-01 RX ADMIN — POTASSIUM CHLORIDE AND SODIUM CHLORIDE: 900; 150 INJECTION, SOLUTION INTRAVENOUS at 22:21

## 2020-01-01 RX ADMIN — LIDOCAINE 1 PATCH: 50 PATCH TOPICAL at 15:06

## 2020-01-01 RX ADMIN — CEFTRIAXONE SODIUM 1000 MG: 1 INJECTION, POWDER, FOR SOLUTION INTRAMUSCULAR; INTRAVENOUS at 06:17

## 2020-01-01 RX ADMIN — LORAZEPAM 0.5 MG: 2 SOLUTION, CONCENTRATE ORAL at 20:39

## 2020-01-01 RX ADMIN — LIDOCAINE 1 PATCH: 50 PATCH TOPICAL at 13:00

## 2020-01-01 RX ADMIN — MIDODRINE HYDROCHLORIDE 2.5 MG: 5 TABLET ORAL at 16:55

## 2020-01-01 RX ADMIN — LORAZEPAM 0.5 MG: 2 SOLUTION, CONCENTRATE ORAL at 06:14

## 2020-01-01 RX ADMIN — OXYCODONE HYDROCHLORIDE 10 MG: 10 TABLET ORAL at 18:33

## 2020-01-01 RX ADMIN — ACETAMINOPHEN 1000 MG: 500 TABLET ORAL at 10:34

## 2020-01-01 RX ADMIN — FAMOTIDINE 20 MG: 20 TABLET, FILM COATED ORAL at 05:38

## 2020-01-01 RX ADMIN — HALOPERIDOL LACTATE 3 MG: 5 INJECTION, SOLUTION INTRAMUSCULAR at 22:02

## 2020-01-01 RX ADMIN — LORAZEPAM 0.5 MG: 2 INJECTION INTRAMUSCULAR; INTRAVENOUS at 02:44

## 2020-01-01 RX ADMIN — QUETIAPINE FUMARATE 12.5 MG: 25 TABLET ORAL at 20:22

## 2020-01-01 RX ADMIN — LORAZEPAM 0.5 MG: 2 SOLUTION, CONCENTRATE ORAL at 12:37

## 2020-01-01 RX ADMIN — POTASSIUM CHLORIDE, DEXTROSE MONOHYDRATE AND SODIUM CHLORIDE: 150; 5; 450 INJECTION, SOLUTION INTRAVENOUS at 03:25

## 2020-01-01 RX ADMIN — OMEPRAZOLE 20 MG: 20 CAPSULE, DELAYED RELEASE ORAL at 13:50

## 2020-01-01 RX ADMIN — QUETIAPINE FUMARATE 25 MG: 25 TABLET ORAL at 22:54

## 2020-01-01 RX ADMIN — ACETAMINOPHEN 1000 MG: 500 TABLET ORAL at 17:42

## 2020-01-01 RX ADMIN — ENOXAPARIN SODIUM 30 MG: 30 INJECTION SUBCUTANEOUS at 05:40

## 2020-01-01 RX ADMIN — MORPHINE SULFATE 2 MG: 4 INJECTION INTRAVENOUS at 05:52

## 2020-01-01 RX ADMIN — FENTANYL CITRATE 100 MCG: 50 INJECTION, SOLUTION INTRAMUSCULAR; INTRAVENOUS at 22:12

## 2020-01-01 RX ADMIN — MICONAZOLE NITRATE 1 EACH: 20 CREAM TOPICAL at 18:16

## 2020-01-01 RX ADMIN — LORAZEPAM 0.5 MG: 2 SOLUTION, CONCENTRATE ORAL at 18:58

## 2020-01-01 RX ADMIN — DRONABINOL 5 MG: 2.5 CAPSULE ORAL at 11:48

## 2020-01-01 RX ADMIN — QUETIAPINE FUMARATE 12.5 MG: 25 TABLET ORAL at 20:40

## 2020-01-01 RX ADMIN — OXYCODONE HYDROCHLORIDE 10 MG: 10 TABLET ORAL at 02:05

## 2020-01-01 RX ADMIN — MIDODRINE HYDROCHLORIDE 5 MG: 5 TABLET ORAL at 08:02

## 2020-01-01 RX ADMIN — VANCOMYCIN HYDROCHLORIDE 750 MG: 500 INJECTION, POWDER, LYOPHILIZED, FOR SOLUTION INTRAVENOUS at 14:53

## 2020-01-01 RX ADMIN — MORPHINE SULFATE 2 MG: 4 INJECTION INTRAVENOUS at 18:43

## 2020-01-01 RX ADMIN — OXYCODONE HYDROCHLORIDE 5 MG: 5 TABLET ORAL at 12:44

## 2020-01-01 RX ADMIN — OXYCODONE HYDROCHLORIDE 5 MG: 5 TABLET ORAL at 14:30

## 2020-01-01 RX ADMIN — MORPHINE SULFATE 2 MG: 4 INJECTION INTRAVENOUS at 08:52

## 2020-01-01 RX ADMIN — ACETAMINOPHEN 1000 MG: 500 TABLET ORAL at 12:02

## 2020-01-01 RX ADMIN — VANCOMYCIN HYDROCHLORIDE 750 MG: 500 INJECTION, POWDER, LYOPHILIZED, FOR SOLUTION INTRAVENOUS at 02:20

## 2020-01-01 RX ADMIN — MICONAZOLE NITRATE: 20 CREAM TOPICAL at 04:36

## 2020-01-01 RX ADMIN — SODIUM CHLORIDE, POTASSIUM CHLORIDE, SODIUM LACTATE AND CALCIUM CHLORIDE 1000 ML: 600; 310; 30; 20 INJECTION, SOLUTION INTRAVENOUS at 17:56

## 2020-01-01 RX ADMIN — ENOXAPARIN SODIUM 30 MG: 30 INJECTION SUBCUTANEOUS at 04:11

## 2020-01-01 RX ADMIN — QUETIAPINE FUMARATE 12.5 MG: 25 TABLET ORAL at 21:09

## 2020-01-01 RX ADMIN — OXYCODONE HYDROCHLORIDE 5 MG: 5 TABLET ORAL at 11:05

## 2020-01-01 RX ADMIN — MIDODRINE HYDROCHLORIDE 5 MG: 5 TABLET ORAL at 17:08

## 2020-01-01 RX ADMIN — ALPRAZOLAM 0.25 MG: 0.25 TABLET ORAL at 00:29

## 2020-01-01 RX ADMIN — DRONABINOL 2.5 MG: 2.5 CAPSULE ORAL at 11:06

## 2020-01-01 RX ADMIN — PIPERACILLIN AND TAZOBACTAM 4.5 G: 4; .5 INJECTION, POWDER, LYOPHILIZED, FOR SOLUTION INTRAVENOUS; PARENTERAL at 03:44

## 2020-01-01 RX ADMIN — SODIUM CHLORIDE, POTASSIUM CHLORIDE, SODIUM LACTATE AND CALCIUM CHLORIDE 1000 ML: 600; 310; 30; 20 INJECTION, SOLUTION INTRAVENOUS at 07:18

## 2020-01-01 RX ADMIN — MIDODRINE HYDROCHLORIDE 5 MG: 5 TABLET ORAL at 10:34

## 2020-01-01 RX ADMIN — HYDROCODONE BITARTRATE AND ACETAMINOPHEN 10 ML: 7.5; 325 SOLUTION ORAL at 00:41

## 2020-01-01 RX ADMIN — ACETAMINOPHEN 500 MG: 500 TABLET ORAL at 05:25

## 2020-01-01 RX ADMIN — MORPHINE SULFATE 4 MG: 4 INJECTION INTRAVENOUS at 03:13

## 2020-01-01 RX ADMIN — OMEPRAZOLE 20 MG: 20 CAPSULE, DELAYED RELEASE ORAL at 05:37

## 2020-01-01 RX ADMIN — OMEPRAZOLE 20 MG: 20 CAPSULE, DELAYED RELEASE ORAL at 18:35

## 2020-01-01 RX ADMIN — DRONABINOL 5 MG: 2.5 CAPSULE ORAL at 11:38

## 2020-01-01 RX ADMIN — OXYCODONE HYDROCHLORIDE 10 MG: 10 TABLET ORAL at 23:59

## 2020-01-01 RX ADMIN — FAMOTIDINE 20 MG: 20 TABLET, FILM COATED ORAL at 08:33

## 2020-01-01 RX ADMIN — PIPERACILLIN AND TAZOBACTAM 4.5 G: 4; .5 INJECTION, POWDER, LYOPHILIZED, FOR SOLUTION INTRAVENOUS; PARENTERAL at 06:44

## 2020-01-01 RX ADMIN — NALOXONE HYDROCHLORIDE 0.4 MG: 0.4 INJECTION, SOLUTION INTRAMUSCULAR; INTRAVENOUS; SUBCUTANEOUS at 22:08

## 2020-01-01 RX ADMIN — OXYCODONE HYDROCHLORIDE 10 MG: 10 TABLET ORAL at 22:42

## 2020-01-01 RX ADMIN — OMEPRAZOLE 20 MG: 20 CAPSULE, DELAYED RELEASE ORAL at 05:05

## 2020-01-01 RX ADMIN — OXYCODONE HYDROCHLORIDE 10 MG: 10 TABLET ORAL at 17:24

## 2020-01-01 RX ADMIN — FENTANYL CITRATE 50 MCG: 50 INJECTION INTRAMUSCULAR; INTRAVENOUS at 23:19

## 2020-01-01 RX ADMIN — HYDROCODONE BITARTRATE AND ACETAMINOPHEN 1 TABLET: 5; 325 TABLET ORAL at 00:45

## 2020-01-01 RX ADMIN — QUETIAPINE FUMARATE 12.5 MG: 25 TABLET ORAL at 00:23

## 2020-01-01 RX ADMIN — ACYCLOVIR SODIUM 380 MG: 500 INJECTION, SOLUTION INTRAVENOUS at 06:09

## 2020-01-01 RX ADMIN — QUETIAPINE FUMARATE 12.5 MG: 25 TABLET ORAL at 19:58

## 2020-01-01 RX ADMIN — DEXTROSE MONOHYDRATE 50 ML: 25 INJECTION, SOLUTION INTRAVENOUS at 13:29

## 2020-01-01 RX ADMIN — ENOXAPARIN SODIUM 30 MG: 30 INJECTION SUBCUTANEOUS at 05:51

## 2020-01-01 RX ADMIN — LORAZEPAM 0.5 MG: 2 SOLUTION, CONCENTRATE ORAL at 15:44

## 2020-01-01 RX ADMIN — ACETAMINOPHEN 650 MG: 325 TABLET, FILM COATED ORAL at 15:46

## 2020-01-01 RX ADMIN — QUETIAPINE FUMARATE 12.5 MG: 25 TABLET ORAL at 20:01

## 2020-01-01 RX ADMIN — OMEPRAZOLE 40 MG: KIT at 05:57

## 2020-01-01 RX ADMIN — OXYCODONE HYDROCHLORIDE 10 MG: 10 TABLET ORAL at 20:33

## 2020-01-01 RX ADMIN — POTASSIUM CHLORIDE 10 MEQ: 7.46 INJECTION, SOLUTION INTRAVENOUS at 02:35

## 2020-01-01 RX ADMIN — LIDOCAINE 1 PATCH: 50 PATCH TOPICAL at 11:47

## 2020-01-01 RX ADMIN — POTASSIUM CHLORIDE 40 MEQ: 1500 TABLET, EXTENDED RELEASE ORAL at 11:07

## 2020-01-01 RX ADMIN — FAMOTIDINE 20 MG: 20 TABLET, FILM COATED ORAL at 16:49

## 2020-01-01 RX ADMIN — SODIUM CHLORIDE, POTASSIUM CHLORIDE, SODIUM LACTATE AND CALCIUM CHLORIDE 1140 ML: 600; 310; 30; 20 INJECTION, SOLUTION INTRAVENOUS at 22:05

## 2020-01-01 RX ADMIN — OXYCODONE HYDROCHLORIDE 5 MG: 5 TABLET ORAL at 17:50

## 2020-01-01 RX ADMIN — OXYCODONE HYDROCHLORIDE 10 MG: 10 TABLET ORAL at 16:26

## 2020-01-01 RX ADMIN — BACLOFEN 10 MG: 10 TABLET ORAL at 12:29

## 2020-01-01 RX ADMIN — GADOTERIDOL 10 ML: 279.3 INJECTION, SOLUTION INTRAVENOUS at 08:55

## 2020-01-01 RX ADMIN — QUETIAPINE FUMARATE 12.5 MG: 25 TABLET ORAL at 20:47

## 2020-01-01 RX ADMIN — GADOTERIDOL 10 ML: 279.3 INJECTION, SOLUTION INTRAVENOUS at 11:19

## 2020-01-01 RX ADMIN — POTASSIUM CHLORIDE, DEXTROSE MONOHYDRATE AND SODIUM CHLORIDE: 150; 5; 450 INJECTION, SOLUTION INTRAVENOUS at 07:24

## 2020-01-01 RX ADMIN — QUETIAPINE FUMARATE 12.5 MG: 25 TABLET ORAL at 15:50

## 2020-01-01 RX ADMIN — LORAZEPAM 0.5 MG: 2 INJECTION INTRAMUSCULAR; INTRAVENOUS at 21:56

## 2020-01-01 RX ADMIN — MICONAZOLE NITRATE: 20 CREAM TOPICAL at 17:03

## 2020-01-01 RX ADMIN — MORPHINE SULFATE 10 MG: 10 SOLUTION ORAL at 02:32

## 2020-01-01 RX ADMIN — SODIUM CHLORIDE, POTASSIUM CHLORIDE, SODIUM LACTATE AND CALCIUM CHLORIDE: 600; 310; 30; 20 INJECTION, SOLUTION INTRAVENOUS at 23:46

## 2020-01-01 RX ADMIN — POTASSIUM CHLORIDE 40 MEQ: 1500 TABLET, EXTENDED RELEASE ORAL at 22:48

## 2020-01-01 RX ADMIN — QUETIAPINE FUMARATE 12.5 MG: 25 TABLET ORAL at 07:59

## 2020-01-01 RX ADMIN — FENTANYL CITRATE 50 MCG: 0.05 INJECTION, SOLUTION INTRAMUSCULAR; INTRAVENOUS at 23:44

## 2020-01-01 RX ADMIN — MORPHINE SULFATE 1 MG: 4 INJECTION INTRAVENOUS at 17:48

## 2020-01-01 RX ADMIN — FENTANYL CITRATE 50 MCG: 50 INJECTION, SOLUTION INTRAMUSCULAR; INTRAVENOUS at 08:35

## 2020-01-01 RX ADMIN — PIPERACILLIN AND TAZOBACTAM 4.5 G: 4; .5 INJECTION, POWDER, LYOPHILIZED, FOR SOLUTION INTRAVENOUS; PARENTERAL at 06:08

## 2020-01-01 RX ADMIN — LORAZEPAM 0.5 MG: 2 SOLUTION, CONCENTRATE ORAL at 11:27

## 2020-01-01 RX ADMIN — MORPHINE SULFATE 2 MG: 4 INJECTION INTRAVENOUS at 23:23

## 2020-01-01 RX ADMIN — OXYCODONE HYDROCHLORIDE 10 MG: 10 TABLET ORAL at 07:48

## 2020-01-01 RX ADMIN — POTASSIUM CHLORIDE, DEXTROSE MONOHYDRATE AND SODIUM CHLORIDE: 150; 5; 450 INJECTION, SOLUTION INTRAVENOUS at 16:38

## 2020-01-01 RX ADMIN — LORAZEPAM 0.5 MG: 2 SOLUTION, CONCENTRATE ORAL at 21:52

## 2020-01-01 RX ADMIN — QUETIAPINE FUMARATE 25 MG: 25 TABLET ORAL at 20:40

## 2020-01-01 RX ADMIN — MIDODRINE HYDROCHLORIDE 5 MG: 5 TABLET ORAL at 08:41

## 2020-01-01 RX ADMIN — LIDOCAINE 1 PATCH: 50 PATCH TOPICAL at 11:04

## 2020-01-01 RX ADMIN — ENOXAPARIN SODIUM 30 MG: 30 INJECTION SUBCUTANEOUS at 05:43

## 2020-01-01 RX ADMIN — OMEPRAZOLE 40 MG: KIT at 05:38

## 2020-01-01 ASSESSMENT — COGNITIVE AND FUNCTIONAL STATUS - GENERAL
MOVING FROM LYING ON BACK TO SITTING ON SIDE OF FLAT BED: UNABLE
MOBILITY SCORE: 10
MOVING TO AND FROM BED TO CHAIR: UNABLE
MOBILITY SCORE: 9
CLIMB 3 TO 5 STEPS WITH RAILING: TOTAL
TURNING FROM BACK TO SIDE WHILE IN FLAT BAD: UNABLE
TOILETING: A LITTLE
STANDING UP FROM CHAIR USING ARMS: TOTAL
STANDING UP FROM CHAIR USING ARMS: A LITTLE
CLIMB 3 TO 5 STEPS WITH RAILING: TOTAL
DAILY ACTIVITIY SCORE: 12
SUGGESTED CMS G CODE MODIFIER MOBILITY: CL
HELP NEEDED FOR BATHING: A LOT
DRESSING REGULAR LOWER BODY CLOTHING: A LOT
HELP NEEDED FOR BATHING: A LITTLE
MOVING FROM LYING ON BACK TO SITTING ON SIDE OF FLAT BED: UNABLE
SUGGESTED CMS G CODE MODIFIER MOBILITY: CM
PERSONAL GROOMING: TOTAL
WALKING IN HOSPITAL ROOM: A LOT
PERSONAL GROOMING: A LITTLE
DRESSING REGULAR LOWER BODY CLOTHING: TOTAL
EATING MEALS: A LOT
HELP NEEDED FOR BATHING: A LOT
CLIMB 3 TO 5 STEPS WITH RAILING: TOTAL
TURNING FROM BACK TO SIDE WHILE IN FLAT BAD: A LOT
DRESSING REGULAR UPPER BODY CLOTHING: A LOT
DAILY ACTIVITIY SCORE: 14
DRESSING REGULAR LOWER BODY CLOTHING: A LOT
SUGGESTED CMS G CODE MODIFIER MOBILITY: CN
TURNING FROM BACK TO SIDE WHILE IN FLAT BAD: A LOT
MOBILITY SCORE: 8
MOVING TO AND FROM BED TO CHAIR: UNABLE
MOBILITY SCORE: 7
TOILETING: TOTAL
WALKING IN HOSPITAL ROOM: A LITTLE
SUGGESTED CMS G CODE MODIFIER MOBILITY: CM
TURNING FROM BACK TO SIDE WHILE IN FLAT BAD: A LOT
MOVING TO AND FROM BED TO CHAIR: A LITTLE
MOVING FROM LYING ON BACK TO SITTING ON SIDE OF FLAT BED: UNABLE
WALKING IN HOSPITAL ROOM: A LOT
EATING MEALS: A LOT
TURNING FROM BACK TO SIDE WHILE IN FLAT BAD: A LOT
WALKING IN HOSPITAL ROOM: A LOT
PERSONAL GROOMING: A LOT
SUGGESTED CMS G CODE MODIFIER MOBILITY: CL
STANDING UP FROM CHAIR USING ARMS: TOTAL
WALKING IN HOSPITAL ROOM: TOTAL
SUGGESTED CMS G CODE MODIFIER MOBILITY: CM
MOBILITY SCORE: 10
CLIMB 3 TO 5 STEPS WITH RAILING: A LOT
TURNING FROM BACK TO SIDE WHILE IN FLAT BAD: A LOT
DRESSING REGULAR LOWER BODY CLOTHING: TOTAL
CLIMB 3 TO 5 STEPS WITH RAILING: TOTAL
MOVING TO AND FROM BED TO CHAIR: UNABLE
SUGGESTED CMS G CODE MODIFIER DAILY ACTIVITY: CN
DAILY ACTIVITIY SCORE: 13
DAILY ACTIVITIY SCORE: 12
MOVING TO AND FROM BED TO CHAIR: UNABLE
SUGGESTED CMS G CODE MODIFIER DAILY ACTIVITY: CL
STANDING UP FROM CHAIR USING ARMS: A LOT
CLIMB 3 TO 5 STEPS WITH RAILING: TOTAL
TURNING FROM BACK TO SIDE WHILE IN FLAT BAD: A LOT
MOBILITY SCORE: 8
PERSONAL GROOMING: A LOT
MOVING TO AND FROM BED TO CHAIR: UNABLE
SUGGESTED CMS G CODE MODIFIER MOBILITY: CM
EATING MEALS: TOTAL
DRESSING REGULAR UPPER BODY CLOTHING: A LOT
DAILY ACTIVITIY SCORE: 10
PERSONAL GROOMING: A LOT
MOVING TO AND FROM BED TO CHAIR: UNABLE
STANDING UP FROM CHAIR USING ARMS: A LITTLE
WALKING IN HOSPITAL ROOM: TOTAL
DRESSING REGULAR LOWER BODY CLOTHING: A LOT
STANDING UP FROM CHAIR USING ARMS: A LITTLE
MOVING TO AND FROM BED TO CHAIR: UNABLE
DRESSING REGULAR UPPER BODY CLOTHING: A LITTLE
SUGGESTED CMS G CODE MODIFIER DAILY ACTIVITY: CL
PERSONAL GROOMING: A LOT
WALKING IN HOSPITAL ROOM: TOTAL
DAILY ACTIVITIY SCORE: 13
TOILETING: A LOT
MOVING FROM LYING ON BACK TO SITTING ON SIDE OF FLAT BED: A LITTLE
MOVING FROM LYING ON BACK TO SITTING ON SIDE OF FLAT BED: UNABLE
STANDING UP FROM CHAIR USING ARMS: TOTAL
HELP NEEDED FOR BATHING: A LOT
MOBILITY SCORE: 8
CLIMB 3 TO 5 STEPS WITH RAILING: TOTAL
HELP NEEDED FOR BATHING: A LOT
DRESSING REGULAR UPPER BODY CLOTHING: A LOT
SUGGESTED CMS G CODE MODIFIER MOBILITY: CM
MOBILITY SCORE: 7
CLIMB 3 TO 5 STEPS WITH RAILING: TOTAL
TURNING FROM BACK TO SIDE WHILE IN FLAT BAD: A LOT
TOILETING: A LOT
SUGGESTED CMS G CODE MODIFIER MOBILITY: CM
MOVING FROM LYING ON BACK TO SITTING ON SIDE OF FLAT BED: UNABLE
STANDING UP FROM CHAIR USING ARMS: A LOT
EATING MEALS: A LOT
DRESSING REGULAR UPPER BODY CLOTHING: A LITTLE
MOVING FROM LYING ON BACK TO SITTING ON SIDE OF FLAT BED: UNABLE
CLIMB 3 TO 5 STEPS WITH RAILING: TOTAL
SUGGESTED CMS G CODE MODIFIER DAILY ACTIVITY: CK
STANDING UP FROM CHAIR USING ARMS: A LOT
WALKING IN HOSPITAL ROOM: A LOT
CLIMB 3 TO 5 STEPS WITH RAILING: TOTAL
MOVING TO AND FROM BED TO CHAIR: UNABLE
SUGGESTED CMS G CODE MODIFIER DAILY ACTIVITY: CK
EATING MEALS: A LOT
TURNING FROM BACK TO SIDE WHILE IN FLAT BAD: UNABLE
SUGGESTED CMS G CODE MODIFIER MOBILITY: CL
MOVING FROM LYING ON BACK TO SITTING ON SIDE OF FLAT BED: UNABLE
MOVING FROM LYING ON BACK TO SITTING ON SIDE OF FLAT BED: UNABLE
WALKING IN HOSPITAL ROOM: A LOT
SUGGESTED CMS G CODE MODIFIER MOBILITY: CM
PERSONAL GROOMING: A LOT
HELP NEEDED FOR BATHING: TOTAL
PERSONAL GROOMING: A LOT
MOVING FROM LYING ON BACK TO SITTING ON SIDE OF FLAT BED: UNABLE
TURNING FROM BACK TO SIDE WHILE IN FLAT BAD: UNABLE
CLIMB 3 TO 5 STEPS WITH RAILING: TOTAL
CLIMB 3 TO 5 STEPS WITH RAILING: TOTAL
DRESSING REGULAR LOWER BODY CLOTHING: A LOT
TURNING FROM BACK TO SIDE WHILE IN FLAT BAD: A LOT
PERSONAL GROOMING: TOTAL
WALKING IN HOSPITAL ROOM: A LOT
DRESSING REGULAR UPPER BODY CLOTHING: A LITTLE
WALKING IN HOSPITAL ROOM: TOTAL
STANDING UP FROM CHAIR USING ARMS: A LITTLE
MOBILITY SCORE: 9
DRESSING REGULAR LOWER BODY CLOTHING: TOTAL
MOVING FROM LYING ON BACK TO SITTING ON SIDE OF FLAT BED: UNABLE
SUGGESTED CMS G CODE MODIFIER MOBILITY: CM
SUGGESTED CMS G CODE MODIFIER DAILY ACTIVITY: CL
TOILETING: A LOT
MOVING FROM LYING ON BACK TO SITTING ON SIDE OF FLAT BED: UNABLE
STANDING UP FROM CHAIR USING ARMS: A LITTLE
SUGGESTED CMS G CODE MODIFIER MOBILITY: CK
DAILY ACTIVITIY SCORE: 6
SUGGESTED CMS G CODE MODIFIER DAILY ACTIVITY: CL
TOILETING: TOTAL
MOBILITY SCORE: 6
TOILETING: A LITTLE
DRESSING REGULAR LOWER BODY CLOTHING: A LOT
MOVING FROM LYING ON BACK TO SITTING ON SIDE OF FLAT BED: UNABLE
CLIMB 3 TO 5 STEPS WITH RAILING: TOTAL
WALKING IN HOSPITAL ROOM: A LOT
CLIMB 3 TO 5 STEPS WITH RAILING: TOTAL
SUGGESTED CMS G CODE MODIFIER MOBILITY: CM
MOVING TO AND FROM BED TO CHAIR: UNABLE
MOVING TO AND FROM BED TO CHAIR: UNABLE
TURNING FROM BACK TO SIDE WHILE IN FLAT BAD: A LOT
EATING MEALS: A LOT
DRESSING REGULAR LOWER BODY CLOTHING: A LITTLE
MOBILITY SCORE: 7
WALKING IN HOSPITAL ROOM: TOTAL
MOVING FROM LYING ON BACK TO SITTING ON SIDE OF FLAT BED: UNABLE
MOVING TO AND FROM BED TO CHAIR: UNABLE
HELP NEEDED FOR BATHING: A LOT
TURNING FROM BACK TO SIDE WHILE IN FLAT BAD: A LITTLE
HELP NEEDED FOR BATHING: A LOT
STANDING UP FROM CHAIR USING ARMS: A LOT
STANDING UP FROM CHAIR USING ARMS: A LOT
EATING MEALS: A LITTLE
TURNING FROM BACK TO SIDE WHILE IN FLAT BAD: UNABLE
MOVING TO AND FROM BED TO CHAIR: UNABLE
WALKING IN HOSPITAL ROOM: TOTAL
DRESSING REGULAR UPPER BODY CLOTHING: TOTAL
STANDING UP FROM CHAIR USING ARMS: A LOT
TOILETING: A LOT
DRESSING REGULAR UPPER BODY CLOTHING: A LITTLE
SUGGESTED CMS G CODE MODIFIER DAILY ACTIVITY: CL
EATING MEALS: TOTAL
MOBILITY SCORE: 10
TOILETING: A LOT
DAILY ACTIVITIY SCORE: 18
MOBILITY SCORE: 9
MOVING TO AND FROM BED TO CHAIR: UNABLE
HELP NEEDED FOR BATHING: A LOT
MOBILITY SCORE: 17
DRESSING REGULAR UPPER BODY CLOTHING: TOTAL

## 2020-01-01 ASSESSMENT — ENCOUNTER SYMPTOMS
WEAKNESS: 1
ABDOMINAL PAIN: 0
CHILLS: 0
SORE THROAT: 0
SHORTNESS OF BREATH: 0
WEIGHT LOSS: 0
BACK PAIN: 1
WEIGHT LOSS: 0
WHEEZING: 0
CONSTIPATION: 0
DIARRHEA: 0
VOMITING: 0
EYE DISCHARGE: 0
NERVOUS/ANXIOUS: 1
NEUROLOGICAL NEGATIVE: 1
VOMITING: 0
WEIGHT LOSS: 0
NAUSEA: 0
COUGH: 0
NAUSEA: 1
DOUBLE VISION: 0
INSOMNIA: 0
DIZZINESS: 0
FEVER: 0
WHEEZING: 0
SORE THROAT: 0
LOSS OF CONSCIOUSNESS: 0
INSOMNIA: 0
MYALGIAS: 1
NERVOUS/ANXIOUS: 0
SORE THROAT: 0
VOMITING: 0
DIARRHEA: 0
ABDOMINAL PAIN: 1
MYALGIAS: 1
BLURRED VISION: 0
ABDOMINAL PAIN: 1
CONSTIPATION: 0
SHORTNESS OF BREATH: 0
CHILLS: 0
PALPITATIONS: 0
DIZZINESS: 0
SHORTNESS OF BREATH: 0
VOMITING: 0
CHILLS: 0
NAUSEA: 0
SORE THROAT: 0
DOUBLE VISION: 0
CHILLS: 0
ABDOMINAL PAIN: 0
CONSTIPATION: 0
VOMITING: 0
ABDOMINAL PAIN: 0
PALPITATIONS: 0
FALLS: 0
EYE DISCHARGE: 0
VOMITING: 0
NERVOUS/ANXIOUS: 1
CHILLS: 0
ABDOMINAL PAIN: 1
VOMITING: 0
SHORTNESS OF BREATH: 0
WEIGHT LOSS: 0
SHORTNESS OF BREATH: 0
WEAKNESS: 1
FLANK PAIN: 0
ABDOMINAL PAIN: 0
SHORTNESS OF BREATH: 0
NAUSEA: 1
MUSCULOSKELETAL NEGATIVE: 1
EYE DISCHARGE: 0
ABDOMINAL PAIN: 1
BACK PAIN: 1
SHORTNESS OF BREATH: 0
EYE PAIN: 0
BACK PAIN: 1
NAUSEA: 0
ABDOMINAL PAIN: 1
DIZZINESS: 0
SHORTNESS OF BREATH: 0
NAUSEA: 0
SHORTNESS OF BREATH: 0
ABDOMINAL PAIN: 1
BACK PAIN: 1
MEMORY LOSS: 1
RESPIRATORY NEGATIVE: 1
ABDOMINAL PAIN: 1
SHORTNESS OF BREATH: 0
NERVOUS/ANXIOUS: 0
FALLS: 0
NAUSEA: 0
PALPITATIONS: 0
ABDOMINAL PAIN: 1
CONSTIPATION: 0
RESPIRATORY NEGATIVE: 1
MUSCULOSKELETAL NEGATIVE: 1
ABDOMINAL PAIN: 1
WEAKNESS: 1
WEAKNESS: 1
EYE DISCHARGE: 0
DIAPHORESIS: 0
DIZZINESS: 0
NAUSEA: 0
EYE PAIN: 0
WHEEZING: 0
CHILLS: 0
VOMITING: 0
NAUSEA: 0
PALPITATIONS: 0
WHEEZING: 0
ABDOMINAL PAIN: 1
SHORTNESS OF BREATH: 0
WEIGHT LOSS: 0
DEPRESSION: 0
VOMITING: 0
WHEEZING: 0
ABDOMINAL PAIN: 1
BLURRED VISION: 0
PALPITATIONS: 0
CHILLS: 0
EYE PAIN: 0
VOMITING: 0
WEIGHT LOSS: 0
DIARRHEA: 1
CHILLS: 0
SHORTNESS OF BREATH: 0
MEMORY LOSS: 1
CARDIOVASCULAR NEGATIVE: 1
FLANK PAIN: 0
BLOOD IN STOOL: 0
MYALGIAS: 1
FLANK PAIN: 0
DIZZINESS: 1
EYE DISCHARGE: 0
WEIGHT LOSS: 0
BLURRED VISION: 0
PSYCHIATRIC NEGATIVE: 1
DIZZINESS: 0
ABDOMINAL PAIN: 0
CHILLS: 0
PALPITATIONS: 0
EYE PAIN: 0
SHORTNESS OF BREATH: 0
NAUSEA: 0
WEAKNESS: 1
BACK PAIN: 1
VOMITING: 0
CARDIOVASCULAR NEGATIVE: 1
WHEEZING: 0
EYE DISCHARGE: 0
BACK PAIN: 1
DOUBLE VISION: 0
SHORTNESS OF BREATH: 0
EYE REDNESS: 0
HEADACHES: 0
NERVOUS/ANXIOUS: 0
NAUSEA: 0
FALLS: 0
SEIZURES: 0
DIARRHEA: 1
NECK PAIN: 0
NAUSEA: 0
ABDOMINAL PAIN: 1
BACK PAIN: 1
MYALGIAS: 0
MYALGIAS: 1
DIZZINESS: 0
WEIGHT LOSS: 0
MYALGIAS: 1
FALLS: 0
FLANK PAIN: 0
VOMITING: 0
MYALGIAS: 1
DIARRHEA: 0
DIARRHEA: 0
DIZZINESS: 1
MUSCULOSKELETAL NEGATIVE: 1
WEAKNESS: 1
PSYCHIATRIC NEGATIVE: 1
ABDOMINAL PAIN: 1
FEVER: 0
CHILLS: 0
EYE PAIN: 0
WEAKNESS: 1
ABDOMINAL PAIN: 0
WHEEZING: 0
INSOMNIA: 0
CHILLS: 0
BLURRED VISION: 0
SORE THROAT: 0
ABDOMINAL PAIN: 1
DIARRHEA: 1
EYE DISCHARGE: 0
WEIGHT LOSS: 0
EYE PAIN: 0
PALPITATIONS: 0
DIZZINESS: 0
ABDOMINAL PAIN: 0
PALPITATIONS: 0
NEUROLOGICAL NEGATIVE: 1
SORE THROAT: 0
ABDOMINAL PAIN: 1
DIARRHEA: 1
WHEEZING: 0
WEIGHT LOSS: 0
DIARRHEA: 0
NAUSEA: 0
MYALGIAS: 1
INSOMNIA: 0
BLURRED VISION: 0
WEIGHT LOSS: 0
DOUBLE VISION: 0
WEAKNESS: 1
DOUBLE VISION: 0
PALPITATIONS: 0
WEIGHT LOSS: 0
NERVOUS/ANXIOUS: 0
WEAKNESS: 1
DIZZINESS: 0

## 2020-01-01 ASSESSMENT — PAIN DESCRIPTION - PAIN TYPE
TYPE: CHRONIC PAIN
TYPE: ACUTE PAIN
TYPE: CHRONIC PAIN
TYPE: ACUTE PAIN
TYPE: CHRONIC PAIN
TYPE: ACUTE PAIN
TYPE: CHRONIC PAIN
TYPE: ACUTE PAIN
TYPE: ACUTE PAIN
TYPE: CHRONIC PAIN
TYPE: ACUTE PAIN
TYPE: CHRONIC PAIN
TYPE: ACUTE PAIN
TYPE: CHRONIC PAIN
TYPE: CHRONIC PAIN
TYPE: ACUTE PAIN
TYPE: ACUTE PAIN;CHRONIC PAIN
TYPE: ACUTE PAIN
TYPE: ACUTE PAIN
TYPE: CHRONIC PAIN
TYPE: ACUTE PAIN
TYPE: CHRONIC PAIN
TYPE: ACUTE PAIN
TYPE: CHRONIC PAIN
TYPE: CHRONIC PAIN
TYPE: ACUTE PAIN
TYPE: ACUTE PAIN
TYPE: CHRONIC PAIN
TYPE: ACUTE PAIN
TYPE: CHRONIC PAIN
TYPE: CHRONIC PAIN
TYPE: ACUTE PAIN
TYPE: CHRONIC PAIN
TYPE: ACUTE PAIN
TYPE: CHRONIC PAIN
TYPE: CHRONIC PAIN

## 2020-01-01 ASSESSMENT — LIFESTYLE VARIABLES
TOTAL SCORE: 0
HAVE YOU EVER FELT YOU SHOULD CUT DOWN ON YOUR DRINKING: NO
HAVE PEOPLE ANNOYED YOU BY CRITICIZING YOUR DRINKING: NO
AVERAGE NUMBER OF DAYS PER WEEK YOU HAVE A DRINK CONTAINING ALCOHOL: 0
DOES PATIENT WANT TO STOP DRINKING: NO
CONSUMPTION TOTAL: NEGATIVE
ALCOHOL_USE: NO
HAVE YOU EVER FELT YOU SHOULD CUT DOWN ON YOUR DRINKING: NO
AVERAGE NUMBER OF DAYS PER WEEK YOU HAVE A DRINK CONTAINING ALCOHOL: 0
TOTAL SCORE: 0
CONSUMPTION TOTAL: NEGATIVE
TOTAL SCORE: 0
TOTAL SCORE: 0
HOW MANY TIMES IN THE PAST YEAR HAVE YOU HAD 5 OR MORE DRINKS IN A DAY: 0
ON A TYPICAL DAY WHEN YOU DRINK ALCOHOL HOW MANY DRINKS DO YOU HAVE: 0
HOW MANY TIMES IN THE PAST YEAR HAVE YOU HAD 5 OR MORE DRINKS IN A DAY: 0
EVER FELT BAD OR GUILTY ABOUT YOUR DRINKING: NO
TOTAL SCORE: 0
EVER HAD A DRINK FIRST THING IN THE MORNING TO STEADY YOUR NERVES TO GET RID OF A HANGOVER: NO
ALCOHOL_USE: NO
TOTAL SCORE: 0
EVER FELT BAD OR GUILTY ABOUT YOUR DRINKING: NO
DOES PATIENT WANT TO STOP DRINKING: NO
DO YOU DRINK ALCOHOL: NO
HAVE PEOPLE ANNOYED YOU BY CRITICIZING YOUR DRINKING: NO
EVER HAD A DRINK FIRST THING IN THE MORNING TO STEADY YOUR NERVES TO GET RID OF A HANGOVER: NO
ON A TYPICAL DAY WHEN YOU DRINK ALCOHOL HOW MANY DRINKS DO YOU HAVE: 0

## 2020-01-01 ASSESSMENT — GAIT ASSESSMENTS
ASSISTIVE DEVICE: FRONT WHEEL WALKER
GAIT LEVEL OF ASSIST: UNABLE TO PARTICIPATE
DEVIATION: BRADYKINETIC;SHUFFLED GAIT
DISTANCE (FEET): 3
GAIT LEVEL OF ASSIST: UNABLE TO PARTICIPATE
DEVIATION: BRADYKINETIC;SHUFFLED GAIT
DISTANCE (FEET): 4
DISTANCE (FEET): 3
GAIT LEVEL OF ASSIST: MODERATE ASSIST
GAIT LEVEL OF ASSIST: UNABLE TO PARTICIPATE
ASSISTIVE DEVICE: HAND HELD ASSIST
ASSISTIVE DEVICE: HAND HELD ASSIST
DISTANCE (FEET): 3
ASSISTIVE DEVICE: 4 WHEEL WALKER
GAIT LEVEL OF ASSIST: MODERATE ASSIST
GAIT LEVEL OF ASSIST: MODERATE ASSIST
DEVIATION: SHUFFLED GAIT;DECREASED BASE OF SUPPORT
DISTANCE (FEET): 3
GAIT LEVEL OF ASSIST: MODERATE ASSIST
GAIT LEVEL OF ASSIST: UNABLE TO PARTICIPATE
GAIT LEVEL OF ASSIST: MODERATE ASSIST
ASSISTIVE DEVICE: HAND HELD ASSIST
DEVIATION: SHUFFLED GAIT;DECREASED BASE OF SUPPORT;OTHER (COMMENT)
GAIT LEVEL OF ASSIST: MODERATE ASSIST
DISTANCE (FEET): 3
GAIT LEVEL OF ASSIST: MODERATE ASSIST
DISTANCE (FEET): 3
ASSISTIVE DEVICE: HAND HELD ASSIST
DEVIATION: BRADYKINETIC;SHUFFLED GAIT
ASSISTIVE DEVICE: HAND HELD ASSIST

## 2020-01-01 ASSESSMENT — PATIENT HEALTH QUESTIONNAIRE - PHQ9
1. LITTLE INTEREST OR PLEASURE IN DOING THINGS: NOT AT ALL
SUM OF ALL RESPONSES TO PHQ9 QUESTIONS 1 AND 2: 0
2. FEELING DOWN, DEPRESSED, IRRITABLE, OR HOPELESS: NOT AT ALL
1. LITTLE INTEREST OR PLEASURE IN DOING THINGS: NOT AT ALL
CLINICAL INTERPRETATION OF PHQ2 SCORE: 0
2. FEELING DOWN, DEPRESSED, IRRITABLE, OR HOPELESS: NOT AT ALL
1. LITTLE INTEREST OR PLEASURE IN DOING THINGS: NOT AT ALL
SUM OF ALL RESPONSES TO PHQ9 QUESTIONS 1 AND 2: 0
2. FEELING DOWN, DEPRESSED, IRRITABLE, OR HOPELESS: NOT AT ALL
SUM OF ALL RESPONSES TO PHQ9 QUESTIONS 1 AND 2: 0

## 2020-01-01 ASSESSMENT — FIBROSIS 4 INDEX
FIB4 SCORE: 1.51
FIB4 SCORE: 1.52
FIB4 SCORE: 1.53
FIB4 SCORE: 1.85
FIB4 SCORE: 6.11
FIB4 SCORE: 1.85
FIB4 SCORE: 1.85
FIB4 SCORE: 1.03
FIB4 SCORE: 1.53
FIB4 SCORE: 1.31
FIB4 SCORE: 1.85
FIB4 SCORE: 4.78
FIB4 SCORE: 1.08
FIB4 SCORE: 2.33
FIB4 SCORE: 1.66
FIB4 SCORE: 1.21

## 2020-01-01 ASSESSMENT — ACTIVITIES OF DAILY LIVING (ADL): TOILETING: REQUIRES ASSIST

## 2020-01-01 ASSESSMENT — PAIN DESCRIPTION - DESCRIPTORS: DESCRIPTORS: PRESSURE;CRAMPING

## 2020-01-01 ASSESSMENT — PAIN SCALES - GENERAL: PAINLEVEL: 10=SEVERE PAIN

## 2020-02-17 NOTE — ED PROVIDER NOTES
ED Provider Note    CHIEF COMPLAINT  Chief Complaint   Patient presents with   • Flu Like Symptoms     Pt complains of cough, fever, vomitting, diarrhea and  BLQ pain that has been present for 1.5 days. Pt has been using otc medications for symptom control. Pt denies any recent antibiotic use.       HPI  Gayla Escudero is a 49 y.o. female who presents with abdominal pain.  The patient states that she has right lower quadrant abdominal pain that started about a day and a half ago.  She is unaware of any exacerbating or relieving factors.  She states the pain is 8 out of 10 in intensity.  She has had some loose stool as well as some vomiting.  She states she is also had a slight cough.  She is unaware of any fevers.  She is had a hysterectomy in the past but no other abdominal surgeries.    REVIEW OF SYSTEMS  See HPI for further details. All other systems are negative.     PAST MEDICAL HISTORY  Past Medical History:   Diagnosis Date   • Anemia    • Cataract    • Heart murmur    • Muscular disease    • Muscular dystrophy (HCC)    • JOSÉ on CPAP        FAMILY HISTORY  [unfilled]    SOCIAL HISTORY  Social History     Socioeconomic History   • Marital status:      Spouse name: Not on file   • Number of children: Not on file   • Years of education: Not on file   • Highest education level: Not on file   Occupational History   • Not on file   Social Needs   • Financial resource strain: Not on file   • Food insecurity     Worry: Not on file     Inability: Not on file   • Transportation needs     Medical: Not on file     Non-medical: Not on file   Tobacco Use   • Smoking status: Never Smoker   • Smokeless tobacco: Never Used   Substance and Sexual Activity   • Alcohol use: No   • Drug use: No   • Sexual activity: Yes     Partners: Male   Lifestyle   • Physical activity     Days per week: Not on file     Minutes per session: Not on file   • Stress: Not on file   Relationships   • Social connections     Talks on  "phone: Not on file     Gets together: Not on file     Attends Congregation service: Not on file     Active member of club or organization: Not on file     Attends meetings of clubs or organizations: Not on file     Relationship status: Not on file   • Intimate partner violence     Fear of current or ex partner: Not on file     Emotionally abused: Not on file     Physically abused: Not on file     Forced sexual activity: Not on file   Other Topics Concern   • Not on file   Social History Narrative   • Not on file       SURGICAL HISTORY  Past Surgical History:   Procedure Laterality Date   • HYSTERECTOMY LAPAROSCOPY         CURRENT MEDICATIONS  Home Medications    **Home medications have not yet been reviewed for this encounter**         ALLERGIES  Allergies   Allergen Reactions   • Codeine Vomiting and Nausea     N&V   • Tramadol      Effects her MD       PHYSICAL EXAM  VITAL SIGNS: /68   Pulse 100   Temp 37.1 °C (98.8 °F)   Resp 16   Ht 1.549 m (5' 1\")   Wt 44.9 kg (98 lb 15.8 oz)   SpO2 100%   BMI 18.70 kg/m²       Constitutional: Patient appears uncomfortable but nontoxic.   HENT: Normocephalic, Atraumatic, Bilateral external ears normal, Oropharynx moist, No oral exudates, Nose normal.   Eyes: PERRLA, EOMI, Conjunctiva normal, No discharge.   Neck: Normal range of motion, No tenderness, Supple, No stridor.   Lymphatic: No lymphadenopathy noted.   Cardiovascular: Lightly tachycardic heart rate, Normal rhythm, No murmurs, No rubs, No gallops.   Thorax & Lungs: Normal breath sounds, No respiratory distress, No wheezing, No chest tenderness.   Abdomen: Right lower quadrant abdominal pain to deep palpation, no rebound, voluntary guarding, normal bowel sounds.   Skin: Warm, Dry, No erythema, No rash.   Back: No tenderness, No CVA tenderness.   Extremities: Intact distal pulses, No edema, No tenderness, No cyanosis, No clubbing.    Neurologic: Alert & oriented x 3, Normal motor function, Normal sensory " function, No focal deficits noted.   Psychiatric: Affect normal, Judgment normal, Mood normal.     COURSE & MEDICAL DECISION MAKING  Pertinent Labs & Imaging studies reviewed. (See chart for details)  This a 49-year-old female who presents the emergency department with abdominal pain and associated vomiting as well as some diarrhea.  She did have some significant tenderness to my initial exam therefore a CT scan was ordered to rule out a surgical process.  There is no evidence of appendicitis.  Based on the diarrhea, vomiting, and cramping abdominal pain I suspect this is from a viral enteritis.  Therefore the patient will be treated supportively.  She did receive fentanyl in the emergency department and feels significantly better.  She will be discharged home on Zofran and she will return if she is acutely worse.    FINAL IMPRESSION  1.  Abdominal pain  2.  Vomiting and diarrhea  3.  Suspect viral enteritis    Disposition  The patient will be discharged in stable condition         Electronically signed by: Krish Arreola M.D., 2/16/2020 10:26 PM

## 2020-02-17 NOTE — ED TRIAGE NOTES
"[facility]  Chief Complaint   Patient presents with   • Flu Like Symptoms     Pt complains of cough, fever, vomitting, diarrhea and  BLQ pain that has been present for 1.5 days. Pt has been using otc medications for symptom control. Pt denies any recent antibiotic use.     Pt wheeled to triage with above complaint.     /68   Pulse 100   Temp 37.1 °C (98.8 °F)   Resp 16   Ht 1.549 m (5' 1\")   Wt 44.9 kg (98 lb 15.8 oz)   SpO2 100%   BMI 18.70 kg/m²     Pt informed of triage process and encouraged to notify staff of any changes or concerns. Pt verbalized understanding of instructions. Apologized for long wait time. Pt placed back in lobby.     "

## 2020-02-17 NOTE — ED NOTES
Discharge instructions given to pt. Prescriptions handed to pt at bedside. Pt educated, verbalizes understanding. All belongings accounted for. Pt wheeled out of ED with  at side. PIV removed and dressing applied.

## 2020-02-20 NOTE — ED NOTES
Break RN: Pt to bedside commode with 1xRN assist, voiding without difficulties. Pt medicated per MAR for pain.    Chief Complaint   Patient presents with    New Patient    Headache     few years     1. Have you been to the ER, urgent care clinic since your last visit? Hospitalized since your last visit? No    2. Have you seen or consulted any other health care providers outside of the [address] [place] [address] since your last visit? Include any pap smears or colon screening.  No

## 2020-02-20 NOTE — ED PROVIDER NOTES
ED Provider Note    Scribed for Vanessa Garcia M.D. by Nimo Donaldson. 2/19/2020  10:09 PM    Means of arrival: Walk-in  History obtained from: Patient  History limited by: None      CHIEF COMPLAINT  Chief Complaint   Patient presents with   • RLQ Pain   • Sent from Urgent Care       HPI  Gayla Escudero is a 49 y.o. female with history of myotonic muscular dystrophy who presents to the Emergency Department for right lower quadrant abdominal pain which radiates to the right flank and right shoulder onset five days. She was seen in the ED for this complaints three days ago and was diagnosed with viral gastroenteritis. She was given nausea medications and discharged home. She feels like her pain is worsening. There are no alleviating or aggravating factors. She has never had pain like this before. When pain first started she did have fevers up to 102, but she has not had a fever in a couple of days. She also has headache, cough and diarrhea. She is able to tolerate PO. She denies dysuria, vomiting, nasal congestion, sore throat or ear pain. She has not taken any OTC medications today as she states they did not work for her pain earlier when symptoms began. Her partner who she lives with was sick recently but otherwise no sick contacts. She did not get her flu shot this year. She went to urgent care this evening for pain, and they recommended she come to ED for evaluation. She has a history of hysterectomy. She normally ambuates with walker.      REVIEW OF SYSTEMS  Pertinent positive include abdominal pain, diarrhea, headache, cough. Pertinent negative include dysuria, vomiting, nasal congestion, sore throat or ear pain. All other systems reviewed and are negative.    PAST MEDICAL HISTORY   has a past medical history of Anemia, Cataract, Heart murmur, Muscular disease, Muscular dystrophy (HCC), and JOSÉ on CPAP.    SOCIAL HISTORY  Social History     Tobacco Use   • Smoking status: Never Smoker   •  Smokeless tobacco: Never Used   Substance and Sexual Activity   • Alcohol use: No   • Drug use: No   • Sexual activity: Yes     Partners: Male     SURGICAL HISTORY   has a past surgical history that includes hysterectomy laparoscopy.    CURRENT MEDICATIONS  Home Medications     Reviewed by Schuyler B Collett, R.N. (Registered Nurse) on 02/19/20 at 1858  Med List Status: <None>   Medication Last Dose Status   acetaminophen (TYLENOL) 325 MG Tab  Active   ondansetron (ZOFRAN ODT) 4 MG TABLET DISPERSIBLE  Active   sumatriptan (IMITREX) 20 MG/ACT nasal spray  Active   SUMAtriptan (IMITREX) 50 MG Tab  Active   ZOLMitriptan 2.5 MG Solution  Active              ALLERGIES  Allergies   Allergen Reactions   • Codeine Vomiting and Nausea     N&V   • Tramadol      Effects her MD     PHYSICAL EXAM   VITAL SIGNS: BP (!) 95/64   Pulse 83   Temp 36.9 °C (98.4 °F) (Oral)   Resp 16   Ht 1.524 m (5')   Wt 44 kg (97 lb)   SpO2 100%   BMI 18.94 kg/m²    Constitutional: Chronically ill-appearing thin  female, Alert in no apparent distress.  HENT: Normocephalic, Atraumatic. Bilateral external ears normal. Nose normal.  Dry mucous membranes.  Oropharynx clear.  Eyes: Pupils are equal and reactive. Conjunctiva normal.   Neck: Supple, full range of motion  Heart: Regular rate and rhythm.  No murmurs.    Lungs: Tachypneic, Lungs clear to auscultation bilaterally.  Abdomen Soft, no distention. Diffuse abdominal tenderness with voluntary guarding worse in the right upper quadrant.   Musculoskeletal: Atraumatic. No obvious deformities noted.  No lower extremity edema.  Skin: Warm, Dry.  No erythema, No rash.   Neurologic: Alert and oriented x3. Moving all extremities spontaneously without focal deficits.  Psychiatric: Affect normal, Mood normal, Appears appropriate and not intoxicated.    DIAGNOSTIC STUDIES    LABS  Personally reviewed by me  Labs Reviewed   CBC WITH DIFFERENTIAL - Abnormal; Notable for the following  components:       Result Value    RBC 4.11 (*)     MCHC 31.7 (*)     Neutrophils-Polys 74.80 (*)     Lymphocytes 20.80 (*)     All other components within normal limits   COMP METABOLIC PANEL - Abnormal; Notable for the following components:    Anion Gap 19.0 (*)     Glucose 51 (*)     AST(SGOT) 48 (*)     ALT(SGPT) 94 (*)     All other components within normal limits   URINALYSIS,CULTURE IF INDICATED - Abnormal; Notable for the following components:    Glucose 250 (*)     Ketones 80 (*)     All other components within normal limits   ACCU-CHEK GLUCOSE - Abnormal; Notable for the following components:    Glucose - Accu-Ck 168 (*)     All other components within normal limits   LIPASE   INFLUENZA A/B BY PCR   LACTIC ACID   ESTIMATED GFR   DIFFERENTIAL MANUAL   PERIPHERAL SMEAR REVIEW   PLATELET ESTIMATE   MORPHOLOGY     RADIOLOGY  Personally reviewed by me  DX-CHEST-2 VIEWS   Final Result         1.  No acute cardiopulmonary disease.      US-RUQ   Final Result         1.  Unremarkable right upper quadrant exam. Examination is technically limited due to limited mobility and patient discomfort limiting positioning.        ED COURSE  Vitals:    02/19/20 2216 02/19/20 2231 02/19/20 2246 02/19/20 2301   BP: 134/77 (!) 91/64 110/68 101/61   Pulse: 90 76 85 76   Resp: 14  14    Temp:       TempSrc:       SpO2: 97% 99% 98% 99%   Weight:       Height:           Medications administered:  Medications   morphine (pf) 4 MG/ML injection 4 mg (has no administration in time range)   ketorolac (TORADOL) injection 15 mg (15 mg Intravenous Given 2/19/20 2300)   ondansetron (ZOFRAN) syringe/vial injection 4 mg (4 mg Intravenous Given 2/19/20 2258)   D5 NS infusion ( Intravenous New Bag 2/19/20 2257)   HYDROcodone-acetaminophen 2.5-108 mg/5mL (HYCET) solution 10 mL (10 mL Oral Given 2/20/20 0041)     Patient was given IV fluids for dehydration.  IV hydration was used because oral hydration was not adequate alone. Following fluid  administration patient's vital signs and hydration status were improved.    Old records personally reviewed:  Seen here on 2/16/20; labs and CT of abdomen done. CT revealed:     1.  Decreased patchy lung base opacities, could represent improving atelectasis or infiltrate.  2.  Cystic structure the vaginal opening, likely representing Bartholin or [place]'s gland cyst.  3.  Trace pericardial effusion    10:09 PM Patient seen and examined at bedside. The patient presents with five day history of abdominal pain, worse in the right upper quadrant on examination. CT scan done three days ago without evidence of appendicitis. Ordered for US-RUQ, CBC with differential, CMP, lipase and urinalysis labs to evaluate. Patient will be treated with Toradol IV 15 mg, Zofran IV 4 mg and IVF x 1 L for her symptoms.     10:15 PM Lab testing reveals patient hypoglycemic with blood sugar of 51.  Patient given orange juice.    MEDICAL DECISION MAKING  Patient with history of muscular dystrophy who presents with few day history of worsening abdominal pain as well as flulike symptoms.  She is afebrile with reassuring vital signs on arrival.  Her blood pressure was borderline low however improved with fluids.  Labs are reassuring without lactic acidosis or leukocytosis concerning for sepsis or infectious etiology.  She has evidence of mild transaminitis which is improved from her recent visit.  She is mildly hypoglycemic and appears dehydrated based on her labs and urinalysis therefore oral intake and fluids were administered with improvement.    The patient had imaging performed 3 days prior showing no acute abnormality in the abdomen or pelvis including obstruction, perforation, appendicitis.  I do not feel that reimaging her today in the setting of normal labs is necessary.  Ultrasound of her right upper quadrant was performed without evidence of cholelithiasis.  I also performed a chest x-ray without signs of pneumonia.    12:18 AM  Patient reevaluated at bedside. She was feeling better but her pain got worse. Repeat blood sugar is improved after D5NS. She will be given another dose of pain medication, Hycet PO 10 mL, and then will reevaluate. Further review of chart shows she has been here for similar abdominal pain in the past which has required admission therefore I think this is more likely attributed to her chronic symptoms.    1:21 AM - Upon reassessment, patient is resting comfortably but states pain is starting to return. She will be treated with Morphine IV 4 mg. Discussed results with patient and family as well as importance of primary care follow up. Patient feels that she is comfortable going home at this point. She will be prescribed Hycet. Patient understands plan of care and strict return precautions for new or changing symptoms. She is agreeable to discharge plan with no further questions.    The patient will return for new or worsening symptoms and is stable at the time of discharge.    The patient is referred to a primary physician for blood pressure management, diabetic screening, and for all other preventative health concerns.       DISPOSITION:  Patient will be discharged home in stable condition.    FOLLOW UP:  Adithya Martinez, P.A.-C.  [id number]  [facility] [[email or web address] [address]  [facility] [address] [phone]    Schedule an appointment as soon as possible for a visit       [facility], Emergency Dept  [address] [facility] [address] [phone]    If symptoms worsen    IMPRESSION  (R10.84) Generalized abdominal pain  (E86.0) Dehydration  (R74.0) Transaminitis    Results, diagnoses, and treatment options were discussed with the patient and/or family. Patient verbalized understanding of plan of care.    New Prescriptions    HYDROCODONE-ACETAMINOPHEN 2.5-108 MG/5ML (HYCET) 7.5-325 MG/15ML SOLUTION    Take 10 mL by mouth every four hours as needed for up to 3 days.         In prescribing controlled  substances to this patient, I certify that I have obtained and reviewed the medical history of Gayla Escudero. I have also made a good hitesh effort to obtain applicable records from other providers who have treated the patient and records did not demonstrate any increased risk of substance abuse that would prevent me from prescribing controlled substances.     I have conducted a physical exam and documented it. I have reviewed Ms. Escudero’s prescription history as maintained by the [place] Prescription Monitoring Program.     I have assessed the patient’s risk for abuse, dependency, and addiction using the validated Opioid Risk Tool available at https://www.mdcalc.com/qpvoja-rnjw-sjwi-ort-narcotic-abuse.     Given the above, I believe the benefits of controlled substance therapy outweigh the risks. The reasons for prescribing controlled substances include non-narcotic, oral analgesic alternatives have been inadequate for pain control. Accordingly, I have discussed the risk and benefits, treatment plan, and alternative therapies with the patient.   Nimo VILLEGAS (Mateoibe), am scribing for, and in the presence of, Vanessa Garcia M.D..    Electronically signed by: Nimo Donaldson (Scribe), 2/19/2020    Vanessa VILLEGAS M.D. personally performed the services described in this documentation, as scribed by Nimo Donaldson in my presence, and it is both accurate and complete.    The note accurately reflects work and decisions made by me.  Vnaessa Garcia M.D.  2/20/2020  4:44 AM

## 2020-02-20 NOTE — ED NOTES
Pt given juice for low glucose, per ERP. IV fluids also changed from NS to D5% NS, will administer. Pt also medicated for pain and nausea per MAR. Ultrasound was unable to finish exam as pt was very tender so tech will be back to finish exam now that pt has been medicated for pain.

## 2020-02-20 NOTE — PROGRESS NOTES
"Subjective:      Gayla Escudero is a 49 y.o. female who presents with Abdominal Pain (L side abd pain lower back pain that moves to shoulder x3 days)            3-4d left flank pain. Diffuse abd pain. Severe. No hematuria. No dysuria. Initial fever resolved. PMH pyelonephritis. Associated diarrhea. No blood in stool. No recent travel, camping, abx. Urine output is normal. No other aggravating or alleviating factors. Patient was seen in ER 2/16 with dx of likely viral AGE. No other aggravating or alleviating factors.          Review of Systems   Constitutional: Positive for malaise/fatigue. Negative for weight loss.   Eyes: Negative for discharge and redness.   Gastrointestinal: Negative for nausea and vomiting.   Musculoskeletal: Negative for joint pain and myalgias.   Skin: Negative for itching and rash.       .  Medications, Allergies, and current problem list reviewed today in Epic       Objective:     BP (!) 80/60   Pulse 79   Temp 36.8 °C (98.2 °F) (Temporal)   Resp 14   Ht 1.549 m (5' 1\")   Wt 45.4 kg (100 lb)   SpO2 99%   BMI 18.89 kg/m²      Physical Exam  Constitutional:       Appearance: She is well-developed.      Comments: Thin. In wheechair. Tearful in clinic.    HENT:      Head: Normocephalic and atraumatic.   Eyes:      Conjunctiva/sclera: Conjunctivae normal.   Cardiovascular:      Rate and Rhythm: Normal rate and regular rhythm.      Heart sounds: Normal heart sounds. No murmur.   Pulmonary:      Effort: Pulmonary effort is normal.      Breath sounds: Normal breath sounds. No wheezing.   Abdominal:      Palpations: Abdomen is soft.      Tenderness: There is abdominal tenderness (diffuse).   Skin:     General: Skin is warm and dry.      Findings: No rash.   Neurological:      Mental Status: She is alert and oriented to person, place, and time.                 Assessment/Plan:     UA: ketones and bili, no evidence infection    1. Generalized abdominal pain       Differential diagnosis, " natural history, supportive care, and indications for immediate follow-up discussed at length.     Given worsening symptoms, and low BP recommend back to ER for further evaluation and treatment.   Differential diagnosis, natural history, supportive care, and indications for immediate follow-up discussed at length.

## 2020-02-20 NOTE — ED NOTES
Pt reports morphine significantly improved pt's pain. Now more independent. Patient verbalized understanding of discharge instructions, provided with discharge paperwork, aware of follow up care and reasons to return to the ER, gait steady with assistance from family at bedside using home walker to ER lobby.

## 2020-02-20 NOTE — ED TRIAGE NOTES
Gayla Escudero presents to triage reporting RLQ pain x2 days. Sent from . Pt denies n/v. Hx of muscular dystrophy.      .Pt speaking in full sentences, NAD noted. Pt educated of triage process, placed back in waiting area pending room assignment.

## 2020-02-20 NOTE — DISCHARGE INSTRUCTIONS
You were seen in the Emergency Department for abdominal pain.    Labs, urine test, ultrasound, chest xray were completed without significant acute abnormalities.    Please use 1,000mg of tylenol or 600mg of ibuprofen every 6 hours as needed for pain. Use stronger pain medications as directed.    Please follow up with your primary care physician.    Return to the Emergency Department with uncontrolled pain, persistent vomiting, fevers, or other concerns.

## 2020-05-10 NOTE — ED PROVIDER NOTES
ED Provider Note    CHIEF COMPLAINT  Chief Complaint   Patient presents with   • Abdominal Pain   • Shortness of Breath       HPI  Patient is a 49-year-old female with a history of myotonic dystrophy who presents the emergency room for evaluation of right-sided abdominal discomfort.  She points to the right flank and says that this started approximately 2 to 3 days ago.  She was asleep and it was sudden onset with sharp sensation and radiation across the abdomen occasionally and unpredictably.  This was not associated with nausea or vomiting though she does report some loose stools without blood or tracy diarrhea or mucus.  She has had a similar pain for which she was evaluated in early February and she is unaware of that diagnosis at that time.  She denies dysuria, vomiting, sore throat, chest pain.  She does endorse a single episode of shortness of breath that happened when the pain started but she attributes to the severe miss of that pain however she has not had any other respiratory complaints since.    Respiratory Precautions  During the information gathering assessment, Pt noted that she has not travelled outside the [place], has not had contact with sick individuals, or mentions any other concerning risk factors at this time.  Based on the concern for risk of exposures, and the possibility of delayed exposure notification, this practitioner used extensive PPI during the history gathering, exam and discussions with the patient.  Pt was wearing a mask during these encounters    REVIEW OF SYSTEMS  Constitutional: No fevers, chills, or recent illness.  Skin: No rashes or diaphoresis.  Eyes: No change in vision, no discharge.  ENT: No hearing change. No rhinorrhea or nasal congestion, no ST or difficulty swallowing.    Respiratory: No SOB. No coughing or hemoptysis. No Wheezing.  Cardiac: No CP, palpitations, edema. No PND or orthopnea.  GI: As above. + diarrhea, no constipation. No blood in stool.  : + frequency or  "urgency.    MSK: No pain in joints or muscles. No calf pain or swelling.  Neuro: No HA or paresthesias. No focal weakness.  Endocrine: No polyuria or polydipsia. No heat or cold intolerance.  Heme: No easy bruising. No history of bleeding disorders or anemia.    PAST MEDICAL HISTORY   has a past medical history of Anemia, Cataract, Heart murmur, Muscular disease, Muscular dystrophy (HCC), and JOSÉ on CPAP.    SOCIAL HISTORY  Social History     Tobacco Use   • Smoking status: Never Smoker   • Smokeless tobacco: Never Used   Substance and Sexual Activity   • Alcohol use: No   • Drug use: No   • Sexual activity: Yes     Partners: Male       SURGICAL HISTORY   has a past surgical history that includes hysterectomy laparoscopy.    CURRENT MEDICATIONS  Home Medications    **Home medications have not yet been reviewed for this encounter**       ALLERGIES  Allergies   Allergen Reactions   • Codeine Vomiting and Nausea     N&V   • Tramadol      Effects her MD     PHYSICAL EXAM  VITAL SIGNS: /57   Pulse 78   Temp 36.3 °C (97.4 °F) (Temporal)   Resp 16   Ht 1.549 m (5' 1\")   Wt 45.4 kg (100 lb)   SpO2 99%   BMI 18.89 kg/m²   Pulse ox interpretation: I interpret this pulse ox as normal.  Genl: F sitting in gurney uncomfortably, speaking clearly, appears in mild  distress   Head: NC/AT   ENT: Mucous membranes moist, posterior pharynx clear, uvula midline, nares patent bilaterally   Eyes: Normal sclera, pupils equal round reactive to light  Neck: Supple, FROM, no LAD appreciated   Pulmonary: Lungs are clear to auscultation bilaterally  Chest: No TTP  CV:  RRR, no murmur appreciated, pulses 2+ in both upper and lower extremities,  Abdomen: soft, globally tender extending from the right flank across the abdomen, somewhat inconsistent on repeat abdominal exams.  There is no true Puente sign, there is epigastric discomfort, there is suprapubic discomfort without true McBurney's point tenderness however this is difficult " due to the occasional grimacing and reported pain on repeat abdominal exams.  No evidence of rebound or peritonitis.  No masses or HSM   : no CVA tenderness   Musculoskeletal: Pain free ROM of the neck. Moving upper and lower extremities and spontaneous in coordinated fashion  Neuro: A&Ox4 (person, place, time, situation), speech fluent, gait steady, no focal deficits appreciated, No cerebellar signs.  Sensation is grossly intact in the distal upper and lower extremities.  5/5 strength in  and dorsiflexion/plantar flexion of the ankles  Psych: Patient has an appropriate affect and behavior  Skin: No rash or lesions.  No pallor or jaundice.  No cyanosis.  Warm and dry.     DIAGNOSTIC STUDIES / PROCEDURES    LABS  Labs Reviewed   CBC WITH DIFFERENTIAL - Abnormal; Notable for the following components:       Result Value    MCHC 31.8 (*)     Neutrophils-Polys 74.70 (*)     Lymphocytes 17.60 (*)     All other components within normal limits   COMP METABOLIC PANEL - Abnormal; Notable for the following components:    Glucose 139 (*)     Creatinine 0.49 (*)     AST(SGOT) 180 (*)     ALT(SGPT) 59 (*)     All other components within normal limits   LACTIC ACID - Abnormal; Notable for the following components:    Lactic Acid 2.9 (*)     All other components within normal limits   LIPASE   URINALYSIS,CULTURE IF INDICATED   ESTIMATED GFR   DIAGNOSTIC ALCOHOL       RADIOLOGY  DX-ABDOMEN COMPLETE WITH AP OR PA CXR   Final Result      1. Clear lungs. No pleural effusions.   2. No evidence of bowel obstruction. No intra-abdominal free air.   3. Moderate amount of stool throughout the colon.        COURSE & MEDICAL DECISION MAKING  Pertinent Labs & Imaging studies reviewed. (See chart for details)    DDX:  Epigastric abdominal pain: PUD, pancreatitis, gastritis, GERD, cholelithiasis, cholecystitis, choledocolithiasis.  Colitis, bowel obstruction, volvulus, kidney stone, UTI, Pyelonephritis     MDM    Initial evaluation at  2000:  Patient presents emergency room for symptoms as described above.  On initial assessment she has pain out of proportion to exam with palpation of the soft tissues of the skin and some inconsistent abdominal pain throughout the right flank.  There is no true signs of peritonitis, there is no true signs of abdominal distention and she has reassuring vital signs and is afebrile.  Chart is reviewed and she has had medical imaging in the past for undifferentiated abdominal discomfort.    IV access established, after initial lactate comes back is elevated she is hydrated both orally and IV and tolerates this well.  She is having no further shortness of breath and abdominal pain is minimal after receiving medications.  Repeat abdominal exam shows no distention, no localizing pain, no evidence of peritonitis.  Her vital signs remained stable.    She has no leukocytosis, no leftward shift, and she has no gross metabolic derangements.  Her AST is slightly elevated at 180 and ALT is elevated at 59.  Alcohol level is obtained and is not acutely elevated.  There is no elevated bilirubin, there is no elevated lipase, and I doubt pancreatitis or acute biliary obstruction based on these isolated findings.  There is no evidence of kidney injury and her urine shows no signs of acute infectious or stone pathology.  The patient feels much improved and is feeling good enough to go home.  Her upright abdominal series shows no evidence of free air, bowel obstruction however there is moderate amount of stool throughout the colon.  She is counseled regarding these findings, and she has had some chronic constipation in the past and is amenable to taking some medication for this.  She tolerates p.o. without difficulty and is given very strict return precautions regarding these findings and the need for reevaluation should she develop fevers, lack of bowel movements, uncontrolled nausea vomiting or any other worsening  symptoms.    HYDRATION: Based on the patient's presentation of Inability to take oral fluids and Tachycardia the patient was given IV fluids. IV Hydration was used because oral hydration was not adequate alone. Upon recheck following hydration, the patient was improved.    The patient will not drink alcohol nor drive with prescribed medications. The patient will return for worsening symptoms and is stable at the time of discharge. The patient verbalizes understanding and will comply.    FINAL IMPRESSION  Visit Diagnoses     ICD-10-CM   1. Right flank discomfort R10.9   2. Abdominal pain, unspecified abdominal location R10.9      Electronically signed by: Cornell Catherine M.D., 5/9/2020 8:00 PM

## 2020-05-10 NOTE — ED NOTES
Patient discharged with family.  Assisted out in wheelchair.  Patient verbalizes understanding of follow up, medication, pain management and when to return to the ED.  All questions answered.  2 prescription provided

## 2020-05-10 NOTE — ED TRIAGE NOTES
"Chief Complaint   Patient presents with   • Abdominal Pain   • Shortness of Breath     50 yo female in home whelchair to triage for above complaint. Pt reports 10/10 RLQ intermittent sharp abdominal pain \"for a while\" with intermittent brown diarrhea, pt also reports mild SOB but denies cough or travel. AOx4, GCS 15.    Educated on triage process, encourage to inform staff of any changes. Charge RN notified, placed in senior lounge for comfort.    BP (!) 99/74   Pulse 82   Temp 36.3 °C (97.4 °F) (Temporal)   Resp 16   Ht 1.549 m (5' 1\")   Wt 45.4 kg (100 lb)   SpO2 99%   BMI 18.89 kg/m²   "

## 2020-05-12 PROBLEM — R10.84 GENERALIZED ABDOMINAL PAIN: Status: ACTIVE | Noted: 2018-07-31

## 2020-05-12 PROBLEM — K59.00 CONSTIPATION: Status: ACTIVE | Noted: 2020-01-01

## 2020-05-12 PROBLEM — R63.0 ANOREXIA: Status: RESOLVED | Noted: 2018-08-14 | Resolved: 2020-01-01

## 2020-05-12 PROBLEM — R10.13 DYSPEPSIA: Status: RESOLVED | Noted: 2018-04-10 | Resolved: 2020-01-01

## 2020-05-12 NOTE — PROGRESS NOTES
Subjective:   Gayla Escudero is a 49 y.o. female here today for abdominal pain and a chronic history of constipation, migraines and dysphagia.    Right upper quadrant abdominal pain  This is a 49-year-old female accompanied by her .  Chronic history of right-sided abdominal pain that radiates to the back.  Has been seen multiple times at the ER.  CT scan performed in February showed no significant concerns.  Recent x-ray performed less than a week ago showed moderate amount of stool.  The past she was seen by [facility].  It appears that she was given MiraLAX told to take it multiple times a day but it caused excessive diarrhea.  Even once a day because diarrhea.  From her ER visit recently she picked up some Metamucil which she is taking once a day.  Also on Colace 100 mg twice a day.  She also was given Hycet during her last ER visit in February.  Takes a small amount when needed.  Medication as well as Tylenol have been effective.  They would like to follow-up with another GI specialist.  She has not had a colonoscopy.  Turns 50 in December.  No family history of colon cancer.  No rectal bleeding.    Migraine  Chronic condition.  Migraines are stable.  No recent exacerbation.  No current medication that she is on for her migraines.    Pharyngoesophageal dysphagia  Chronic condition secondary to her dystrophy.  Unable to swallow tablets.  Is any significant dysphasia with foods.       Current medicines (including changes today)  Current Outpatient Medications   Medication Sig Dispense Refill   • HYDROcodone-acetaminophen 2.5-108 mg/5mL (HYCET) 7.5-325 MG/15ML solution Take 10 mL by mouth every four hours as needed for up to 3 days. 100 mL 0   • docusate sodium (COLACE) 100 MG Cap Take 1 Cap by mouth 2 times a day for 30 days. 60 Cap 0   • acetaminophen (TYLENOL) 325 MG Tab Take 650 mg by mouth every four hours as needed.       No current facility-administered medications for this visit.      She  has a past  "medical history of Anemia, Cataract, Heart murmur, Muscular disease, Muscular dystrophy (HCC), and JOSÉ on CPAP.    Social History and Family History were reviewed and updated.    ROS   No chest pain, no shortness of breath, no abdominal pain and all other systems were reviewed and are negative.       Objective:     BP (!) 98/56   Pulse 84   Temp 36.7 °C (98 °F)   Resp 14   Ht 1.549 m (5' 1\")   Wt 45.4 kg (100 lb)   SpO2 97%  Body mass index is 18.89 kg/m².   Physical Exam:  Constitutional: Alert, no distress.  Skin: Warm, dry, good turgor, no rashes in visible areas.  Eye: Equal, round and reactive, conjunctiva clear, lids normal.  ENMT: Lips without lesions, good dentition, oropharynx clear.  Neck: Trachea midline, no masses.   Lymph: No cervical or supraclavicular lymphadenopathy  Respiratory: Unlabored respiratory effort, lungs appear clear, no wheezes.  Cardiovascular: Regular rate and rhythm.  Abdomen: Soft, right sided abdominal tenderness.   Psych: Alert and oriented x3, normal affect and mood.        Assessment and Plan:   The following treatment plan was discussed    1. Right upper quadrant abdominal pain  Chronic condition with recent exacerbation.  Refer to [facility].  Suggested taking Metamucil twice daily.  Continue Colace 100 mg twice daily until stool is softer.  Renewed Hycet as directed.  Advised medication may cause constipation.  Has been has been dosing out infrequently.  They still have medication left over from February.  - REFERRAL TO GASTROENTEROLOGY  - HYDROcodone-acetaminophen 2.5-108 mg/5mL (HYCET) 7.5-325 MG/15ML solution; Take 10 mL by mouth every four hours as needed for up to 3 days.  Dispense: 100 mL; Refill: 0    2. Constipation, unspecified constipation type  Chronic condition.  Symptoms could be secondary to IBS-constipation.  Refer to [facility] for evaluation.  I to review medical records from [facility] but not in the chart.  - REFERRAL TO GASTROENTEROLOGY    3. Migraine without status " migrainosus, not intractable, unspecified migraine type  Chronic condition.  Stable.  We will continue to monitor symptoms.    4. Pharyngoesophageal dysphagia  Condition.  Stable.  Symptoms secondary to dystrophy.  We will continue to monitor.      Followup: Return in about 3 months (around 8/12/2020), or if symptoms worsen or fail to improve.    Please note that this dictation was created using voice recognition software. I have made every reasonable attempt to correct obvious errors, but I expect that there are errors of grammar and possibly content that I did not discover before finalizing the note.

## 2020-05-12 NOTE — ASSESSMENT & PLAN NOTE
Chronic condition.  Migraines are stable.  No recent exacerbation.  No current medication that she is on for her migraines.

## 2020-05-12 NOTE — ASSESSMENT & PLAN NOTE
This is a 49-year-old female accompanied by her .  Chronic history of right-sided abdominal pain that radiates to the back.  Has been seen multiple times at the ER.  CT scan performed in February showed no significant concerns.  Recent x-ray performed less than a week ago showed moderate amount of stool.  The past she was seen by [facility].  It appears that she was given MiraLAX told to take it multiple times a day but it caused excessive diarrhea.  Even once a day because diarrhea.  From her ER visit recently she picked up some Metamucil which she is taking once a day.  Also on Colace 100 mg twice a day.  She also was given Hycet during her last ER visit in February.  Takes a small amount when needed.  Medication as well as Tylenol have been effective.  They would like to follow-up with another GI specialist.  She has not had a colonoscopy.  Turns 50 in December.  No family history of colon cancer.  No rectal bleeding.

## 2020-05-12 NOTE — ASSESSMENT & PLAN NOTE
Chronic condition secondary to her dystrophy.  Unable to swallow tablets.  Is any significant dysphasia with foods.

## 2020-07-30 NOTE — ED NOTES
Patient refused straight cath but was able to stand and pivot with assistance to bedside commode for UA.

## 2020-07-30 NOTE — DISCHARGE PLANNING
SHAHID updated that per  that the front dest at the [facility] is open and that they would be able to give the Pt a new key card to get her into their room.  SHAHID called DANIS and scheduled  time for 0330.

## 2020-07-30 NOTE — ED NOTES
Patient requesting something more for pain. Patient is aware of her chart being placed up for recheck.

## 2020-07-30 NOTE — DISCHARGE INSTRUCTIONS
Your blood test did not show any signs of infection.  Your abdominal x-ray confirmed constipation, which is the most likely cause of your pain.  We are treating you for this, starting with the magnesium citrate here.  We have sent 3 days of a laxative to your pharmacy.  After that, start Senokot, which we have also prescribed.  You should stay on this as a daily medication, unless your primary care doctor tells you to stop.

## 2020-07-30 NOTE — ED NOTES
Updated patient's  Adryan on plan of care. Adryan verbalized frustration at patient frequently coming to ER for ab pain and not getting a diagnosis. Adryan requesting every test be done to find an answer. Empathetic listening provided and educated Adryan on current labs and tests run. Verbalized understanding. ERP notified of husbands concerns

## 2020-07-30 NOTE — ED PROVIDER NOTES
"ED Provider Note    Scribed for Ramírez Springer M.D. by Ramírez Springer M.D.. 7/29/2020,  10:30 PM.    CHIEF COMPLAINT  Chief Complaint   Patient presents with   • Abdominal Pain       HPI  Gayla Escudero is a 49 y.o. female who presents to the Emergency Department for right lower quadrant abdominal pain since Monday, or perhaps longer, but \"Mondays when I got really bad.\"  She has moderate to severe debility secondary to muscular dystrophy.  She reports chronic constipation.  She says that the last time she was here for this \"they did not find anything,\" but also says that she was told it was \"a virus,\" and is sure that \"this is not a virus.\"  She is not sure whether or not she has had decreased appetite.  She denies fevers, diarrhea, dysuria, but cannot point to any exacerbating or relieving factors other than palpation.  She took some medication at home for this pain, but does not know what it was.  She had some improvement from fentanyl in route with EMS.  At the bedside, though chronically ill-appearing, she does not seem uncomfortable, and has reassuring vital signs.    REVIEW OF SYSTEMS  See HPI for further details. All other systems are negative.     PAST MEDICAL HISTORY   has a past medical history of Anemia, Cataract, Heart murmur, Muscular disease, Muscular dystrophy (HCC), and JOSÉ on CPAP.    SOCIAL HISTORY  Social History     Tobacco Use   • Smoking status: Never Smoker   • Smokeless tobacco: Never Used   Substance and Sexual Activity   • Alcohol use: No   • Drug use: No   • Sexual activity: Yes     Partners: Male     Social History     Substance and Sexual Activity   Drug Use No       SURGICAL HISTORY   has a past surgical history that includes hysterectomy laparoscopy.    CURRENT MEDICATIONS  Home Medications    **Home medications have not yet been reviewed for this encounter**         ALLERGIES  Allergies   Allergen Reactions   • Codeine Vomiting, Nausea and Unspecified     N&V  Dizzy, " "lightheaded and vomiting.    • Tramadol Unspecified     Effects her MD  weak       PHYSICAL EXAM  VITAL SIGNS: BP (!) 97/67   Pulse 86   Temp 36.9 °C (98.4 °F) (Temporal)   Resp 16   Ht 1.549 m (5' 1\")   Wt 45.4 kg (100 lb)   SpO2 96%   BMI 18.89 kg/m²   Pulse ox interpretation: I interpret this pulse ox as normal.  Constitutional: Alert in no apparent distress.  HENT: No signs of trauma, Bilateral external ears normal, Nose normal.   Eyes: Conjunctiva normal, Non-icteric.   Neck: Normal range of motion, Supple, No stridor.   Lymphatic: No lymphadenopathy noted.   Cardiovascular: Regular rate and rhythm, no murmurs.   Thorax & Lungs: Normal breath sounds, No respiratory distress, No wheezing, No chest tenderness.   Abdomen: Bowel sounds normal, Soft,RLQ  tenderness, No masses, No pulsatile masses. No peritoneal signs.  Skin: Warm, Dry, No erythema, No rash.   Back: No CVA tenderness.  Extremities: Intact distal pulses, No edema, No cyanosis.  Musculoskeletal: Good range of motion in all major joints. No or major deformities noted.   Neurologic: Alert , Normal motor function, Normal sensory function, No focal deficits noted.   Psychiatric: Affect normal, Judgment normal, Mood normal.     DIAGNOSTIC STUDIES / PROCEDURES        LABS  Labs Reviewed   CBC WITH DIFFERENTIAL - Abnormal; Notable for the following components:       Result Value    MCHC 31.9 (*)     Lymphocytes 20.50 (*)     All other components within normal limits   BASIC METABOLIC PANEL - Abnormal; Notable for the following components:    Glucose 109 (*)     Creatinine 0.43 (*)     All other components within normal limits   ESTIMATED GFR     All labs reviewed by me.    RADIOLOGY  [id number] VIEW   Final Result         1.  Moderate stool in the colon suggests changes of constipation, otherwise nonspecific bowel gas pattern        The radiologist's interpretation of all radiological studies have been reviewed by me.    COURSE & MEDICAL DECISION " MAKING  Nursing notes, VS, PMSFHx reviewed in chart.     10:30 PM Patient seen and examined at bedside. Differential diagnosis includes but is not limited to appendicitis, gastroenteritis, constipation, urinary tract infection, kidney stone, mesenteric adenitis. Ordered for CBC, BMP, urinalysis, KUB to evaluate. Patient will be treated with fentanyl and Zofran for her symptoms.  Given 4 days of pain with no abnormal vital signs, I think a bacterial infection is less likely been another cause.    11:19 PM xray confirms constipation.    11:45 PM this patient's evaluation was unremarkable.  Her laboratory tests are not suspicious for intra-abdominal infection, despite 4 days of symptoms.  She herself reports chronic constipation, and recent evaluation for identical symptoms that did not result in a specific diagnosis.  I think constipation is likely cause of her pain.  We will start treatment for her with magnesium citrate here, 3 days of MiraLAX at home, and then Senokot, which I think she may want to stay on, and consultation with her primary care doctor.  I suspect she has some slow transit constipation secondary to her muscular dystrophy.     The patient will return for new or worsening symptoms and is stable at the time of discharge.    The patient is referred to a primary physician for blood pressure management, diabetic screening, and for all other preventative health concerns.      DISPOSITION:  Patient will be discharged home in stable condition.    FOLLOW UP:  Adithya Martinez, P.A.-C.  [id number] [facility] [address]  [facility] [id number] [phone]    Schedule an appointment as soon as possible for a visit         OUTPATIENT MEDICATIONS:  New Prescriptions    POLYETHYLENE GLYCOL/LYTES (MIRALAX) 17 G PACK    Take 1 Packet by mouth every day for 3 days.    SENNOSIDES-DOCUSATE SODIUM (SENOKOT-S) 8.6-50 MG TABLET    Take 1 Tab by mouth every day.         FINAL IMPRESSION  1. Chronic idiopathic constipation    2.  Right lower quadrant abdominal pain

## 2020-07-30 NOTE — ED NOTES
Report given to [facility]. Patient transported home with [facility]

## 2020-07-30 NOTE — ED TRIAGE NOTES
Chief Complaint   Patient presents with   • Abdominal Pain     Patient  BIB EMS complaining of RLQ ab pain. Patient stated she took medication for it but cannot remember what she took. EMS gave intranasal fentanyl. Hx muscular dystrophy

## 2020-07-30 NOTE — ED NOTES
Patient's  updated on plan of care, verbalized understanding. Patient's  stated he would call the  at the [facility] to ensure someone would be there to let patient into her room. Social work notified and stated they will set up [facility] transport

## 2020-07-30 NOTE — DISCHARGE PLANNING
SHAHID informed that Pt needs assistance getting home and is reporting she is unable to walk due to dx of Muscular Dystrophy. SHAHID completed [facility] PCS form and faxed all required documentation to [facility].  SHAHID called [facility] and was informed that Pt does not have transportation benefits.  SHAHID met with Pt and she informed this SW that she does not have the key card with her to get back into her hotel room.  She shared that her  is working until 5 am.  Pt reported that she lives at the [facility].  SHAHID attempted to call [facility] [phone] however no one answered.  Pt is attempting to get a hold of her  so he can pick her up.  SHAHID called [facility] back and placed transport on will call for the time.

## 2020-07-30 NOTE — ED NOTES
Patient educated on discharge instructions, follow up appointments, prescriptions, and home care. Patient verbalized understanding. Patient awaiting [facility] transport home

## 2020-07-30 NOTE — ED NOTES
Patient changed into gown and placed on monitor. Labs drawn but unable to get IV. ERP notified, verbal orders to change Zofran to ODT and fentanyl to IM

## 2020-09-13 PROBLEM — R10.9 PAIN IN THE ABDOMEN: Status: ACTIVE | Noted: 2020-01-01

## 2020-09-13 NOTE — ED PROVIDER NOTES
ED Provider Note    Scribed for Mu Shepard M.D. by Berenice Abarca. 9/13/2020, 4:53 PM.    Primary care provider: Adithya Martinez P.A.-C.  Means of arrival: Walk-in  History obtained from: Patient  History limited by: None    CHIEF COMPLAINT  Chief Complaint   Patient presents with   • Abdominal Pain     diffuse abd pain, reoccuring problem. Followed by GI consultants     HPI  Gayla Escudero is a 49 y.o. female with a history of myotonic muscular dystrophy type II who presents to the Emergency Department for evaluation of worsening diffuse abdominal pain for the past 5-6 months. No alleviating or aggravating factors noted. The patient presents to the ED every couple months for the same. The patient is currently followed by GI consults who generally recommend she take MiraLAX but causes the patient to have diarrhea. Per , the patient has been crying all day. The patient has been taking Tylenol for the pain in the past, but was told to stop by GI consultants. The patient denies any associated fever.    REVIEW OF SYSTEMS  Pertinent positives include abdominal pain. Pertinent negatives include no fever. As above otherwise all other systems are negative    PAST MEDICAL HISTORY   has a past medical history of Anemia, Cataract, Heart murmur, Muscular disease, Muscular dystrophy (HCC), and JOSÉ on CPAP.    SURGICAL HISTORY   has a past surgical history that includes hysterectomy laparoscopy.    SOCIAL HISTORY  Social History     Tobacco Use   • Smoking status: Never Smoker   • Smokeless tobacco: Never Used   Substance Use Topics   • Alcohol use: No   • Drug use: No      Social History     Substance and Sexual Activity   Drug Use No     FAMILY HISTORY  Family History   Problem Relation Age of Onset   • No Known Problems Mother    • No Known Problems Father    • Sleep Apnea Daughter    • No Known Problems Sister    • No Known Problems Brother         Musc dys also   • No Known Problems Sister    •  "No Known Problems Brother         Trauma, was shot   • No Known Problems Brother         Car accident   • Other Son         Muscular dystrophy   • Heart Disease Daughter          at 2 months congenital heart disease     CURRENT MEDICATIONS  Home Medications     Reviewed by Mike Olivier R.N. (Registered Nurse) on 20 at 1629  Med List Status: <None>   Medication Last Dose Status   acetaminophen (TYLENOL) 325 MG Tab  Active   sennosides-docusate sodium (SENOKOT-S) 8.6-50 MG tablet  Active   sumatriptan (IMITREX) 20 MG/ACT nasal spray  Active              ALLERGIES  Allergies   Allergen Reactions   • Codeine Vomiting, Nausea and Unspecified     N&V  Dizzy, lightheaded and vomiting.    • Tramadol Unspecified     Effects her MD  weak       PHYSICAL EXAM  VITAL SIGNS: BP (!) 99/71   Pulse 92   Temp 36.7 °C (98.1 °F) (Temporal)   Resp 14   Ht 1.651 m (5' 5\")   Wt 45.4 kg (100 lb)   SpO2 95%   BMI 16.64 kg/m²     Constitutional: Thin appearance tearful  HENT: Normocephalic, Atraumatic, Bilateral external ears normal, oropharynx mucous membranes, No oral exudates, Nose normal.   Eyes:conjunctiva is normal, there are no signs of exudate.   Neck: Supple, no meningeal signs.  Lymphatic: No lymphadenopathy noted.   Cardiovascular: Tachycardic rate and rhythm without murmurs gallops or rubs.   Thorax & Lungs: No respiratory distress. Breathing comfortably. Lungs are clear to auscultation bilaterally, there are no wheezes no rales. Chest wall is nontender.  Abdomen: Soft diffusely tender bowel sounds are hypoactive.  Abdomen is scaphoid and cachectic  Skin: Warm, Dry, No erythema,   Back: No tenderness, No CVA tenderness.  Musculoskeletal: Clubbing cyanosis or edema   neurologic: Alert & oriented x 3, Moving all extremities. No gross abnormalities.  Generalized weakness throughout  Psychiatric: Affect normal, Judgment normal, Mood normal.         LABS  Results for orders placed or performed during the hospital " encounter of 09/13/20   CBC WITH DIFFERENTIAL   Result Value Ref Range    WBC 5.7 4.8 - 10.8 K/uL    RBC 4.70 4.20 - 5.40 M/uL    Hemoglobin 13.9 12.0 - 16.0 g/dL    Hematocrit 42.8 37.0 - 47.0 %    MCV 91.1 81.4 - 97.8 fL    MCH 29.6 27.0 - 33.0 pg    MCHC 32.5 (L) 33.6 - 35.0 g/dL    RDW 41.2 35.9 - 50.0 fL    Platelet Count 238 164 - 446 K/uL    MPV 10.0 9.0 - 12.9 fL    Neutrophils-Polys 44.60 44.00 - 72.00 %    Lymphocytes 46.10 (H) 22.00 - 41.00 %    Monocytes 7.50 0.00 - 13.40 %    Eosinophils 1.10 0.00 - 6.90 %    Basophils 0.50 0.00 - 1.80 %    Immature Granulocytes 0.20 0.00 - 0.90 %    Nucleated RBC 0.00 /100 WBC    Neutrophils (Absolute) 2.54 2.00 - 7.15 K/uL    Lymphs (Absolute) 2.63 1.00 - 4.80 K/uL    Monos (Absolute) 0.43 0.00 - 0.85 K/uL    Eos (Absolute) 0.06 0.00 - 0.51 K/uL    Baso (Absolute) 0.03 0.00 - 0.12 K/uL    Immature Granulocytes (abs) 0.01 0.00 - 0.11 K/uL    NRBC (Absolute) 0.00 K/uL   COMP METABOLIC PANEL   Result Value Ref Range    Sodium 140 135 - 145 mmol/L    Potassium 3.7 3.6 - 5.5 mmol/L    Chloride 101 96 - 112 mmol/L    Co2 25 20 - 33 mmol/L    Anion Gap 14.0 7.0 - 16.0    Glucose 94 65 - 99 mg/dL    Bun 4 (L) 8 - 22 mg/dL    Creatinine 0.52 0.50 - 1.40 mg/dL    Calcium 10.2 8.5 - 10.5 mg/dL    AST(SGOT) 22 12 - 45 U/L    ALT(SGPT) 12 2 - 50 U/L    Alkaline Phosphatase 52 30 - 99 U/L    Total Bilirubin 0.4 0.1 - 1.5 mg/dL    Albumin 4.4 3.2 - 4.9 g/dL    Total Protein 7.3 6.0 - 8.2 g/dL    Globulin 2.9 1.9 - 3.5 g/dL    A-G Ratio 1.5 g/dL   LIPASE   Result Value Ref Range    Lipase 163 (H) 11 - 82 U/L   HCG QUAL SERUM   Result Value Ref Range    Beta-Hcg Qualitative Serum Negative Negative   URINALYSIS,CULTURE IF INDICATED    Specimen: Blood   Result Value Ref Range    Color Yellow     Character Clear     Specific Gravity <=1.005 <1.035    Ph 6.0 5.0 - 8.0    Glucose Negative Negative mg/dL    Ketones Negative Negative mg/dL    Protein Negative Negative mg/dL    Bilirubin  Negative Negative    Urobilinogen, Urine 0.2 Negative    Nitrite Negative Negative    Leukocyte Esterase Negative Negative    Occult Blood Negative Negative    Micro Urine Req see below    ESTIMATED GFR   Result Value Ref Range    GFR If African American >60 >60 mL/min/1.73 m 2    GFR If Non African American >60 >60 mL/min/1.73 m 2     All labs reviewed by me.    RADIOLOGY  CT-ABDOMEN-PELVIS WITH   Final Result      1.  Slight decrease in size of cystic structure at the lower end of the vagina compared to the prior exam.      2.  Otherwise no acute abnormalities are identified in the abdomen or pelvis.           The radiologist's interpretation of all radiological studies have been reviewed by me.    COURSE & MEDICAL DECISION MAKING  Pertinent Labs & Imaging studies reviewed. (See chart for details)    4:53 PM - Patient seen and examined at bedside. Patient will be treated with lactated ringers infusion, Zofran 4 mg, and Morphine 4 mg. Ordered CT-abdomen-pelvis, CBC with differential, CMP, lipase, HCG qual serum, and UA to evaluate her symptoms. The differential diagnoses include but are not limited to: Cryptitis cholecystitis intra-abdominal infection masses obstruction    HYDRATION: Based on the patient's presentation of dehydration,  the patient was given IV fluids. IV Hydration was used because oral hydration is unable to be done due to the patient's symptoms. Upon recheck following hydration, the patient was improved.    Decision Making:  Presents to the ED for evaluation.  Clinically the patient is very cachectic and ill in appearance most likely secondary to her chronic debilitated status secondary to her muscular dystrophy.  IV was established the patient was given fluid hydration pain medications and nausea medications.  Upon reevaluation she appears much improved more perky.  Laboratory studies do show an increased lipase of 168.  The patient did have a recent ultrasound the right upper quadrant back in  February no signs of overt abnormalities at the time.  LFTs here do not show an elevated bilirubin so I do not feel the patient has obstructive biliary causes for pancreatitis.  She still is having continued symptoms and requesting more pain medication so after long discussion with both the patient as well as the patient's  who is been very frustrated with the outpatient work-up they feel uncomfortable going home to follow-up with her GI specialist.  I will admit the patient for further pain control fluid hydration and evaluation of recurrent pancreatitis.  Have spoken to the hospitalist for this admission.         FINAL IMPRESSION  1. Acute pancreatitis, unspecified complication status, unspecified pancreatitis type    2. Muscular dystrophy (HCC)          IBerenice (Waqar), am scribing for, and in the presence of, Mu Shepard M.D..    Electronically signed by: Berenice Abarca (Waqar), 9/13/2020    Mu VILLEGAS M.D. personally performed the services described in this documentation, as scribed by Berenice Abarca in my presence, and it is both accurate and complete.    C.     The note accurately reflects work and decisions made by me.  Mu Shepard M.D.  9/13/2020  8:33 PM

## 2020-09-13 NOTE — ED TRIAGE NOTES
Chief Complaint   Patient presents with   • Abdominal Pain     diffuse abd pain, reoccuring problem. Followed by GI consultants     To triage by wheeled walker. Explained triage process, to waiting room. Asked to inform RN if questions or concerns arise.

## 2020-09-14 PROBLEM — K86.1 OTHER CHRONIC PANCREATITIS (HCC): Status: ACTIVE | Noted: 2020-01-01

## 2020-09-14 NOTE — PROGRESS NOTES
Admit profile and assessment completed. Pt A&Ox4.  Pt reports of 8/10 abdominal pain at this time, medicated per MAR. Abdomen soft and tender on palpation. Sips of water given, not tolerated. POC discussed, communication board updated. Bed in locked, lowest position.  Call light and belongings within reach. Needs met, will continue to monitor.

## 2020-09-14 NOTE — PROGRESS NOTES
Pt transferred from ER R11 to T208 via transporter. Pt transferred from [facility] to bed. Monitors applied.

## 2020-09-14 NOTE — H&P
"Hospital Medicine History & Physical Note    Date of Service  9/13/2020    PCP: Adithya Martinez P.A.-C.    CC:  \"My belly hurts\"    HPI:   This is a 49 y.o. female with myotonic muscular dystrophy with complaints of chronic abdominal pain and alternating constipation and diarrhea. 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 She's 'tried all the medications' for the constipation, usually using Miralax which then causes diarrhea so she will stop using that. No fevers or chills. No sick contacts. Her  works nights at [employer] and she says she is able to care for herself well enough.     Here her lipase elevated 163, otherwise all labs normal. CT a/p w/o contrast negative for pathology.     Denies dysuria, UA negative.     Consultants:   None    ROS: As above. The remainder of a complete review of systems is negative in all systems except as noted.    PMHx:   has a past medical history of Anemia, Cataract, Heart murmur, Muscular disease, Muscular dystrophy (HCC), and JOSÉ on CPAP.    PSHx:   has a past surgical history that includes hysterectomy laparoscopy.    SHx:   reports that she has never smoked. She has never used smokeless tobacco. She reports that she does not drink alcohol or use drugs.    FHx:  Reviewed with patient, no pertinent family history noted.    Allergies:  Allergies   Allergen Reactions   • Codeine Vomiting, Nausea and Unspecified     N&V  Dizzy, lightheaded and vomiting.    • Tramadol Unspecified     Effects her MD  weak       Medications:  No current facility-administered medications on file prior to encounter.      Current Outpatient Medications on File Prior to Encounter   Medication Sig Dispense Refill   • polyethylene glycol/lytes (MIRALAX) 17 g Pack Take 17 g by mouth every day.     • acetaminophen (TYLENOL) 160 MG chewable tablet Take 480 mg by mouth every four hours as needed.     • sumatriptan " (IMITREX) 20 MG/ACT nasal spray Spray 1 Spray in nose as needed for Migraine. 1 Each 3       Objective Exam:  Vitals:    09/13/20 1627 09/13/20 1658 09/13/20 1750 09/13/20 1904   BP:   116/64 111/59   Pulse:   94 (!) 105   Resp:  20 18 15   Temp:       TempSrc:       SpO2:   93% 95%   Weight: 45.4 kg (100 lb)      Height:           Physical Exam   Constitutional: She is oriented to person, place, and time. No distress.   Frail appearing woman appearing older than stated age   HENT:   Head: Normocephalic.   Mouth/Throat: Oropharynx is clear and moist.   Eyes: Conjunctivae and EOM are normal. No scleral icterus.   Neck: Normal range of motion.   Cardiovascular: Normal rate, regular rhythm, normal heart sounds and intact distal pulses.   Pulmonary/Chest: Effort normal. No stridor. No respiratory distress. She has no wheezes.   Abdominal: Soft. She exhibits no distension. There is abdominal tenderness. There is guarding.   Tenderness seems out of proportion, she jumped when only lightly touching abdomen    Musculoskeletal:         General: No tenderness, deformity or edema.      Comments: Chronic dystrophy findings   Neurological: She is alert and oriented to person, place, and time.   Skin: Skin is warm and dry. No rash noted. She is not diaphoretic. No erythema.   Psychiatric: She has a normal mood and affect. Her behavior is normal.   Nursing note and vitals reviewed.      Laboratory--reviewed personally and are as follows:  Lab Results   Component Value Date/Time    WBC 5.7 09/13/2020 05:35 PM    RBC 4.70 09/13/2020 05:35 PM    HEMOGLOBIN 13.9 09/13/2020 05:35 PM    HEMATOCRIT 42.8 09/13/2020 05:35 PM    MCV 91.1 09/13/2020 05:35 PM    MCH 29.6 09/13/2020 05:35 PM    MCHC 32.5 (L) 09/13/2020 05:35 PM    MPV 10.0 09/13/2020 05:35 PM    NEUTSPOLYS 44.60 09/13/2020 05:35 PM    LYMPHOCYTES 46.10 (H) 09/13/2020 05:35 PM    MONOCYTES 7.50 09/13/2020 05:35 PM    EOSINOPHILS 1.10 09/13/2020 05:35 PM    BASOPHILS 0.50  09/13/2020 05:35 PM    ANISOCYTOSIS 1+ 02/19/2020 09:58 PM      Lab Results   Component Value Date/Time    SODIUM 140 09/13/2020 05:35 PM    POTASSIUM 3.7 09/13/2020 05:35 PM    CHLORIDE 101 09/13/2020 05:35 PM    CO2 25 09/13/2020 05:35 PM    GLUCOSE 94 09/13/2020 05:35 PM    BUN 4 (L) 09/13/2020 05:35 PM    CREATININE 0.52 09/13/2020 05:35 PM      Lab Results   Component Value Date/Time    PROTHROMBTM 13.5 07/24/2018 11:12 AM    INR 1.06 07/24/2018 11:12 AM        Radiology  CT-ABDOMEN-PELVIS WITH   Final Result      1.  Slight decrease in size of cystic structure at the lower end of the vagina compared to the prior exam.      2.  Otherwise no acute abnormalities are identified in the abdomen or pelvis.             Assessment/Plan:  * Pain in the abdomen  Assessment & Plan  Questionable etiology, unclear if related to subtle pancreatitis?  Lipase elevated  Conservative treatment, will feed her slowly, ADAT  Holding bowel regimen for now, I think best option is to just watch and see how she does  Follow up BMP    Constipation- (present on admission)  Assessment & Plan  Hx chronic constipation, had been previously seen by GI  Holding bowel regimen at this time    Impaired mobility and ADLs- (present on admission)  Assessment & Plan  She reports doing well enough at home  PT OT    Myotonic muscular dystrophy (HCC)- (present on admission)  Assessment & Plan  Stable  Social work consult for abuse screening     It is expected patient will stay beyond 2 midnights.    VTE prophylaxis: lmwh

## 2020-09-14 NOTE — ED NOTES
Med rec complete via interview wit family member at bedside. Allergies reviewed. Family member denies antibiotic use in past 14 days.    Home pharmacy [facility]

## 2020-09-14 NOTE — ED NOTES
PIV placed, blood sent to lab.      Patient up to bedside commode with full 1 person lift assist to pivot to commode, urine sample sent to lab.

## 2020-09-14 NOTE — ASSESSMENT & PLAN NOTE
With abdominal pain and nausea vomiting   CT scan showed normal pancreas   Lipase was mildly elevated on admission   Improving and continue advance diet with IV fluid

## 2020-09-14 NOTE — PROGRESS NOTES
"Spoke to  on the phone.  strongly requested that patient's pain not be managed with oxycodone.  states: \"I don't want her taking any of it. It makes her sick.\" Education provided about common side effects and active plan of care.  continued to make request on behalf of patient. This RN educated  that decision are made by the patient. This RN to bedside after phone conversation, patient on phone with  on speaker.  requesting patient to request for something other that oxycodone. Patient educated on plan of care, common side effects, and her patient rights.  "

## 2020-09-15 NOTE — PROGRESS NOTES
[facility] Medicine Daily Progress Note    Date of Service  9/15/2020    Chief Complaint  49 y.o. female admitted 9/13/2020 with abdominal pain    Hospital Course    49-year-old female with history of myotonic muscular atrophy and chronic abdominal pain presented 9/13 with abdominal pain, on the left side, increasing with food, denied any fever or chills she had some nausea and vomiting, CT scan on admission did not show any acute finding and normal pancreas, labs showed normal white blood cell and elevated lipase around 163, the patient was treated with IV fluids and pain medication with some improvement        Interval Problem Update  -Evaluated examined the patient at bedside, she is lethargic some improving on her pain and no vomiting  -Start with diet  -Improving on her lipase    Consultants/Specialty  none    Code Status  Full Code    Disposition  home tomorrow if she is stable    Review of Systems  Review of Systems   Constitutional: Positive for malaise/fatigue. Negative for chills and weight loss.   HENT: Negative.    Respiratory: Negative.    Cardiovascular: Negative.    Gastrointestinal: Positive for abdominal pain. Negative for nausea and vomiting.   Genitourinary: Negative.    Musculoskeletal: Negative.    Skin: Negative.    Neurological: Negative.    Psychiatric/Behavioral: Negative.         Physical Exam  Temp:  [36.4 °C (97.5 °F)-37.7 °C (99.9 °F)] 37.7 °C (99.9 °F)  Pulse:  [] 101  Resp:  [16-18] 18  BP: ()/(53-57) 96/53  SpO2:  [97 %-100 %] 97 %    Physical Exam  Constitutional:       General: She is in acute distress.      Appearance: She is ill-appearing.   HENT:      Head: Atraumatic.   Eyes:      Extraocular Movements: Extraocular movements intact.      Pupils: Pupils are equal, round, and reactive to light.   Neck:      Musculoskeletal: Normal range of motion and neck supple.   Cardiovascular:      Rate and Rhythm: Normal rate and regular rhythm.      Pulses: Normal pulses.    Pulmonary:      Effort: Pulmonary effort is normal. No respiratory distress.      Breath sounds: Normal breath sounds. No wheezing.   Abdominal:      General: Abdomen is flat. There is no distension.      Palpations: There is no mass.      Tenderness: There is abdominal tenderness (epigastric). There is no guarding.      Hernia: No hernia is present.   Skin:     General: Skin is warm.      Capillary Refill: Capillary refill takes 2 to 3 seconds.      Coloration: Skin is not jaundiced.      Findings: No bruising.   Neurological:      General: No focal deficit present.      Mental Status: She is alert and oriented to person, place, and time.      Cranial Nerves: No cranial nerve deficit.      Motor: No weakness.   Psychiatric:         Mood and Affect: Mood normal.         Fluids    Intake/Output Summary (Last 24 hours) at 9/15/2020 1526  Last data filed at 9/14/2020 1915  Gross per 24 hour   Intake 240 ml   Output --   Net 240 ml       Laboratory  Recent Labs     09/13/20 1735 09/15/20  0301   WBC 5.7 7.4   RBC 4.70 3.84*   HEMOGLOBIN 13.9 11.2*   HEMATOCRIT 42.8 36.5*   MCV 91.1 95.1   MCH 29.6 29.2   MCHC 32.5* 30.7*   RDW 41.2 43.6   PLATELETCT 238 202   MPV 10.0 10.3     Recent Labs     09/13/20  1735 09/15/20  0301   SODIUM 140 145   POTASSIUM 3.7 4.6   CHLORIDE 101 109   CO2 25 24   GLUCOSE 94 72   BUN 4* 3*   CREATININE 0.52 0.34*   CALCIUM 10.2 9.0             Recent Labs     09/15/20  0301   TRIGLYCERIDE 73   HDL 70   LDL 72       Imaging  IR-US GUIDED PIV   Final Result    Ultrasound-guided PERIPHERAL IV INSERTION performed by    qualified nursing staff as above.      CT-ABDOMEN-PELVIS WITH   Final Result      1.  Slight decrease in size of cystic structure at the lower end of the vagina compared to the prior exam.      2.  Otherwise no acute abnormalities are identified in the abdomen or pelvis.              Assessment/Plan  * Other chronic pancreatitis (HCC)  Assessment & Plan  With abdominal pain and  nausea vomiting   CT scan showed normal pancreas   Lipase was mildly elevated on admission   Improving and continue advance diet with IV fluid       Impaired mobility and ADLs- (present on admission)  Assessment & Plan  She reports doing well enough at home  PT OT    Constipation- (present on admission)  Assessment & Plan  Hx chronic constipation, had been previously seen by GI  Scheduled MiraLAX       Myotonic muscular dystrophy (HCC)- (present on admission)  Assessment & Plan  Stable  Follow-up primary care doctor         VTE prophylaxis: scd.,  Lovenox

## 2020-09-15 NOTE — PROGRESS NOTES
Assessment completed. Pt A&Ox4. Respirations are even and unlabored on 2L n/c. Pt denies pain at this time. Denies nausea. Lethargic and fatigued. Purewick in place. Monitors applied, VS stable, call light and belongings within reach. POC updated (pain control, hydration). Pt educated on room and call light, pt verbalized understanding. Communication board updated. Needs met.

## 2020-09-15 NOTE — PROGRESS NOTES
[facility] Medicine Daily Progress Note    Date of Service  9/14/2020    Chief Complaint  49 y.o. female admitted 9/13/2020 with abdominal pain    Hospital Course          Interval Problem Update  Epigastric pain, intensity 8/10, sharp, no radiation, constant    Elevated lipase    Consultants/Specialty  none    Code Status  Full Code    Disposition  home    Review of Systems  Review of Systems   Constitutional: Positive for malaise/fatigue. Negative for chills and weight loss.   HENT: Negative.    Respiratory: Negative.    Cardiovascular: Negative.    Gastrointestinal: Positive for abdominal pain and nausea. Negative for vomiting.   Genitourinary: Negative.    Musculoskeletal: Negative.    Skin: Negative.    Neurological: Negative.    Psychiatric/Behavioral: Negative.         Physical Exam  Temp:  [36.1 °C (96.9 °F)-36.9 °C (98.5 °F)] 36.9 °C (98.4 °F)  Pulse:  [] 76  Resp:  [14-16] 16  BP: ()/(55-71) 99/57  SpO2:  [92 %-100 %] 98 %    Physical Exam  Constitutional:       General: She is in acute distress.      Appearance: She is ill-appearing.   HENT:      Head: Atraumatic.   Eyes:      Extraocular Movements: Extraocular movements intact.      Pupils: Pupils are equal, round, and reactive to light.   Neck:      Musculoskeletal: Normal range of motion and neck supple.   Cardiovascular:      Rate and Rhythm: Normal rate and regular rhythm.      Pulses: Normal pulses.   Pulmonary:      Effort: Pulmonary effort is normal. No respiratory distress.      Breath sounds: Normal breath sounds. No wheezing.   Abdominal:      General: Abdomen is flat. There is no distension.      Tenderness: There is abdominal tenderness (epigastric).      Hernia: No hernia is present.   Skin:     General: Skin is warm.      Capillary Refill: Capillary refill takes 2 to 3 seconds.      Coloration: Skin is not jaundiced.      Findings: No bruising.   Neurological:      General: No focal deficit present.      Mental Status: She is  alert and oriented to person, place, and time.      Cranial Nerves: No cranial nerve deficit.      Motor: No weakness.   Psychiatric:         Mood and Affect: Mood normal.         Fluids  No intake or output data in the 24 hours ending 09/14/20 1939    Laboratory  Recent Labs     09/13/20  1735   WBC 5.7   RBC 4.70   HEMOGLOBIN 13.9   HEMATOCRIT 42.8   MCV 91.1   MCH 29.6   MCHC 32.5*   RDW 41.2   PLATELETCT 238   MPV 10.0     Recent Labs     09/13/20  1735   SODIUM 140   POTASSIUM 3.7   CHLORIDE 101   CO2 25   GLUCOSE 94   BUN 4*   CREATININE 0.52   CALCIUM 10.2                   Imaging  CT-ABDOMEN-PELVIS WITH   Final Result      1.  Slight decrease in size of cystic structure at the lower end of the vagina compared to the prior exam.      2.  Otherwise no acute abnormalities are identified in the abdomen or pelvis.              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

## 2020-09-15 NOTE — PROGRESS NOTES
Report received from Alexis. MCKAY.  Patient resting in bed. Pt is very fatigued.  POC gone over with pt and all questions answered.  Patient A & O x 4.  No apparent signs of distress.  Safety precautions in place.  Patient educated to call for assistance.  Will continue to monitor.

## 2020-09-16 NOTE — PROGRESS NOTES
Discharge instructions, medications and follow-up reviewed with pt, pt verbalized understanding and denies questions. Discharge paperwork and prescription given to pt. Controlled substance form signed by pt. PIV removed, armband removed. Pt awaiting transport.

## 2020-09-16 NOTE — DISCHARGE SUMMARY
Discharge Summary    CHIEF COMPLAINT ON ADMISSION  Chief Complaint   Patient presents with   • Abdominal Pain     diffuse abd pain, reoccuring problem. Followed by GI consultants       Reason for Admission  Abdominal Pain     Admission Date  9/13/2020    CODE STATUS  Full Code    HPI & HOSPITAL COURSE    49-year-old female with history of myotonic muscular atrophy and chronic abdominal pain possible IBS presented 9/13 with acute on chronic abdominal pain, on the left side, increasing with food, denied any fever or chills she had some nausea and vomiting, CT scan on admission did not show any acute finding and normal pancreas, labs showed normal white blood cell and mild elevated lipase around 163, the patient was treated with IV fluids and pain medication with some improvement, the patient able to tolerate GI soft diet before discharge.     The patient has been followed by digestive health and primary care doctor for chronic abdominal pain and possible IBS with constipation, the patient around her baseline and she is stable for discharge, to follow-up closely with GI clinic, she has an appointment next month.    The patient has chronic generalized weakness due to myotonic atrophy, around her baseline and she is on room air.     Therefore, she is discharged in good and stable condition to home with close outpatient follow-up.    The patient met 2-midnight criteria for an inpatient stay at the time of discharge.    Discharge Date  09/16/20      FOLLOW UP ITEMS POST DISCHARGE  Follow-up with primary care doctor for myotonic atrophy  Follow-up with primary care doctor for chronic abdominal pain.    DISCHARGE DIAGNOSES  Principal Problem:    Other chronic pancreatitis (HCC) POA: Unknown  Active Problems:    Impaired mobility and ADLs POA: Yes    Underweight POA: Yes    Myotonic muscular dystrophy (HCC) POA: Yes    Constipation POA: Yes  Resolved Problems:    * No resolved hospital problems. *      FOLLOW UP  Future  Appointments   Date Time Provider Department Center   9/22/2020 11:00 AM [facility]     No follow-up provider specified.    MEDICATIONS ON DISCHARGE     Medication List      START taking these medications      Instructions   omeprazole 20 MG delayed-release capsule  Commonly known as: PRILOSEC   Take 1 Cap by mouth every day.  Dose: 20 mg     ondansetron 4 MG Tbdp  Commonly known as: ZOFRAN ODT   Take 1 Tab by mouth every four hours as needed for Nausea (give PO if no IV route available).  Dose: 4 mg     oxyCODONE immediate-release 5 MG Tabs  Commonly known as: ROXICODONE   Take 1 Tab by mouth 2 times a day as needed for up to 3 days.  Dose: 5 mg        CHANGE how you take these medications      Instructions   polyethylene glycol/lytes 17 g Pack  What changed: when to take this  Commonly known as: MIRALAX   Doctor's comments: Hold for diarrhea  Take 1 Packet by mouth 2 times a day.  Dose: 17 g        CONTINUE taking these medications      Instructions   acetaminophen 160 MG chewable tablet  Commonly known as: TYLENOL   Take 480 mg by mouth every four hours as needed.  Dose: 480 mg     sumatriptan 20 MG/ACT nasal spray  Commonly known as: IMITREX   Spray 1 Spray in nose as needed for Migraine.  Dose: 1 Spray            Allergies  Allergies   Allergen Reactions   • Codeine Vomiting, Nausea and Unspecified     N&V  Dizzy, lightheaded and vomiting.    • Tramadol Unspecified     Effects her MD  weak       DIET  Orders Placed This Encounter   Procedures   • Diet Order Low Fiber(GI Soft)     Standing Status:   Standing     Number of Occurrences:   1     Order Specific Question:   Diet:     Answer:   Low Fiber(GI Soft) [2]       ACTIVITY  As tolerated.  Weight bearing as tolerated    CONSULTATIONS  None     PROCEDURES  None     LABORATORY  Lab Results   Component Value Date    SODIUM 142 09/16/2020    POTASSIUM 4.2 09/16/2020    CHLORIDE 110 09/16/2020    CO2 23 09/16/2020    GLUCOSE 77  09/16/2020    BUN 3 (L) 09/16/2020    CREATININE 0.40 (L) 09/16/2020        Lab Results   Component Value Date    WBC 11.5 (H) 09/16/2020    HEMOGLOBIN 9.9 (L) 09/16/2020    HEMATOCRIT 31.0 (L) 09/16/2020    PLATELETCT 149 (L) 09/16/2020        Total time of the discharge process exceeds 33 minutes.

## 2020-09-16 NOTE — PROGRESS NOTES
Assessment completed. Pt A&Ox4, fatigued.  Respirations even, unlabored on 2L O2 NC.  Pt reported 5/10 abdominal pain, declines interventions at this time.Pt updated on POC. Bed in locked, lowest position. Call light and belongings within reach. Needs met, will continue to monitor.

## 2020-09-16 NOTE — DISCHARGE INSTRUCTIONS
Discharge Instructions    Discharged to home by car with relative. Discharged via wheelchair, hospital escort: Yes.  Special equipment needed: Not Applicable    Be sure to schedule a follow-up appointment with your primary care doctor or any specialists as instructed.     Discharge Plan:   Diet Plan: Discussed  Activity Level: Discussed  Confirmed Follow up Appointment: Patient to Call and Schedule Appointment  Confirmed Symptoms Management: Discussed  Medication Reconciliation Updated: Yes    I understand that a diet low in cholesterol, fat, and sodium is recommended for good health. Unless I have been given specific instructions below for another diet, I accept this instruction as my diet prescription.   Other diet: Regular    Special Instructions: None    · Is patient discharged on Warfarin / Coumadin?   No     Depression / Suicide Risk    As you are discharged from this [facility] facility, it is important to learn how to keep safe from harming yourself.    Recognize the warning signs:  · Abrupt changes in personality, positive or negative- including increase in energy   · Giving away possessions  · Change in eating patterns- significant weight changes-  positive or negative  · Change in sleeping patterns- unable to sleep or sleeping all the time   · Unwillingness or inability to communicate  · Depression  · Unusual sadness, discouragement and loneliness  · Talk of wanting to die  · Neglect of personal appearance   · Rebelliousness- reckless behavior  · Withdrawal from people/activities they love  · Confusion- inability to concentrate     If you or a loved one observes any of these behaviors or has concerns about self-harm, here's what you can do:  · Talk about it- your feelings and reasons for harming yourself  · Remove any means that you might use to hurt yourself (examples: pills, rope, extension cords, firearm)  · Get professional help from the community (Mental Health, Substance Abuse, psychological  counseling)  · Do not be alone:Call your Safe Contact- someone whom you trust who will be there for you.  · Call your local CRISIS HOTLINE 098-0562 or 769-256-1805  · Call your local Children's Mobile Crisis Response Team [place] (484) 770-6410 or www.[employer]  · Call the toll free National Suicide Prevention Hotlines   · National Suicide Prevention Lifeline 992-658-TRVT (3899)  · National Hope Line Network 800-SUICIDE (643-4543)

## 2020-09-30 NOTE — ED NOTES
Pt now c/o diarrhea. Pt incontinent of stool x 1, pt cleaned, linen and gown changed. erp made aware.

## 2020-09-30 NOTE — ED TRIAGE NOTES
Chief Complaint   Patient presents with   • Abdominal Pain     pt bib ems from [facility] c/o mid abdominal pain for a while worse last night. pt has hx of chronic abdominal pain. was seen here sunday and diagnosed w/pancreatitis. denies etoh abuse. pt crying upon arrival.     Taking tylenol for pain w/o relief. Call light within reach. Instructed to call staff for any assistance.

## 2020-09-30 NOTE — ED NOTES
Discharge instructions given to pt including follow up with GI specialist and pcp or returning if no improvement of symptoms or to return if worse. instructed t take otc anti-acid as instructed by erp. Questions answered by RN. Denies any new complaints. Discharged w/stable vitals and able to wheeled to the lobby by RN. Pt assisted in calling a cab.

## 2020-09-30 NOTE — ED PROVIDER NOTES
ED Provider Note    CHIEF COMPLAINT  Chief Complaint   Patient presents with   • Abdominal Pain     pt bib ems from [facility] c/o mid abdominal pain for a while worse last night. pt has hx of chronic abdominal pain. was seen here  and diagnosed w/pancreatitis. denies etoh abuse. pt crying upon arrival.         HPI  Gayla Escudero is a 49 y.o. female who presents with abdominal pain.  This is a chronic recurrent problem.  Pain is located in the epigastrium.  Radiates to the back.  Aching character.  Started 3 days ago.  Gradually worsening.  Constant without modifying factors.  Nothing in particular brought this on made it better or worse.  She states she has abdominal pain all the time and this is similar to that.  She has not had a fever or chills.  No dysuria hematuria.  No chest pain or shortness of breath.  No cough.  No sore throat.  She has had normal bowel movements.    REVIEW OF SYSTEMS  As above  All other systems are negative.     PAST MEDICAL HISTORY  Past Medical History:   Diagnosis Date   • Anemia    • Cataract    • Heart murmur    • Muscular disease    • Muscular dystrophy (HCC)    • JOSÉ on CPAP        FAMILY HISTORY  Family History   Problem Relation Age of Onset   • No Known Problems Mother    • No Known Problems Father    • Sleep Apnea Daughter    • No Known Problems Sister    • No Known Problems Brother         Musc dys also   • No Known Problems Sister    • No Known Problems Brother         Trauma, was shot   • No Known Problems Brother         Car accident   • Other Son         Muscular dystrophy   • Heart Disease Daughter          at 2 months congenital heart disease       SOCIAL HISTORY  Social History     Tobacco Use   • Smoking status: Never Smoker   • Smokeless tobacco: Never Used   Substance Use Topics   • Alcohol use: No   • Drug use: No       SURGICAL HISTORY  Past Surgical History:   Procedure Laterality Date   • HYSTERECTOMY LAPAROSCOPY         CURRENT MEDICATIONS  Home  "Medications     Reviewed by Samantha Lindsay R.N. (Registered Nurse) on 09/30/20 at 0756  Med List Status: <None>   Medication Last Dose Status   acetaminophen (TYLENOL) 160 MG chewable tablet  Active   omeprazole (PRILOSEC) 20 MG delayed-release capsule  Active   ondansetron (ZOFRAN ODT) 4 MG TABLET DISPERSIBLE  Active   polyethylene glycol/lytes (MIRALAX) 17 g Pack  Active   sumatriptan (IMITREX) 20 MG/ACT nasal spray  Active                ALLERGIES  Allergies   Allergen Reactions   • Codeine Vomiting, Nausea and Unspecified     N&V  Dizzy, lightheaded and vomiting.    • Tramadol Unspecified     Effects her MD  weak       PHYSICAL EXAM  VITAL SIGNS: /75   Pulse (!) 103   Temp 36.4 °C (97.6 °F) (Temporal)   Resp 20   Ht 1.549 m (5' 1\")   Wt 40.8 kg (90 lb)   SpO2 97%   BMI 17.01 kg/m²   Constitutional: Awake and alert  HENT: Mildly dry mucous membranes  Eyes: Sclera white  Neck: Normal range of motion  Cardiovascular: Tachycardic heart rate, Normal rhythm  Thorax & Lungs: Normal breath sounds, No respiratory distress, No wheezing, No chest tenderness.   Abdomen: Mild tenderness to palpation in the central epigastrium.  No rebound or peritonitis.  Skin: No rash.   Back: No tenderness, No CVA tenderness.   Extremities: Intact, symmetric distal pulses, no edema.  Neurologic: Grossly normal    RADIOLOGY/PROCEDURES  DX-ABDOMEN COMPLETE WITH AP OR PA CXR   Final Result         1.  Unremarkable three way abdominal series.      US-RUQ   Final Result      1.  Unremarkable right upper quadrant ultrasound.         Imaging is interpreted by radiologist    Labs:   Results for orders placed or performed during the hospital encounter of 09/30/20   CBC WITH DIFFERENTIAL   Result Value Ref Range    WBC 4.4 (L) 4.8 - 10.8 K/uL    RBC 4.51 4.20 - 5.40 M/uL    Hemoglobin 13.4 12.0 - 16.0 g/dL    Hematocrit 41.4 37.0 - 47.0 %    MCV 91.8 81.4 - 97.8 fL    MCH 29.7 27.0 - 33.0 pg    MCHC 32.4 (L) 33.6 - 35.0 g/dL    RDW 44.7 " 35.9 - 50.0 fL    Platelet Count 283 164 - 446 K/uL    MPV 9.5 9.0 - 12.9 fL    Neutrophils-Polys 51.80 44.00 - 72.00 %    Lymphocytes 37.90 22.00 - 41.00 %    Monocytes 7.30 0.00 - 13.40 %    Eosinophils 1.60 0.00 - 6.90 %    Basophils 0.90 0.00 - 1.80 %    Immature Granulocytes 0.50 0.00 - 0.90 %    Nucleated RBC 0.00 /100 WBC    Neutrophils (Absolute) 2.27 2.00 - 7.15 K/uL    Lymphs (Absolute) 1.66 1.00 - 4.80 K/uL    Monos (Absolute) 0.32 0.00 - 0.85 K/uL    Eos (Absolute) 0.07 0.00 - 0.51 K/uL    Baso (Absolute) 0.04 0.00 - 0.12 K/uL    Immature Granulocytes (abs) 0.02 0.00 - 0.11 K/uL    NRBC (Absolute) 0.00 K/uL   COMP METABOLIC PANEL   Result Value Ref Range    Sodium 144 135 - 145 mmol/L    Potassium 4.7 3.6 - 5.5 mmol/L    Chloride 107 96 - 112 mmol/L    Co2 25 20 - 33 mmol/L    Anion Gap 12.0 7.0 - 16.0    Glucose 81 65 - 99 mg/dL    Bun 6 (L) 8 - 22 mg/dL    Creatinine 0.59 0.50 - 1.40 mg/dL    Calcium 9.6 8.5 - 10.5 mg/dL    AST(SGOT) 21 12 - 45 U/L    ALT(SGPT) 9 2 - 50 U/L    Alkaline Phosphatase 42 30 - 99 U/L    Total Bilirubin 0.5 0.1 - 1.5 mg/dL    Albumin 4.0 3.2 - 4.9 g/dL    Total Protein 7.0 6.0 - 8.2 g/dL    Globulin 3.0 1.9 - 3.5 g/dL    A-G Ratio 1.3 g/dL   LIPASE   Result Value Ref Range    Lipase 22 11 - 82 U/L   ESTIMATED GFR   Result Value Ref Range    GFR If African American >60 >60 mL/min/1.73 m 2    GFR If Non African American >60 >60 mL/min/1.73 m 2       Medications   NS infusion 1,000 mL (has no administration in time range)   fentaNYL (SUBLIMAZE) injection 50 mcg (has no administration in time range)   ondansetron (ZOFRAN) syringe/vial injection 4 mg (has no administration in time range)       Hydration: Patient was given IV fluids because of possible dehydration.  Oral fluids were not appropriate because of a possible surgical problem.  On recheck the patient was improved    COURSE & MEDICAL DECISION MAKING  Patient presents with epigastric abdominal pain.  Her vital signs are  stable.  No chest pain shortness of breath or cardiopulmonary symptoms.  She is felt nauseous but has not been vomiting.  She has no lower abdominal tenderness or urinary symptoms.  Differential includes: cholelithiasis, cholecystitis, choledocholithiasis, cholangitis, pancreatitis, gastritis, hepatitis, hepatic abscess, portal vein thrombosis, mesenteric ischemia, AAA, bowel obstruction, appendicitis, diverticulitis.  Patient was treated with a dose of fentanyl.  Also given a GI cocktail and IV fluids.  Laboratory data was collected.  She had a fairly recent CT scan of her abdomen and pelvis that was unremarkable.  She is has had several CT scans of her abdomen and pelvis.  X-ray and right upper quadrant ultrasound was ordered.    Data returned as above.  She does not have leukocytosis or left shift.  CMP was unremarkable.  Lipase is normal.  Patient had persistent pain.  Nonsurgical abdominal exam.  She stopped able to tolerate orals and has not been vomiting.  She has discontinued her Prilosec and at this point I have advised that she reinitiated.  She reports she has seen gastroenterology in the past and they were not helpful.  I have asked her to revisit this.  Prior to her discharge she had an episode of diarrhea.  No hematochezia or melena.  She at that time reported that she had diarrhea yesterday as well.  Perhaps this is a source of her symptoms.  She should see her doctor as soon as possible for recheck.  I precautioned her to return to the ER for any fever, localized pain, not improving or concern.    FINAL IMPRESSION  1.  Epigastric abdominal pain  2.  Diarrhea        This dictation was created using voice recognition software. The accuracy of the dictation is limited to the abilities of the software.  The nursing notes were reviewed and certain aspects of this information were incorporated into this note.    Electronically signed by: Nathaniel Almazan M.D., 9/30/2020 8:32 AM

## 2020-10-12 NOTE — ED PROVIDER NOTES
ED Provider Note    Scribed for Kimberly Puente M.D. by Gaudencio Neff. 10/11/2020, 9:46 PM.    Primary care provider: Adithya Martinez P.A.-C.  Means of arrival: Wheel chair  History obtained from: Patient's   History limited by: None    CHIEF COMPLAINT  Chief Complaint   Patient presents with   • Abdominal Pain     PT reports ABD pain starting yesterday and getting worse. PT reports no N/V but has Diarrhea over past 24 hours.       HPI  Gayla Escudero is a 49 y.o. female who presents to the Emergency Department for recurring, moderate abdominal pain onset yesterday. Patient's  reports that she has been seen here several times for her abdominal pain and was previously admitted due to the pain and diagnosed with pancreatitis. Her current episode started yesterday and been worsening since. She has associated diarrhea but denies any vomiting or fever. Patient took Tylenol for her pain but it has not helped. She has a history including myotonic muscular dystrophy and hysterectomy. Patient still has her gallbladder and appendix.    REVIEW OF SYSTEMS  Review of Systems   Constitutional: Negative for fever.   Gastrointestinal: Positive for abdominal pain and diarrhea. Negative for vomiting.   All other systems reviewed and are negative.    PAST MEDICAL HISTORY   has a past medical history of Anemia, Cataract, Heart murmur, Muscular disease, Muscular dystrophy (HCC), and JOSÉ on CPAP.    SURGICAL HISTORY   has a past surgical history that includes hysterectomy laparoscopy.    SOCIAL HISTORY  Social History     Tobacco Use   • Smoking status: Never Smoker   • Smokeless tobacco: Never Used   Substance Use Topics   • Alcohol use: No   • Drug use: No      Social History     Substance and Sexual Activity   Drug Use No       FAMILY HISTORY  Family History   Problem Relation Age of Onset   • No Known Problems Mother    • No Known Problems Father    • Sleep Apnea Daughter    • No Known Problems Sister    • No  "Known Problems Brother         Musc dys also   • No Known Problems Sister    • No Known Problems Brother         Trauma, was shot   • No Known Problems Brother         Car accident   • Other Son         Muscular dystrophy   • Heart Disease Daughter          at 2 months congenital heart disease       CURRENT MEDICATIONS  Current Outpatient Medications   Medication Instructions   • acetaminophen (TYLENOL) 480 mg, Oral, EVERY 4 HOURS PRN   • omeprazole (PRILOSEC) 20 mg, Oral, DAILY   • ondansetron (ZOFRAN ODT) 4 mg, Oral, EVERY 4 HOURS PRN   • polyethylene glycol/lytes (MIRALAX) 17 g Pack 1 Packet, Oral, 2 TIMES DAILY   • sumatriptan (IMITREX) 20 mg, Nasal, PRN        ALLERGIES  Allergies   Allergen Reactions   • Codeine Vomiting, Nausea and Unspecified     N&V  Dizzy, lightheaded and vomiting.    • Tramadol Unspecified     Effects her MD  weak       PHYSICAL EXAM  VITAL SIGNS: /77   Pulse (!) 105   Temp 36.3 °C (97.3 °F) (Oral)   Resp 18   Ht 1.549 m (5' 1\")   Wt 40.8 kg (89 lb 15.2 oz)   SpO2 100%   BMI 17.00 kg/m²   Vitals reviewed by myself.  Physical Exam  Nursing note and vitals reviewed.  Constitutional: Well-developed and well-nourished.   HENT: Head is normocephalic and atraumatic.  Eyes: extra-ocular movements intact  Cardiovascular: Tachycardic rate and regular rhythm. No murmur heard.  Pulmonary/Chest: Breath sounds normal. No wheezes or rales.   Abdominal: Periumbilical tenderness to palpation. Soft. No distention.    Pelvic: no evidence of infection, no Bartholin's gland abscess  Musculoskeletal: Extremities exhibit normal range of motion without edema or tenderness.   Neurological: Awake and alert  Skin: Skin is warm and dry. No rash.       DIAGNOSTIC STUDIES  LABS  Labs Reviewed   CBC WITH DIFFERENTIAL - Abnormal; Notable for the following components:       Result Value    MCHC 31.4 (*)     All other components within normal limits   COMP METABOLIC PANEL - Abnormal; Notable for the " following components:    Glucose 104 (*)     Bun 5 (*)     All other components within normal limits   URINALYSIS,CULTURE IF INDICATED - Abnormal; Notable for the following components:    Leukocyte Esterase Small (*)     All other components within normal limits   URINE MICROSCOPIC (W/UA) - Abnormal; Notable for the following components:    RBC 2-5 (*)     Bacteria Moderate (*)     All other components within normal limits   LIPASE   HCG QUAL SERUM   ESTIMATED GFR     All labs reviewed by me.      RADIOLOGY  CT-ABDOMEN-PELVIS WITH   Final Result      1.  Mild RIGHT hydroureteronephrosis. No obstructing lesion visualized.   2.  9 mm cystic lesion in the lower vagina, similar to prior.   3.  Prior hysterectomy                 The radiologist's interpretation of all radiological studies have been reviewed by me.    REASSESSMENT    9:46 PM - Patient seen and examined at bedside. Discussed plan of care, including ordering labs/imaging and treating her symptoms with medications. Patient agrees to the plan of care. \    12:18 AM - Patient updated on results. Return precautions were discussed with the patient, and they were cleared for discharge at this time. Patient was understanding and agreeable to discharge.    HYDRATION: Based on the patient's presentation of Tachycardia the patient was given IV fluids. IV Hydration was used because oral hydration was not adequate alone. Upon recheck following hydration, the patient was feeling improved.     COURSE & MEDICAL DECISION MAKING  Nursing notes, VS, PMSFHx reviewed in chart.    Patient is a 49-year-old female who comes in for evaluation of abdominal pain.  Differential diagnosis includes gastroenteritis, gastritis, pancreatitis, gastroparesis.  Diagnostic work-up includes labs and CT of the abdomen.    Patient's initial vitals notable for slight tachycardia.  This is likely related to pain and dehydration.  Patient was treated with IV fluids and morphine after which she  feels improved.  Labs returned and are unremarkable.  Lipase is within normal limits ruling out pancreatitis.  Urinalysis demonstrates no signs of infection.  CT of the abdomen returns and demonstrates mild right hydroureteronephrosis with no obstructing lesion, etiology of this is unclear.  Patient is also noted to have a 9 mm cystic lesion in the lower vagina similar to prior, I performed a pelvic exam and saw no evidence of Bartholin's gland abscess or large cyst that would be the cause of patient's pain.  Therefore patient and  are reassured and advised to follow-up with PCP and gastroenterologist.  She has follow-up with her PCP in 2 days.  In the meantime I will treat her with Norco at home for management of her ongoing pain as she has failed outpatient treatment with Tylenol and Motrin.  Patient is given strict return precautions and discharged in stable condition.    The patient will return for new or worsening symptoms and is stable at the time of discharge.    The patient is referred to a primary physician for blood pressure management, diabetic screening, and for all other preventative health concerns.    In prescribing controlled substances to this patient, I certify that I have obtained and reviewed the medical history of Gayla Escudero. I have also made a good hitesh effort to obtain applicable records from other providers who have treated the patient and records demonstrating the following: she had a prior presciption on 10/1/20 for 100 mL (3 days worth).  No obvious evidence of history of abuse of narcotics.    I have conducted a physical exam and documented it. I have reviewed Ms. Escudero’s prescription history as maintained by the [place] Prescription Monitoring Program.     I have assessed the patient’s risk for abuse, dependency, and addiction using the validated Opioid Risk Tool available at https://www.mdcalc.com/xsxcnr-qful-xkbx-ort-narcotic-abuse.     Given the above, I believe the  benefits of controlled substance therapy outweigh the risks. The reasons for prescribing controlled substances include non-narcotic, oral analgesic alternatives have been inadequate for pain control. Accordingly, I have discussed the risk and benefits, treatment plan, and alternative therapies with the patient.       DISPOSITION:  Patient will be discharged home in stable condition.    FOLLOW UP:  Adithya Martinez P.A.-C.  [address]  [address]  [facility] [id number] [phone]            OUTPATIENT MEDICATIONS:  New Prescriptions    DICYCLOMINE (BENTYL) 20 MG TAB    Take 1 Tab by mouth every 6 hours as needed (abdominal pain).    HYDROCODONE-ACETAMINOPHEN (NORCO) 5-325 MG TAB PER TABLET    Take 1 Tab by mouth every 6 hours as needed for up to 5 days.       FINAL IMPRESSION  1. Abdominal pain, unspecified abdominal location          Gaudencio VILLEGAS (Scribe), am scribing for, and in the presence of, Kimberly Puente M.D..    Electronically signed by: Gaudecnio Neff (Scribe), 10/11/2020    Kimberly VILLEGAS M.D. personally performed the services described in this documentation, as scribed by Gaudencio Neff in my presence, and it is both accurate and complete. C.    The note accurately reflects work and decisions made by me.  Kimberly Puente M.D.  10/12/2020  6:13 AM

## 2020-10-12 NOTE — DISCHARGE INSTRUCTIONS
You were noted to have an incidental vaginal cyst on your CT, on my exam this does not appear infected, this can be followed up by gynecology if it bothers you or causes pain

## 2020-10-12 NOTE — ED TRIAGE NOTES
"Chief Complaint   Patient presents with   • Abdominal Pain     PT reports ABD pain starting yesterday and getting worse. PT reports no N/V but has Diarrhea over past 24 hours.     Blood Pressure 113/77   Pulse (Abnormal) 105   Temperature 36.3 °C (97.3 °F) (Oral)   Respiration 18   Height 1.549 m (5' 1\")   Weight 40.8 kg (89 lb 15.2 oz)   Oxygen Saturation 100%   Body Mass Index 17.00 kg/m²     "

## 2020-10-12 NOTE — ED NOTES
"Notified Dr. Puente regarding patient complaining of \"difficulty breathing\", respiration rate in mid 30's, breath sounds clear to auscultation. Post fentanyl administration, pain 9/10 in abdomen, received verbal order for 50 mcg of fentanyl IVP.   "

## 2020-10-12 NOTE — ED NOTES
Assisted patient to commode, patient voided.     At bedside with Dr. Puente, ERP performed vaginal exam.

## 2020-10-12 NOTE — ED NOTES
Discharge teaching and paperwork provided. All questions/concerns answered. VSS, assessment stable and PIV removed. Given information regarding prescriptions. Patient discharged to the care of spouse and assisted out of ED via wheelchair.

## 2020-10-15 PROBLEM — R10.9 CHRONIC ABDOMINAL PAIN: Status: ACTIVE | Noted: 2020-01-01

## 2020-10-15 PROBLEM — G89.29 CHRONIC ABDOMINAL PAIN: Status: ACTIVE | Noted: 2020-01-01

## 2020-10-15 NOTE — ASSESSMENT & PLAN NOTE
This is a pleasant 49-year-old female at home in her bed.  Her  is next to her.  's name is Adryan.  She has been having worsening abdominal pain over the past few months.  Has been seen several times at the ED.  Pain has been in the right upper quadrant and the left side in the middle area.  She has been seen by GI.  GI suggested a colonoscopy.  She has had CT scans and ultrasounds.  Last CT scan showed the following:    IMPRESSION:     1.  Mild RIGHT hydroureteronephrosis. No obstructing lesion visualized.  2.  9 mm cystic lesion in the lower vagina, similar to prior.  3.  Prior hysterectomy    Prior to her last ED visit she was diagnosed with acute pancreatitis.  She denies any current nausea vomiting.  She has history of constipation.  Uses MiraLAX.  Is currently also on Bentyl.  Today unfortunately she has had an exacerbation of her symptoms and is crying continually.  She typically has been provided in the past Norco with good relief.  She prefers liquid Norco but also does not want to take high doses of narcotics.  Currently 5 mg divided every 6 hours is effective.

## 2020-10-15 NOTE — PROGRESS NOTES
Subjective:   Gayla Escudero is a 49 y.o. female here today for chronic abdominal pain.    This evaluation was conducted via Zoom using secure and encrypted videoconferencing technology. The patient was in a private location in the [facility].    The patient's identity was confirmed and verbal consent was obtained for this virtual visit.      Chronic abdominal pain  This is a pleasant 49-year-old female at home in her bed.  Her  is next to her.  's name is Adryan.  She has been having worsening abdominal pain over the past few months.  Has been seen several times at the ED.  Pain has been in the right upper quadrant and the left side in the middle area.  She has been seen by GI.  GI suggested a colonoscopy.  She has had CT scans and ultrasounds.  Last CT scan showed the following:    IMPRESSION:     1.  Mild RIGHT hydroureteronephrosis. No obstructing lesion visualized.  2.  9 mm cystic lesion in the lower vagina, similar to prior.  3.  Prior hysterectomy    Prior to her last ED visit she was diagnosed with acute pancreatitis.  She denies any current nausea vomiting.  She has history of constipation.  Uses MiraLAX.  Is currently also on Bentyl.  Today unfortunately she has had an exacerbation of her symptoms and is crying continually.  She typically has been provided in the past Norco with good relief.  She prefers liquid Norco but also does not want to take high doses of narcotics.  Currently 5 mg divided every 6 hours is effective.           Current medicines (including changes today)  Current Outpatient Medications   Medication Sig Dispense Refill   • HYDROcodone-acetaminophen (NORCO) 5-325 MG Tab per tablet Take 1 Tab by mouth every 6 hours as needed for up to 30 days. 120 Tab 0   • dicyclomine (BENTYL) 20 MG Tab Take 1 Tab by mouth every 6 hours as needed (abdominal pain). 60 Tab 0   • omeprazole (PRILOSEC) 20 MG delayed-release capsule Take 1 Cap by mouth every day. 30 Cap 1   •  "ondansetron (ZOFRAN ODT) 4 MG TABLET DISPERSIBLE Take 1 Tab by mouth every four hours as needed for Nausea (give PO if no IV route available). 10 Tab 0   • polyethylene glycol/lytes (MIRALAX) 17 g Pack Take 1 Packet by mouth 2 times a day. 30 Each 1   • acetaminophen (TYLENOL) 160 MG chewable tablet Take 480 mg by mouth every four hours as needed.     • sumatriptan (IMITREX) 20 MG/ACT nasal spray Spray 1 Spray in nose as needed for Migraine. 1 Each 3     No current facility-administered medications for this visit.      She  has a past medical history of Anemia, Cataract, Heart murmur, Muscular disease, Muscular dystrophy (HCC), and JOSÉ on CPAP.    Social History and Family History were reviewed and updated.    ROS   No chest pain, no shortness of breath, no abdominal pain and all other systems were reviewed and are negative.       Objective:     Ht 1.549 m (5' 1\")   Wt 40.4 kg (89 lb)  Body mass index is 16.82 kg/m².   Physical Exam:  Constitutional: Alert, no distress.  Skin: Warm, dry, good turgor, no rashes in visible areas.  Eye: Equal, round and reactive, conjunctiva clear, lids normal.  ENMT: Lips without lesions, good dentition, oropharynx clear.  Neck: Trachea midline, no masses.   Lymph: No cervical or supraclavicular lymphadenopathy  Respiratory: Unlabored respiratory effort, lungs clear to auscultation, no wheezes, no ronchi.  Cardiovascular: Normal S1, S2, no murmur, no edema.  Abdomen: Soft, non-tender, no masses.  Psych: Alert and oriented x3, normal affect and mood.        Assessment and Plan:   The following treatment plan was discussed    1. Chronic abdominal pain  Chronic condition.  Uncontrolled.  Refer to pain management.  Renew Norco as directed.   reviewed.  Ordered MRI of the abdomen.  In future will discuss possible gastric emptying scan.  - REFERRAL TO PAIN MANAGEMENT  - HYDROcodone-acetaminophen (NORCO) 5-325 MG Tab per tablet; Take 1 Tab by mouth every 6 hours as needed for up to 30 " days.  Dispense: 120 Tab; Refill: 0    2. Other chronic pancreatitis (HCC)  Prior condition.  During last ER visit no abnormality in her pancreatic enzymes.  Ordered MRI abdomen.  - [id number]; Future  - HYDROcodone-acetaminophen (NORCO) 5-325 MG Tab per tablet; Take 1 Tab by mouth every 6 hours as needed for up to 30 days.  Dispense: 120 Tab; Refill: 0    Addendum: Decided to order the gastric emptying scan.  Will send message to patient to [employer].      Followup: Return in about 4 weeks (around 11/12/2020), or if symptoms worsen or fail to improve.    Please note that this dictation was created using voice recognition software. I have made every reasonable attempt to correct obvious errors, but I expect that there are errors of grammar and possibly content that I did not discover before finalizing the note.

## 2020-11-11 PROBLEM — M79.601 UPPER EXTREMITY PAIN, DIFFUSE, RIGHT: Status: ACTIVE | Noted: 2020-01-01

## 2020-11-11 PROBLEM — R10.9 ABDOMINAL PAIN: Status: ACTIVE | Noted: 2020-01-01

## 2020-11-11 PROBLEM — M79.604 BILATERAL LOWER EXTREMITY PAIN: Status: ACTIVE | Noted: 2020-01-01

## 2020-11-11 PROBLEM — M79.605 BILATERAL LOWER EXTREMITY PAIN: Status: ACTIVE | Noted: 2020-01-01

## 2020-11-11 NOTE — ED TRIAGE NOTES
Pt was 88% on RA, does not normally wear oxygen, placed on 2L and is now 94%. Pt denies feeling SOB.

## 2020-11-11 NOTE — ED TRIAGE NOTES
Wheeled to triage  Chief Complaint   Patient presents with   • Abdominal Pain     sharp pain, denies n/v/d   • Leg Pain     R leg pain     Pt has a hx of muscular dystrophy, her abs pain is an on going issue, but seems to be getting worse, the leg pain is new. Pt is crying in triage because of the pain. Has been taking norco with no relief.

## 2020-11-12 PROBLEM — E16.2 HYPOGLYCEMIA: Status: ACTIVE | Noted: 2020-01-01

## 2020-11-12 PROBLEM — R41.82 ALTERED MENTAL STATUS: Status: ACTIVE | Noted: 2020-01-01

## 2020-11-12 NOTE — ASSESSMENT & PLAN NOTE
-ongoing lethargy. Rouses easily.   -impulsive at times resulting in falls  -She continues to have hallucinations which she states are bad dreams   -Sleep hygiene  -1:1 sitter

## 2020-11-12 NOTE — ASSESSMENT & PLAN NOTE
-Diagnosed at age 16  -History of chronic abdominal pain with no clear explained etiology prompting multiple ED visits   -Most likely contributing to chronic abdominal pain as well as onset of of extremity symptoms  -Comfort Care

## 2020-11-12 NOTE — PROGRESS NOTES
D50W administered; pt more alert.   Recheck is now 264.   Fluids running as ordered.  Pt voided using bedpan.   Is now A&Ox4.

## 2020-11-12 NOTE — ED NOTES
Spoke with admitting MD, updated on Pt condition. Order change from IV K+ to oral. Pt updated on need for PIV in order to receive pain medications.

## 2020-11-12 NOTE — DISCHARGE PLANNING
Anticipated Discharge Disposition: TBD    Action: Patient discussed during IDT rounds. Per beside RN, patient is disoriented and drowsy this morning.     RN JAMIL attempted to call spouse Adryan, [phone] to obtain CTT assessment information but was unable to reach    Barriers to Discharge: Medical clearance     Plan: Complete CTT assessment once patient is responsive and/or follow up with patient's spouse

## 2020-11-12 NOTE — ED PROVIDER NOTES
ED Provider Note    CHIEF COMPLAINT  Chief Complaint   Patient presents with   • Abdominal Pain     sharp pain, denies n/v/d   • Leg Pain     R leg pain       HPI  Gayla Escudero is a 49 y.o. female who presents to the emergency department complaining of abdominal pain, leg pain, back pain and right arm pain.  The patient does have a history of chronic abdominal pain and other chronic pain.  She has been treated with pain medicine at home but has become ineffective.    Comes in today with abdominal pain.  Abdominal pain is diffuse generalized pain throughout her abdomen.  It is occasionally sharp.  Nothing specifically makes it better or worse.  She has no associated nausea or vomiting or diarrhea.  She denies any fevers or chills.  She denies any dysuria materia dysuria frequency. the patient has also been having her right arm and both legs.  Starts in a bandlike distribution of her abdomen goes down her legs.  She has some vague intermittent numbness as well.  There is no falls.  There is no trauma.  There is no tearing pain in her chest or back.  There is no fevers or chills.  This is different than her report.  She denies any Covid exposure and she has any other acute concerns or complaints.    Patient also fell out of her wheelchair on the way back and hit her head.  She has pain in her forehead.  Pain her leg and has been present before the fall.    REVIEW OF SYSTEMS  See HPI for further details. All other systems are negative.    PAST MEDICAL HISTORY  Past Medical History:   Diagnosis Date   • Anemia    • Cataract    • Heart murmur    • Muscular disease    • Muscular dystrophy (HCC)    • JOSÉ on CPAP        FAMILY HISTORY  Family History   Problem Relation Age of Onset   • No Known Problems Mother    • No Known Problems Father    • Sleep Apnea Daughter    • No Known Problems Sister    • No Known Problems Brother         Musc dys also   • No Known Problems Sister    • No Known Problems Brother          Trauma, was shot   • No Known Problems Brother         Car accident   • Other Son         Muscular dystrophy   • Heart Disease Daughter          at 2 months congenital heart disease       SOCIAL HISTORY  Social History     Socioeconomic History   • Marital status:      Spouse name: Not on file   • Number of children: Not on file   • Years of education: Not on file   • Highest education level: Not on file   Occupational History   • Not on file   Social Needs   • Financial resource strain: Not on file   • Food insecurity     Worry: Not on file     Inability: Not on file   • Transportation needs     Medical: Not on file     Non-medical: Not on file   Tobacco Use   • Smoking status: Never Smoker   • Smokeless tobacco: Never Used   Substance and Sexual Activity   • Alcohol use: No   • Drug use: No   • Sexual activity: Yes     Partners: Male   Lifestyle   • Physical activity     Days per week: Not on file     Minutes per session: Not on file   • Stress: Not on file   Relationships   • Social connections     Talks on phone: Not on file     Gets together: Not on file     Attends Jainism service: Not on file     Active member of club or organization: Not on file     Attends meetings of clubs or organizations: Not on file     Relationship status: Not on file   • Intimate partner violence     Fear of current or ex partner: Not on file     Emotionally abused: Not on file     Physically abused: Not on file     Forced sexual activity: Not on file   Other Topics Concern   • Not on file   Social History Narrative   • Not on file       SURGICAL HISTORY  Past Surgical History:   Procedure Laterality Date   • HYSTERECTOMY LAPAROSCOPY         CURRENT MEDICATIONS  Home Medications     Reviewed by Estevan Juarez (Pharmacy Tech) on 20 at 2019  Med List Status: Complete   Medication Last Dose Status   dicyclomine (BENTYL) 20 MG Tab 11/10/2020 Active   HYDROcodone-acetaminophen (NORCO) 5-325 MG Tab per tablet  "11/11/2020 Active   magnesium hydroxide (MILK OF MAGNESIA) 400 MG/5ML Suspension 11/10/2020 Active   omeprazole (PRILOSEC) 20 MG delayed-release capsule 11/10/2020 Active   ondansetron (ZOFRAN ODT) 4 MG TABLET DISPERSIBLE Not Taking Active   polyethylene glycol/lytes (MIRALAX) 17 g Pack Not Taking Active   sumatriptan (IMITREX) 20 MG/ACT nasal spray Not Taking Active                ALLERGIES  Allergies   Allergen Reactions   • Codeine Vomiting, Nausea and Unspecified     N&V  Dizzy, lightheaded and vomiting.    • Tramadol Unspecified     Effects her MD  weak       PHYSICAL EXAM  VITAL SIGNS: /74   Pulse 96   Temp 36.3 °C (97.3 °F) (Temporal)   Resp (!) 25   Ht 1.549 m (5' 1\")   Wt 40.8 kg (90 lb)   SpO2 95%   BMI 17.01 kg/m²    Constitutional: Awake alert chronic ill-appearing appears uncomfortable no acute cardiopulmonary distress  HENT: Normocephalic, Atraumatic, Bilateral external ears normal,  Eyes: PERRL, EOMI, Conjunctiva normal, No discharge.   Neck: Normal range of motion,  Cardiovascular: Normal heart rate, Normal rhythm, No murmurs  Thorax & Lungs: Normal breath sounds, No respiratory distress  Abdomen: Soft diffusely tender without peritonitis.  Skin: Warm, Dry, No erythema, No rash.   Musculoskeletal: Good range of motion in all major joints good pulses in all extremities.  Atrophy is noted in all extremities.  Neurologic: Alert, at baseline per family no focal deficits noted.   Psychiatric: Affect anxious    Results for orders placed or performed during the hospital encounter of 11/11/20   CBC WITH DIFFERENTIAL   Result Value Ref Range    WBC 6.2 4.8 - 10.8 K/uL    RBC 4.72 4.20 - 5.40 M/uL    Hemoglobin 13.6 12.0 - 16.0 g/dL    Hematocrit 44.1 37.0 - 47.0 %    MCV 93.4 81.4 - 97.8 fL    MCH 28.8 27.0 - 33.0 pg    MCHC 30.8 (L) 33.6 - 35.0 g/dL    RDW 48.2 35.9 - 50.0 fL    Platelet Count 316 164 - 446 K/uL    MPV 10.2 9.0 - 12.9 fL    Neutrophils-Polys 59.10 44.00 - 72.00 %    Lymphocytes " 33.70 22.00 - 41.00 %    Monocytes 5.80 0.00 - 13.40 %    Eosinophils 0.50 0.00 - 6.90 %    Basophils 0.60 0.00 - 1.80 %    Immature Granulocytes 0.30 0.00 - 0.90 %    Nucleated RBC 0.00 /100 WBC    Neutrophils (Absolute) 3.66 2.00 - 7.15 K/uL    Lymphs (Absolute) 2.09 1.00 - 4.80 K/uL    Monos (Absolute) 0.36 0.00 - 0.85 K/uL    Eos (Absolute) 0.03 0.00 - 0.51 K/uL    Baso (Absolute) 0.04 0.00 - 0.12 K/uL    Immature Granulocytes (abs) 0.02 0.00 - 0.11 K/uL    NRBC (Absolute) 0.00 K/uL   COMP METABOLIC PANEL   Result Value Ref Range    Sodium 139 135 - 145 mmol/L    Potassium 4.5 3.6 - 5.5 mmol/L    Chloride 98 96 - 112 mmol/L    Co2 26 20 - 33 mmol/L    Anion Gap 15.0 7.0 - 16.0    Glucose 73 65 - 99 mg/dL    Bun 4 (L) 8 - 22 mg/dL    Creatinine 0.40 (L) 0.50 - 1.40 mg/dL    Calcium 10.1 8.5 - 10.5 mg/dL    AST(SGOT) 22 12 - 45 U/L    ALT(SGPT) 7 2 - 50 U/L    Alkaline Phosphatase 56 30 - 99 U/L    Total Bilirubin 0.4 0.1 - 1.5 mg/dL    Albumin 4.1 3.2 - 4.9 g/dL    Total Protein 7.4 6.0 - 8.2 g/dL    Globulin 3.3 1.9 - 3.5 g/dL    A-G Ratio 1.2 g/dL   LIPASE   Result Value Ref Range    Lipase 15 11 - 82 U/L   URINALYSIS,CULTURE IF INDICATED    Specimen: Urine   Result Value Ref Range    Color Yellow     Character Cloudy (A)     Specific Gravity 1.025 <1.035    Ph 5.5 5.0 - 8.0    Glucose Negative Negative mg/dL    Ketones 15 (A) Negative mg/dL    Protein Negative Negative mg/dL    Bilirubin Negative Negative    Urobilinogen, Urine 0.2 Negative    Nitrite Negative Negative    Leukocyte Esterase Trace (A) Negative    Occult Blood Negative Negative    Micro Urine Req Microscopic    ESTIMATED GFR   Result Value Ref Range    GFR If African American >60 >60 mL/min/1.73 m 2    GFR If Non African American >60 >60 mL/min/1.73 m 2   URINE MICROSCOPIC (W/UA)   Result Value Ref Range    WBC 2-5 /hpf    RBC 0-2 /hpf    Bacteria Few (A) None /hpf    Epithelial Cells Moderate (A) /hpf    Hyaline Cast 3-5 (A) /lpf   CBC with  Differential   Result Value Ref Range    WBC 6.2 4.8 - 10.8 K/uL    RBC 4.18 (L) 4.20 - 5.40 M/uL    Hemoglobin 12.2 12.0 - 16.0 g/dL    Hematocrit 38.7 37.0 - 47.0 %    MCV 92.6 81.4 - 97.8 fL    MCH 29.2 27.0 - 33.0 pg    MCHC 31.5 (L) 33.6 - 35.0 g/dL    RDW 47.2 35.9 - 50.0 fL    Platelet Count 260 164 - 446 K/uL    MPV 10.1 9.0 - 12.9 fL    Neutrophils-Polys 61.90 44.00 - 72.00 %    Lymphocytes 27.80 22.00 - 41.00 %    Monocytes 8.90 0.00 - 13.40 %    Eosinophils 0.30 0.00 - 6.90 %    Basophils 0.50 0.00 - 1.80 %    Immature Granulocytes 0.60 0.00 - 0.90 %    Nucleated RBC 0.00 /100 WBC    Neutrophils (Absolute) 3.83 2.00 - 7.15 K/uL    Lymphs (Absolute) 1.72 1.00 - 4.80 K/uL    Monos (Absolute) 0.55 0.00 - 0.85 K/uL    Eos (Absolute) 0.02 0.00 - 0.51 K/uL    Baso (Absolute) 0.03 0.00 - 0.12 K/uL    Immature Granulocytes (abs) 0.04 0.00 - 0.11 K/uL    NRBC (Absolute) 0.00 K/uL   Comp Metabolic Panel (CMP)   Result Value Ref Range    Sodium 139 135 - 145 mmol/L    Potassium 3.6 3.6 - 5.5 mmol/L    Chloride 97 96 - 112 mmol/L    Co2 27 20 - 33 mmol/L    Anion Gap 15.0 7.0 - 16.0    Glucose 62 (L) 65 - 99 mg/dL    Bun 4 (L) 8 - 22 mg/dL    Creatinine 0.34 (L) 0.50 - 1.40 mg/dL    Calcium 9.5 8.5 - 10.5 mg/dL    AST(SGOT) 17 12 - 45 U/L    ALT(SGPT) <5 2 - 50 U/L    Alkaline Phosphatase 48 30 - 99 U/L    Total Bilirubin 0.3 0.1 - 1.5 mg/dL    Albumin 3.5 3.2 - 4.9 g/dL    Total Protein 6.4 6.0 - 8.2 g/dL    Globulin 2.9 1.9 - 3.5 g/dL    A-G Ratio 1.2 g/dL   LACTIC ACID   Result Value Ref Range    Lactic Acid 1.0 0.5 - 2.0 mmol/L   CRP QUANTITIVE (NON-CARDIAC)   Result Value Ref Range    Stat C-Reactive Protein 0.32 0.00 - 0.75 mg/dL   ESTIMATED GFR   Result Value Ref Range    GFR If African American >60 >60 mL/min/1.73 m 2    GFR If Non African American >60 >60 mL/min/1.73 m 2   EKG   Result Value Ref Range    Report       [facility] Emergency Dept.    Test Date:  2020-11-11  Pt Name:    ANIBAL  [place]                  Department:   MRN:        [id number]                      Room:        18  Gender:     Female                       Technician: [id number]  :        1970                   Requested By:BROWN MITCHELL  Order #:    [id number]                    Reading MD: BROWN MITCHELL. [facility]    Measurements  Intervals                                Axis  Rate:       121                          P:          0  CO:         153                          QRS:        -11  QRSD:       73                           T:          -46  QT:         356  QTc:        505    Interpretive Statements  Sinus tachycardia  Consider right atrial enlargement  Abnormal T, consider ischemia, anterior leads  Baseline wander in lead(s) V6  Compared to ECG 2018 13:39:53  T wave inversions are not significantly different  Sinus rhythm no longer present  T-wave abnormality still present  Electronically Signed  On 2020 0:35:00 PST by BROWN MITCHELL. [facility]        RADIOLOGY/PROCEDURES  CT-ABDOMEN-PELVIS WITH   Final Result      No evidence of acute intra-abdominal or pelvic process.      Probably duplicated right renal collecting system.      CT-HEAD W/O   Final Result      1.  Focal soft tissue swelling right frontal region with minimal underlying increased density adjacent to the periphery of the outer table of the calvarium which could represent a small cephalohematoma.      2.  Periventricular and subcortical white matter density changes which could be related to small vessel ischemia, demyelinization or gliosis. Findings may be slightly more prominent than on previous exam.            COURSE & MEDICAL DECISION MAKING  Pertinent Labs & Imaging studies reviewed. (See chart for details)    The patient presents emergency department for evaluation of abdominal pain, head pain, and body pain.    This pain has been present for some time and pain is worsening.    A broad differential diagnosis was considered for this pain  including but not limited to constipation, bowel perforation, UTI, chronic pain, colitis.    The patient fell and hit her head into the head CT this is normal.  The patient has no midline neck tenderness I do not think she requires a neck CT.  Her back is nontender, her lungs are clear.    Is diffusely tender she is worked up for abdominal pain her CT and labs are reassuring.  No history of falls or trauma in her spine to suggest a spinal etiology to her discomfort.  The patient requires further work-up and treatment.  Her pain is poorly controlled.  Discussed the case with the hospitalist and the patient be hospitalized for further work-up and treatment.    The patient has been hospitalized multiple times and seen multiple times in the past for abdominal pain.  This is her third visit in the last couple of months.  The patient will be hospitalized for further work-up and treatment care transferred to the  hospitalist at this time.    FINAL IMPRESSION  1. Generalized abdominal pain     2. Closed head injury, initial encounter     3. Other chronic pain         2.   3.         Electronically signed by: Anders Trujillo M.D., 11/11/2020 10:56 PM

## 2020-11-12 NOTE — PROGRESS NOTES
"Left PIVs, fluids running as ordered.  Goose egg to rt forehead.   Pt denies pain at at this time, but able to state \"ouch\" when labs drawn at bedside, but still very drowsy.    Placed on 1 L NC, o2 now at 99%. Heart rate has been steady in the 80's.  on.   Rapid MCKAY Hutson on floor, assisted with pt.    "

## 2020-11-12 NOTE — DIETARY
"Nutrition services: Day 0 of admit.  Gayla Escudero is a 49 y.o. female with admitting DX of abdominal pain.  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  Consult received for MST of 2.  Attempted to visit with patient this morning however she was busy with RN and appeared to be somewhat confused at that time.  Current A/O x 4 per chart.  I attempted to visit with patient again this afternoon however she was sound asleep and wound not wake.  RN reports she had a busy morning - will attempt interview at another time.      Assessment:  Height: 154.9 cm (5' 1\")  Weight: 40.8 kg (90 lb)  Body mass index is 17.01 kg/m²., BMI classification: considered underweight.  Diet/Intake: Regular diet; per RN, < 50% of lunch consumed.    Weight history per chart:  2017: 100 pounds  2/19/20: 100 pounds  9/13/20: 88.85 pounds  10/11/20: 89.95 pounds    Evaluation:   1. Per chart review it appears that patient previously lost ~ 10 pounds but that in the last 2 months her weight has been relatively stable.   2. POC glucose today was 45 - D5LR @ 50 ml/hr currently running.   3. Meds and labs reviewed.    Malnutrition Risk: Unable to determine at this time.     Recommendations/Plan:  1. Encourage intake of meals and snacks.  2. Document intake of all PO  as % taken in ADL's to provide interdisciplinary communication across all shifts.   3. Monitor weight.  4. Nutrition rep will continue to see patient for ongoing meal and snack preferences.     RD monitoring.         "

## 2020-11-12 NOTE — ASSESSMENT & PLAN NOTE
-Chronic, stable  -History of pancreatitis  -Contributing to her poor p.o. intake  -Having bowel movements  -Reports pain controlled.   - Patient tends to refuse to eat then at other times gorges herself causing pain and emesis from overeating.

## 2020-11-12 NOTE — ED NOTES
Med rec complete via interview with family member at bedside. Allergies reviewed. Family member denies antibiotic use in past 14 days.

## 2020-11-12 NOTE — PROGRESS NOTES
Pt still sleeping.  RN into pt room to awaken as  Adryan on ph and wanted update.   Pt is drowsy and not answering questions appropriately, she keeps repeating her first name. When asked where she is stated the month.   Physician updated. Heart rate irregular, bouncing from 43's-80's. 92% on RA, resp 10/min.

## 2020-11-12 NOTE — ED NOTES
Assist Pt onto bedpan, Pt urinated. Pt C?O pain at IV site from potassium, decreased rate to 50 mL/hr.

## 2020-11-12 NOTE — PROGRESS NOTES
Received report from NOC, assumed care of pt 0700.  Pt sleeping, noted rise and fall of chest. She is not cooperative at this time with assessment. Ignored this RN when in room.  Will let rest at this time and re-assess.

## 2020-11-12 NOTE — CARE PLAN
Problem: Nutritional:  Goal: Achieve adequate nutritional intake  Description: Patient will consume >50-75% of meals  Outcome: NOT MET

## 2020-11-12 NOTE — ASSESSMENT & PLAN NOTE
-Acute onset of right upper extremity pain and numbness on arrival to ED at similar time of bilateral lower extremity symptoms  -Also continues to have difficulty in characterizing pain which fluctuates between sharp and throbbing per patient  -No focal tenderness or neuro deficits noted as well  -Baseline upper extremity weakness symmetrical on both sides  -CT head negative for signs of infarction, showing possible small cephalohematoma    Plan:  -Baclofen  -Assess pt response and adjust accordingly

## 2020-11-12 NOTE — ED NOTES
Assist RN:   IV placed with US to right upper arm.   Pt reporting continued pain and need to void.   A purwick collection system was placed.   Primary RN aware of pt complaints of pain.

## 2020-11-12 NOTE — ED NOTES
"Assist RN: attempted to re-establish PIV via ultrasound unsuccessful x1 on right side. Went to try on left side and pt ripped tourniquet off and would not allow this RN to make another attempt. Pt requesting to \"rest\" and does not want to be poked anymore. Primary RN made aware.   "

## 2020-11-12 NOTE — ED NOTES
Pt continues to cry, inconsolable at this time. Attempted to reassure Pt, reposition, added warm blankets.

## 2020-11-12 NOTE — ED NOTES
As pt was being wheeled from the ER lobby to [place] [id number] she falls forward out of the chair and lands on the floor.  Has a area of swelling on rt forehead.  No LOC.  Immediately picked up and taken to room.  She has been crying constantly since she arrived in ER.  C/o leg pain.

## 2020-11-12 NOTE — PROGRESS NOTES
Hospital Medicine Daily Progress Note    Date of Service  11/12/2020    Chief Complaint  49 y.o. female admitted 11/11/2020 with abdominal pain    Hospital Course  Ms. Gayla Escudero is a 49 y.o. 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  Abdominal pain rated 8/10 localized to the umbilicus and is nonradiating.  On presentation she started to experience right upper extremity and bilateral lower extremity pain and numbness.  Denies history of similar symptoms.  While in the ER she also fell forward out of chair while being wheeled from lobby and hit her forehead without loss of consciousness.  CT head showed possible small cephalohematoma.  CT abdomen pelvis showed no evidence of acute intra-abdominal or pelvic process.  ESR and CRP within normal limits.    Interval Problem Update  Patient was seen and examined at bedside.  I have personally reviewed vitals, labs, and imaging.    11/12.  Afebrile.  Episodes of tachycardia and hypotension.  On room air.  Patient with decreased responsiveness.  Does respond to pain.  Repeat CT head within normal limits.  Blood sugar checked found to be 45.  IV fluids changed and put on hypoglycemia protocol.    Consultants/Specialty  None    Code Status  Full Code    Disposition  Medical clearance  PT/OT    Review of Systems  Review of Systems   Unable to perform ROS: Acuity of condition        Physical Exam  Temp:  [36.3 °C (97.3 °F)-36.8 °C (98.3 °F)] 36.8 °C (98.3 °F)  Pulse:  [] 97  Resp:  [16-25] 18  BP: ()/(57-84) 100/72  SpO2:  [83 %-99 %] 99 %    Physical Exam  Vitals signs and nursing note reviewed.   Constitutional:       General: She is not in acute distress.     Appearance: Normal appearance.      Comments: Lethargic.   HENT:      Head:      Comments: Right frontal hematoma     Right Ear: External ear normal.      Left Ear:  External ear normal.      Nose: Nose normal.      Mouth/Throat:      Mouth: Mucous membranes are dry.      Pharynx: Oropharynx is clear. No oropharyngeal exudate or posterior oropharyngeal erythema.   Eyes:      Extraocular Movements: Extraocular movements intact.      Conjunctiva/sclera: Conjunctivae normal.   Neck:      Musculoskeletal: Normal range of motion and neck supple.   Cardiovascular:      Rate and Rhythm: Normal rate and regular rhythm.      Pulses: Normal pulses.      Heart sounds: Normal heart sounds. No murmur.   Pulmonary:      Effort: Pulmonary effort is normal. No respiratory distress.      Breath sounds: Normal breath sounds. No stridor. No wheezing or rales.   Abdominal:      General: Abdomen is flat. Bowel sounds are normal. There is no distension.      Palpations: Abdomen is soft. There is no mass.      Tenderness: There is no abdominal tenderness.   Skin:     General: Skin is warm.      Capillary Refill: Capillary refill takes less than 2 seconds.   Neurological:      Mental Status: She is disoriented.      Cranial Nerves: No cranial nerve deficit.      Comments: Does not follow commands.  Nonverbal.  Is responsive to pain.   Psychiatric:         Mood and Affect: Mood normal.         Behavior: Behavior normal.         Fluids  No intake or output data in the 24 hours ending 11/12/20 0743    Laboratory  Recent Labs     11/11/20  1805 11/11/20  2326   WBC 6.2 6.2   RBC 4.72 4.18*   HEMOGLOBIN 13.6 12.2   HEMATOCRIT 44.1 38.7   MCV 93.4 92.6   MCH 28.8 29.2   MCHC 30.8* 31.5*   RDW 48.2 47.2   PLATELETCT 316 260   MPV 10.2 10.1     Recent Labs     11/11/20  1805 11/11/20  2326   SODIUM 139 139   POTASSIUM 4.5 3.6   CHLORIDE 98 97   CO2 26 27   GLUCOSE 73 62*   BUN 4* 4*   CREATININE 0.40* 0.34*   CALCIUM 10.1 9.5                   Imaging  CT-HEAD W/O   Final Result      No CT evidence of acute infarct, hemorrhage or mass.      CT-ABDOMEN-PELVIS WITH   Final Result      No evidence of acute  intra-abdominal or pelvic process.      Probably duplicated right renal collecting system.      CT-HEAD W/O   Final Result      1.  Focal soft tissue swelling right frontal region with minimal underlying increased density adjacent to the periphery of the outer table of the calvarium which could represent a small cephalohematoma.      2.  Periventricular and subcortical white matter density changes which could be related to small vessel ischemia, demyelinization or gliosis. Findings may be slightly more prominent than on previous exam.           Assessment/Plan  Upper extremity pain, diffuse, right- (present on admission)  Assessment & Plan  -Acute onset of right upper extremity pain and numbness on arrival to ED at similar time of bilateral lower extremity symptoms  -Also continues to have difficulty in characterizing pain which fluctuates between sharp and throbbing per patient  -No focal tenderness or neuro deficits noted as well  -Baseline upper extremity weakness symmetrical on both sides  -CT head negative for signs of infarction, showing possible small cephalohematoma    Plan:  -Baclofen  -Assess pt response and adjust accordingly    Bilateral lower extremity pain- (present on admission)  Assessment & Plan  -Acute onset of bilateral lower extremity pain and numbness on arrival to ED, with no prior history of such events  -Patient had difficulty describing character of pain fluctuating between sharp and throbbing  -No focal muscle tenderness noted on exam or focal neurological deficits  -Baseline lower extremity weakness  -Could be secondary to possible progressive myotonia in setting myotonic muscular dystrophy  -CT head negative for signs of infarction, showing possible small cephalohematoma    Plan:  -Will attempt trial of Baclofen 10 mg TID  -Assess patient response and adjust dosing needed  -Can consider Mexiletene as alternative option if cardiomyopathy can be ruled out  -PT consulted  -OT  consulted    Abdominal pain- (present on admission)  Assessment & Plan  -Acute exacerbation of chronic abdominal pain, now located in epigastric region, no associated N/V or diarrhea, now unable to be controlled on home Norco  -History of pancreatitis, otherwise on other visits no clear etiology defined  -CT abdomen negative for acute intra-abdominal process, afebrile, normotensive, tachycardic, requiring 2L O2, no leukocytosis, normal lipase, no electrolyte abnormalities, LFT's wnl  -UA negative for signs of infection  -Last BM this morning  -Remains unclear etiology     Started on pain scale.    Impaired mobility and ADLs- (present on admission)  Assessment & Plan  Seizure, aspiration, fall precautions  PT/OT    Myotonic muscular dystrophy (HCC)- (present on admission)  Assessment & Plan  -Diagnosed at age 16  -History of chronic abdominal pain with no clear explained etiology prompting multiple ED visits   -Most likely contributing to chronic abdominal pain as well as onset of of extremity symptoms  ESR, CRP within normal limits    Hypoglycemia  Assessment & Plan  Change fluids from LR to D5 LR  Started on hypoglycemia protocol with Accu-Cheks.    Altered mental status  Assessment & Plan  CT head with normal limits.  Likely secondary to hypoglycemia.  She did receive morphine 6 mg IV in the emergency room.       VTE prophylaxis: Enoxaparin

## 2020-11-12 NOTE — ASSESSMENT & PLAN NOTE
-seems to be mostly when she is mobilizing  -Likely a component of deconditioning from muscular dystrophy- and continued deconditioning related to refusal to particpate in movement and therapies   -Pain management  -COMFORT CARE

## 2020-11-12 NOTE — H&P
"History & Physical Note    Date of Admission: 11/11/2020  Admission Status: Observation-Outpatient  Attending: Dr. Cristian Kyle  Contact Number: [phone]    Chief Complaint: Abdominal pain    History of Present Illness (HPI):   Gayla is a 49 y.o. female with myotonic muscular dystrophy diagnosed at age 16 who has been having chronic abdominal pain in various regions of her abdomen that has prompted multiple visits to the ED with no clear etiology classified other than one episode of pancreatitis. She has been controlling this pain at home with Norco, however despite increasing her dosing at home it remains uncontrolled.    Brought into the ED with  at bedside. On arrival to ED patient started to experience acute onset of right upper extremity and bilateral lower extremity pain and numbness that both patient and  deny any history of such symptoms. She takes scheduled Milk of Magnesia at home while on chronic opioid therapy with good response.     Patient reports having 8/10 periumbical pain non-radiating, not exacerbated or relieved by any particular mechanism. She has difficulty in characterizing her lower extremity and upper extremity pain. On discussion about descriptors patient fluctuated between \"sharp\" and \"throbbing\" in description of her ongoing symptoms. Otherwise patient denies fevers, chills, headache, nausea/vomiting, or constipation/diarrhea.     ED course:  -Patient fell forward out of chair being wheeled from lobby and hit right forehead without LOC. CT head showed possible right front small cephalohematoma  -Pain: s/p Morphine IV 6 mg total in dosage  -Nause: s/p Zofran IV 4 mg    Review of Systems:   Review of Systems   Constitutional: Negative for chills, diaphoresis, fever and weight loss.   HENT: Negative for hearing loss.    Eyes: Negative for blurred vision and double vision.   Respiratory: Negative for cough and shortness of breath.    Cardiovascular: Negative for chest pain, " palpitations and leg swelling.   Gastrointestinal: Positive for abdominal pain. Negative for blood in stool, constipation, diarrhea, nausea and vomiting.   Genitourinary: Negative for dysuria, frequency, hematuria and urgency.   Musculoskeletal: Negative for neck pain.   Skin: Negative for rash.   Neurological: Positive for weakness. Negative for dizziness, seizures, loss of consciousness and headaches.   Psychiatric/Behavioral: Negative for depression.       Past Medical History:   Past Medical History was reviewed with patient.   has a past medical history of Anemia, Cataract, Heart murmur, Muscular disease, Muscular dystrophy (HCC), and JOSÉ on CPAP.    Past Surgical History: Past Surgical History was reviewed with patient.   has a past surgical history that includes hysterectomy laparoscopy.    Medications: Medications have been reviewed with patient.  Prior to Admission Medications   Prescriptions Last Dose Informant Patient Reported? Taking?   HYDROcodone-acetaminophen (NORCO) 5-325 MG Tab per tablet 11/11/2020 at 1200 Family Member No No   Sig: Take 1 Tab by mouth every 6 hours as needed for up to 30 days.   dicyclomine (BENTYL) 20 MG Tab 11/10/2020 at PM Family Member No No   Sig: Take 1 Tab by mouth every 6 hours as needed (abdominal pain).   magnesium hydroxide (MILK OF MAGNESIA) 400 MG/5ML Suspension 11/10/2020 at PM Family Member Yes Yes   Sig: Take 30 mL by mouth 1 time daily as needed.   omeprazole (PRILOSEC) 20 MG delayed-release capsule 11/10/2020 at AM Family Member No No   Sig: Take 1 Cap by mouth every day.   ondansetron (ZOFRAN ODT) 4 MG TABLET DISPERSIBLE Not Taking at Unknown time Family Member No No   Sig: Take 1 Tab by mouth every four hours as needed for Nausea (give PO if no IV route available).   Patient not taking: Reported on 11/11/2020   polyethylene glycol/lytes (MIRALAX) 17 g Pack Not Taking at Unknown time Family Member No No   Sig: Take 1 Packet by mouth 2 times a day.   Patient not  taking: Reported on 11/11/2020   sumatriptan (IMITREX) 20 MG/ACT nasal spray Not Taking at Unknown time Family Member No No   Sig: Spray 1 Spray in nose as needed for Migraine.   Patient not taking: Reported on 11/11/2020      Facility-Administered Medications: None        Allergies: Allergies have been reviewed with patient.  Allergies   Allergen Reactions   • Codeine Vomiting, Nausea and Unspecified     N&V  Dizzy, lightheaded and vomiting.    • Tramadol Unspecified     Effects her MD  weak       Family History:   family history includes Heart Disease in her daughter; No Known Problems in her brother, brother, brother, father, mother, sister, and sister; Other in her son; Sleep Apnea in her daughter.     Social History:   Tobacco: Never smoker  Alcohol: Denies ETOH use    Recreational drugs (illegal and prescription):  Denies current/prior illicit drug use   Employment: Not currently employed  Activity Level: Difficulty in ADL's   Living situation:  Lives at home with   Recent travel: Denies any recent travel history  Primary Care Provider: reviewed Adithya Martinez P.A.-C.  Other (stressors, spirituality, exposures):  None identified at this time    Physical Exam:   Vitals:  Temp:  [36.3 °C (97.3 °F)] 36.3 °C (97.3 °F)  Pulse:  [] 96  Resp:  [16-25] 25  BP: ()/(64-74) 110/74  SpO2:  [88 %-96 %] 95 %    Physical Exam  Vitals signs and nursing note reviewed.   Constitutional:       General: She is in acute distress.      Appearance: She is not diaphoretic.   HENT:      Head: Normocephalic and atraumatic.      Nose: Nose normal.      Mouth/Throat:      Mouth: Mucous membranes are moist.      Pharynx: Oropharynx is clear.   Eyes:      General: No scleral icterus.     Extraocular Movements: Extraocular movements intact.      Conjunctiva/sclera: Conjunctivae normal.      Pupils: Pupils are equal, round, and reactive to light.   Neck:      Musculoskeletal: Neck supple.   Cardiovascular:      Rate and  Rhythm: Normal rate and regular rhythm.      Pulses: Normal pulses.      Heart sounds: Normal heart sounds. No murmur. No friction rub. No gallop.    Pulmonary:      Effort: Pulmonary effort is normal. No respiratory distress.      Breath sounds: Normal breath sounds. No wheezing, rhonchi or rales.   Abdominal:      General: Abdomen is flat. There is no distension.      Tenderness: There is abdominal tenderness. There is no guarding.      Comments: Moderate TTP in periumbilical region   Musculoskeletal:         General: No deformity or signs of injury.      Right lower leg: No edema.      Left lower leg: No edema.      Comments:  Baseline weakness symmetrical 3/5 in all 4 extremities   Skin:     General: Skin is warm and dry.      Capillary Refill: Capillary refill takes less than 2 seconds.   Neurological:      General: No focal deficit present.      Mental Status: She is alert and oriented to person, place, and time.   Psychiatric:         Mood and Affect: Mood normal.         Labs:   Lab Results   Component Value Date/Time    WBC 6.2 11/11/2020 11:26 PM    RBC 4.18 (L) 11/11/2020 11:26 PM    HEMOGLOBIN 12.2 11/11/2020 11:26 PM    HEMATOCRIT 38.7 11/11/2020 11:26 PM    MCV 92.6 11/11/2020 11:26 PM    MCH 29.2 11/11/2020 11:26 PM    MCHC 31.5 (L) 11/11/2020 11:26 PM    MPV 10.1 11/11/2020 11:26 PM    NEUTSPOLYS 61.90 11/11/2020 11:26 PM    LYMPHOCYTES 27.80 11/11/2020 11:26 PM    MONOCYTES 8.90 11/11/2020 11:26 PM    EOSINOPHILS 0.30 11/11/2020 11:26 PM    BASOPHILS 0.50 11/11/2020 11:26 PM    ANISOCYTOSIS 1+ 02/19/2020 09:58 PM      Lab Results   Component Value Date/Time    SODIUM 139 11/11/2020 06:05 PM    POTASSIUM 4.5 11/11/2020 06:05 PM    CHLORIDE 98 11/11/2020 06:05 PM    CO2 26 11/11/2020 06:05 PM    GLUCOSE 73 11/11/2020 06:05 PM    BUN 4 (L) 11/11/2020 06:05 PM    CREATININE 0.40 (L) 11/11/2020 06:05 PM        Imaging:   CT-ABDOMEN-PELVIS WITH   Final Result      No evidence of acute intra-abdominal or  pelvic process.      Probably duplicated right renal collecting system.      CT-HEAD W/O   Final Result      1.  Focal soft tissue swelling right frontal region with minimal underlying increased density adjacent to the periphery of the outer table of the calvarium which could represent a small cephalohematoma.      2.  Periventricular and subcortical white matter density changes which could be related to small vessel ischemia, demyelinization or gliosis. Findings may be slightly more prominent than on previous exam.          Previous Data Review: reviewed    Problem Representation:     Patient is a 48 y/o F with PMH of myotonic muscular dystrophy presenting with acute on chronic abdominal pain that has been worked up multiple times with no clear etiology, afebrile today, normotensive, tachycardic, saturating well on RA, with no leukocytosis, no electrolyte derangement, LFT's and lipase wnl and CT abdomen negative for acute intra-abdominal process, with concurrent new acute onset of right upper extremity and bilateral lower extremity pain and numbness that was difficult for patient to characterize and no focal neurological deficit except baseline symmetrical weakness in all four extremities. Will admit for observation with pain management and PT/OT.    Upper extremity pain, diffuse, right- (present on admission)  Assessment & Plan  -Acute onset of right upper extremity pain and numbness on arrival to ED at similar time of bilateral lower extremity symptoms  -Also continues to have difficulty in characterizing pain which fluctuates between sharp and throbbing per patient  -No focal tenderness or neuro deficits noted as well  -Baseline upper extremity weakness symmetrical on both sides  -CT head negative for signs of infarction, showing possible small cephalohematoma    Plan:  -Baclofen  -Assess pt response and adjust accordingly    Bilateral lower extremity pain- (present on admission)  Assessment & Plan  -Acute onset  of bilateral lower extremity pain and numbness on arrival to ED, with no prior history of such events  -Patient had difficulty describing character of pain fluctuating between sharp and throbbing  -No focal muscle tenderness noted on exam or focal neurological deficits  -Baseline lower extremity weakness  -Could be secondary to possible progressive myotonia in setting myotonic muscular dystrophy  -CT head negative for signs of infarction, showing possible small cephalohematoma    Plan:  -Will attempt trial of Baclofen 10 mg TID  -Assess patient response and adjust dosing needed  -Can consider Mexiletene as alternative option if cardiomyopathy can be ruled out  -PT consulted  -OT consulted    Abdominal pain- (present on admission)  Assessment & Plan  -Acute exacerbation of chronic abdominal pain, now located in epigastric region, no associated N/V or diarrhea, now unable to be controlled on home Norco  -History of pancreatitis, otherwise on other visits no clear etiology defined  -CT abdomen negative for acute intra-abdominal process, afebrile, normotensive, tachycardic, requiring 2L O2, no leukocytosis, normal lipase, no electrolyte abnormalities, LFT's wnl  -UA negative for signs of infection  -Last BM this morning  -Remains unclear etiology    Plan:  -Pain mgmt: Dilaudid IV 0.5 mg q4h PRN  -Continue to monitor and adjust accordingly  -IVF: LR at 83 cc/hr  -Scheduled Milk of Magnesia (pt has good response at home while on chronic opioid therapy)  -PRN bowel regimen in addition    Myotonic muscular dystrophy (HCC)- (present on admission)  Assessment & Plan  -Diagnosed at age 16  -History of chronic abdominal pain with no clear explained etiology prompting multiple ED visits   -Most likely contributing to chronic abdominal pain as well as onset of of extremity symptoms    Plan:  -Pain management with Dilaudid and trial of Baclofen  -Continue to monitor response symptomatic mgmt

## 2020-11-13 PROBLEM — E87.8 ELECTROLYTE ABNORMALITY: Status: ACTIVE | Noted: 2020-01-01

## 2020-11-13 PROBLEM — R65.20 SEVERE SEPSIS (HCC): Status: ACTIVE | Noted: 2020-01-01

## 2020-11-13 PROBLEM — A41.9 SEVERE SEPSIS (HCC): Status: ACTIVE | Noted: 2020-01-01

## 2020-11-13 PROBLEM — G93.40 ENCEPHALOPATHY ACUTE: Status: ACTIVE | Noted: 2020-01-01

## 2020-11-13 NOTE — PROGRESS NOTES
Pt oriented to self and place this shift, very tearful, po intake poor, pills crushed with apple sauce. Pt seen by speech therapy and case management.  Wound found on patient left shoulder please see image in chart.    Frequent rounding in place, will continue to monitor and support

## 2020-11-13 NOTE — PROGRESS NOTES
"A&Ox4. R PIV, infusing. Pt stating pain in \"ok\" at 8/10 in abdomen, pt couldn't remember if morphine or oxycodone worked better for her pain. Pt very upset and tearful that  did not come to visit her today, emotional support provided. Pt unable to ambulate, bed pain in use. Bed locked and in lowest position, hourly rounding in place, pt calls appropriately.   "

## 2020-11-13 NOTE — THERAPY
"Physical Therapy   Initial Evaluation     Patient Name: Gayla Escudero  Age:  49 y.o., Sex:  female  Medical Record #: [id number]  Today's Date: 11/12/2020     Precautions: Fall Risk    Assessment  Patient presents with decreased activity tolerance and pain secondary to a baseline dx of myotonic MD and a fall in the ER last night. Pt crying throughout session, very fearful of falling. Fear is primary limiter in session. Unsure of accuracy of social hx. Pt willing to participate in session with constant reassurance that she won't fall. Pt unable to localize pain she was feeling, \"I don't know, I don't know\". Given pt age and pathophysiology behind disease progression, recommend neurology consult to determine if etiology of pain is from disease progression or from other mechanical processes. As physical exam ineffective at localizing pain, she is non-specific and pain does not follow a biomechanical pattern. Will follow with acute PT to address mobility, recommend placement at this time given verbalized PLOF. Will follow.      Plan    Recommend Physical Therapy 2 times per week until therapy goals are met for the following treatments:  Bed Mobility, Equipment, Gait Training, Manual Therapy, Neuro Re-Education / Balance, Self Care/Home Evaluation, Sensory Integration Techniques, Stair Training, Therapeutic Activities and Therapeutic Exercises    DC Equipment Recommendations: Unable to determine at this time  Discharge Recommendations: Recommend post-acute placement for additional physical therapy services prior to discharge home          Objective       11/12/20 1623   Precautions   Precautions Fall Risk   Comments baseline myotonic MD   Vitals   O2 (LPM) 0   O2 Delivery Device None - Room Air  (as found)   Pain 0 - 10 Group   Therapist Pain Assessment Post Activity Pain Same as Prior to Activity;Nurse Notified  (tearful, willing to participate in session)   Prior Living Situation   Prior Services Intermittent " "Physical Support for ADL Per Family;Unable To Determine At This Time   Housing / Facility Motel   Steps Into Home 0   Equipment Owned 4-Wheel Walker   Lives with - Patient's Self Care Capacity Spouse   Comments reports  has to work during the day so she is alone during the day. Unsure of accuracy as a historian   Prior Level of Functional Mobility   Bed Mobility Unable To Determine At This Time   Transfer Status Unable To Determine At This Time   Ambulation Unable To Determine At This Time   Distance Ambulation (Feet)   (household with 4WW)   Assistive Devices Used 4-Wheel Walker   Stairs Unable To Determine At This Time   Comments pt reports being independent during the day when  is at work, but also reports he helps her. Unsure of accuracy as historian.    History of Falls   History of Falls Yes   Cognition    Cognition / Consciousness X   Speech/ Communication Delayed Responses   Level of Consciousness Alert   Ability To Follow Commands 1 Step   Safety Awareness Impaired  (very fearful of falling)   New Learning Impaired   Attention Impaired   Sequencing Impaired   Initiation Impaired   Comments very tearful and crying throughout session, dysarthric. Very fearful of falling after ER fall last night, \"I don't want to fall\"   Passive ROM Lower Body   Passive ROM Lower Body X   Comments Not directly assessed, however WL for functional activity with exception of toe mobility   Strength Lower Body   Lower Body Strength  X   Comments 3/5 BL Quadriceps, 0/5 distal aspects of LE's. Very malnourished appearance   Sensation Lower Body   Lower Extremity Sensation   WDL   Comments denies n/t. UNable to localize pain   Balance Assessment   Sitting Balance (Static) Fair -   Sitting Balance (Dynamic) Poor +   Standing Balance (Static) Poor -   Standing Balance (Dynamic) Poor -   Weight Shift Sitting Poor   Weight Shift Standing Poor   Comments HHA of 2, required strong verbal encouragement to particiapte in stand " d/t fear of falling   Gait Analysis   Gait Level Of Assist Unable to Participate   Bed Mobility    Supine to Sit Maximal Assist   Sit to Supine Maximal Assist   Functional Mobility   Sit to Stand Maximal Assist   Edema / Skin Assessment   Edema / Skin  X   Comments large maroon spot with slight opening present on L posterior aspect of shoulder. RN in room and examined   Patient / Family Goals    Patient / Family Goal #1 none verbalized   Short Term Goals    Short Term Goal # 1 Pt will be able to transfer supine<>sit with min A within 6 visits to ensure mobility at home.   Short Term Goal # 2 Pt will be able to transfer sit<>stand with 4WW and min A within 6 visits to ensure mobility at home.   Short Term Goal # 3 Pt will be ambulate 25 ft with FWW and min A within 6 visits to ensure mobility around house.    Education Group   Role of Physical Therapist Patient Response Patient;Acceptance;Explanation;No Learning Evidence;Reinforcement Needed   Additional Comments impotance of breathing   Problem List    Problems Pain;Impaired Bed Mobility;Impaired Transfers;Impaired Ambulation;Impaired Balance;Decreased Activity Tolerance;Motor Planning / Sequencing

## 2020-11-13 NOTE — PROGRESS NOTES
Hospital Medicine Daily Progress Note    Date of Service  11/13/2020    Chief Complaint  49 y.o. female admitted 11/11/2020 with abdominal pain    Hospital Course  Ms. Gayla Escudero is a 49 y.o. 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  Abdominal pain rated 8/10 localized to the umbilicus and is nonradiating.  On presentation she started to experience right upper extremity and bilateral lower extremity pain and numbness.  Denies history of similar symptoms.  While in the ER she also fell forward out of chair while being wheeled from lobby and hit her forehead without loss of consciousness.  CT head showed possible small cephalohematoma.  CT abdomen pelvis showed no evidence of acute intra-abdominal or pelvic process.  ESR and CRP within normal limits.    Interval Problem Update  Patient was seen and examined at bedside.  I have personally reviewed vitals, labs, and imaging.    11/12.  Afebrile.  Episodes of tachycardia and hypotension.  On room air.  Patient with decreased responsiveness.  Does respond to pain.  Repeat CT head within normal limits.  Blood sugar checked found to be 45.  IV fluids changed and put on hypoglycemia protocol.  11/13.  Afebrile.  Episodes of hypotension have improved.  Episodes of tachycardia.  On 0-3 L nasal cannula.  Patient is malnourished with low p.o. intake.  Patient has been oriented to person and place.  She does make a lot of incomprehensible sounds.  Follows some commands.  I did speak with her  Adryan over the phone who states that at baseline she is alert oriented x3 and very cognizant and able to make her own decisions.  She does follow with neurologist at [facility] for her muscular dystrophy.  She chronically has had difficulty eating and taking pills, stating that her stomach starts to hurt.  She currently is being for muscular  dystrophy and has frequent falls and also uses front wheeled walker at home.  PT recommended placement.  SNF referral placed.  EPS evaluation.     Consultants/Specialty  None    Code Status  Full Code    Disposition  Medical clearance  PT/OT recommend SNF    Review of Systems  Review of Systems   Unable to perform ROS: Acuity of condition        Physical Exam  Temp:  [36.2 °C (97.2 °F)-37.3 °C (99.1 °F)] 36.6 °C (97.9 °F)  Pulse:  [] 77  Resp:  [11-18] 12  BP: ()/(54-94) 102/64  SpO2:  [86 %-100 %] 100 %    Physical Exam  Vitals signs and nursing note reviewed.   Constitutional:       General: She is not in acute distress.     Appearance: Normal appearance.      Comments: Lethargic.   HENT:      Head:      Comments: Right frontal hematoma     Right Ear: External ear normal.      Left Ear: External ear normal.      Nose: Nose normal.      Mouth/Throat:      Mouth: Mucous membranes are dry.      Pharynx: Oropharynx is clear. No oropharyngeal exudate or posterior oropharyngeal erythema.   Eyes:      Extraocular Movements: Extraocular movements intact.      Conjunctiva/sclera: Conjunctivae normal.   Neck:      Musculoskeletal: Normal range of motion and neck supple.   Cardiovascular:      Rate and Rhythm: Normal rate and regular rhythm.      Pulses: Normal pulses.      Heart sounds: Normal heart sounds. No murmur.   Pulmonary:      Effort: Pulmonary effort is normal. No respiratory distress.      Breath sounds: Normal breath sounds. No stridor. No wheezing or rales.   Abdominal:      General: Abdomen is flat. Bowel sounds are normal. There is no distension.      Palpations: Abdomen is soft. There is no mass.      Tenderness: There is no abdominal tenderness.   Skin:     General: Skin is warm.      Capillary Refill: Capillary refill takes less than 2 seconds.   Neurological:      Mental Status: She is disoriented.      Cranial Nerves: No cranial nerve deficit.      Comments: Follow some simple commands.  She  is responsive to verbal stimuli.   Psychiatric:         Mood and Affect: Mood normal.         Behavior: Behavior normal.         Fluids    Intake/Output Summary (Last 24 hours) at 11/13/2020 0713  Last data filed at 11/12/2020 1404  Gross per 24 hour   Intake 142 ml   Output --   Net 142 ml       Laboratory  Recent Labs     11/11/20  1805 11/11/20  2326 11/13/20  0016   WBC 6.2 6.2 6.3   RBC 4.72 4.18* 4.37   HEMOGLOBIN 13.6 12.2 12.8   HEMATOCRIT 44.1 38.7 40.5   MCV 93.4 92.6 92.7   MCH 28.8 29.2 29.3   MCHC 30.8* 31.5* 31.6*   RDW 48.2 47.2 47.8   PLATELETCT 316 260 242   MPV 10.2 10.1 10.2     Recent Labs     11/11/20  1805 11/11/20 2326 11/13/20  0016   SODIUM 139 139 140   POTASSIUM 4.5 3.6 3.5*   CHLORIDE 98 97 104   CO2 26 27 28   GLUCOSE 73 62* 114*   BUN 4* 4* 2*   CREATININE 0.40* 0.34* 0.48*   CALCIUM 10.1 9.5 9.4                   Imaging  CT-HEAD W/O   Final Result      No CT evidence of acute infarct, hemorrhage or mass.      CT-ABDOMEN-PELVIS WITH   Final Result      No evidence of acute intra-abdominal or pelvic process.      Probably duplicated right renal collecting system.      CT-HEAD W/O   Final Result      1.  Focal soft tissue swelling right frontal region with minimal underlying increased density adjacent to the periphery of the outer table of the calvarium which could represent a small cephalohematoma.      2.  Periventricular and subcortical white matter density changes which could be related to small vessel ischemia, demyelinization or gliosis. Findings may be slightly more prominent than on previous exam.           Assessment/Plan  Upper extremity pain, diffuse, right- (present on admission)  Assessment & Plan  -Acute onset of right upper extremity pain and numbness on arrival to ED at similar time of bilateral lower extremity symptoms  -Also continues to have difficulty in characterizing pain which fluctuates between sharp and throbbing per patient  -No focal tenderness or neuro  deficits noted as well  -Baseline upper extremity weakness symmetrical on both sides  -CT head negative for signs of infarction, showing possible small cephalohematoma    Plan:  -Baclofen  -Assess pt response and adjust accordingly    Bilateral lower extremity pain- (present on admission)  Assessment & Plan  -Acute onset of bilateral lower extremity pain and numbness on arrival to ED, with no prior history of such events  -Patient had difficulty describing character of pain fluctuating between sharp and throbbing  -No focal muscle tenderness noted on exam or focal neurological deficits  -Baseline lower extremity weakness  -Could be secondary to possible progressive myotonia in setting myotonic muscular dystrophy  -CT head negative for signs of infarction, showing possible small cephalohematoma    Plan:  -Will attempt trial of Baclofen 10 mg TID  -Assess patient response and adjust dosing needed  -Can consider Mexiletene as alternative option if cardiomyopathy can be ruled out  -PT consulted  -OT consulted  SNF referral placed    Abdominal pain- (present on admission)  Assessment & Plan  -Acute exacerbation of chronic abdominal pain, now located in epigastric region, no associated N/V or diarrhea, now unable to be controlled on home Norco  -History of pancreatitis, otherwise on other visits no clear etiology defined  -CT abdomen negative for acute intra-abdominal process, afebrile, normotensive, tachycardic, requiring 2L O2, no leukocytosis, normal lipase, no electrolyte abnormalities, LFT's wnl  -UA negative for signs of infection  -Last BM this morning  -Remains unclear etiology     Started on pain scale.    Impaired mobility and ADLs- (present on admission)  Assessment & Plan  Seizure, aspiration, fall precautions  PT/OT recommend SNF    Myotonic muscular dystrophy (HCC)- (present on admission)  Assessment & Plan  -Diagnosed at age 16  -History of chronic abdominal pain with no clear explained etiology prompting  multiple ED visits   -Most likely contributing to chronic abdominal pain as well as onset of of extremity symptoms  ESR, CRP within normal limits    Hypoglycemia  Assessment & Plan  Change fluids from LR to D5 LR  Started on hypoglycemia protocol with Accu-Cheks.    Altered mental status  Assessment & Plan  CT head with normal limits.  Likely secondary to hypoglycemia.  She did receive morphine 6 mg IV in the emergency room.       VTE prophylaxis: Enoxaparin
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
## 2020-11-13 NOTE — CARE PLAN
Problem: Infection  Goal: Will remain free from infection  Outcome: PROGRESSING AS EXPECTED   No signs or symptoms of worsening infection         Problem: Safety  Goal: Will remain free from injury  Outcome: PROGRESSING AS EXPECTED   Fall precautions in place

## 2020-11-13 NOTE — CARE PLAN
Problem: Safety  Goal: Will remain free from falls  Outcome: PROGRESSING AS EXPECTED  Note: Fall precautions in place.      Problem: Discharge Barriers/Planning  Goal: Patient's continuum of care needs will be met  Outcome: PROGRESSING AS EXPECTED  Note: PT/OT saw pt today. Pt monitored closely; pt now tele monitored.

## 2020-11-13 NOTE — THERAPY
Occupational Therapy   Initial Evaluation     Patient Name: Gayla Escudero  Age:  49 y.o., Sex:  female  Medical Record #: [id number]  Today's Date: 11/12/2020          Assessment  Patient is 49 y.o. 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 Pt currently requires max a for ADLs and mobility, labile throughout, unable to localize her pain. Pt does have h/o OP PT, OT and speech with functional decline over last 2-3 years it appears. Will follow while in house, recommend obtain SLP evaluation for swallow.     Plan    Recommend Occupational Therapy 2 times per week until therapy goals are met for the following treatments:  Adaptive Equipment, Cognitive Skill Development, Manual Therapy Techniques, Self Care/Activities of Daily Living, Therapeutic Activities and Therapeutic Exercises.    DC Equipment Recommendations: Unable to determine at this time  Discharge Recommendations: Recommend post-acute placement for additional occupational therapy services prior to discharge home(unless spouse can provide 24/7)        Objective       11/12/20 3067   Initial Contact Note    Initial Contact Note Order Received and Verified, Occupational Therapy Evaluation in Progress with Full Report to Follow.   Prior Living Situation   Prior Services Unable To Determine At This Time;Intermittent Physical Support for ADL Per Family   Housing / Facility Motel   Steps Into Home 0   Bathroom Set up Bathtub / Shower Combination   Equipment Owned Front-Wheel Walker   Lives with - Patient's Self Care Capacity Spouse   Comments Pt providing contradictory information during session. Initially verbalized I w/ ADLs  "and then reports  assists with everything. Per pt,s pouse works during daytime   Prior Level of ADL Function   Self Feeding Independent   Grooming / Hygiene Requires Assist   Bathing Requires Assist   Dressing Requires Assist   Toileting Requires Assist   Prior Level of IADL Function   Medication Management Requires Assist   Laundry Requires Assist   Kitchen Mobility Requires Assist   Finances Requires Assist   Home Management Requires Assist   Shopping Requires Assist   Prior Level Of Mobility Supervision With Device in Home  (reports walking with FWW w/ intermittent sup from spouse)   Driving / Transportation Relatives / Others Provide Transportation  ()   Cognition    Cognition / Consciousness X   Speech/ Communication Delayed Responses   Level of Consciousness Alert   Ability To Follow Commands 1 Step   Safety Awareness Impaired  (fear of falling)   New Learning Impaired   Attention Impaired   Sequencing Impaired   Initiation Impaired   Comments pt noted to be labile throughout the session, dysarthria noted, fearful of falling after fall in ER last night. Unclear how reliable of historian pt is   Active ROM Upper Body   Active ROM Upper Body  X   Comments attention limited testing, but noted to be using BUE during facewashing    Strength Upper Body   Upper Body Strength  X   Comments appears malnourished overall   Sensation Upper Body   Comments unable to assess   Coordination Upper Body   Coordination X   Comments grossly appears intact   Balance Assessment   Sitting Balance (Static) Fair -   Sitting Balance (Dynamic) Poor +   Standing Balance (Static) Poor -   Standing Balance (Dynamic) Poor -   Weight Shift Sitting Poor   Weight Shift Standing Poor   Comments w/HHA of 2, requires encouragement to attempt, verbalizing \"I can't\"   Bed Mobility    Supine to Sit Maximal Assist   Sit to Supine Maximal Assist   Scooting Maximal Assist   Rolling Moderate Assist to Lt.   ADL Assessment   Eating Moderate " Assist   Grooming Moderate Assist;Seated   Bathing Total Assist   Upper Body Dressing Total Assist   Lower Body Dressing Total Assist   Toileting Total Assist   Functional Mobility   Sit to Stand Maximal Assist   Bed, Chair, Wheelchair Transfer Refused   Mobility STS eob with physical assist of 2   Short Term Goals   Short Term Goal # 1 Pt will perform LB dressing w/ min a   Short Term Goal # 2 Pt will perform functional t/f  w/ min a    Short Term Goal # 3 Pt will perform toileting task with min a   Anticipated Discharge Equipment and Recommendations   DC Equipment Recommendations Unable to determine at this time

## 2020-11-14 NOTE — PROGRESS NOTES
"Pharmacy Kinetics 49 y.o. female on vancomycin day # 1  2020    Has had Vancomycin 1000 mg iv x 1 dose  Provider specified end date: TBD    Indication for Treatment: Unknown source of infection    Pertinent history per medical record: Admitted on 2020 for  abdominal pain with severe sepsis present on admission. Hx of myotonic muscular dystrophy.      Other antibiotics: Zosyn 4.5 g iv q8h and acyclovir 380 mg iv q 8 hrs    Allergies: Codeine and Tramadol     List concerns for renal function: low body wt (BMI 15.8), low albumin    Pertinent cultures to date:   20: PBC x 2 = NGTD    MRSA nares swab if pneumonia is a concern (ordered/positive/negative/n-a): ordered    Recent Labs     20  0242   WBC 6.2 6.2 6.3 17.6* 8.8   NEUTSPOLYS 59.10 61.90 55.80 90.40* 96.00*   BANDSSTABS  --   --   --  1.80  --      Recent Labs     206 209 20  0242 20  1200   BUN 4* 4* 2* 2* 3* 3*   CREATININE 0.40* 0.34* 0.48* 0.38* 0.32* <0.17*   ALBUMIN 4.1 3.5  --  3.0*  --   --      Recent Labs     20  1215   [facility]       Intake/Output Summary (Last 24 hours) at 2020 1336  Last data filed at 2020 1140  Gross per 24 hour   Intake --   Output 1100 ml   Net -1100 ml      /70   Pulse 100   Temp 36.8 °C (98.2 °F) (Temporal)   Resp 18   Ht 1.549 m (5' 1\")   Wt 38 kg (83 lb 12.4 oz)   SpO2 91%  Temp (24hrs), Av.9 °C (98.5 °F), Min:36.3 °C (97.3 °F), Max:37.9 °C (100.3 °F)      A/P   1. Vancomycin dose change: yes start Vanco 750 mg iv q 12 hrs  2. Next vancomycin level: was today = 8.3 mcg/ml  3. Goal trough: 15-20 mcg/ml for now  4. Comments: Vanco level was drawn ~12 hours after the vanco loading dose last night and the pt appears to be able to clear vanco. Will start vanco ~17 mg/kg (750 mg) iv q 12 hrs and check a vanco trough tomorrow. Pt's BP is improved. " PCT is 0.55 so possible PNA as source. MRSA PCR swab ordered, recommend stopping vanco if MRSA PCR is negative. Pt to have an LP done, may need to adjust vanco goal trough if bacterial meningitis results. Blood cultures negative so far.    Yassine Puente, PharmD

## 2020-11-14 NOTE — PROGRESS NOTES
Pt is still hypotensive and tachycardic, per flowsheets. HR appears to be trending upward, with HR in 130-140s. Pt continues to be lethargic and only responsive to painful stimuli, unable to follow commands. Ariadne BLEDSOE paged and updated, new orders received.

## 2020-11-14 NOTE — PROGRESS NOTES
Hospital Medicine Daily Progress Note    Date of Service  11/13/2020    Chief Complaint  49 y.o. female admitted 11/11/2020 with abdominal pain    Hospital Course  Ms. Gayla Escudero is a 49 y.o. 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  Abdominal pain rated 8/10 localized to the umbilicus and is nonradiating.  On presentation she started to experience right upper extremity and bilateral lower extremity pain and numbness.  Denies history of similar symptoms.  While in the ER she also fell forward out of chair while being wheeled from lobby and hit her forehead without loss of consciousness.  CT head showed possible small cephalohematoma.  CT abdomen pelvis showed no evidence of acute intra-abdominal or pelvic process.  ESR and CRP within normal limits.    Interval Problem Update  Patient seen overnight due to abnormal labs and vitals.  She seems to have now developed new onset severe sepsis of unknown origin.  I evaluated her at bedside and she is obtunded only withdrawing to painful stimuli and not awakening to verbal stimuli which seems to be a change from earlier today.  Her last dose of narcotics were yesterday evening, I have ordered a dose of Narcan to see if this may help awaken her.  Her blood sugar currently is in the 200s.  I have ordered fluid bolus, started her on vancomycin and Zosyn, blood cultures have been ordered, repeat lactic acid, I have replaced electrolytes via IV, UA, chest x-ray and abdominal x-ray have all been ordered.  I discussed case with bedside RN and telemetry RN.  Bedside RN states that patient has not gotten out of bed or falling today.  I will hold off on ordering repeat CT head scan.    Consultants/Specialty  None    Code Status  Full Code    Disposition  Transfer to telemetry  PT/OT recommend SNF    Review of Systems  Review of Systems   Unable  to perform ROS: Acuity of condition        Physical Exam  Temp:  [36.3 °C (97.3 °F)-37.9 °C (100.3 °F)] 36.5 °C (97.7 °F)  Pulse:  [] 74  Resp:  [12-24] 24  BP: ()/(58-94) 90/58  SpO2:  [100 %] 100 %    Physical Exam  Constitutional:       General: She is in acute distress.      Appearance: She is ill-appearing.   HENT:      Mouth/Throat:      Mouth: Mucous membranes are dry.   Cardiovascular:      Rate and Rhythm: Tachycardia present.      Heart sounds: No murmur.   Neurological:      Comments: Obtunded and not following commands or responding to verbal stimuli.  She does withdraw to painful stimuli         Fluids  No intake or output data in the 24 hours ending 11/13/20 2056    Laboratory  Recent Labs     11/11/20 2326 11/13/20 0016 11/13/20 1909   WBC 6.2 6.3 17.6*   RBC 4.18* 4.37 4.06*   HEMOGLOBIN 12.2 12.8 12.0   HEMATOCRIT 38.7 40.5 37.3   MCV 92.6 92.7 91.9   MCH 29.2 29.3 29.6   MCHC 31.5* 31.6* 32.2*   RDW 47.2 47.8 47.2   PLATELETCT 260 242 191   MPV 10.1 10.2 9.9     Recent Labs     11/11/20 2326 11/13/20 0016 11/13/20 1909   SODIUM 139 140 148*   POTASSIUM 3.6 3.5* 2.4*   CHLORIDE 97 104 108   CO2 27 28 30   GLUCOSE 62* 114* 196*   BUN 4* 2* 2*   CREATININE 0.34* 0.48* 0.38*   CALCIUM 9.5 9.4 9.4                   Imaging  CT-HEAD W/O   Final Result      No CT evidence of acute infarct, hemorrhage or mass.      CT-ABDOMEN-PELVIS WITH   Final Result      No evidence of acute intra-abdominal or pelvic process.      Probably duplicated right renal collecting system.      CT-HEAD W/O   Final Result      1.  Focal soft tissue swelling right frontal region with minimal underlying increased density adjacent to the periphery of the outer table of the calvarium which could represent a small cephalohematoma.      2.  Periventricular and subcortical white matter density changes which could be related to small vessel ischemia, demyelinization or gliosis. Findings may be slightly more prominent  than on previous exam.      DX-CHEST-PORTABLE (1 VIEW)    (Results Pending)   [id number] VIEW    (Results Pending)        Assessment/Plan  * Severe sepsis (HCC)  Assessment & Plan  This is Severe Sepsis Not present on admission  SIRS criteria identified on my evaluation include: Tachycardia, with heart rate greater than 90 BPM, Tachypnea, with respirations greater than 20 per minute and Leukocytosis, with WBC greater than 12,000  Source of infection is unknown  Clinical indicators of end organ dysfunction include Systolic blood pressure (SBP) <90 mmHg or mean arterial pressure <65 mmHg  Sepsis protocol initiated  Fluid resuscitation ordered per protocol  IV antibiotics as appropriate for source of sepsis  Reassessment: I have reassessed the patient's hemodynamic status  End organ dysfunction include(s):  Acute respiratory failure and Encephalopathy (metabolic)      Upper extremity pain, diffuse, right- (present on admission)  Assessment & Plan  -Acute onset of right upper extremity pain and numbness on arrival to ED at similar time of bilateral lower extremity symptoms  -Also continues to have difficulty in characterizing pain which fluctuates between sharp and throbbing per patient  -No focal tenderness or neuro deficits noted as well  -Baseline upper extremity weakness symmetrical on both sides  -CT head negative for signs of infarction, showing possible small cephalohematoma    Plan:  -Baclofen  -Assess pt response and adjust accordingly    Bilateral lower extremity pain- (present on admission)  Assessment & Plan  -Acute onset of bilateral lower extremity pain and numbness on arrival to ED, with no prior history of such events  -Patient had difficulty describing character of pain fluctuating between sharp and throbbing  -No focal muscle tenderness noted on exam or focal neurological deficits  -Baseline lower extremity weakness  -Could be secondary to possible progressive myotonia in setting myotonic muscular  dystrophy  -CT head negative for signs of infarction, showing possible small cephalohematoma    Plan:  -Will attempt trial of Baclofen 10 mg TID  -Assess patient response and adjust dosing needed  -Can consider Mexiletene as alternative option if cardiomyopathy can be ruled out  -PT consulted  -OT consulted  SNF referral placed    Abdominal pain- (present on admission)  Assessment & Plan  -Acute exacerbation of chronic abdominal pain, now located in epigastric region, no associated N/V or diarrhea, now unable to be controlled on home Norco  -History of pancreatitis, otherwise on other visits no clear etiology defined  -CT abdomen negative for acute intra-abdominal process, afebrile, normotensive, tachycardic, requiring 2L O2, no leukocytosis, normal lipase, no electrolyte abnormalities, LFT's wnl  -UA negative for signs of infection  -Last BM this morning  -Remains unclear etiology     Started on pain scale.    Impaired mobility and ADLs- (present on admission)  Assessment & Plan  Seizure, aspiration, fall precautions  PT/OT recommend SNF    Myotonic muscular dystrophy (HCC)- (present on admission)  Assessment & Plan  -Diagnosed at age 16  -History of chronic abdominal pain with no clear explained etiology prompting multiple ED visits   -Most likely contributing to chronic abdominal pain as well as onset of of extremity symptoms  ESR, CRP within normal limits    Electrolyte abnormality  Assessment & Plan  Acute hypokalemia with potassium of 2.4, mag of 1.7 and Phos of 1.5  Due to patient's inability to take orally I have ordered IV replacement  Repeat labs at 3 AM    Encephalopathy acute  Assessment & Plan  Spleen due to severe sepsis  Random blood sugar in 200s  I have ordered ABG and 1 dose of Narcan  IV fluids and blood cultures ordered    Hypoglycemia  Assessment & Plan  Change fluids from LR to D5 LR  Started on hypoglycemia protocol with Accu-Cheks.    Altered mental status  Assessment & Plan  CT head with  normal limits.  Likely secondary to hypoglycemia.  She did receive morphine 6 mg IV in the emergency room.       VTE prophylaxis: Enoxaparin    I spent 65 minutes coordinating patient care.  She is critically ill with new onset of severe sepsis.

## 2020-11-14 NOTE — THERAPY
Speech Language Pathology   Clinical Swallow Evaluation     Patient Name: Gayla Escudero  AGE:  49 y.o., SEX:  female  Medical Record #: [id number]  Today's Date: 11/13/2020    Precautions: Fall Risk  Comments: baseline myotonic MD    Assessment    Patient is 49 y.o. female who was admitted to [facility] for abdominal pain. 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 Additional factors influencing patient status/progress: hx of muscular dystrophy, unknown baseline swallow function. Was on modified diet at Rehab in 2018 and was on a mechanical soft/thin liquid diet.     Patient was seen on this date for a clinical swallow evaluation. Per RN, patient sounded gurgly earlier with thin liquids. Patient slumped over with head flexed down. AAO to self, hospital, and year only (perseverating on 2020 with further orientation questions). She was sleepy and required MAX verbal cueing to maintain wakefulness and head elevated for PO. Pt following a few simple directives for oral motor examination which revealed diffuse weakness and reduced ROM of oral structures. Open mouth posture. PO trials were administered and consisted of ice chips, MTL, applesauce, pudding, thin liquids, and soft solids. Onset of swallow trigger minimally delayed and laryngeal elevation reduced to palpation. NO overt s/sx of aspiration appreciated this session. However, with trials of thin liquids patient had more prolonged and effortful initiation of pharyngeal swallow. Mastication was prolonged and uncoordinated with open mouth chewing noted.     Plan    Recommend downgrade to a pureed (level 4) and mildly thick liquid (level 2) diet given 1:1 feeding. OK for patient to have sips of un-thickened water between meals when awake/alert. SLP following.     Recommend Speech Therapy 3 times per week until therapy goals are met for the following treatments:  Dysphagia Training and Patient /  Family / Caregiver Education.    Discharge Recommendations: Recommend post-acute placement for additional speech therapy services prior to discharge home       Objective       11/13/20 1550   Vitals   O2 (LPM) 2   O2 Delivery Device Silicone Nasal Cannula   Prior Level Of Function   Communication Impaired   Swallow Unknown   Dentition Intact   Oral Motor Eval    Is Patient Able to Complete Oral Motor Eval Yes but Impaired   Labial Function   Labial Structure At Rest Minimal  (open mouth posture)   Labial Vowel Production / I /, / U / Moderate  (weakness)   Labial Sequence / I /, / U / Moderate   Lingual Function   Lingual Structure At Rest Within Functional Limits   Lingual Protrude Moderate   Jaw   Jaw Structure At Rest Within Functional Limits   Bite (Masseter) Moderate   Chew (Rotary) Moderate   Laryngeal Function   Voice Quality Minimal  (low volume)   Volutional Cough   (not following)   Excursion Upon Swallow Weak   (reduced)   Oral Food Presentation   Ice Chips Minimal   Single Swallow Mildly Thick (2) - (Nectar Thick)  Within Functional Limits   Single Swallow Thin (0) Minimal   Liquidised (3) Minimal   Pureed (4) Minimal   Soft & Bite-Sized (6) - (Dysphagia III) Moderate   Self Feeding Needs Total Assistance   Dysphagia Strategies / Recommendations   Compensatory Strategies Strict 1:1 Feeding;Head of Bed 90 Degrees During Eating / Drinking;Single Sips / Bites   Diet / Liquid Recommendation Puree (4);Mildly Thick (2) - (Nectar Thick)   Medication Administration  Crush all Medications in Puree   Therapy Interventions Dysphagia Therapy By Speech Language Pathologist   Short Term Goals   Short Term Goal # 1 Patient will consume a PU4/MT2 diet with no overt s/sx of aspiration given 1:1 feeding.

## 2020-11-14 NOTE — PROGRESS NOTES
Cross Cover Note:     RN notified me of patient's obtundation & ABG results. D/w Dr. LENCHO Freeman, asked him to assess at bedside. Per him, she will need to be followed closely. If CXR & UA normal, may need an ICU consult  & an LP to r/o meningitis.     Stat CT head ordered & reviewed, no acute abnormalities.

## 2020-11-14 NOTE — PROGRESS NOTES
Mu from Lab called with critical result of lactic acid 4.0 at 1836. Critical lab result read back to Mu.   Dr. Montalvo notified of critical lab result at 1839.  Critical lab result read back by Dr. Montalvo.

## 2020-11-14 NOTE — PROGRESS NOTES
Khushboo from Lab called with critical result of potassium of 2.4 at 2012. Critical lab result read back to Autumn. Dr. Kyle notified of critical lab result at 2020. Critical lab result read back by Dr. Kyle.     MD coming to bedside.

## 2020-11-14 NOTE — PROCEDURES
Vascular Access Team    Date of Insertion: 11/14/20  Arm Circumference: n/a  Line Length: 8  Line Size: 20  Vein Occupancy %: 26  Reason for Midline: Access  Labs: WBC 8.8, , BUN 3, Cr 0.32, GFR >60, INR 1.10    Orders confirmed, vessel patency confirmed with ultrasound. Risks and benefits of procedure explained to patient and education regarding line associated bloodstream infections provided. Questions answered.     PowerGlide Midline placed in LUE per licensed provider order with ultrasound guidance. 20g, 8 cm line placed in Bacilic vein after 1 attempt(s).  Catheter inserted with brisk blood return. Secured with 0 cm external from insertion site.  Line flushed without resistance with 10 mL 0.9% normal saline.  Midline secured with Biopatch and Tegaderm.     Midline placement is confirmed by nurse using ultrasound and ability to flush and draw blood. Midline is appropriate for use at this time.  No X-ray is needed for placement confirmation. Pt tolerated procedure well.  Patient condition relayed to unit RN or ordering physician via this post procedure note in the EMR.    Ultrasound images uploaded to PACS and viewable in the EMR - yes  Ultrasound imaged printed and placed in paper chart - no      BARD PowerGlide Midline ref # [id number], Lot # [id number], Expiration Date 6/30/21

## 2020-11-14 NOTE — PROGRESS NOTES
"Pharmacy Kinetics 49 y.o. female on vancomycin day # 1 2020    Received Vancomycin loading dose 1000 mg iv x1 (~0000)  Provider specified end date: TBD    Indication for Treatment: unknown source of infection     Pertinent history per medical record: Admitted on 2020 for abdominal pain with severe sepsis present on admission. Hx of myotonic muscular dystrophy.     Other antibiotics: Zosyn 4.5 g iv q8h     Allergies: Codeine and Tramadol     List concerns for renal function: abnormal LFTs, malnutrition (BMI 15) /low albumin, hypermetabolic state (SIRS), hypotension, concurrent use of Zosyn     Pertinent cultures to date:    - blood cx pending     MRSA nares swab if pneumonia is a concern (ordered/positive/negative/n-a): N/A    Recent Labs     20  1909   WBC 6.2 6.2 6.3 17.6*   NEUTSPOLYS 59.10 61.90 55.80 90.40*   BANDSSTABS  --   --   --  1.80     Recent Labs     206 20  1909   BUN 4* 4* 2* 2*   CREATININE 0.40* 0.34* 0.48* 0.38*   ALBUMIN 4.1 3.5  --  3.0*     No results for input(s): VANCOTROUGH, VANCOPEAK, VANCORANDOM in the last 72 hours.No intake or output data in the 24 hours ending 207   Blood Pressure (Abnormal) 90/58   Pulse 74   Temperature 36.5 °C (97.7 °F) (Temporal)   Respiration (Abnormal) 24   Height 1.549 m (5' 1\")   Weight 38 kg (83 lb 12.4 oz)   Oxygen Saturation 100%  Temp (24hrs), Av.9 °C (98.4 °F), Min:36.3 °C (97.3 °F), Max:37.9 °C (100.3 °F)      A/P   1. Vancomycin dose change: new start vanco   2. Next vancomycin level:  at 1200 - ordered (approx 12 hours post-loading dose)    3. Goal trough: 10-15 mcg/mL   4. Comments: Due to above renal concerns, will obtain random vanco trough level prior to further dosing. Pharmacy will continue to follow and recommend de-escalation of antibiotics as appropriate.      Irena Gonzalez, PharmD      "

## 2020-11-14 NOTE — ASSESSMENT & PLAN NOTE
Spleen due to severe sepsis  Random blood sugar in 200s  I have ordered ABG and 1 dose of Narcan  IV fluids and blood cultures ordered

## 2020-11-14 NOTE — CONSULTS
Referring Physician: Dr. Paloma Catherine    Referral Reason: Alteration of mental status    HPI:  Ms. Gayla Escudero is a 49 y.o. female with past medical history significant for muscular dystrophy, obstructive sleep apnea on CPAP who was admitted on 11/11/2020 for evaluation of abdominal pain rated 8/10.  CT of abdomen, pelvis did not reveal acute abnormalities.  Patient is currently obtunded and nonverbal.  History was obtained by reviewing her medical record.  Apparently yesterday she became acutely encephalopathic with elevated white count to almost 17,000 and evidence of pneumonia on chest x-ray.  Her lactic acid was elevated as well.  Patient was also found to have multiple electrolyte abnormalities including hypocalcemia and hypernatremia.  She is currently on broad-spectrum antibiotics including vancomycin and Zosyn as well as acyclovir with a plan for spinal tap this evening.  She underwent a brain CT which did not reveal acute abnormalities.  Apparently at baseline she has profound weakness to the point she is wheelchair-bound.  Of note she fell of wheelchair in the emergency room and hit her forehead with no loss of consciousness.  Her brain CT at that time revealed focal soft tissue swelling in the right frontal head region but no intracranial abnormalities.  She was also noted to have some apnea episode.    ROS:   Unable to obtain.    Past Medical History:   Past Medical History:   Diagnosis Date   • Anemia    • Cataract    • Heart murmur    • Muscular disease    • Muscular dystrophy (HCC)    • JOSÉ on CPAP        Past Surgical History:   Past Surgical History:   Procedure Laterality Date   • HYSTERECTOMY LAPAROSCOPY         Social History:   Social History     Socioeconomic History   • Marital status:      Spouse name: Not on file   • Number of children: Not on file   • Years of education: Not on file   • Highest education level: Not on file   Occupational History   • Not on file   Social  Needs   • Financial resource strain: Not on file   • Food insecurity     Worry: Not on file     Inability: Not on file   • Transportation needs     Medical: Not on file     Non-medical: Not on file   Tobacco Use   • Smoking status: Never Smoker   • Smokeless tobacco: Never Used   Substance and Sexual Activity   • Alcohol use: No   • Drug use: No   • Sexual activity: Yes     Partners: Male   Lifestyle   • Physical activity     Days per week: Not on file     Minutes per session: Not on file   • Stress: Not on file   Relationships   • Social connections     Talks on phone: Not on file     Gets together: Not on file     Attends Baptism service: Not on file     Active member of club or organization: Not on file     Attends meetings of clubs or organizations: Not on file     Relationship status: Not on file   • Intimate partner violence     Fear of current or ex partner: Not on file     Emotionally abused: Not on file     Physically abused: Not on file     Forced sexual activity: Not on file   Other Topics Concern   • Not on file   Social History Narrative   • Not on file       Family Hx:   Family History   Problem Relation Age of Onset   • No Known Problems Mother    • No Known Problems Father    • Sleep Apnea Daughter    • No Known Problems Sister    • No Known Problems Brother         Musc dys also   • No Known Problems Sister    • No Known Problems Brother         Trauma, was shot   • No Known Problems Brother         Car accident   • Other Son         Muscular dystrophy   • Heart Disease Daughter          at 2 months congenital heart disease       Current Medications:   Current Facility-Administered Medications   Medication Dose Route Frequency Provider Last Rate Last Admin   • piperacillin-tazobactam (ZOSYN) 4.5 g in  mL IVPB  4.5 g Intravenous Q8HRS Cristian Kyle M.D. 25 mL/hr at 20 1501 4.5 g at 20 1501   • PIPERACILLIN SOD-TAZOBACTAM SO 4.5 (4-0.5) G IV SOLR            • SODIUM CHLORIDE  0.9 % IV SOLN            • morphine (pf) 4 mg/mL injection 1-2 mg  1-2 mg Intravenous Q3HRS PRN Joann Bro M.D.       • naloxone (NARCAN) injection 0.4 mg  0.4 mg Intravenous PRN Joann Bro M.D.       • acyclovir (ZOVIRAX) 380 mg in  mL IVPB  10 mg/kg Intravenous Q8HRS Asia Lucero M.D.   Stopped at 11/14/20 1450   • D5LR infusion   Intravenous Continuous Asia Lucero M.D.       • vancomycin (VANCOCIN) 750 mg in  mL IVPB  17 mg/kg Intravenous Q12HR Asia Lucero M.D. 125 mL/hr at 11/14/20 1435 750 mg at 11/14/20 1435   • diphenhydrAMINE (BENADRYL) injection 12.5-25 mg  12.5-25 mg Intravenous Q6HRS PRN Joann Bro M.D.       • LORazepam (ATIVAN) injection 0.5 mg  0.5 mg Intravenous Q4HRS PRN Joann Bro M.D.       • acetaminophen (Tylenol) tablet 650 mg  650 mg Oral Q4HRS PRN Diego Montalvo D.O.   650 mg at 11/13/20 1626   • MD Alert...Vancomycin per Pharmacy   Other PHARMACY TO DOSE Kendall Carreon M.D.       • Pharmacy Consult Request ...Pain Management Review 1 Each  1 Each Other PHARMACY TO DOSE CLARA Ramirez.O.       • oxyCODONE immediate-release (ROXICODONE) tablet 2.5 mg  2.5 mg Oral Q3HRS PRN Diego Montalvo D.O.       • oxyCODONE immediate-release (ROXICODONE) tablet 5 mg  5 mg Oral Q3HRS PRN Diego Montalvo D.O.   5 mg at 11/12/20 2308   • morphine (pf) 4 mg/mL injection 2 mg  2 mg Intravenous Q3HRS PRN Diego Montalvo D.O.   2 mg at 11/14/20 1418   • insulin regular (HumuLIN R,NovoLIN R) injection  1-6 Units Subcutaneous 4X/DAY ACHS Diego Montlavo D.O.   Stopped at 11/12/20 1700    And   • glucose 4 g chewable tablet 16 g  16 g Oral Q15 MIN PRN Diego Montalvo D.O.        And   • dextrose 50% (D50W) injection 50 mL  50 mL Intravenous Q15 MIN PRN Diego Montalvo D.O.   50 mL at 11/12/20 1335   • enoxaparin (LOVENOX) inj 30 mg  30 mg Subcutaneous DAILY Diego Montalvo D.O.   30 mg at 11/14/20 0611   • baclofen (LIORESAL)  tablet 10 mg  10 mg Oral TID Lauro Castro M.D.   Stopped at 11/13/20 1800   • ondansetron (ZOFRAN) syringe/vial injection 4 mg  4 mg Intravenous Q4HRS PRN Lauro Castro M.D.       • magnesium hydroxide (MILK OF MAGNESIA) suspension 30 mL  30 mL Oral DAILY Lauro Castro M.D.   Stopped at 11/14/20 0600    And   • senna-docusate (PERICOLACE or SENOKOT S) 8.6-50 MG per tablet 2 Tab  2 Tab Oral BID Lauro Castro M.D.   Stopped at 11/13/20 1800    And   • polyethylene glycol/lytes (MIRALAX) PACKET 1 Packet  1 Packet Oral QDAY PRN Lauro Castro M.D.        And   • bisacodyl (DULCOLAX) suppository 10 mg  10 mg Rectal QDAY PRN Lauro Castro M.D.       • omeprazole (PRILOSEC) capsule 20 mg  20 mg Oral DAILY Lauro Castro M.D.   Stopped at 11/14/20 0600   • dicyclomine (BENTYL) tablet 20 mg  20 mg Oral Q6HRS PRN Lauro Castro M.D.           Allergies:   Allergies   Allergen Reactions   • Codeine Vomiting, Nausea and Unspecified     N&V  Dizzy, lightheaded and vomiting.    • Tramadol Unspecified     Effects her MD  weak       Physical Exam:   Vitals:    11/14/20 0325 11/14/20 0500 11/14/20 0744 11/14/20 1139   BP: (!) 99/66 (!) 93/65 117/80 104/70   Pulse: 94 (!) 144 (!) 132 100   Resp: 19  20 18   Temp: 36.3 °C (97.3 °F)   36.8 °C (98.2 °F)   TempSrc: Temporal   Temporal   SpO2: 94%  96% 91%   Weight:       Height:           Physical Exam   GENERAL:  Lying in the hospital bed, nonverbal and occasionally moaning.  She appears very dehydrated.  Head: Normocephalic and atraumatic.   Eyes: Pupils are equal, round, and reactive to light. EOM are normal.   Cardiovascular: Normal rate and regular rhythm.    Pulmonary/Chest: Coarse breath sound  Abdominal: Soft. Bowel sounds are normal. He exhibits no distension.   Skin: Skin is warm and dry. No rash noted. No erythema.  Neuro Exam  She is awake but nonverbal, occasionally she moans.  According to the nurse she was able to say yes or  no.  Pupils are equal and reactive.  I do not appreciate facial asymmetry.  Facial sensation cannot be tested.  She has decreased muscle tone in all extremity.  She does not follow commands.  She has no resistance to passive movement of her extremity and there is no spontaneous.  According to her nurse she moves all 4 extremity minimally.  Sensation, coordination cannot be tested.  Deep tendon reflexes are diffusely diminished.  Plantar reflexes are equivocal.    GAIT:  Deferred     Labs:  Recent Labs     11/13/20 0016 11/13/20 1909 11/14/20  0242   WBC 6.3 17.6* 8.8   RBC 4.37 4.06* 4.10*   HEMOGLOBIN 12.8 12.0 11.8*   HEMATOCRIT 40.5 37.3 38.1   MCV 92.7 91.9 92.9   MCH 29.3 29.6 28.8   MCHC 31.6* 32.2* 31.0*   RDW 47.8 47.2 48.2   PLATELETCT 242 191 190   MPV 10.2 9.9 10.0     Recent Labs     11/13/20 1909 11/14/20 0242 11/14/20  1200   SODIUM 148* 149* 151*   POTASSIUM 2.4* 4.2 3.7   CHLORIDE 108 111 123*   CO2 30 28 23   GLUCOSE 196* 129* 77   BUN 2* 3* 3*   CREATININE 0.38* 0.32* <0.17*   CALCIUM 9.4 9.4 6.3*     Recent Labs     11/13/20  2246   INR 1.10                 Recent Labs     11/13/20 1909 11/14/20  0242 11/14/20  1200   SODIUM 148* 149* 151*   POTASSIUM 2.4* 4.2 3.7   CHLORIDE 108 111 123*   CO2 30 28 23   GLUCOSE 196* 129* 77   BUN 2* 3* 3*     Recent Labs     11/13/20 0016 11/13/20 1909 11/14/20 0242 11/14/20  1200   SODIUM 140 148* 149* 151*   POTASSIUM 3.5* 2.4* 4.2 3.7   CHLORIDE 104 108 111 123*   CO2 28 30 28 23   BUN 2* 2* 3* 3*   CREATININE 0.48* 0.38* 0.32* <0.17*   MAGNESIUM 2.1 1.7 2.1  --    PHOSPHORUS 2.6 1.5* 1.2* 2.9   CALCIUM 9.4 9.4 9.4 6.3*     Recent Labs     11/13/20  2246   INR 1.10     Results for orders placed or performed during the hospital encounter of 03/21/18   ECHOCARDIOGRAM COMP W/O CONT   Result Value Ref Range    Eject.Frac. MOD BP 51.01     Eject.Frac. MOD 4C 51.05     Eject.Frac. MOD 2C 54.03     Left Ventrical Ejection Fraction 55          Imaging  reviewed:    IR-MIDLINE CATHETER INSERTION WO GUIDANCE > AGE 3   Final Result                  Ultrasound-guided midline placement performed by qualified nursing staff    as above.          CT-HEAD W/O   Final Result      Normal CT scan of the head without contrast.               INTERPRETING LOCATION:  [address], [place], [address]      CT-HEAD W/O   Final Result      No CT evidence of acute infarct, hemorrhage or mass.      CT-ABDOMEN-PELVIS WITH   Final Result      No evidence of acute intra-abdominal or pelvic process.      Probably duplicated right renal collecting system.      CT-HEAD W/O   Final Result      1.  Focal soft tissue swelling right frontal region with minimal underlying increased density adjacent to the periphery of the outer table of the calvarium which could represent a small cephalohematoma.      2.  Periventricular and subcortical white matter density changes which could be related to small vessel ischemia, demyelinization or gliosis. Findings may be slightly more prominent than on previous exam.      DX-CHEST-PORTABLE (1 VIEW)    (Results Pending)   [id number] VIEW    (Results Pending)   DX-LUMBAR PUNCTURE FOR DIAGNOSIS    (Results Pending)   MR-BRAIN-WITH & W/O    (Results Pending)          Assessment/Plan:   49 y.o. female with history of muscular dystrophy who was admitted on 11/11/2020 due to abdominal pain and while in the hospital developed severe sepsis with acute encephalopathy and electrolyte abnormalities.  Her encephalopathy is likely multifactorial such as toxic, metabolic and infective etiology.  Suggest to obtain brain MRI with and without contrast, I agree with spinal tap which is already planned for this afternoon.  Optimize medical management and correct electrolyte abnormalities as you are doing.  No abnormal movement or seizure activity reported or noted, will defer EEG at this point.  Send CSF for Gram stain, cultures, HSV and encephalitis panel.  Continue with acyclovir  until the results of herpes PCR.  Check B12, folate, RPR.  We will continue to follow.

## 2020-11-14 NOTE — CARE PLAN
Problem: Safety  Goal: Will remain free from injury  Outcome: PROGRESSING AS EXPECTED  Goal: Will remain free from falls  Outcome: PROGRESSING AS EXPECTED     Problem: Infection  Goal: Will remain free from infection  Outcome: PROGRESSING AS EXPECTED     Problem: Venous Thromboembolism (VTW)/Deep Vein Thrombosis (DVT) Prevention:  Goal: Patient will participate in Venous Thrombosis (VTE)/Deep Vein Thrombosis (DVT)Prevention Measures  Outcome: PROGRESSING AS EXPECTED     Problem: Bowel/Gastric:  Goal: Normal bowel function is maintained or improved  Outcome: PROGRESSING AS EXPECTED  Goal: Will not experience complications related to bowel motility  Outcome: PROGRESSING AS EXPECTED     Problem: Discharge Barriers/Planning  Goal: Patient's continuum of care needs will be met  Outcome: PROGRESSING AS EXPECTED     Problem: Psychosocial Needs:  Goal: Level of anxiety will decrease  Outcome: PROGRESSING AS EXPECTED     Problem: Skin Integrity  Goal: Risk for impaired skin integrity will decrease  Outcome: PROGRESSING AS EXPECTED     Problem: Pain Management  Goal: Pain level will decrease to patient's comfort goal  Outcome: PROGRESSING AS EXPECTED     Problem: Respiratory:  Goal: Respiratory status will improve  Outcome: PROGRESSING AS EXPECTED     Problem: Mobility  Goal: Risk for activity intolerance will decrease  Outcome: PROGRESSING AS EXPECTED     Problem: Fluid Volume:  Goal: Will maintain balanced intake and output  Outcome: PROGRESSING AS EXPECTED     Problem: Urinary Elimination:  Goal: Ability to reestablish a normal urinary elimination pattern will improve  Outcome: PROGRESSING AS EXPECTED

## 2020-11-14 NOTE — PROGRESS NOTES
[facility] [facility] [place]     Attending:   Dalila Scherer MD    Resident:   Asia Lucero MD    PATIENT:   Gayla Escudero; [id number]; 1970    ID:   49 y.o. female with PMHx of myotonic muscular dystrophy and chronic abdominal pain on Norco admitted for profound weakness and worsening abdominal pain. She has developed sepsis, since being admitted, of unknown origin.      SUBJECTIVE:   Patient minimally responsive to questions. Unable to communicate. Encephalopathic.     OBJECTIVE:  Vitals:    11/13/20 2300 11/13/20 2346 11/14/20 0325 11/14/20 0500   BP: 117/67 103/61 (!) 99/66 (!) 93/65   Pulse: 76 (!) 101 94 (!) 144   Resp: (!) 22 18 19    Temp: 36.6 °C (97.9 °F) 36.5 °C (97.7 °F) 36.3 °C (97.3 °F)    TempSrc: Temporal Temporal Temporal    SpO2: 100% 100% 94%    Weight:       Height:         No intake or output data in the 24 hours ending 11/14/20 0537    PHYSICAL EXAM:  General: In mild distress. Thin.  HEENT: NC/AT. EOMI.   Cardiovascular: RRR without murmurs, rubs, heaves. Normal capillary refill   Respiratory: Occasional apnea, shallow breath sounds  Abdomen: normal bowel sounds, rigid, nontender, nondistended, no masses, no organomegaly   EXT:  JIM, no edema  Skin: No erythema/lesions   Neuro: Encephalopathic      LABS:  Recent Labs     11/13/20  0016 11/13/20  1909 11/14/20  0242   WBC 6.3 17.6* 8.8   RBC 4.37 4.06* 4.10*   HEMOGLOBIN 12.8 12.0 11.8*   HEMATOCRIT 40.5 37.3 38.1   MCV 92.7 91.9 92.9   MCH 29.3 29.6 28.8   RDW 47.8 47.2 48.2   PLATELETCT 242 191 190   MPV 10.2 9.9 10.0   NEUTSPOLYS 55.80 90.40* 96.00*   LYMPHOCYTES 30.80 2.60* 2.50*   MONOCYTES 11.30 5.30 1.10   EOSINOPHILS 1.30 0.00 0.00   BASOPHILS 0.50 0.00 0.20   RBCMORPHOLO  --  Normal  --      Recent Labs     11/11/20  1805 11/11/20  2326 11/13/20  0016 11/13/20  1909 11/14/20  0242   SODIUM 139 139 140 148* 149*   POTASSIUM 4.5 3.6 3.5* 2.4* 4.2   CHLORIDE 98 97 104 108 111   CO2 26 27 28 30 28   BUN 4* 4* 2* 2* 3*      CREATININE 0.40* 0.34* 0.48* 0.38* 0.32*   CALCIUM 10.1 9.5 9.4 9.4 9.4   MAGNESIUM  --   --  2.1 1.7 2.1   PHOSPHORUS  --   --  2.6 1.5* 1.2*   ALBUMIN 4.1 3.5  --  3.0*  --      Estimated GFR/CRCL = CrCl cannot be calculated (Unknown ideal weight.).  Recent Labs     11/13/20  0016 11/13/20  0016 11/13/20  1751 11/13/20  1909 11/13/20  2043 11/13/20  2235 11/14/20  0242   GLUCOSE 114*  --   --  196*  --   --  129*   POCGLUCOSE  --    < > 177*  --  220* 170*  --     < > = values in this interval not displayed.     Recent Labs     11/11/20  1805 11/11/20  2326 11/13/20 1909 11/13/20  2246 11/14/20  0242   ASTSGOT 22 17 159*  --   --    ALTSGPT 7 <5 45  --   --    TBILIRUBIN 0.4 0.3 0.5  --   --    ALKPHOSPHAT 56 48 62  --   --    GLOBULIN 3.3 2.9 2.5  --   --    INR  --   --   --  1.10  --    AMMONIA  --   --   --   --  26         Recent Labs     11/13/20 2246   OMYCZ60P 7.37*   TXLNMI621G 51.4*   VQZQX790I 158.1*   XYOZ5ISQ 98.7   ARTHCO3 29*   ARTBE 3     Recent Labs     11/13/20  2246   INR 1.10       MICROBIOLOGY:   Blood Culture Hold   Date Value Ref Range Status   02/19/2020 Collected  Final        IMAGING:   CT-HEAD W/O   Final Result      Normal CT scan of the head without contrast.               INTERPRETING LOCATION:  [address] [facility], [id number]      CT-HEAD W/O   Final Result      No CT evidence of acute infarct, hemorrhage or mass.      CT-ABDOMEN-PELVIS WITH   Final Result      No evidence of acute intra-abdominal or pelvic process.      Probably duplicated right renal collecting system.      CT-HEAD W/O   Final Result      1.  Focal soft tissue swelling right frontal region with minimal underlying increased density adjacent to the periphery of the outer table of the calvarium which could represent a small cephalohematoma.      2.  Periventricular and subcortical white matter density changes which could be related to small vessel ischemia, demyelinization or gliosis. Findings may be slightly more  "prominent than on previous exam.      DX-CHEST-PORTABLE (1 VIEW)    (Results Pending)   AO-[id number]-1 VIEW    (Results Pending)       CULTURES:   Results     Procedure Component Value Units Date/Time    Blood Culture [[id number]] Collected: 11/14/20 0242    Order Status: Completed Updated: 11/14/20 0254    Blood Culture,Hold [[id number]] Collected: 11/14/20 0242    Order Status: Canceled     Urinalysis [[id number]] Collected: 11/14/20 0020    Order Status: Sent Specimen: Urine, Cath     Blood Culture [[id number]]     Order Status: No result Specimen: Blood from Peripheral     Blood Culture [[id number]]     Order Status: No result Specimen: Blood from Peripheral     Culture Respiratory W/ GRM STN [[id number]]     Order Status: Completed Specimen: Respirate from Sputum     Blood Culture [[id number]] Collected: 11/13/20 1740    Order Status: Completed Specimen: Blood from Peripheral Updated: 11/13/20 1759    Narrative:      Per Hospital Policy: Only change Specimen Src: to \"Line\" if  specified by physician order.    Blood Culture [[id number]] Collected: 11/13/20 1741    Order Status: Completed Specimen: Blood from Peripheral Updated: 11/13/20 1759    Narrative:      Per Hospital Policy: Only change Specimen Src: to \"Line\" if  specified by physician order.    CoV-2, Flu A/B, And RSV by PCR [[id number]] Collected: 11/11/20 2328    Order Status: Completed Updated: 11/12/20 0218     Influenza virus A RNA Negative     Influenza virus B, PCR Negative     RSV, PCR Negative     SARS-CoV-2 by PCR NotDetected     Comment: PATIENTS: Important information regarding your results and instructions can  be found at [email or web address]   \"After your  Covid-19 Test\"  [facility] providers: PLEASE REFER TO DE-ESCALATION AND RETESTING PROTOCOL  on [email or web address]  **The [employer] GeneXpert Xpress SARS-CoV-2 Test has been made available for  use under the Emergency Use Authorization (EUA) only.          SARS-CoV-2 Source NP " Swab    Narrative:      Is patient being admitted?->Yes  Does this patient meet criteria for Rush[facility]Cepheid per [facility]  [facility] Workflow? (See workflow link below)->Yes  Expected turn around time?->Rush (Cepheid 2-4 hours)  Is this the patients First SARS CoV-2 test?->Unknown  Is this patient employed in healthcare?->No  Is the patient symptomatic as defined by the CDC?->No  Is the patient hospitalized?->Yes  Is the patient in the ICU?->No  Is the patient a resident in a congregate care setting?->No  Is the patient pregnant?->No    COVID/SARS CoV-2 PCR [[id number]] Collected: 11/11/20 2328    Order Status: Completed Specimen: Respirate from Nasopharyngeal Updated: 11/12/20 0137     COVID Order Status Received     Comment: The order for SARS CoV-2 testing has been received by the  Laboratory. This result is neither positive nor negative.  Final results of testing will report in 24-48 hours, separately.         Narrative:      Is patient being admitted?->Yes  Does this patient meet criteria for Rush/Cepheid per [facility]  Inpatient Workflow? (See workflow link below)->Yes  Expected turn around time?->Rush (Cepheid 2-4 hours)  Is this the patients First SARS CoV-2 test?->Unknown  Is this patient employed in healthcare?->No  Is the patient symptomatic as defined by the CDC?->No  Is the patient hospitalized?->Yes  Is the patient in the ICU?->No  Is the patient a resident in a congregate care setting?->No  Is the patient pregnant?->No    URINALYSIS,CULTURE IF INDICATED [[id number]]  (Abnormal) Collected: 11/11/20 2030    Order Status: Completed Specimen: Urine Updated: 11/11/20 2059     Color Yellow     Character Cloudy     Specific Gravity 1.025     Ph 5.5     Glucose Negative mg/dL      Ketones 15 mg/dL      Protein Negative mg/dL      Bilirubin Negative     Urobilinogen, Urine 0.2     Nitrite Negative     Leukocyte Esterase Trace     Occult Blood Negative     Micro Urine Req Microscopic    Narrative:      Indication for  culture:->Patient WITHOUT an indwelling Mehta  catheter in place with new onset of Dysuria, Frequency,  Urgency, and/or Suprapubic pain          MEDS:  Current Facility-Administered Medications   Medication Last Admin   • piperacillin-tazobactam (ZOSYN) 4.5 g in  mL IVPB     • PIPERACILLIN SOD-TAZOBACTAM SO 4.5 (4-0.5) G IV SOLR     • SODIUM CHLORIDE 0.9 % IV SOLN     • potassium chloride SA (Kdur) tablet 40 mEq     • acetaminophen (Tylenol) tablet 650 mg 650 mg at 11/13/20 1626   • lactated ringers infusion New Bag at 11/13/20 2216   • potassium phosphate IVPB 30 mmol in 500 mL D5W (premix) 30 mmol at 11/14/20 0345   • MD Alert...Vancomycin per Pharmacy     • Pharmacy Consult Request ...Pain Management Review 1 Each     • oxyCODONE immediate-release (ROXICODONE) tablet 2.5 mg     • oxyCODONE immediate-release (ROXICODONE) tablet 5 mg 5 mg at 11/12/20 2308   • morphine (pf) 4 mg/mL injection 2 mg 2 mg at 11/12/20 1843   • insulin regular (HumuLIN R,NovoLIN R) injection Stopped at 11/12/20 1700    And   • glucose 4 g chewable tablet 16 g      And   • dextrose 50% (D50W) injection 50 mL 50 mL at 11/12/20 1335   • D5LR infusion New Bag at 11/13/20 2034   • enoxaparin (LOVENOX) inj 30 mg     • baclofen (LIORESAL) tablet 10 mg Stopped at 11/13/20 1800   • ondansetron (ZOFRAN) syringe/vial injection 4 mg     • magnesium hydroxide (MILK OF MAGNESIA) suspension 30 mL Stopped at 11/14/20 0600    And   • senna-docusate (PERICOLACE or SENOKOT S) 8.6-50 MG per tablet 2 Tab Stopped at 11/13/20 1800    And   • polyethylene glycol/lytes (MIRALAX) PACKET 1 Packet      And   • bisacodyl (DULCOLAX) suppository 10 mg     • omeprazole (PRILOSEC) capsule 20 mg Stopped at 11/14/20 0600   • dicyclomine (BENTYL) tablet 20 mg         PROBLEM LIST:  No problems updated.    ASSESSMENT/PLAN: 49 y.o. female with PMHx of myotonic muscular dystrophy and chronic abdominal pain on Norco admitted for profound weakness and worsening abdominal  pain. She has developed sepsis, since being admitted, of unknown origin.      # Severe Sepsis  # Encephalopathy  # Acute compensated respiratory acidosis  # Hx of Muscular Dystrophy  Follows with neurologist at [facility] for muscular dystrophy   Not present on admission. SIRS 3/4. Leukocytosis down from 17 to 8, but with persistent bandemia.   Sources include respiratory, CSF, blood, and urine. CXR shows L basilar opacities consistent with PNA. Initial UA in ED negative for infection.  Procalcitonin 0.53. Lactic Acid 4>>3.7>>3.6  Zosyn and Vancomycin started 11/13.  ABG with pH of 7.33, pCO2 of 51.4, HCO3 of 29  S/p Narcan x1 on 11/13. Still obtunded.   S/p 3 boluses of LR 11/14.   One hypoglycemic episode to 45 on 11/12 AM; started on D5LR. Recent POC glucose have normalized. Switched to LR.  Initial CT head showed no acute abnormalities, with possible small cephalohematoma. Repeat CT unchanged.  Spoke with , 11/14AM, who claims her current altered mental status is a total 180 from her baseline.   Intensivist consulted; does not think patient meets criteria for ICU transfer at this point.  - Continue Abx  - Continue fluid resuscitation with LR @ 100cc  - Follow up on UA and blood cultures  - Continue O2 as needed, currently only on 2L NC  - Trend lactic acid  - IR to do LP with concern for meningitis. Broad Abx coverage should cover for any bacterial source. Viral is also a possibility, so will start IV Acyclovir empirically.   - Low threshold to transfer to ICU as they're already aware of patient  - Toxic metabolic panel pending: Vit B12, RPR, HIV    # Hypokalemia  # Hypophosphatemia  - Replete as needed    # Upper extremity pain, right  # Bilateral lower extremity pain  Present on admission. 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   Per , this is a new development. Also reported numbness of lower extremities.  CT head negative for signs of infarction, with possible  small cephalohematoma   - Continue Baclofen  - Morphine PRN as she is NPO and used to taking Norco 5mg q4h.    # Abdominal Pain  She chronically has had difficulty eating and taking pills, stating that her stomach starts to hurt  Acute exacerbation of chronic abdominal pain, now located in epigastric region, no associated N/V or diarrhea, now unable to be controlled on home [place]  CTAP showed no evidence of acute intra-abdominal or pelvic process.  ESR and CRP wnl.   - Will assess once patient is no longer encephalopathic     # ADLs   assists with all ADLs. Patient uses walker at home.   Per , patient has been exhibiting an increase in falls lately with unknown head trauma, unable to care for her when he goes to work.   PT/OT recommending SNF. Referral placed.   - Case management to provide updates on SNF referral. Patient not yet medically cleared for transfer.    Lines: PIV  Abx: Zosyn and Vancomycin  DVT prophylaxis: Lovenox  Code Status: Full    Disposition: Continue care on telemetry with sepsis protocol. If patient's oxygen needs increase or her BP intractably low, will advance level of care to ICU.     Asia Lucero MD   PGY-1 Family Medicine Resident   [facility]

## 2020-11-14 NOTE — PROGRESS NOTES
Report received from CNU RN. Pt transported down to xray with transport and transferred to [facility] with transport. Monitor room notified. Pt is SR-ST on monitor. Pt currently on 3L NC, satting 100%. Pt very lethargic and obtunded, only responsive to painful stimuli.

## 2020-11-14 NOTE — PROGRESS NOTES
Lab called with critical result of pCO2 of 51.4 at 2310. Critical lab result read back to lab.   Dr. Clancy notified of critical lab result at 2325. Asked by Dr Clancy to katie hospitalist since they were following this patient until transfer to telemetry 8.     Ariadne BLEDSOE paged and updated with critical lab value and patient's current condition. MD came to bedside to assess patient.     Due to [facility] family [facility] being unfamiliar with this patient, per hospitalist MD, okay to katie hospitalist team for this patient until am.

## 2020-11-14 NOTE — PROGRESS NOTES
Critical care progress note:  Called by [facility] resident for possible ICU transfer.  Patient is on 2 L nc, has had apneic episodes overnight. Has likely left side pna vs atelectasis on cxray. Covid negative. WBC normal and procal only 0.53.  Encephalopathy per resident.  Currently giving IVF for borderline hypotension, sbp low 90s.  Currently does not sound like needs ICU transfer.  Resident to call back if condition worsens.  Lactic acid currently 3.6 and likely will benefit from fluids.  She has a hx of muscular dystrophy and there is a possibility apneic episodes are secondary to central apnea and respiratory distress may be acute on chronic worsening of diaphragm weakness per history given.  Resident agreed that ok if I only see if patient worsens and currently the team is adequately caring for patient.

## 2020-11-14 NOTE — PROGRESS NOTES
Lab called with critical result of Calcium of 6.3 at 1310.  Dr. Joann Bro notified of critical lab result at 1314.

## 2020-11-15 PROBLEM — I95.89 HYPOTENSION DUE TO HYPOVOLEMIA: Status: ACTIVE | Noted: 2018-03-29

## 2020-11-15 PROBLEM — E86.1 HYPOTENSION DUE TO HYPOVOLEMIA: Status: ACTIVE | Noted: 2018-03-29

## 2020-11-15 PROBLEM — T17.908A ASPIRATION INTO AIRWAY: Status: ACTIVE | Noted: 2020-01-01

## 2020-11-15 PROBLEM — R09.02 HYPOXIA: Status: ACTIVE | Noted: 2020-01-01

## 2020-11-15 NOTE — ASSESSMENT & PLAN NOTE
Unclear etiology  Severe deconditioning  ? Malnutrition  lp negative for infection per fluid, pending labs  MRI ordered per Neurology  Consider EEG, for now neurology deferring from recommending  No source of infection currenlty  May be chronic decline

## 2020-11-15 NOTE — ASSESSMENT & PLAN NOTE
Volume resuscitate for map goal > 60 and sbp > 90  Continue fluids, appears dry  Consider cortisol testing  Midodrine may help once resuscitated fully

## 2020-11-15 NOTE — THERAPY
Speech Language Pathology  Daily Treatment     Patient Name: Gayla Escudero  Age:  49 y.o., Sex:  female  Medical Record #: [id number]  Today's Date: 11/15/2020     Precautions  Precautions: Fall Risk, Swallow Precautions ( See Comments)  Comments: baseline myotonic MD    Assessment    The patient was seen on this date for a clinical swallow evaluation. Patient was made NPO per nursing judgement 11/13 d/t inadequate alertness for PO intake. Per RN, patient is more alert this morning, but still demonstrated difficulty with managing secretions. Upon arrival, pt noted to have upper airway congestion. She was unable to provided volitional cough adequate for clearing congestion. Pt required max verbal cues to maintain wakefulness for appropriate PO intake. She answered all questions with moans which were appreciated to be with wet vocal quality. Pt was provided PO trials of 1/4 tsp sips of mildly thick liquid (MT2) and 1/4 tsp of liquidized trials. All trials resulted in increased wet vocal quality and weak volitional cough. Anterior labial spillage impacted by poor labial seal and poor A-P transit following sips of MT2 and ice chips. Recommend continue NPO with consideration for non-oral nutrition.     Collaborated with MD who is holding Cortrak placement at this time. If tubefeeding does not align with patient or family wishes, recommend safest oral diet of liquidized texture with mildly thick liquids, which may reduce but not eliminate aspiration. SLP will continue to follow.      Plan    Continue current treatment plan.     Recommend continue NPO with consideration for non-oral nutrition. Please administer medications non-orally.     Discharge Recommendations: Recommend post-acute placement for additional speech therapy services prior to discharge home       Objective       11/15/20 0932   Dysphagia    Other Treatments PO trials of ice, MTL and LQ3   Diet / Liquid Recommendation NPO;Pre-Feeding Trials with SLP  Only   Nutritional Liquid Intake Rating Scale Nothing by mouth   Nutritional Food Intake Rating Scale Nothing by mouth   Skilled Intervention Verbal Cueing;Compensatory Strategies   Recommended Route of Medication Administration   Medication Administration  Other (See Comments)  (non-orally)

## 2020-11-15 NOTE — OR SURGEON
Immediate Post OP Note    PROCEDURE:     With the patient in prone position, the lumbar region was prepped and draped in a sterile manner.  Following local anesthesia with 1% lidocaine, a 20-gauge spinal needle was advanced into the subarachnoid space at the L2-3 level.  Approximately 11 cc of clear CSF was collected and the specimen was sent to the lab for the studies requested.  The patient tolerated the procedure well with no evidence of complication.        11/14/2020 6:10 PM Eliel Oakes M.D.

## 2020-11-15 NOTE — ASSESSMENT & PLAN NOTE
Unclear, ? Chronicity  Work up negative so far  Mri pending  Consider eeg  Consider autoimmune work up

## 2020-11-15 NOTE — PROGRESS NOTES
Called to bedside by nurse for significant desaturation to 65% for approximately 4 min, required non-rebreather at 15L to bring SpO2 back up above 90%.   Evaluated at bedside with similar physical exam to this morning and inability to answer questions.  Spoke with Dr. Mcgregor, who evaluated patient at bedside and provided recommendations based on patient's current status.  - ABG consistent with respiratory acidosis with appropriate metabolic compensation, improved from yesterday  - repeat lactic acid was 1.5  - CXR this morning showed improvement from yesterday  - will order EEG  - MRI pending for this afternoon  - discussed use of high-flow O2 on the floor as needed given significant ICU need at [facility]  - discussed case with on-call attending, Dr. Scherer

## 2020-11-15 NOTE — PROGRESS NOTES
Pharmacy Kinetics 49 y.o. female on vancomycin day # 3  11/15/2020    Currently Dose: Vancomycin 750 mg iv q12hr (~17 mg/kg/dose)  Received Load Dose: Yes    Indication for Treatment: unknown source of infection  Provider Specified End Date: None  ID Service Following: No    Pertinent history per medical record: Admitted on 11/11/2020 for concern of infection of unknown source. Concern for CNS infection vs PNA vs other etiology. PMA and JONATHON consulted.    Other antibiotics: acyclovir ~10 mg/kg/dose iv q8h, ceftriaxone 2 gm iv q12h    Allergies: Codeine and Tramadol     List concerns for accumulation of vancomycin: BMI ~16, immobility 2/2 muscular dystrophy, electrolyte derangement    Pertinent cultures to date:   Results     Procedure Component Value Units Date/Time    Blood Culture [[id number]] Collected: 11/14/20 0601    Order Status: Completed Specimen: Blood Updated: 11/15/20 0724     Significant Indicator NEG     Source BLD     Site Periperal     Culture Result No Growth  Note: Blood cultures are incubated for 5 days and  are monitored continuously.Positive blood cultures  are called to the RN and reported as soon as  they are identified.      Narrative:      Right Hand    Blood Culture [[id number]] Collected: 11/14/20 0242    Order Status: Completed Specimen: Blood Updated: 11/15/20 0724     Significant Indicator NEG     Source BLD     Site Peripheral     Culture Result No Growth  Note: Blood cultures are incubated for 5 days and  are monitored continuously.Positive blood cultures  are called to the RN and reported as soon as  they are identified.      Narrative:      Left Wrist    MRSA By PCR (Amp) [[id number]] Collected: 11/15/20 0315    Order Status: Completed Specimen: Respirate from Nares Updated: 11/15/20 0638    Narrative:      Collected By:[id number] DANIEL MAURER    GRAM STAIN [[id number]] Collected: 11/14/20 1725    Order Status: Completed Specimen: CSF Updated: 11/14/20 1858     Significant Indicator .     Source  "CSF     Site Tap     Gram Stain Result No organisms seen.    HSV 1/2 By PCR(Herpes)+[id number] [[id number]] Collected: 11/14/20 1810    Order Status: Sent Specimen: CSF from Spinal Fluid     CSF CULTURE [[id number]] Collected: 11/14/20 1725    Order Status: Completed Updated: 11/14/20 1803    Cryptococcal Antigen, CSF [[id number]]     Order Status: No result Specimen: CSF     CSF Culture [[id number]]     Order Status: No result Specimen: CSF from Tap     Blood Culture [[id number]] Collected: 11/13/20 1740    Order Status: Completed Specimen: Blood from Peripheral Updated: 11/14/20 0726     Significant Indicator NEG     Source BLD     Site PERIPHERAL     Culture Result No Growth  Note: Blood cultures are incubated for 5 days and  are monitored continuously.Positive blood cultures  are called to the RN and reported as soon as  they are identified.      Narrative:      Per Hospital Policy: Only change Specimen Src: to \"Line\" if  specified by physician order.  Left AC    Blood Culture [[id number]] Collected: 11/13/20 1741    Order Status: Completed Specimen: Blood from Peripheral Updated: 11/14/20 0726     Significant Indicator NEG     Source BLD     Site PERIPHERAL     Culture Result No Growth  Note: Blood cultures are incubated for 5 days and  are monitored continuously.Positive blood cultures  are called to the RN and reported as soon as  they are identified.      Narrative:      Per Hospital Policy: Only change Specimen Src: to \"Line\" if  specified by physician order.  Left Hand    COVID/SARS CoV-2 PCR [[id number]]     Order Status: Canceled Specimen: Respirate from Nasopharyngeal     Blood Culture,Hold [[id number]] Collected: 11/14/20 0242    Order Status: Canceled     Urinalysis [[id number]] Collected: 11/14/20 0020    Order Status: Sent Specimen: Urine, Cath     Blood Culture [[id number]]     Order Status: No result Specimen: Blood from Peripheral     Blood Culture [[id number]]     Order Status: No result Specimen: Blood from " "Peripheral     Culture Respiratory W/ GRM STN [[id number]]     Order Status: Completed Specimen: Respirate from Sputum     CoV-2, Flu A/B, And RSV by PCR [[id number]] Collected: 11/11/20 2328    Order Status: Completed Updated: 11/12/20 0218     Influenza virus A RNA Negative     Influenza virus B, PCR Negative     RSV, PCR Negative     SARS-CoV-2 by PCR NotDetected     Comment: PATIENTS: Important information regarding your results and instructions can  be found at [email or web address]   \"After your  Covid-19 Test\"  [facility] providers: PLEASE REFER TO DE-ESCALATION AND RETESTING PROTOCOL  on [email or web address]  **The [email or web address] GeneXpert Xpress SARS-CoV-2 Test has been made available for  use under the Emergency Use Authorization (EUA) only.          SARS-CoV-2 Source NP Swab    Narrative:      Is patient being admitted?->Yes  Does this patient meet criteria for Rush/Cepheid per [facility]  Inpatient Workflow? (See workflow link below)->Yes  Expected turn around time?->Rush (Cepheid 2-4 hours)  Is this the patients First SARS CoV-2 test?->Unknown  Is this patient employed in healthcare?->No  Is the patient symptomatic as defined by the CDC?->No  Is the patient hospitalized?->Yes  Is the patient in the ICU?->No  Is the patient a resident in a congregate care setting?->No  Is the patient pregnant?->No    COVID/SARS CoV-2 PCR [[id number]] Collected: 11/11/20 2328    Order Status: Completed Specimen: Respirate from Nasopharyngeal Updated: 11/12/20 0137     COVID Order Status Received     Comment: The order for SARS CoV-2 testing has been received by the  Laboratory. This result is neither positive nor negative.  Final results of testing will report in 24-48 hours, separately.         Narrative:      Is patient being admitted?->Yes  Does this patient meet criteria for Rush/Cepheid per [facility]  Inpatient Workflow? (See workflow link below)->Yes  Expected turn around time?->Rush (Cepheid 2-4 hours)  Is this " "the patients First SARS CoV-2 test?->Unknown  Is this patient employed in healthcare?->No  Is the patient symptomatic as defined by the CDC?->No  Is the patient hospitalized?->Yes  Is the patient in the ICU?->No  Is the patient a resident in a congregate care setting?->No  Is the patient pregnant?->No    URINALYSIS,CULTURE IF INDICATED [[id number]]  (Abnormal) Collected: 20    Order Status: Completed Specimen: Urine Updated: 20     Color Yellow     Character Cloudy     Specific Gravity 1.025     Ph 5.5     Glucose Negative mg/dL      Ketones 15 mg/dL      Protein Negative mg/dL      Bilirubin Negative     Urobilinogen, Urine 0.2     Nitrite Negative     Leukocyte Esterase Trace     Occult Blood Negative     Micro Urine Req Microscopic    Narrative:      Indication for culture:->Patient WITHOUT an indwelling Mehta  catheter in place with new onset of Dysuria, Frequency,  Urgency, and/or Suprapubic pain        MRSA nares swab if pneumonia is a concern (ordered/positive/negative/n-a):  in process    Recent Labs     20  0242 11/15/20  0300   WBC 6.3 17.6* 8.8 6.2   NEUTSPOLYS 55.80 90.40* 96.00* 83.50*   BANDSSTABS  --  1.80  --   --      Recent Labs     20  00120  0242 20  1200 20  2000   BUN 2* 2* 3* 3* 4*   CREATININE 0.48* 0.38* 0.32* <0.17* 0.30*   ALBUMIN  --  3.0*  --   --   --      Recent Labs     20  1215   VANCOTROUGH 8.3*     /74   Pulse 90   Temp 36.5 °C (97.7 °F) (Temporal)   Resp 18   Ht 1.549 m (5' 1\")   Wt 40 kg (88 lb 2.9 oz)   SpO2 99%  Temp (24hrs), Av.5 °C (97.7 °F), Min:35.9 °C (96.7 °F), Max:36.9 °C (98.5 °F)    A/P   1. Vancomycin dose change: not indicated   2. Next vancomycin level:  @0230 (ordered)  3. Goal trough: 18-22 mcg/mL (pending findings)  4. Comments: VS stable. Afebrile. WBC stable. SCr stable and likely not reflective of CrCl given immobility. Microbiology " pending. MRSA nares pending. HSV PCR from CSF pending. Concerns for accumulation of vancomycin. Vancomycin level prior to AM dose 11/16/20 to assess clearance. BMP and CBC with AM AM labs per provider. Pharmacy will continue to follow.    Adithya Puente, PharmD

## 2020-11-15 NOTE — CONSULTS
Critical Care Consultation    Date of consult: 11/15/2020    Referring Physician  Dalila Scherer M.D.    Reason for Consultation  Altered mental status    History of Presenting Illness  49 y.o. female who presented 11/11/2020 with altered mental status.  Infectious work up negative so far, urinalysis negative, chest xray clear and LP fluid appears non infectious. She has had intermittent hypoxia requiring up to 15 L oxygen but able to titrate down after events. REceived morphine 540 am today but limited effect on mental status per nursing.  She does wake up and follow commands.  She appears deconditioned but according to primary teams discussion with family she is able to take care of daily activities.  NO focal deficits.  ECHO was normal. She appears malnourished.  Lethargic when not physical stimulated.      Code Status  Full Code    Review of Systems  Review of Systems   Unable to perform ROS: Mental acuity       Past Medical History   has a past medical history of Anemia, Cataract, Heart murmur, Muscular disease, Muscular dystrophy (HCC), and JOSÉ on CPAP.    Surgical History   has a past surgical history that includes hysterectomy laparoscopy.    Family History  family history includes Heart Disease in her daughter; No Known Problems in her brother, brother, brother, father, mother, sister, and sister; Other in her son; Sleep Apnea in her daughter.    Social History   reports that she has never smoked. She has never used smokeless tobacco. She reports that she does not drink alcohol or use drugs.    Medications  Home Medications     Reviewed by Mich Daley R.N. (Registered Nurse) on 11/12/20 at 0521  Med List Status: Complete   Medication Last Dose Status   dicyclomine (BENTYL) 20 MG Tab 11/10/2020 Active   HYDROcodone-acetaminophen (NORCO) 5-325 MG Tab per tablet 11/11/2020 Active   magnesium hydroxide (MILK OF MAGNESIA) 400 MG/5ML Suspension 11/10/2020 Active   omeprazole (PRILOSEC) 20 MG delayed-release  capsule 11/10/2020 Active   ondansetron (ZOFRAN ODT) 4 MG TABLET DISPERSIBLE Not Taking Active   polyethylene glycol/lytes (MIRALAX) 17 g Pack Not Taking Active   sumatriptan (IMITREX) 20 MG/ACT nasal spray Not Taking Active              Current Facility-Administered Medications   Medication Dose Route Frequency Provider Last Rate Last Admin   • morphine (pf) 4 mg/mL injection 1-2 mg  1-2 mg Intravenous Q3HRS PRN Berenice Denny M.D.   1 mg at 11/15/20 1116   • cefTRIAXone (ROCEPHIN) 2 g in  mL IVPB  2 g Intravenous Q12HR Asia Lucero M.D.   Stopped at 11/15/20 1123   • naloxone (NARCAN) injection 0.4 mg  0.4 mg Intravenous PRN Joann Bro M.D.       • acyclovir (ZOVIRAX) 380 mg in  mL IVPB  10 mg/kg Intravenous Q8HRS Asia Lucero M.D.   Stopped at 11/15/20 0633   • D5LR infusion   Intravenous Continuous Asia Lucero M.D. 75 mL/hr at 11/15/20 1053 Rate Change at 11/15/20 1053   • vancomycin (VANCOCIN) 750 mg in  mL IVPB  17 mg/kg Intravenous Q12HR Asia Lucero M.D.   Stopped at 11/15/20 0450   • diphenhydrAMINE (BENADRYL) injection 12.5-25 mg  12.5-25 mg Intravenous Q6HRS PRN Joann Bro M.D.       • LORazepam (ATIVAN) injection 0.5 mg  0.5 mg Intravenous Q4HRS PRN Joann Bro M.D.   0.5 mg at 11/14/20 2017   • acetaminophen (Tylenol) tablet 650 mg  650 mg Oral Q4HRS PRN Diego Montalvo D.O.   650 mg at 11/13/20 1626   • MD Alert...Vancomycin per Pharmacy   Other PHARMACY TO DOSE Kendall Carreon M.D.       • Pharmacy Consult Request ...Pain Management Review 1 Each  1 Each Other PHARMACY TO DOSE Diego Montalvo D.O.       • oxyCODONE immediate-release (ROXICODONE) tablet 2.5 mg  2.5 mg Oral Q3HRS PRN RENU RamirezO.       • oxyCODONE immediate-release (ROXICODONE) tablet 5 mg  5 mg Oral Q3HRS PRN RENU RamirezO.   5 mg at 11/12/20 2308   • insulin regular (HumuLIN R,NovoLIN R) injection  1-6 Units Subcutaneous 4X/DAY  ACHS Diego Montalvo D.O.   Stopped at 11/12/20 1700    And   • glucose 4 g chewable tablet 16 g  16 g Oral Q15 MIN PRN Diego Montalvo D.O.        And   • dextrose 50% (D50W) injection 50 mL  50 mL Intravenous Q15 MIN PRN Diego Montalvo D.O.   50 mL at 11/12/20 1335   • enoxaparin (LOVENOX) inj 30 mg  30 mg Subcutaneous DAILY RENU RamirezO.   30 mg at 11/15/20 0543   • baclofen (LIORESAL) tablet 10 mg  10 mg Oral TID Lauro Castro M.D.   Stopped at 11/13/20 1800   • ondansetron (ZOFRAN) syringe/vial injection 4 mg  4 mg Intravenous Q4HRS PRN Lauro Castro M.D.       • magnesium hydroxide (MILK OF MAGNESIA) suspension 30 mL  30 mL Oral DAILY Lauro Castro M.D.   Stopped at 11/14/20 0600    And   • senna-docusate (PERICOLACE or SENOKOT S) 8.6-50 MG per tablet 2 Tab  2 Tab Oral BID Lauro Castro M.D.   Stopped at 11/13/20 1800    And   • polyethylene glycol/lytes (MIRALAX) PACKET 1 Packet  1 Packet Oral QDAY PRN Lauro Castro M.D.        And   • bisacodyl (DULCOLAX) suppository 10 mg  10 mg Rectal QDAY PRN Lauro Castro M.D.       • omeprazole (PRILOSEC) capsule 20 mg  20 mg Oral DAILY Lauro Castro M.D.   Stopped at 11/14/20 0600   • dicyclomine (BENTYL) tablet 20 mg  20 mg Oral Q6HRS PRN Lauro Castro M.D.           Allergies  Allergies   Allergen Reactions   • Codeine Vomiting, Nausea and Unspecified     N&V  Dizzy, lightheaded and vomiting.    • Tramadol Unspecified     Effects her MD  weak       Vital Signs last 24 hours  Temp:  [35.9 °C (96.7 °F)-36.9 °C (98.5 °F)] 36.6 °C (97.9 °F)  Pulse:  [] 108  Resp:  [16-19] 18  BP: ()/(67-88) 121/80  SpO2:  [91 %-97 %] 97 %    Physical Exam  Physical Exam  Vitals signs and nursing note reviewed.   Constitutional:       General: She is not in acute distress.     Appearance: She is ill-appearing and toxic-appearing. She is not diaphoretic.   HENT:      Head: Normocephalic and atraumatic.      Right Ear:  External ear normal.      Left Ear: External ear normal.      Nose: No congestion or rhinorrhea.      Mouth/Throat:      Mouth: Mucous membranes are dry.      Pharynx: No oropharyngeal exudate or posterior oropharyngeal erythema.   Eyes:      General: No scleral icterus.     Extraocular Movements: Extraocular movements intact.      Conjunctiva/sclera: Conjunctivae normal.      Pupils: Pupils are equal, round, and reactive to light.   Neck:      Musculoskeletal: Neck supple. No neck rigidity or muscular tenderness.   Cardiovascular:      Rate and Rhythm: Regular rhythm. Tachycardia present.      Pulses: Normal pulses.      Heart sounds: Normal heart sounds. No murmur.   Pulmonary:      Effort: No respiratory distress.      Breath sounds: No wheezing.      Comments: Oral secretions present  Abdominal:      General: There is no distension.      Palpations: There is no mass.      Tenderness: There is no abdominal tenderness. There is no guarding.   Musculoskeletal:         General: No swelling or tenderness.      Right lower leg: No edema.      Left lower leg: No edema.   Lymphadenopathy:      Cervical: No cervical adenopathy.   Skin:     Coloration: Skin is not jaundiced or pale.      Findings: No bruising, erythema, lesion or rash.   Neurological:      General: No focal deficit present.      Mental Status: She is alert. She is disoriented.      Cranial Nerves: No cranial nerve deficit.      Sensory: No sensory deficit.      Motor: No weakness.      Coordination: Coordination normal.      Deep Tendon Reflexes: Reflexes normal.   Psychiatric:      Comments: lethargic         Fluids    Intake/Output Summary (Last 24 hours) at 11/15/2020 1134  Last data filed at 11/14/2020 1140  Gross per 24 hour   Intake --   Output 1100 ml   Net -1100 ml       Laboratory  Recent Results (from the past 48 hour(s))   ACCU-CHEK GLUCOSE    Collection Time: 11/13/20 11:51 AM   Result Value Ref Range    Glucose - Accu-Ck 104 (H) 65 - 99 mg/dL    Blood Culture    Collection Time: 11/13/20  5:40 PM    Specimen: Peripheral; Blood   Result Value Ref Range    Significant Indicator NEG     Source BLD     Site PERIPHERAL     Culture Result       No Growth  Note: Blood cultures are incubated for 5 days and  are monitored continuously.Positive blood cultures  are called to the RN and reported as soon as  they are identified.     PROCALCITONIN    Collection Time: 11/13/20  5:40 PM   Result Value Ref Range    Procalcitonin 0.33 (H) <0.25 ng/mL   Blood Culture    Collection Time: 11/13/20  5:41 PM    Specimen: Peripheral; Blood   Result Value Ref Range    Significant Indicator NEG     Source BLD     Site PERIPHERAL     Culture Result       No Growth  Note: Blood cultures are incubated for 5 days and  are monitored continuously.Positive blood cultures  are called to the RN and reported as soon as  they are identified.     LACTIC ACID    Collection Time: 11/13/20  5:48 PM   Result Value Ref Range    Lactic Acid 4.0 (HH) 0.5 - 2.0 mmol/L   ACCU-CHEK GLUCOSE    Collection Time: 11/13/20  5:51 PM   Result Value Ref Range    Glucose - Accu-Ck 177 (H) 65 - 99 mg/dL   CBC WITH DIFFERENTIAL    Collection Time: 11/13/20  7:09 PM   Result Value Ref Range    WBC 17.6 (H) 4.8 - 10.8 K/uL    RBC 4.06 (L) 4.20 - 5.40 M/uL    Hemoglobin 12.0 12.0 - 16.0 g/dL    Hematocrit 37.3 37.0 - 47.0 %    MCV 91.9 81.4 - 97.8 fL    MCH 29.6 27.0 - 33.0 pg    MCHC 32.2 (L) 33.6 - 35.0 g/dL    RDW 47.2 35.9 - 50.0 fL    Platelet Count 191 164 - 446 K/uL    MPV 9.9 9.0 - 12.9 fL    Neutrophils-Polys 90.40 (H) 44.00 - 72.00 %    Lymphocytes 2.60 (L) 22.00 - 41.00 %    Monocytes 5.30 0.00 - 13.40 %    Eosinophils 0.00 0.00 - 6.90 %    Basophils 0.00 0.00 - 1.80 %    Nucleated RBC 0.00 /100 WBC    Neutrophils (Absolute) 16.23 (H) 2.00 - 7.15 K/uL    Lymphs (Absolute) 0.46 (L) 1.00 - 4.80 K/uL    Monos (Absolute) 0.93 (H) 0.00 - 0.85 K/uL    Eos (Absolute) 0.00 0.00 - 0.51 K/uL    Baso (Absolute) 0.00  0.00 - 0.12 K/uL    NRBC (Absolute) 0.00 K/uL   Comp Metabolic Panel    Collection Time: 11/13/20  7:09 PM   Result Value Ref Range    Sodium 148 (H) 135 - 145 mmol/L    Potassium 2.4 (LL) 3.6 - 5.5 mmol/L    Chloride 108 96 - 112 mmol/L    Co2 30 20 - 33 mmol/L    Anion Gap 10.0 7.0 - 16.0    Glucose 196 (H) 65 - 99 mg/dL    Bun 2 (L) 8 - 22 mg/dL    Creatinine 0.38 (L) 0.50 - 1.40 mg/dL    Calcium 9.4 8.5 - 10.5 mg/dL    AST(SGOT) 159 (H) 12 - 45 U/L    ALT(SGPT) 45 2 - 50 U/L    Alkaline Phosphatase 62 30 - 99 U/L    Total Bilirubin 0.5 0.1 - 1.5 mg/dL    Albumin 3.0 (L) 3.2 - 4.9 g/dL    Total Protein 5.5 (L) 6.0 - 8.2 g/dL    Globulin 2.5 1.9 - 3.5 g/dL    A-G Ratio 1.2 g/dL   MAGNESIUM    Collection Time: 11/13/20  7:09 PM   Result Value Ref Range    Magnesium 1.7 1.5 - 2.5 mg/dL   PHOSPHORUS    Collection Time: 11/13/20  7:09 PM   Result Value Ref Range    Phosphorus 1.5 (L) 2.5 - 4.5 mg/dL   DIFFERENTIAL MANUAL    Collection Time: 11/13/20  7:09 PM   Result Value Ref Range    Bands-Stabs 1.80 0.00 - 10.00 %    Manual Diff Status PERFORMED    PERIPHERAL SMEAR REVIEW    Collection Time: 11/13/20  7:09 PM   Result Value Ref Range    Peripheral Smear Review see below    PLATELET ESTIMATE    Collection Time: 11/13/20  7:09 PM   Result Value Ref Range    Plt Estimation Normal    MORPHOLOGY    Collection Time: 11/13/20  7:09 PM   Result Value Ref Range    RBC Morphology Normal    ESTIMATED GFR    Collection Time: 11/13/20  7:09 PM   Result Value Ref Range    GFR If African American >60 >60 mL/min/1.73 m 2    GFR If Non African American >60 >60 mL/min/1.73 m 2   ACCU-CHEK GLUCOSE    Collection Time: 11/13/20  8:43 PM   Result Value Ref Range    Glucose - Accu-Ck 220 (H) 65 - 99 mg/dL   ACCU-CHEK GLUCOSE    Collection Time: 11/13/20 10:35 PM   Result Value Ref Range    Glucose - Accu-Ck 170 (H) 65 - 99 mg/dL   LACTIC ACID    Collection Time: 11/13/20 10:46 PM   Result Value Ref Range    Lactic Acid 3.7 (H) 0.5 - 2.0  mmol/L   Prothrombin time (INR)    Collection Time: 11/13/20 10:46 PM   Result Value Ref Range    PT 13.9 12.0 - 14.6 sec    INR 1.10 0.87 - 1.13   Procalcitonin    Collection Time: 11/13/20 10:46 PM   Result Value Ref Range    Procalcitonin 0.53 (H) <0.25 ng/mL   ABG - LAB    Collection Time: 11/13/20 10:46 PM   Result Value Ref Range    Ph 7.37 (L) 7.40 - 7.50    Pco2 51.4 (HH) 26.0 - 37.0 mmHg    Po2 158.1 (H) 64.0 - 87.0 mmHg    O2 Saturation 98.7 93.0 - 99.0 %    Hco3 29 (H) 17 - 25 mmol/L    Base Excess 3 -4 - 3 mmol/L    Body Temp see below Centigrade   LACTIC ACID    Collection Time: 11/14/20  2:42 AM   Result Value Ref Range    Lactic Acid 3.4 (H) 0.5 - 2.0 mmol/L   Basic Metabolic Panel    Collection Time: 11/14/20  2:42 AM   Result Value Ref Range    Sodium 149 (H) 135 - 145 mmol/L    Potassium 4.2 3.6 - 5.5 mmol/L    Chloride 111 96 - 112 mmol/L    Co2 28 20 - 33 mmol/L    Glucose 129 (H) 65 - 99 mg/dL    Bun 3 (L) 8 - 22 mg/dL    Creatinine 0.32 (L) 0.50 - 1.40 mg/dL    Calcium 9.4 8.5 - 10.5 mg/dL    Anion Gap 10.0 7.0 - 16.0   CBC WITH DIFFERENTIAL    Collection Time: 11/14/20  2:42 AM   Result Value Ref Range    WBC 8.8 4.8 - 10.8 K/uL    RBC 4.10 (L) 4.20 - 5.40 M/uL    Hemoglobin 11.8 (L) 12.0 - 16.0 g/dL    Hematocrit 38.1 37.0 - 47.0 %    MCV 92.9 81.4 - 97.8 fL    MCH 28.8 27.0 - 33.0 pg    MCHC 31.0 (L) 33.6 - 35.0 g/dL    RDW 48.2 35.9 - 50.0 fL    Platelet Count 190 164 - 446 K/uL    MPV 10.0 9.0 - 12.9 fL    Neutrophils-Polys 96.00 (H) 44.00 - 72.00 %    Lymphocytes 2.50 (L) 22.00 - 41.00 %    Monocytes 1.10 0.00 - 13.40 %    Eosinophils 0.00 0.00 - 6.90 %    Basophils 0.20 0.00 - 1.80 %    Immature Granulocytes 0.20 0.00 - 0.90 %    Nucleated RBC 0.00 /100 WBC    Neutrophils (Absolute) 8.42 (H) 2.00 - 7.15 K/uL    Lymphs (Absolute) 0.22 (L) 1.00 - 4.80 K/uL    Monos (Absolute) 0.10 0.00 - 0.85 K/uL    Eos (Absolute) 0.00 0.00 - 0.51 K/uL    Baso (Absolute) 0.02 0.00 - 0.12 K/uL    Immature  Granulocytes (abs) 0.02 0.00 - 0.11 K/uL    NRBC (Absolute) 0.00 K/uL   PHOSPHORUS    Collection Time: 20  2:42 AM   Result Value Ref Range    Phosphorus 1.2 (L) 2.5 - 4.5 mg/dL   MAGNESIUM    Collection Time: 20  2:42 AM   Result Value Ref Range    Magnesium 2.1 1.5 - 2.5 mg/dL   AMMONIA    Collection Time: 20  2:42 AM   Result Value Ref Range    Ammonia 26 11 - 45 umol/L   Blood Culture    Collection Time: 20  2:42 AM    Specimen: Blood   Result Value Ref Range    Significant Indicator NEG     Source BLD     Site Peripheral     Culture Result       No Growth  Note: Blood cultures are incubated for 5 days and  are monitored continuously.Positive blood cultures  are called to the RN and reported as soon as  they are identified.     ESTIMATED GFR    Collection Time: 20  2:42 AM   Result Value Ref Range    GFR If African American >60 >60 mL/min/1.73 m 2    GFR If Non African American >60 >60 mL/min/1.73 m 2   LACTIC ACID    Collection Time: 20  6:00 AM   Result Value Ref Range    Lactic Acid 3.6 (H) 0.5 - 2.0 mmol/L   Blood Culture    Collection Time: 20  6:01 AM    Specimen: Blood   Result Value Ref Range    Significant Indicator NEG     Source BLD     Site Periperal     Culture Result       No Growth  Note: Blood cultures are incubated for 5 days and  are monitored continuously.Positive blood cultures  are called to the RN and reported as soon as  they are identified.     EKG    Collection Time: 20  6:05 AM   Result Value Ref Range    Report       [facility]    Test Date:  2020  Pt Name:    ANIBAL CLARK                  Department: 183  MRN:        [id number]                      Room:       [facility]  Gender:     Female                       Technician: ILSA  :        [id number]                   Requested By:RONALDO GARCIA  Order #:    [id number]                    Jaquelin VAUGHN: Jose Santosh    Measurements  Intervals                                Axis  Rate:        140                          P:          0  OR:         91                           QRS:        38  QRSD:       86                           T:          45  QT:         288  QTc:        440    Interpretive Statements  TECHNICALLY POOR TRACING - PLEASE REPEAT ECG!  SINUS TACHYCARDIA  Compared to ECG 11/12/2020 13:24:26  Sinus rhythm no longer present  T-wave abnormality no longer present    Electronically Signed On 11- 7:02:05 PST by Jose Frost     ACCU-CHEK GLUCOSE    Collection Time: 11/14/20  6:14 AM   Result Value Ref Range    Glucose - Accu-Ck 128 (H) 65 - 99 mg/dL   ACCU-CHEK GLUCOSE    Collection Time: 11/14/20 10:07 AM   Result Value Ref Range    Glucose - Accu-Ck 103 (H) 65 - 99 mg/dL   Basic Metabolic Panel    Collection Time: 11/14/20 12:00 PM   Result Value Ref Range    Sodium 151 (H) 135 - 145 mmol/L    Potassium 3.7 3.6 - 5.5 mmol/L    Chloride 123 (H) 96 - 112 mmol/L    Co2 23 20 - 33 mmol/L    Glucose 77 65 - 99 mg/dL    Bun 3 (L) 8 - 22 mg/dL    Creatinine <0.17 (L) 0.50 - 1.40 mg/dL    Calcium 6.3 (LL) 8.5 - 10.5 mg/dL    Anion Gap 5.0 (L) 7.0 - 16.0   LACTIC ACID    Collection Time: 11/14/20 12:00 PM   Result Value Ref Range    Lactic Acid 1.5 0.5 - 2.0 mmol/L   ESTIMATED GFR    Collection Time: 11/14/20 12:00 PM   Result Value Ref Range    GFR If African American >60 >60 mL/min/1.73 m 2    GFR If Non African American >60 >60 mL/min/1.73 m 2   PHOSPHORUS    Collection Time: 11/14/20 12:00 PM   Result Value Ref Range    Phosphorus 2.9 2.5 - 4.5 mg/dL   VANCOMYCIN TROUGH LEVEL    Collection Time: 11/14/20 12:15 PM   Result Value Ref Range    Vancomycin Trough 8.3 (L) 10.0 - 20.0 ug/mL   VITAMIN B12    Collection Time: 11/14/20 12:15 PM   Result Value Ref Range    Vitamin B12 -True Cobalamin 1457 (H) 211 - 911 pg/mL   T.PALLIDUM AB EIA    Collection Time: 11/14/20 12:15 PM   Result Value Ref Range    Syphilis, Treponemal Qual Non-Reactive Non-Reactive   HIV AG/AB COMBO ASSAY SCREENING     Collection Time: 11/14/20 12:15 PM   Result Value Ref Range    HIV Ag/Ab Combo Assay Non-Reactive Non Reactive   CSF Glucose    Collection Time: 11/14/20  5:25 PM   Result Value Ref Range    Glucose CSF 70 40 - 80 mg/dL   CSF Protein    Collection Time: 11/14/20  5:25 PM   Result Value Ref Range    Total Protein, CSF 20 15 - 45 mg/dL   CSF Cell Count    Collection Time: 11/14/20  5:25 PM   Result Value Ref Range    Number Of Tubes 3     Volume 11.0 mL    Color-Body Fluid Colorless     Character-Body Fluid Clear     Supernatant Appearance Colorless     Total RBC Count 373 cells/uL    Crenated RBC 0 %    Total WBC Count 3 0 - 10 cells/uL    Polys 60 %    Lymphs 40 %    CSF Tube Number 3    GRAM STAIN    Collection Time: 11/14/20  5:25 PM    Specimen: CSF   Result Value Ref Range    Significant Indicator .     Source CSF     Site Tap     Gram Stain Result No organisms seen.    ACCU-CHEK GLUCOSE    Collection Time: 11/14/20  6:13 PM   Result Value Ref Range    Glucose - Accu-Ck 79 65 - 99 mg/dL   IONIZED CALCIUM    Collection Time: 11/14/20  6:25 PM   Result Value Ref Range    Ionized Calcium 1.4 (H) 1.1 - 1.3 mmol/L   ACCU-CHEK GLUCOSE    Collection Time: 11/14/20  7:48 PM   Result Value Ref Range    Glucose - Accu-Ck 105 (H) 65 - 99 mg/dL   Basic Metabolic Panel    Collection Time: 11/14/20  8:00 PM   Result Value Ref Range    Sodium 149 (H) 135 - 145 mmol/L    Potassium 4.7 3.6 - 5.5 mmol/L    Chloride 117 (H) 96 - 112 mmol/L    Co2 27 20 - 33 mmol/L    Glucose 116 (H) 65 - 99 mg/dL    Bun 4 (L) 8 - 22 mg/dL    Creatinine 0.30 (L) 0.50 - 1.40 mg/dL    Calcium 9.6 8.5 - 10.5 mg/dL    Anion Gap 5.0 (L) 7.0 - 16.0   LACTIC ACID    Collection Time: 11/14/20  8:00 PM   Result Value Ref Range    Lactic Acid 1.2 0.5 - 2.0 mmol/L   ESTIMATED GFR    Collection Time: 11/14/20  8:00 PM   Result Value Ref Range    GFR If African American >60 >60 mL/min/1.73 m 2    GFR If Non African American >60 >60 mL/min/1.73 m 2    EC-ECHOCARDIOGRAM COMPLETE W/O CONT    Collection Time: 11/15/20 12:43 AM   Result Value Ref Range    Eject.Frac. MOD BP 71.86     Eject.Frac. MOD 4C 72.17     Eject.Frac. MOD 2C 74.49     Left Ventrical Ejection Fraction 65    CBC WITH DIFFERENTIAL    Collection Time: 11/15/20  3:00 AM   Result Value Ref Range    WBC 6.2 4.8 - 10.8 K/uL    RBC 3.33 (L) 4.20 - 5.40 M/uL    Hemoglobin 9.6 (L) 12.0 - 16.0 g/dL    Hematocrit 30.6 (L) 37.0 - 47.0 %    MCV 91.9 81.4 - 97.8 fL    MCH 28.8 27.0 - 33.0 pg    MCHC 31.4 (L) 33.6 - 35.0 g/dL    RDW 48.6 35.9 - 50.0 fL    Platelet Count 133 (L) 164 - 446 K/uL    MPV 10.7 9.0 - 12.9 fL    Neutrophils-Polys 83.50 (H) 44.00 - 72.00 %    Lymphocytes 9.70 (L) 22.00 - 41.00 %    Monocytes 4.40 0.00 - 13.40 %    Eosinophils 1.60 0.00 - 6.90 %    Basophils 0.50 0.00 - 1.80 %    Immature Granulocytes 0.30 0.00 - 0.90 %    Nucleated RBC 0.00 /100 WBC    Neutrophils (Absolute) 5.18 2.00 - 7.15 K/uL    Lymphs (Absolute) 0.60 (L) 1.00 - 4.80 K/uL    Monos (Absolute) 0.27 0.00 - 0.85 K/uL    Eos (Absolute) 0.10 0.00 - 0.51 K/uL    Baso (Absolute) 0.03 0.00 - 0.12 K/uL    Immature Granulocytes (abs) 0.02 0.00 - 0.11 K/uL    NRBC (Absolute) 0.00 K/uL   IONIZED CALCIUM    Collection Time: 11/15/20  3:00 AM   Result Value Ref Range    Ionized Calcium 1.3 1.1 - 1.3 mmol/L   ACCU-CHEK GLUCOSE    Collection Time: 11/15/20  5:45 AM   Result Value Ref Range    Glucose - Accu-Ck 116 (H) 65 - 99 mg/dL       Imaging  DX-CHEST-LIMITED (1 VIEW)   Final Result      No acute cardiopulmonary disease.      EC-ECHOCARDIOGRAM COMPLETE W/O CONT   Final Result      DX-LUMBAR PUNCTURE FOR DIAGNOSIS   Final Result         FLUOROSCOPIC-GUIDED LUMBAR PUNCTURE AS DESCRIBED ABOVE.      IR-MIDLINE CATHETER INSERTION WO GUIDANCE > AGE 3   Final Result                  Ultrasound-guided midline placement performed by qualified nursing staff    as above.          CT-HEAD W/O   Final Result      Normal CT scan of the head  without contrast.               INTERPRETING LOCATION:  [address], [place], [address]      [id number] VIEW   Final Result      Unremarkable AP recumbent abdomen.      DX-CHEST-PORTABLE (1 VIEW)   Final Result      Left basilar opacities, consistent with pneumonia.      CT-HEAD W/O   Final Result      No CT evidence of acute infarct, hemorrhage or mass.      CT-ABDOMEN-PELVIS WITH   Final Result      No evidence of acute intra-abdominal or pelvic process.      Probably duplicated right renal collecting system.      CT-HEAD W/O   Final Result      1.  Focal soft tissue swelling right frontal region with minimal underlying increased density adjacent to the periphery of the outer table of the calvarium which could represent a small cephalohematoma.      2.  Periventricular and subcortical white matter density changes which could be related to small vessel ischemia, demyelinization or gliosis. Findings may be slightly more prominent than on previous exam.      MR-BRAIN-WITH & W/O    (Results Pending)       Assessment/Plan  * Severe sepsis (HCC)  Assessment & Plan  Unclear, no source of infection    Bilateral lower extremity pain- (present on admission)  Assessment & Plan  Pain management per primary team    Abdominal pain- (present on admission)  Assessment & Plan  No significant distension or pain currently  ? 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 Chronicity  Work up negative so far  Mri pending  Consider eeg  Consider autoimmune work up    Hypoglycemia  Assessment & Plan  Glucose as necessary, likely from poor nutrition    Altered mental status  Assessment & Plan  Unclear etiology  Severe deconditioning  ? Malnutrition  lp negative for infection per fluid, pending labs  MRI ordered per Neurology  Consider EEG, for now neurology deferring from recommending  No source of infection currenlty  May be chronic decline      Hypotension due to hypovolemia  Assessment & Plan  Volume resuscitate for map goal > 60 and sbp > 90  Continue fluids, appears dry  Consider cortisol testing  Midodrine may help once resuscitated fully      Discussed patient condition and risk of morbidity and/or mortality with RN, RT, Code status disscussed, Charge nurse / hot rounds, Patient and [facility].      No current need for ICU level of care, please call back if clinically deteriorates and needs re-evaluation

## 2020-11-15 NOTE — CARE PLAN
Problem: Communication  Goal: The ability to communicate needs accurately and effectively will improve  Outcome: PROGRESSING SLOWER THAN EXPECTED  Note: Pt is uncooperative with instruction, unable to use call light. Pt is difficult to understand, and unable to communicate needs at this time.        Problem: Safety  Goal: Will remain free from injury  Outcome: PROGRESSING AS EXPECTED  Note: Fall precautions in place. Bed in lowest position. Non-skid socks in place. Personal possessions within reach. Mobility sign on door. Bed-alarm on. Call light within reach.

## 2020-11-15 NOTE — ASSESSMENT & PLAN NOTE
Likely poor swallow reflex  Recommend swallow study  Likely form aspiration upper airway, poor secretion clearance  Clear xray  Continue suctioning per RT  Keep sao2 88% or higher

## 2020-11-15 NOTE — PROGRESS NOTES
NEUROLOGY PROGRESS NOTE      BACKGROUND:    49 y.o. female was admitted on 11/11/2020  5:04 PM for Abdominal pain.  She is being followed by Neurology for acute encephalopathy.    SUBJECTIVE:   More alert today but is still not communicating verbally.  According to bedside nurse she was more interactive and oriented last night.      VITALS:  Vitals:    11/14/20 2044 11/15/20 0030 11/15/20 0342 11/15/20 0738   BP: 109/67 128/80 129/88 121/80   Pulse: 85 (!) 112 (!) 115 (!) 108   Resp: 19 16 17 18   Temp: 35.9 °C (96.7 °F) 36.6 °C (97.8 °F) 36.9 °C (98.5 °F) 36.6 °C (97.9 °F)   TempSrc: Temporal Temporal Temporal Temporal   SpO2: 92% 92% 93% 97%   Weight: 40 kg (88 lb 2.9 oz)      Height:           NEUROLOGICAL EXAM:   She is awake but nonverbal, occasionally she moans.  According to the nurse she is able to say yes or no.  Pupils are equal and reactive.  I do not appreciate facial asymmetry.  Facial sensation cannot be tested.  She has decreased muscle tone in all extremity.  She does not follow commands.  She has no resistance to passive movement of her extremity.  She has some spontaneous movement of both upper extremity but not much movement of lower extremity  Sensation, coordination cannot be tested.    Deep tendon reflexes are diffusely diminished.  Plantar reflexes are equivocal.    GAIT:  Deferred, apparently she is wheelchair-bound at baseline.      OBJECTIVE:    NEUROIMAGING:    EC-ECHOCARDIOGRAM COMPLETE W/O CONT   Final Result      DX-LUMBAR PUNCTURE FOR DIAGNOSIS   Final Result         FLUOROSCOPIC-GUIDED LUMBAR PUNCTURE AS DESCRIBED ABOVE.      IR-MIDLINE CATHETER INSERTION WO GUIDANCE > AGE 3   Final Result                  Ultrasound-guided midline placement performed by qualified nursing staff    as above.          CT-HEAD W/O   Final Result      Normal CT scan of the head without contrast.               INTERPRETING LOCATION:  [address] [facility], [facility], [address]      OI-EVRDBFT-6 VIEW   Final Result       Unremarkable AP recumbent abdomen.      DX-CHEST-PORTABLE (1 VIEW)   Final Result      Left basilar opacities, consistent with pneumonia.      CT-HEAD W/O   Final Result      No CT evidence of acute infarct, hemorrhage or mass.      CT-ABDOMEN-PELVIS WITH   Final Result      No evidence of acute intra-abdominal or pelvic process.      Probably duplicated right renal collecting system.      CT-HEAD W/O   Final Result      1.  Focal soft tissue swelling right frontal region with minimal underlying increased density adjacent to the periphery of the outer table of the calvarium which could represent a small cephalohematoma.      2.  Periventricular and subcortical white matter density changes which could be related to small vessel ischemia, demyelinization or gliosis. Findings may be slightly more prominent than on previous exam.      MR-BRAIN-WITH & W/O    (Results Pending)   DX-CHEST-LIMITED (1 VIEW)    (Results Pending)       MEDICATIONS:  Current Facility-Administered Medications   Medication Dose Route Frequency Provider Last Rate Last Admin   • morphine (pf) 4 mg/mL injection 1-2 mg  1-2 mg Intravenous Q3HRS PRN Berenice Denny M.D.       • cefTRIAXone (ROCEPHIN) 2 g in  mL IVPB  2 g Intravenous Q12HR Asia Lucero M.D.       • naloxone (NARCAN) injection 0.4 mg  0.4 mg Intravenous PRN Joann Bro M.D.       • acyclovir (ZOVIRAX) 380 mg in  mL IVPB  10 mg/kg Intravenous Q8HRS Asia Lucero M.D.   Stopped at 11/15/20 0633   • D5LR infusion   Intravenous Continuous Asia Lucero M.D. 100 mL/hr at 11/15/20 0449 New Bag at 11/15/20 0449   • vancomycin (VANCOCIN) 750 mg in  mL IVPB  17 mg/kg Intravenous Q12HR Asia Lucero M.D.   Stopped at 11/15/20 0450   • diphenhydrAMINE (BENADRYL) injection 12.5-25 mg  12.5-25 mg Intravenous Q6HRS PRN Joann Bro M.D.       • LORazepam (ATIVAN) injection 0.5 mg  0.5 mg Intravenous Q4HRS PRN Joann ARANDA  LUCIO Bro   0.5 mg at 11/14/20 2017   • acetaminophen (Tylenol) tablet 650 mg  650 mg Oral Q4HRS PRN CLARA Ramirez.O.   650 mg at 11/13/20 1626   • MD Alert...Vancomycin per Pharmacy   Other PHARMACY TO DOSE Kendall Carreon M.D.       • Pharmacy Consult Request ...Pain Management Review 1 Each  1 Each Other PHARMACY TO DOSE RENU RamirezO.       • oxyCODONE immediate-release (ROXICODONE) tablet 2.5 mg  2.5 mg Oral Q3HRS PRN CLARA Ramirez.O.       • oxyCODONE immediate-release (ROXICODONE) tablet 5 mg  5 mg Oral Q3HRS PRN CLARA Ramirez.O.   5 mg at 11/12/20 2308   • insulin regular (HumuLIN R,NovoLIN R) injection  1-6 Units Subcutaneous 4X/DAY ACHS RENU RamirezOKi   Stopped at 11/12/20 1700    And   • glucose 4 g chewable tablet 16 g  16 g Oral Q15 MIN PRN CLARA Ramirez.O.        And   • dextrose 50% (D50W) injection 50 mL  50 mL Intravenous Q15 MIN PRN RENU RamirezO.   50 mL at 11/12/20 1335   • enoxaparin (LOVENOX) inj 30 mg  30 mg Subcutaneous DAILY CLARA Ramirez.O.   30 mg at 11/15/20 0543   • baclofen (LIORESAL) tablet 10 mg  10 mg Oral TID Lauor Castro M.D.   Stopped at 11/13/20 1800   • ondansetron (ZOFRAN) syringe/vial injection 4 mg  4 mg Intravenous Q4HRS PRN Lauro Castro M.D.       • magnesium hydroxide (MILK OF MAGNESIA) suspension 30 mL  30 mL Oral DAILY Lauro Castro M.D.   Stopped at 11/14/20 0600    And   • senna-docusate (PERICOLACE or SENOKOT S) 8.6-50 MG per tablet 2 Tab  2 Tab Oral BID Lauro Castro M.D.   Stopped at 11/13/20 1800    And   • polyethylene glycol/lytes (MIRALAX) PACKET 1 Packet  1 Packet Oral QDAY PRN Lauro Castro M.D.        And   • bisacodyl (DULCOLAX) suppository 10 mg  10 mg Rectal QDAY PRN Lauro Castro M.D.       • omeprazole (PRILOSEC) capsule 20 mg  20 mg Oral DAILY Lauro Castro M.D.   Stopped at 11/14/20 0600   • dicyclomine (BENTYL) tablet 20 mg  20 mg Oral Q6HRS PRN Lauro  VINCENZO Castro.           LABS:      Recent Labs     11/13/20  1909 11/14/20  0242 11/15/20  0300   WBC 17.6* 8.8 6.2   RBC 4.06* 4.10* 3.33*   HEMOGLOBIN 12.0 11.8* 9.6*   HEMATOCRIT 37.3 38.1 30.6*   MCV 91.9 92.9 91.9   MCH 29.6 28.8 28.8   MCHC 32.2* 31.0* 31.4*   RDW 47.2 48.2 48.6   PLATELETCT 191 190 133*   MPV 9.9 10.0 10.7     Recent Labs     11/14/20  0242 11/14/20  1200 11/14/20 2000   SODIUM 149* 151* 149*   POTASSIUM 4.2 3.7 4.7   CHLORIDE 111 123* 117*   CO2 28 23 27   GLUCOSE 129* 77 116*   BUN 3* 3* 4*     INR   Date Value Ref Range Status   11/13/2020 1.10 0.87 - 1.13 Final     Comment:     Please note new reference range as of 10/30/2014 10:00 AM  INR - Non-therapeutic Reference Range: 0.87-1.13  INR - Therapeutic Reference Range: 2.0-4.0       No results found for: POCINR  Lab Results   Component Value Date/Time    CREATININE 0.30 (L) 11/14/2020 2000     Lab Results   Component Value Date/Time    IFAFRICA >60 11/14/2020 2000    IFNOTAFR >60 11/14/2020 2000       ECHO:  Results for orders placed or performed during the hospital encounter of 03/21/18   ECHOCARDIOGRAM COMP W/O CONT   Result Value Ref Range    Eject.Frac. MOD BP 51.01     Eject.Frac. MOD 4C 51.05     Eject.Frac. MOD 2C 54.03     Left Ventrical Ejection Fraction 55         ASSESSMENT AND PLAN:  49 y.o. female with history of muscular dystrophy who was admitted on 11/11/2020 due to abdominal pain and while in the hospital developed severe sepsis with acute encephalopathy and electrolyte abnormalities.  Her encephalopathy is likely multifactorial such as toxic, metabolic, hypoxic and infective etiology.  Brain MRI with and without contrast is pending.  She underwent spinal tap and CSF is unremarkable so far with normal cell count, protein and glucose.  Gram stain's are negative pending HSV PCR and encephalitis panel. Optimize medical management and correct electrolyte abnormalities as you are doing.    Patient is more alert and  appears to have gradual improvement.  No abnormal movement or seizure activity reported or noted, will defer EEG at this point.  Continue with acyclovir until the results of herpes PCR.

## 2020-11-15 NOTE — PROGRESS NOTES
Patient's SpO2 was 74% when RN entered room and decreased to 66% on 15L on the oxymask. Patient was placed on a non-rebreather at 15L and took approximately five minutes to increase to an SpO2 greater than 90. Patient was placed back on the oxymask and was saturating at 94% on 6L. Patient's heart rate was 127, which monitor room stated had increased in the last few minutes. Patient's blood pressure was 140/80. During this time patient was agitated/restless. Dr. Denny came to bedside. Patient was placed in restraints per physician order because patient was pulling off her oxygen. ABG was ordered. Per Dr. Denny, do not administer ativan but morphine was ok to administer at this time as patient appears to be in pain. RN will continue to monitor.

## 2020-11-15 NOTE — PROGRESS NOTES
Assumed care at 1900, bedside report received from Nahid BASHIR. Pt is SR-ST on the monitor. Initial assessment completed, orders reviewed. Pt was able to answer orientation questions, oriented x3; disoriented to situation. Pt denies pain at this time. Call light within reach, bed alarm is in use, and hourly rounding in place. , Adryan, at bedside. Discussed POC with  and patient.

## 2020-11-15 NOTE — PROGRESS NOTES
[facility] [place] [facility]GRESS [place]     Attending:   Dalila Scherer MD    Resident:   Asia Lucero MD    PATIENT:   Gayla Escudero; [id number]; 1970    ID:   49 y.o. female with PMHx of myotonic muscular dystrophy and chronic abdominal pain on Norco admitted for profound weakness and worsening abdominal pain. She has developed sepsis, since being admitted, of unknown origin.      SUBJECTIVE:   Patient continues to be minimally responsive to questions. Unable to communicate effectively, but does acknowledge your presence and answers yes or no.     OBJECTIVE:  Vitals:    11/14/20 2044 11/15/20 0030 11/15/20 0342 11/15/20 0738   BP: 109/67 128/80 129/88 121/80   Pulse: 85 (!) 112 (!) 115 (!) 108   Resp: 19 16 17 18   Temp: 35.9 °C (96.7 °F) 36.6 °C (97.8 °F) 36.9 °C (98.5 °F) 36.6 °C (97.9 °F)   TempSrc: Temporal Temporal Temporal Temporal   SpO2: 92% 92% 93% 97%   Weight: 40 kg (88 lb 2.9 oz)      Height:         No intake or output data in the 24 hours ending 11/14/20 0537    PHYSICAL EXAM:  General: In mild distress. Thin. Oxymask in place.  HEENT: NC/AT. EOMI.   Cardiovascular: RRR without murmurs, rubs, heaves. Normal capillary refill   Respiratory: Occasional apnea, shallow breath sounds, upper airway congestion  Abdomen: normal bowel sounds, rigid, nontender, nondistended, no masses, no organomegaly   EXT:  JIM, no edema  Skin: No erythema/lesions   Neuro: Encephalopathic      LABS:  Recent Labs     11/13/20  1909 11/14/20  0242 11/15/20  0300   WBC 17.6* 8.8 6.2   RBC 4.06* 4.10* 3.33*   HEMOGLOBIN 12.0 11.8* 9.6*   HEMATOCRIT 37.3 38.1 30.6*   MCV 91.9 92.9 91.9   MCH 29.6 28.8 28.8   RDW 47.2 48.2 48.6   PLATELETCT 191 190 133*   MPV 9.9 10.0 10.7   NEUTSPOLYS 90.40* 96.00* 83.50*   LYMPHOCYTES 2.60* 2.50* 9.70*   MONOCYTES 5.30 1.10 4.40   EOSINOPHILS 0.00 0.00 1.60   BASOPHILS 0.00 0.20 0.50   RBCMORPHOLO Normal  --   --      Recent Labs     11/13/20  0016 11/13/20  1909 11/14/20  0242 11/14/20  1200  11/14/20 2000   SODIUM 140 148* 149* 151* 149*   POTASSIUM 3.5* 2.4* 4.2 3.7 4.7   CHLORIDE 104 108 111 123* 117*   CO2 28 30 28 23 27   BUN 2* 2* 3* 3* 4*   CREATININE 0.48* 0.38* 0.32* <0.17* 0.30*   CALCIUM 9.4 9.4 9.4 6.3* 9.6   MAGNESIUM 2.1 1.7 2.1  --   --    PHOSPHORUS 2.6 1.5* 1.2* 2.9  --    ALBUMIN  --  3.0*  --   --   --      Estimated GFR/CRCL = CrCl cannot be calculated (Unknown ideal weight.).  Recent Labs     11/14/20 0242 11/14/20  0242 11/14/20  1200 11/14/20  1813 11/14/20  1948 11/14/20  2000 11/15/20  0545   GLUCOSE 129*  --  77  --   --  116*  --    POCGLUCOSE  --    < >  --  79 105*  --  116*    < > = values in this interval not displayed.     Recent Labs     11/13/20  1909 11/13/20 2246 11/14/20 0242   ASTSGOT 159*  --   --    ALTSGPT 45  --   --    TBILIRUBIN 0.5  --   --    ALKPHOSPHAT 62  --   --    GLOBULIN 2.5  --   --    INR  --  1.10  --    AMMONIA  --   --  26         Recent Labs     11/13/20 2246   EAFRK58M 7.37*   PPGZLW920U 51.4*   XFFKV168V 158.1*   FMQZ9LPZ 98.7   ARTHCO3 29*   ARTBE 3     Recent Labs     11/13/20 2246   INR 1.10       MICROBIOLOGY:   Blood Culture Hold   Date Value Ref Range Status   02/19/2020 Collected  Final        IMAGING:   EC-ECHOCARDIOGRAM COMPLETE W/O CONT   Final Result      DX-LUMBAR PUNCTURE FOR DIAGNOSIS   Final Result         FLUOROSCOPIC-GUIDED LUMBAR PUNCTURE AS DESCRIBED ABOVE.      IR-MIDLINE CATHETER INSERTION WO GUIDANCE > AGE 3   Final Result                  Ultrasound-guided midline placement performed by qualified nursing staff    as above.          CT-HEAD W/O   Final Result      Normal CT scan of the head without contrast.               INTERPRETING LOCATION:  [facility], [facility], [address]      OQ-[id number]-5 VIEW   Final Result      Unremarkable AP recumbent abdomen.      DX-CHEST-PORTABLE (1 VIEW)   Final Result      Left basilar opacities, consistent with pneumonia.      CT-HEAD W/O   Final Result      No CT evidence of acute  infarct, hemorrhage or mass.      CT-ABDOMEN-PELVIS WITH   Final Result      No evidence of acute intra-abdominal or pelvic process.      Probably duplicated right renal collecting system.      CT-HEAD W/O   Final Result      1.  Focal soft tissue swelling right frontal region with minimal underlying increased density adjacent to the periphery of the outer table of the calvarium which could represent a small cephalohematoma.      2.  Periventricular and subcortical white matter density changes which could be related to small vessel ischemia, demyelinization or gliosis. Findings may be slightly more prominent than on previous exam.      MR-BRAIN-WITH & W/O    (Results Pending)   DX-CHEST-2 VIEWS    (Results Pending)       CULTURES:   Results     Procedure Component Value Units Date/Time    Blood Culture [[id number]] Collected: 11/14/20 0601    Order Status: Completed Specimen: Blood Updated: 11/15/20 0724     Significant Indicator NEG     Source BLD     Site Periperal     Culture Result No Growth  Note: Blood cultures are incubated for 5 days and  are monitored continuously.Positive blood cultures  are called to the RN and reported as soon as  they are identified.      Narrative:      Right Hand    Blood Culture [[id number]] Collected: 11/14/20 0242    Order Status: Completed Specimen: Blood Updated: 11/15/20 0724     Significant Indicator NEG     Source BLD     Site Peripheral     Culture Result No Growth  Note: Blood cultures are incubated for 5 days and  are monitored continuously.Positive blood cultures  are called to the RN and reported as soon as  they are identified.      Narrative:      Left Wrist    MRSA By PCR (Amp) [[id number]] Collected: 11/15/20 0315    Order Status: Completed Specimen: Respirate from Nares Updated: 11/15/20 0638    Narrative:      Collected By:[id number] DANIEL MAURER    GRAM STAIN [[id number]] Collected: 11/14/20 1725    Order Status: Completed Specimen: CSF Updated: 11/14/20 1854     Significant  "Indicator .     Source CSF     Site Tap     Gram Stain Result No organisms seen.    CSF CULTURE [[id number]] Collected: 11/14/20 1725    Order Status: Completed Updated: 11/14/20 1803    Cryptococcal Antigen, CSF [[id number]]     Order Status: No result Specimen: CSF     CSF Culture [[id number]]     Order Status: No result Specimen: CSF from Tap     Blood Culture [[id number]] Collected: 11/13/20 1740    Order Status: Completed Specimen: Blood from Peripheral Updated: 11/14/20 0726     Significant Indicator NEG     Source BLD     Site PERIPHERAL     Culture Result No Growth  Note: Blood cultures are incubated for 5 days and  are monitored continuously.Positive blood cultures  are called to the RN and reported as soon as  they are identified.      Narrative:      Per Hospital Policy: Only change Specimen Src: to \"Line\" if  specified by physician order.  Left AC    Blood Culture [[id number]] Collected: 11/13/20 1741    Order Status: Completed Specimen: Blood from Peripheral Updated: 11/14/20 0726     Significant Indicator NEG     Source BLD     Site PERIPHERAL     Culture Result No Growth  Note: Blood cultures are incubated for 5 days and  are monitored continuously.Positive blood cultures  are called to the RN and reported as soon as  they are identified.      Narrative:      Per Hospital Policy: Only change Specimen Src: to \"Line\" if  specified by physician order.  Left Hand    COVID/SARS CoV-2 PCR [[id number]]     Order Status: Canceled Specimen: Respirate from Nasopharyngeal     Blood Culture,Hold [[id number]] Collected: 11/14/20 0242    Order Status: Canceled     Urinalysis [[id number]] Collected: 11/14/20 0020    Order Status: Sent Specimen: Urine, Cath     Blood Culture [[id number]]     Order Status: No result Specimen: Blood from Peripheral     Blood Culture [[id number]]     Order Status: No result Specimen: Blood from Peripheral     Culture Respiratory W/ GRM STN [[id number]]     Order Status: Completed Specimen: " "Respirate from Sputum     CoV-2, Flu A/B, And RSV by PCR [[id number]] Collected: 11/11/20 2328    Order Status: Completed Updated: 11/12/20 0218     Influenza virus A RNA Negative     Influenza virus B, PCR Negative     RSV, PCR Negative     SARS-CoV-2 by PCR NotDetected     Comment: PATIENTS: Important information regarding your results and instructions can  be found at [email or web address]   \"After your  Covid-19 Test\"  [facility] providers: PLEASE REFER TO DE-ESCALATION AND RETESTING PROTOCOL  on [email or web address]  **The [employer] GeneXpert Xpress SARS-CoV-2 Test has been made available for  use under the Emergency Use Authorization (EUA) only.          SARS-CoV-2 Source NP Swab    Narrative:      Is patient being admitted?->Yes  Does this patient meet criteria for Rush/Cepheid per [facility]  Inpatient Workflow? (See workflow link below)->Yes  Expected turn around time?->Rush (Cepheid 2-4 hours)  Is this the patients First SARS CoV-2 test?->Unknown  Is this patient employed in healthcare?->No  Is the patient symptomatic as defined by the CDC?->No  Is the patient hospitalized?->Yes  Is the patient in the ICU?->No  Is the patient a resident in a congregate care setting?->No  Is the patient pregnant?->No    COVID/SARS CoV-2 PCR [[id number]] Collected: 11/11/20 2328    Order Status: Completed Specimen: Respirate from Nasopharyngeal Updated: 11/12/20 0137     COVID Order Status Received     Comment: The order for SARS CoV-2 testing has been received by the  Laboratory. This result is neither positive nor negative.  Final results of testing will report in 24-48 hours, separately.         Narrative:      Is patient being admitted?->Yes  Does this patient meet criteria for Rush/Cepheid per [facility]  Inpatient Workflow? (See workflow link below)->Yes  Expected turn around time?->Rush (Cepheid 2-4 hours)  Is this the patients First SARS CoV-2 test?->Unknown  Is this patient employed in healthcare?->No  Is the " patient symptomatic as defined by the CDC?->No  Is the patient hospitalized?->Yes  Is the patient in the ICU?->No  Is the patient a resident in a congregate care setting?->No  Is the patient pregnant?->No    URINALYSIS,CULTURE IF INDICATED [[id number]]  (Abnormal) Collected: 11/11/20 2030    Order Status: Completed Specimen: Urine Updated: 11/11/20 2059     Color Yellow     Character Cloudy     Specific Gravity 1.025     Ph 5.5     Glucose Negative mg/dL      Ketones 15 mg/dL      Protein Negative mg/dL      Bilirubin Negative     Urobilinogen, Urine 0.2     Nitrite Negative     Leukocyte Esterase Trace     Occult Blood Negative     Micro Urine Req Microscopic    Narrative:      Indication for culture:->Patient WITHOUT an indwelling Mehta  catheter in place with new onset of Dysuria, Frequency,  Urgency, and/or Suprapubic pain          MEDS:  Current Facility-Administered Medications   Medication Last Admin   • piperacillin-tazobactam (ZOSYN) 4.5 g in  mL IVPB 4.5 g at 11/15/20 0644   • morphine (pf) 4 mg/mL injection 1-2 mg 2 mg at 11/15/20 0552   • naloxone (NARCAN) injection 0.4 mg     • acyclovir (ZOVIRAX) 380 mg in  mL IVPB Stopped at 11/15/20 0633   • D5LR infusion New Bag at 11/15/20 0449   • vancomycin (VANCOCIN) 750 mg in  mL IVPB Stopped at 11/15/20 0450   • diphenhydrAMINE (BENADRYL) injection 12.5-25 mg     • LORazepam (ATIVAN) injection 0.5 mg 0.5 mg at 11/14/20 2017   • acetaminophen (Tylenol) tablet 650 mg 650 mg at 11/13/20 1626   • MD Alert...Vancomycin per Pharmacy     • Pharmacy Consult Request ...Pain Management Review 1 Each     • oxyCODONE immediate-release (ROXICODONE) tablet 2.5 mg     • oxyCODONE immediate-release (ROXICODONE) tablet 5 mg 5 mg at 11/12/20 2308   • morphine (pf) 4 mg/mL injection 2 mg 2 mg at 11/14/20 1418   • insulin regular (HumuLIN R,NovoLIN R) injection Stopped at 11/12/20 1700    And   • glucose 4 g chewable tablet 16 g      And   • dextrose 50% (D50W)  injection 50 mL 50 mL at 11/12/20 1335   • enoxaparin (LOVENOX) inj 30 mg 30 mg at 11/15/20 0543   • baclofen (LIORESAL) tablet 10 mg Stopped at 11/13/20 1800   • ondansetron (ZOFRAN) syringe/vial injection 4 mg     • magnesium hydroxide (MILK OF MAGNESIA) suspension 30 mL Stopped at 11/14/20 0600    And   • senna-docusate (PERICOLACE or SENOKOT S) 8.6-50 MG per tablet 2 Tab Stopped at 11/13/20 1800    And   • polyethylene glycol/lytes (MIRALAX) PACKET 1 Packet      And   • bisacodyl (DULCOLAX) suppository 10 mg     • omeprazole (PRILOSEC) capsule 20 mg Stopped at 11/14/20 0600   • dicyclomine (BENTYL) tablet 20 mg         PROBLEM LIST:  No problems updated.    ASSESSMENT/PLAN: 49 y.o. female with PMHx of myotonic muscular dystrophy and chronic abdominal pain on Norco admitted for profound weakness and worsening abdominal pain. She has developed sepsis, since being admitted, of unknown origin.      # Severe Sepsis  # Encephalopathy  # Altered Mental Status  # Acute compensated respiratory acidosis  # Hx of Muscular Dystrophy  Assessment:  - Follows with PM&R doctor at [facility] for muscular dystrophy - Dr. Marion Kimball at [phone]  - Sepsis not present on admission, SIRS 3/4. Patient's WBC count jumped up to 17, became acutely hypotensive and tachycardic. Transferred to telemetry 11/13.   - Sources include respiratory, CSF, blood, and urine. CXR shows L basilar opacities consistent with PNA. Initial UA in ED negative for infection.  - Procalcitonin 0.53. Lactic Acid 4>>3.7>>3.6>>1.5. Now normalized.  - Broad coverage Abx - Zosyn and Vancomycin started 11/13.  - ABG with pH of 7.33, pCO2 of 51.4, HCO3 of 29  - S/p Narcan x1 on 11/13. Still obtunded.   - S/p 3 boluses of LR 11/14.   - One hypoglycemic episode to 45 on 11/12 AM; started on D5LR. Recent POC glucose have normalized.   - Initial CT head showed no acute abnormalities, with possible small cephalohematoma. Repeat CT unchanged.  - Spoke with ,  11/14, who claims her current altered mental status is a complete change from her baseline.   - Intensivist consulted; does not think patient meets criteria for ICU transfer at this point.  - UA negative. Blood cultures NGTD   - LP unremarkable for meningitis with cell count, glucose, and protein.   - Toxic metabolic panel: HIV, RPR, B12 wnl  - Echo ordered to assess for cardiomyopathy as a sequelae of muscular dystrophy. Results show minimal change from 3/2020. EF of 65%.  - 11/15: BP stable, still tachycardic. A&Ox3 last night for nursing staff briefly, this morning she is unable to communicate clearly as with yesterday. Leukocytosis improved. Oxygen needs increased - now on oxymask 3L. Suction at bedside for upper airway secretions. More audible congestion.  - 11/15: Swallow eval completed showing aspiration risk with all trials    Plan  - Neurology consulted, recommended MRI; pending   - Changing Zosyn to Ceftriaxone, 11/15 - cleared with pharmacy - to cover for aspiration PNA. MRSA swabs obtained - discontinue Vancomycin if it comes back negative. MRSA results still pending  - Reducing fluid resuscitation with D5LR to 75cc/hr as she's NPO. Made NPO 11/13 at 10pm; continue NPO with aspiration risk as above. Consider initiation of NG tube in the next few days if she's still unable to protect her airway.  - Continue following blood cultures  - Continue O2 as needed. Repeat CXR 11/15 to assess for worsening respiratory status.  - Consider discontinuing Acyclovir as encephalitis panel not concerning for viral infection, will await HSV PCR results  - Follow up final CSF cultures    # Hypokalemia - resolved  # Hypophosphatemia  # Hypernatremia - improving  # Hypocalcemia - resolved  Electrolyte derangement contributing to altered mental status.  - Replete as needed  - Continue to monitor    # Agitation  - Morphine, Ativan PRN    # Upper extremity pain, right  # Bilateral lower extremity pain  Present on admission.  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   Per , this is a new development. Also reported numbness of lower extremities.  CT head negative for signs of infarction, with possible small cephalohematoma   - Holding Baclofen  - Morphine PRN as she is NPO and used to taking Norco 5mg q4h.    # Abdominal Pain  She chronically has had difficulty eating and taking pills, stating that her stomach starts to hurt  Acute exacerbation of chronic abdominal pain, now located in epigastric region, no associated N/V or diarrhea, now unable to be controlled on home [place]  CTAP showed no evidence of acute intra-abdominal or pelvic process.  ESR and CRP wnl.   - Will assess once patient is no longer encephalopathic     # ADLs   assists with all ADLs. Patient uses walker at home.   Per , patient has been exhibiting an increase in falls lately with unknown head trauma, unable to care for her when he goes to work.   PT/OT recommending SNF. Referral placed.   - Case management to provide updates on SNF referral. Patient not yet medically cleared for transfer.    Lines: PIV  Abx: Zosyn and Vancomycin  DVT prophylaxis: Lovenox  Code Status: Full    Disposition: Continue care on telemetry with sepsis protocol. If patient's oxygen needs increase or her BP intractably low, will advance level of care to ICU.     Asia Lucero MD   PGY-1 Family Medicine Resident   [facility]

## 2020-11-16 NOTE — CARE PLAN
Problem: Safety  Goal: Will remain free from injury  Outcome: PROGRESSING AS EXPECTED  Pt remained free from falls during shift. Bed in lowest and locked position, call light within reach, and frequent rounding completed. Pt needing frequent redirection to utilize call light.      Problem: Safety - Medical Restraint  Goal: Remains free of injury from restraints (Restraint for Interference with Medical Device)  Description: INTERVENTIONS:  1. Determine that other, less restrictive measures have been tried or would not be effective before applying the restraint  2. Evaluate the patient's condition at the time of restraint application  3. Inform patient/family regarding the reason for restraint  4. Q2H: Monitor safety, psychosocial status, comfort, nutrition and hydration  Outcome: PROGRESSING AS EXPECTED  With MD's at bedside, attempted to remove restraints. Pt unable to follow direction to leave lines in place, specifically her high flow O2. Per MD's recommendations, keeping restraints on until patient able to understand and follow directions.      Problem: Urinary Elimination:  Goal: Ability to reestablish a normal urinary elimination pattern will improve  Outcome: PROGRESSING AS EXPECTED  Pt voiding naturally, incontinent of bowel and bladder.

## 2020-11-16 NOTE — PROGRESS NOTES
Neurology Progress Note  Neurohospitalist Service, [facility]    Referring Physician: Meño Wilkerson M.D.    Chief Complaint   Patient presents with   • Abdominal Pain     sharp pain, denies n/v/d   • Leg Pain     R leg pain       HPI: Refer to initial documented Neurology H&P, as detailed in the patient's chart.    Interval History 2020: No acute events overnight. Currently patient is lying in bed, somnolent requiring light physical and repeated verbal stimuli to attend, weakly following commands, generally weak, and oriented to self, place, and year but not situation. Per RN report, patient was more alert earlier in shift, attempting to remove HFNC requiring bilateral wrist restraints. Trialing restraints off now given no attempts to removal medical equipment during neurological exam. Patient denies pain, but ROS otherwise limited given difficulty to speak and maintain alertness.     Past Medical History:   Past Medical History:   Diagnosis Date   • Anemia    • Cataract    • Heart murmur    • Muscular disease    • Muscular dystrophy (HCC)    • JOSÉ on CPAP         FHx:  Family History   Problem Relation Age of Onset   • No Known Problems Mother    • No Known Problems Father    • Sleep Apnea Daughter    • No Known Problems Sister    • No Known Problems Brother         Musc dys also   • No Known Problems Sister    • No Known Problems Brother         Trauma, was shot   • No Known Problems Brother         Car accident   • Other Son         Muscular dystrophy   • Heart Disease Daughter          at 2 months congenital heart disease        SHx:  Social History     Socioeconomic History   • Marital status:      Spouse name: Not on file   • Number of children: Not on file   • Years of education: Not on file   • Highest education level: Not on file   Occupational History   • Not on file   Social Needs   • Financial resource strain: Not on file   • Food insecurity     Worry: Not  on file     Inability: Not on file   • Transportation needs     Medical: Not on file     Non-medical: Not on file   Tobacco Use   • Smoking status: Never Smoker   • Smokeless tobacco: Never Used   Substance and Sexual Activity   • Alcohol use: No   • Drug use: No   • Sexual activity: Yes     Partners: Male   Lifestyle   • Physical activity     Days per week: Not on file     Minutes per session: Not on file   • Stress: Not on file   Relationships   • Social connections     Talks on phone: Not on file     Gets together: Not on file     Attends Sabianist service: Not on file     Active member of club or organization: Not on file     Attends meetings of clubs or organizations: Not on file     Relationship status: Not on file   • Intimate partner violence     Fear of current or ex partner: Not on file     Emotionally abused: Not on file     Physically abused: Not on file     Forced sexual activity: Not on file   Other Topics Concern   • Not on file   Social History Narrative   • Not on file        Medications:    Current Facility-Administered Medications:   •  magnesium sulfate IVPB premix 2 g, 2 g, Intravenous, Once, Kimberly Mc M.D., Last Rate: 25 mL/hr at 11/16/20 1040, 2 g at 11/16/20 1040  •  morphine (pf) 4 mg/mL injection 1-2 mg, 1-2 mg, Intravenous, Q3HRS PRN, Berenice Denny M.D., 1 mg at 11/15/20 1116  •  cefTRIAXone (ROCEPHIN) 2 g in  mL IVPB, 2 g, Intravenous, Q12HR, Asia Lucero M.D., Last Rate: 200 mL/hr at 11/16/20 1026, 2 g at 11/16/20 1026  •  naloxone (NARCAN) injection 0.4 mg, 0.4 mg, Intravenous, Q2 MIN PRN, Dalila Scherer M.D.  •  Respiratory Therapy Consult, , Nebulization, Continuous RT, Berenice Denny M.D.  •  acyclovir (ZOVIRAX) 380 mg in  mL IVPB, 10 mg/kg, Intravenous, Q8HRS, Asia Lucero M.D., Stopped at 11/16/20 0709  •  D5LR infusion, , Intravenous, Continuous, Asia Lucero M.D., Last Rate: 75 mL/hr at 11/16/20 0609, New Bag at  11/16/20 0609  •  vancomycin (VANCOCIN) 750 mg in  mL IVPB, 17 mg/kg, Intravenous, Q12HR, Asia Lucero M.D., Stopped at 11/16/20 0420  •  diphenhydrAMINE (BENADRYL) injection 12.5-25 mg, 12.5-25 mg, Intravenous, Q6HRS PRN, Joann Bro M.D.  •  LORazepam (ATIVAN) injection 0.5 mg, 0.5 mg, Intravenous, Q4HRS PRN, Joann Bro M.D., 0.5 mg at 11/14/20 2017  •  acetaminophen (Tylenol) tablet 650 mg, 650 mg, Oral, Q4HRS PRN, CLARA Ramirez.O., 650 mg at 11/13/20 1626  •  MD Alert...Vancomycin per Pharmacy, , Other, PHARMACY TO DOSE, Kendall Carreon M.D.  •  Pharmacy Consult Request ...Pain Management Review 1 Each, 1 Each, Other, PHARMACY TO DOSE, CLARA Ramirez.O.  •  oxyCODONE immediate-release (ROXICODONE) tablet 2.5 mg, 2.5 mg, Oral, Q3HRS PRN, CLARA Ramirez.O.  •  oxyCODONE immediate-release (ROXICODONE) tablet 5 mg, 5 mg, Oral, Q3HRS PRN, CLARA Ramirez.O., 5 mg at 11/12/20 2308  •  insulin regular (HumuLIN R,NovoLIN R) injection, 1-6 Units, Subcutaneous, 4X/DAY ACHS, Stopped at 11/12/20 1700 **AND** POC Blood Glucose, , , Q AC AND BEDTIME(S) **AND** NOTIFY MD and PharmD, , , Once **AND** glucose 4 g chewable tablet 16 g, 16 g, Oral, Q15 MIN PRN **AND** dextrose 50% (D50W) injection 50 mL, 50 mL, Intravenous, Q15 MIN PRN, CLARA Ramirez.O., 50 mL at 11/12/20 1335  •  enoxaparin (LOVENOX) inj 30 mg, 30 mg, Subcutaneous, DAILY, Diego Montalvo D.O., 30 mg at 11/16/20 0609  •  baclofen (LIORESAL) tablet 10 mg, 10 mg, Oral, TID, Lauro Castro M.D., Stopped at 11/13/20 1800  •  ondansetron (ZOFRAN) syringe/vial injection 4 mg, 4 mg, Intravenous, Q4HRS PRN, Lauro Castro M.D.  •  senna-docusate (PERICOLACE or SENOKOT S) 8.6-50 MG per tablet 2 Tab, 2 Tab, Oral, BID, Stopped at 11/13/20 1800 **AND** polyethylene glycol/lytes (MIRALAX) PACKET 1 Packet, 1 Packet, Oral, QDAY PRN **AND** magnesium hydroxide (MILK OF MAGNESIA) suspension 30 mL, 30 mL, Oral, DAILY,  Stopped at 11/14/20 0600 **AND** bisacodyl (DULCOLAX) suppository 10 mg, 10 mg, Rectal, QDAY PRN, Lauro Castro M.D.  •  omeprazole (PRILOSEC) capsule 20 mg, 20 mg, Oral, DAILY, Lauro Castro M.D., Stopped at 11/14/20 0600  •  dicyclomine (BENTYL) tablet 20 mg, 20 mg, Oral, Q6HRS PRN, Lauro Castro M.D.    Allergies:  Allergies   Allergen Reactions   • Codeine Vomiting, Nausea and Unspecified     N&V  Dizzy, lightheaded and vomiting.    • Tramadol Unspecified     Effects her MD  weak        Review of systems: In addition to what is detailed in the HPI and interval history above, all other systems reviewed and are negative.      Physical Examination:   Vitals:    11/16/20 0002 11/16/20 0502 11/16/20 0716 11/16/20 0811   BP: 125/75 120/79  146/84   Pulse: (!) 123 (!) 116 (!) 108 63   Resp: 19 16 18 (!) 22   Temp: 36.8 °C (98.2 °F) 36.6 °C (97.8 °F)  36.4 °C (97.6 °F)   TempSrc: Temporal Temporal  Temporal   SpO2: 100% 100% 100% 100%   Weight:       Height:         General: Patient in no acute distress, somnolent requiring repeated light physical and verbal stimuli to attend, pleasant and cooperative.  HEENT: Normocephalic, no signs of acute trauma.   Neck: Supple, no meningeal signs or carotid bruits. There is normal range of motion. No tenderness on exam.   Chest: Clear to auscultation. No cough.   CV: RRR, no murmurs.   Skin: No signs of acute rashes or trauma.   Musculoskeletal: Joints exhibit slightly limited passive range of motion, without any pain to palpation. Bilateral foot drop. There are no signs of joint or muscle swelling. There is no tenderness to deep palpation of muscles.   Psychiatric: No hallucinatory behavior. Denies symptoms of depression or suicidal ideation. Mood and affect appear withdrawn and somnolent on exam.     NEUROLOGICAL EXAM:  Mental status, orientation: Somnolent requiring repeated light physical and verbal stimuli to attend, weakly follows simple commands, oriented  to self, place, and time but not situation.    GCS E(2)V(4)M(6).  Speech and language: Speech is mildly dysarthric with weak whispered tone and quality, and fluent. The patient is able to name, repeat, and comprehend.   Memory: Unable to assess as patient non-contributory.   Cranial nerve exam: Pupils are 2-3 mm bilaterally and equally reactive to light and accommodation. Visual fields are intact by confrontation. There is no nystagmus on primary or secondary gaze. Intact full EOM in all directions of gaze. Face appears symmetric. Unable to assess facial sensation or sternocleidomastoid muscles as unable to attend. Uvula is midline. Palate elevates symmetrically. Tongue is midline and without any signs of tongue biting or fasciculations. Shoulder shrug is minimally intact with weakness bilaterally.   Motor exam: Strength is 2/5 in BUE and 1/5 BLE, proximal greater than distal weakness. Tone is decreased throughout. Bilateral foot droop noted. No abnormal movements were seen on exam.   Sensory exam Weak flexion response to noxious stimuli in all extremities.   Deep tendon reflexes:  1+ throughout. Plantar responses are mute. There is no clonus.   Coordination: Patient non-contributory to coordination testing.    Gait: Deferred given high fall risk.       Ancillary Data Reviewed:    Labs:  Lab Results   Component Value Date/Time    PROTHROMBTM 13.9 11/13/2020 10:46 PM    INR 1.10 11/13/2020 10:46 PM      Lab Results   Component Value Date/Time    WBC 9.3 11/16/2020 12:33 AM    RBC 4.09 (L) 11/16/2020 12:33 AM    HEMOGLOBIN 11.8 (L) 11/16/2020 12:33 AM    HEMATOCRIT 37.1 11/16/2020 12:33 AM    MCV 90.7 11/16/2020 12:33 AM    MCH 28.9 11/16/2020 12:33 AM    MCHC 31.8 (L) 11/16/2020 12:33 AM    MPV 11.5 11/16/2020 12:33 AM    NEUTSPOLYS 79.60 (H) 11/16/2020 12:33 AM    LYMPHOCYTES 10.60 (L) 11/16/2020 12:33 AM    MONOCYTES 8.50 11/16/2020 12:33 AM    EOSINOPHILS 0.40 11/16/2020 12:33 AM    BASOPHILS 0.30 11/16/2020 12:33  AM    ANISOCYTOSIS 1+ 02/19/2020 09:58 PM      Lab Results   Component Value Date/Time    SODIUM 146 (H) 11/16/2020 12:33 AM    POTASSIUM 3.8 11/16/2020 12:33 AM    CHLORIDE 112 11/16/2020 12:33 AM    CO2 23 11/16/2020 12:33 AM    GLUCOSE 110 (H) 11/16/2020 12:33 AM    BUN 2 (L) 11/16/2020 12:33 AM    CREATININE 0.30 (L) 11/16/2020 12:33 AM      Lab Results   Component Value Date/Time    CHOLSTRLTOT 157 09/15/2020 03:01 AM    LDL 72 09/15/2020 03:01 AM    HDL 70 09/15/2020 03:01 AM    TRIGLYCERIDE 73 09/15/2020 03:01 AM       Lab Results   Component Value Date/Time    ALKPHOSPHAT 62 11/13/2020 07:09 PM    ASTSGOT 159 (H) 11/13/2020 07:09 PM    ALTSGPT 45 11/13/2020 07:09 PM    TBILIRUBIN 0.5 11/13/2020 07:09 PM        Imaging/Testing:    I interpreted and/or reviewed the patient's neuroimaging    DX-CHEST-LIMITED (1 VIEW)   Final Result      No acute cardiopulmonary disease.      EC-ECHOCARDIOGRAM COMPLETE W/O CONT   Final Result      DX-LUMBAR PUNCTURE FOR DIAGNOSIS   Final Result         FLUOROSCOPIC-GUIDED LUMBAR PUNCTURE AS DESCRIBED ABOVE.      IR-MIDLINE CATHETER INSERTION WO GUIDANCE > AGE 3   Final Result                  Ultrasound-guided midline placement performed by qualified nursing staff    as above.          CT-HEAD W/O   Final Result      Normal CT scan of the head without contrast.               INTERPRETING LOCATION:  [address] [place] [address], [facility]      XU-EJDBHSH-3 VIEW   Final Result      Unremarkable AP recumbent abdomen.      DX-CHEST-PORTABLE (1 VIEW)   Final Result      Left basilar opacities, consistent with pneumonia.      CT-HEAD W/O   Final Result      No CT evidence of acute infarct, hemorrhage or mass.      CT-ABDOMEN-PELVIS WITH   Final Result      No evidence of acute intra-abdominal or pelvic process.      Probably duplicated right renal collecting system.      CT-HEAD W/O   Final Result      1.  Focal soft tissue swelling right frontal region with minimal underlying increased density  adjacent to the periphery of the outer table of the calvarium which could represent a small cephalohematoma.      2.  Periventricular and subcortical white matter density changes which could be related to small vessel ischemia, demyelinization or gliosis. Findings may be slightly more prominent than on previous exam.      MR-BRAIN-WITH & W/O    (Results Pending)       Assessment:    Gayla Escudero is a 49 y.o. female with relevant history of muscular dystrophy, JOSÉ on CPAP, and anemia who presented on 11/11/20 for abdominal pain and developed severe sepsis for which neurology was consulted to address acute encephalopathy.  She underwent lumbar puncture for CSF analysis which thus far has been unremarkable with normal protein, glucose, and WBCs; however, RBCs are elevated at 373 with clear and colorless CSF. CSF culture has shown no growth at 24 hours. Meningitis/encephalitis panel, HSV I/II and PCR, encephalitis autoimmune panel, and cryptococcal antigen are all still pending.  Neurological exam appears modestly improved as patient is speaking however limited and oriented x3 and weakly following simple commands once aroused with repeated verbal and light physical stimuli to attend.  Routine video EEG was normal with patient noted to be very lethargic, but no seizures captured. ABG results showed mild to moderate hypercapnia with pCO2 41.2 and hyperoxygenation with pO2 135.4 with normal pH of 7.43. Differential diagnosis includes multifactorial toxic, metabolic, hypercapnic, and infectious encephalopathy with HSV encephalitis a possibility given elevated RBCs pending serum HSV PCR and CSF HSV I/II lab results.     Plan:    -q4h and PRN neuro assessment. VS per nursing/unit protocol.  -Normotensive BP goal. Antihypertensives per primary team.   -Obtain MRI Brain with and without contrast.   -Telemetry and pulse oximetry; currently SR with SpO2 100% on HFNC. Agree with weaning HFNC per RT and primary team.    -Continue Acyclovir pending CSF HSV I/II and serum HSV PCR results  -Will continue to follow CSF analysis with pending results as outlined in assessment.   -PT/OT/SLP eval and treat.   -Fall and aspiration precautions.   -All other medical management per primary team.   -DVT PPX: SCDs.     The evaluation of the patient, and recommended management, was discussed with Dr. Wilson, Dr. Wilkerson, Dr. Trujillo, and bedside RN. I have performed a physical exam and reviewed and updated ROS and Plan today (11/16/2020). In review of yesterday's note (11/15/2020), there are no changes except as documented above.    Kvng Allen, [email or web address].[email or web address].   Nurse Practitioner, Neurohospitalist  [facility]  t) [phone] (f) [phone]

## 2020-11-16 NOTE — CARE PLAN
Problem: Safety  Goal: Will remain free from injury  Outcome: PROGRESSING AS EXPECTED  Goal: Will remain free from falls  Outcome: PROGRESSING AS EXPECTED    Bed locked and in lowest position. Bed alarm on. Treaded socks in use. Call light and belongings within reach. Patient educated to call for assistance.. Hourly rounding in place.      Problem: Communication  Goal: The ability to communicate needs accurately and effectively will improve  Outcome: PROGRESSING SLOWER THAN EXPECTED   Patient unable to verbalize and communicate needs with staf

## 2020-11-16 NOTE — PROGRESS NOTES
Patient SPO2 desaturated to the 70s. Patient now on 60/40 high flow O2. Dr. Stoner notified. Patient VSS stable. Will continue to monitor.

## 2020-11-16 NOTE — PROGRESS NOTES
Received phone call from Adryan, Pt's spouse, and was unable to speak at the time r/t direct patient care. At 1113 attempted phone call back to Adryan and left message to return phone call.     1120 Adryan, Pt spouse, returned phone call. Answered all questions. Pt requesting follow up call later in the day regarding any change in status or procedures.

## 2020-11-16 NOTE — DIETARY
Nutrition Services: Update   Pt made NPO on 11/13 by nursing judgement d/t inadequate alertness for PO intake and difficulty managing secretions. SLP evaluation on 11/15 recommended NPO with consideration for non-oral nutrition. Diet advanced this AM by SLP to Pureed, Mildly Thick Liquids with 1:1 supervision. Per CNA, pt too lethargic to eat today. RD to obtain supplement order to optimize oral intake.     Recommendations/Plan:  1. Add Boost Plus TID with meals.   2. If unable to consume adequate PO, consider supplemental tube feeds if within pt's plan of care.   3. Document intake of all meals as % taken in ADL's to provide interdisciplinary communication across all shifts.   4. Monitor weight.    RD following

## 2020-11-16 NOTE — PROGRESS NOTES
Dr. Denny notified that patient's SpO2 has decreased to below 80% three times in the past 2 hours, requiring 15L non-rebreather to stabilize. BP has decreased from 130/74 to 100/70. Heart rate increases to the 130s. No new orders at this time. RN will continue to monitor.

## 2020-11-16 NOTE — PROCEDURES
ROUTINE ELECTROENCEPHALOGRAM REPORT      Referring provider: Dr. Denny.    DOS: 11/16/2020 (total recording of 24 minutes)    INDICATION:  Gayla Escudero 49 y.o. female presenting with altered mental status.    CURRENT ANTIEPILEPTIC REGIMEN: None.    TECHNIQUE: 30 channel routine electroencephalogram (EEG) was performed in accordance with the international 10-20 system. The study was reviewed in bipolar and referential montages. The recording examined the patient during wakeful and drowsy/sleep state(s).     DESCRIPTION OF THE RECORD:  During the wakefulness, the background showed a symmetrical 10 hz alpha activity posteriorly with amplitude of 70 mV.  There was reactivity to eye closure/opening.  A normal anterior-posterior gradient was noted with faster beta frequencies seen anteriorly.  During drowsiness, theta/delta frequencies were seen.    During the sleep state, background shows diffuse high-amplitude 4-5 Hz delta activity.  Symmetrical high-amplitude sleep spindles and vertex sharps were seen in the leads over the central regions.     ACTIVATION PROCEDURES:   Intermittent Photic stimulation was performed in a stepwise fashion from 1 to 30 Hz and elicited a normal response (photic driving), most noticeable in the posterior leads.      ICTAL AND/OR INTERICTAL FINDINGS:   No focal or generalized epileptiform activity noted. No regional slowing was seen during this routine study.  No seizures were reported or recorded during the study.  The patient was very lethargic, mostly a drowsy and sleep study.    EKG: sampling of the EKG recording demonstrated sinus rhythm.       INTERPRETATION:  This is a normal routine EEG recording in the awake, drowsy/sleep state(s).  The patient was very lethargic, mostly a drowsy and sleep study.  No seizures captured.  Clinical correlation is recommended.    Note: A normal EEG does not rule out epilepsy.  If the clinical suspicion remains high for seizures, a prolonged  recording to capture clinical or subclinical events may be helpful.        Cameron Amaral MD   Epilepsy and Neurodiagnostics.   Clinical  of Neurology [facility].   Diplomate in Neurology, Epilepsy, and Electrodiagnostic Medicine.   Office: [phone]  Fax: [phone]

## 2020-11-16 NOTE — THERAPY
Speech Language Pathology  Daily Treatment     Patient Name: Gayla Escudero  Age:  49 y.o., Sex:  female  Medical Record #: [id number]  Today's Date: 11/16/2020     Precautions  Precautions: Fall Risk, Swallow Precautions ( See Comments)  Comments: baseline myotonic MD    Assessment    Pt was seen for a swallowing reassessment today, given poor alertness yesterday. Today, pt was A&Ox3 though confused in general re: purpose of assessment, medical status and current abilities/limitations. Pt is currently on HHFNC 30L 60% with O2 sats 100%. Assessment was limited due to poor compliance with pt refusing to trial applesauce, soft fruit and more than two bites of pudding. Pt was given ice chips (x3), thins via tsp (x1), mildly thick liquids via tsp/cup (x5) and purees (X2). Max anterior bolus loss noted with thin liquids. Improved oral containment appreciated w/ mildly thick liquids. Oral phase was slow for bolus formation and A-P lingual transit. Pt expectorated 2/4 bites of purees, needing max encouragement and education to swallow. Hyolaryngeal excursion was palpated as weak/reduced. However, vocal quality remained clear and no s/sx of aspiration occurred. Recommend restarting diet of Pureed Solids (PU4) and Mildly Thick Liquids (MT2) with STRICT 1:1 feeding. Hold PO with any increased lethargy and/or change in status. Suspect pt will have difficulty meeting nutritional needs d/t waxing/waning mental status and poor compliance w/ care, including eating. Recommend considering supplemental means of nutritional/hydration if PO intake remains poor.     Plan    Pureed Solids (PU4) and Mildly Thick Liquids (MT2) with STRICT 1:1 feeding.   Crush pills in puree.  Hold PO with any increased lethargy and/or change in status.  Consider supplemental means of nutritional/hydration if PO intake remains poor.       Continue current treatment plan.    Discharge Recommendations: (P) Recommend post-acute placement for additional  "speech therapy services prior to discharge home    Subjective    \"I don't want that.\"  \"Can I leave today?\"     Objective       11/16/20 0818   Cognitive-Linguistic   Level of Consciousness Confused   Dysphagia    Positioning / Behavior Modification Modulate Rate or Bite Size;Multiple Swallows;Self Monitoring   Other Treatments PO trials of ice chips, thins via tsp, mildly thick liquids via tsp/cup, purees   Diet / Liquid Recommendation Puree (4);Mildly Thick (2) - (Nectar Thick)   Recommended Route of Medication Administration   Medication Administration  Crush all Medications in Puree   Short Term Goals   Short Term Goal # 1 Patient will consume a PU4/MT2 diet with no overt s/sx of aspiration given 1:1 feeding.    Goal Outcome # 1 Progressing slower than expected  (limited PO intake; low threshold for diet tolerance w/ AMS)   Short Term Goal # 2 New goal: 11/15: Pt will consume prefeeding trials with SLP without any overt s/sx of aspiration given min verbal cues    Goal Outcome # 2  Goal met         "

## 2020-11-16 NOTE — PROGRESS NOTES
Patient pulled oxygen off and decreased to an SpO2 of 55%. Patient was placed on 15L nonrebreather and increased SpO2 to 66%.  Dr. Denny placed orders for heated high flow. Respiratory at bedside. RN will continue to monitor.

## 2020-11-16 NOTE — PROGRESS NOTES
Assumed care of patient. Bedside report, received from Asia BASHIR. Patient is on high flow O2. Patient is in bilateral soft wrist restraints. Patient is lethargic.  Updated POC, call light within reach, and fall precautions in place. Bed locked and and in lowest position.

## 2020-11-16 NOTE — PROGRESS NOTES
Pharmacy Kinetics 49 y.o. female on vancomycin day # 4 11/16/2020    Currently Dose: Vancomycin 750 mg iv q12hr (~17 mg/kg/dose)  Received Load Dose: Yes     Indication for Treatment: unknown source of infection  Provider Specified End Date: None  ID Service Following: No     Pertinent history per medical record: Admitted on 11/11/2020 for concern of infection of unknown source. Concern for CNS infection vs PNA vs other etiology. PMA and JONATHON consulted.     Other antibiotics: acyclovir ~10 mg/kg/dose iv q8h, ceftriaxone 2 gm iv q12h     Allergies: Codeine and Tramadol      List concerns for accumulation of vancomycin: BMI ~16, immobility 2/2 muscular dystrophy, electrolyte derangement     Pertinent cultures to date:   Results     Procedure Component Value Units Date/Time    MRSA By PCR (Amp) [[id number]] Collected: 11/15/20 0315    Order Status: Completed Specimen: Respirate from Nares Updated: 11/15/20 1856     Significant Indicator NEG     Source RESP     Site NARES     MRSA PCR Negative for MRSA by PCR.    Narrative:      Collected By:[id number] DANIEL MAURER  Collected By:[id number] DANIEL MAURER    CSF CULTURE [[id number]] Collected: 11/14/20 1725    Order Status: Completed Specimen: CSF Updated: 11/15/20 1537     Significant Indicator NEG     Source CSF     Site Tap     Culture Result No growth at 24 hours.     Gram Stain Result No organisms seen.    HSV 1/2 By PCR(Herpes)+QT2359 [[id number]] Collected: 11/14/20 1725    Order Status: Completed Specimen: CSF from Spinal Fluid Updated: 11/15/20 1329    Narrative:      Collected By:[id number] SHANKAR KAY  Please add to CSF sample in lab.    Blood Culture [[id number]] Collected: 11/14/20 0601    Order Status: Completed Specimen: Blood Updated: 11/15/20 0724     Significant Indicator NEG     Source BLD     Site Periperal     Culture Result No Growth  Note: Blood cultures are incubated for 5 days and  are monitored continuously.Positive blood cultures  are called to the RN and reported as  "soon as  they are identified.      Narrative:      Right Hand    Blood Culture [[id number]] Collected: 11/14/20 0242    Order Status: Completed Specimen: Blood Updated: 11/15/20 0724     Significant Indicator NEG     Source BLD     Site Peripheral     Culture Result No Growth  Note: Blood cultures are incubated for 5 days and  are monitored continuously.Positive blood cultures  are called to the RN and reported as soon as  they are identified.      Narrative:      Left Wrist    GRAM STAIN [[id number]] Collected: 11/14/20 1725    Order Status: Completed Specimen: CSF Updated: 11/14/20 1854     Significant Indicator .     Source CSF     Site Tap     Gram Stain Result No organisms seen.    Cryptococcal Antigen, CSF [[id number]]     Order Status: No result Specimen: CSF     CSF Culture [[id number]]     Order Status: No result Specimen: CSF from Tap     Blood Culture [[id number]] Collected: 11/13/20 1740    Order Status: Completed Specimen: Blood from Peripheral Updated: 11/14/20 0726     Significant Indicator NEG     Source BLD     Site PERIPHERAL     Culture Result No Growth  Note: Blood cultures are incubated for 5 days and  are monitored continuously.Positive blood cultures  are called to the RN and reported as soon as  they are identified.      Narrative:      Per Hospital Policy: Only change Specimen Src: to \"Line\" if  specified by physician order.  Left AC    Blood Culture [[id number]] Collected: 11/13/20 1741    Order Status: Completed Specimen: Blood from Peripheral Updated: 11/14/20 0726     Significant Indicator NEG     Source BLD     Site PERIPHERAL     Culture Result No Growth  Note: Blood cultures are incubated for 5 days and  are monitored continuously.Positive blood cultures  are called to the RN and reported as soon as  they are identified.      Narrative:      Per Hospital Policy: Only change Specimen Src: to \"Line\" if  specified by physician order.  Left Hand    COVID/SARS CoV-2 PCR [[id number]]     Order " "Status: Canceled Specimen: Respirate from Nasopharyngeal     Blood Culture,Hold [[id number]] Collected: 11/14/20 0242    Order Status: Canceled     Urinalysis [[id number]] Collected: 11/14/20 0020    Order Status: Sent Specimen: Urine, Cath     Blood Culture [[id number]]     Order Status: No result Specimen: Blood from Peripheral     Blood Culture [[id number]]     Order Status: No result Specimen: Blood from Peripheral     Culture Respiratory W/ GRM STN [[id number]]     Order Status: Completed Specimen: Respirate from Sputum     CoV-2, Flu A/B, And RSV by PCR [[id number]] Collected: 11/11/20 2328    Order Status: Completed Updated: 11/12/20 0218     Influenza virus A RNA Negative     Influenza virus B, PCR Negative     RSV, PCR Negative     SARS-CoV-2 by PCR NotDetected     Comment: PATIENTS: Important information regarding your results and instructions can  be found at [email or web address]   \"After your  Covid-19 Test\"  [facility] providers: PLEASE REFER TO DE-ESCALATION AND RETESTING PROTOCOL  on [email or web address]  **The [employer] GeneXpert Xpress SARS-CoV-2 Test has been made available for  use under the Emergency Use Authorization (EUA) only.          SARS-CoV-2 Source NP Swab    Narrative:      Is patient being admitted?->Yes  Does this patient meet criteria for Rush/Cepheid per [facility]  Inpatient Workflow? (See workflow link below)->Yes  Expected turn around time?->Rush (Cepheid 2-4 hours)  Is this the patients First SARS CoV-2 test?->Unknown  Is this patient employed in healthcare?->No  Is the patient symptomatic as defined by the CDC?->No  Is the patient hospitalized?->Yes  Is the patient in the ICU?->No  Is the patient a resident in a congregate care setting?->No  Is the patient pregnant?->No    COVID/SARS CoV-2 PCR [[id number]] Collected: 11/11/20 2328    Order Status: Completed Specimen: Respirate from Nasopharyngeal Updated: 11/12/20 0137     COVID Order Status Received     Comment: The " order for SARS CoV-2 testing has been received by the  Laboratory. This result is neither positive nor negative.  Final results of testing will report in 24-48 hours, separately.         Narrative:      Is patient being admitted?->Yes  Does this patient meet criteria for Rush/Cepheid per [facility]  Inpatient Workflow? (See workflow link below)->Yes  Expected turn around time?->Rush (Cepheid 2-4 hours)  Is this the patients First SARS CoV-2 test?->Unknown  Is this patient employed in healthcare?->No  Is the patient symptomatic as defined by the CDC?->No  Is the patient hospitalized?->Yes  Is the patient in the ICU?->No  Is the patient a resident in a congregate care setting?->No  Is the patient pregnant?->No    URINALYSIS,CULTURE IF INDICATED [[id number]]  (Abnormal) Collected: 11/11/20 2030    Order Status: Completed Specimen: Urine Updated: 11/11/20 2059     Color Yellow     Character Cloudy     Specific Gravity 1.025     Ph 5.5     Glucose Negative mg/dL      Ketones 15 mg/dL      Protein Negative mg/dL      Bilirubin Negative     Urobilinogen, Urine 0.2     Nitrite Negative     Leukocyte Esterase Trace     Occult Blood Negative     Micro Urine Req Microscopic    Narrative:      Indication for culture:->Patient WITHOUT an indwelling Mehta  catheter in place with new onset of Dysuria, Frequency,  Urgency, and/or Suprapubic pain        MRSA nares swab if pneumonia is a concern (ordered/positive/negative/n-a): 11/15 negative    Recent Labs     11/13/20 1909 11/14/20 0242 11/15/20  0300 11/16/20  0033   WBC 17.6* 8.8 6.2 9.3   NEUTSPOLYS 90.40* 96.00* 83.50* 79.60*   BANDSSTABS 1.80  --   --   --      Recent Labs     11/13/20 1909 11/14/20  0242 11/14/20  1200 11/14/20  2000 11/16/20  0033   BUN 2* 3* 3* 4* 2*   CREATININE 0.38* 0.32* <0.17* 0.30* 0.30*   ALBUMIN 3.0*  --   --   --   --      Recent Labs     11/14/20  1215 11/16/20  0033   VANCOTROUGH 8.3* 20.1*     Intake/Output Summary (Last 24 hours) at 11/16/2020  "0735  Last data filed at 11/15/2020 1500  Gross per 24 hour   Intake --   Output 1200 ml   Net -1200 ml      /79   Pulse (!) 108   Temp 36.6 °C (97.8 °F) (Temporal)   Resp 18   Ht 1.549 m (5' 1\")   Wt 40 kg (88 lb 2.9 oz)   SpO2 100%  Temp (24hrs), Av.5 °C (97.7 °F), Min:35.9 °C (96.7 °F), Max:36.8 °C (98.2 °F)    A/P   1. Vancomycin dose change: not indicated   2. Next vancomycin level: 20 @1330 (ordered)  3. Goal trough: 18-22 mcg/mL  4. Comments: VS stable. Afebrile. Intermittent escalations in O2 needs. SCr stable. No new microbiology. MRSA nares negative. HSV PCR from CSF pending. Vancomycin level drawn ~2 hours early and likely at goal range had timed level been drawn appropriately. Repeat vancomycin level in place prior to PM dose 20 to assess clearance. BMP and CBC with AM labs. Recommend ID consult to help delineate care plan. Pharmacy will continue to follow.    Adithya Puente, PharmD  "

## 2020-11-16 NOTE — PROGRESS NOTES
[facility] FAMILY MEDICINE PROGRESS NOTE     Attending:   Dr. Scherer     Resident:   Kimberly Mc MD    PATIENT:   Gayla Escudero; [id number]; 1970    ID:   49 y.o. female admitted for worsening ABD pain, bilateral LE, Right arm pain and numbness. Admission day 5. Patient sustained a fall out of her wheelchair in the ED, resulting in a cephalohematoma. Admission day 2 patient developed hypotension, tachycardia and AMS. Yesterday, multiple episodes of hypoxia with desaturations into the 60s. Patient was started on high flow oxygen 40L/min at 60% oxygen.     SUBJECTIVE:   Continued oxygen requirements overnight with associated altered mental status.     OBJECTIVE:  Vitals:    11/16/20 0002 11/16/20 0502 11/16/20 0716 11/16/20 0811   BP: 125/75 120/79  146/84   Pulse: (!) 123 (!) 116 (!) 108 63   Resp: 19 16 18 (!) 22   Temp: 36.8 °C (98.2 °F) 36.6 °C (97.8 °F)  36.4 °C (97.6 °F)   TempSrc: Temporal Temporal  Temporal   SpO2: 100% 100% 100% 100%   Weight:       Height:           Intake/Output Summary (Last 24 hours) at 11/16/2020 1117  Last data filed at 11/15/2020 1500  Gross per 24 hour   Intake --   Output 1200 ml   Net -1200 ml       PHYSICAL EXAM:  General: No acute distress, afebrile, agitated, awake, pulling on oxygen   HEENT: NC/AT. EOMI.   Cardiovascular: RRR without murmurs, rubs, heaves. Normal capillary refill   Respiratory: CTAB, no tachypnea or retractions   Abdomen: normal bowel sounds, soft, tender to palpation throughout, nondistended, no masses, no organomegaly   EXT:  JIM, no edema  Skin: No erythema/lesions   Neuro: Non-focal, alert not oriented       LABS:  Recent Labs     11/13/20  1909 11/14/20  0242 11/15/20  0300 11/16/20  0033   WBC 17.6* 8.8 6.2 9.3   RBC 4.06* 4.10* 3.33* 4.09*   HEMOGLOBIN 12.0 11.8* 9.6* 11.8*   HEMATOCRIT 37.3 38.1 30.6* 37.1   MCV 91.9 92.9 91.9 90.7   MCH 29.6 28.8 28.8 28.9   RDW 47.2 48.2 48.6 47.1   PLATELETCT 191 190 133* 126*   MPV 9.9 10.0 10.7 11.5   NEUTSPOLYS  90.40* 96.00* 83.50* 79.60*   LYMPHOCYTES 2.60* 2.50* 9.70* 10.60*   MONOCYTES 5.30 1.10 4.40 8.50   EOSINOPHILS 0.00 0.00 1.60 0.40   BASOPHILS 0.00 0.20 0.50 0.30   RBCMORPHOLO Normal  --   --   --      Recent Labs     11/13/20 1909 11/14/20 0242 11/14/20 1200 11/14/20 2000 11/16/20  0033   SODIUM 148* 149* 151* 149* 146*   POTASSIUM 2.4* 4.2 3.7 4.7 3.8   CHLORIDE 108 111 123* 117* 112   CO2 30 28 23 27 23   BUN 2* 3* 3* 4* 2*   CREATININE 0.38* 0.32* <0.17* 0.30* 0.30*   CALCIUM 9.4 9.4 6.3* 9.6 9.0   MAGNESIUM 1.7 2.1  --   --  1.5   PHOSPHORUS 1.5* 1.2* 2.9  --  1.8*   ALBUMIN 3.0*  --   --   --   --      Estimated GFR/CRCL = CrCl cannot be calculated (Unknown ideal weight.).  Recent Labs     11/14/20  1200 11/14/20  1200 11/14/20  2000 11/14/20  2000 11/15/20  1706 11/15/20  2114 11/16/20  0033 11/16/20  0615   GLUCOSE 77  --  116*  --   --   --  110*  --    POCGLUCOSE  --    < >  --    < > 117* 121*  --  90    < > = values in this interval not displayed.     Recent Labs     11/13/20 1909 11/13/20 2246 11/14/20 0242   ASTSGOT 159*  --   --    ALTSGPT 45  --   --    TBILIRUBIN 0.5  --   --    ALKPHOSPHAT 62  --   --    GLOBULIN 2.5  --   --    INR  --  1.10  --    AMMONIA  --   --  26         Recent Labs     11/13/20  2246 11/15/20  1128   HKRGE94W 7.37* 7.39*   UPROCI502K 51.4* 41.8*   RRWZY611F 158.1* 131.4*   CPNJ3OCI 98.7 98.2   ARTHCO3 29* 25   [id number]  --  6.0   ARTBE 3 0     Recent Labs     11/13/20  2246   INR 1.10       MICROBIOLOGY:   Blood Culture Hold   Date Value Ref Range Status   02/19/2020 Collected  Final        IMAGING:   DX-CHEST-LIMITED (1 VIEW)   Final Result      No acute cardiopulmonary disease.      EC-ECHOCARDIOGRAM COMPLETE W/O CONT   Final Result      DX-LUMBAR PUNCTURE FOR DIAGNOSIS   Final Result         FLUOROSCOPIC-GUIDED LUMBAR PUNCTURE AS DESCRIBED ABOVE.      IR-MIDLINE CATHETER INSERTION WO GUIDANCE > AGE 3   Final Result                  Ultrasound-guided midline  placement performed by qualified nursing staff    as above.          CT-HEAD W/O   Final Result      Normal CT scan of the head without contrast.               INTERPRETING LOCATION:  [address], [place], [address]      [id number] VIEW   Final Result      Unremarkable AP recumbent abdomen.      DX-CHEST-PORTABLE (1 VIEW)   Final Result      Left basilar opacities, consistent with pneumonia.      CT-HEAD W/O   Final Result      No CT evidence of acute infarct, hemorrhage or mass.      CT-ABDOMEN-PELVIS WITH   Final Result      No evidence of acute intra-abdominal or pelvic process.      Probably duplicated right renal collecting system.      CT-HEAD W/O   Final Result      1.  Focal soft tissue swelling right frontal region with minimal underlying increased density adjacent to the periphery of the outer table of the calvarium which could represent a small cephalohematoma.      2.  Periventricular and subcortical white matter density changes which could be related to small vessel ischemia, demyelinization or gliosis. Findings may be slightly more prominent than on previous exam.      MR-BRAIN-WITH & W/O    (Results Pending)       CULTURES:   Results     Procedure Component Value Units Date/Time    URINALYSIS [[id number]]     Order Status: No result     MRSA By PCR (Amp) [[id number]] Collected: 11/15/20 0315    Order Status: Completed Specimen: Respirate from Nares Updated: 11/15/20 1856     Significant Indicator NEG     Source RESP     Site NARES     MRSA PCR Negative for MRSA by PCR.    Narrative:      Collected By:[id number] DANIEL MAURER  Collected By:[id number] DANIEL MAURER    CSF CULTURE [[id number]] Collected: 11/14/20 1725    Order Status: Completed Specimen: CSF Updated: 11/15/20 1537     Significant Indicator NEG     Source CSF     Site Tap     Culture Result No growth at 24 hours.     Gram Stain Result No organisms seen.    HSV 1/2 By PCR(Herpes)+LW1951 [[id number]] Collected: 11/14/20 1725    Order Status: Completed Specimen: CSF  "from Spinal Fluid Updated: 11/15/20 1329    Narrative:      Collected By:[id number] SHANKAR KAY  Please add to CSF sample in lab.    Blood Culture [[id number]] Collected: 11/14/20 0601    Order Status: Completed Specimen: Blood Updated: 11/15/20 0724     Significant Indicator NEG     Source BLD     Site Periperal     Culture Result No Growth  Note: Blood cultures are incubated for 5 days and  are monitored continuously.Positive blood cultures  are called to the RN and reported as soon as  they are identified.      Narrative:      Right Hand    Blood Culture [[id number]] Collected: 11/14/20 0242    Order Status: Completed Specimen: Blood Updated: 11/15/20 0724     Significant Indicator NEG     Source BLD     Site Peripheral     Culture Result No Growth  Note: Blood cultures are incubated for 5 days and  are monitored continuously.Positive blood cultures  are called to the RN and reported as soon as  they are identified.      Narrative:      Left Wrist    GRAM STAIN [[id number]] Collected: 11/14/20 1725    Order Status: Completed Specimen: CSF Updated: 11/14/20 1854     Significant Indicator .     Source CSF     Site Tap     Gram Stain Result No organisms seen.    Cryptococcal Antigen, CSF [[id number]]     Order Status: No result Specimen: CSF     CSF Culture [[id number]]     Order Status: No result Specimen: CSF from Tap     Blood Culture [[id number]] Collected: 11/13/20 1740    Order Status: Completed Specimen: Blood from Peripheral Updated: 11/14/20 0726     Significant Indicator NEG     Source BLD     Site PERIPHERAL     Culture Result No Growth  Note: Blood cultures are incubated for 5 days and  are monitored continuously.Positive blood cultures  are called to the RN and reported as soon as  they are identified.      Narrative:      Per Hospital Policy: Only change Specimen Src: to \"Line\" if  specified by physician order.  Left AC    Blood Culture [[id number]] Collected: 11/13/20 1741    Order Status: Completed " "Specimen: Blood from Peripheral Updated: 11/14/20 0726     Significant Indicator NEG     Source BLD     Site PERIPHERAL     Culture Result No Growth  Note: Blood cultures are incubated for 5 days and  are monitored continuously.Positive blood cultures  are called to the RN and reported as soon as  they are identified.      Narrative:      Per Hospital Policy: Only change Specimen Src: to \"Line\" if  specified by physician order.  Left Hand    COVID/SARS CoV-2 PCR [[id number]]     Order Status: Canceled Specimen: Respirate from Nasopharyngeal     Blood Culture,Hold [[id number]] Collected: 11/14/20 0242    Order Status: Canceled     Urinalysis [[id number]] Collected: 11/14/20 0020    Order Status: Canceled Specimen: Urine, Cath     Blood Culture [[id number]]     Order Status: No result Specimen: Blood from Peripheral     Blood Culture [[id number]]     Order Status: No result Specimen: Blood from Peripheral     Culture Respiratory W/ GRM STN [[id number]]     Order Status: Completed Specimen: Respirate from Sputum     CoV-2, Flu A/B, And RSV by PCR [[id number]] Collected: 11/11/20 2328    Order Status: Completed Updated: 11/12/20 0218     Influenza virus A RNA Negative     Influenza virus B, PCR Negative     RSV, PCR Negative     SARS-CoV-2 by PCR NotDetected     Comment: PATIENTS: Important information regarding your results and instructions can  be found at [email or web address]   \"After your  Covid-19 Test\"  [facility] providers: PLEASE REFER TO DE-ESCALATION AND RETESTING PROTOCOL  on [email or web address]  **The [employer] GeneXpert Xpress SARS-CoV-2 Test has been made available for  use under the Emergency Use Authorization (EUA) only.          SARS-CoV-2 Source NP Swab    Narrative:      Is patient being admitted?->Yes  Does this patient meet criteria for Rush/Cepheid per [facility]  Inpatient Workflow? (See workflow link below)->Yes  Expected turn around time?->Rush (Cepheid 2-4 hours)  Is this the patients " First SARS CoV-2 test?->Unknown  Is this patient employed in healthcare?->No  Is the patient symptomatic as defined by the CDC?->No  Is the patient hospitalized?->Yes  Is the patient in the ICU?->No  Is the patient a resident in a congregate care setting?->No  Is the patient pregnant?->No    COVID/SARS CoV-2 PCR [[id number]] Collected: 11/11/20 2328    Order Status: Completed Specimen: Respirate from Nasopharyngeal Updated: 11/12/20 0137     COVID Order Status Received     Comment: The order for SARS CoV-2 testing has been received by the  Laboratory. This result is neither positive nor negative.  Final results of testing will report in 24-48 hours, separately.         Narrative:      Is patient being admitted?->Yes  Does this patient meet criteria for Rush/Cepheid per [facility]  Inpatient Workflow? (See workflow link below)->Yes  Expected turn around time?->Rush (Cepheid 2-4 hours)  Is this the patients First SARS CoV-2 test?->Unknown  Is this patient employed in healthcare?->No  Is the patient symptomatic as defined by the CDC?->No  Is the patient hospitalized?->Yes  Is the patient in the ICU?->No  Is the patient a resident in a congregate care setting?->No  Is the patient pregnant?->No    URINALYSIS,CULTURE IF INDICATED [[id number]]  (Abnormal) Collected: 11/11/20 2030    Order Status: Completed Specimen: Urine Updated: 11/11/20 2059     Color Yellow     Character Cloudy     Specific Gravity 1.025     Ph 5.5     Glucose Negative mg/dL      Ketones 15 mg/dL      Protein Negative mg/dL      Bilirubin Negative     Urobilinogen, Urine 0.2     Nitrite Negative     Leukocyte Esterase Trace     Occult Blood Negative     Micro Urine Req Microscopic    Narrative:      Indication for culture:->Patient WITHOUT an indwelling Mehta  catheter in place with new onset of Dysuria, Frequency,  Urgency, and/or Suprapubic pain          MEDS:  Current Facility-Administered Medications   Medication Last Admin   • magnesium sulfate IVPB  premix 2 g 2 g at 11/16/20 1040   • morphine (pf) 4 mg/mL injection 1-2 mg 1 mg at 11/15/20 1116   • cefTRIAXone (ROCEPHIN) 2 g in  mL IVPB 2 g at 11/16/20 1026   • naloxone (NARCAN) injection 0.4 mg     • Respiratory Therapy Consult     • acyclovir (ZOVIRAX) 380 mg in  mL IVPB Stopped at 11/16/20 0709   • D5LR infusion New Bag at 11/16/20 0609   • vancomycin (VANCOCIN) 750 mg in  mL IVPB Stopped at 11/16/20 0420   • diphenhydrAMINE (BENADRYL) injection 12.5-25 mg     • LORazepam (ATIVAN) injection 0.5 mg 0.5 mg at 11/14/20 2017   • acetaminophen (Tylenol) tablet 650 mg 650 mg at 11/13/20 1626   • MD Alert...Vancomycin per Pharmacy     • Pharmacy Consult Request ...Pain Management Review 1 Each     • oxyCODONE immediate-release (ROXICODONE) tablet 2.5 mg     • oxyCODONE immediate-release (ROXICODONE) tablet 5 mg 5 mg at 11/12/20 2308   • insulin regular (HumuLIN R,NovoLIN R) injection Stopped at 11/12/20 1700    And   • glucose 4 g chewable tablet 16 g      And   • dextrose 50% (D50W) injection 50 mL 50 mL at 11/12/20 1335   • enoxaparin (LOVENOX) inj 30 mg 30 mg at 11/16/20 0609   • baclofen (LIORESAL) tablet 10 mg Stopped at 11/13/20 1800   • ondansetron (ZOFRAN) syringe/vial injection 4 mg     • magnesium hydroxide (MILK OF MAGNESIA) suspension 30 mL Stopped at 11/14/20 0600    And   • senna-docusate (PERICOLACE or SENOKOT S) 8.6-50 MG per tablet 2 Tab Stopped at 11/13/20 1800    And   • polyethylene glycol/lytes (MIRALAX) PACKET 1 Packet      And   • bisacodyl (DULCOLAX) suppository 10 mg     • omeprazole (PRILOSEC) capsule 20 mg Stopped at 11/14/20 0600   • dicyclomine (BENTYL) tablet 20 mg         PROBLEM LIST:  No problems updated.    ASSESSMENT/PLAN: 49 y.o. female with a  PMHx of myotonic muscular dystrophy admitted on 11/11 for worsening ABD pain, extremity pain and numbness. Sustained a GLF in the ED resulting a cephalohematoma. Developed hypotension, tachycardia, and AMS day 2 of  admission. Increasing oxygen requirements. Etiology unknown at this time. Consider: sepsis, stroke, seizure     # Encephalopathy  # AMS  Etiology: difficult to assess patient's baseline mental status. Workup has been largely negative up to this point. Possibly due to hypercarbia d/t respiratory acidosis. GLF on admission could be contributing  Work up: CSF cultures NGTD. TSH WNL, previous drug screens only + for chronic opioid use, sepsis workup negative    Plan:  - Neurology consulting: appreciate their recs  - MRI pending, will happen once patient is off of high flow oxygen  - EEG pending for today  - Plan to discontinue acyclovir when HSV titers are back today- provided they are negative  - Thiamine pending  - Repeat ABGs pending  - Speech therapy consult - appreciate recs    # Acute respiratory acidosis   # Acute respiratory failure   Etiology: unknown. CXR WNL, WBC WNL, ABGs consistent with respiratory acidosis.     Plan:   - Per pulmonary recs: recommending NIF  - Continue to try and ween off of high flow O2  - Goal O2 >88%  - Continue 2 point restraints, patient removes oxygen immediately after restraints are off  - Repeat ABGs are pending  - Continue ceftriaxone for possible aspiration PNA from AMS    # Hypotension- resolved  # Tachycardia   Believed to be due to sepsis given acute increase in WBC to 17; lactic acid up to 4. BP responded well to fluid bolus. Remains tachycardic overnight.   Etiology: unknown at this time, work up for source of infection largely negative at this point. Consider: dehydration, possibly adrenal insufficiency.     Plan:  - ID following: recommending d/c vanc and acyclovir   - Continue tele monitoring  - Continue IVMF D5LR @75mL/hour  - Monitor urinary output    # Upper ext pain, right  # BL LE pain  Present on admission. Possibly d/t patient's underlying myotonic dystrophy.  states that this is new for patient. GLF on admission at hospital. Cephalohematoma present seen on  CT head.      Plan:   - MRI pending    # Abdominal pain  Present on admission, patient states she is still having abd pain. Physical exam reassuring. No reg flag symptoms: no blood in stool, N/V/D or constipation.    Work up: CT ABD WNL; UA negative; RUQ US negative 9/2020    Plan:   - Continue to monitor    # ADLs   assists with all ADLs. Patient uses walker at home.   Per , patient has been exhibiting an increase in falls lately with unknown head trauma, unable to care for her when he goes to work.   PT/OT recommending SNF. Referral placed.   - Case management to provide updates on SNF referral. Patient not yet medically cleared for transfer.    Core Measures:   Fluids: 50% D5 LR 75mL/hour  Lines: IVP  Abx: d/c vanc; c3  DVT prophylaxis: Lovenox   Code Status: full code     Disposition: jimbo Mc MD   PGY-1 Family Medicine Resident   [facility]

## 2020-11-16 NOTE — DISCHARGE PLANNING
Care Transition Team Discharge Planning    Anticipated Discharge Information  Discharge Disposition: D/T to SNF with medicare cert w/planned hosp IP readmit (03)  Discharge Contact Phone Number: [phone]              Discharge Plan:  SNF vs LTACH    This RN JAMIL spoke with Adryan, Pt's  who is inquiring about Pt's SNF acceptance.  Informed Adryan that Pt has been declined by some SNFs that we sent the referral to including all [facility].    Informed Adryan, that there are still some SNFs that are pending.     Per Adryan, he is working, Pt's eldest Son is developmentally delayed and is residing in a Facility.   Pt's Other Son  Is a  and lives in [place].      Informed Adryan, that Pt is now on 20 liters oxygen and was on 30 liters today.  Adryan is aware that Pt will need an MRI.    Per Adryan , if no SNF will accept Pt , it is okay to send the referral to LTACH.    Pt's choices are 1. [facility] and 2. [facility].  Will need an order for LTACH.

## 2020-11-17 NOTE — THERAPY
Speech Language Pathology  Daily Treatment     Patient Name: Gayla Escudero  Age:  49 y.o., Sex:  female  Medical Record #: [id number]  Today's Date: 11/17/2020     Precautions  Precautions: Fall Risk, Swallow Precautions ( See Comments)  Comments: baseline myotonic MD    Assessment    Pt was seen for dysphagia tx with PO of mildly thick liquids via tsp/cup. Pt declined all other PO offered, agreeing only to orange juice. Pt was A&Ox2, confused and perseverative with language of confusion noted throughout the session. Poor labial seal and tongue protrusion noted during oral phase of swallow. Multiple weak swallows occurred during pharyngeal phase though no overt s/sx of aspiration occurred and vocal quality remained clear. Pt needed constant encouragement to continue PO intake and took ~20 minutes to consume 4 oz of MTL. Pt will not meet nutrition/hydrational needs at her current level. Per nursing, pt has not eaten or drank anything today d/t AMS, lethargy and this is the most awake/alert she has been. Strongly recommend consideration of an alternative primary source of nutrition/hydration/meds. Ok to continue attempts to feed pureed solids and mildly thick liquids as tolerated when alert and agreeable.     Plan    Strong recommend considering an alternative primary source of nutrition/hydration/meds d/t poor PO intake, waxing/waning mental status.  Ok to continue attempts to feed pureed solids and mildly thick liquids as tolerated when alert and agreeable.     Continue current treatment plan.    Discharge Recommendations: (P) Recommend post-acute placement for additional speech therapy services prior to discharge home    Subjective    Pt was alert, confused.      Objective       11/17/20 5042   Dysphagia    Positioning / Behavior Modification Modulate Rate or Bite Size;Multiple Swallows;Self Monitoring;Other (see Comments)  (labial seal)   Other Treatments PO of ice chips, mildly thick liquids; pt refused all  LQ3/PU4 offered   Diet / Liquid Recommendation Puree (4);Mildly Thick (2) - (Nectar Thick)  (primary source of non-oral nutrition/hydration recommended)   Recommended Route of Medication Administration   Medication Administration  Via Gastric Tube   Short Term Goals   Short Term Goal # 1 Patient will consume a PU4/MT2 diet with no overt s/sx of aspiration given 1:1 feeding.    Goal Outcome # 1 Progressing slower than expected

## 2020-11-17 NOTE — PROCEDURES
Vascular Access Team    Date of Insertion: November 17, 2020  Arm Circumference: n/a  Line Length: 8 cm  Line Size: 18 G  Vein Occupancy %: 31  Reason for Midline: Access   Labs: WBC 7.2, , BUN 2, Cr 0.24, GFR >60, INR 1.10 (11/13/20)    Orders confirmed, vessel patency confirmed with ultrasound. Risks and benefits of procedure explained to patient and education regarding line associated bloodstream infections provided. Questions answered.     PowerGlide Midline placed in LUE per licensed provider order with ultrasound guidance. 18g, 8 cm line placed in brachial vein after 1 attempt(s).  Catheter inserted with brisk blood return. Secured with 0 cm external from insertion site.  Line flushed without resistance with 10 mL 0.9% normal saline.  Midline secured with Biopatch and Tegaderm.     Midline placement is confirmed by nurse using ultrasound and ability to flush and draw blood. Midline is appropriate for use at this time.  No X-ray is needed for placement confirmation. Pt tolerated procedure well.  Patient condition relayed to unit RN or ordering physician via this post procedure note in the EMR.    Ultrasound images uploaded to PACS and viewable in the EMR - yes  Ultrasound imaged printed and placed in paper chart - no      BARD PowerGlide Midline ref # [id number], Lot # [id number], Expiration Date September 30, 2021

## 2020-11-17 NOTE — PROGRESS NOTES
Neurology Progress Note  Neurohospitalist Service, [facility]    Referring Physician: Dalila Scherer M.D.    Chief Complaint   Patient presents with   • Abdominal Pain     sharp pain, denies n/v/d   • Leg Pain     R leg pain       HPI: Refer to initial documented Neurology H&P, as detailed in the patient's chart.    Interval History 2020: No acute events overnight. Patient is currently lying in bed, awake, alert, following simple commands with redirection, oriented to self, place, and year but not month, date, or situation. She complains of feeling confused and unaware of what is going on, feeling generally weaker, and having some abdominal pain and tingling to right lower quadrant. Denies headaches, acute visual changes, facial droopiness, change in speech or language, abnormal movements, seizures, loss of consciousness. Now off HFNC, but still requiring restraints for attempts to remove medical equipment.     Past Medical History:   Past Medical History:   Diagnosis Date   • Anemia    • Cataract    • Heart murmur    • Muscular disease    • Muscular dystrophy (HCC)    • JOSÉ on CPAP         FHx:  Family History   Problem Relation Age of Onset   • No Known Problems Mother    • No Known Problems Father    • Sleep Apnea Daughter    • No Known Problems Sister    • No Known Problems Brother         Musc dys also   • No Known Problems Sister    • No Known Problems Brother         Trauma, was shot   • No Known Problems Brother         Car accident   • Other Son         Muscular dystrophy   • Heart Disease Daughter          at 2 months congenital heart disease        SHx:  Social History     Socioeconomic History   • Marital status:      Spouse name: Not on file   • Number of children: Not on file   • Years of education: Not on file   • Highest education level: Not on file   Occupational History   • Not on file   Social Needs   • Financial resource strain: Not on file   • Food  insecurity     Worry: Not on file     Inability: Not on file   • Transportation needs     Medical: Not on file     Non-medical: Not on file   Tobacco Use   • Smoking status: Never Smoker   • Smokeless tobacco: Never Used   Substance and Sexual Activity   • Alcohol use: No   • Drug use: No   • Sexual activity: Yes     Partners: Male   Lifestyle   • Physical activity     Days per week: Not on file     Minutes per session: Not on file   • Stress: Not on file   Relationships   • Social connections     Talks on phone: Not on file     Gets together: Not on file     Attends Worship service: Not on file     Active member of club or organization: Not on file     Attends meetings of clubs or organizations: Not on file     Relationship status: Not on file   • Intimate partner violence     Fear of current or ex partner: Not on file     Emotionally abused: Not on file     Physically abused: Not on file     Forced sexual activity: Not on file   Other Topics Concern   • Not on file   Social History Narrative   • Not on file        Medications:    Current Facility-Administered Medications:   •  QUEtiapine (Seroquel) tablet 25 mg, 25 mg, Oral, Nightly, Kimberly Mc M.D.  •  cefTRIAXone (ROCEPHIN) injection 1,000 mg, 1,000 mg, Intramuscular, Q12HRS **AND** cefTRIAXone (ROCEPHIN) injection 1,000 mg, 1,000 mg, Intramuscular, Q12HRS, Feng Arellano M.D.  •  morphine (pf) 4 mg/mL injection 1-2 mg, 1-2 mg, Intravenous, Q3HRS PRN, Berenice Denny M.D., 2 mg at 11/16/20 2307  •  naloxone (NARCAN) injection 0.4 mg, 0.4 mg, Intravenous, Q2 MIN PRN, Dalila Scherer M.D.  •  Respiratory Therapy Consult, , Nebulization, Continuous RT, Berenice Denny M.D.  •  D5LR infusion, , Intravenous, Continuous, Asia Lucero M.D., Last Rate: 75 mL/hr at 11/16/20 0609, New Bag at 11/16/20 0609  •  diphenhydrAMINE (BENADRYL) injection 12.5-25 mg, 12.5-25 mg, Intravenous, Q6HRS PRN, Joann Bro M.D.  •  LORazepam (ATIVAN)  injection 0.5 mg, 0.5 mg, Intravenous, Q4HRS PRN, Joann Bro M.D., 0.5 mg at 11/17/20 0822  •  acetaminophen (Tylenol) tablet 650 mg, 650 mg, Oral, Q4HRS PRN, RENU RamirezO., 650 mg at 11/13/20 1626  •  Pharmacy Consult Request ...Pain Management Review 1 Each, 1 Each, Other, PHARMACY TO DOSE, Diego Montalvo D.O.  •  oxyCODONE immediate-release (ROXICODONE) tablet 2.5 mg, 2.5 mg, Oral, Q3HRS PRN, RENU RamirezO.  •  oxyCODONE immediate-release (ROXICODONE) tablet 5 mg, 5 mg, Oral, Q3HRS PRN, RENU RamirezO., 5 mg at 11/12/20 2308  •  insulin regular (HumuLIN R,NovoLIN R) injection, 1-6 Units, Subcutaneous, 4X/DAY ACHS, Stopped at 11/12/20 1700 **AND** POC Blood Glucose, , , Q AC AND BEDTIME(S) **AND** NOTIFY MD and PharmD, , , Once **AND** glucose 4 g chewable tablet 16 g, 16 g, Oral, Q15 MIN PRN **AND** dextrose 50% (D50W) injection 50 mL, 50 mL, Intravenous, Q15 MIN PRN, RENU RamirezO., 50 mL at 11/12/20 1335  •  enoxaparin (LOVENOX) inj 30 mg, 30 mg, Subcutaneous, DAILY, CLARA Ramirez.O., 30 mg at 11/17/20 0551  •  ondansetron (ZOFRAN) syringe/vial injection 4 mg, 4 mg, Intravenous, Q4HRS PRN, Lauro Castro M.D.  •  senna-docusate (PERICOLACE or SENOKOT S) 8.6-50 MG per tablet 2 Tab, 2 Tab, Oral, BID, Stopped at 11/13/20 1800 **AND** polyethylene glycol/lytes (MIRALAX) PACKET 1 Packet, 1 Packet, Oral, QDAY PRN **AND** magnesium hydroxide (MILK OF MAGNESIA) suspension 30 mL, 30 mL, Oral, DAILY, Stopped at 11/14/20 0600 **AND** bisacodyl (DULCOLAX) suppository 10 mg, 10 mg, Rectal, QDAY PRN, Lauro Castro M.D.  •  omeprazole (PRILOSEC) capsule 20 mg, 20 mg, Oral, DAILY, Lauro Castro M.D., 20 mg at 11/17/20 0550  •  dicyclomine (BENTYL) tablet 20 mg, 20 mg, Oral, Q6HRS PRN, Lauro Castro M.D.    Allergies:  Allergies   Allergen Reactions   • Codeine Vomiting, Nausea and Unspecified     N&V  Dizzy, lightheaded and vomiting.    • Tramadol Unspecified      Effects her MD  weak        Review of systems: In addition to what is detailed in the HPI and interval history above, (and scanned into the chart if and when application), all other systems reviewed and are negative.      Physical Examination:   Vitals:    11/17/20 0300 11/17/20 0416 11/17/20 0748 11/17/20 1115   BP:  124/80 117/83 114/77   Pulse: (!) 109 (!) 109 (!) 130 (!) 105   Resp: 18 18 (!) 22 20   Temp:  36.6 °C (97.9 °F) 37.4 °C (99.4 °F) 36.3 °C (97.4 °F)   TempSrc:  Temporal Temporal Temporal   SpO2: 98% 94% 93% 94%   Weight:       Height:         General: Patient in no acute distress, pleasant and cooperative.  HEENT: Normocephalic, no signs of acute trauma.   Neck: Supple, no meningeal signs or carotid bruits. There is normal range of motion. No tenderness on exam.   Chest: Slear to auscultation. No cough.   CV: RRR, no murmurs.   Skin: Right arm edema about brachial and antecubital PIV, RN notified of possible infiltration. No signs of acute rashes or trauma.   Musculoskeletal: Joints exhibit slightly limited passive range of motion, without any pain to palpation. Bilateral foot drop. There are no signs of joint or muscle swelling. There is no tenderness to deep palpation of muscles.   Psychiatric: No hallucinatory behavior. Unable to attend to answer for symptoms of depression or suicidal ideation. Mood and affect appear anxious and confused on exam.     NEUROLOGICAL EXAM:   Mental status, orientation: Awake, alert, anxious requiring redirection to attend, weakly follows simple commands, oriented to self, place, and year but not month, date, or situation.    GCS E(4)V(4)M(6).   Speech and language: Speech is mildly dysarthric but much stronger and fluent today. The Patient is able to name, repeat, and comprehend.   Memory: There is impaired recollection of recent and remote events.   Cranial nerve exam: Pupils are 3-4 mm bilaterally and equally reactive to light and accommodation. Visual fields are  intact by confrontation. There is no nystagmus on primary or secondary gaze. Intact full EOM in all directions of gaze. Face appears symmetric. Sensation in the face is intact to light touch. Uvula is midline. Palate elevates symmetrically. Tongue is midline and without any signs of tongue biting or fasciculations. Sternocleidomastoid muscles and shoulder shrug are minimally intact with weakness bilaterally.   Motor exam: Strength is 3/5 in BUE and 2/5 BLE, proximal greater than distal weakness. Tone is decreased throughout. Bilateral foot droop noted. No abnormal movements were seen on exam.   Sensory exam Weak flexion response to noxious stimuli in all extremities. Tingling paresthesia to right lower quadrant of abdomen.   Deep tendon reflexes:  1+ throughout. Plantar responses are mute. There is no clonus.   Coordination: Patient unable to complete coordination testing secondary to weakness.    Gait: Deferred given confusion and generalized weakness with high fall risk.       Ancillary Data Reviewed:    Labs:  Lab Results   Component Value Date/Time    PROTHROMBTM 13.9 11/13/2020 10:46 PM    INR 1.10 11/13/2020 10:46 PM      Lab Results   Component Value Date/Time    WBC 7.2 11/17/2020 12:18 PM    RBC 3.48 (L) 11/17/2020 12:18 PM    HEMOGLOBIN 10.1 (L) 11/17/2020 12:18 PM    HEMATOCRIT 31.4 (L) 11/17/2020 12:18 PM    MCV 90.2 11/17/2020 12:18 PM    MCH 29.0 11/17/2020 12:18 PM    MCHC 32.2 (L) 11/17/2020 12:18 PM    MPV 10.7 11/17/2020 12:18 PM    NEUTSPOLYS 78.50 (H) 11/17/2020 12:18 PM    LYMPHOCYTES 14.00 (L) 11/17/2020 12:18 PM    MONOCYTES 6.10 11/17/2020 12:18 PM    EOSINOPHILS 0.60 11/17/2020 12:18 PM    BASOPHILS 0.40 11/17/2020 12:18 PM    ANISOCYTOSIS 1+ 02/19/2020 09:58 PM      Lab Results   Component Value Date/Time    SODIUM 147 (H) 11/17/2020 12:18 PM    POTASSIUM 3.1 (L) 11/17/2020 12:18 PM    CHLORIDE 115 (H) 11/17/2020 12:18 PM    CO2 26 11/17/2020 12:18 PM    GLUCOSE 88 11/17/2020 12:18 PM     BUN 2 (L) 11/17/2020 12:18 PM    CREATININE 0.24 (L) 11/17/2020 12:18 PM      Lab Results   Component Value Date/Time    CHOLSTRLTOT 157 09/15/2020 03:01 AM    LDL 72 09/15/2020 03:01 AM    HDL 70 09/15/2020 03:01 AM    TRIGLYCERIDE 73 09/15/2020 03:01 AM       Lab Results   Component Value Date/Time    ALKPHOSPHAT 80 11/17/2020 12:18 PM    ASTSGOT 48 (H) 11/17/2020 12:18 PM    ALTSGPT 63 (H) 11/17/2020 12:18 PM    TBILIRUBIN 0.4 11/17/2020 12:18 PM        Imaging/Testing:    I interpreted and/or reviewed the patient's neuroimaging    MR-BRAIN-WITH & W/O   Final Result      1.  Limited exam due to motion artifact.   2.  Diffuse atrophy and white matter microvascular ischemic changes.   3.  No acute stroke or evidence for intracranial hemorrhage.      DX-CHEST-LIMITED (1 VIEW)   Final Result      No acute cardiopulmonary disease.      EC-ECHOCARDIOGRAM COMPLETE W/O CONT   Final Result      DX-LUMBAR PUNCTURE FOR DIAGNOSIS   Final Result         FLUOROSCOPIC-GUIDED LUMBAR PUNCTURE AS DESCRIBED ABOVE.      IR-MIDLINE CATHETER INSERTION WO GUIDANCE > AGE 3   Final Result                  Ultrasound-guided midline placement performed by qualified nursing staff    as above.          CT-HEAD W/O   Final Result      Normal CT scan of the head without contrast.               INTERPRETING LOCATION:  [address] [place] [address], [id number]      HV-HOTITCD-5 VIEW   Final Result      Unremarkable AP recumbent abdomen.      DX-CHEST-PORTABLE (1 VIEW)   Final Result      Left basilar opacities, consistent with pneumonia.      CT-HEAD W/O   Final Result      No CT evidence of acute infarct, hemorrhage or mass.      CT-ABDOMEN-PELVIS WITH   Final Result      No evidence of acute intra-abdominal or pelvic process.      Probably duplicated right renal collecting system.      CT-HEAD W/O   Final Result      1.  Focal soft tissue swelling right frontal region with minimal underlying increased density adjacent to the periphery of the outer table of  the calvarium which could represent a small cephalohematoma.      2.  Periventricular and subcortical white matter density changes which could be related to small vessel ischemia, demyelinization or gliosis. Findings may be slightly more prominent than on previous exam.      IR-MIDLINE CATHETER INSERTION WO GUIDANCE > AGE 3    (Results Pending)       Assessment:    Gayla Escudero is a 49 y.o. female with relevant history of muscular dystrophy, JOSÉ on CPAP, and anemia who presented on 11/11/20 for abdominal pain and developed severe sepsis for which neurology was consulted to address acute encephalopathy.  She underwent lumbar puncture for CSF analysis which thus far has been unremarkable with normal protein, glucose, and WBCs; however, RBCs are elevated at 373 with clear and colorless CSF. CSF culture has shown no growth at 72 hours. Meningitis/encephalitis panel, HSV I/II and PCR, encephalitis autoimmune panel, and cryptococcal antigen are all still pending.  Neurological exam appears improved as patient is awake, alert, oriented to place, self, and year but disoriented to month, date, or situation, and weakly following simple commands with redirection to attend. Routine video EEG was normal with patient noted to be very lethargic, but no seizures captured. ABG results yesterday showed mild to moderate hypercapnia with pCO2 41.2 and hyperoxygenation with pO2 135.4 with normal pH of 7.43. HFNC now off with improving neurological exam. MRI brain w//wo contrast was limited due to motion artifact, but no acute infarct or hemorrhage noted. Differential diagnosis includes multifactorial toxic, metabolic, hypercapnic, and infectious encephalopathy with HSV encephalitis a possibility given elevated RBCs pending serum HSV PCR and CSF HSV I/II lab results.     Plan:    -Repeat MRI brain w/wo contrast given motion degradation    -May administer Ativan 1mg PRN prior to MRI   -q4h and PRN neuro assessment. VS per  nursing/unit protocol.  -Normotensive BP goal. Antihypertensives per primary team.   -Telemetry and pulse oximetry; currently SR  -Continue Acyclovir pending CSF HSV I/II and serum HSV PCR results  -Will continue to follow CSF analysis with pending results as outlined in assessment.   -PT/OT/SLP eval and treat.   -Fall and aspiration precautions.   -All other medical management per primary team.   -DVT PPX: SCDs.     The evaluation of the patient, and recommended management, was discussed with Dr. Wilson, Dr. Mc, and bedside RN. I have performed a physical exam and reviewed and updated ROS and Plan today (11/17/2020). In review of yesterday's note (11/16/2020), there are no changes except as documented above.    HERIBERTO Flowers.P.R.N.   Nurse Practitioner, Neurohospitalist  [facility]  t) [phone] (f) [phone]

## 2020-11-17 NOTE — CARE PLAN
Problem: Communication  Goal: The ability to communicate needs accurately and effectively will improve  Outcome: PROGRESSING AS EXPECTED     Problem: Safety  Goal: Will remain free from injury  Outcome: PROGRESSING AS EXPECTED  Goal: Will remain free from falls  Outcome: PROGRESSING AS EXPECTED     Problem: Infection  Goal: Will remain free from infection  Outcome: PROGRESSING AS EXPECTED     Problem: Venous Thromboembolism (VTW)/Deep Vein Thrombosis (DVT) Prevention:  Goal: Patient will participate in Venous Thrombosis (VTE)/Deep Vein Thrombosis (DVT)Prevention Measures  Outcome: PROGRESSING AS EXPECTED     Problem: Bowel/Gastric:  Goal: Normal bowel function is maintained or improved  Outcome: PROGRESSING AS EXPECTED  Goal: Will not experience complications related to bowel motility  Outcome: PROGRESSING AS EXPECTED     Problem: Knowledge Deficit  Goal: Knowledge of disease process/condition, treatment plan, diagnostic tests, and medications will improve  Outcome: PROGRESSING AS EXPECTED  Goal: Knowledge of the prescribed therapeutic regimen will improve  Outcome: PROGRESSING AS EXPECTED     Problem: Discharge Barriers/Planning  Goal: Patient's continuum of care needs will be met  Outcome: PROGRESSING AS EXPECTED     Problem: Psychosocial Needs:  Goal: Level of anxiety will decrease  Outcome: PROGRESSING AS EXPECTED     Problem: Skin Integrity  Goal: Risk for impaired skin integrity will decrease  Outcome: PROGRESSING AS EXPECTED     Problem: Pain Management  Goal: Pain level will decrease to patient's comfort goal  Outcome: PROGRESSING AS EXPECTED     Problem: Respiratory:  Goal: Respiratory status will improve  Outcome: PROGRESSING AS EXPECTED     Problem: Mobility  Goal: Risk for activity intolerance will decrease  Outcome: PROGRESSING AS EXPECTED     Problem: Fluid Volume:  Goal: Will maintain balanced intake and output  Outcome: PROGRESSING AS EXPECTED     Problem: Urinary Elimination:  Goal: Ability to  reestablish a normal urinary elimination pattern will improve  Outcome: PROGRESSING AS EXPECTED     Problem: Safety - Medical Restraint  Goal: Remains free of injury from restraints (Restraint for Interference with Medical Device)  Description: INTERVENTIONS:  1. Determine that other, less restrictive measures have been tried or would not be effective before applying the restraint  2. Evaluate the patient's condition at the time of restraint application  3. Inform patient/family regarding the reason for restraint  4. Q2H: Monitor safety, psychosocial status, comfort, nutrition and hydration  Outcome: PROGRESSING AS EXPECTED  Goal: Free from restraint(s) (Restraint for Interference with Medical Device)  Description: INTERVENTIONS:  1. ONCE/SHIFT or MINIMUM Q12H: Assess and document the continuing need for restraints  2. Q24H: Continued use of restraint requires LIP to perform face to face examination and written order  3. Identify and implement measures to help patient regain control  Outcome: PROGRESSING AS EXPECTED

## 2020-11-17 NOTE — NON-PROVIDER
MEDICAL STUDENT PROGRESS NOTE     Attending:   Dr. Scherer      Student:  Renny Pope, MS3     PATIENT:   Gayla Escudero; [id number]; 1970     ID:   This is a 49-year-old female admitted for initial complaints of worsening abdominal pain, pain to the right arm and bilateral lower extremities, and numbness to those extremities. This is her sixth day admitted to the service. She was originally brought to the emergency department status post a fall out of her wheelchair with resultant head trauma and hematoma development. Since admission, the patient has had fluctuating cognition, as well as multiple episodes of hypoxia, tachycardia, and hypertension. Patient did receive hopeful auction and 40 L per minute at 60% auction yesterday, which has since been stopped.     SUBJECTIVE:   The patient is currently focused on restraints that were replaced yesterday, stating that they are tight and hurting her bilateral wrists. She reports that she is breathing better than she was yesterday, but continues to have a little insight on what is going on. She denies any worsening abdominal pain today.      OBJECTIVE:  Vitals:    11/17/20 0416   BP: 124/80   Pulse: (!) 109   Resp: 18   Temp: 36.6 °C (97.9 °F)   SpO2: 94%               .    Intake/Output Summary (Last 24 hours) at 11/17/2020 0755  Last data filed at 11/16/2020 1833  Gross per 24 hour   Intake 930 ml   Output --   Net 930 ml            PHYSICAL EXAM:  General: No acute distress, afebrile, awake, appears malaised, staying in one position with minimal movement and appears to be staring off.   HEENT: NC/AT. EOMI. Dry oral mucosa with crusting to the lips.   Cardiovascular: Tachycardic rate, regular rhythm without murmurs, rubs, heaves. Normal capillary refill   Respiratory: CTAB, no tachypnea or retractions on poor inspiratory effort.   Abdomen: normal bowel sounds, soft, tender to palpation throughout, nondistended, no masses, no organomegaly   EXT:  JIM, no edema.   Skin: No  erythema/lesions   Neuro: Alert and oriented to person, place, and time. Strength 4/5 to the bilateral upper and lower extremities with decreasing strength distally. Patellar and biceps reflex 1+ bilaterally.     LABS:  Recent Labs     11/13/20  1909 11/14/20  0242 11/15/20  0300 11/16/20  0033   WBC 17.6* 8.8 6.2 9.3   RBC 4.06* 4.10* 3.33* 4.09*   HEMOGLOBIN 12.0 11.8* 9.6* 11.8*   HEMATOCRIT 37.3 38.1 30.6* 37.1   MCV 91.9 92.9 91.9 90.7   MCH 29.6 28.8 28.8 28.9   RDW 47.2 48.2 48.6 47.1   PLATELETCT 191 190 133* 126*   MPV 9.9 10.0 10.7 11.5   NEUTSPOLYS 90.40* 96.00* 83.50* 79.60*   LYMPHOCYTES 2.60* 2.50* 9.70* 10.60*   MONOCYTES 5.30 1.10 4.40 8.50   EOSINOPHILS 0.00 0.00 1.60 0.40   BASOPHILS 0.00 0.20 0.50 0.30   RBCMORPHOLO Normal  --   --   --               Recent Labs     11/13/20 1909 11/14/20 0242 11/14/20 1200 11/14/20 2000 11/16/20  0033   SODIUM 148* 149* 151* 149* 146*   POTASSIUM 2.4* 4.2 3.7 4.7 3.8   CHLORIDE 108 111 123* 117* 112   CO2 30 28 23 27 23   BUN 2* 3* 3* 4* 2*   CREATININE 0.38* 0.32* <0.17* 0.30* 0.30*   CALCIUM 9.4 9.4 6.3* 9.6 9.0   MAGNESIUM 1.7 2.1  --   --  1.5   PHOSPHORUS 1.5* 1.2* 2.9  --  1.8*   ALBUMIN 3.0*  --   --   --   --       Estimated GFR/CRCL = CrCl cannot be calculated (Unknown ideal weight.).             Recent Labs     11/14/20  1200 11/14/20  1200 11/14/20  2000 11/14/20  2000 11/15/20  1706 11/15/20  2114 11/16/20  0033 11/16/20  0615   GLUCOSE 77  --  116*  --   --   --  110*  --    POCGLUCOSE  --    < >  --    < > 117* 121*  --  90    < > = values in this interval not displayed.            Recent Labs     11/13/20  1909 11/13/20 2246 11/14/20  0242   ASTSGOT 159*  --   --    ALTSGPT 45  --   --    TBILIRUBIN 0.5  --   --    ALKPHOSPHAT 62  --   --    GLOBULIN 2.5  --   --    INR  --  1.10  --    AMMONIA  --   --  26               Recent Labs     11/13/20  2246 11/15/20  1128   CPUKZ14T 7.37* 7.39*   IYDRLZ061H 51.4* 41.8*   ZKTLE012W 158.1* 131.4*      EWIN5DFA 98.7 98.2   ARTHCO3 29* 25   V7EUGLUYX  --  6.0   ARTBE 3 0          Recent Labs     11/13/20  2246   INR 1.10      IMAGING:   DX-CHEST-LIMITED (1 VIEW)   Final Result       No acute cardiopulmonary disease.       EC-ECHOCARDIOGRAM COMPLETE W/O CONT   Final Result       DX-LUMBAR PUNCTURE FOR DIAGNOSIS   Final Result           FLUOROSCOPIC-GUIDED LUMBAR PUNCTURE AS DESCRIBED ABOVE.       IR-MIDLINE CATHETER INSERTION WO GUIDANCE > AGE 3   Final Result                   Ultrasound-guided midline placement performed by qualified nursing staff    as above.            CT-HEAD W/O   Final Result       Normal CT scan of the head without contrast.                   INTERPRETING LOCATION:  [address], [place], [address]       [id number] VIEW   Final Result       Unremarkable AP recumbent abdomen.       DX-CHEST-PORTABLE (1 VIEW)   Final Result       Left basilar opacities, consistent with pneumonia.       CT-HEAD W/O   Final Result       No CT evidence of acute infarct, hemorrhage or mass.       CT-ABDOMEN-PELVIS WITH   Final Result       No evidence of acute intra-abdominal or pelvic process.       Probably duplicated right renal collecting system.       CT-HEAD W/O   Final Result       1.  Focal soft tissue swelling right frontal region with minimal underlying increased density adjacent to the periphery of the outer table of the calvarium which could represent a small cephalohematoma.       2.  Periventricular and subcortical white matter density changes which could be related to small vessel ischemia, demyelinization or gliosis. Findings may be slightly more prominent than on previous exam.       MR-BRAIN-WITH & W/O    (Results Pending)     CULTURES:            Results      Procedure Component Value Units Date/Time     URINALYSIS [[id number]]       Order Status: No result       MRSA By PCR (Amp) [[id number]] Collected: 11/15/20 0315     Order Status: Completed Specimen: Respirate from Nares Updated: 11/15/20  1856       Significant Indicator NEG       Source RESP       Site NARES       MRSA PCR Negative for MRSA by PCR.     Narrative:       Collected By:[id number] DANIEL LIBERTAD  Collected By:[id number] DANIEL LIBERTAD     CSF CULTURE [[id number]] Collected: 11/14/20 1725     Order Status: Completed Specimen: CSF Updated: 11/15/20 1537       Significant Indicator NEG       Source CSF       Site Tap       Culture Result No growth at 24 hours.       Gram Stain Result No organisms seen.     HSV 1/2 By PCR(Herpes)+[id number] [[id number]] Collected: 11/14/20 1725     Order Status: Completed Specimen: CSF from Spinal Fluid Updated: 11/15/20 1329     Narrative:       Collected By:[id number] SHANKAR KAY  Please add to CSF sample in lab.     Blood Culture [[id number]] Collected: 11/14/20 0601     Order Status: Completed Specimen: Blood Updated: 11/15/20 0724       Significant Indicator NEG       Source BLD       Site Periperal       Culture Result No Growth  Note: Blood cultures are incubated for 5 days and  are monitored continuously.Positive blood cultures  are called to the RN and reported as soon as  they are identified.        Narrative:       Right Hand     Blood Culture [[id number]] Collected: 11/14/20 0242     Order Status: Completed Specimen: Blood Updated: 11/15/20 0724       Significant Indicator NEG       Source BLD       Site Peripheral       Culture Result No Growth  Note: Blood cultures are incubated for 5 days and  are monitored continuously.Positive blood cultures  are called to the RN and reported as soon as  they are identified.        Narrative:       Left Wrist     GRAM STAIN [[id number]] Collected: 11/14/20 1725     Order Status: Completed Specimen: CSF Updated: 11/14/20 1854       Significant Indicator .       Source CSF       Site Tap       Gram Stain Result No organisms seen.     Cryptococcal Antigen, CSF [[id number]]       Order Status: No result Specimen: CSF       CSF Culture [[id number]]       Order Status: No result Specimen: CSF  "from Tap       Blood Culture [[id number]] Collected: 11/13/20 1740     Order Status: Completed Specimen: Blood from Peripheral Updated: 11/14/20 0726       Significant Indicator NEG       Source BLD       Site PERIPHERAL       Culture Result No Growth  Note: Blood cultures are incubated for 5 days and  are monitored continuously.Positive blood cultures  are called to the RN and reported as soon as  they are identified.        Narrative:       Per Hospital Policy: Only change Specimen Src: to \"Line\" if  specified by physician order.  Left AC     Blood Culture [[id number]] Collected: 11/13/20 1741     Order Status: Completed Specimen: Blood from Peripheral Updated: 11/14/20 0726       Significant Indicator NEG       Source BLD       Site PERIPHERAL       Culture Result No Growth  Note: Blood cultures are incubated for 5 days and  are monitored continuously.Positive blood cultures  are called to the RN and reported as soon as  they are identified.        Narrative:       Per Hospital Policy: Only change Specimen Src: to \"Line\" if  specified by physician order.  Left Hand     COVID/SARS CoV-2 PCR [[id number]]       Order Status: Canceled Specimen: Respirate from Nasopharyngeal       Blood Culture,Hold [[id number]] Collected: 11/14/20 0242     Order Status: Canceled       Urinalysis [[id number]] Collected: 11/14/20 0020     Order Status: Canceled Specimen: Urine, Cath       Blood Culture [[id number]]       Order Status: No result Specimen: Blood from Peripheral       Blood Culture [[id number]]       Order Status: No result Specimen: Blood from Peripheral       Culture Respiratory W/ GRM STN [[id number]]       Order Status: Completed Specimen: Respirate from Sputum       CoV-2, Flu A/B, And RSV by PCR [[id number]] Collected: 11/11/20 2328     Order Status: Completed Updated: 11/12/20 0218       Influenza virus A RNA Negative       Influenza virus B, PCR Negative       RSV, PCR Negative       SARS-CoV-2 by PCR NotDetected       " "Comment: PATIENTS: Important information regarding your results and instructions can  be found at [email or web address]   \"After your  Covid-19 Test\"  [facility] providers: PLEASE REFER TO DE-ESCALATION AND RETESTING PROTOCOL  on [email or web address]  **The [employer] GeneXpert Xpress SARS-CoV-2 Test has been made available for  use under the Emergency Use Authorization (EUA) only.             SARS-CoV-2 Source NP Swab     Narrative:       Is patient being admitted?->Yes  Does this patient meet criteria for Rush/Cepheid per [facility]  Inpatient Workflow? (See workflow link below)->Yes  Expected turn around time?->Rush (Cepheid 2-4 hours)  Is this the patients First SARS CoV-2 test?->Unknown  Is this patient employed in healthcare?->No  Is the patient symptomatic as defined by the CDC?->No  Is the patient hospitalized?->Yes  Is the patient in the ICU?->No  Is the patient a resident in a congregate care setting?->No  Is the patient pregnant?->No     COVID/SARS CoV-2 PCR [[id number]] Collected: 11/11/20 2328     Order Status: Completed Specimen: Respirate from Nasopharyngeal Updated: 11/12/20 0137       COVID Order Status Received       Comment: The order for SARS CoV-2 testing has been received by the  Laboratory. This result is neither positive nor negative.  Final results of testing will report in 24-48 hours, separately.           Narrative:       Is patient being admitted?->Yes  Does this patient meet criteria for Rush/Cepheid per [facility]  Inpatient Workflow? (See workflow link below)->Yes  Expected turn around time?->Rush (Cepheid 2-4 hours)  Is this the patients First SARS CoV-2 test?->Unknown  Is this patient employed in healthcare?->No  Is the patient symptomatic as defined by the CDC?->No  Is the patient hospitalized?->Yes  Is the patient in the ICU?->No  Is the patient a resident in a congregate care setting?->No  Is the patient pregnant?->No     URINALYSIS,CULTURE IF INDICATED [[id number]]  " (Abnormal) Collected: 11/11/20 2030     Order Status: Completed Specimen: Urine Updated: 11/11/20 2059       Color Yellow       Character Cloudy       Specific Gravity 1.025       Ph 5.5       Glucose Negative mg/dL         Ketones 15 mg/dL         Protein Negative mg/dL         Bilirubin Negative       Urobilinogen, Urine 0.2       Nitrite Negative       Leukocyte Esterase Trace       Occult Blood Negative       Micro Urine Req Microscopic     Narrative:       Indication for culture:->Patient WITHOUT an indwelling Mehta  catheter in place with new onset of Dysuria, Frequency,  Urgency, and/or Suprapubic pain             MEDS:       Current Facility-Administered Medications   Medication Last Admin   • magnesium sulfate IVPB premix 2 g 2 g at 11/16/20 1040   • morphine (pf) 4 mg/mL injection 1-2 mg 1 mg at 11/15/20 1116   • cefTRIAXone (ROCEPHIN) 2 g in  mL IVPB 2 g at 11/16/20 1026   • naloxone (NARCAN) injection 0.4 mg     • Respiratory Therapy Consult     • acyclovir (ZOVIRAX) 380 mg in  mL IVPB Stopped at 11/16/20 0709   • D5LR infusion New Bag at 11/16/20 0609   • vancomycin (VANCOCIN) 750 mg in  mL IVPB Stopped at 11/16/20 0420   • diphenhydrAMINE (BENADRYL) injection 12.5-25 mg     • LORazepam (ATIVAN) injection 0.5 mg 0.5 mg at 11/14/20 2017   • acetaminophen (Tylenol) tablet 650 mg 650 mg at 11/13/20 1626   • MD Alert...Vancomycin per Pharmacy     • Pharmacy Consult Request ...Pain Management Review 1 Each     • oxyCODONE immediate-release (ROXICODONE) tablet 2.5 mg     • oxyCODONE immediate-release (ROXICODONE) tablet 5 mg 5 mg at 11/12/20 2308   • insulin regular (HumuLIN R,NovoLIN R) injection Stopped at 11/12/20 1700     And   • glucose 4 g chewable tablet 16 g       And   • dextrose 50% (D50W) injection 50 mL 50 mL at 11/12/20 1335   • enoxaparin (LOVENOX) inj 30 mg 30 mg at 11/16/20 0609   • baclofen (LIORESAL) tablet 10 mg Stopped at 11/13/20 1800   • ondansetron (ZOFRAN)  syringe/vial injection 4 mg     • magnesium hydroxide (MILK OF MAGNESIA) suspension 30 mL Stopped at 11/14/20 0600     And   • senna-docusate (PERICOLACE or SENOKOT S) 8.6-50 MG per tablet 2 Tab Stopped at 11/13/20 1800     And   • polyethylene glycol/lytes (MIRALAX) PACKET 1 Packet       And   • bisacodyl (DULCOLAX) suppository 10 mg     • omeprazole (PRILOSEC) capsule 20 mg Stopped at 11/14/20 0600   • dicyclomine (BENTYL) tablet 20 mg       PROBLEM LIST:  No problems updated.     ASSESSMENT/PLAN: This is a 49-year-old female with a known history of myotonic muscular dystrophy admitted six days ago for worsening abdominal pain, extremity pain, numbness. The patient on day of admission suffered a ground-level fall from her wheelchair with resultant cephalohematoma necessitating visit to the emergency department. Developed hypertension, tachycardia, and altered mental status since day to mission. Patient usually required increasing oxygen, which will stop today to assess current auction saturation and breathing. The origin of this disease process is currently unknown, with differential diagnoses including sepsis, cerebrovascular accident, complex seizure, Traumatic brain injury, Nuro cognitive degeneration secondary to muscular dystrophy.     # Encephalopathy  # AMS  Etiology: While the patient is alert and oriented to person, place, and time today, it continues to be difficult to assess her mental status and baseline today, after speaking with her spouse yesterday, she revealed patient to be sharp in cognition at home normally which would make this current presentation out of her baseline. She continues to have agitation, which she is currently focusing on restraints. Laboratory and imaging have been negative to this point In investigating it. At this time, traumatic brain injury is considered secondary to her ground level for six days ago, however there have been no evidence of this on CT scans. The patient spouse  did report that she has been taking narcotics since the diagnosis of pancreatitis 1.5 months ago, however, this is presentation is not fully consistent with a narcotic withdrawal. Also considered is herpesvirus encephalitis after consultation with neurology.   Work Up: Cerebrospinal fluid negative. TSH within normal limits. Previous drug screens revealed chronic opioid use but no other substances. Negative for SIRS criteria. CT Scan of the head unremarkable. EEG unremarkable.      Plan:  - Neurology consulting: HSVPCR pending.   - MRI pending, likely today given the patient is off of high flow oxygen.   - EEG pending for today.   - Plan to discontinue acyclovir today. HSV PCR still pending.   - Thiamine pending  - Repeat ABGs pending  - Speech therapy consulted.      # Acute respiratory acidosis   # Acute respiratory failure   Etiology: unknown. Chest X-ray unremarkable. CBC with leukocytosis or shift. Previous arterial blood gas shows respiratory acidosis. Consider progression of muscular dystrophy leading to decrease respiratory drive.      Plan:   - Per pulmonary recs: recommending NIF  - High flow oxygen stopped, goal to maintain oxygen saturation above 88%.   - Continue 2 point restraints for agitation.   - Repeat ABGs potentially today.   - Continue ceftriaxone for possible aspiration pneumonia from AMS     # Hypotension- resolved  # Tachycardia   Etiology: Likely secondary to ongoing respiratory acidosis and hypoxia. Considered is TBI with resultant secondary sympathetic hyperactivation.      Plan:  - ID following: Plan to stop acyclovir today. Vancomycin stopped.   - Continue tele monitoring  - Continue IVMF D5LR @75mL/hour  - Monitor urinary output     # Upper ext pain, right  # BL LE pain  Present on admission. Possibly due the patient's underlying myotonic dystrophy.  states that this is new for patient. GLF on admission at hospital. Cephalohematoma seen on CT head.      Plan:   - MRI pending     #  Abdominal pain  The patient continues to complain of abdominal  pain similar to the pain she had for admission. Exam continues to be unremarkable with no tenderness, distention, organomegaly. Patient has not had any nausea, vomiting, diarrhea, hematochezia, or Melena. Imaging has been negative throughout her admission. Your analysis is negative. The patient’s spouse does report the patient being Admitted for pancreatitis 1.5 months ago with similar pain twice she’s complaining about today but worsening.     Plan:   - Continue to monitor     # ADLs  After a lengthy conversation with the patient spouse, it is revealed that the patient has minimal activity at home, it is almost entirely dependent on her spouse for movement, bathing, and diet. The patient has had worsening ground-level falls increasing over the past three months, which has necessitated the patient spouse taking over much of the responsibilities.   - Case management to provide updates on SNF referral. Patient not yet medically cleared for transfer.    Core Measures:   Fluids: 50% D5 LR 75mL/hour  Lines: IVP  Abx: Vancomycin and acyclovir discontinued. Continue ceftriaxone.   DVT prophylaxis: Lovenox   Code Status: full code     Disposition: Continue admission.     Renny Pope, MS3  R Med

## 2020-11-17 NOTE — THERAPY
Physical Therapy   Daily Treatment     Patient Name: Gayla Escudero  Age:  49 y.o., Sex:  female  Medical Record #: [id number]  Today's Date: 11/17/2020     Precautions: Fall Risk, Swallow Precautions ( See Comments)    Assessment    Pt progressing well w/ therapy. Pt presenting more alert and able to follow cues today given extra time. Pt was delayed in cue following but eventually did initiate mobility. Pt w/ a posterior lean upon initial sit but was able to progress to balance w/out assist and performing seated LE exercises. Pt needing AAROM for LE exercises d/t LE weakness. Pt was able to stand w/ ModA but did require a lot of cues for lifting head up in standing. Pt able to participate in pre gait exercises but was unable to perform gait this session.    Plan    Increase frequency to 4x/wk.    DC Equipment Recommendations: Unable to determine at this time  Discharge Recommendations: Recommend post-acute placement for additional physical therapy services prior to discharge home      Subjective    Pt pleasant and cooperative.     Objective       11/17/20 0934   Precautions   Precautions Fall Risk;Swallow Precautions ( See Comments)   Comments baseline myotonic MD   Gait Analysis   Gait Level Of Assist Unable to Participate   Comments Pre gait w/ weight shifts. Unable to progress d/t BM while standing.   Bed Mobility    Supine to Sit Moderate Assist   Sit to Supine Maximal Assist   Scooting Moderate Assist   Rolling Moderate Assist to Rt.   Comments Pt taking a lot of time to initiate.   Functional Mobility   Sit to Stand Moderate Assist   Bed, Chair, Wheelchair Transfer Unable to Participate   Mobility Pt able to initiate stand through LE's. Pt needing max cues to lift head w/ improved balance once performed.   Short Term Goals    Short Term Goal # 1 Pt will be able to transfer supine<>sit with min A within 6 visits to ensure mobility at home.   Goal Outcome # 1 goal not met   Short Term Goal # 2 Pt will be  able to transfer sit<>stand with 4WW and min A within 6 visits to ensure mobility at home.   Goal Outcome # 2 Goal not met   Short Term Goal # 3 Pt will be ambulate 25 ft with FWW and min A within 6 visits to ensure mobility around house.    Goal Outcome # 3 Goal not met

## 2020-11-17 NOTE — PROGRESS NOTES
[facility] [place]     Attending:   Dr. Scherer    Resident:   Kimberly Mc MD    PATIENT:   Gayla Escudero; [id number]; 1970    ID:   49 y.o. female admitted for worsening ABD pain, bilateral LE, Right arm pain and numbness. Admission day 5. Patient sustained a fall out of her wheelchair in the ED, resulting in a cephalohematoma. Admission day 2 patient developed hypotension, tachycardia and AMS.  11/15 patient had multiple episodes of hypoxia with desaturations into the 60s. Patient was started on high flow oxygen 40L/min at 60% oxygen. Slow ween off of the oxygen, currently on room air, saturating in the high 90s, no signs of respiratory distress.        SUBJECTIVE:   Overnight, patient was agitated, pulling on her lines. She removed her midline IV access. 0.5mg Ativan was given and patient was more calm afterwards.     OBJECTIVE:  Vitals:    11/16/20 2355 11/17/20 0300 11/17/20 0416 11/17/20 0748   BP: 117/69  124/80 117/83   Pulse: (!) 117 (!) 109 (!) 109 (!) 130   Resp: 18 18 18 (!) 22   Temp: 37 °C (98.6 °F)  36.6 °C (97.9 °F) 37.4 °C (99.4 °F)   TempSrc: Temporal  Temporal Temporal   SpO2: 98% 98% 94% 93%   Weight:       Height:           Intake/Output Summary (Last 24 hours) at 11/17/2020 1015  Last data filed at 11/17/2020 0800  Gross per 24 hour   Intake 930 ml   Output --   Net 930 ml       PHYSICAL EXAM:   General: Mild distress due to wanting her restraints off, afebrile, resting comfortably, quiet  HEENT: NC/AT. EOMI.   Cardiovascular: RRR without murmurs, rubs, heaves. Normal capillary refill   Respiratory: CTAB, no tachypnea or retractions   Abdomen: normal bowel sounds, soft, nontender, nondistended, no masses, no organomegaly   EXT:  JIM, no edema  Skin: No erythema/lesions   Neuro: Non-focal, alert      LABS:  Recent Labs     11/15/20  0300 11/16/20  0033   WBC 6.2 9.3   RBC 3.33* 4.09*   HEMOGLOBIN 9.6* 11.8*   HEMATOCRIT 30.6* 37.1   MCV 91.9 90.7   MCH 28.8 28.9   RDW 48.6  47.1   PLATELETCT 133* 126*   MPV 10.7 11.5   NEUTSPOLYS 83.50* 79.60*   LYMPHOCYTES 9.70* 10.60*   MONOCYTES 4.40 8.50   EOSINOPHILS 1.60 0.40   BASOPHILS 0.50 0.30     Recent Labs     11/14/20 1200 11/14/20 2000 11/16/20 0033   SODIUM 151* 149* 146*   POTASSIUM 3.7 4.7 3.8   CHLORIDE 123* 117* 112   CO2 23 27 23   BUN 3* 4* 2*   CREATININE <0.17* 0.30* 0.30*   CALCIUM 6.3* 9.6 9.0   MAGNESIUM  --   --  1.5   PHOSPHORUS 2.9  --  1.8*     Estimated GFR/CRCL = CrCl cannot be calculated (Unknown ideal weight.).  Recent Labs     11/14/20 1200 11/14/20 1200 11/14/20 2000 11/14/20 2000 11/16/20  0033 11/16/20  0033 11/16/20  1649 11/16/20  2146 11/17/20  0626   GLUCOSE 77  --  116*  --  110*  --   --   --   --    POCGLUCOSE  --    < >  --    < >  --    < > 114* 80 81    < > = values in this interval not displayed.             Recent Labs     11/15/20  1128 11/16/20  1402   OENCA53V 7.39* 7.43   KPEPRT181X 41.8* 41.2*   HDJTI082J 131.4* 135.4*   EHYC0IPB 98.2 97.9   ARTHCO3 25 26*   P1LMEKWBB 6.0  --    ARTBE 0 2     No results for input(s): INR, APTT, FIBRINOGEN in the last 72 hours.    Invalid input(s): DIMER    MICROBIOLOGY:   Blood Culture Hold   Date Value Ref Range Status   02/19/2020 Collected  Final        IMAGING:   MR-BRAIN-WITH & W/O   Final Result      1.  Limited exam due to motion artifact.   2.  Diffuse atrophy and white matter microvascular ischemic changes.   3.  No acute stroke or evidence for intracranial hemorrhage.      DX-CHEST-LIMITED (1 VIEW)   Final Result      No acute cardiopulmonary disease.      EC-ECHOCARDIOGRAM COMPLETE W/O CONT   Final Result      DX-LUMBAR PUNCTURE FOR DIAGNOSIS   Final Result         FLUOROSCOPIC-GUIDED LUMBAR PUNCTURE AS DESCRIBED ABOVE.      IR-MIDLINE CATHETER INSERTION WO GUIDANCE > AGE 3   Final Result                  Ultrasound-guided midline placement performed by qualified nursing staff    as above.          CT-HEAD W/O   Final Result      Normal CT scan  of the head without contrast.               INTERPRETING LOCATION:  [address], [place], [address]      [id number] VIEW   Final Result      Unremarkable AP recumbent abdomen.      DX-CHEST-PORTABLE (1 VIEW)   Final Result      Left basilar opacities, consistent with pneumonia.      CT-HEAD W/O   Final Result      No CT evidence of acute infarct, hemorrhage or mass.      CT-ABDOMEN-PELVIS WITH   Final Result      No evidence of acute intra-abdominal or pelvic process.      Probably duplicated right renal collecting system.      CT-HEAD W/O   Final Result      1.  Focal soft tissue swelling right frontal region with minimal underlying increased density adjacent to the periphery of the outer table of the calvarium which could represent a small cephalohematoma.      2.  Periventricular and subcortical white matter density changes which could be related to small vessel ischemia, demyelinization or gliosis. Findings may be slightly more prominent than on previous exam.          CULTURES:   Results     Procedure Component Value Units Date/Time    CSF CULTURE [[id number]] Collected: 11/14/20 1725    Order Status: Completed Specimen: CSF Updated: 11/17/20 0812     Significant Indicator NEG     Source CSF     Site Tap     Culture Result No growth at 72 hours.     Gram Stain Result No organisms seen.    URINALYSIS [[id number]]     Order Status: No result     MRSA By PCR (Amp) [[id number]] Collected: 11/15/20 0315    Order Status: Completed Specimen: Respirate from Nares Updated: 11/15/20 1856     Significant Indicator NEG     Source RESP     Site NARES     MRSA PCR Negative for MRSA by PCR.    Narrative:      Collected By:[id number] DANIEL MAURER  Collected By:[id number] DANIEL MAURER    HSV 1/2 By PCR(Herpes)+[id number] [[id number]] Collected: 11/14/20 1725    Order Status: Completed Specimen: CSF from Spinal Fluid Updated: 11/15/20 1329    Narrative:      Collected By:[id number] SHANKAR KAY  Please add to CSF sample in lab.    Blood Culture  "[[id number]] Collected: 11/14/20 0601    Order Status: Completed Specimen: Blood Updated: 11/15/20 0724     Significant Indicator NEG     Source BLD     Site Periperal     Culture Result No Growth  Note: Blood cultures are incubated for 5 days and  are monitored continuously.Positive blood cultures  are called to the RN and reported as soon as  they are identified.      Narrative:      Right Hand    Blood Culture [[id number]] Collected: 11/14/20 0242    Order Status: Completed Specimen: Blood Updated: 11/15/20 0724     Significant Indicator NEG     Source BLD     Site Peripheral     Culture Result No Growth  Note: Blood cultures are incubated for 5 days and  are monitored continuously.Positive blood cultures  are called to the RN and reported as soon as  they are identified.      Narrative:      Left Wrist    GRAM STAIN [[id number]] Collected: 11/14/20 1725    Order Status: Completed Specimen: CSF Updated: 11/14/20 1854     Significant Indicator .     Source CSF     Site Tap     Gram Stain Result No organisms seen.    Cryptococcal Antigen, CSF [[id number]]     Order Status: No result Specimen: CSF     CSF Culture [[id number]]     Order Status: No result Specimen: CSF from Tap     Blood Culture [[id number]] Collected: 11/13/20 1740    Order Status: Completed Specimen: Blood from Peripheral Updated: 11/14/20 0726     Significant Indicator NEG     Source BLD     Site PERIPHERAL     Culture Result No Growth  Note: Blood cultures are incubated for 5 days and  are monitored continuously.Positive blood cultures  are called to the RN and reported as soon as  they are identified.      Narrative:      Per Hospital Policy: Only change Specimen Src: to \"Line\" if  specified by physician order.  Left AC    Blood Culture [[id number]] Collected: 11/13/20 1741    Order Status: Completed Specimen: Blood from Peripheral Updated: 11/14/20 0726     Significant Indicator NEG     Source BLD     Site PERIPHERAL     Culture Result No Growth  Note: " "Blood cultures are incubated for 5 days and  are monitored continuously.Positive blood cultures  are called to the RN and reported as soon as  they are identified.      Narrative:      Per Hospital Policy: Only change Specimen Src: to \"Line\" if  specified by physician order.  Left Hand    COVID/SARS CoV-2 PCR [[id number]]     Order Status: Canceled Specimen: Respirate from Nasopharyngeal     Blood Culture,Hold [[id number]] Collected: 11/14/20 0242    Order Status: Canceled     Urinalysis [[id number]] Collected: 11/14/20 0020    Order Status: Canceled Specimen: Urine, Cath     Blood Culture [[id number]]     Order Status: No result Specimen: Blood from Peripheral     Blood Culture [[id number]]     Order Status: No result Specimen: Blood from Peripheral     Culture Respiratory W/ GRM STN [[id number]]     Order Status: Completed Specimen: Respirate from Sputum     CoV-2, Flu A/B, And RSV by PCR [[id number]] Collected: 11/11/20 2328    Order Status: Completed Updated: 11/12/20 0218     Influenza virus A RNA Negative     Influenza virus B, PCR Negative     RSV, PCR Negative     SARS-CoV-2 by PCR NotDetected     Comment: PATIENTS: Important information regarding your results and instructions can  be found at [email or web address]   \"After your  Covid-19 Test\"  [facility] providers: PLEASE REFER TO DE-ESCALATION AND RETESTING PROTOCOL  on inside[email or web address]  **The [employer] GeneXpert Xpress SARS-CoV-2 Test has been made available for  use under the Emergency Use Authorization (EUA) only.          SARS-CoV-2 Source NP Swab    Narrative:      Is patient being admitted?->Yes  Does this patient meet criteria for Rush/Cepheid per [facility]  Inpatient Workflow? (See workflow link below)->Yes  Expected turn around time?->Rush (Cepheid 2-4 hours)  Is this the patients First SARS CoV-2 test?->Unknown  Is this patient employed in healthcare?->No  Is the patient symptomatic as defined by the CDC?->No  Is the patient " hospitalized?->Yes  Is the patient in the ICU?->No  Is the patient a resident in a congregate care setting?->No  Is the patient pregnant?->No    COVID/SARS CoV-2 PCR [[id number]] Collected: 11/11/20 2328    Order Status: Completed Specimen: Respirate from Nasopharyngeal Updated: 11/12/20 0137     COVID Order Status Received     Comment: The order for SARS CoV-2 testing has been received by the  Laboratory. This result is neither positive nor negative.  Final results of testing will report in 24-48 hours, separately.         Narrative:      Is patient being admitted?->Yes  Does this patient meet criteria for Rush/Cepheid per [facility]  Inpatient Workflow? (See workflow link below)->Yes  Expected turn around time?->Rush (Cepheid 2-4 hours)  Is this the patients First SARS CoV-2 test?->Unknown  Is this patient employed in healthcare?->No  Is the patient symptomatic as defined by the CDC?->No  Is the patient hospitalized?->Yes  Is the patient in the ICU?->No  Is the patient a resident in a congregate care setting?->No  Is the patient pregnant?->No    URINALYSIS,CULTURE IF INDICATED [[id number]]  (Abnormal) Collected: 11/11/20 2030    Order Status: Completed Specimen: Urine Updated: 11/11/20 2059     Color Yellow     Character Cloudy     Specific Gravity 1.025     Ph 5.5     Glucose Negative mg/dL      Ketones 15 mg/dL      Protein Negative mg/dL      Bilirubin Negative     Urobilinogen, Urine 0.2     Nitrite Negative     Leukocyte Esterase Trace     Occult Blood Negative     Micro Urine Req Microscopic    Narrative:      Indication for culture:->Patient WITHOUT an indwelling Mehta  catheter in place with new onset of Dysuria, Frequency,  Urgency, and/or Suprapubic pain          MEDS:  Current Facility-Administered Medications   Medication Last Admin   • morphine (pf) 4 mg/mL injection 1-2 mg 2 mg at 11/16/20 2307   • cefTRIAXone (ROCEPHIN) 2 g in  mL IVPB Stopped at 11/16/20 2215   • naloxone (NARCAN) injection 0.4 mg      • Respiratory Therapy Consult     • acyclovir (ZOVIRAX) 380 mg in  mL IVPB Stopped at 11/17/20 0651   • D5LR infusion New Bag at 11/16/20 0609   • diphenhydrAMINE (BENADRYL) injection 12.5-25 mg     • LORazepam (ATIVAN) injection 0.5 mg 0.5 mg at 11/17/20 0822   • acetaminophen (Tylenol) tablet 650 mg 650 mg at 11/13/20 1626   • Pharmacy Consult Request ...Pain Management Review 1 Each     • oxyCODONE immediate-release (ROXICODONE) tablet 2.5 mg     • oxyCODONE immediate-release (ROXICODONE) tablet 5 mg 5 mg at 11/12/20 2308   • insulin regular (HumuLIN R,NovoLIN R) injection Stopped at 11/12/20 1700    And   • glucose 4 g chewable tablet 16 g      And   • dextrose 50% (D50W) injection 50 mL 50 mL at 11/12/20 1335   • enoxaparin (LOVENOX) inj 30 mg 30 mg at 11/17/20 0551   • baclofen (LIORESAL) tablet 10 mg 10 mg at 11/17/20 0551   • ondansetron (ZOFRAN) syringe/vial injection 4 mg     • magnesium hydroxide (MILK OF MAGNESIA) suspension 30 mL Stopped at 11/14/20 0600    And   • senna-docusate (PERICOLACE or SENOKOT S) 8.6-50 MG per tablet 2 Tab Stopped at 11/13/20 1800    And   • polyethylene glycol/lytes (MIRALAX) PACKET 1 Packet      And   • bisacodyl (DULCOLAX) suppository 10 mg     • omeprazole (PRILOSEC) capsule 20 mg 20 mg at 11/17/20 0550   • dicyclomine (BENTYL) tablet 20 mg         PROBLEM LIST:  No problems updated.    ASSESSMENT/PLAN: 49 y.o. female with a  PMHx of myotonic muscular dystrophy admitted on 11/11 for worsening ABD pain, extremity pain and numbness. Sustained a GLF in the ED resulting a cephalohematoma. Developed hypotension, tachycardia, and AMS day 2 of admission. Increasing oxygen requirements. Etiology unknown at this time. Consider: sepsis, stroke, seizure      # Encephalopathy  # AMS  Etiology: difficult to assess patient's baseline mental status. Workup has been largely negative up to this point. Possibly due to hypercarbia d/t respiratory acidosis. GLF on admission could be  contributing.   Work up: CSF cultures NGTD. TSH WNL, previous drug screens only + for chronic opioid use, sepsis workup negative. MRI showing diffuse atrophy, no acute hemorrhage or ischemic stroke. EEG WNL. Given slow but gradual improvement, continue to monitor mental status, but it appears that patient is approaching her baseline.    Plan:  - Neurology consulting: appreciate their recs  - Discontinue acyclovir  - Thiamine pending  - Speech therapy consult - appreciate recs  - Discharge planning for safety, probably requiring a long term care facility or SNF.   - 2 point restraints in place due to patient agitation and pulling out IV access      # Acute respiratory acidosis   # Acute respiratory failure   Etiology: unknown. CXR WNL, WBC WNL, ABGs consistent with respiratory acidosis.   Currently on RA and saturating in the high 90s.      Plan:   - Per pulmonary recs: recommending NIF  - Continue to monitor  - Goal O2 >88%  - Continue ceftriaxone for possible aspiration PNA from AMS     # Hypotension- resolved  # Tachycardia   Believed to be due to sepsis given acute increase in WBC to 17; lactic acid up to 4. BP responded well to fluid bolus. Remains tachycardic overnight.   Etiology: unknown at this time, work up for source of infection largely negative at this point. Consider: dehydration, possibly adrenal insufficiency.      Plan:  - ID following: recommending  - Continue tele monitoring  - Continue IVMF D5LR @75mL/hour  - Monitor urinary output     # Upper ext pain, right  # BL LE pain  Present on admission. Possibly d/t patient's underlying myotonic dystrophy.  states that this is new for patient. GLF on admission at hospital. Cephalohematoma present seen on CT head.      Plan:   - MRI pending     # Abdominal pain  Present on admission, patient states she is still having abd pain. Physical exam reassuring. No reg flag symptoms: no blood in stool, N/V/D or constipation.    Work up: CT ABD WNL; UA  negative; RUQ US negative 9/2020     Plan:   - Continue to monitor     # Severe calorie malnutrition  # Poor oral intake   - Speech consult- appreciate recs  - Will need to consider alternate nutrition source soon if patient does not start tolerating an oral diet. NG tube possible, though patient will most likely continuously pull the tube out  - Nutrition consult- appreciate recs    # ADLs   assists with all ADLs. Patient uses walker at home.   Per , patient has been exhibiting an increase in falls lately with unknown head trauma, unable to care for her when he goes to work.   PT/OT recommending SNF. Referral placed.   - Case management to provide updates on SNF referral.      Core Measures:   Fluids: 50% D5 LR 75mL/hour  Lines: IVP  Abx: c3  DVT prophylaxis: Lovenox   Code Status: full code      Disposition: patient is getting close to being medically cleared for discharge. Will speak with CM today regarding long term placement options     Kimberly Mc MD   PGY-1 Family Medicine Resident   [facility]      Attending Attestation (For Attendings USE Only)...

## 2020-11-17 NOTE — PROGRESS NOTES
Pt pulling off tele multiple times and now pulled out midline. Placed back in bilateral wrist restraints. Dr. Montes De Oca on floor, notified and aware.

## 2020-11-17 NOTE — PROGRESS NOTES
Pt received from nightshift RN. Fluids running per MAR. Pt alert and responsive this AM. Pt vocalized feeling restless, otherwise, mumbled speech/unable to determine what she is saying. Will continue plan of care. Pt on room air, MRI called and notified that pt is ready for MRI of brain.

## 2020-11-18 NOTE — NON-PROVIDER
[facility] PROGRESS NOTE     Attending: Dalila Scherer M.D.  Senior Resident: Feng Arellano M.D. (PGY-2)  Panchito Resident: Kimberly Mc (PGY-1)  PATIENT: Gayla Escudero; [id number]; 1970    ID:This is a 49-year-old female admitted for initial complaints of worsening abdominal pain, pain to the right arm and bilateral lower extremities, and numbness to those extremities. This is her sixth day admitted to the service. She was originally brought to the emergency department status post a fall out of her wheelchair with resultant head trauma and hematoma development. Since admission, the patient has had fluctuating cognition, as well as multiple episodes of hypoxia, tachycardia, and hypertension.     Overnight Events:  The patient did have a Cortrack NG placed for nutrition yesterday without issues overnight. A PIV placement was initially unsuccessful, but was completed this morning. The patient was given Seroquel last night for agitation, and then rest through the night.     Subjective: The patient is less agitated today, asking the staff and her  at the bedside when she can go home. She then goes on to state that she does not feel well, and is concerned that she has COVID-19, and further reports that she’s continue to have abdominal pain.     OBJECTIVE:  Temp:  [36.1 °C (97 °F)-37 °C (98.6 °F)] 36.1 °C (97 °F)  Pulse:  [] 104  Resp:  [16-18] 16  BP: (105-147)/(54-80) 119/74  SpO2:  [93 %-97 %] 96 %    Intake/Output Summary (Last 24 hours) at 11/18/2020 1205  Last data filed at 11/18/2020 1030  Gross per 24 hour   Intake 670 ml   Output --   Net 670 ml       PHYSICAL EXAM:  General: No acute distress, afebrile, awake, appears malaised, staying in one position with minimal movement and appears to be staring off.   HEENT: NC/AT. EOMI. Dry oral mucosa with crusting to the lips.   Cardiovascular: Tachycardic rate, regular rhythm without murmurs, rubs, heaves. Normal capillary refill   Respiratory:  CTAB, no tachypnea or retractions on poor inspiratory effort.   Abdomen: normal bowel sounds, soft, tender to palpation throughout, nondistended, no masses, no organomegaly   EXT:  JIM, no edema.   Skin: No erythema/lesions   Neuro: Alert and oriented to person, place, and time. Strength 4/5 to the bilateral upper and lower extremities with decreasing strength distally. Patellar and biceps reflex 1+ bilaterally.     LABS:  Recent Labs     11/16/20 0033 11/17/20 1218 11/18/20  0536   WBC 9.3 7.2 5.6   RBC 4.09* 3.48* 3.63*   HEMOGLOBIN 11.8* 10.1* 10.7*   HEMATOCRIT 37.1 31.4* 32.8*   MCV 90.7 90.2 90.4   MCH 28.9 29.0 29.5   RDW 47.1 45.5 45.6   PLATELETCT 126* 170 160*   MPV 11.5 10.7 10.1   NEUTSPOLYS 79.60* 78.50* 71.00   LYMPHOCYTES 10.60* 14.00* 18.50*   MONOCYTES 8.50 6.10 8.90   EOSINOPHILS 0.40 0.60 0.90   BASOPHILS 0.30 0.40 0.50     Recent Labs     11/16/20 0033 11/17/20 1218 11/18/20  0536   SODIUM 146* 147* 149*   POTASSIUM 3.8 3.1* 3.0*   CHLORIDE 112 115* 115*   CO2 23 26 27   BUN 2* 2* 2*   CREATININE 0.30* 0.24* 0.18*   CALCIUM 9.0 8.5 8.5   MAGNESIUM 1.5  --  2.1   PHOSPHORUS 1.8*  --   --    ALBUMIN  --  2.4*  --      Estimated GFR/CRCL = CrCl cannot be calculated (Unknown ideal weight.).  Recent Labs     11/16/20 0033 11/16/20 0033 11/17/20 1218 11/17/20 1218 11/18/20  0536 11/18/20  0603 11/18/20  0923 11/18/20  1042   GLUCOSE 110*  --  88  --  79  --   --   --    POCGLUCOSE  --    < >  --    < >  --  73 85 85    < > = values in this interval not displayed.     Recent Labs     11/17/20  1218   ASTSGOT 48*   ALTSGPT 63*   TBILIRUBIN 0.4   ALKPHOSPHAT 80   GLOBULIN 2.6         Recent Labs     11/16/20  1402   LHKMF12Y 7.43   [id number] 41.2*   [id number] 135.4*   [id number] 97.9   ARTHCO3 26*   ARTBE 2        Ref. Range 11/14/2020 12:15 11/14/2020 17:25 11/16/2020 00:33 11/16/2020 03:39 11/18/2020 05:36   Procalcitonin Latest Ref Range: <0.25 ng/mL    1.98 (H) 0.19   Vitamin B12 -True  Cobalamin Latest Ref Range: 211 - 911 pg/mL 1457 (H)       TSH Latest Ref Range: 0.380 - 5.330 uIU/mL   1.210     Cryptococcus neoformans/gattii by PCR Unknown  Not Detected      Cytomegalovirus by PCR Unknown  Not Detected      Enterovirus by PCR Unknown  Not Detected      Escherichia coli K1 by PCR Unknown  Not Detected      HIV Ag/Ab Combo Assay Latest Ref Range: Non Reactive  Non-Reactive       HSV 1 by PCR Unknown  Not Detected      HSV 2 by PCR Unknown  Not Detected      HSV Type 2 Latest Ref Range: Negative   Negative      HSV Type I Latest Ref Range: Negative   Negative      Human Herpesvirus 6 by PCR Unknown  Not Detected      Human parechovirus by PCR Unknown  Not Detected      Varicella Zoster Virus by PCR Unknown  Not Detected      Syphilis, Treponemal Qual Latest Ref Range: Non-Reactive  Non-Reactive           MICROBIOLOGY:    Ref. Range 11/14/2020 02:42 11/14/2020 06:01 11/14/2020 17:25 11/14/2020 17:25 11/15/2020 03:15   Cytomegalovirus by PCR Unknown   Not Detected     HAEM influenzae by PCR Unknown   Not Detected     Listeria Monocytogenes by PCR Unknown   Not Detected     Neisseria meningitidis by PCR Unknown   Not Detected     Significant Indicator Unknown NEG NEG . NEG NEG   Site Unknown Peripheral Periperal Tap Tap NARES   Source Unknown BLD BLD CSF CSF RESP   Source Unknown   CSF     Strep Agalactiae by PCR Unknown   Not Detected     Strep pneumoniae by PCR Unknown   Not Detected         IMAGING:  DX-ABDOMEN FOR TUBE PLACEMENT   Final Result         1.  Nonspecific bowel gas pattern.   2.  Dobbhoff tube is coiled within the stomach, the tip terminates overlying the expected location of the gastric fundus.   3.  Left basilar atelectasis      IR-MIDLINE CATHETER INSERTION WO GUIDANCE > AGE 3   Final Result                  Ultrasound-guided midline placement performed by qualified nursing staff    as above.          MR-BRAIN-WITH & W/O   Final Result      1.  Limited exam due to motion artifact.     2.  Diffuse atrophy and white matter microvascular ischemic changes.   3.  No acute stroke or evidence for intracranial hemorrhage.      DX-CHEST-LIMITED (1 VIEW)   Final Result      No acute cardiopulmonary disease.      EC-ECHOCARDIOGRAM COMPLETE W/O CONT   Final Result      DX-LUMBAR PUNCTURE FOR DIAGNOSIS   Final Result         FLUOROSCOPIC-GUIDED LUMBAR PUNCTURE AS DESCRIBED ABOVE.      IR-MIDLINE CATHETER INSERTION WO GUIDANCE > AGE 3   Final Result                  Ultrasound-guided midline placement performed by qualified nursing staff    as above.          CT-HEAD W/O   Final Result      Normal CT scan of the head without contrast.               INTERPRETING LOCATION:  [address], [place], [address]      LN-MAHBMDE-6 VIEW   Final Result      Unremarkable AP recumbent abdomen.      DX-CHEST-PORTABLE (1 VIEW)   Final Result      Left basilar opacities, consistent with pneumonia.      CT-HEAD W/O   Final Result      No CT evidence of acute infarct, hemorrhage or mass.      CT-ABDOMEN-PELVIS WITH   Final Result      No evidence of acute intra-abdominal or pelvic process.      Probably duplicated right renal collecting system.      CT-HEAD W/O   Final Result      1.  Focal soft tissue swelling right frontal region with minimal underlying increased density adjacent to the periphery of the outer table of the calvarium which could represent a small cephalohematoma.      2.  Periventricular and subcortical white matter density changes which could be related to small vessel ischemia, demyelinization or gliosis. Findings may be slightly more prominent than on previous exam.      MR-BRAIN-WITH & W/O    (Results Pending)       MEDS:  Current Facility-Administered Medications   Medication Last Admin   • cefTRIAXone (ROCEPHIN) injection 1,000 mg 1,000 mg at 11/18/20 0617   • Pharmacy Consult: Enteral tube insertion - review meds/change route/product selection     • acetaminophen (Tylenol) tablet 650 mg     •  senna-docusate (PERICOLACE or SENOKOT S) 8.6-50 MG per tablet 2 Tab Stopped at 11/18/20 0600    And   • polyethylene glycol/lytes (MIRALAX) PACKET 1 Packet      And   • magnesium hydroxide (MILK OF MAGNESIA) suspension 30 mL Stopped at 11/18/20 0600    And   • bisacodyl (DULCOLAX) suppository 10 mg     • QUEtiapine (Seroquel) tablet 25 mg 25 mg at 11/17/20 2203   • oxyCODONE immediate-release (ROXICODONE) tablet 5 mg     • oxyCODONE immediate-release (ROXICODONE) tablet 2.5 mg     • LORazepam (ATIVAN) tablet 1 mg     • dicyclomine (BENTYL) tablet 20 mg     • omeprazole (FIRST-OMEPRAZOLE) 2 mg/mL oral susp 40 mg 40 mg at 11/18/20 0617   • morphine (pf) 4 mg/mL injection 1-2 mg 2 mg at 11/16/20 2307   • naloxone (NARCAN) injection 0.4 mg     • Respiratory Therapy Consult     • diphenhydrAMINE (BENADRYL) injection 12.5-25 mg     • LORazepam (ATIVAN) injection 0.5 mg 0.5 mg at 11/17/20 0822   • Pharmacy Consult Request ...Pain Management Review 1 Each     • insulin regular (HumuLIN R,NovoLIN R) injection Stopped at 11/12/20 1700    And   • dextrose 50% (D50W) injection 50 mL 50 mL at 11/17/20 1329   • enoxaparin (LOVENOX) inj 30 mg 30 mg at 11/18/20 0617   • ondansetron (ZOFRAN) syringe/vial injection 4 mg         ASSESSMENT/PLAN: This is a 49-year-old female with a known history of myotonic muscular dystrophy admitted seven days ago on 11/11 for worsening abdominal pain, extremity pain, numbness. The patient on day of admission suffered a ground-level fall from her wheelchair with resultant cephalohematoma necessitating visit to the emergency department. Developed hypertension, tachycardia, and altered mental status since day to mission.     1. Altered Mental Status   ETIOLOGY:  The patient continues to have cognitive deficiencies and altered mental status since her admission. The patient’s spouse, who is now at the bedside, continues to elaborate that the patient, prior to her admission, had good cognitive capacity  and was described as sharp. Broad differential diagnoses include viral versus bacterial encephalopathy, neurodegenerative disease secondary to muscular dystrophy, delirium, and cerebrovascular accident. Numerous cultures and immunology, including HSV, has come back negative, and there is no clear etymology of a viral or bacterial encephalopathy. Neurodegenerative disease of the brain considered, but challenging to diagnose at this time. Delirium considered giving her elongated hospital stay, however an underlying diagnosis is still undetermined. CT scan did not show ischemia or infarct. MRI still pending due to movement and excess artifact on the scan yesterday.   PLAN:      -  Repeat MRI today.     -  Continue ceftriaxone. Acyclovir against HSV discontinued.     -  Continue to monitor cognitive status throughout admission.     2. Failure to thrive with limited activities of daily living   ETIOLOGY: The patient continues to have difficulties with nutrition throughout admission, and maintains a BMI of 16. A coretrack NG has been placed for parenteral nutrition. There has been no issues with this administration yesterday. The patient also continues to have apparent decreases in activities of daily living. Her  provides all support for the patient, Including bathing and a regular diet. Given this, the patient may benefit from palliative care at this point.  PLAN:     -  Provide parenteral nutrition and assess for any changes in condition.    -  Consultation with case management and the patient's spouse regarding placement in either a SNF or palliative care.     3. Acute Respiratory Failure  ETIOLOGY: Unknown.  Previous arterial blood gas shows respiratory acidosis. Consider progression of muscular dystrophy leading to decrease respiratory drive.   PLAN:     -  Per pulmonary recs: recommending NIF    -  Continue 2 point restraints for agitation.     -  Repeat ABGs today.     -  Continue ceftriaxone for possible  aspiration pneumonia from AMS    4. Hypotension, Resolved. Tachycardia, ongoing.   ETIOLOGY: Likely secondary to ongoing respiratory acidosis and hypoxia. Considered is TBI with resultant secondary sympathetic hyperactivation.   PLAN:     -  Continue tele monitoring    -  Continue IVMF D5LR @75mL/hour    -  Monitor urinary output    5. Abdominal Pain   ETIOLOGY: The patient continues to complain of abdominal  pain similar to the pain she had for admission. Exam continues to be unremarkable with no tenderness, distention, organomegaly. Patient has not had any nausea, vomiting, diarrhea, hematochezia, or Melena. Imaging has been negative throughout her admission. Your analysis is negative. The patient’s spouse does report the patient being Admitted for pancreatitis 1.5 months ago with similar pain twice she’s complaining about today but worsening.  PLAN:     -  Continue to monitor.     6. Upper Extremity Pain, Bilateral lower extremity pain   ETIOLOGY: Present on admission. Possibly due the patient's underlying myotonic dystrophy.  states that this is new for patient. GLF on admission at hospital. Cephalohematoma seen on CT head.   PLAN:     -  MRI pending      Core Measures:   Fluids: 50% D5 LR 75mL/hour  Lines: IVP  Abx: Vancomycin and acyclovir discontinued. Continue ceftriaxone.   DVT prophylaxis: Lovenox   Code Status: full code      Disposition: Continue admission.      Renny Pope, MS3  Count includes the [facility]

## 2020-11-18 NOTE — PROGRESS NOTES
Cortrak Placement    Tube Team verified patient name and medical record number prior to tube placement.  Cortrak tube (43 inches, 10 Singaporean) placed at 69 cm in right nare.  Per Cortrak picture, tube appears to be in the small bowel.  Nursing Instructions: Awaiting KUB to confirm placement before use for medications or feeding. Once placement confirmed, flush tube with 30 ml of water, and then remove and save stylet, in patient medication drawer.

## 2020-11-18 NOTE — DISCHARGE PLANNING
Anticipated Discharge Disposition: SNF    Action: RN CM met with patient's spouse at bedside per his request and answered his questions regarding discharge planning. He is aware that patient is not currently medically ready for discharge to a facility and may need at least a few more days. RN CM informed him that updated referrals will be resent as patient status has changed since previous referrals were sent on 11/13.     Barriers to Discharge: medical clearance, placement    Plan: Case coordination to f/u with snf referrals, f/u with treatment team for medical clearance

## 2020-11-18 NOTE — THERAPY
Missed Therapy     Patient Name: Gayla Escudero  Age:  49 y.o., Sex:  female  Medical Record #: [id number]  Today's Date: 11/18/2020    Ot treatment attempted; unable to wake patient from sleeping.    in room. RN updated and reported she had been very drowsy today.  Will attempt as able.

## 2020-11-18 NOTE — DISCHARGE PLANNING
Anticipated Discharge Disposition: SNF    Action: Patient discussed in IDT rounds. Per Dr. Arellano, patient's mentation is waxing and waning, she will not be medically cleared for at least a few days. Referrals to snf's have been sent and are pending.    Barriers to Discharge: medical clearance, placement    Plan: Case coordination to f/u with treatment team for medical clearance, f/u with snf referrals

## 2020-11-18 NOTE — CONSULTS
Ophthalmology Consults       HPI: 50 yo female with accidental findings of possible retinal detachment on Brain MRI. Pt's  is by the bedside and provided h/o since pt is not awake and oriented. She underwent cataract surgery about 10 years ago. Last eye exam was about 7 years ago. Not wearing glasses. He noted in the last several weeks that she holds her phone closer to her face. Had several fall with head trauma recently.         Imaging:    Brain MRI 11/18/2020  Possible retinal detachment left eye            Eye exam:  VA unable due to metal status  EOM unable due to metal status  VF unable due to mental satus       External exam.   L/L WNL       C/S White and quite OU  Cornea: Clear OU  Iris: flat and round  A/C: formed OU  Lens: Well positioned OD Dislocated superior OS     DFE OU at 3:10pm   Difficult exam due to very myotic pupil  OD: appears to be flat with some RPE changes in the macula  OS: No view due to dislocated IOL and cortical material blocking pupil             A/P:  1. Dislocated IOL, left eye   - unknown for how long  - may need surgery when stable   -    2. Possible retinal detachment, left eye  -no view of posterior pole due to dislocated lens  -Discussed with  that she will need a more detailed eye exam at the office  -Will schedule outpatient visit when discharged to long term facility

## 2020-11-18 NOTE — PROGRESS NOTES
[facility] FAMILY MEDICINE PROGRESS NOTE     Attending:   Dr. Scherer    Resident:   Kimberly Mc MD    PATIENT:   Gayla Escudero; [id number]; 1970    ID:   49 y.o. female admitted for worsening ABD pain, bilateral LE, Right arm pain and numbness. Admission day 5. Patient sustained a fall out of her wheelchair in the ED, resulting in a cephalohematoma. Admission day 2 patient developed hypotension, tachycardia and AMS.  11/15 patient had multiple episodes of hypoxia with desaturations into the 60s. Patient was started on high flow oxygen 40L/min at 60% oxygen. Slow ween off of the oxygen, currently on room air, saturating in the high 90s, no signs of respiratory distress.        SUBJECTIVE:   Overnight, patient was agitated, pulling on her lines. She removed her midline IV access. 0.5mg Ativan was given and patient was more calm afterwards.     OBJECTIVE:  Vitals:    11/17/20 1930 11/17/20 2337 11/18/20 0412 11/18/20 0810   BP:  105/62 147/77 119/80   Pulse:  98 97 (!) 118   Resp:  16 18 18   Temp:  37 °C (98.6 °F) 36.5 °C (97.7 °F) 36.2 °C (97.1 °F)   TempSrc:  Temporal Temporal Temporal   SpO2:  93% 97% 96%   Weight: 41.2 kg (90 lb 13.3 oz)      Height:           Intake/Output Summary (Last 24 hours) at 11/17/2020 1015  Last data filed at 11/17/2020 0800  Gross per 24 hour   Intake 930 ml   Output --   Net 930 ml       PHYSICAL EXAM:   General: Mild distress due to wanting her restraints off, afebrile, resting comfortably, quiet  HEENT: NC/AT. EOMI.   Cardiovascular: RRR without murmurs, rubs, heaves. Normal capillary refill   Respiratory: CTAB, no tachypnea or retractions   Abdomen: normal bowel sounds, soft, nontender, nondistended, no masses, no organomegaly   EXT:  JIM, no edema  Skin: No erythema/lesions   Neuro: Non-focal, alert      LABS:  Recent Labs     11/16/20  0033 11/17/20  1218 11/18/20  0536   WBC 9.3 7.2 5.6   RBC 4.09* 3.48* 3.63*   HEMOGLOBIN 11.8* 10.1* 10.7*   HEMATOCRIT 37.1 31.4* 32.8*   MCV  90.7 90.2 90.4   MCH 28.9 29.0 29.5   RDW 47.1 45.5 45.6   PLATELETCT 126* 170 160*   MPV 11.5 10.7 10.1   NEUTSPOLYS 79.60* 78.50* 71.00   LYMPHOCYTES 10.60* 14.00* 18.50*   MONOCYTES 8.50 6.10 8.90   EOSINOPHILS 0.40 0.60 0.90   BASOPHILS 0.30 0.40 0.50     Recent Labs     11/16/20  0033 11/17/20  1218 11/18/20  0536   SODIUM 146* 147* 149*   POTASSIUM 3.8 3.1* 3.0*   CHLORIDE 112 115* 115*   CO2 23 26 27   BUN 2* 2* 2*   CREATININE 0.30* 0.24* 0.18*   CALCIUM 9.0 8.5 8.5   MAGNESIUM 1.5  --  2.1   PHOSPHORUS 1.8*  --   --    ALBUMIN  --  2.4*  --      Estimated GFR/CRCL = CrCl cannot be calculated (Unknown ideal weight.).  Recent Labs     11/16/20  0033 11/16/20  0033 11/17/20  1218 11/17/20  1218 11/17/20  2202 11/18/20  0536 11/18/20  0603 11/18/20  0923   GLUCOSE 110*  --  88  --   --  79  --   --    POCGLUCOSE  --    < >  --    < > 104*  --  73 85    < > = values in this interval not displayed.     Recent Labs     11/17/20  1218   ASTSGOT 48*   ALTSGPT 63*   TBILIRUBIN 0.4   ALKPHOSPHAT 80   GLOBULIN 2.6         Recent Labs     11/15/20  1128 11/16/20  1402   [id number] 7.39* 7.43   UKIPTR113Q 41.8* 41.2*   [id number] 131.4* 135.4*   TGCI5GQI 98.2 97.9   ARTHCO3 25 26*   [id number] 6.0  --    ARTBE 0 2     No results for input(s): INR, APTT, FIBRINOGEN in the last 72 hours.    Invalid input(s): DIMER    MICROBIOLOGY:   Blood Culture Hold   Date Value Ref Range Status   02/19/2020 Collected  Final        IMAGING:   DX-ABDOMEN FOR TUBE PLACEMENT   Final Result         1.  Nonspecific bowel gas pattern.   2.  Dobbhoff tube is coiled within the stomach, the tip terminates overlying the expected location of the gastric fundus.   3.  Left basilar atelectasis      IR-MIDLINE CATHETER INSERTION WO GUIDANCE > AGE 3   Final Result                  Ultrasound-guided midline placement performed by qualified nursing staff    as above.          MR-BRAIN-WITH & W/O   Final Result      1.  Limited exam due to motion artifact.      2.  Diffuse atrophy and white matter microvascular ischemic changes.   3.  No acute stroke or evidence for intracranial hemorrhage.      DX-CHEST-LIMITED (1 VIEW)   Final Result      No acute cardiopulmonary disease.      EC-ECHOCARDIOGRAM COMPLETE W/O CONT   Final Result      DX-LUMBAR PUNCTURE FOR DIAGNOSIS   Final Result         FLUOROSCOPIC-GUIDED LUMBAR PUNCTURE AS DESCRIBED ABOVE.      IR-MIDLINE CATHETER INSERTION WO GUIDANCE > AGE 3   Final Result                  Ultrasound-guided midline placement performed by qualified nursing staff    as above.          CT-HEAD W/O   Final Result      Normal CT scan of the head without contrast.               INTERPRETING LOCATION:  [address], [place], [address]      [id number] VIEW   Final Result      Unremarkable AP recumbent abdomen.      DX-CHEST-PORTABLE (1 VIEW)   Final Result      Left basilar opacities, consistent with pneumonia.      CT-HEAD W/O   Final Result      No CT evidence of acute infarct, hemorrhage or mass.      CT-ABDOMEN-PELVIS WITH   Final Result      No evidence of acute intra-abdominal or pelvic process.      Probably duplicated right renal collecting system.      CT-HEAD W/O   Final Result      1.  Focal soft tissue swelling right frontal region with minimal underlying increased density adjacent to the periphery of the outer table of the calvarium which could represent a small cephalohematoma.      2.  Periventricular and subcortical white matter density changes which could be related to small vessel ischemia, demyelinization or gliosis. Findings may be slightly more prominent than on previous exam.      MR-BRAIN-WITH & W/O    (Results Pending)   MR-BRAIN-WITH & W/O    (Results Pending)       CULTURES:   Results     Procedure Component Value Units Date/Time    HSV 1/2 By PCR(Herpes)+[id number] [[id number]] Collected: 11/14/20 9183    Order Status: Completed Specimen: CSF from Spinal Fluid Updated: 11/17/20 1626     Source CSF     HSV Type I  Negative     HSV Type 2 Negative     Comment: CSF has not been FDA approved for use with the Rik® HSV 1  and HSV 2 assay.  CSF samples have been validated at  [facility], in accordance with regulatory  guidelines (CAP) for use on this assay.  Mutations or polymorphisms in primer- or probe-binding regions  may affect detection of new or unknown variants, resulting  in a false negative result with the Rik® HSV 1 and 2 Assay.         Narrative:      Collected By:[id number] SHANKAR KAY  Please add to CSF sample in lab.    CSF CULTURE [[id number]] Collected: 11/14/20 1725    Order Status: Completed Specimen: CSF Updated: 11/17/20 0812     Significant Indicator NEG     Source CSF     Site Tap     Culture Result No growth at 72 hours.     Gram Stain Result No organisms seen.    URINALYSIS [[id number]]     Order Status: No result     MRSA By PCR (Amp) [[id number]] Collected: 11/15/20 0315    Order Status: Completed Specimen: Respirate from Nares Updated: 11/15/20 1856     Significant Indicator NEG     Source RESP     Site NARES     MRSA PCR Negative for MRSA by PCR.    Narrative:      Collected By:[id number] DANIEL MAURER  Collected By:[id number] DANIEL MAURER    Blood Culture [[id number]] Collected: 11/14/20 0601    Order Status: Completed Specimen: Blood Updated: 11/15/20 0724     Significant Indicator NEG     Source BLD     Site Periperal     Culture Result No Growth  Note: Blood cultures are incubated for 5 days and  are monitored continuously.Positive blood cultures  are called to the RN and reported as soon as  they are identified.      Narrative:      Right Hand    Blood Culture [[id number]] Collected: 11/14/20 0242    Order Status: Completed Specimen: Blood Updated: 11/15/20 0724     Significant Indicator NEG     Source BLD     Site Peripheral     Culture Result No Growth  Note: Blood cultures are incubated for 5 days and  are monitored continuously.Positive blood cultures  are called to the RN and reported as soon  "as  they are identified.      Narrative:      Left Wrist    GRAM STAIN [[id number]] Collected: 11/14/20 1725    Order Status: Completed Specimen: CSF Updated: 11/14/20 1854     Significant Indicator .     Source CSF     Site Tap     Gram Stain Result No organisms seen.    Cryptococcal Antigen, CSF [[id number]]     Order Status: No result Specimen: CSF     CSF Culture [[id number]]     Order Status: No result Specimen: CSF from Tap     Blood Culture [[id number]] Collected: 11/13/20 1740    Order Status: Completed Specimen: Blood from Peripheral Updated: 11/14/20 0726     Significant Indicator NEG     Source BLD     Site PERIPHERAL     Culture Result No Growth  Note: Blood cultures are incubated for 5 days and  are monitored continuously.Positive blood cultures  are called to the RN and reported as soon as  they are identified.      Narrative:      Per Hospital Policy: Only change Specimen Src: to \"Line\" if  specified by physician order.  Left AC    Blood Culture [[id number]] Collected: 11/13/20 1741    Order Status: Completed Specimen: Blood from Peripheral Updated: 11/14/20 0726     Significant Indicator NEG     Source BLD     Site PERIPHERAL     Culture Result No Growth  Note: Blood cultures are incubated for 5 days and  are monitored continuously.Positive blood cultures  are called to the RN and reported as soon as  they are identified.      Narrative:      Per Hospital Policy: Only change Specimen Src: to \"Line\" if  specified by physician order.  Left Hand    COVID/SARS CoV-2 PCR [[id number]]     Order Status: Canceled Specimen: Respirate from Nasopharyngeal     Blood Culture,Hold [[id number]] Collected: 11/14/20 0242    Order Status: Canceled     Urinalysis [[id number]] Collected: 11/14/20 0020    Order Status: Canceled Specimen: Urine, Cath     Blood Culture [[id number]]     Order Status: No result Specimen: Blood from Peripheral     Blood Culture [[id number]]     Order Status: No result Specimen: Blood from Peripheral  " "   Culture Respiratory W/ GRM STN [[id number]]     Order Status: Completed Specimen: Respirate from Sputum     CoV-2, Flu A/B, And RSV by PCR [[id number]] Collected: 11/11/20 2328    Order Status: Completed Updated: 11/12/20 0218     Influenza virus A RNA Negative     Influenza virus B, PCR Negative     RSV, PCR Negative     SARS-CoV-2 by PCR NotDetected     Comment: PATIENTS: Important information regarding your results and instructions can  be found at [email or web address]   \"After your  Covid-19 Test\"  [facility] providers: PLEASE REFER TO DE-ESCALATION AND RETESTING PROTOCOL  on [email or web address]  **The [employer] GeneXpert Xpress SARS-CoV-2 Test has been made available for  use under the Emergency Use Authorization (EUA) only.          SARS-CoV-2 Source NP Swab    Narrative:      Is patient being admitted?->Yes  Does this patient meet criteria for Rush/Cepheid per [facility]  Inpatient Workflow? (See workflow link below)->Yes  Expected turn around time?->Rush (Cepheid 2-4 hours)  Is this the patients First SARS CoV-2 test?->Unknown  Is this patient employed in healthcare?->No  Is the patient symptomatic as defined by the CDC?->No  Is the patient hospitalized?->Yes  Is the patient in the ICU?->No  Is the patient a resident in a congregate care setting?->No  Is the patient pregnant?->No    COVID/SARS CoV-2 PCR [[id number]] Collected: 11/11/20 2328    Order Status: Completed Specimen: Respirate from Nasopharyngeal Updated: 11/12/20 0137     COVID Order Status Received     Comment: The order for SARS CoV-2 testing has been received by the  Laboratory. This result is neither positive nor negative.  Final results of testing will report in 24-48 hours, separately.         Narrative:      Is patient being admitted?->Yes  Does this patient meet criteria for Rush/Cepheid per [facility]  Inpatient Workflow? (See workflow link below)->Yes  Expected turn around time?->Rush (Cepheid 2-4 hours)  Is this the patients " First SARS CoV-2 test?->Unknown  Is this patient employed in healthcare?->No  Is the patient symptomatic as defined by the CDC?->No  Is the patient hospitalized?->Yes  Is the patient in the ICU?->No  Is the patient a resident in a congregate care setting?->No  Is the patient pregnant?->No    URINALYSIS,CULTURE IF INDICATED [[id number]]  (Abnormal) Collected: 11/11/20 2030    Order Status: Completed Specimen: Urine Updated: 11/11/20 2059     Color Yellow     Character Cloudy     Specific Gravity 1.025     Ph 5.5     Glucose Negative mg/dL      Ketones 15 mg/dL      Protein Negative mg/dL      Bilirubin Negative     Urobilinogen, Urine 0.2     Nitrite Negative     Leukocyte Esterase Trace     Occult Blood Negative     Micro Urine Req Microscopic    Narrative:      Indication for culture:->Patient WITHOUT an indwelling Mehta  catheter in place with new onset of Dysuria, Frequency,  Urgency, and/or Suprapubic pain          MEDS:  Current Facility-Administered Medications   Medication Last Admin   • LORazepam (ATIVAN) tablet 1 mg     • potassium chloride SA (Kdur) tablet 40 mEq     • cefTRIAXone (ROCEPHIN) injection 1,000 mg 1,000 mg at 11/18/20 0617   • Pharmacy Consult: Enteral tube insertion - review meds/change route/product selection     • acetaminophen (Tylenol) tablet 650 mg     • senna-docusate (PERICOLACE or SENOKOT S) 8.6-50 MG per tablet 2 Tab Stopped at 11/18/20 0600    And   • polyethylene glycol/lytes (MIRALAX) PACKET 1 Packet      And   • magnesium hydroxide (MILK OF MAGNESIA) suspension 30 mL Stopped at 11/18/20 0600    And   • bisacodyl (DULCOLAX) suppository 10 mg     • QUEtiapine (Seroquel) tablet 25 mg 25 mg at 11/17/20 2203   • oxyCODONE immediate-release (ROXICODONE) tablet 5 mg     • oxyCODONE immediate-release (ROXICODONE) tablet 2.5 mg     • LORazepam (ATIVAN) tablet 1 mg     • dicyclomine (BENTYL) tablet 20 mg     • omeprazole (FIRST-OMEPRAZOLE) 2 mg/mL oral susp 40 mg 40 mg at 11/18/20 0617   •  morphine (pf) 4 mg/mL injection 1-2 mg 2 mg at 11/16/20 2307   • naloxone (NARCAN) injection 0.4 mg     • Respiratory Therapy Consult     • diphenhydrAMINE (BENADRYL) injection 12.5-25 mg     • LORazepam (ATIVAN) injection 0.5 mg 0.5 mg at 11/17/20 0822   • Pharmacy Consult Request ...Pain Management Review 1 Each     • insulin regular (HumuLIN R,NovoLIN R) injection Stopped at 11/12/20 1700    And   • dextrose 50% (D50W) injection 50 mL 50 mL at 11/17/20 1329   • enoxaparin (LOVENOX) inj 30 mg 30 mg at 11/18/20 0617   • ondansetron (ZOFRAN) syringe/vial injection 4 mg         PROBLEM LIST:  No problems updated.    ASSESSMENT/PLAN: 49 y.o. female with a  PMHx of myotonic muscular dystrophy admitted on 11/11 for worsening ABD pain, extremity pain and numbness. Sustained a GLF in the ED resulting a cephalohematoma. Developed hypotension, tachycardia, and AMS day 2 of admission. Increasing oxygen requirements. Etiology unknown at this time. Consider: sepsis, stroke, seizure      # Encephalopathy  # AMS  Etiology: difficult to assess patient's baseline mental status. Workup has been largely negative up to this point. Possibly due to hypercarbia d/t respiratory acidosis. GLF on admission could be contributing.   Work up: CSF cultures NGTD. TSH WNL, previous drug screens only + for chronic opioid use, sepsis workup negative. MRI showing diffuse atrophy, no acute hemorrhage or ischemic stroke, poor image due to movement artifact. EEG WNL. Given slow but gradual improvement, continue to monitor mental status, but it appears that patient is approaching her baseline.    Plan:  - Neurology consulting: appreciate their recs  - HSV NR- Discontinue acyclovir  - Speech therapy following, recommending supplemental nutrition given the fact that patient cannot participate in oral feeding due to AMS, poor swallow technique  - Discharge planning for safety, probably requiring a long term care facility or SNF.   - 2 point restraints in  place due to patient agitation and pulling out IV access   - Neuro recommending repeat MRI given poor image quality due to movement  - 1mg PO Ativan prior to MRI      # Severe calorie malnutrition  # Poor oral intake   Long discussion with , Adryan, yesterday regarding nutritional needs. We discussed different options for artifical nutrition, with the NG tube being the least invasive for current needs. He agreed to try a trial of Cortrak feeding tube. We will continue to re-evaluate the need for artificial nutritional needs daily.   - Speech consult- appreciate recs  - Nutrition consult- appreciate recs  - Cortrak tube placed last night, AXR showing appropriate placement  - 2 Point restraints remain in place as patient continues to pull out IV access    # Hypernatremia  # Hypokalemia  Poor IV access over the last day or so. Currently, patent     # Acute respiratory acidosis   # Acute respiratory failure   Etiology: unknown. CXR WNL, WBC WNL, ABGs consistent with respiratory acidosis.   Currently on RA and saturating in the high 90s.      Plan:   - Per pulmonary recs: recommending NIF  - Continue to monitor  - Goal O2 >88%  - Continue ceftriaxone for possible aspiration PNA from AMS     # Hypotension- resolved  # Tachycardia   Believed to be due to sepsis given acute increase in WBC to 17; lactic acid up to 4. BP responded well to fluid bolus. Remains tachycardic overnight.   Etiology: unknown at this time, work up for source of infection largely negative at this point. Consider: dehydration, possibly adrenal insufficiency.      Plan:  - ID following: recommending  - Continue tele monitoring  - Continue IVMF D5LR @75mL/hour  - Monitor urinary output     # Upper ext pain, right  # BL LE pain  Present on admission. Possibly d/t patient's underlying myotonic dystrophy.  states that this is new for patient. GLF on admission at hospital. Cephalohematoma present seen on CT head.      Plan:   - Repeat MRI  pending     # Abdominal pain  Present on admission, patient states she is still having abd pain. Physical exam reassuring. No reg flag symptoms: no blood in stool, N/V/D or constipation.    Work up: CT ABD WNL; UA negative; RUQ US negative 9/2020     Plan:   - Continue to monitor    # ADLs   assists with all ADLs. Patient uses walker at home.   Per , patient has been exhibiting an increase in falls lately with unknown head trauma, unable to care for her when he goes to work.   PT/OT recommending SNF. Referral placed.   - Case management to provide updates on SNF referral.      Core Measures:   Fluids: 50% D5 LR 75mL/hour  Lines: IVP  Abx: c3  DVT prophylaxis: Lovenox   Code Status: full code      Disposition: patient is getting close to being medically cleared for discharge. Will speak with CM today regarding long term placement options     Kimberly Mc MD   PGY-1 Family Medicine Resident   [facility]

## 2020-11-18 NOTE — DIETARY
"Nutrition Support Assessment   Day 5 of admit.  Gayla Escudero is a 49 y.o. female with admitting DX of Abdominal pain.     Current problem list:  1. Abdominal pain  2. Bilateral lower extremity pain  3. Upper extremity pain, diffuse, right  4. Myotonic muscular dystrophy  5. Hypoxia  6. Aspiration into airway  7. Severe Sepsis  8. Encephalopathy acute  9. Electrolyte abnormality  10. Altered mental status  11. Hypoglycemia  12. Hypotension due to hypovolemia     Assessment:  Estimated Nutritional Needs: based on:   Height: 154.9 cm (5' 1\")  Weight: 41.2 kg (90 lb 13.3 oz)  Weight to Use in Calculations: 41.2 kg (90 lb 13.3 oz)  Ideal Body Weight: 47.6 kg (105 lb)  Percent Ideal Body Weight: 86.5  Body mass index is 17.16 kg/m²., BMI classification: Underweight    Calculation/Equation: REE with MSJ = 975 x 1.1 - 1.2  Total Calories / day: 1072.6 - 1170 (Calories / k - 28)  Total Grams Protein / day: 49 - 54 (Grams Protein / k.2 - 1.3)     Evaluation:   1. Pt with minimal PO intake. Seen by SLP  who recommended alternate source of nutrition to meet needs. Tube feed consult was ordered per RD.  2. Calorie Count ordered this morning. Discussed with MD. Per MD, calorie count not needed at this time as pt to receive tube feed.  3. Gastric cortrak in place.  4. Pt with history of myotonic muscular dystrophy. Pt out of room at time of visit, but  present. He states pt has had ongoing abdominal pain. He does state that pt was eating at home. She would eat small amounts at a time, but would eat most of what he prepared. He states the poor PO intake and current refusal of meals is since admission.  5.  states her usual weight fluctuates between 95 and 100 lb. He did say she lost weight earlier this year due to her abdominal pain.  Per chart review, weight 10/11/20 = 40.8 kg. 20 = 40.3 kg. 20 = 44 kg. Weight has been stable the past couple of months.  6. Labs  include Na 149 " (H), K+ 3 (L), Glu 79, BUN 2 (L), Creat 0.18 (L), Mg 2.1. 11/17: Alb 2.4 (L). 11/16 Phos 1.8 (L).  7. Medications include Rocephin, SSI, Senna and milk of magnesia held today.  8. LBM 11/18, loose.  9. Skin: wound to shoulder per nursing note.  10. Start Fibersource HN for tube feed formula.     Malnutrition Risk: Pt at risk due to poor oral intake. Unable to meet 2 criteria at this time.     Recommendations/Plan:  1. Start Fibersource HN and advance as tolerated to goal rate of 40 ml/hour to provide 1152 kcal, 52 gm protein, and 777 ml free water in 24 hours.  2. Continue diet per SLP: Level 4 - pureed, Level 2 - mildly thick, 1:1 supervision.  3. Reassess tube feed needs if PO intake consistently improves.  4. Fluids per MD.  5. Monitor weight.  6. RD following.

## 2020-11-18 NOTE — CARE PLAN
Problem: Nutritional:  Goal: Achieve adequate nutritional intake  Description: Patient will consume >50-75% of meals  Outcome: NOT MET   Pt with minimal PO intake. Tube feed to start.    Problem: Nutritional:  Goal: Nutrition support tolerated and meeting greater than 85% of estimated needs  Outcome: NOT MET

## 2020-11-18 NOTE — PROGRESS NOTES
Left arm midline infiltrated, multiple attempts to place PIV with US guidance, unsuccessful. Per vascular access, if midline went bad a central line would be required considering such poor vasculature. Will discuss with day team.

## 2020-11-19 NOTE — PROGRESS NOTES
Cortrak Placement    Tube Team verified patient name and medical record number prior to tube placement.  Cortrak tube (43 inches, 10 Monegasque) placed at 65 cm in left nare.  Per Cortrak picture, tube appears to be in the stomach.  Nursing Instructions: Awaiting KUB to confirm placement before use for medications or feeding. Once placement confirmed, flush tube with 30 ml of water, and then remove and save stylet, in patient medication drawer.

## 2020-11-19 NOTE — PROGRESS NOTES
Pt found to have Cortrak laying next to her head on her pillow. New Cortrak to be placed per standing order. Paging MD for new order for chest tube confirmation.

## 2020-11-19 NOTE — PROGRESS NOTES
Assumed care at 0700, bedside report received from Ernst BASHIR. Pt is SR/ST on the monitor. Initial assessment completed, orders reviewed, call light with reach, bed alarm on, and hourly rounding in place. POC addressed with patient, no additional questions at this time.  0730 On first assessment, pt found with pulled cortrack. MD notified. Pt A&Ox3. Attempted to feed pt, refusing at this time. Per MD await cortrack placement until patient appears calmer.   Hold cortrack at this time. Pt tolerating small amounts of PO intake.   OOB to chair. T&Rq2.   Pt remains confused, oriented x2/3.

## 2020-11-19 NOTE — PROGRESS NOTES
[facility] [place] [facility] MEDICINE PROGRESS NOTE     Attending: Dalila Scherer MD  Senior Resident: Feng Arellano MD (PGY-2)   [place] Resident: Kimberly Mc MD (PGY-1)    PATIENT: Gayla Escudero; [id number]; 1970    Subjective: Alerted by cross cover physician and RN that patient removed NG tube once last night and once again this morning. On arrival to room Gayla much more alert and asking to be seated upright. Asking for NG tube anchor to be removed. Discussed with Gayla that may try period without tube or restraints so long as she is non-combative and does not pull IV lines. Patient verbalized agreement. Discussed with RN.     OBJECTIVE:  Temp:  [36.1 °C (97 °F)-37.1 °C (98.7 °F)] 36.2 °C (97.1 °F)  Pulse:  [103-116] 113  Resp:  [16-18] 16  BP: (108-133)/(72-79) 113/78  SpO2:  [92 %-97 %] 95 %    Intake/Output Summary (Last 24 hours) at 11/19/2020 1506  Last data filed at 11/19/2020 1400  Gross per 24 hour   Intake 2096.15 ml   Output --   Net 2096.15 ml       PE:  General: Cachectic appearing woman in no acute distress, resting on arrival to room  HEENT: Normocephalic, atraumatic, nares patent, tongue and lips somewhat swollen appearing  Cardiovascular: RRR, no murmurs, gallops, or rubs  Pulmonary: CTAB, symmetrical chest expansion, no rales, rhonchi, or wheezes  Abdominal: Normoactive bowel sounds, non-tender to palpation, no guarding, rigidity, or distension  Extremities: Moves all spontaneously, bilateral calves non-tender to palpation, no pedal edema  Neurological: Alert, oriented to person and place though not time or event    LABS:  Recent Labs     11/17/20  1218 11/18/20  0536   WBC 7.2 5.6   RBC 3.48* 3.63*   HEMOGLOBIN 10.1* 10.7*   HEMATOCRIT 31.4* 32.8*   MCV 90.2 90.4   MCH 29.0 29.5   RDW 45.5 45.6   PLATELETCT 170 160*   MPV 10.7 10.1   NEUTSPOLYS 78.50* 71.00   LYMPHOCYTES 14.00* 18.50*   MONOCYTES 6.10 8.90   EOSINOPHILS 0.60 0.90   BASOPHILS 0.40 0.50     Recent Labs     11/17/20  1218 11/18/20  0536  11/19/20  0707   SODIUM 147* 149* 147*   POTASSIUM 3.1* 3.0* 3.7   CHLORIDE 115* 115* 114*   CO2 26 27 26   BUN 2* 2* 2*   CREATININE 0.24* 0.18* 0.24*   CALCIUM 8.5 8.5 8.8   MAGNESIUM  --  2.1  --    ALBUMIN 2.4*  --   --      Estimated GFR/CRCL = Estimated Creatinine Clearance: 207.7 mL/min (A) (by C-G formula based on SCr of 0.24 mg/dL (L)).  Recent Labs     11/17/20  1218 11/17/20  1218 11/18/20  0536 11/18/20  0536 11/18/20  2106 11/19/20  0641 11/19/20  0707 11/19/20  1126   GLUCOSE 88  --  79  --   --   --  76  --    POCGLUCOSE  --    < >  --    < > 85 70  --  80    < > = values in this interval not displayed.     Recent Labs     11/17/20  1218   ASTSGOT 48*   ALTSGPT 63*   TBILIRUBIN 0.4   ALKPHOSPHAT 80   GLOBULIN 2.6       MICROBIOLOGY:   Blood Culture Hold   Date Value Ref Range Status   02/19/2020 Collected  Final        IMAGING:   DX-ABDOMEN FOR TUBE PLACEMENT   Final Result         1.  Nonspecific bowel gas pattern.   2.  Dobbhoff tube tip overlying the expected location of the pylorus or first duodenal segment.   3.  Left basilar atelectasis      MR-BRAIN-WITH & W/O   Final Result      1.  Study is again limited by patient motion.   2.  Mild cerebral atrophy, prominent for the patient's age.   3.  T2 FLAIR hyperintensity in the anterior bilateral frontal lobes probably encephalomalacia/gliosis in could be related to old trauma.   4.  Additional mild nonspecific scattered white matter disease, possibly chronic microvascular ischemic changes.   5.  No acute intracranial hemorrhage or infarct.   6.  Left retinal detachment.      DX-ABDOMEN FOR TUBE PLACEMENT   Final Result         1.  Nonspecific bowel gas pattern.   2.  Dobbhoff tube is coiled within the stomach, the tip terminates overlying the expected location of the gastric fundus.   3.  Left basilar atelectasis      IR-MIDLINE CATHETER INSERTION WO GUIDANCE > AGE 3   Final Result                  Ultrasound-guided midline placement performed by  qualified nursing staff    as above.          MR-BRAIN-WITH & W/O   Final Result      1.  Limited exam due to motion artifact.   2.  Diffuse atrophy and white matter microvascular ischemic changes.   3.  No acute stroke or evidence for intracranial hemorrhage.      DX-CHEST-LIMITED (1 VIEW)   Final Result      No acute cardiopulmonary disease.      EC-ECHOCARDIOGRAM COMPLETE W/O CONT   Final Result      DX-LUMBAR PUNCTURE FOR DIAGNOSIS   Final Result         FLUOROSCOPIC-GUIDED LUMBAR PUNCTURE AS DESCRIBED ABOVE.      IR-MIDLINE CATHETER INSERTION WO GUIDANCE > AGE 3   Final Result                  Ultrasound-guided midline placement performed by qualified nursing staff    as above.          CT-HEAD W/O   Final Result      Normal CT scan of the head without contrast.               INTERPRETING LOCATION:  [address], [place], [address]      ON-ZSKUYTH-5 VIEW   Final Result      Unremarkable AP recumbent abdomen.      DX-CHEST-PORTABLE (1 VIEW)   Final Result      Left basilar opacities, consistent with pneumonia.      CT-HEAD W/O   Final Result      No CT evidence of acute infarct, hemorrhage or mass.      CT-ABDOMEN-PELVIS WITH   Final Result      No evidence of acute intra-abdominal or pelvic process.      Probably duplicated right renal collecting system.      CT-HEAD W/O   Final Result      1.  Focal soft tissue swelling right frontal region with minimal underlying increased density adjacent to the periphery of the outer table of the calvarium which could represent a small cephalohematoma.      2.  Periventricular and subcortical white matter density changes which could be related to small vessel ischemia, demyelinization or gliosis. Findings may be slightly more prominent than on previous exam.          MEDS:  Current Facility-Administered Medications   Medication Last Admin   • cefTRIAXone (ROCEPHIN) 1 g in  mL IVPB Stopped at 11/19/20 0639   • dextrose 5 % and 0.45 % NaCl with KCl 20 mEq Rate Change at  11/19/20 1004   • Pharmacy Consult: Enteral tube insertion - review meds/change route/product selection     • acetaminophen (Tylenol) tablet 650 mg     • senna-docusate (PERICOLACE or SENOKOT S) 8.6-50 MG per tablet 2 Tab Stopped at 11/18/20 0600    And   • polyethylene glycol/lytes (MIRALAX) PACKET 1 Packet      And   • magnesium hydroxide (MILK OF MAGNESIA) suspension 30 mL Stopped at 11/18/20 0600    And   • bisacodyl (DULCOLAX) suppository 10 mg     • QUEtiapine (Seroquel) tablet 25 mg 25 mg at 11/17/20 2203   • oxyCODONE immediate-release (ROXICODONE) tablet 5 mg     • oxyCODONE immediate-release (ROXICODONE) tablet 2.5 mg     • dicyclomine (BENTYL) tablet 20 mg     • omeprazole (FIRST-OMEPRAZOLE) 2 mg/mL oral susp 40 mg 40 mg at 11/19/20 0639   • morphine (pf) 4 mg/mL injection 1-2 mg 2 mg at 11/16/20 2307   • naloxone (NARCAN) injection 0.4 mg     • Respiratory Therapy Consult     • diphenhydrAMINE (BENADRYL) injection 12.5-25 mg     • LORazepam (ATIVAN) injection 0.5 mg 0.5 mg at 11/19/20 0256   • Pharmacy Consult Request ...Pain Management Review 1 Each     • insulin regular (HumuLIN R,NovoLIN R) injection Stopped at 11/12/20 1700    And   • dextrose 50% (D50W) injection 50 mL 50 mL at 11/17/20 1329   • enoxaparin (LOVENOX) inj 30 mg 30 mg at 11/19/20 0609   • ondansetron (ZOFRAN) syringe/vial injection 4 mg         ASSESSMENT/PLAN: Gayla Escudero is a 49 y.o. woman with PMH of myotonic muscular distrophy admitted on 11/11/20 for limb and abdominal pain of unknown cause with subsequent development of encephalopathy, undergoing continued management of altered mental status. Overall patient doing much better today with respect to mental capabilities.       # Encephalopathy / Altered Mental Status / Delirium   Per notes, patient lucid on arrival to ED and at admission with subsequent development of altered mental status within 24 hours of arrival to hospital. Unknown etiology with very broad workup performed  "which has been unremarkable to date.   CSF cultures NTD. TSH WNL, previous drug screens only + for chronic opioid use, sepsis workup negative. MRI showing diffuse atrophy with repeat MRI showing mild abnormal anterior lobe hyperintensity of unknown cause, no acute hemorrhage or ischemic stroke. EEG WNL. Neurology consulted and without further recommendations. Given waxing/waning concentration and fluctuating pattern of sleep and cognition, suspect that delirium may be primary cause, though uncertain as to cause for initial onset. Patient appears to be doing much better today and able to be moved to chair.   - Autoimmune encephalitis panel pending  - Delirium management with frequent reorientation, daytime lights/dark evenings  - Restraints discontinued today, may re-initiate if necessary   - Seroquel 25 mg PO at bedtime for sleep aid     #Retinal Detachment / Displaced Optic Lense  MRI yesterday with incidentally found left retinal detachment. Spoke with patient's  who stated that Gayla reported difficulty seeing out of left eye yesterday to him, which she had never reported before.  does note that Gayla has been, \"holding her phone very close to her face,\" for past several weeks. Unknown time of onset. Ophthalmology consulted urgently and recommended that given altered mental status, poor surgical candidate at present, and unknown timing of onset, patient would require outpatient follow-up after placement at SNF.  - Outpatient Ophthalmology follow-up    #Hypernatremia  Likely secondary to poor fluid intake in the setting of altered mental status. Improved somewhat today with free water flushes through NG yesterday. Will trial self-feeding today and have increased IV fluids for time being.  - IV fluids as discussed below  - Repeat AM BMP    #Severe Calorie Malnutrition / Poor Oral Intake   - Speech consulted, appreciate recs  - Nutrition consulted, appreciate recs  - May replace NG tube if needed, " pending evaluation of PO intake today  - BOOST energy drinks w/meals  - Strict I/Os     #Acute Respiratory Acidosis /  Acute Respiratory Failure   Unknown etiology, though in setting of altered mental status though to potentially be aspiration PNA. However CXR WNL, WBC WNL, ABGs consistent with respiratory acidosis only. Required high-flow O2 for several days. Currently on RA and saturating in the high 90s.   - Supplemental oxygen as needed  - Last dose CTX 2 mg IV today for possible PNA      #Tachycardia   Initially believed secondary to sepsis given acute increase in WBC to 17; lactic acid up to 4. BP responded well to fluid bolus. Remaining tachycardic intermittently and may be due to agitation during delirium.   - Continue tele monitoring  - Monitor urinary output     #Abdominal Pain / Limb Pain  Present on admission, patient states she is still having abdominal pain at times. CT abdomen without significant finding to explain symptoms and has had recurrent workup in past without finding. Suspect related potentially to muscular distrophy which can commonly present with pain without other findings. RUQ US negative 9/2020.  - Continue to monitor  - Acetaminophen PRN  - Oxycodone 2.5-5 mg PO Q6H PRN,  morphine 1-2 mg IV Q3H PRN  - Naloxone PRN     #Type II DM  Blood glucose levels within normal limits in setting of poor PO intake.  - SS insulin   - Hypoglycemia protocol     #ADLs   assists with all ADLs & patient uses walker at home prior to admission Per , patient has been exhibiting an increase in falls lately & is unable to care for her when he goes to work. PT/OT recommending SNF. Case management reports patient accepted once medically cleared.   - Planned discharge to SNF     #Diet / Fluids   NG tube removed today; will trial period of continued no NG tube to determine whether patient able to feed with 1:1 feeding.   - PO diet per RD, appreciate the recommendations  - Trial period of self feeding  today while off NG tube  - Increased 50% D5 LR to 90 mL/hour while NG discontinued   - If unable to feed self or mental status worsening, may need to replace NG tube     #DVT Prophylaxis  - Enoxaparin     #Disposition  Patient continues to improve with mental capacity; if able to feed self without IV fluid support, suspect may be stable for discharge to SNF within 1-2 days.     Feng Arellano M.D.  Family Medicine Resident  PGY-2

## 2020-11-19 NOTE — CARE PLAN
Problem: Safety  Goal: Will remain free from injury  Outcome: PROGRESSING AS EXPECTED  Goal: Will remain free from falls  Outcome: PROGRESSING AS EXPECTED   Fall precautions in place. Hourly rounding. Bed/chair alarm on and active.   Problem: Psychosocial Needs:  Goal: Level of anxiety will decrease  Outcome: PROGRESSING AS EXPECTED   Generalized anxiety, encouraging healthy coping techniques such as deep breathing.

## 2020-11-19 NOTE — NON-PROVIDER
FAMILY MEDICINE MEDICAL STUDENT PROGRESS NOTE     Attending: Dalila Scherer M.D.  Senior Resident: Feng Arellano M.D. (PGY-2)  Panchito Resident: Kimberly Mc (PGY-1)  PATIENT: Gayla Escudero; [id number]; 1970     ID:This is a 49-year-old female admitted for initial complaints of worsening abdominal pain, pain to the right arm and bilateral lower extremities, and numbness to those extremities. This is her sixth day admitted to the service. She was originally brought to the emergency department status post a fall out of her wheelchair with resultant head trauma and hematoma development. Since admission, the patient has had fluctuating cognition, as well as multiple episodes of hypoxia, tachycardia, and hypertension.     Overnight Events: The patient was assessed to be alert and oriented to person, place, and time last night, with decreased agitation. At that time, restraints were removed. However, overnight it was found that the patient had removed the NG feeding tube placed yesterday. This was ordered to be replaced, and restraints have remained off. Otherwise, no other acute events. MRI read yesterday did reveal a retinal detachment, concerning for progression to permanent blindness in the eye.     Subjective: The patient primarily mentions concern that her  is not safe, and asks where he is and what is wrong with him. She does report feeling better today than yesterday, and denies any abdominal pain, difficulty breathing, or extremity pain today. When inquired regarding her vision, she does report that she cannot see out of her left eye, but she does not know for how long she has had this issue or when it started. The  was asked regarding this issue, and he did not provide any insight to the origin of the problem.     OBJECTIVE:  Temp:  [36.1 °C (97 °F)-36.9 °C (98.4 °F)] 36.4 °C (97.6 °F)  Pulse:  [103-118] 111  Resp:  [16-18] 18  BP: (108-133)/(72-80) 133/73  SpO2:  [92 %-97 %] 97 %    Intake/Output  Summary (Last 24 hours) at 11/19/2020 0710  Last data filed at 11/18/2020 1533  Gross per 24 hour   Intake 700 ml   Output --   Net 700 ml       PHYSICAL EXAM:  General: No acute distress, afebrile, awake, appears malaised, staying in one position with minimal movement and appears to be staring off.   HEENT: NC/AT. EOMI. Dry oral mucosa with crusting to the lips.   Cardiovascular: Tachycardic rate, regular rhythm without murmurs, rubs, heaves. Normal capillary refill   Respiratory: CTAB, no tachypnea or retractions on poor inspiratory effort.   Abdomen: normal bowel sounds, soft, tender to palpation throughout, nondistended, no masses, no organomegaly   EXT:  JIM, no edema.   Skin: No erythema/lesions   Neuro: Alert and oriented to person and place only. Short term memory recall poor. Strength 4/5 to the bilateral upper and lower extremities with decreasing strength distally. Subjectively unable to see from the left eye; unable to fully assess secondary to the patient's current mental status.     LABS:  Recent Labs     11/17/20 1218 11/18/20  0536   WBC 7.2 5.6   RBC 3.48* 3.63*   HEMOGLOBIN 10.1* 10.7*   HEMATOCRIT 31.4* 32.8*   MCV 90.2 90.4   MCH 29.0 29.5   RDW 45.5 45.6   PLATELETCT 170 160*   MPV 10.7 10.1   NEUTSPOLYS 78.50* 71.00   LYMPHOCYTES 14.00* 18.50*   MONOCYTES 6.10 8.90   EOSINOPHILS 0.60 0.90   BASOPHILS 0.40 0.50     Recent Labs     11/17/20 1218 11/18/20  0536   SODIUM 147* 149*   POTASSIUM 3.1* 3.0*   CHLORIDE 115* 115*   CO2 26 27   BUN 2* 2*   CREATININE 0.24* 0.18*   CALCIUM 8.5 8.5   MAGNESIUM  --  2.1   ALBUMIN 2.4*  --      Estimated GFR/CRCL = Estimated Creatinine Clearance: 276.9 mL/min (A) (by C-G formula based on SCr of 0.18 mg/dL (L)).  Recent Labs     11/17/20  1218 11/17/20  1218 11/18/20  0536 11/18/20  0536 11/18/20  1042 11/18/20  1726 11/18/20  2106   GLUCOSE 88  --  79  --   --   --   --    POCGLUCOSE  --    < >  --    < > 85 88 85    < > = values in this interval not  displayed.     Recent Labs     11/17/20  1218   ASTSGOT 48*   ALTSGPT 63*   TBILIRUBIN 0.4   ALKPHOSPHAT 80   GLOBULIN 2.6         Recent Labs     11/16/20  1402   GFCCC39Q 7.43   [id number] 41.2*   [id number] 135.4*   [id number] 97.9   ARTHCO3 26*   ARTBE 2          Ref. Range 11/14/2020 12:15 11/14/2020 17:25 11/16/2020 00:33 11/16/2020 03:39 11/18/2020 05:36   Procalcitonin Latest Ref Range: <0.25 ng/mL       1.98 (H) 0.19   Vitamin B12 -True Cobalamin Latest Ref Range: 211 - 911 pg/mL 1457 (H)           TSH Latest Ref Range: 0.380 - 5.330 uIU/mL     1.210       Cryptococcus neoformans/gattii by PCR Unknown   Not Detected         Cytomegalovirus by PCR Unknown   Not Detected         Enterovirus by PCR Unknown   Not Detected         Escherichia coli K1 by PCR Unknown   Not Detected         HIV Ag/Ab Combo Assay Latest Ref Range: Non Reactive  Non-Reactive           HSV 1 by PCR Unknown   Not Detected         HSV 2 by PCR Unknown   Not Detected         HSV Type 2 Latest Ref Range: Negative    Negative         HSV Type I Latest Ref Range: Negative    Negative         Human Herpesvirus 6 by PCR Unknown   Not Detected         Human parechovirus by PCR Unknown   Not Detected         Varicella Zoster Virus by PCR Unknown   Not Detected         Syphilis, Treponemal Qual Latest Ref Range: Non-Reactive  Non-Reactive                 MICROBIOLOGY:     Ref. Range 11/14/2020 02:42 11/14/2020 06:01 11/14/2020 17:25 11/14/2020 17:25 11/15/2020 03:15   Cytomegalovirus by PCR Unknown     Not Detected       HAEM influenzae by PCR Unknown     Not Detected       Listeria Monocytogenes by PCR Unknown     Not Detected       Neisseria meningitidis by PCR Unknown     Not Detected       Significant Indicator Unknown NEG NEG . NEG NEG   Site Unknown Peripheral Periperal Tap Tap NARES   Source Unknown BLD BLD CSF CSF RESP   Source Unknown     CSF       Strep Agalactiae by PCR Unknown     Not Detected       Strep pneumoniae by PCR Unknown      Not Detected             IMAGING:  DX-ABDOMEN FOR TUBE PLACEMENT   Final Result         1.  Nonspecific bowel gas pattern.   2.  Dobbhoff tube tip overlying the expected location of the pylorus or first duodenal segment.   3.  Left basilar atelectasis      MR-BRAIN-WITH & W/O   Final Result      1.  Study is again limited by patient motion.   2.  Mild cerebral atrophy, prominent for the patient's age.   3.  T2 FLAIR hyperintensity in the anterior bilateral frontal lobes probably encephalomalacia/gliosis in could be related to old trauma.   4.  Additional mild nonspecific scattered white matter disease, possibly chronic microvascular ischemic changes.   5.  No acute intracranial hemorrhage or infarct.   6.  Left retinal detachment.      DX-ABDOMEN FOR TUBE PLACEMENT   Final Result         1.  Nonspecific bowel gas pattern.   2.  Dobbhoff tube is coiled within the stomach, the tip terminates overlying the expected location of the gastric fundus.   3.  Left basilar atelectasis      IR-MIDLINE CATHETER INSERTION WO GUIDANCE > AGE 3   Final Result                  Ultrasound-guided midline placement performed by qualified nursing staff    as above.          MR-BRAIN-WITH & W/O   Final Result      1.  Limited exam due to motion artifact.   2.  Diffuse atrophy and white matter microvascular ischemic changes.   3.  No acute stroke or evidence for intracranial hemorrhage.      DX-CHEST-LIMITED (1 VIEW)   Final Result      No acute cardiopulmonary disease.      EC-ECHOCARDIOGRAM COMPLETE W/O CONT   Final Result      DX-LUMBAR PUNCTURE FOR DIAGNOSIS   Final Result         FLUOROSCOPIC-GUIDED LUMBAR PUNCTURE AS DESCRIBED ABOVE.      IR-MIDLINE CATHETER INSERTION WO GUIDANCE > AGE 3   Final Result                  Ultrasound-guided midline placement performed by qualified nursing staff    as above.          CT-HEAD W/O   Final Result      Normal CT scan of the head without contrast.               INTERPRETING LOCATION:  [address]  [facility], [address]      TM-DYHZYES-3 VIEW   Final Result      Unremarkable AP recumbent abdomen.      DX-CHEST-PORTABLE (1 VIEW)   Final Result      Left basilar opacities, consistent with pneumonia.      CT-HEAD W/O   Final Result      No CT evidence of acute infarct, hemorrhage or mass.      CT-ABDOMEN-PELVIS WITH   Final Result      No evidence of acute intra-abdominal or pelvic process.      Probably duplicated right renal collecting system.      CT-HEAD W/O   Final Result      1.  Focal soft tissue swelling right frontal region with minimal underlying increased density adjacent to the periphery of the outer table of the calvarium which could represent a small cephalohematoma.      2.  Periventricular and subcortical white matter density changes which could be related to small vessel ischemia, demyelinization or gliosis. Findings may be slightly more prominent than on previous exam.          MEDS:  Current Facility-Administered Medications   Medication Last Admin   • cefTRIAXone (ROCEPHIN) 1 g in  mL IVPB Stopped at 11/19/20 0639   • dextrose 5 % and 0.45 % NaCl with KCl 20 mEq New Bag at 11/19/20 0320   • Pharmacy Consult: Enteral tube insertion - review meds/change route/product selection     • acetaminophen (Tylenol) tablet 650 mg     • senna-docusate (PERICOLACE or SENOKOT S) 8.6-50 MG per tablet 2 Tab Stopped at 11/18/20 0600    And   • polyethylene glycol/lytes (MIRALAX) PACKET 1 Packet      And   • magnesium hydroxide (MILK OF MAGNESIA) suspension 30 mL Stopped at 11/18/20 0600    And   • bisacodyl (DULCOLAX) suppository 10 mg     • QUEtiapine (Seroquel) tablet 25 mg 25 mg at 11/17/20 2203   • oxyCODONE immediate-release (ROXICODONE) tablet 5 mg     • oxyCODONE immediate-release (ROXICODONE) tablet 2.5 mg     • dicyclomine (BENTYL) tablet 20 mg     • omeprazole (FIRST-OMEPRAZOLE) 2 mg/mL oral susp 40 mg 40 mg at 11/19/20 0639   • morphine (pf) 4 mg/mL injection 1-2 mg 2 mg at 11/16/20 2307   •  naloxone (NARCAN) injection 0.4 mg     • Respiratory Therapy Consult     • diphenhydrAMINE (BENADRYL) injection 12.5-25 mg     • LORazepam (ATIVAN) injection 0.5 mg 0.5 mg at 11/19/20 0256   • Pharmacy Consult Request ...Pain Management Review 1 Each     • insulin regular (HumuLIN R,NovoLIN R) injection Stopped at 11/12/20 1700    And   • dextrose 50% (D50W) injection 50 mL 50 mL at 11/17/20 1329   • enoxaparin (LOVENOX) inj 30 mg 30 mg at 11/19/20 0609   • ondansetron (ZOFRAN) syringe/vial injection 4 mg       ASSESSMENT/PLAN: This is a 49-year-old female with a known history of myotonic muscular dystrophy admitted seven days ago on 11/11 for worsening abdominal pain, extremity pain, numbness. The patient on day of admission suffered a ground-level fall from her wheelchair with resultant cephalohematoma necessitating visit to the emergency department. Developed hypertension, tachycardia, and altered mental status since day to mission.      1. Altered Mental Status   ETIOLOGY:  The patient continues to have cognitive deficiencies and altered mental status since her admission. The patient’s spouse, who is now at the bedside, continues to elaborate that the patient, prior to her admission, had good cognitive capacity and was described as sharp. Broad differential diagnoses include viral versus bacterial encephalopathy, neurodegenerative disease secondary to muscular dystrophy, delirium, and cerebrovascular accident. Numerous cultures and immunology, including HSV, has come back negative, and there is no clear etymology of a viral or bacterial encephalopathy. Neurodegenerative disease of the brain considered, but challenging to diagnose at this time. Delirium considered giving her elongated hospital stay, however an underlying diagnosis is still undetermined. CT scan did not show ischemia or infarct. MRI is nonspecific for any acute vs. Chronic changes leading to rapid neurodegeneration; however, a left retinal  detachment was picked up.   PLAN:      -  Continue ceftriaxone.     -  Continue to monitor cognitive status throughout admission.     -  Continue consultation with case management regarding appropriate long-term placement of the patient.      2. Left Retinal detachment   ETIOLOGY: MRI demonstrated a left retinal attachment yesterday. She did not have any vision complaints during multiple examinations, but it is later revealed that she did tell her  that she was having difficulty seeing from her left eye yesterday. When asked regarding symptoms, the patient does not remember when this started, or how long it has been going on. Broad differential for rental detachment includes rhegmatogenous, tractional, and exudative. Rhegmatogenous doubted given the patient’s age and no prior history of eye surgery. Exudative doubted given no evidence of periorbital edema or retinal hemorrhage on initial exam. Tractional retinal detachment considered given the patient’s known history of diabetes mellitus with various levels of control. Ophthalmology was consulted emergently regarding this finding. They did assess the patient, and found that Mars or detachment has occurred, but given a vague history of symptoms, timing, and the patient’s current mental status, intervention cannot be performed at this time.   PLAN:     -  Continue regular vision checks to assess for progression.     -  Continue ophthalmology consultation throughout the patient’s stay.    3. Failure to thrive with limited activities of daily living   ETIOLOGY: The patient continues to have difficulties with nutrition throughout admission, and maintains a BMI of 16. A coretrack NG has been placed for parenteral nutrition. There has been no issues with this administration yesterday. The patient also continues to have apparent decreases in activities of daily living. Her  provides all support for the patient, Including bathing and a regular diet. Given this,  the patient may benefit from palliative care at this point.   The patient has had difficult maintaining her NG tube secondary to agitation, pulling it out repeatedly.  PLAN:     -  Discontinue restraint protocol.    -  Encourage PO solid and liquid intake as tolerated.    -  Should nutrition continue to be a challenge, an NG tube may need to be restarted to insure a proper nutritional state.      -  Consultation with case management and the patient's spouse regarding placement in either a SNF or palliative care.      4. Acute Respiratory Failure  ETIOLOGY: Unknown.  Previous arterial blood gas shows respiratory acidosis. Consider progression of muscular dystrophy leading to decrease respiratory drive.   PLAN:     -  Per pulmonary recs: recommending NIF     -  Continue ceftriaxone for possible aspiration pneumonia from AMS, with course ending tomorrow.      5. Hypotension, Resolved. Tachycardia, ongoing.   ETIOLOGY: Likely secondary to ongoing respiratory acidosis and hypoxia. Considered is TBI with resultant secondary sympathetic hyperactivation.   PLAN:     -  Continue tele monitoring    -  Continue IVMF D5LR @75mL/hour    -  Monitor urinary output     6. Abdominal Pain   ETIOLOGY: The patient continues to complain of abdominal  pain similar to the pain she had for admission. Exam continues to be unremarkable with no tenderness, distention, organomegaly. Patient has not had any nausea, vomiting, diarrhea, hematochezia, or Melena. Imaging has been negative throughout her admission. Your analysis is negative. The patient’s spouse does report the patient being Admitted for pancreatitis 1.5 months ago with similar pain twice she’s complaining about today but worsening.  PLAN:     -  Continue to monitor.      7. Upper Extremity Pain, Bilateral lower extremity pain   ETIOLOGY: Present on admission. Possibly due the patient's underlying myotonic dystrophy.  states that this is new for patient. GLF on admission at  [facility]. Cephalohematoma seen on CT head. MRI did not demonstrate any cerebral or cerebellar abnormalities; therefore, etiology of this pain is still unclear.   PLAN:     -  Continue to monitor.         Core Measures:   Fluids: 50% D5 LR 75mL/hour  Lines: IVP  Abx: Vancomycin and acyclovir discontinued. Continue ceftriaxone.   DVT prophylaxis: Lovenox   Code Status: full code      Disposition: Continue admission. Further consultation with case management regarding medical clearance for a skilled nursing facility placement.      Renny Pope MS3  [facility]

## 2020-11-19 NOTE — THERAPY
"Occupational Therapy  Daily Treatment     Patient Name: Gayla Escudero  Age:  49 y.o., Sex:  female  Medical Record #: [id number]  Today's Date: 11/19/2020     Precautions: Fall Risk, Swallow Precautions ( See Comments)  Comments: baseline myotonic MD    Assessment  Pt cooperative and motivated to participate today. Pt continues to be limited by pain, weakness, and impaired cognition. Will continue to follow.    Plan  Continue current treatment plan.    DC Equipment Recommendations: Unable to determine at this time  Discharge Recommendations: Recommend post-acute placement for additional occupational therapy services prior to discharge home(unless spouse can provide 24/7)    Subjective  \"I want to see my \"     Objective   11/19/20 1127   Pain 0 - 10 Group   Therapist Pain Assessment   (minimal c/o pain)   Cognition    Cognition / Consciousness X   Speech/ Communication Delayed Responses;Incomprehensible Words   Level of Consciousness Alert   Ability To Follow Commands 1 Step   Safety Awareness Impaired  (fear of falling and being left alone)   New Learning Impaired   Attention Impaired   Sequencing Impaired   Initiation Impaired   Comments Pt able to follow 1 step commands with cues; easily distracted   Active ROM Upper Body   Active ROM Upper Body  X   Comments WFL   Strength Upper Body   Upper Body Strength  X   Comments generalized weakness; Hx of MD   Neuro-Muscular Treatments   Neuro-Muscular Treatments Postural Facilitation;Tactile Cuing;Verbal Cuing   Comments Posterior lean; difficulty maintaining upright position   Other Treatments   Other Treatments Provided lower body dressing and assistance with functional transfer to cardiac chair   Balance   Sitting Balance (Static) Fair -   Sitting Balance (Dynamic) Poor +   Standing Balance (Static) Poor   Standing Balance (Dynamic) Poor   Weight Shift Sitting Fair   Weight Shift Standing Poor   Bed Mobility    Supine to Sit Moderate Assist   Sit to Supine " Maximal Assist   Scooting Moderate Assist   Skilled Intervention Verbal Cuing;Tactile Cuing;Sequencing;Postural Facilitation;Facilitation   Comments cues for handplacement and sequencing   Activities of Daily Living   Lower Body Dressing Maximal Assist   Skilled Intervention Verbal Cuing;Tactile Cuing;Facilitation   Functional Mobility   Skilled Intervention Verbal Cuing;Tactile Cuing;Facilitation   Comments MaxA squat pivot txf to flattened cardiac chair   Activity Tolerance   Comments limited by pain, fatigue   Patient / Family Goals   Patient / Family Goal #1 feel better   Short Term Goals   Short Term Goal # 1 Pt will perform LB dressing w/ min a   Goal Outcome # 1 Goal not met   Short Term Goal # 2 Pt will perform functional t/f  w/ min a    Goal Outcome # 2 Goal not met   Short Term Goal # 3 Pt will perform toileting task with min a   Goal Outcome # 3 Goal not met   Education Group   Education Provided Role of Occupational Therapist;Activities of Daily Living   Role of Occupational Therapist Patient Response Patient;Acceptance;Explanation   ADL Patient Response Patient;Acceptance;* * Method * *;Explanation;Verbal Demonstration;Action Demonstration   Anticipated Discharge Equipment and Recommendations   DC Equipment Recommendations Unable to determine at this time   Discharge Recommendations Recommend post-acute placement for additional occupational therapy services prior to discharge home  (unless spouse can provide 24/7)   Interdisciplinary Plan of Care Collaboration   IDT Collaboration with  Nursing   Patient Position at End of Therapy Seated;Chair Alarm On;Call Light within Reach;Other (Comments)  (in cardiac chair)   Collaboration Comments RN updated and aware of session   Session Information   Date / Session Number  11/19 2 (1/2, 11/24)   Priority 3

## 2020-11-19 NOTE — PROGRESS NOTES
Report received from Kavitha. Pt out of restraints today. Pt awake in bed A&Ox2-3; no complaints at this time. POC discussed; all questions answered. Bed locked in lowest position; call light in reach; bed alarm in use.

## 2020-11-19 NOTE — PROGRESS NOTES
Neurology Progress Note  Neurohospitalist Service, [facility]    Referring Physician: Dalila Scherer M.D.    Chief Complaint   Patient presents with   • Abdominal Pain     sharp pain, denies n/v/d   • Leg Pain     R leg pain       HPI: Refer to initial documented Neurology H&P, as detailed in the patient's chart.    Interval History 2020: No acute events overnight. Patient underwent repeat MRI brain w/wo contrast with Ativan administration for sedation. Currently she is lying in bed with  at bedside, awakens to verbal stimuli with light physical stimuli to maintain attention given sedation, follows commands, and oriented to self, place, time, but not situation. She complains of feeling confused and unaware of what is going on, and having some abdominal pain. Denies headaches, acute visual changes, facial droopiness, change in speech or language, abnormal movements, seizures, loss of consciousness. Restraints now off as patient no longer removing medical equipment.     Past Medical History:   Past Medical History:   Diagnosis Date   • Anemia    • Cataract    • Heart murmur    • Muscular disease    • Muscular dystrophy (HCC)    • JOSÉ on CPAP         FHx:  Family History   Problem Relation Age of Onset   • No Known Problems Mother    • No Known Problems Father    • Sleep Apnea Daughter    • No Known Problems Sister    • No Known Problems Brother         Musc dys also   • No Known Problems Sister    • No Known Problems Brother         Trauma, was shot   • No Known Problems Brother         Car accident   • Other Son         Muscular dystrophy   • Heart Disease Daughter          at 2 months congenital heart disease        SHx:  Social History     Socioeconomic History   • Marital status:      Spouse name: Not on file   • Number of children: Not on file   • Years of education: Not on file   • Highest education level: Not on file   Occupational History   • Not on file   Social  Needs   • Financial resource strain: Not on file   • Food insecurity     Worry: Not on file     Inability: Not on file   • Transportation needs     Medical: Not on file     Non-medical: Not on file   Tobacco Use   • Smoking status: Never Smoker   • Smokeless tobacco: Never Used   Substance and Sexual Activity   • Alcohol use: No   • Drug use: No   • Sexual activity: Yes     Partners: Male   Lifestyle   • Physical activity     Days per week: Not on file     Minutes per session: Not on file   • Stress: Not on file   Relationships   • Social connections     Talks on phone: Not on file     Gets together: Not on file     Attends Restorationist service: Not on file     Active member of club or organization: Not on file     Attends meetings of clubs or organizations: Not on file     Relationship status: Not on file   • Intimate partner violence     Fear of current or ex partner: Not on file     Emotionally abused: Not on file     Physically abused: Not on file     Forced sexual activity: Not on file   Other Topics Concern   • Not on file   Social History Narrative   • Not on file        Medications:    Current Facility-Administered Medications:   •  cefTRIAXone (ROCEPHIN) 1 g in  mL IVPB, 1 g, Intravenous, Q12HRS, Feng Arellano M.D.  •  dextrose 5 % and 0.45 % NaCl with KCl 20 mEq, , Intravenous, Continuous, Feng Arellano M.D., Last Rate: 81 mL/hr at 11/18/20 1355, New Bag at 11/18/20 1355  •  Pharmacy Consult: Enteral tube insertion - review meds/change route/product selection, 1 Each, Other, PHARMACY TO DOSE, Kimberly Mc M.D.  •  acetaminophen (Tylenol) tablet 650 mg, 650 mg, Enteral Tube, Q4HRS PRN, Dalila Scherer M.D.  •  senna-docusate (PERICOLACE or SENOKOT S) 8.6-50 MG per tablet 2 Tab, 2 Tab, Enteral Tube, BID, Stopped at 11/18/20 0600 **AND** polyethylene glycol/lytes (MIRALAX) PACKET 1 Packet, 1 Packet, Enteral Tube, QDAY PRN **AND** magnesium hydroxide (MILK OF MAGNESIA) suspension 30 mL, 30 mL,  Enteral Tube, DAILY, Stopped at 11/18/20 0600 **AND** bisacodyl (DULCOLAX) suppository 10 mg, 10 mg, Rectal, QDAY PRN, Dalila Scherer M.D.  •  QUEtiapine (Seroquel) tablet 25 mg, 25 mg, Enteral Tube, Nightly, Dalila Scherer M.D., 25 mg at 11/17/20 2203  •  oxyCODONE immediate-release (ROXICODONE) tablet 5 mg, 5 mg, Enteral Tube, Q3HRS PRN, Dalila Scherer M.D.  •  oxyCODONE immediate-release (ROXICODONE) tablet 2.5 mg, 2.5 mg, Enteral Tube, Q3HRS PRN, Dalila Scherer M.D.  •  LORazepam (ATIVAN) tablet 1 mg, 1 mg, Enteral Tube, Q4HRS PRN, Dalila Scherer M.D.  •  dicyclomine (BENTYL) tablet 20 mg, 20 mg, Enteral Tube, Q6HRS PRN, Dalila Scherer M.D.  •  omeprazole (FIRST-OMEPRAZOLE) 2 mg/mL oral susp 40 mg, 40 mg, Enteral Tube, DAILY, Dalila Scherer M.D., 40 mg at 11/18/20 0617  •  morphine (pf) 4 mg/mL injection 1-2 mg, 1-2 mg, Intravenous, Q3HRS PRN, Berenice Denny M.D., 2 mg at 11/16/20 2307  •  naloxone (NARCAN) injection 0.4 mg, 0.4 mg, Intravenous, Q2 MIN PRN, Dalila Scherer M.D.  •  Respiratory Therapy Consult, , Nebulization, Continuous RT, Berenice Denny M.D.  •  diphenhydrAMINE (BENADRYL) injection 12.5-25 mg, 12.5-25 mg, Intravenous, Q6HRS PRN, Joann Bro M.D.  •  LORazepam (ATIVAN) injection 0.5 mg, 0.5 mg, Intravenous, Q4HRS PRN, Joann Bro M.D., 0.5 mg at 11/17/20 0822  •  Pharmacy Consult Request ...Pain Management Review 1 Each, 1 Each, Other, PHARMACY TO DOSE, Diego Montalvo D.O.  •  insulin regular (HumuLIN R,NovoLIN R) injection, 1-6 Units, Subcutaneous, 4X/DAY ACHS, Stopped at 11/12/20 1700 **AND** POC Blood Glucose, , , Q AC AND BEDTIME(S) **AND** NOTIFY MD and PharmD, , , Once **AND** [DISCONTINUED] glucose 4 g chewable tablet 16 g, 16 g, Oral, Q15 MIN PRN **AND** dextrose 50% (D50W) injection 50 mL, 50 mL, Intravenous, Q15 MIN PRN, Diego Montalvo D.O., 50 mL at 11/17/20 1329  •  enoxaparin (LOVENOX) inj 30 mg, 30 mg, Subcutaneous, DAILY, Diego Montalvo D.O., 30 mg at  11/18/20 0617  •  ondansetron (ZOFRAN) syringe/vial injection 4 mg, 4 mg, Intravenous, Q4HRS PRN, Lauro Castro M.D.    Allergies:  Allergies   Allergen Reactions   • Codeine Vomiting, Nausea and Unspecified     N&V  Dizzy, lightheaded and vomiting.    • Tramadol Unspecified     Effects her MD  weak        Review of systems: In addition to what is detailed in the HPI and interval history above, all other systems reviewed and are negative.      Physical Examination:   Vitals:    11/18/20 0412 11/18/20 0810 11/18/20 1157 11/18/20 1617   BP: 147/77 119/80 119/74 108/75   Pulse: 97 (!) 118 (!) 104 (!) 116   Resp: 18 18 16 18   Temp: 36.5 °C (97.7 °F) 36.2 °C (97.1 °F) 36.1 °C (97 °F) 36.9 °C (98.4 °F)   TempSrc: Temporal Temporal Temporal Temporal   SpO2: 97% 96% 96% 95%   Weight:       Height:         General: Patient in no acute distress, pleasant and cooperative.  HEENT: Normocephalic, no signs of acute trauma.   Neck: Supple, no meningeal signs or carotid bruits. There is normal range of motion. No tenderness on exam.   Chest: Clear to auscultation. No cough.   CV: RRR, no murmurs.   Skin:Right arm edema about brachial and antecubital from prior PIV infiltration improved. No signs of acute rashes or trauma.   Musculoskeletal: Joints exhibit slightly limited passive range of motion, without any pain to palpation. There are no signs of joint or muscle swelling. There is no tenderness to deep palpation of muscles.   Psychiatric: No hallucinatory behavior. Unable to attend to answer for symptoms of depression or suicidal ideation. Mood and affect appear withdrawn on exam.     NEUROLOGICAL EXAM:   Mental status, orientation: Drowsy/sedated following Ativan, awakens to verbal and light physical stimuli to attend, follows commands, oriented to self, place, time, but not situation.    Speech and language: Speech is mildly dysarthric and fluent. The patient is able to name, repeat, and comprehend.   Memory: There is  impaired recollection of recent and remote events.   Cranial nerve exam: Pupils are 3-4 mm bilaterally and equally reactive to light and accommodation. Visual fields are full to right, impaired to left with hemianopsia. There is no nystagmus on primary or secondary gaze. Intact full EOM in all directions of gaze. Face appears symmetric. Sensation in the face is intact to light touch. Uvula is midline. Palate elevates symmetrically. Tongue is midline and without any signs of tongue biting or fasciculations. Sternocleidomastoid muscles exhibit is normal strength bilaterally. Shoulder shrug is intact bilaterally.   Motor exam: Strength is 3/5 in BUE and 2/5 BLE, proximal greater than distal weakness. Tone is decreased throughout. Bilateral foot droop noted. Tone is normal. No abnormal movements were seen on exam.   Sensory exam Weak flexion response to noxious stimuli in all extremities.  Deep tendon reflexes:  1+ throughout. Plantar responses are mute. There is no clonus.   Coordination: Patient unable to complete coordination testing secondary to weakness.     Gait: Deferred given confusion and generalized weakness with high fall risk.       Ancillary Data Reviewed:    Labs:  Lab Results   Component Value Date/Time    PROTHROMBTM 13.9 11/13/2020 10:46 PM    INR 1.10 11/13/2020 10:46 PM      Lab Results   Component Value Date/Time    WBC 5.6 11/18/2020 05:36 AM    RBC 3.63 (L) 11/18/2020 05:36 AM    HEMOGLOBIN 10.7 (L) 11/18/2020 05:36 AM    HEMATOCRIT 32.8 (L) 11/18/2020 05:36 AM    MCV 90.4 11/18/2020 05:36 AM    MCH 29.5 11/18/2020 05:36 AM    MCHC 32.6 (L) 11/18/2020 05:36 AM    MPV 10.1 11/18/2020 05:36 AM    NEUTSPOLYS 71.00 11/18/2020 05:36 AM    LYMPHOCYTES 18.50 (L) 11/18/2020 05:36 AM    MONOCYTES 8.90 11/18/2020 05:36 AM    EOSINOPHILS 0.90 11/18/2020 05:36 AM    BASOPHILS 0.50 11/18/2020 05:36 AM    ANISOCYTOSIS 1+ 02/19/2020 09:58 PM      Lab Results   Component Value Date/Time    SODIUM 149 (H)  11/18/2020 05:36 AM    POTASSIUM 3.0 (L) 11/18/2020 05:36 AM    CHLORIDE 115 (H) 11/18/2020 05:36 AM    CO2 27 11/18/2020 05:36 AM    GLUCOSE 79 11/18/2020 05:36 AM    BUN 2 (L) 11/18/2020 05:36 AM    CREATININE 0.18 (L) 11/18/2020 05:36 AM      Lab Results   Component Value Date/Time    CHOLSTRLTOT 157 09/15/2020 03:01 AM    LDL 72 09/15/2020 03:01 AM    HDL 70 09/15/2020 03:01 AM    TRIGLYCERIDE 73 09/15/2020 03:01 AM       Lab Results   Component Value Date/Time    ALKPHOSPHAT 80 11/17/2020 12:18 PM    ASTSGOT 48 (H) 11/17/2020 12:18 PM    ALTSGPT 63 (H) 11/17/2020 12:18 PM    TBILIRUBIN 0.4 11/17/2020 12:18 PM        Imaging/Testing:    I interpreted and/or reviewed the patient's neuroimaging    MR-BRAIN-WITH & W/O   Final Result      1.  Study is again limited by patient motion.   2.  Mild cerebral atrophy, prominent for the patient's age.   3.  T2 FLAIR hyperintensity in the anterior bilateral frontal lobes probably encephalomalacia/gliosis in could be related to old trauma.   4.  Additional mild nonspecific scattered white matter disease, possibly chronic microvascular ischemic changes.   5.  No acute intracranial hemorrhage or infarct.   6.  Left retinal detachment.      DX-ABDOMEN FOR TUBE PLACEMENT   Final Result         1.  Nonspecific bowel gas pattern.   2.  Dobbhoff tube is coiled within the stomach, the tip terminates overlying the expected location of the gastric fundus.   3.  Left basilar atelectasis      IR-MIDLINE CATHETER INSERTION WO GUIDANCE > AGE 3   Final Result                  Ultrasound-guided midline placement performed by qualified nursing staff    as above.          MR-BRAIN-WITH & W/O   Final Result      1.  Limited exam due to motion artifact.   2.  Diffuse atrophy and white matter microvascular ischemic changes.   3.  No acute stroke or evidence for intracranial hemorrhage.      DX-CHEST-LIMITED (1 VIEW)   Final Result      No acute cardiopulmonary disease.      EC-ECHOCARDIOGRAM  COMPLETE W/O CONT   Final Result      DX-LUMBAR PUNCTURE FOR DIAGNOSIS   Final Result         FLUOROSCOPIC-GUIDED LUMBAR PUNCTURE AS DESCRIBED ABOVE.      IR-MIDLINE CATHETER INSERTION WO GUIDANCE > AGE 3   Final Result                  Ultrasound-guided midline placement performed by qualified nursing staff    as above.          CT-HEAD W/O   Final Result      Normal CT scan of the head without contrast.               INTERPRETING LOCATION:  [address], [place], [address]      [id number] VIEW   Final Result      Unremarkable AP recumbent abdomen.      DX-CHEST-PORTABLE (1 VIEW)   Final Result      Left basilar opacities, consistent with pneumonia.      CT-HEAD W/O   Final Result      No CT evidence of acute infarct, hemorrhage or mass.      CT-ABDOMEN-PELVIS WITH   Final Result      No evidence of acute intra-abdominal or pelvic process.      Probably duplicated right renal collecting system.      CT-HEAD W/O   Final Result      1.  Focal soft tissue swelling right frontal region with minimal underlying increased density adjacent to the periphery of the outer table of the calvarium which could represent a small cephalohematoma.      2.  Periventricular and subcortical white matter density changes which could be related to small vessel ischemia, demyelinization or gliosis. Findings may be slightly more prominent than on previous exam.          Assessment:    Gayla Escudero is a 49 y.o. female with relevant history of muscular dystrophy, JOSÉ on CPAP, and anemia who presented on 11/11/20 for abdominal pain and developed severe sepsis for which neurology was consulted to address acute encephalopathy.  She underwent lumbar puncture for CSF analysis which thus far has been unremarkable with normal protein, glucose, and WBCs. CSF culture has shown no growth at 72 hours. Meningitis/encephalitis panel, and HSV I/II and PCR were all negative. Encephalitis autoimmune panel and cryptococcal antigen are still pending.   Neurological exam appears stable as patient is oriented to place, self, and time but disoriented to situation, and weakly following simple commands with redirection to attend. Routine video EEG was normal with patient noted to be very lethargic, but no seizures captured. ABG results on 11/17/20 showed mild to moderate hypercapnia with pCO2 41.2 and hyperoxygenation with pO2 135.4 with normal pH of 7.43. HFNC off since yesterday with improving neurological exam. MRI brain w/wo contrast on 11/17/20 was limited due to motion artifact, but no acute infarct or hemorrhage noted. Repeat MRI brain w/wo contrast today with Ativan for sedation revealed T2 flair hyperintensity in the bilateral frontal lobes, but no acute infarct, hemorrhage, or mass seen. Incidentally found left retinal detachment prompted hospitalist to consult ophthalmology.  Differential diagnosis is multifactorial toxic, metabolic, hypercapnic, and infectious encephalopathy in the setting of recovery from severe sepsis.   Given degree of cerebral atrophy and bilateral frontal encephalomalacia, anticipate a protected recovery in the setting of any toxic metabolic derangement.    Plan:    -q4h and PRN neuro assessment. VS per nursing/unit protocol.  -Normotensive BP goal. Antihypertensives per primary team.   -Telemetry and pulse oximetry; currently SR  -Discontinue Acyclovir given negative CSF HSV I/II and serum HSV PCR results  -Will continue to follow CSF analysis peripherally with pending results as outlined in assessment.   -PT/OT/SLP eval and treat.   -Fall and aspiration precautions.   -All other medical management per primary team.   -DVT PPX: SCDs.     No further recommendations or further studies from a neurological standpoint at this time. Please re-consult if you have further questions or there is a change in status.    The evaluation of the patient, and recommended management, was discussed with Dr. Wilson, Dr. Mc, and bedside RN. I have  performed a physical exam and reviewed and updated ROS and Plan today (11/18/2020). In review of yesterday's note (11/17/2020), there are no changes except as documented above.    DUYEN Flowers.   Nurse Practitioner, Neurohospitalist  [facility]  t) [phone] (f) [phone]

## 2020-11-20 NOTE — PROGRESS NOTES
[facility] FAMILY MEDICINE PROGRESS NOTE     Attending:   Dr. Scherer    Resident:   Kimberly Mc MD    PATIENT:   Gayla Escudero; [id number]; 1970    SUBJECTIVE:   No acute events overnight. Patient tolerated small amounts of apple sauce but refused any additional intake. Patient states that she is not hungry this morning. She is much more engaged in conversation this morning and able to express her desires.     OBJECTIVE:  Vitals:    11/19/20 1646 11/19/20 2020 11/20/20 0006 11/20/20 0352   BP: 134/88 117/64 113/73 120/80   Pulse: (!) 117 (!) 107 (!) 107 (!) 115   Resp: 17 16 16 16   Temp: 37.2 °C (98.9 °F) 36.4 °C (97.6 °F) 36.9 °C (98.5 °F) 36.6 °C (97.9 °F)   TempSrc: Temporal Temporal Temporal Temporal   SpO2: 93% 98% 95% 96%   Weight:  46.1 kg (101 lb 10.1 oz)     Height:           Intake/Output Summary (Last 24 hours) at 11/20/2020 0749  Last data filed at 11/20/2020 0700  Gross per 24 hour   Intake 2347.65 ml   Output --   Net 2347.65 ml       PHYSICAL EXAM:   General: No acute distress, afebrile, resting comfortably, conversational   HEENT: NC/AT. EOMI.   Cardiovascular: RRR without murmurs, rubs, heaves. Normal capillary refill   Respiratory: CTAB, no tachypnea or retractions   Abdomen: normal bowel sounds, soft, nontender, nondistended, no masses, no organomegaly   EXT:  JIM, no edema  Skin: No erythema/lesions   Neuro: Non-focal, alert and orientated       LABS:  Recent Labs     11/17/20  1218 11/18/20  0536   WBC 7.2 5.6   RBC 3.48* 3.63*   HEMOGLOBIN 10.1* 10.7*   HEMATOCRIT 31.4* 32.8*   MCV 90.2 90.4   MCH 29.0 29.5   RDW 45.5 45.6   PLATELETCT 170 160*   MPV 10.7 10.1   NEUTSPOLYS 78.50* 71.00   LYMPHOCYTES 14.00* 18.50*   MONOCYTES 6.10 8.90   EOSINOPHILS 0.60 0.90   BASOPHILS 0.40 0.50     Recent Labs     11/17/20  1218 11/18/20  0536 11/19/20  0707   SODIUM 147* 149* 147*   POTASSIUM 3.1* 3.0* 3.7   CHLORIDE 115* 115* 114*   CO2 26 27 26   BUN 2* 2* 2*   CREATININE 0.24* 0.18* 0.24*   CALCIUM 8.5  8.5 8.8   MAGNESIUM  --  2.1  --    ALBUMIN 2.4*  --   --      Estimated GFR/CRCL = Estimated Creatinine Clearance: 206.4 mL/min (A) (by C-G formula based on SCr of 0.24 mg/dL (L)).  Recent Labs     11/17/20  1218 11/17/20  1218 11/18/20  0536 11/18/20  0536 11/19/20  0707 11/19/20  0707 11/19/20  1806 11/19/20 2029 11/20/20  0652   GLUCOSE 88  --  79  --  76  --   --   --   --    POCGLUCOSE  --    < >  --    < >  --    < > 87 80 77    < > = values in this interval not displayed.     Recent Labs     11/17/20  1218   ASTSGOT 48*   ALTSGPT 63*   TBILIRUBIN 0.4   ALKPHOSPHAT 80   GLOBULIN 2.6             No results for input(s): INR, APTT, FIBRINOGEN in the last 72 hours.    Invalid input(s): DIMER    MICROBIOLOGY:   Blood Culture Hold   Date Value Ref Range Status   02/19/2020 Collected  Final        IMAGING:   DX-ABDOMEN FOR TUBE PLACEMENT   Final Result         1.  Nonspecific bowel gas pattern.   2.  Dobbhoff tube tip overlying the expected location of the pylorus or first duodenal segment.   3.  Left basilar atelectasis      MR-BRAIN-WITH & W/O   Final Result      1.  Study is again limited by patient motion.   2.  Mild cerebral atrophy, prominent for the patient's age.   3.  T2 FLAIR hyperintensity in the anterior bilateral frontal lobes probably encephalomalacia/gliosis in could be related to old trauma.   4.  Additional mild nonspecific scattered white matter disease, possibly chronic microvascular ischemic changes.   5.  No acute intracranial hemorrhage or infarct.   6.  Left retinal detachment.      DX-ABDOMEN FOR TUBE PLACEMENT   Final Result         1.  Nonspecific bowel gas pattern.   2.  Dobbhoff tube is coiled within the stomach, the tip terminates overlying the expected location of the gastric fundus.   3.  Left basilar atelectasis      IR-MIDLINE CATHETER INSERTION WO GUIDANCE > AGE 3   Final Result                  Ultrasound-guided midline placement performed by qualified nursing staff    as above.           MR-BRAIN-WITH & W/O   Final Result      1.  Limited exam due to motion artifact.   2.  Diffuse atrophy and white matter microvascular ischemic changes.   3.  No acute stroke or evidence for intracranial hemorrhage.      DX-CHEST-LIMITED (1 VIEW)   Final Result      No acute cardiopulmonary disease.      EC-ECHOCARDIOGRAM COMPLETE W/O CONT   Final Result      DX-LUMBAR PUNCTURE FOR DIAGNOSIS   Final Result         FLUOROSCOPIC-GUIDED LUMBAR PUNCTURE AS DESCRIBED ABOVE.      IR-MIDLINE CATHETER INSERTION WO GUIDANCE > AGE 3   Final Result                  Ultrasound-guided midline placement performed by qualified nursing staff    as above.          CT-HEAD W/O   Final Result      Normal CT scan of the head without contrast.               INTERPRETING LOCATION:  [address], [place], [address]      [id number] VIEW   Final Result      Unremarkable AP recumbent abdomen.      DX-CHEST-PORTABLE (1 VIEW)   Final Result      Left basilar opacities, consistent with pneumonia.      CT-HEAD W/O   Final Result      No CT evidence of acute infarct, hemorrhage or mass.      CT-ABDOMEN-PELVIS WITH   Final Result      No evidence of acute intra-abdominal or pelvic process.      Probably duplicated right renal collecting system.      CT-HEAD W/O   Final Result      1.  Focal soft tissue swelling right frontal region with minimal underlying increased density adjacent to the periphery of the outer table of the calvarium which could represent a small cephalohematoma.      2.  Periventricular and subcortical white matter density changes which could be related to small vessel ischemia, demyelinization or gliosis. Findings may be slightly more prominent than on previous exam.          CULTURES:   Results     Procedure Component Value Units Date/Time    Blood Culture [[id number]] Collected: 11/14/20 0601    Order Status: Completed Specimen: Blood Updated: 11/19/20 0900     Significant Indicator NEG     Source BLD     Site Periperal     " Culture Result No growth after 5 days of incubation.    Narrative:      Right Hand    Blood Culture [[id number]] Collected: 11/14/20 0242    Order Status: Completed Specimen: Blood Updated: 11/19/20 0500     Significant Indicator NEG     Source BLD     Site Peripheral     Culture Result No growth after 5 days of incubation.    Narrative:      Left Wrist    Blood Culture [[id number]] Collected: 11/13/20 1741    Order Status: Completed Specimen: Blood from Peripheral Updated: 11/18/20 2233     Significant Indicator NEG     Source BLD     Site PERIPHERAL     Culture Result No growth after 5 days of incubation.    Narrative:      Per Hospital Policy: Only change Specimen Src: to \"Line\" if  specified by physician order.  Left Hand    Blood Culture [[id number]] Collected: 11/13/20 1740    Order Status: Completed Specimen: Blood from Peripheral Updated: 11/18/20 2233     Significant Indicator NEG     Source BLD     Site PERIPHERAL     Culture Result No growth after 5 days of incubation.    Narrative:      Per Hospital Policy: Only change Specimen Src: to \"Line\" if  specified by physician order.  Left AC    HSV 1/2 By PCR(Herpes)+[id number] [[id number]] Collected: 11/14/20 1725    Order Status: Completed Specimen: CSF from Spinal Fluid Updated: 11/17/20 1626     Source CSF     HSV Type I Negative     HSV Type 2 Negative     Comment: CSF has not been FDA approved for use with the Rik® HSV 1  and HSV 2 assay.  CSF samples have been validated at  [facility], in accordance with regulatory  guidelines (CAP) for use on this assay.  Mutations or polymorphisms in primer- or probe-binding regions  may affect detection of new or unknown variants, resulting  in a false negative result with the Rik® HSV 1 and 2 Assay.         Narrative:      Collected By:[id number] SHANKAR KAY  Please add to CSF sample in lab.    CSF CULTURE [[id number]] Collected: 11/14/20 1725    Order Status: Completed Specimen: CSF Updated: " 11/17/20 0812     Significant Indicator NEG     Source CSF     Site Tap     Culture Result No growth at 72 hours.     Gram Stain Result No organisms seen.    URINALYSIS [[id number]] Collected: 11/16/20 0000    Order Status: Canceled     MRSA By PCR (Amp) [[id number]] Collected: 11/15/20 0315    Order Status: Completed Specimen: Respirate from Nares Updated: 11/15/20 1856     Significant Indicator NEG     Source RESP     Site NARES     MRSA PCR Negative for MRSA by PCR.    Narrative:      Collected By:[id number] DANIEL MAURER  Collected By:[id number] DANIEL LIBERTAD    GRAM STAIN [[id number]] Collected: 11/14/20 1725    Order Status: Completed Specimen: CSF Updated: 11/14/20 1854     Significant Indicator .     Source CSF     Site Tap     Gram Stain Result No organisms seen.    Cryptococcal Antigen, CSF [[id number]]     Order Status: No result Specimen: CSF     CSF Culture [[id number]]     Order Status: No result Specimen: CSF from Tap     COVID/SARS CoV-2 PCR [[id number]]     Order Status: Canceled Specimen: Respirate from Nasopharyngeal     Blood Culture,Hold [[id number]] Collected: 11/14/20 0242    Order Status: Canceled     Urinalysis [[id number]] Collected: 11/14/20 0020    Order Status: Canceled Specimen: Urine, Cath     Blood Culture [[id number]]     Order Status: No result Specimen: Blood from Peripheral     Blood Culture [[id number]]     Order Status: No result Specimen: Blood from Peripheral     Culture Respiratory W/ GRM STN [[id number]]     Order Status: Completed Specimen: Respirate from Sputum           MEDS:  Current Facility-Administered Medications   Medication Last Admin   • oxyCODONE immediate-release (ROXICODONE) tablet 2.5 mg     • oxyCODONE immediate-release (ROXICODONE) tablet 5 mg     • dextrose 5 % and 0.45 % NaCl with KCl 20 mEq New Bag at 11/20/20 0532   • Pharmacy Consult: Enteral tube insertion - review meds/change route/product selection     • acetaminophen (Tylenol) tablet 650 mg 650 mg at 11/19/20 2023   •  senna-docusate (PERICOLACE or SENOKOT S) 8.6-50 MG per tablet 2 Tab Stopped at 11/18/20 0600    And   • polyethylene glycol/lytes (MIRALAX) PACKET 1 Packet      And   • magnesium hydroxide (MILK OF MAGNESIA) suspension 30 mL Stopped at 11/18/20 0600    And   • bisacodyl (DULCOLAX) suppository 10 mg     • QUEtiapine (Seroquel) tablet 25 mg 25 mg at 11/19/20 2023   • dicyclomine (BENTYL) tablet 20 mg     • omeprazole (FIRST-OMEPRAZOLE) 2 mg/mL oral susp 40 mg 40 mg at 11/20/20 0538   • morphine (pf) 4 mg/mL injection 1-2 mg 2 mg at 11/19/20 2157   • naloxone (NARCAN) injection 0.4 mg     • Respiratory Therapy Consult     • diphenhydrAMINE (BENADRYL) injection 12.5-25 mg     • LORazepam (ATIVAN) injection 0.5 mg 0.5 mg at 11/20/20 0244   • Pharmacy Consult Request ...Pain Management Review 1 Each     • insulin regular (HumuLIN R,NovoLIN R) injection Stopped at 11/12/20 1700    And   • dextrose 50% (D50W) injection 50 mL 50 mL at 11/17/20 1329   • enoxaparin (LOVENOX) inj 30 mg 30 mg at 11/20/20 0540   • ondansetron (ZOFRAN) syringe/vial injection 4 mg         ASSESSMENT/PLAN: 49 y.o. female with a  PMHx of myotonic muscular dystrophy admitted on 11/11 for worsening ABD pain, extremity pain and numbness. Sustained a GLF in the ED resulting a cephalohematoma. Developed hypotension, tachycardia, and AMS day 2 of admission. Patient has slowly improved in mentation.      # Encephalopathy  # AMS  Workup has been largely negative up to this point. Possibly due to hypercarbia d/t respiratory acidosis. GLF on admission could be contributing.   Work up: CSF cultures NGTD. TSH WNL, previous drug screens only + for chronic opioid use, sepsis workup negative. MRI showing diffuse atrophy, no acute hemorrhage or ischemic stroke. EEG WNL. HSV neg.   Given slow but gradual improvement, continue to monitor mental status, but it appears that patient is approaching her baseline.   - Neurology consulted without further recs - signed off  -  Discharge planning for safety, probably requiring a long term care facility or SNF.   - Continue Seroquel 25mg PO at bedtime for sleep     # Severe calorie malnutrition  # Poor oral intake   We have discussed different options for artifical nutrition, with the NG tube being the least invasive for current needs. Trial of Cortrak feeding tube was not well tolerated by patient. We will continue to re-evaluate the need for artificial nutritional needs daily.   - Speech consult- appreciate recs  - Nutrition consult- appreciate recs  - Continue to encourage PO intake  - Palliative care consult for ACP and advance directive support      # Hypernatremia  # Hypokalemia  Likely due to poor nutritional status. Patient pulled NG tube out multiple times. Currently, she is tolerating small amounts of oral intake. No NG tube in place.  - Encourage PO intake  - Continue daily BMP monitoring     # Left retinal detachment   MRI incidentaly discovered left retinal detachment. Patient told  that she is having difficulty seeing out of left eye, but did not report prior to finding. Ophthalmology was consulted.   - Recommending outpatient follow up     # Acute respiratory acidosis resolved  # Acute respiratory failure resolved   CXR WNL, WBC WNL, ABGs consistent with respiratory acidosis.   Currently on RA and saturating in the high 90s.   - Continue to monitor  - Goal O2 >88%  - Completed regimen of ceftriaxone 11/19     # Hypotension- resolved  # Tachycardia resolved  Believed to be due to sepsis given acute increase in WBC to 17; lactic acid up to 4. BP responded well to fluid bolus. Remains tachycardic overnight.   Etiology: unknown at this time, work up for source of infection largely negative at this point. Consider: dehydration, possibly adrenal insufficiency.   - Continue tele monitoring  - Monitor urinary output     # Upper ext pain, right  # BL LE pain  Present on admission. Possibly d/t patient's underlying myotonic  dystrophy.  states that this is new for patient. GLF on admission at hospital. Cephalohematoma present seen on CT head. MRI showing diffuse atrophy, gliosis, left retinal detachment. Work up has been largely negative. Consider new onset weakness progression of muscular dystrophy.   - Continue to monitor.      # Abdominal pain  Present on admission, patient states she is still having abd pain. Physical exam reassuring. No reg flag symptoms: no blood in stool, N/V/D or constipation.    Work up: CT ABD WNL; UA negative; RUQ US negative 9/2020  - Continue to monitor     # ADLs   assists with all ADLs. Patient uses walker at home.   Per , patient has been exhibiting an increase in falls lately with unknown head trauma, unable to care for her when he goes to work.   PT/OT recommending SNF. Referral placed.   - Case management to provide updates on SNF referral.     #Type II DM  Blood glucose levels within normal limits in setting of poor PO intake.  - SS insulin   - Hypoglycemia protocol      Core Measures:   Fluids: PO and IVMF  Lines: IVP  Abx: none  DVT prophylaxis: Lovenox   Code Status: full code        Disposition: patient is getting close to being medically cleared for discharge. Will speak with CM today regarding long term placement options     Kimberly Mc MD   PGY-1 Family Medicine Resident   [facility]

## 2020-11-20 NOTE — CARE PLAN
Problem: Nutritional:  Goal: Achieve adequate nutritional intake  Description: Patient will consume >50-75% of meals  Outcome: PROGRESSING SLOWER THAN EXPECTED

## 2020-11-20 NOTE — PROGRESS NOTES
Assumed care at 0700, bedside report received from Mallory BASHIR. Pt is SR/ST on the monitor. Initial assessment completed, orders reviewed, call light with reach, bed alarm on, and hourly rounding in place. POC addressed with patient, no additional questions at this time.    Poor PO intake. Pt having difficulty tolerating diet. Prompting frequently needed. Suction at bedside for safety.   OOB to cardiac chair. Remains alert and confused. Frequent reoriention provided.

## 2020-11-20 NOTE — NON-PROVIDER
[facility] MEDICINE PROGRESS NOTE     Attending: Dalila Scherer M.D.  Senior Resident: Feng Arellano M.D. (PGY-2)  Panchito Resident: Kimberly Mc MD (PGY-1)  PATIENT: Gayla Escudero; [id number]; 1970     ID:This is a 49-year-old female admitted for initial complaints of worsening abdominal pain, pain to the right arm and bilateral lower extremities, and numbness to those extremities. She was admitted on 11/11, eight days ago. She was originally brought to the emergency department status post a fall out of her wheelchair with resultant head trauma and hematoma development. Since admission, the patient has had fluctuating cognition, as well as multiple episodes of hypoxia, tachycardia, and hypertension.      Overnight Events: The patient remains tachycardic, with no other acute overnight events.     Subjective: The patient reports feeling better today, but continues to complain of right lower quadrant abdominal pain. She is willing to try eating solids and liquids today, but reports a lack of appetite over the past two days.    OBJECTIVE:  Temp:  [36.2 °C (97.1 °F)-37.2 °C (98.9 °F)] 36.6 °C (97.9 °F)  Pulse:  [107-117] 115  Resp:  [16-18] 16  BP: (113-134)/(64-88) 120/80  SpO2:  [93 %-98 %] 96 %    Intake/Output Summary (Last 24 hours) at 11/20/2020 0708  Last data filed at 11/19/2020 1621  Gross per 24 hour   Intake 2297.65 ml   Output --   Net 2297.65 ml       PHYSICAL EXAM:  General: No acute distress, afebrile, awake, appears malaised, staying in one position with minimal movement and appears to be staring off.   HEENT: NC/AT. EOMI. Dry oral mucosa with crusting to the lips.   Cardiovascular: Tachycardic rate, regular rhythm without murmurs, rubs, heaves. Normal capillary refill   Respiratory: CTAB, no tachypnea or retractions on poor inspiratory effort.   Abdomen: normal bowel sounds, soft, tender to palpation throughout, nondistended, no masses, no organomegaly   EXT:  JIM, no edema.   Skin: No erythema/lesions    Neuro: Alert and oriented to person, place, and  Strength 4/5 to the bilateral upper and lower extremities with decreasing strength distally. Subjectively unable to see from the left eye; unable to fully assess secondary to the patient's current mental status.      LABS:  Recent Labs     11/17/20 1218 11/18/20  0536   WBC 7.2 5.6   RBC 3.48* 3.63*   HEMOGLOBIN 10.1* 10.7*   HEMATOCRIT 31.4* 32.8*   MCV 90.2 90.4   MCH 29.0 29.5   RDW 45.5 45.6   PLATELETCT 170 160*   MPV 10.7 10.1   NEUTSPOLYS 78.50* 71.00   LYMPHOCYTES 14.00* 18.50*   MONOCYTES 6.10 8.90   EOSINOPHILS 0.60 0.90   BASOPHILS 0.40 0.50     Recent Labs     11/17/20 1218 11/18/20  0536 11/19/20  0707   SODIUM 147* 149* 147*   POTASSIUM 3.1* 3.0* 3.7   CHLORIDE 115* 115* 114*   CO2 26 27 26   BUN 2* 2* 2*   CREATININE 0.24* 0.18* 0.24*   CALCIUM 8.5 8.5 8.8   MAGNESIUM  --  2.1  --    ALBUMIN 2.4*  --   --      Estimated GFR/CRCL = Estimated Creatinine Clearance: 206.4 mL/min (A) (by C-G formula based on SCr of 0.24 mg/dL (L)).  Recent Labs     11/17/20 1218 11/17/20 1218 11/18/20  0536 11/18/20  0536 11/19/20  0707 11/19/20  1126 11/19/20  1806 11/19/20 2029   GLUCOSE 88  --  79  --  76  --   --   --    POCGLUCOSE  --    < >  --    < >  --  80 87 80    < > = values in this interval not displayed.     Recent Labs     11/17/20 1218   ASTSGOT 48*   ALTSGPT 63*   TBILIRUBIN 0.4   ALKPHOSPHAT 80   GLOBULIN 2.6      Ref. Range 11/14/2020 12:15 11/14/2020 17:25 11/16/2020 00:33 11/16/2020 03:39 11/18/2020 05:36   Procalcitonin Latest Ref Range: <0.25 ng/mL       1.98 (H) 0.19   Vitamin B12 -True Cobalamin Latest Ref Range: 211 - 911 pg/mL 1457 (H)           TSH Latest Ref Range: 0.380 - 5.330 uIU/mL     1.210       Cryptococcus neoformans/gattii by PCR Unknown   Not Detected         Cytomegalovirus by PCR Unknown   Not Detected         Enterovirus by PCR Unknown   Not Detected         Escherichia coli K1 by PCR Unknown   Not Detected         HIV Ag/Ab  Combo Assay Latest Ref Range: Non Reactive  Non-Reactive           HSV 1 by PCR Unknown   Not Detected         HSV 2 by PCR Unknown   Not Detected         HSV Type 2 Latest Ref Range: Negative    Negative         HSV Type I Latest Ref Range: Negative    Negative         Human Herpesvirus 6 by PCR Unknown   Not Detected         Human parechovirus by PCR Unknown   Not Detected         Varicella Zoster Virus by PCR Unknown   Not Detected         Syphilis, Treponemal Qual Latest Ref Range: Non-Reactive  Non-Reactive                 MICROBIOLOGY:     Ref. Range 11/14/2020 02:42 11/14/2020 06:01 11/14/2020 17:25 11/14/2020 17:25 11/15/2020 03:15   Cytomegalovirus by PCR Unknown     Not Detected       HAEM influenzae by PCR Unknown     Not Detected       Listeria Monocytogenes by PCR Unknown     Not Detected       Neisseria meningitidis by PCR Unknown     Not Detected       Significant Indicator Unknown NEG NEG . NEG NEG   Site Unknown Peripheral Periperal Tap Tap NARES   Source Unknown BLD BLD CSF CSF RESP   Source Unknown     CSF       Strep Agalactiae by PCR Unknown     Not Detected       Strep pneumoniae by PCR Unknown     Not Detected             Ref. Range 11/19/2020 07:07   Stat C-Reactive Protein Latest Ref Range: 0.00 - 0.75 mg/dL 3.15 (H)       IMAGING:   DX-ABDOMEN FOR TUBE PLACEMENT   Final Result         1.  Nonspecific bowel gas pattern.   2.  Dobbhoff tube tip overlying the expected location of the pylorus or first duodenal segment.   3.  Left basilar atelectasis      MR-BRAIN-WITH & W/O   Final Result      1.  Study is again limited by patient motion.   2.  Mild cerebral atrophy, prominent for the patient's age.   3.  T2 FLAIR hyperintensity in the anterior bilateral frontal lobes probably encephalomalacia/gliosis in could be related to old trauma.   4.  Additional mild nonspecific scattered white matter disease, possibly chronic microvascular ischemic changes.   5.  No acute intracranial hemorrhage or  infarct.   6.  Left retinal detachment.      DX-ABDOMEN FOR TUBE PLACEMENT   Final Result         1.  Nonspecific bowel gas pattern.   2.  Dobbhoff tube is coiled within the stomach, the tip terminates overlying the expected location of the gastric fundus.   3.  Left basilar atelectasis      IR-MIDLINE CATHETER INSERTION WO GUIDANCE > AGE 3   Final Result                  Ultrasound-guided midline placement performed by qualified nursing staff    as above.          MR-BRAIN-WITH & W/O   Final Result      1.  Limited exam due to motion artifact.   2.  Diffuse atrophy and white matter microvascular ischemic changes.   3.  No acute stroke or evidence for intracranial hemorrhage.      DX-CHEST-LIMITED (1 VIEW)   Final Result      No acute cardiopulmonary disease.      EC-ECHOCARDIOGRAM COMPLETE W/O CONT   Final Result      DX-LUMBAR PUNCTURE FOR DIAGNOSIS   Final Result         FLUOROSCOPIC-GUIDED LUMBAR PUNCTURE AS DESCRIBED ABOVE.      IR-MIDLINE CATHETER INSERTION WO GUIDANCE > AGE 3   Final Result                  Ultrasound-guided midline placement performed by qualified nursing staff    as above.          CT-HEAD W/O   Final Result      Normal CT scan of the head without contrast.               INTERPRETING LOCATION:  [address], [place], [address]      [id number] VIEW   Final Result      Unremarkable AP recumbent abdomen.      DX-CHEST-PORTABLE (1 VIEW)   Final Result      Left basilar opacities, consistent with pneumonia.      CT-HEAD W/O   Final Result      No CT evidence of acute infarct, hemorrhage or mass.      CT-ABDOMEN-PELVIS WITH   Final Result      No evidence of acute intra-abdominal or pelvic process.      Probably duplicated right renal collecting system.      CT-HEAD W/O   Final Result      1.  Focal soft tissue swelling right frontal region with minimal underlying increased density adjacent to the periphery of the outer table of the calvarium which could represent a small cephalohematoma.      2.   Periventricular and subcortical white matter density changes which could be related to small vessel ischemia, demyelinization or gliosis. Findings may be slightly more prominent than on previous exam.          MEDS:  Current Facility-Administered Medications   Medication Last Admin   • oxyCODONE immediate-release (ROXICODONE) tablet 2.5 mg     • oxyCODONE immediate-release (ROXICODONE) tablet 5 mg     • dextrose 5 % and 0.45 % NaCl with KCl 20 mEq New Bag at 11/20/20 0544   • Pharmacy Consult: Enteral tube insertion - review meds/change route/product selection     • acetaminophen (Tylenol) tablet 650 mg 650 mg at 11/19/20 2023   • senna-docusate (PERICOLACE or SENOKOT S) 8.6-50 MG per tablet 2 Tab Stopped at 11/18/20 0600    And   • polyethylene glycol/lytes (MIRALAX) PACKET 1 Packet      And   • magnesium hydroxide (MILK OF MAGNESIA) suspension 30 mL Stopped at 11/18/20 0600    And   • bisacodyl (DULCOLAX) suppository 10 mg     • QUEtiapine (Seroquel) tablet 25 mg 25 mg at 11/19/20 2023   • dicyclomine (BENTYL) tablet 20 mg     • omeprazole (FIRST-OMEPRAZOLE) 2 mg/mL oral susp 40 mg 40 mg at 11/20/20 0538   • morphine (pf) 4 mg/mL injection 1-2 mg 2 mg at 11/19/20 2157   • naloxone (NARCAN) injection 0.4 mg     • Respiratory Therapy Consult     • diphenhydrAMINE (BENADRYL) injection 12.5-25 mg     • LORazepam (ATIVAN) injection 0.5 mg 0.5 mg at 11/20/20 0244   • Pharmacy Consult Request ...Pain Management Review 1 Each     • insulin regular (HumuLIN R,NovoLIN R) injection Stopped at 11/12/20 1700    And   • dextrose 50% (D50W) injection 50 mL 50 mL at 11/17/20 1329   • enoxaparin (LOVENOX) inj 30 mg 30 mg at 11/20/20 0540   • ondansetron (ZOFRAN) syringe/vial injection 4 mg         ASSESSMENT/PLAN: This is a 49-year-old female with a known history of myotonic muscular dystrophy admitted seven days ago on 11/11 for worsening abdominal pain, extremity pain, numbness. The patient on day of admission suffered a  ground-level fall from her wheelchair with resultant cephalohematoma necessitating visit to the emergency department. Developed hypertension, tachycardia, and altered mental status since day to mission.      1. Altered Mental Status   ETIOLOGY:  The patient continues to have cognitive deficiencies and altered mental status since her admission. The patient’s spouse, who is now at the bedside, continues to elaborate that the patient, prior to her admission, had good cognitive capacity and was described as sharp. Broad differential diagnoses include viral versus bacterial encephalopathy, neurodegenerative disease secondary to muscular dystrophy, delirium, and cerebrovascular accident. Numerous cultures and immunology, including HSV, has come back negative, and there is no clear etymology of a viral or bacterial encephalopathy. Neurodegenerative disease of the brain considered, but challenging to diagnose at this time. Delirium considered giving her elongated hospital stay, however an underlying diagnosis is still undetermined. CT scan did not show ischemia or infarct. MRI is nonspecific for any acute vs. Chronic changes leading to rapid neurodegeneration; however, a left retinal detachment was picked up.   PLAN:      -  Ceftriaxone course completed.     -  Continue to monitor cognitive status throughout admission.     -  Continue consultation with case management regarding appropriate long-term placement of the patient.      2. Left Retinal detachment   ETIOLOGY: MRI demonstrated a left retinal attachment yesterday. She did not have any vision complaints during multiple examinations, but it is later revealed that she did tell her  that she was having difficulty seeing from her left eye yesterday. When asked regarding symptoms, the patient does not remember when this started, or how long it has been going on. Broad differential for rental detachment includes rhegmatogenous, tractional, and exudative.  Rhegmatogenous doubted given the patient’s age and no prior history of eye surgery. Exudative doubted given no evidence of periorbital edema or retinal hemorrhage on initial exam. Tractional retinal detachment considered given the patient’s known history of diabetes mellitus with various levels of control. Ophthalmology was consulted emergently regarding this finding. They did assess the patient, and found that Mars or detachment has occurred, but given a vague history of symptoms, timing, and the patient’s current mental status, intervention cannot be performed at this time.   PLAN:     -  Continue regular vision checks to assess for progression.     -  Continue ophthalmology consultation throughout the patient’s stay.     3. Failure to thrive with limited activities of daily living   ETIOLOGY: The patient continues to have difficulties with nutrition throughout admission, and maintains a BMI of 16. A coretrack NG has been placed for parenteral nutrition. There has been no issues with this administration yesterday. The patient also continues to have apparent decreases in activities of daily living. Her  provides all support for the patient, Including bathing and a regular diet. Given this, the patient may benefit from palliative care at this point.   The patient has had difficult maintaining her NG tube secondary to agitation, pulling it out repeatedly.  PLAN:     -  Discontinue restraint protocol.    -  Encourage PO solid and liquid intake as tolerated.    -  Should nutrition continue to be a challenge, an NG tube may need to be restarted to insure a proper nutritional state.      -  Consultation with case management and the patient's spouse regarding placement in either a SNF or palliative care.      4. Acute Respiratory Failure  ETIOLOGY: Unknown.  Previous arterial blood gas shows respiratory acidosis. Consider progression of muscular dystrophy leading to decrease respiratory drive.   PLAN:     -  Per  pulmonary recs: recommending NIF     -  Continue ceftriaxone for possible aspiration pneumonia from AMS, with course ending tomorrow.      5. Hypotension, Resolved. Tachycardia, ongoing.   ETIOLOGY: Likely secondary to ongoing respiratory acidosis and hypoxia. Considered is TBI with resultant secondary sympathetic hyperactivation.   PLAN:     -  Continue tele monitoring    -  Continue IVMF D5LR @75mL/hour    -  Monitor urinary output     6. Abdominal Pain   ETIOLOGY: The patient continues to complain of abdominal  pain similar to the pain she had for admission. Exam continues to be unremarkable with no tenderness, distention, organomegaly. Patient has not had any nausea, vomiting, diarrhea, hematochezia, or Melena. Imaging has been negative throughout her admission. Your analysis is negative. The patient’s spouse does report the patient being Admitted for pancreatitis 1.5 months ago with similar pain twice she’s complaining about today but worsening.  PLAN:     -  Continue to monitor.      7. Upper Extremity Pain, Bilateral lower extremity pain   ETIOLOGY: Present on admission. Possibly due the patient's underlying myotonic dystrophy.  states that this is new for patient. GLF on admission at hospital. Cephalohematoma seen on CT head. MRI did not demonstrate any cerebral or cerebellar abnormalities; therefore, etiology of this pain is still unclear.   PLAN:     -  Continue to monitor.         Core Measures:   Fluids: 50% D5 LR 75mL/hour  Lines: IVP  Abx: Vancomycin and acyclovir discontinued. Ceftriaxone course completed.   DVT prophylaxis: Lovenox   Code Status: full code      Disposition: Continue admission. Further consultation with case management regarding medical clearance for a skilled nursing facility placement.      Renny Pope, MS3  [facility]

## 2020-11-20 NOTE — DISCHARGE PLANNING
Anticipated Discharge Disposition: SNF    Action: Patient discussed in IDT rounds. Per Dr. Arellano, patient may be medically cleared for discharge in 1-2 days. SNF referrals pending.    Barriers to Discharge: medical clearance    Plan: Case coordination to f/u with treatment team for medical clearance, f/u with snf referrals

## 2020-11-20 NOTE — DIETARY
Nutrition Services: Brief Update    Pt pulled cortrak yesterday and cortrak remains out with no plans at this time to start TF again. RN reports pt is eating <25% of meals yesterday. No documentation in place for today's meals. RN reports pt drinking minimal amount of Boost Plus shakes. SLP last seen on 11/17 and recommends NPO. MD notes pt had a few bites of applesauce and refused any additional intake. Per [facility] resident, pending palliative care meeting tomorrow.    Recommendations/Plans:  1. Diet per SLP as appropriate.  2. Continue thick Boost Plus with meals and will add additional magic cup BID.  3. If pt unable to eat 50% or greater in next few days, strong consideration to replace cortrak and restart TF. Discussed with [facility] resident.    RD to follow.

## 2020-11-20 NOTE — THERAPY
Missed Therapy     Patient Name: Gayla Escudero  Age:  49 y.o., Sex:  female  Medical Record #: [id number]  Today's Date: 11/19/2020    Attempted to see pt for therapy. Pt working w/ OT and getting up to cardiac chair. Will continue to follow and re-attempt as able.

## 2020-11-21 NOTE — DISCHARGE PLANNING
Agency/Facility Name: [facility]  Spoke To: Rosalina   Outcome: Per Rosalina no beds till Monday 11/23/2020.

## 2020-11-21 NOTE — THERAPY
"Physical Therapy   Daily Treatment     Patient Name: Gayla Escudero  Age:  49 y.o., Sex:  female  Medical Record #: [id number]  Today's Date: 11/20/2020     Precautions: Fall Risk, Swallow Precautions ( See Comments)    Assessment    Pt progressing well w/ therapy. Pt continues to present more alert each session. She does require extra time to perform mobility. Pt able to stand w/ ModA and took a couple of small steps while pivoting from the cardiac chair to the bed. Pt able to hold balance EOB and only needing more assist w/ bed mobility d/t bed height and pts height. Pt continues to be at a level in which she will benefit from post acute therapy.    Plan    Continue current treatment plan.    DC Equipment Recommendations: Unable to determine at this time  Discharge Recommendations: Recommend post-acute placement for additional physical therapy services prior to discharge home      Subjective    \"Can you stay here with me?\"     Objective       11/20/20 1433   Precautions   Precautions Fall Risk;Swallow Precautions ( See Comments)   Comments baseline myotonic MD   Gait Analysis   Gait Level Of Assist Unable to Participate   Comments Pt did take a couple of pivot steps during transfer.   Bed Mobility    Supine to Sit   (NT up in cardiac chair)   Sit to Supine Moderate Assist   Scooting Minimal Assist   Rolling Minimal Assist to Rt.;Minimum Assist to Lt.   Comments Pt was able to perform most mobility w/ extra time to initiate.   Functional Mobility   Sit to Stand Moderate Assist   Bed, Chair, Wheelchair Transfer Moderate Assist   Transfer Method Stand Pivot   Mobility SPT from cardiac chair to bed   Short Term Goals    Short Term Goal # 1 Pt will be able to transfer supine<>sit with min A within 6 visits to ensure mobility at home.   Goal Outcome # 1 goal not met   Short Term Goal # 2 Pt will be able to transfer sit<>stand with 4WW and min A within 6 visits to ensure mobility at home.   Goal Outcome # 2 Goal not " met   Short Term Goal # 3 Pt will be ambulate 25 ft with FWW and min A within 6 visits to ensure mobility around house.    Goal Outcome # 3 Goal not met

## 2020-11-22 NOTE — PROGRESS NOTES
[facility] [place]     Attending:   Dr. Tucker    Resident:   Kimberly Mc MD    PATIENT:   Gayla Escudero; [id number]; 1970    SUBJECTIVE:   No acute events overnight, Patient having numbness and tingling in her lower ext. Appears to be the same as her baseline numbness. It is bilateral. Difficult to access her strength given generalize baseline weakness. No red flag symptoms of neurological deficits at this time.     OBJECTIVE:  Vitals:    11/21/20 1639 11/21/20 1941 11/22/20 0406 11/22/20 0728   BP: 128/76 120/76 110/72 (!) 96/63   Pulse: (!) 115 (!) 111 (!) 101 (!) 104   Resp: 16 16 16 18   Temp: (!) 38.1 °C (100.6 °F) 37.8 °C (100.1 °F) 36.5 °C (97.7 °F) 36.6 °C (97.9 °F)   TempSrc: Temporal Oral Temporal Temporal   SpO2: 93% 88% 99% 99%   Weight:  46 kg (101 lb 6.6 oz)     Height:         No intake or output data in the 24 hours ending 11/22/20 1154    PHYSICAL EXAM:   General: No acute distress, afebrile, resting comfortably, conversational. Significant improvement in mental status.   HEENT: NC/AT. EOMI.   Cardiovascular: RRR without murmurs, rubs, heaves. Normal capillary refill   Respiratory: CTAB, no tachypnea or retractions   Abdomen: normal bowel sounds, soft, nontender, nondistended, no masses, no organomegaly   EXT:  JIM, no edema  Skin: No erythema/lesions   Neuro: Non-focal, alert and orientated. Diminished sensation in bilateral LE,       LABS:  No results for input(s): WBC, RBC, HEMOGLOBIN, HEMATOCRIT, MCV, MCH, RDW, PLATELETCT, MPV, NEUTSPOLYS, LYMPHOCYTES, MONOCYTES, EOSINOPHILS, BASOPHILS, RBCMORPHOLO in the last 72 hours.  Recent Labs     11/20/20  0719 11/21/20  0748   SODIUM 144 141   POTASSIUM 4.0 4.2   CHLORIDE 112 111   CO2 28 24   BUN 2* 2*   CREATININE 0.25* 0.20*   CALCIUM 8.6 8.5   MAGNESIUM  --  2.0     Estimated GFR/CRCL = Estimated Creatinine Clearance: 247.1 mL/min (A) (by C-G formula based on SCr of 0.2 mg/dL (L)).  Recent Labs     11/20/20  7374  11/20/20  0719 11/21/20  0748 11/21/20  0748 11/21/20  2126 11/22/20  0600 11/22/20  0724   GLUCOSE 82  --  88  --   --   --   --    POCGLUCOSE  --    < >  --    < > 96 66 83    < > = values in this interval not displayed.                 No results for input(s): INR, APTT, FIBRINOGEN in the last 72 hours.    Invalid input(s): DIMER    MICROBIOLOGY:   Blood Culture Hold   Date Value Ref Range Status   02/19/2020 Collected  Final        IMAGING:   DX-ABDOMEN FOR TUBE PLACEMENT   Final Result         1.  Nonspecific bowel gas pattern.   2.  Dobbhoff tube tip overlying the expected location of the pylorus or first duodenal segment.   3.  Left basilar atelectasis      MR-BRAIN-WITH & W/O   Final Result      1.  Study is again limited by patient motion.   2.  Mild cerebral atrophy, prominent for the patient's age.   3.  T2 FLAIR hyperintensity in the anterior bilateral frontal lobes probably encephalomalacia/gliosis in could be related to old trauma.   4.  Additional mild nonspecific scattered white matter disease, possibly chronic microvascular ischemic changes.   5.  No acute intracranial hemorrhage or infarct.   6.  Left retinal detachment.      DX-ABDOMEN FOR TUBE PLACEMENT   Final Result         1.  Nonspecific bowel gas pattern.   2.  Dobbhoff tube is coiled within the stomach, the tip terminates overlying the expected location of the gastric fundus.   3.  Left basilar atelectasis      IR-MIDLINE CATHETER INSERTION WO GUIDANCE > AGE 3   Final Result                  Ultrasound-guided midline placement performed by qualified nursing staff    as above.          MR-BRAIN-WITH & W/O   Final Result      1.  Limited exam due to motion artifact.   2.  Diffuse atrophy and white matter microvascular ischemic changes.   3.  No acute stroke or evidence for intracranial hemorrhage.      DX-CHEST-LIMITED (1 VIEW)   Final Result      No acute cardiopulmonary disease.      EC-ECHOCARDIOGRAM COMPLETE W/O CONT   Final Result       DX-LUMBAR PUNCTURE FOR DIAGNOSIS   Final Result         FLUOROSCOPIC-GUIDED LUMBAR PUNCTURE AS DESCRIBED ABOVE.      IR-MIDLINE CATHETER INSERTION WO GUIDANCE > AGE 3   Final Result                  Ultrasound-guided midline placement performed by qualified nursing staff    as above.          CT-HEAD W/O   Final Result      Normal CT scan of the head without contrast.               INTERPRETING LOCATION:  [address], [place], [address]      [id number] VIEW   Final Result      Unremarkable AP recumbent abdomen.      DX-CHEST-PORTABLE (1 VIEW)   Final Result      Left basilar opacities, consistent with pneumonia.      CT-HEAD W/O   Final Result      No CT evidence of acute infarct, hemorrhage or mass.      CT-ABDOMEN-PELVIS WITH   Final Result      No evidence of acute intra-abdominal or pelvic process.      Probably duplicated right renal collecting system.      CT-HEAD W/O   Final Result      1.  Focal soft tissue swelling right frontal region with minimal underlying increased density adjacent to the periphery of the outer table of the calvarium which could represent a small cephalohematoma.      2.  Periventricular and subcortical white matter density changes which could be related to small vessel ischemia, demyelinization or gliosis. Findings may be slightly more prominent than on previous exam.          CULTURES:   Results     Procedure Component Value Units Date/Time    Blood Culture [[id number]] Collected: 11/14/20 0601    Order Status: Completed Specimen: Blood Updated: 11/19/20 0900     Significant Indicator NEG     Source BLD     Site Periperal     Culture Result No growth after 5 days of incubation.    Narrative:      Right Hand    Blood Culture [[id number]] Collected: 11/14/20 0242    Order Status: Completed Specimen: Blood Updated: 11/19/20 0500     Significant Indicator NEG     Source BLD     Site Peripheral     Culture Result No growth after 5 days of incubation.    Narrative:      Left Wrist    Blood  "Culture [[id number]] Collected: 11/13/20 1741    Order Status: Completed Specimen: Blood from Peripheral Updated: 11/18/20 2233     Significant Indicator NEG     Source BLD     Site PERIPHERAL     Culture Result No growth after 5 days of incubation.    Narrative:      Per Hospital Policy: Only change Specimen Src: to \"Line\" if  specified by physician order.  Left Hand    Blood Culture [[id number]] Collected: 11/13/20 1740    Order Status: Completed Specimen: Blood from Peripheral Updated: 11/18/20 2233     Significant Indicator NEG     Source BLD     Site PERIPHERAL     Culture Result No growth after 5 days of incubation.    Narrative:      Per Hospital Policy: Only change Specimen Src: to \"Line\" if  specified by physician order.  Left AC    HSV 1/2 By PCR(Herpes)+[id number] [[id number]] Collected: 11/14/20 1725    Order Status: Completed Specimen: CSF from Spinal Fluid Updated: 11/17/20 1626     Source CSF     HSV Type I Negative     HSV Type 2 Negative     Comment: CSF has not been FDA approved for use with the Rik® HSV 1  and HSV 2 assay.  CSF samples have been validated at  [facility], in accordance with regulatory  guidelines (CAP) for use on this assay.  Mutations or polymorphisms in primer- or probe-binding regions  may affect detection of new or unknown variants, resulting  in a false negative result with the Rik® HSV 1 and 2 Assay.         Narrative:      Collected By:[id number] SHANKAR KAY  Please add to CSF sample in lab.    CSF CULTURE [[id number]] Collected: 11/14/20 1725    Order Status: Completed Specimen: CSF Updated: 11/17/20 0812     Significant Indicator NEG     Source CSF     Site Tap     Culture Result No growth at 72 hours.     Gram Stain Result No organisms seen.    URINALYSIS [[id number]] Collected: 11/16/20 0000    Order Status: Canceled     MRSA By PCR (Amp) [[id number]] Collected: 11/15/20 0315    Order Status: Completed Specimen: Respirate from Nares Updated: 11/15/20 1856 "     Significant Indicator NEG     Source RESP     Site NARES     MRSA PCR Negative for MRSA by PCR.    Narrative:      Collected By:[id number] DANIEL MAURER  Collected By:[id number] DANIEL MAURER          MEDS:  Current Facility-Administered Medications   Medication Last Admin   • oxyCODONE immediate-release (ROXICODONE) tablet 2.5 mg     • oxyCODONE immediate-release (ROXICODONE) tablet 5 mg     • D5 1/2 NS infusion Stopped at 11/22/20 0845   • [START ON 11/23/2020] omeprazole (FIRST-OMEPRAZOLE) 2 mg/mL oral susp 40 mg     • acetaminophen (Tylenol) tablet 650 mg 650 mg at 11/21/20 1652   • senna-docusate (PERICOLACE or SENOKOT S) 8.6-50 MG per tablet 2 Tab Stopped at 11/18/20 0600    And   • polyethylene glycol/lytes (MIRALAX) PACKET 1 Packet      And   • magnesium hydroxide (MILK OF MAGNESIA) suspension 30 mL Stopped at 11/18/20 0600    And   • bisacodyl (DULCOLAX) suppository 10 mg     • QUEtiapine (Seroquel) tablet 25 mg 25 mg at 11/21/20 2123   • dicyclomine (BENTYL) tablet 20 mg     • morphine (pf) 4 mg/mL injection 1-2 mg 1 mg at 11/20/20 1748   • naloxone (NARCAN) injection 0.4 mg     • Respiratory Therapy Consult     • diphenhydrAMINE (BENADRYL) injection 12.5-25 mg     • LORazepam (ATIVAN) injection 0.5 mg 0.5 mg at 11/20/20 1949   • Pharmacy Consult Request ...Pain Management Review 1 Each     • insulin regular (HumuLIN R,NovoLIN R) injection Stopped at 11/12/20 1700    And   • dextrose 50% (D50W) injection 50 mL 50 mL at 11/17/20 1329   • enoxaparin (LOVENOX) inj 30 mg 30 mg at 11/22/20 0557   • ondansetron (ZOFRAN) syringe/vial injection 4 mg         ASSESSMENT/PLAN: 49 y.o. female with a  PMHx of myotonic muscular dystrophy admitted on 11/11 for worsening ABD pain, extremity pain and numbness. Sustained a GLF in the ED resulting a cephalohematoma. Developed hypotension, tachycardia, and AMS day 2 of admission. Patient has slowly improved in mentation.     # Severe calorie malnutrition  # Poor oral  intake   We have discussed different options for artifical nutrition, with the NG tube being the least invasive for current needs. Trial of Cortrak feeding tube was not well tolerated by patient. Met with Palliative care, , patient 11/21 for advanced care planning. Goals of care were discussed. Both the patient and  acknowledge that this may be the patient's new baseline status and she is unable to safely take care of herself at home when her  is at work. Long discussion with patient regarding her need for nutrition if she wants to become stronger. She understands and is willing to try and participate more in PO feeding. She does not want a feeding tube, G tube or J tube.  is in agreement with that plan.   - Speech consult- appreciate recs  - Nutrition consult- appreciate recs  - Palliative care following. POLST completed today  - Patient still refusing almost all oral nutrition, multiple attempts today with nursing and CNA at bedside. Patient only taking in a few mLs at time. Patient pulled her midline IV out this morning. Spoke with RN about holding off on IV for a few more hours to see if patient will have adequate oral intake. We will closely monitor her blood sugars and re-evaluate need for IV later today.      # Encephalopathy- resolved  # AMS- improving  Workup has been largely negative up to this point. Possibly due to hypercarbia d/t respiratory acidosis. GLF on admission could be contributing.   Work up: CSF cultures NGTD. TSH WNL, previous drug screens only + for chronic opioid use, sepsis workup negative. MRI showing diffuse atrophy, no acute hemorrhage or ischemic stroke. EEG WNL. HSV neg.   Given slow but gradual improvement, continue to monitor mental status, but it appears that patient is approaching her baseline.   - Neurology consulted without further recs - signed off  - Discharge planning for safety, probably requiring a long term care facility or SNF.   - Continue  Seroquel 25mg PO at bedtime for sleep     # Upper ext pain, right  # BL LE pain  Present on admission. Possibly d/t patient's underlying myotonic dystrophy. MRI 11/18 showing diffuse atrophy, gliosis, left retinal detachment. Remaining work up has been largely negative. Consider new onset weakness progression of muscular dystrophy. Episode 11/22 of bilateral loss of sensation in feet per patient. Neuro exam largely normal, no focal deficits were elicited. MRI not indicated at this time, patient appears to be at baseline in terms of generalized weakness and LE tingling.   - Continue to monitor    # Hypernatremia - resolved  # Hypokalemia- resolved  Likely due to poor nutritional status. Patient pulled NG tube out multiple times. Currently, she is tolerating small amounts of oral intake. No NG tube in place.  - Encourage PO intake  - Continue daily BMP monitoring     # Left retinal detachment   MRI incidentaly discovered left retinal detachment. Patient told  that she is having difficulty seeing out of left eye, but did not report prior to finding. Ophthalmology was consulted.   - Recommending outpatient follow up      # Acute respiratory acidosis resolved  # Acute respiratory failure resolved   CXR WNL, WBC WNL, ABGs consistent with respiratory acidosis.   Currently on RA and saturating in the high 90s.   - Continue to monitor  - Goal O2 >88%  - Completed regimen of ceftriaxone 11/19     # Hypotension- resolved  # Tachycardia resolved  Believed to be due to sepsis given acute increase in WBC to 17; lactic acid up to 4. BP responded well to fluid bolus. Remains tachycardic overnight.   Etiology: unknown at this time, work up for source of infection largely negative at this point. Consider: dehydration, possibly adrenal insufficiency.   - Continue tele monitoring  - Monitor urinary output     # Abdominal pain- improving  Present on admission, patient states she is still having abd pain. Physical exam reassuring.  No reg flag symptoms: no blood in stool, N/V/D or constipation.    Work up: CT ABD WNL; UA negative; RUQ US negative 9/2020  - Continue to monitor     # ADLs   assists with all ADLs. Patient uses walker at home.   Per , patient has been exhibiting an increase in falls lately with unknown head trauma, unable to care for her when he goes to work.   PT/OT recommending SNF. Referral placed.   - Case management to provide updates on SNF referral.      #Type II DM  Blood glucose levels within normal limits in setting of poor PO intake.  - SS insulin   - Hypoglycemia protocol      Core Measures:   Fluids: PO and IVMF  Lines: IVP  Abx: none  DVT prophylaxis: Lovenox   Code Status: full code        Disposition: D/C to SNF. Possibly Monday to Sri Mc MD   PGY-1 Family Medicine Resident   [facility]

## 2020-11-22 NOTE — PROGRESS NOTES
[facility] PROGRESS NOTE     Attending:   Dr. Tucker     Resident:   Kimberly Mc MD    PATIENT:   Gayla Escudero; [id number]; 1970    SUBJECTIVE:   No acute events overnight. Patient still refusing most meals. Unable to feed herself. States she feels frustrated when staff tried to rush her through her meals. Prefers when her  feeds her.     OBJECTIVE:  Vitals:    11/21/20 0417 11/21/20 0818 11/21/20 1157 11/21/20 1639   BP: 125/73 112/79 120/74 128/76   Pulse: (!) 121 (!) 116 (!) 113 (!) 115   Resp: 18 15 15 16   Temp: 36.6 °C (97.9 °F) 37.7 °C (99.9 °F) 37.6 °C (99.6 °F) (!) 38.1 °C (100.6 °F)   TempSrc: Temporal Temporal Temporal Temporal   SpO2: 95% 93% 92% 93%   Weight:       Height:           Intake/Output Summary (Last 24 hours) at 11/21/2020 1744  Last data filed at 11/21/2020 0700  Gross per 24 hour   Intake 2434.5 ml   Output --   Net 2434.5 ml       PHYSICAL EXAM:   General: No acute distress, afebrile, resting comfortably, conversational. Significant improvement in mental status.   HEENT: NC/AT. EOMI.   Cardiovascular: RRR without murmurs, rubs, heaves. Normal capillary refill   Respiratory: CTAB, no tachypnea or retractions   Abdomen: normal bowel sounds, soft, nontender, nondistended, no masses, no organomegaly   EXT:  JIM, no edema  Skin: No erythema/lesions   Neuro: Non-focal, alert and orientated       LABS:  No results for input(s): WBC, RBC, HEMOGLOBIN, HEMATOCRIT, MCV, MCH, RDW, PLATELETCT, MPV, NEUTSPOLYS, LYMPHOCYTES, MONOCYTES, EOSINOPHILS, BASOPHILS, RBCMORPHOLO in the last 72 hours.  Recent Labs     11/19/20  0707 11/20/20  0719 11/21/20  0748   SODIUM 147* 144 141   POTASSIUM 3.7 4.0 4.2   CHLORIDE 114* 112 111   CO2 26 28 24   BUN 2* 2* 2*   CREATININE 0.24* 0.25* 0.20*   CALCIUM 8.8 8.6 8.5   MAGNESIUM  --   --  2.0     Estimated GFR/CRCL = Estimated Creatinine Clearance: 247.6 mL/min (A) (by C-G formula based on SCr of 0.2 mg/dL (L)).  Recent Labs      11/19/20  0707 11/19/20  0707 11/20/20  0719 11/20/20  0719 11/21/20  0748 11/21/20  0825 11/21/20  1104 11/21/20  1641   GLUCOSE 76  --  82  --  88  --   --   --    POCGLUCOSE  --    < >  --    < >  --  92 94 90    < > = values in this interval not displayed.                 No results for input(s): INR, APTT, FIBRINOGEN in the last 72 hours.    Invalid input(s): DIMER    MICROBIOLOGY:   Blood Culture Hold   Date Value Ref Range Status   02/19/2020 Collected  Final        IMAGING:   DX-ABDOMEN FOR TUBE PLACEMENT   Final Result         1.  Nonspecific bowel gas pattern.   2.  Dobbhoff tube tip overlying the expected location of the pylorus or first duodenal segment.   3.  Left basilar atelectasis      MR-BRAIN-WITH & W/O   Final Result      1.  Study is again limited by patient motion.   2.  Mild cerebral atrophy, prominent for the patient's age.   3.  T2 FLAIR hyperintensity in the anterior bilateral frontal lobes probably encephalomalacia/gliosis in could be related to old trauma.   4.  Additional mild nonspecific scattered white matter disease, possibly chronic microvascular ischemic changes.   5.  No acute intracranial hemorrhage or infarct.   6.  Left retinal detachment.      DX-ABDOMEN FOR TUBE PLACEMENT   Final Result         1.  Nonspecific bowel gas pattern.   2.  Dobbhoff tube is coiled within the stomach, the tip terminates overlying the expected location of the gastric fundus.   3.  Left basilar atelectasis      IR-MIDLINE CATHETER INSERTION WO GUIDANCE > AGE 3   Final Result                  Ultrasound-guided midline placement performed by qualified nursing staff    as above.          MR-BRAIN-WITH & W/O   Final Result      1.  Limited exam due to motion artifact.   2.  Diffuse atrophy and white matter microvascular ischemic changes.   3.  No acute stroke or evidence for intracranial hemorrhage.      DX-CHEST-LIMITED (1 VIEW)   Final Result      No acute cardiopulmonary disease.      EC-ECHOCARDIOGRAM  COMPLETE W/O CONT   Final Result      DX-LUMBAR PUNCTURE FOR DIAGNOSIS   Final Result         FLUOROSCOPIC-GUIDED LUMBAR PUNCTURE AS DESCRIBED ABOVE.      IR-MIDLINE CATHETER INSERTION WO GUIDANCE > AGE 3   Final Result                  Ultrasound-guided midline placement performed by qualified nursing staff    as above.          CT-HEAD W/O   Final Result      Normal CT scan of the head without contrast.               INTERPRETING LOCATION:  [address], [place], [address]      [id number] VIEW   Final Result      Unremarkable AP recumbent abdomen.      DX-CHEST-PORTABLE (1 VIEW)   Final Result      Left basilar opacities, consistent with pneumonia.      CT-HEAD W/O   Final Result      No CT evidence of acute infarct, hemorrhage or mass.      CT-ABDOMEN-PELVIS WITH   Final Result      No evidence of acute intra-abdominal or pelvic process.      Probably duplicated right renal collecting system.      CT-HEAD W/O   Final Result      1.  Focal soft tissue swelling right frontal region with minimal underlying increased density adjacent to the periphery of the outer table of the calvarium which could represent a small cephalohematoma.      2.  Periventricular and subcortical white matter density changes which could be related to small vessel ischemia, demyelinization or gliosis. Findings may be slightly more prominent than on previous exam.          CULTURES:   Results     Procedure Component Value Units Date/Time    Blood Culture [[id number]] Collected: 11/14/20 0601    Order Status: Completed Specimen: Blood Updated: 11/19/20 0900     Significant Indicator NEG     Source BLD     Site Periperal     Culture Result No growth after 5 days of incubation.    Narrative:      Right Hand    Blood Culture [[id number]] Collected: 11/14/20 0242    Order Status: Completed Specimen: Blood Updated: 11/19/20 0500     Significant Indicator NEG     Source BLD     Site Peripheral     Culture Result No growth after 5 days of incubation.     "Narrative:      Left Wrist    Blood Culture [[id number]] Collected: 11/13/20 1741    Order Status: Completed Specimen: Blood from Peripheral Updated: 11/18/20 2233     Significant Indicator NEG     Source BLD     Site PERIPHERAL     Culture Result No growth after 5 days of incubation.    Narrative:      Per Hospital Policy: Only change Specimen Src: to \"Line\" if  specified by physician order.  Left Hand    Blood Culture [[id number]] Collected: 11/13/20 1740    Order Status: Completed Specimen: Blood from Peripheral Updated: 11/18/20 2233     Significant Indicator NEG     Source BLD     Site PERIPHERAL     Culture Result No growth after 5 days of incubation.    Narrative:      Per Hospital Policy: Only change Specimen Src: to \"Line\" if  specified by physician order.  Left AC    HSV 1/2 By PCR(Herpes)+[id number] [[id number]] Collected: 11/14/20 1725    Order Status: Completed Specimen: CSF from Spinal Fluid Updated: 11/17/20 1626     Source CSF     HSV Type I Negative     HSV Type 2 Negative     Comment: CSF has not been FDA approved for use with the Rik® HSV 1  and HSV 2 assay.  CSF samples have been validated at  [facility], in accordance with regulatory  guidelines (CAP) for use on this assay.  Mutations or polymorphisms in primer- or probe-binding regions  may affect detection of new or unknown variants, resulting  in a false negative result with the Rik® HSV 1 and 2 Assay.         Narrative:      Collected By:[id number] SHANKAR KAY  Please add to CSF sample in lab.    CSF CULTURE [[id number]] Collected: 11/14/20 1725    Order Status: Completed Specimen: CSF Updated: 11/17/20 0812     Significant Indicator NEG     Source CSF     Site Tap     Culture Result No growth at 72 hours.     Gram Stain Result No organisms seen.    URINALYSIS [[id number]] Collected: 11/16/20 0000    Order Status: Canceled     MRSA By PCR (Amp) [[id number]] Collected: 11/15/20 0315    Order Status: Completed Specimen: " Respirate from Nares Updated: 11/15/20 1856     Significant Indicator NEG     Source RESP     Site NARES     MRSA PCR Negative for MRSA by PCR.    Narrative:      Collected By:[id number] DANIEL LIBERTAD  Collected By:[id number] DANIEL LIBERTAD    GRAM STAIN [[id number]] Collected: 11/14/20 1725    Order Status: Completed Specimen: CSF Updated: 11/14/20 1854     Significant Indicator .     Source CSF     Site Tap     Gram Stain Result No organisms seen.          MEDS:  Current Facility-Administered Medications   Medication Last Admin   • oxyCODONE immediate-release (ROXICODONE) tablet 2.5 mg     • oxyCODONE immediate-release (ROXICODONE) tablet 5 mg 5 mg at 11/20/20 1244   • dextrose 5 % and 0.45 % NaCl with KCl 20 mEq New Bag at 11/21/20 1638   • Pharmacy Consult: Enteral tube insertion - review meds/change route/product selection     • acetaminophen (Tylenol) tablet 650 mg 650 mg at 11/21/20 1652   • senna-docusate (PERICOLACE or SENOKOT S) 8.6-50 MG per tablet 2 Tab Stopped at 11/18/20 0600    And   • polyethylene glycol/lytes (MIRALAX) PACKET 1 Packet      And   • magnesium hydroxide (MILK OF MAGNESIA) suspension 30 mL Stopped at 11/18/20 0600    And   • bisacodyl (DULCOLAX) suppository 10 mg     • QUEtiapine (Seroquel) tablet 25 mg 25 mg at 11/20/20 2254   • dicyclomine (BENTYL) tablet 20 mg     • omeprazole (FIRST-OMEPRAZOLE) 2 mg/mL oral susp 40 mg 40 mg at 11/20/20 0538   • morphine (pf) 4 mg/mL injection 1-2 mg 1 mg at 11/20/20 1748   • naloxone (NARCAN) injection 0.4 mg     • Respiratory Therapy Consult     • diphenhydrAMINE (BENADRYL) injection 12.5-25 mg     • LORazepam (ATIVAN) injection 0.5 mg 0.5 mg at 11/20/20 1949   • Pharmacy Consult Request ...Pain Management Review 1 Each     • insulin regular (HumuLIN R,NovoLIN R) injection Stopped at 11/12/20 1700    And   • dextrose 50% (D50W) injection 50 mL 50 mL at 11/17/20 1329   • enoxaparin (LOVENOX) inj 30 mg 30 mg at 11/21/20 0600   • ondansetron (ZOFRAN) syringe/vial injection  4 mg         ASSESSMENT/PLAN: 49 y.o. female with a  PMHx of myotonic muscular dystrophy admitted on 11/11 for worsening ABD pain, extremity pain and numbness. Sustained a GLF in the ED resulting a cephalohematoma. Developed hypotension, tachycardia, and AMS day 2 of admission. Patient has slowly improved in mentation.      # Encephalopathy- resolved  # AMS- improving  Workup has been largely negative up to this point. Possibly due to hypercarbia d/t respiratory acidosis. GLF on admission could be contributing.   Work up: CSF cultures NGTD. TSH WNL, previous drug screens only + for chronic opioid use, sepsis workup negative. MRI showing diffuse atrophy, no acute hemorrhage or ischemic stroke. EEG WNL. HSV neg.   Given slow but gradual improvement, continue to monitor mental status, but it appears that patient is approaching her baseline.   - Neurology consulted without further recs - signed off  - Discharge planning for safety, probably requiring a long term care facility or SNF.   - Continue Seroquel 25mg PO at bedtime for sleep     # Severe calorie malnutrition  # Poor oral intake   We have discussed different options for artifical nutrition, with the NG tube being the least invasive for current needs. Trial of Cortrak feeding tube was not well tolerated by patient. Met with Palliative care, , patient 11/21 for advanced care planning. Goals of care were discussed. Both the patient and  acknowledge that this may be the patient's new baseline status and she is unable to safely take care of herself at home when her  is at work. Long discussion with patient regarding her need for nutrition if she wants to become stronger. She understands and is willing to try and participate more in PO feeding. She does not want a feeding tube, G tube or J tube.  is in agreement with that plan.   - Speech consult- appreciate recs  - Nutrition consult- appreciate recs  - Continue to encourage PO intake  -  Palliative care following. POLST completed today     # Hypernatremia  # Hypokalemia  Likely due to poor nutritional status. Patient pulled NG tube out multiple times. Currently, she is tolerating small amounts of oral intake. No NG tube in place.  - Encourage PO intake  - Continue daily BMP monitoring     # Left retinal detachment   MRI incidentaly discovered left retinal detachment. Patient told  that she is having difficulty seeing out of left eye, but did not report prior to finding. Ophthalmology was consulted.   - Recommending outpatient follow up      # Acute respiratory acidosis resolved  # Acute respiratory failure resolved   CXR WNL, WBC WNL, ABGs consistent with respiratory acidosis.   Currently on RA and saturating in the high 90s.   - Continue to monitor  - Goal O2 >88%  - Completed regimen of ceftriaxone 11/19     # Hypotension- resolved  # Tachycardia resolved  Believed to be due to sepsis given acute increase in WBC to 17; lactic acid up to 4. BP responded well to fluid bolus. Remains tachycardic overnight.   Etiology: unknown at this time, work up for source of infection largely negative at this point. Consider: dehydration, possibly adrenal insufficiency.   - Continue tele monitoring  - Monitor urinary output     # Upper ext pain, right  # BL LE pain  Present on admission. Possibly d/t patient's underlying myotonic dystrophy.  states that this is new for patient. GLF on admission at hospital. Cephalohematoma present seen on CT head. MRI showing diffuse atrophy, gliosis, left retinal detachment. Work up has been largely negative. Consider new onset weakness progression of muscular dystrophy.   - Continue to monitor.      # Abdominal pain- improving  Present on admission, patient states she is still having abd pain. Physical exam reassuring. No reg flag symptoms: no blood in stool, N/V/D or constipation.    Work up: CT ABD WNL; UA negative; RUQ US negative 9/2020  - Continue to  monitor     # ADLs   assists with all ADLs. Patient uses walker at home.   Per , patient has been exhibiting an increase in falls lately with unknown head trauma, unable to care for her when he goes to work.   PT/OT recommending SNF. Referral placed.   - Case management to provide updates on SNF referral.     #Type II DM  Blood glucose levels within normal limits in setting of poor PO intake.  - SS insulin   - Hypoglycemia protocol      Core Measures:   Fluids: PO and IVMF  Lines: IVP  Abx: none  DVT prophylaxis: Lovenox   Code Status: full code        Disposition: patient is close to being medically cleared for D/C to SNF. Possibly Monday to Sri Mc MD   PGY-1 Family Medicine Resident   [facility]

## 2020-11-22 NOTE — PROGRESS NOTES
Aleja RN alerted me that pt was reporting having numbness on one side of their face and body, this RN went in to assess pt, pt once again stated that she was having unilateral numbness, she would not participate in neuro exam and said she was too weak. This RN notified MD about pt's neuro changes. MD to room, pt participated in neuro exam and denied numbness at this time.

## 2020-11-22 NOTE — PROGRESS NOTES
This RN and CNA attempted to feed pt breakfast on three separate occassions, pt refused all three times.

## 2020-11-22 NOTE — PROGRESS NOTES
Per physician decision, try to increase oral intake and hold fluids. Okay to not have IV access for a couple of hours while trying to increase PO fluids.

## 2020-11-22 NOTE — PROGRESS NOTES
This RN was taking care of patient in bed one.  Patient in bed two stated that the left side of her face felt swollen and numb.  This RN asked patient to smile, to puff out cheeks, and to raise eyebrows.  Patient unable to perform tasks.  Asked patient to shut eyes very tight and then open.  Patient able to blink lightly only.  Performed test of touch on both sides of face individually and at same time.  Patient stated that she could not feel touch on left side of face.  Asked patient to use hands to squeeze this RN's fingers.  Patient could not perform task.  After this assessment, this RN notified the patient's primary nurse, MCKAY Beckwith who immediately went in to assess patient herself and notifiy patient's team of doctors who were outside patient's room.

## 2020-11-22 NOTE — NON-PROVIDER
"  FAMILY MEDICINE MED STUDENT PROGRESS NOTE     Attending: Kirk Tucker M.D.  Senior Resident: Feng Arellano MD (PGY-2)  Panchito Resident: Kimberly Mc MD (PGY-1)  PATIENT: Gayla Escudero; [id number]; 1970    ID: This is a 49-year-old female admitted for initial complaints of worsening abdominal pain, pain to the right arm and bilateral lower extremities, and numbness to those extremities. She was admitted on 11/11, 11 days ago. She was originally brought to the emergency department status post a fall out of her wheelchair with resultant head trauma and hematoma development. Since admission, the patient has had fluctuating cognition, as well as multiple episodes of hypoxia, tachycardia, and hypertension.     Overnight Events: No acute events overnight.    Subjective: The patient initially states that she is feeling, \"Icky.\" She reports no difficulty breathing, and continues to complain of right lower quadrant abdominal pain. She continues to not have much of an appetite this morning. She then reports that she cannot feel either of her feet, and complains of point pain to her right shoulder, and then pain to her right wrist, at which time she states that, \"This is serious,\" regarding her condition, and to call her  immediately.     OBJECTIVE:  Temp:  [36.5 °C (97.7 °F)-38.1 °C (100.6 °F)] 36.5 °C (97.7 °F)  Pulse:  [101-116] 101  Resp:  [15-16] 16  BP: (110-128)/(72-79) 110/72  SpO2:  [88 %-99 %] 99 %    Intake/Output Summary (Last 24 hours) at 11/22/2020 0657  Last data filed at 11/21/2020 0700  Gross per 24 hour   Intake 10 ml   Output --   Net 10 ml       PHYSICAL EXAM:  General: No acute distress, afebrile, awake, appears malaised.  HEENT: NC/AT. EOMI. Dry oral mucosa with crusting to the lips.   Cardiovascular: Tachycardic rate, regular rhythm without murmurs, rubs, heaves. Normal capillary refill   Respiratory: CTAB, no tachypnea or retractions on poor inspiratory effort.   Abdomen: normal bowel sounds, " soft, tender to palpation throughout, nondistended, no masses, no organomegaly   EXT: Moving all extremities equally, no edema. Pain on palpation to right deltoid and right dorsal wrist. Limited range of motion to the right and left upper extremities.   Skin: No erythema/lesions   Neuro: Alert and oriented to person, place, and  Strength 4/5 to the bilateral upper and lower extremities with decreasing strength distally. Subjectively unable to see from the left eye; unable to fully assess secondary to the patient's current mental status.      LABS:  Results for orders placed or performed during the hospital encounter of 11/11/20   EC-ECHOCARDIOGRAM COMPLETE W/O CONT   Result Value Ref Range    Eject.Frac. MOD BP 71.86     Eject.Frac. MOD 4C 72.17     Eject.Frac. MOD 2C 74.49     Left Ventrical Ejection Fraction 65    CBC WITH DIFFERENTIAL   Result Value Ref Range    WBC 6.2 4.8 - 10.8 K/uL    RBC 4.72 4.20 - 5.40 M/uL    Hemoglobin 13.6 12.0 - 16.0 g/dL    Hematocrit 44.1 37.0 - 47.0 %    MCV 93.4 81.4 - 97.8 fL    MCH 28.8 27.0 - 33.0 pg    MCHC 30.8 (L) 33.6 - 35.0 g/dL    RDW 48.2 35.9 - 50.0 fL    Platelet Count 316 164 - 446 K/uL    MPV 10.2 9.0 - 12.9 fL    Neutrophils-Polys 59.10 44.00 - 72.00 %    Lymphocytes 33.70 22.00 - 41.00 %    Monocytes 5.80 0.00 - 13.40 %    Eosinophils 0.50 0.00 - 6.90 %    Basophils 0.60 0.00 - 1.80 %    Immature Granulocytes 0.30 0.00 - 0.90 %    Nucleated RBC 0.00 /100 WBC    Neutrophils (Absolute) 3.66 2.00 - 7.15 K/uL    Lymphs (Absolute) 2.09 1.00 - 4.80 K/uL    Monos (Absolute) 0.36 0.00 - 0.85 K/uL    Eos (Absolute) 0.03 0.00 - 0.51 K/uL    Baso (Absolute) 0.04 0.00 - 0.12 K/uL    Immature Granulocytes (abs) 0.02 0.00 - 0.11 K/uL    NRBC (Absolute) 0.00 K/uL   COMP METABOLIC PANEL   Result Value Ref Range    Sodium 139 135 - 145 mmol/L    Potassium 4.5 3.6 - 5.5 mmol/L    Chloride 98 96 - 112 mmol/L    Co2 26 20 - 33 mmol/L    Anion Gap 15.0 7.0 - 16.0    Glucose 73 65 - 99  mg/dL    Bun 4 (L) 8 - 22 mg/dL    Creatinine 0.40 (L) 0.50 - 1.40 mg/dL    Calcium 10.1 8.5 - 10.5 mg/dL    AST(SGOT) 22 12 - 45 U/L    ALT(SGPT) 7 2 - 50 U/L    Alkaline Phosphatase 56 30 - 99 U/L    Total Bilirubin 0.4 0.1 - 1.5 mg/dL    Albumin 4.1 3.2 - 4.9 g/dL    Total Protein 7.4 6.0 - 8.2 g/dL    Globulin 3.3 1.9 - 3.5 g/dL    A-G Ratio 1.2 g/dL   LIPASE   Result Value Ref Range    Lipase 15 11 - 82 U/L   URINALYSIS,CULTURE IF INDICATED    Specimen: Urine   Result Value Ref Range    Color Yellow     Character Cloudy (A)     Specific Gravity 1.025 <1.035    Ph 5.5 5.0 - 8.0    Glucose Negative Negative mg/dL    Ketones 15 (A) Negative mg/dL    Protein Negative Negative mg/dL    Bilirubin Negative Negative    Urobilinogen, Urine 0.2 Negative    Nitrite Negative Negative    Leukocyte Esterase Trace (A) Negative    Occult Blood Negative Negative    Micro Urine Req Microscopic    ESTIMATED GFR   Result Value Ref Range    GFR If African American >60 >60 mL/min/1.73 m 2    GFR If Non African American >60 >60 mL/min/1.73 m 2   URINE MICROSCOPIC (W/UA)   Result Value Ref Range    WBC 2-5 /hpf    RBC 0-2 /hpf    Bacteria Few (A) None /hpf    Epithelial Cells Moderate (A) /hpf    Hyaline Cast 3-5 (A) /lpf   CBC with Differential   Result Value Ref Range    WBC 6.2 4.8 - 10.8 K/uL    RBC 4.18 (L) 4.20 - 5.40 M/uL    Hemoglobin 12.2 12.0 - 16.0 g/dL    Hematocrit 38.7 37.0 - 47.0 %    MCV 92.6 81.4 - 97.8 fL    MCH 29.2 27.0 - 33.0 pg    MCHC 31.5 (L) 33.6 - 35.0 g/dL    RDW 47.2 35.9 - 50.0 fL    Platelet Count 260 164 - 446 K/uL    MPV 10.1 9.0 - 12.9 fL    Neutrophils-Polys 61.90 44.00 - 72.00 %    Lymphocytes 27.80 22.00 - 41.00 %    Monocytes 8.90 0.00 - 13.40 %    Eosinophils 0.30 0.00 - 6.90 %    Basophils 0.50 0.00 - 1.80 %    Immature Granulocytes 0.60 0.00 - 0.90 %    Nucleated RBC 0.00 /100 WBC    Neutrophils (Absolute) 3.83 2.00 - 7.15 K/uL    Lymphs (Absolute) 1.72 1.00 - 4.80 K/uL    Monos (Absolute) 0.55  0.00 - 0.85 K/uL    Eos (Absolute) 0.02 0.00 - 0.51 K/uL    Baso (Absolute) 0.03 0.00 - 0.12 K/uL    Immature Granulocytes (abs) 0.04 0.00 - 0.11 K/uL    NRBC (Absolute) 0.00 K/uL   Comp Metabolic Panel (CMP)   Result Value Ref Range    Sodium 139 135 - 145 mmol/L    Potassium 3.6 3.6 - 5.5 mmol/L    Chloride 97 96 - 112 mmol/L    Co2 27 20 - 33 mmol/L    Anion Gap 15.0 7.0 - 16.0    Glucose 62 (L) 65 - 99 mg/dL    Bun 4 (L) 8 - 22 mg/dL    Creatinine 0.34 (L) 0.50 - 1.40 mg/dL    Calcium 9.5 8.5 - 10.5 mg/dL    AST(SGOT) 17 12 - 45 U/L    ALT(SGPT) <5 2 - 50 U/L    Alkaline Phosphatase 48 30 - 99 U/L    Total Bilirubin 0.3 0.1 - 1.5 mg/dL    Albumin 3.5 3.2 - 4.9 g/dL    Total Protein 6.4 6.0 - 8.2 g/dL    Globulin 2.9 1.9 - 3.5 g/dL    A-G Ratio 1.2 g/dL   LACTIC ACID   Result Value Ref Range    Lactic Acid 1.0 0.5 - 2.0 mmol/L   CRP QUANTITIVE (NON-CARDIAC)   Result Value Ref Range    Stat C-Reactive Protein 0.32 0.00 - 0.75 mg/dL   Sed Rate   Result Value Ref Range    Sed Rate Westergren 8 0 - 20 mm/hour   ESTIMATED GFR   Result Value Ref Range    GFR If African American >60 >60 mL/min/1.73 m 2    GFR If Non African American >60 >60 mL/min/1.73 m 2   COVID/SARS CoV-2 PCR    Specimen: Nasopharyngeal; Respirate   Result Value Ref Range    COVID Order Status Received    CoV-2, Flu A/B, And RSV by PCR   Result Value Ref Range    Influenza virus A RNA Negative Negative    Influenza virus B, PCR Negative Negative    RSV, PCR Negative Negative    SARS-CoV-2 by PCR NotDetected     SARS-CoV-2 Source NP Swab    LACTIC ACID   Result Value Ref Range    Lactic Acid 0.9 0.5 - 2.0 mmol/L   Basic Metabolic Panel   Result Value Ref Range    Sodium 140 135 - 145 mmol/L    Potassium 3.5 (L) 3.6 - 5.5 mmol/L    Chloride 104 96 - 112 mmol/L    Co2 28 20 - 33 mmol/L    Glucose 114 (H) 65 - 99 mg/dL    Bun 2 (L) 8 - 22 mg/dL    Creatinine 0.48 (L) 0.50 - 1.40 mg/dL    Calcium 9.4 8.5 - 10.5 mg/dL    Anion Gap 8.0 7.0 - 16.0   CBC WITH  DIFFERENTIAL   Result Value Ref Range    WBC 6.3 4.8 - 10.8 K/uL    RBC 4.37 4.20 - 5.40 M/uL    Hemoglobin 12.8 12.0 - 16.0 g/dL    Hematocrit 40.5 37.0 - 47.0 %    MCV 92.7 81.4 - 97.8 fL    MCH 29.3 27.0 - 33.0 pg    MCHC 31.6 (L) 33.6 - 35.0 g/dL    RDW 47.8 35.9 - 50.0 fL    Platelet Count 242 164 - 446 K/uL    MPV 10.2 9.0 - 12.9 fL    Neutrophils-Polys 55.80 44.00 - 72.00 %    Lymphocytes 30.80 22.00 - 41.00 %    Monocytes 11.30 0.00 - 13.40 %    Eosinophils 1.30 0.00 - 6.90 %    Basophils 0.50 0.00 - 1.80 %    Immature Granulocytes 0.30 0.00 - 0.90 %    Nucleated RBC 0.00 /100 WBC    Neutrophils (Absolute) 3.50 2.00 - 7.15 K/uL    Lymphs (Absolute) 1.93 1.00 - 4.80 K/uL    Monos (Absolute) 0.71 0.00 - 0.85 K/uL    Eos (Absolute) 0.08 0.00 - 0.51 K/uL    Baso (Absolute) 0.03 0.00 - 0.12 K/uL    Immature Granulocytes (abs) 0.02 0.00 - 0.11 K/uL    NRBC (Absolute) 0.00 K/uL   MAGNESIUM   Result Value Ref Range    Magnesium 2.1 1.5 - 2.5 mg/dL   PHOSPHORUS   Result Value Ref Range    Phosphorus 2.6 2.5 - 4.5 mg/dL   ESTIMATED GFR   Result Value Ref Range    GFR If African American >60 >60 mL/min/1.73 m 2    GFR If Non African American >60 >60 mL/min/1.73 m 2   Blood Culture    Specimen: Peripheral; Blood   Result Value Ref Range    Significant Indicator NEG     Source BLD     Site PERIPHERAL     Culture Result No growth after 5 days of incubation.    Blood Culture    Specimen: Peripheral; Blood   Result Value Ref Range    Significant Indicator NEG     Source BLD     Site PERIPHERAL     Culture Result No growth after 5 days of incubation.    PROCALCITONIN   Result Value Ref Range    Procalcitonin 0.33 (H) <0.25 ng/mL   LACTIC ACID   Result Value Ref Range    Lactic Acid 4.0 (HH) 0.5 - 2.0 mmol/L   LACTIC ACID   Result Value Ref Range    Lactic Acid 3.7 (H) 0.5 - 2.0 mmol/L   CBC WITH DIFFERENTIAL   Result Value Ref Range    WBC 17.6 (H) 4.8 - 10.8 K/uL    RBC 4.06 (L) 4.20 - 5.40 M/uL    Hemoglobin 12.0 12.0 -  16.0 g/dL    Hematocrit 37.3 37.0 - 47.0 %    MCV 91.9 81.4 - 97.8 fL    MCH 29.6 27.0 - 33.0 pg    MCHC 32.2 (L) 33.6 - 35.0 g/dL    RDW 47.2 35.9 - 50.0 fL    Platelet Count 191 164 - 446 K/uL    MPV 9.9 9.0 - 12.9 fL    Neutrophils-Polys 90.40 (H) 44.00 - 72.00 %    Lymphocytes 2.60 (L) 22.00 - 41.00 %    Monocytes 5.30 0.00 - 13.40 %    Eosinophils 0.00 0.00 - 6.90 %    Basophils 0.00 0.00 - 1.80 %    Nucleated RBC 0.00 /100 WBC    Neutrophils (Absolute) 16.23 (H) 2.00 - 7.15 K/uL    Lymphs (Absolute) 0.46 (L) 1.00 - 4.80 K/uL    Monos (Absolute) 0.93 (H) 0.00 - 0.85 K/uL    Eos (Absolute) 0.00 0.00 - 0.51 K/uL    Baso (Absolute) 0.00 0.00 - 0.12 K/uL    NRBC (Absolute) 0.00 K/uL   Comp Metabolic Panel   Result Value Ref Range    Sodium 148 (H) 135 - 145 mmol/L    Potassium 2.4 (LL) 3.6 - 5.5 mmol/L    Chloride 108 96 - 112 mmol/L    Co2 30 20 - 33 mmol/L    Anion Gap 10.0 7.0 - 16.0    Glucose 196 (H) 65 - 99 mg/dL    Bun 2 (L) 8 - 22 mg/dL    Creatinine 0.38 (L) 0.50 - 1.40 mg/dL    Calcium 9.4 8.5 - 10.5 mg/dL    AST(SGOT) 159 (H) 12 - 45 U/L    ALT(SGPT) 45 2 - 50 U/L    Alkaline Phosphatase 62 30 - 99 U/L    Total Bilirubin 0.5 0.1 - 1.5 mg/dL    Albumin 3.0 (L) 3.2 - 4.9 g/dL    Total Protein 5.5 (L) 6.0 - 8.2 g/dL    Globulin 2.5 1.9 - 3.5 g/dL    A-G Ratio 1.2 g/dL   MAGNESIUM   Result Value Ref Range    Magnesium 1.7 1.5 - 2.5 mg/dL   PHOSPHORUS   Result Value Ref Range    Phosphorus 1.5 (L) 2.5 - 4.5 mg/dL   DIFFERENTIAL MANUAL   Result Value Ref Range    Bands-Stabs 1.80 0.00 - 10.00 %    Manual Diff Status PERFORMED    PERIPHERAL SMEAR REVIEW   Result Value Ref Range    Peripheral Smear Review see below    PLATELET ESTIMATE   Result Value Ref Range    Plt Estimation Normal    MORPHOLOGY   Result Value Ref Range    RBC Morphology Normal    ESTIMATED GFR   Result Value Ref Range    GFR If African American >60 >60 mL/min/1.73 m 2    GFR If Non African American >60 >60 mL/min/1.73 m 2   Prothrombin time  (INR)   Result Value Ref Range    PT 13.9 12.0 - 14.6 sec    INR 1.10 0.87 - 1.13   Procalcitonin   Result Value Ref Range    Procalcitonin 0.53 (H) <0.25 ng/mL   ABG - LAB   Result Value Ref Range    Ph 7.37 (L) 7.40 - 7.50    Pco2 51.4 (HH) 26.0 - 37.0 mmHg    Po2 158.1 (H) 64.0 - 87.0 mmHg    O2 Saturation 98.7 93.0 - 99.0 %    Hco3 29 (H) 17 - 25 mmol/L    Base Excess 3 -4 - 3 mmol/L    Body Temp see below Centigrade   LACTIC ACID   Result Value Ref Range    Lactic Acid 3.4 (H) 0.5 - 2.0 mmol/L   Basic Metabolic Panel   Result Value Ref Range    Sodium 149 (H) 135 - 145 mmol/L    Potassium 4.2 3.6 - 5.5 mmol/L    Chloride 111 96 - 112 mmol/L    Co2 28 20 - 33 mmol/L    Glucose 129 (H) 65 - 99 mg/dL    Bun 3 (L) 8 - 22 mg/dL    Creatinine 0.32 (L) 0.50 - 1.40 mg/dL    Calcium 9.4 8.5 - 10.5 mg/dL    Anion Gap 10.0 7.0 - 16.0   CBC WITH DIFFERENTIAL   Result Value Ref Range    WBC 8.8 4.8 - 10.8 K/uL    RBC 4.10 (L) 4.20 - 5.40 M/uL    Hemoglobin 11.8 (L) 12.0 - 16.0 g/dL    Hematocrit 38.1 37.0 - 47.0 %    MCV 92.9 81.4 - 97.8 fL    MCH 28.8 27.0 - 33.0 pg    MCHC 31.0 (L) 33.6 - 35.0 g/dL    RDW 48.2 35.9 - 50.0 fL    Platelet Count 190 164 - 446 K/uL    MPV 10.0 9.0 - 12.9 fL    Neutrophils-Polys 96.00 (H) 44.00 - 72.00 %    Lymphocytes 2.50 (L) 22.00 - 41.00 %    Monocytes 1.10 0.00 - 13.40 %    Eosinophils 0.00 0.00 - 6.90 %    Basophils 0.20 0.00 - 1.80 %    Immature Granulocytes 0.20 0.00 - 0.90 %    Nucleated RBC 0.00 /100 WBC    Neutrophils (Absolute) 8.42 (H) 2.00 - 7.15 K/uL    Lymphs (Absolute) 0.22 (L) 1.00 - 4.80 K/uL    Monos (Absolute) 0.10 0.00 - 0.85 K/uL    Eos (Absolute) 0.00 0.00 - 0.51 K/uL    Baso (Absolute) 0.02 0.00 - 0.12 K/uL    Immature Granulocytes (abs) 0.02 0.00 - 0.11 K/uL    NRBC (Absolute) 0.00 K/uL   PHOSPHORUS   Result Value Ref Range    Phosphorus 1.2 (L) 2.5 - 4.5 mg/dL   MAGNESIUM   Result Value Ref Range    Magnesium 2.1 1.5 - 2.5 mg/dL   AMMONIA   Result Value Ref Range     Ammonia 26 11 - 45 umol/L   LACTIC ACID   Result Value Ref Range    Lactic Acid 3.6 (H) 0.5 - 2.0 mmol/L   Blood Culture    Specimen: Blood   Result Value Ref Range    Significant Indicator NEG     Source BLD     Site Peripheral     Culture Result No growth after 5 days of incubation.    ESTIMATED GFR   Result Value Ref Range    GFR If African American >60 >60 mL/min/1.73 m 2    GFR If Non African American >60 >60 mL/min/1.73 m 2   Blood Culture    Specimen: Blood   Result Value Ref Range    Significant Indicator NEG     Source BLD     Site Periperal     Culture Result No growth after 5 days of incubation.    VANCOMYCIN TROUGH LEVEL   Result Value Ref Range    Vancomycin Trough 8.3 (L) 10.0 - 20.0 ug/mL   Basic Metabolic Panel   Result Value Ref Range    Sodium 151 (H) 135 - 145 mmol/L    Potassium 3.7 3.6 - 5.5 mmol/L    Chloride 123 (H) 96 - 112 mmol/L    Co2 23 20 - 33 mmol/L    Glucose 77 65 - 99 mg/dL    Bun 3 (L) 8 - 22 mg/dL    Creatinine <0.17 (L) 0.50 - 1.40 mg/dL    Calcium 6.3 (LL) 8.5 - 10.5 mg/dL    Anion Gap 5.0 (L) 7.0 - 16.0   LACTIC ACID   Result Value Ref Range    Lactic Acid 1.5 0.5 - 2.0 mmol/L   VITAMIN B12   Result Value Ref Range    Vitamin B12 -True Cobalamin 1457 (H) 211 - 911 pg/mL   T.PALLIDUM AB EIA   Result Value Ref Range    Syphilis, Treponemal Qual Non-Reactive Non-Reactive   HIV AG/AB COMBO ASSAY SCREENING   Result Value Ref Range    HIV Ag/Ab Combo Assay Non-Reactive Non Reactive   CSF Glucose   Result Value Ref Range    Glucose CSF 70 40 - 80 mg/dL   CSF Protein   Result Value Ref Range    Total Protein, CSF 20 15 - 45 mg/dL   MENINGITIS/ENCEPHALITIS CSF PANEL BY PCR   Result Value Ref Range    Escherichia coli K1 by PCR Not Detected     HAEM influenzae by PCR Not Detected     Listeria Monocytogenes by PCR Not Detected     Neisseria meningitidis by PCR Not Detected     Strep Agalactiae by PCR Not Detected     Strep pneumoniae by PCR Not Detected     Cytomegalovirus by PCR Not  Detected     Enterovirus by PCR Not Detected     HSV 1 by PCR Not Detected     HSV 2 by PCR Not Detected     Human Herpesvirus 6 by PCR Not Detected     Human parechovirus by PCR Not Detected     Varicella Zoster Virus by PCR Not Detected     Cryptococcus neoformans/gattii by PCR Not Detected    CSF Cell Count   Result Value Ref Range    Number Of Tubes 3     Volume 11.0 mL    Color-Body Fluid Colorless     Character-Body Fluid Clear     Supernatant Appearance Colorless     Total RBC Count 373 cells/uL    Crenated RBC 0 %    Total WBC Count 3 0 - 10 cells/uL    Polys 60 %    Lymphs 40 %    CSF Tube Number 3    HSV I/II IGM AB CSF   Result Value Ref Range    HSV 1/2 IgM, CSF 0.45 <=0.89 IV   ESTIMATED GFR   Result Value Ref Range    GFR If African American >60 >60 mL/min/1.73 m 2    GFR If Non African American >60 >60 mL/min/1.73 m 2   IONIZED CALCIUM   Result Value Ref Range    Ionized Calcium 1.4 (H) 1.1 - 1.3 mmol/L   PHOSPHORUS   Result Value Ref Range    Phosphorus 2.9 2.5 - 4.5 mg/dL   MRSA By PCR (Amp)    Specimen: Nares; Respirate   Result Value Ref Range    Significant Indicator NEG     Source RESP     Site NARES     MRSA PCR Negative for MRSA by PCR.    Basic Metabolic Panel   Result Value Ref Range    Sodium 149 (H) 135 - 145 mmol/L    Potassium 4.7 3.6 - 5.5 mmol/L    Chloride 117 (H) 96 - 112 mmol/L    Co2 27 20 - 33 mmol/L    Glucose 116 (H) 65 - 99 mg/dL    Bun 4 (L) 8 - 22 mg/dL    Creatinine 0.30 (L) 0.50 - 1.40 mg/dL    Calcium 9.6 8.5 - 10.5 mg/dL    Anion Gap 5.0 (L) 7.0 - 16.0   LACTIC ACID   Result Value Ref Range    Lactic Acid 1.2 0.5 - 2.0 mmol/L   CSF CULTURE    Specimen: CSF   Result Value Ref Range    Significant Indicator NEG     Source CSF     Site Tap     Culture Result No growth at 72 hours.     Gram Stain Result No organisms seen.    GRAM STAIN    Specimen: CSF   Result Value Ref Range    Significant Indicator .     Source CSF     Site Tap     Gram Stain Result No organisms seen.       ESTIMATED GFR   Result Value Ref Range    GFR If African American >60 >60 mL/min/1.73 m 2    GFR If Non African American >60 >60 mL/min/1.73 m 2   CBC WITH DIFFERENTIAL   Result Value Ref Range    WBC 6.2 4.8 - 10.8 K/uL    RBC 3.33 (L) 4.20 - 5.40 M/uL    Hemoglobin 9.6 (L) 12.0 - 16.0 g/dL    Hematocrit 30.6 (L) 37.0 - 47.0 %    MCV 91.9 81.4 - 97.8 fL    MCH 28.8 27.0 - 33.0 pg    MCHC 31.4 (L) 33.6 - 35.0 g/dL    RDW 48.6 35.9 - 50.0 fL    Platelet Count 133 (L) 164 - 446 K/uL    MPV 10.7 9.0 - 12.9 fL    Neutrophils-Polys 83.50 (H) 44.00 - 72.00 %    Lymphocytes 9.70 (L) 22.00 - 41.00 %    Monocytes 4.40 0.00 - 13.40 %    Eosinophils 1.60 0.00 - 6.90 %    Basophils 0.50 0.00 - 1.80 %    Immature Granulocytes 0.30 0.00 - 0.90 %    Nucleated RBC 0.00 /100 WBC    Neutrophils (Absolute) 5.18 2.00 - 7.15 K/uL    Lymphs (Absolute) 0.60 (L) 1.00 - 4.80 K/uL    Monos (Absolute) 0.27 0.00 - 0.85 K/uL    Eos (Absolute) 0.10 0.00 - 0.51 K/uL    Baso (Absolute) 0.03 0.00 - 0.12 K/uL    Immature Granulocytes (abs) 0.02 0.00 - 0.11 K/uL    NRBC (Absolute) 0.00 K/uL   IONIZED CALCIUM   Result Value Ref Range    Ionized Calcium 1.3 1.1 - 1.3 mmol/L   HSV 1/2 By PCR(Herpes)+[id number]    Specimen: Spinal Fluid; CSF   Result Value Ref Range    Source CSF     HSV Type I Negative Negative    HSV Type 2 Negative Negative   ABG - LAB   Result Value Ref Range    Ph 7.39 (L) 7.40 - 7.50    Pco2 41.8 (H) 26.0 - 37.0 mmHg    Po2 131.4 (H) 64.0 - 87.0 mmHg    O2 Saturation 98.2 93.0 - 99.0 %    Hco3 25 17 - 25 mmol/L    Base Excess 0 -4 - 3 mmol/L    Body Temp see below Centigrade    O2 Therapy 6.0 2.0 - 10.0 L/min   LACTIC ACID   Result Value Ref Range    Lactic Acid 1.5 0.5 - 2.0 mmol/L   VANCOMYCIN TROUGH LEVEL   Result Value Ref Range    Vancomycin Trough 20.1 (H) 10.0 - 20.0 ug/mL   CBC WITH DIFFERENTIAL   Result Value Ref Range    WBC 9.3 4.8 - 10.8 K/uL    RBC 4.09 (L) 4.20 - 5.40 M/uL    Hemoglobin 11.8 (L) 12.0 - 16.0 g/dL     Hematocrit 37.1 37.0 - 47.0 %    MCV 90.7 81.4 - 97.8 fL    MCH 28.9 27.0 - 33.0 pg    MCHC 31.8 (L) 33.6 - 35.0 g/dL    RDW 47.1 35.9 - 50.0 fL    Platelet Count 126 (L) 164 - 446 K/uL    MPV 11.5 9.0 - 12.9 fL    Neutrophils-Polys 79.60 (H) 44.00 - 72.00 %    Lymphocytes 10.60 (L) 22.00 - 41.00 %    Monocytes 8.50 0.00 - 13.40 %    Eosinophils 0.40 0.00 - 6.90 %    Basophils 0.30 0.00 - 1.80 %    Immature Granulocytes 0.60 0.00 - 0.90 %    Nucleated RBC 0.00 /100 WBC    Neutrophils (Absolute) 7.41 (H) 2.00 - 7.15 K/uL    Lymphs (Absolute) 0.99 (L) 1.00 - 4.80 K/uL    Monos (Absolute) 0.79 0.00 - 0.85 K/uL    Eos (Absolute) 0.04 0.00 - 0.51 K/uL    Baso (Absolute) 0.03 0.00 - 0.12 K/uL    Immature Granulocytes (abs) 0.06 0.00 - 0.11 K/uL    NRBC (Absolute) 0.00 K/uL   Basic Metabolic Panel   Result Value Ref Range    Sodium 146 (H) 135 - 145 mmol/L    Potassium 3.8 3.6 - 5.5 mmol/L    Chloride 112 96 - 112 mmol/L    Co2 23 20 - 33 mmol/L    Glucose 110 (H) 65 - 99 mg/dL    Bun 2 (L) 8 - 22 mg/dL    Creatinine 0.30 (L) 0.50 - 1.40 mg/dL    Calcium 9.0 8.5 - 10.5 mg/dL    Anion Gap 11.0 7.0 - 16.0   PHOSPHORUS   Result Value Ref Range    Phosphorus 1.8 (L) 2.5 - 4.5 mg/dL   MAGNESIUM   Result Value Ref Range    Magnesium 1.5 1.5 - 2.5 mg/dL   ESTIMATED GFR   Result Value Ref Range    GFR If African American >60 >60 mL/min/1.73 m 2    GFR If Non African American >60 >60 mL/min/1.73 m 2   PROCALCITONIN   Result Value Ref Range    Procalcitonin 1.98 (H) <0.25 ng/mL   TSH WITH REFLEX TO FT4   Result Value Ref Range    TSH 1.210 0.380 - 5.330 uIU/mL   ABG - LAB   Result Value Ref Range    Ph 7.43 7.40 - 7.50    Pco2 41.2 (H) 26.0 - 37.0 mmHg    Po2 135.4 (H) 64.0 - 87.0 mmHg    O2 Saturation 97.9 93.0 - 99.0 %    Hco3 26 (H) 17 - 25 mmol/L    Base Excess 2 -4 - 3 mmol/L    Body Temp see below Centigrade   CBC WITH DIFFERENTIAL   Result Value Ref Range    WBC 7.2 4.8 - 10.8 K/uL    RBC 3.48 (L) 4.20 - 5.40 M/uL     Hemoglobin 10.1 (L) 12.0 - 16.0 g/dL    Hematocrit 31.4 (L) 37.0 - 47.0 %    MCV 90.2 81.4 - 97.8 fL    MCH 29.0 27.0 - 33.0 pg    MCHC 32.2 (L) 33.6 - 35.0 g/dL    RDW 45.5 35.9 - 50.0 fL    Platelet Count 170 164 - 446 K/uL    MPV 10.7 9.0 - 12.9 fL    Neutrophils-Polys 78.50 (H) 44.00 - 72.00 %    Lymphocytes 14.00 (L) 22.00 - 41.00 %    Monocytes 6.10 0.00 - 13.40 %    Eosinophils 0.60 0.00 - 6.90 %    Basophils 0.40 0.00 - 1.80 %    Immature Granulocytes 0.40 0.00 - 0.90 %    Nucleated RBC 0.00 /100 WBC    Neutrophils (Absolute) 5.67 2.00 - 7.15 K/uL    Lymphs (Absolute) 1.01 1.00 - 4.80 K/uL    Monos (Absolute) 0.44 0.00 - 0.85 K/uL    Eos (Absolute) 0.04 0.00 - 0.51 K/uL    Baso (Absolute) 0.03 0.00 - 0.12 K/uL    Immature Granulocytes (abs) 0.03 0.00 - 0.11 K/uL    NRBC (Absolute) 0.00 K/uL   Comp Metabolic Panel   Result Value Ref Range    Sodium 147 (H) 135 - 145 mmol/L    Potassium 3.1 (L) 3.6 - 5.5 mmol/L    Chloride 115 (H) 96 - 112 mmol/L    Co2 26 20 - 33 mmol/L    Anion Gap 6.0 (L) 7.0 - 16.0    Glucose 88 65 - 99 mg/dL    Bun 2 (L) 8 - 22 mg/dL    Creatinine 0.24 (L) 0.50 - 1.40 mg/dL    Calcium 8.5 8.5 - 10.5 mg/dL    AST(SGOT) 48 (H) 12 - 45 U/L    ALT(SGPT) 63 (H) 2 - 50 U/L    Alkaline Phosphatase 80 30 - 99 U/L    Total Bilirubin 0.4 0.1 - 1.5 mg/dL    Albumin 2.4 (L) 3.2 - 4.9 g/dL    Total Protein 5.0 (L) 6.0 - 8.2 g/dL    Globulin 2.6 1.9 - 3.5 g/dL    A-G Ratio 0.9 g/dL   ESTIMATED GFR   Result Value Ref Range    GFR If African American >60 >60 mL/min/1.73 m 2    GFR If Non African American >60 >60 mL/min/1.73 m 2   CBC WITH DIFFERENTIAL   Result Value Ref Range    WBC 5.6 4.8 - 10.8 K/uL    RBC 3.63 (L) 4.20 - 5.40 M/uL    Hemoglobin 10.7 (L) 12.0 - 16.0 g/dL    Hematocrit 32.8 (L) 37.0 - 47.0 %    MCV 90.4 81.4 - 97.8 fL    MCH 29.5 27.0 - 33.0 pg    MCHC 32.6 (L) 33.6 - 35.0 g/dL    RDW 45.6 35.9 - 50.0 fL    Platelet Count 160 (L) 164 - 446 K/uL    MPV 10.1 9.0 - 12.9 fL     Neutrophils-Polys 71.00 44.00 - 72.00 %    Lymphocytes 18.50 (L) 22.00 - 41.00 %    Monocytes 8.90 0.00 - 13.40 %    Eosinophils 0.90 0.00 - 6.90 %    Basophils 0.50 0.00 - 1.80 %    Immature Granulocytes 0.20 0.00 - 0.90 %    Nucleated RBC 0.00 /100 WBC    Neutrophils (Absolute) 4.00 2.00 - 7.15 K/uL    Lymphs (Absolute) 1.04 1.00 - 4.80 K/uL    Monos (Absolute) 0.50 0.00 - 0.85 K/uL    Eos (Absolute) 0.05 0.00 - 0.51 K/uL    Baso (Absolute) 0.03 0.00 - 0.12 K/uL    Immature Granulocytes (abs) 0.01 0.00 - 0.11 K/uL    NRBC (Absolute) 0.00 K/uL   Basic Metabolic Panel   Result Value Ref Range    Sodium 149 (H) 135 - 145 mmol/L    Potassium 3.0 (L) 3.6 - 5.5 mmol/L    Chloride 115 (H) 96 - 112 mmol/L    Co2 27 20 - 33 mmol/L    Glucose 79 65 - 99 mg/dL    Bun 2 (L) 8 - 22 mg/dL    Creatinine 0.18 (L) 0.50 - 1.40 mg/dL    Calcium 8.5 8.5 - 10.5 mg/dL    Anion Gap 7.0 7.0 - 16.0   MAGNESIUM   Result Value Ref Range    Magnesium 2.1 1.5 - 2.5 mg/dL   PROCALCITONIN   Result Value Ref Range    Procalcitonin 0.19 <0.25 ng/mL   ESTIMATED GFR   Result Value Ref Range    GFR If African American >60 >60 mL/min/1.73 m 2    GFR If Non African American >60 >60 mL/min/1.73 m 2   Prealbumin   Result Value Ref Range    Pre-Albumin 7.9 (L) 18.0 - 38.0 mg/dL   Basic Metabolic Panel   Result Value Ref Range    Sodium 147 (H) 135 - 145 mmol/L    Potassium 3.7 3.6 - 5.5 mmol/L    Chloride 114 (H) 96 - 112 mmol/L    Co2 26 20 - 33 mmol/L    Glucose 76 65 - 99 mg/dL    Bun 2 (L) 8 - 22 mg/dL    Creatinine 0.24 (L) 0.50 - 1.40 mg/dL    Calcium 8.8 8.5 - 10.5 mg/dL    Anion Gap 7.0 7.0 - 16.0   CRP QUANTITIVE (NON-CARDIAC)   Result Value Ref Range    Stat C-Reactive Protein 3.15 (H) 0.00 - 0.75 mg/dL   ESTIMATED GFR   Result Value Ref Range    GFR If African American >60 >60 mL/min/1.73 m 2    GFR If Non African American >60 >60 mL/min/1.73 m 2   Basic Metabolic Panel   Result Value Ref Range    Sodium 144 135 - 145 mmol/L    Potassium 4.0  3.6 - 5.5 mmol/L    Chloride 112 96 - 112 mmol/L    Co2 28 20 - 33 mmol/L    Glucose 82 65 - 99 mg/dL    Bun 2 (L) 8 - 22 mg/dL    Creatinine 0.25 (L) 0.50 - 1.40 mg/dL    Calcium 8.6 8.5 - 10.5 mg/dL    Anion Gap 4.0 (L) 7.0 - 16.0   ESTIMATED GFR   Result Value Ref Range    GFR If African American >60 >60 mL/min/1.73 m 2    GFR If Non African American >60 >60 mL/min/1.73 m 2   Basic Metabolic Panel   Result Value Ref Range    Sodium 141 135 - 145 mmol/L    Potassium 4.2 3.6 - 5.5 mmol/L    Chloride 111 96 - 112 mmol/L    Co2 24 20 - 33 mmol/L    Glucose 88 65 - 99 mg/dL    Bun 2 (L) 8 - 22 mg/dL    Creatinine 0.20 (L) 0.50 - 1.40 mg/dL    Calcium 8.5 8.5 - 10.5 mg/dL    Anion Gap 6.0 (L) 7.0 - 16.0   MAGNESIUM   Result Value Ref Range    Magnesium 2.0 1.5 - 2.5 mg/dL   ESTIMATED GFR   Result Value Ref Range    GFR If African American >60 >60 mL/min/1.73 m 2    GFR If Non African American >60 >60 mL/min/1.73 m 2   ACCU-CHEK GLUCOSE   Result Value Ref Range    Glucose - Accu-Ck 45 (L) 65 - 99 mg/dL   ACCU-CHEK GLUCOSE   Result Value Ref Range    Glucose - Accu-Ck 264 (H) 65 - 99 mg/dL   ACCU-CHEK GLUCOSE   Result Value Ref Range    Glucose - Accu-Ck 227 (H) 65 - 99 mg/dL   ACCU-CHEK GLUCOSE   Result Value Ref Range    Glucose - Accu-Ck 119 (H) 65 - 99 mg/dL   ACCU-CHEK GLUCOSE   Result Value Ref Range    Glucose - Accu-Ck 132 (H) 65 - 99 mg/dL   ACCU-CHEK GLUCOSE   Result Value Ref Range    Glucose - Accu-Ck 102 (H) 65 - 99 mg/dL   ACCU-CHEK GLUCOSE   Result Value Ref Range    Glucose - Accu-Ck 104 (H) 65 - 99 mg/dL   ACCU-CHEK GLUCOSE   Result Value Ref Range    Glucose - Accu-Ck 177 (H) 65 - 99 mg/dL   ACCU-CHEK GLUCOSE   Result Value Ref Range    Glucose - Accu-Ck 220 (H) 65 - 99 mg/dL   ACCU-CHEK GLUCOSE   Result Value Ref Range    Glucose - Accu-Ck 170 (H) 65 - 99 mg/dL   ACCU-CHEK GLUCOSE   Result Value Ref Range    Glucose - Accu-Ck 128 (H) 65 - 99 mg/dL   ACCU-CHEK GLUCOSE   Result Value Ref Range    Glucose  - Accu-Ck 103 (H) 65 - 99 mg/dL   ACCU-CHEK GLUCOSE   Result Value Ref Range    Glucose - Accu-Ck 79 65 - 99 mg/dL   ACCU-CHEK GLUCOSE   Result Value Ref Range    Glucose - Accu-Ck 105 (H) 65 - 99 mg/dL   ACCU-CHEK GLUCOSE   Result Value Ref Range    Glucose - Accu-Ck 116 (H) 65 - 99 mg/dL   ACCU-CHEK GLUCOSE   Result Value Ref Range    Glucose - Accu-Ck 117 (H) 65 - 99 mg/dL   ACCU-CHEK GLUCOSE   Result Value Ref Range    Glucose - Accu-Ck 90 65 - 99 mg/dL   ACCU-CHEK GLUCOSE   Result Value Ref Range    Glucose - Accu-Ck 121 (H) 65 - 99 mg/dL   ACCU-CHEK GLUCOSE   Result Value Ref Range    Glucose - Accu-Ck 121 (H) 65 - 99 mg/dL   ACCU-CHEK GLUCOSE   Result Value Ref Range    Glucose - Accu-Ck 81 65 - 99 mg/dL   ACCU-CHEK GLUCOSE   Result Value Ref Range    Glucose - Accu-Ck 114 (H) 65 - 99 mg/dL   ACCU-CHEK GLUCOSE   Result Value Ref Range    Glucose - Accu-Ck 80 65 - 99 mg/dL   ACCU-CHEK GLUCOSE   Result Value Ref Range    Glucose - Accu-Ck 81 65 - 99 mg/dL   ACCU-CHEK GLUCOSE   Result Value Ref Range    Glucose - Accu-Ck 85 65 - 99 mg/dL   ACCU-CHEK GLUCOSE   Result Value Ref Range    Glucose - Accu-Ck 70 65 - 99 mg/dL   ACCU-CHEK GLUCOSE   Result Value Ref Range    Glucose - Accu-Ck 324 (H) 65 - 99 mg/dL   ACCU-CHEK GLUCOSE   Result Value Ref Range    Glucose - Accu-Ck 116 (H) 65 - 99 mg/dL   ACCU-CHEK GLUCOSE   Result Value Ref Range    Glucose - Accu-Ck 104 (H) 65 - 99 mg/dL   ACCU-CHEK GLUCOSE   Result Value Ref Range    Glucose - Accu-Ck 73 65 - 99 mg/dL   ACCU-CHEK GLUCOSE   Result Value Ref Range    Glucose - Accu-Ck 85 65 - 99 mg/dL   ACCU-CHEK GLUCOSE   Result Value Ref Range    Glucose - Accu-Ck 85 65 - 99 mg/dL   ACCU-CHEK GLUCOSE   Result Value Ref Range    Glucose - Accu-Ck 88 65 - 99 mg/dL   ACCU-CHEK GLUCOSE   Result Value Ref Range    Glucose - Accu-Ck 85 65 - 99 mg/dL   ACCU-CHEK GLUCOSE   Result Value Ref Range    Glucose - Accu-Ck 70 65 - 99 mg/dL   ACCU-CHEK GLUCOSE   Result Value Ref Range       Glucose - Accu-Ck 80 65 - 99 mg/dL   ACCU-CHEK GLUCOSE   Result Value Ref Range    Glucose - Accu-Ck 87 65 - 99 mg/dL   ACCU-CHEK GLUCOSE   Result Value Ref Range    Glucose - Accu-Ck 80 65 - 99 mg/dL   ACCU-CHEK GLUCOSE   Result Value Ref Range    Glucose - Accu-Ck 77 65 - 99 mg/dL   ACCU-CHEK GLUCOSE   Result Value Ref Range    Glucose - Accu-Ck 119 (H) 65 - 99 mg/dL   ACCU-CHEK GLUCOSE   Result Value Ref Range    Glucose - Accu-Ck 90 65 - 99 mg/dL   ACCU-CHEK GLUCOSE   Result Value Ref Range    Glucose - Accu-Ck 83 65 - 99 mg/dL   ACCU-CHEK GLUCOSE   Result Value Ref Range    Glucose - Accu-Ck 92 65 - 99 mg/dL   ACCU-CHEK GLUCOSE   Result Value Ref Range    Glucose - Accu-Ck 94 65 - 99 mg/dL   ACCU-CHEK GLUCOSE   Result Value Ref Range    Glucose - Accu-Ck 90 65 - 99 mg/dL   ACCU-CHEK GLUCOSE   Result Value Ref Range    Glucose - Accu-Ck 96 65 - 99 mg/dL   EKG   Result Value Ref Range    Report       [facility] Emergency Dept.    Test Date:  2020  Pt Name:    ANIBAL CLARK                  Department: ER  MRN:        [id number]                      Room:        18  Gender:     Female                       Technician: [id number]  :        1970                   Requested By:BROWN MITCHELL  Order #:    [id number]                    Reading MD: BROWN MITCHELL. AMD    Measurements  Intervals                                Axis  Rate:       121                          P:          0  MT:         153                          QRS:        -11  QRSD:       73                           T:          -46  QT:         356  QTc:        505    Interpretive Statements  Sinus tachycardia  Consider right atrial enlargement  Abnormal T, consider ischemia, anterior leads  Baseline wander in lead(s) V6  Compared to ECG 2018 13:39:53  T wave inversions are not significantly different  Sinus rhythm no longer present  T-wave abnormality still present  Electronically Signed  On 2020 0:35:00 PST  by BROWN MILLER AMD     EKG   Result Value Ref Range    Report       [facility]    Test Date:  2020  Pt Name:    ANIBAL CLARK                  Department: [facility]  MRN:        [id number]                      Room:       R313  Gender:     Female                       Technician: [email or web address]  :        1970                   Requested By:UMER DURANT  Order #:    [id number]                    Reading MD: Gregg Long MD    Measurements  Intervals                                Axis  Rate:       75                           P:          61  MI:         200                          QRS:        -1  QRSD:       72                           T:          23  QT:         408  QTc:        456    Interpretive Statements  SINUS RHYTHM  BORDERLINE AV CONDUCTION DELAY  EARLY PRECORDIAL R/S TRANSITION  BORDERLINE T WAVE ABNORMALITIES  Electronically Signed On 2020 13:32:45 PST by Gregg Long MD     EKG   Result Value Ref Range    Report       [facility]    Test Date:  2020  Pt Name:    ANIBAL CLARK                  Department: [facility]  MRN:        [id number]                      Room:       T836  Gender:     Female                       Technician: [email or web address]  :        1970                   Requested By:RONALDO GARCIA  Order #:    [id number]                    Reading MD: Jose Frost    Measurements  Intervals                                Axis  Rate:       140                          P:          0  MI:         91                           QRS:        38  QRSD:       86                           T:          45  QT:         288  QTc:        440    Interpretive Statements  TECHNICALLY POOR TRACING - PLEASE REPEAT ECG!  SINUS TACHYCARDIA  Compared to ECG 2020 13:24:26  Sinus rhythm no longer present  T-wave abnormality no longer present    Electronically Signed On 2020 7:02:05 PST by Jose Frost         IMAGING:   DX-ABDOMEN FOR TUBE PLACEMENT   Final Result         1.  Nonspecific  bowel gas pattern.   2.  Dobbhoff tube tip overlying the expected location of the pylorus or first duodenal segment.   3.  Left basilar atelectasis      MR-BRAIN-WITH & W/O   Final Result      1.  Study is again limited by patient motion.   2.  Mild cerebral atrophy, prominent for the patient's age.   3.  T2 FLAIR hyperintensity in the anterior bilateral frontal lobes probably encephalomalacia/gliosis in could be related to old trauma.   4.  Additional mild nonspecific scattered white matter disease, possibly chronic microvascular ischemic changes.   5.  No acute intracranial hemorrhage or infarct.   6.  Left retinal detachment.      DX-ABDOMEN FOR TUBE PLACEMENT   Final Result         1.  Nonspecific bowel gas pattern.   2.  Dobbhoff tube is coiled within the stomach, the tip terminates overlying the expected location of the gastric fundus.   3.  Left basilar atelectasis      IR-MIDLINE CATHETER INSERTION WO GUIDANCE > AGE 3   Final Result                  Ultrasound-guided midline placement performed by qualified nursing staff    as above.          MR-BRAIN-WITH & W/O   Final Result      1.  Limited exam due to motion artifact.   2.  Diffuse atrophy and white matter microvascular ischemic changes.   3.  No acute stroke or evidence for intracranial hemorrhage.      DX-CHEST-LIMITED (1 VIEW)   Final Result      No acute cardiopulmonary disease.      EC-ECHOCARDIOGRAM COMPLETE W/O CONT   Final Result      DX-LUMBAR PUNCTURE FOR DIAGNOSIS   Final Result         FLUOROSCOPIC-GUIDED LUMBAR PUNCTURE AS DESCRIBED ABOVE.      IR-MIDLINE CATHETER INSERTION WO GUIDANCE > AGE 3   Final Result                  Ultrasound-guided midline placement performed by qualified nursing staff    as above.          CT-HEAD W/O   Final Result      Normal CT scan of the head without contrast.               INTERPRETING LOCATION:  [address] [facility] [address], [place], [address]      [id number] VIEW   Final Result      Unremarkable AP recumbent abdomen.       DX-CHEST-PORTABLE (1 VIEW)   Final Result      Left basilar opacities, consistent with pneumonia.      CT-HEAD W/O   Final Result      No CT evidence of acute infarct, hemorrhage or mass.      CT-ABDOMEN-PELVIS WITH   Final Result      No evidence of acute intra-abdominal or pelvic process.      Probably duplicated right renal collecting system.      CT-HEAD W/O   Final Result      1.  Focal soft tissue swelling right frontal region with minimal underlying increased density adjacent to the periphery of the outer table of the calvarium which could represent a small cephalohematoma.      2.  Periventricular and subcortical white matter density changes which could be related to small vessel ischemia, demyelinization or gliosis. Findings may be slightly more prominent than on previous exam.          MEDS:  Current Facility-Administered Medications   Medication Last Admin   • oxyCODONE immediate-release (ROXICODONE) tablet 2.5 mg     • oxyCODONE immediate-release (ROXICODONE) tablet 5 mg 5 mg at 11/20/20 1244   • dextrose 5 % and 0.45 % NaCl with KCl 20 mEq New Bag at 11/22/20 0325   • Pharmacy Consult: Enteral tube insertion - review meds/change route/product selection     • acetaminophen (Tylenol) tablet 650 mg 650 mg at 11/21/20 1652   • senna-docusate (PERICOLACE or SENOKOT S) 8.6-50 MG per tablet 2 Tab Stopped at 11/18/20 0600    And   • polyethylene glycol/lytes (MIRALAX) PACKET 1 Packet      And   • magnesium hydroxide (MILK OF MAGNESIA) suspension 30 mL Stopped at 11/18/20 0600    And   • bisacodyl (DULCOLAX) suppository 10 mg     • QUEtiapine (Seroquel) tablet 25 mg 25 mg at 11/21/20 2123   • dicyclomine (BENTYL) tablet 20 mg     • omeprazole (FIRST-OMEPRAZOLE) 2 mg/mL oral susp 40 mg 40 mg at 11/22/20 0557   • morphine (pf) 4 mg/mL injection 1-2 mg 1 mg at 11/20/20 1748   • naloxone (NARCAN) injection 0.4 mg     • Respiratory Therapy Consult     • diphenhydrAMINE (BENADRYL) injection 12.5-25 mg     •  LORazepam (ATIVAN) injection 0.5 mg 0.5 mg at 11/20/20 1949   • Pharmacy Consult Request ...Pain Management Review 1 Each     • insulin regular (HumuLIN R,NovoLIN R) injection Stopped at 11/12/20 1700    And   • dextrose 50% (D50W) injection 50 mL 50 mL at 11/17/20 1329   • enoxaparin (LOVENOX) inj 30 mg 30 mg at 11/22/20 0557   • ondansetron (ZOFRAN) syringe/vial injection 4 mg         ASSESSMENT/PLAN: This is a 49-year-old female with a known history of myotonic muscular dystrophy admitted seven days ago on 11/11 for worsening abdominal pain, extremity pain, numbness. The patient on day of admission suffered a ground-level fall from her wheelchair with resultant cephalohematoma necessitating visit to the emergency department. Developed hypertension, tachycardia, and altered mental status since day to mission.      1. Altered Mental Status   ETIOLOGY:  The patient continues to have cognitive deficiencies and altered mental status since her admission. The patient’s spouse continues to elaborate that the patient, prior to her admission, had good cognitive capacity and was described as sharp. Broad differential diagnoses include viral versus bacterial encephalopathy, neurodegenerative disease secondary to muscular dystrophy, delirium, and cerebrovascular accident. Numerous cultures and immunology, including HSV, has come back negative, and there is no clear etymology of a viral or bacterial encephalopathy. Neurodegenerative disease of the brain considered, but challenging to diagnose at this time. Delirium considered giving her elongated hospital stay, however an underlying diagnosis is still undetermined. CT scan did not show ischemia or infarct. MRI is nonspecific for any acute vs. Chronic changes leading to rapid neurodegeneration; however, a left retinal detachment was picked up.   The patient's cognitive status has been improving gradually over the course of her admission. She was assessed by palliative care  yesterday. A recommendation of placement in a skilled nursing facility after consultation with the patient's spouse revealed concern that he could not take care of the patient while he was at work.   PLAN:      -  Continue to monitor cognitive status throughout admission.     -  Potential placement at a SNF tomorrow.      2. Left Retinal detachment   ETIOLOGY: MRI demonstrated a left retinal attachment yesterday. She did not have any vision complaints during multiple examinations, but it is later revealed that she did tell her  that she was having difficulty seeing from her left eye yesterday. When asked regarding symptoms, the patient does not remember when this started, or how long it has been going on. Broad differential for rental detachment includes rhegmatogenous, tractional, and exudative. Rhegmatogenous doubted given the patient’s age and no prior history of eye surgery. Exudative doubted given no evidence of periorbital edema or retinal hemorrhage on initial exam. Tractional retinal detachment considered given the patient’s known history of diabetes mellitus with various levels of control. Ophthalmology was consulted emergently regarding this finding. They did assess the patient, and found that Mars or detachment has occurred, but given a vague history of symptoms, timing, and the patient’s current mental status, intervention cannot be performed at this time.   PLAN:    -  Follow up with ophthalmology as an outpatient.      3. Failure to thrive with limited activities of daily living   ETIOLOGY: The patient continues to have difficulties with nutrition throughout admission, and maintains a BMI of 16. A coretrack NG has been placed for parenteral nutrition. There has been no issues with this administration yesterday. The patient also continues to have apparent decreases in activities of daily living. Her  provides all support for the patient, Including bathing and a regular diet. Given this,  the patient may benefit from palliative care at this point.   The patient has had difficult maintaining her NG tube secondary to agitation, pulling it out repeatedly. She has expressed that she is not interested in parental routes of nutrition of any capacity, and will challenge herself PO.   PLAN:     -  Encourage PO solid and liquid intake as tolerated.     4. Acute Respiratory Failure  ETIOLOGY: Unknown.  Previous arterial blood gas shows respiratory acidosis. Consider progression of muscular dystrophy leading to decrease respiratory drive.   PLAN:     -  Continue to monitor oxygen saturation.        5. Hypotension, Resolved. Tachycardia, ongoing.   ETIOLOGY: Likely secondary to ongoing respiratory acidosis and hypoxia. Considered is TBI with resultant secondary sympathetic hyperactivation.   PLAN:     -  Continue tele monitoring    -  Continue IVMF D5LR @75mL/hour    -  Monitor urinary output     6. Abdominal Pain   ETIOLOGY: The patient continues to complain of abdominal  pain similar to the pain she had for admission. Exam continues to be unremarkable with no tenderness, distention, organomegaly. Patient has not had any nausea, vomiting, diarrhea, hematochezia, or Melena. Imaging has been negative throughout her admission. Your analysis is negative. The patient’s spouse does report the patient being Admitted for pancreatitis 1.5 months ago with similar pain twice she’s complaining about today but worsening.  PLAN:     -  Continue to monitor.      7. Upper Extremity Pain, Bilateral lower extremity pain   ETIOLOGY: Present on admission. Possibly due the patient's underlying myotonic dystrophy.  states that this is new for patient. GLF on admission at hospital. Cephalohematoma seen on CT head. MRI did not demonstrate any cerebral or cerebellar abnormalities; therefore, etiology of this pain is still unclear.   PLAN:     -  Continue to monitor.         Core Measures:   Fluids: 50% D5 LR  75mL/hour  Lines: IVP  Abx: Vancomycin and acyclovir discontinued. Ceftriaxone course completed.   DVT prophylaxis: Lovenox   Code Status: DNR, DNI     Disposition: Continue admission today, with potential for discharge to [place] tomorrow.      Renny Pope MS3  [facility]

## 2020-11-22 NOTE — PROGRESS NOTES
Bedside report received from day shift RN. Assumed care at 1900. Pt answered A&O questions appropiately. Pt is in bed. Pt denies pain at this time. Pt was updated on plan of care. Pt has call light, personal belongings, and bedside table within reach. Bed is in the lowest position. Will continue to monitor

## 2020-11-22 NOTE — DIETARY
CNA tried to feed pt; however, pt refused to eat what the kitchen brought her. Called the kitchen for a new tray with different food.

## 2020-11-22 NOTE — CONSULTS
Reason for PC Consult: Advance Care Planning    Consulted by: Kimberly Mc MD    Assessment:  General: 48yo lady Admitted through ED 11/11 with c/o periumbilical abdominal pain, pt fell from wheelchair in ED - CT imaged possible small right cephalohematoma, 11/14 - AMS and increased WBC, lumbar puncture unremarkable, 11/18 brain MRI with incidental finding of left retinal detachment, seem by opthalmology - diagnosed left dislocated intraocular lens, pt pulled cortrak, decreased nutritional intake per staff (BMI 19.2, albumin 2.4, total protein 5.0). 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 Yes-    Has a  or spiritual provider visit been requested? No    Palliative Performance Scale: 40    Advanced Directive: None-    DPOA:  -    POLST:Yes- Completed at this encounter for DNR/Selective treatment/DNI/no artifiicial nutrition. Pt agreed to these decisions.  signed as health care surrogate as pt's capacity waxes and wanes. Signed by MD, scanned to epic, original placed on hard chart to go with pt at d/c.    Code Status: DNR- DNI, discussed at this encounter with pt and then with pt & spouse together. Spouse supported DNR/DNI and confirmed that this was consistent with previous discussions he and pt have had in the past.    Social:   Pt lives in [place] with her  Adryan Goodwin. Adryan does shift work at [employer]. Pt enjoys shopping and getting outside when she is able, as well as watching tv and raghav on phone. She misses having a dog and would like to have one again. Pt has two sons - one lives in  as he also has myotonic dystrophy plus developmental delay, the other son Tonny Escudero is a  " in [place].     Outcome:  14:45 - Introduced self and role of Palliative Care to pt.  Assessed pt's understanding of her current medical status, overall health picture, and options for future care. Pt verbalized her current lack of appetite and energy and related that to having \"an infection.\" Provided education on disease progression and what pt might expect to experience as her myotonic dystrophy advances. Pt stated understanding, \"but I don't want to talk about dying and stuff.\" Pt does stutter and lacks enunciation at times but generally is fairly easy to understand. She was incontinent of urine and was asking for bedside commode for BM. Pt RN assisted pt on bedpan.     Explored pt's values, beliefs, and preferences in order to identify GOC. Validated that talking about increased debility and end of life wishes was difficult and took courage, but explained that it was needed so that pt could make decisions for herself, and then explored some of those possible decisions. Regarding artificial nutrition, pt adamant that she does not want cortrak or PEG even temporarily. She also stated not wanting to be intubated \"or on any of those machines.\" She hesitated regarding CPR but decided DNR as, again, she does not want to be intubated.    Dr. Mc and pt's  Adryan joined conversation at bedside. Above reviewed and pt's wishes documented in POLST (see above). Lengthy conversation regarding d/c and beyond. While expressing that he did think that this could be pt's new baseline, which he states is quite a decline from two months ago when she could get to bathroom on her own (but also was falling), Adryan would like to give pt the opportunity to get stronger and possibly return home. Adryan stated that he does not think at this functional level that pt is safe to be at home while he is working and IDT concurred. Pt has never been to SNF for rehab and criteria of being able to participate and progress " "was explained with encouragement given to pt to work hard at her nutritional intake and therapies. Pt stated her intent to do so, but she wants the staff feeding her to \"don't overpower me, I need time.\"     Discussed that if pt couldn't progress at rehab, then possibility of transitioning to long term Medicaid bed and eventually hospice support. Hospice providing medical care, including emotional care, at SNF was explained to pt's . He verbalized understanding and is aware to talk with IDT at SNF, including SW, about these pathways.    Active listening, reflection, reminiscing, validation & normalization, and empathic support utilized throughout this encounter.  All questions answered.  PC contact information given.     Discussed with/Updated: Dr. Mc, pt MCKAY White    Plan:  D/c to SNF in [place] if possible, otherwise  ok with [facility]. Palliative care to continue to follow, provide support, and help facilitate decision-making as needed.      Thank you for allowing Palliative Care to participate in this patient's care. Please feel free to call [phone] with any questions or concerns.  "

## 2020-11-22 NOTE — PROGRESS NOTES
Assumed care of pt, received bedside report from MCKAY Lovell. Pt laying in bed, no complaints of pain, room air, A/Ox4. Fall and safety precautions in place, strip alarm in place. Discussed POC with pt, pt verbalizes understanding. No further needs at this time.

## 2020-11-22 NOTE — PROGRESS NOTES
This RN attempted to feed patient.  Patient refusing to eat at this time.  Educated patient on importance of oral intake and nutrition.  Patient continued to refuse.

## 2020-11-23 NOTE — PROGRESS NOTES
Assumed care of pt. Bedside report received from night RN Delicia. Pt was updated on plan of care. Call light, phone and personal belongings within reach. Bed alarm on and working appropriately.

## 2020-11-23 NOTE — PROGRESS NOTES
Aleja RN notified this RN that pt bed alarm went off, Aleja states pt found on knees at bedside. MD at bedside. All fall precautions in place. Bed at lowest position, bed alarm on, 3 side rails up, non skid socks and fall band in place. Pt states did not hit head. Pt assisted back to bed. Denies pain. Lap belt applied, pt able to demonstrate ability to unbuckle. Will continue to monitor.

## 2020-11-23 NOTE — DISCHARGE PLANNING
[facility] has no beds.    [facility], per Jaclyn, will follow up with [facility].    David [place]bu[place], [facility] left a vmail.      [place], [facility] left a vmail    [place], [facility] left a vmail.

## 2020-11-23 NOTE — DIETARY
This CNA arrived in pts room with lunch tray, told pt what was for lunch and she said no to all of the items on the tray. Asked the pt what she would eat and she said radha, so I called the kitchen and ordered a second lunch tray.

## 2020-11-23 NOTE — PROGRESS NOTES
[facility] FAMILY MEDICINE PROGRESS NOTE     Attending:   Dr. Tucker    Resident:   Kimberly Mc MD    PATIENT:   Gayla Escudero; [id number]; 1970    SUBJECTIVE:   No acute events overnight. Patient is anxious and tearful on exam today. She is trying to get out of bed without assistance this AM. Requesting that someone sit with her and rub her back. No acute pain.     OBJECTIVE:  Vitals:    11/22/20 2000 11/23/20 0000 11/23/20 0400 11/23/20 0800   BP: 102/71 116/73 106/72 116/75   Pulse: (!) 125 (!) 122 (!) 108 (!) 111   Resp: 16 16 16 (!) 22   Temp: 36.7 °C (98 °F) 37.3 °C (99.1 °F) 37 °C (98.6 °F) 37.4 °C (99.3 °F)   TempSrc: Temporal Temporal Temporal Temporal   SpO2: 91% 90% 94% 88%   Weight:       Height:         No intake or output data in the 24 hours ending 11/23/20 1053    PHYSICAL EXAM:   General: Emotional distress, afebrile, conversational   HEENT: NC/AT. EOMI.   Cardiovascular: RRR without murmurs, rubs, heaves. Normal capillary refill   Respiratory: CTAB, no tachypnea or retractions   Abdomen: normal bowel sounds, soft, nontender, nondistended, no masses, no organomegaly   EXT:  JIM, no edema  Skin: No erythema/lesions   Neuro: Non-focal, alert and orientated       LABS:  No results for input(s): WBC, RBC, HEMOGLOBIN, HEMATOCRIT, MCV, MCH, RDW, PLATELETCT, MPV, NEUTSPOLYS, LYMPHOCYTES, MONOCYTES, EOSINOPHILS, BASOPHILS, RBCMORPHOLO in the last 72 hours.  Recent Labs     11/21/20  0748   SODIUM 141   POTASSIUM 4.2   CHLORIDE 111   CO2 24   BUN 2*   CREATININE 0.20*   CALCIUM 8.5   MAGNESIUM 2.0     Estimated GFR/CRCL = Estimated Creatinine Clearance: 247.1 mL/min (A) (by C-G formula based on SCr of 0.2 mg/dL (L)).  Recent Labs     11/21/20  0748 11/21/20  0748 11/22/20  0600 11/22/20  0724 11/22/20  1140   GLUCOSE 88  --   --   --   --    POCGLUCOSE  --    < > 66 83 82    < > = values in this interval not displayed.                 No results for input(s): INR, APTT, FIBRINOGEN in the last 72  hours.    Invalid input(s): DIMER    MICROBIOLOGY:   Blood Culture Hold   Date Value Ref Range Status   02/19/2020 Collected  Final        IMAGING:   DX-ABDOMEN FOR TUBE PLACEMENT   Final Result         1.  Nonspecific bowel gas pattern.   2.  Dobbhoff tube tip overlying the expected location of the pylorus or first duodenal segment.   3.  Left basilar atelectasis      MR-BRAIN-WITH & W/O   Final Result      1.  Study is again limited by patient motion.   2.  Mild cerebral atrophy, prominent for the patient's age.   3.  T2 FLAIR hyperintensity in the anterior bilateral frontal lobes probably encephalomalacia/gliosis in could be related to old trauma.   4.  Additional mild nonspecific scattered white matter disease, possibly chronic microvascular ischemic changes.   5.  No acute intracranial hemorrhage or infarct.   6.  Left retinal detachment.      DX-ABDOMEN FOR TUBE PLACEMENT   Final Result         1.  Nonspecific bowel gas pattern.   2.  Dobbhoff tube is coiled within the stomach, the tip terminates overlying the expected location of the gastric fundus.   3.  Left basilar atelectasis      IR-MIDLINE CATHETER INSERTION WO GUIDANCE > AGE 3   Final Result                  Ultrasound-guided midline placement performed by qualified nursing staff    as above.          MR-BRAIN-WITH & W/O   Final Result      1.  Limited exam due to motion artifact.   2.  Diffuse atrophy and white matter microvascular ischemic changes.   3.  No acute stroke or evidence for intracranial hemorrhage.      DX-CHEST-LIMITED (1 VIEW)   Final Result      No acute cardiopulmonary disease.      EC-ECHOCARDIOGRAM COMPLETE W/O CONT   Final Result      DX-LUMBAR PUNCTURE FOR DIAGNOSIS   Final Result         FLUOROSCOPIC-GUIDED LUMBAR PUNCTURE AS DESCRIBED ABOVE.      IR-MIDLINE CATHETER INSERTION WO GUIDANCE > AGE 3   Final Result                  Ultrasound-guided midline placement performed by qualified nursing staff    as above.          CT-HEAD  W/O   Final Result      Normal CT scan of the head without contrast.               INTERPRETING LOCATION:  [address], [place], [address]      TB-UIXAGMT-9 VIEW   Final Result      Unremarkable AP recumbent abdomen.      DX-CHEST-PORTABLE (1 VIEW)   Final Result      Left basilar opacities, consistent with pneumonia.      CT-HEAD W/O   Final Result      No CT evidence of acute infarct, hemorrhage or mass.      CT-ABDOMEN-PELVIS WITH   Final Result      No evidence of acute intra-abdominal or pelvic process.      Probably duplicated right renal collecting system.      CT-HEAD W/O   Final Result      1.  Focal soft tissue swelling right frontal region with minimal underlying increased density adjacent to the periphery of the outer table of the calvarium which could represent a small cephalohematoma.      2.  Periventricular and subcortical white matter density changes which could be related to small vessel ischemia, demyelinization or gliosis. Findings may be slightly more prominent than on previous exam.          CULTURES:   Results     Procedure Component Value Units Date/Time    Blood Culture [[id number]] Collected: 11/14/20 0601    Order Status: Completed Specimen: Blood Updated: 11/19/20 0900     Significant Indicator NEG     Source BLD     Site Periperal     Culture Result No growth after 5 days of incubation.    Narrative:      Right Hand    Blood Culture [[id number]] Collected: 11/14/20 0242    Order Status: Completed Specimen: Blood Updated: 11/19/20 0500     Significant Indicator NEG     Source BLD     Site Peripheral     Culture Result No growth after 5 days of incubation.    Narrative:      Left Wrist    Blood Culture [[id number]] Collected: 11/13/20 1741    Order Status: Completed Specimen: Blood from Peripheral Updated: 11/18/20 2233     Significant Indicator NEG     Source BLD     Site PERIPHERAL     Culture Result No growth after 5 days of incubation.    Narrative:      Per Hospital Policy: Only change  "Specimen Src: to \"Line\" if  specified by physician order.  Left Hand    Blood Culture [[id number]] Collected: 11/13/20 1740    Order Status: Completed Specimen: Blood from Peripheral Updated: 11/18/20 2233     Significant Indicator NEG     Source BLD     Site PERIPHERAL     Culture Result No growth after 5 days of incubation.    Narrative:      Per Hospital Policy: Only change Specimen Src: to \"Line\" if  specified by physician order.  Left AC    HSV 1/2 By PCR(Herpes)+[id number] [[id number]] Collected: 11/14/20 1725    Order Status: Completed Specimen: CSF from Spinal Fluid Updated: 11/17/20 1626     Source CSF     HSV Type I Negative     HSV Type 2 Negative     Comment: CSF has not been FDA approved for use with the Rik® HSV 1  and HSV 2 assay.  CSF samples have been validated at  [facility], in accordance with regulatory  guidelines (CAP) for use on this assay.  Mutations or polymorphisms in primer- or probe-binding regions  may affect detection of new or unknown variants, resulting  in a false negative result with the Rik® HSV 1 and 2 Assay.         Narrative:      Collected By:[id number] SHANKAR KAY  Please add to CSF sample in lab.    CSF CULTURE [[id number]] Collected: 11/14/20 1725    Order Status: Completed Specimen: CSF Updated: 11/17/20 0812     Significant Indicator NEG     Source CSF     Site Tap     Culture Result No growth at 72 hours.     Gram Stain Result No organisms seen.          MEDS:  Current Facility-Administered Medications   Medication Last Admin   • hydrOXYzine HCl (ATARAX) tablet 25 mg     • acetaminophen (Tylenol) tablet 650 mg     • dicyclomine (BENTYL) tablet 20 mg     • magnesium hydroxide (MILK OF MAGNESIA) suspension 30 mL Stopped at 11/23/20 0600    And   • senna-docusate (PERICOLACE or SENOKOT S) 8.6-50 MG per tablet 2 Tab Stopped at 11/22/20 1800    And   • polyethylene glycol/lytes (MIRALAX) PACKET 1 Packet      And   • bisacodyl (DULCOLAX) suppository 10 mg   "   • oxyCODONE immediate-release (ROXICODONE) tablet 2.5 mg     • oxyCODONE immediate-release (ROXICODONE) tablet 5 mg     • QUEtiapine (Seroquel) tablet 25 mg 25 mg at 11/22/20 2054   • omeprazole (PRILOSEC) capsule 20 mg 20 mg at 11/23/20 0601   • morphine (pf) 4 mg/mL injection 1-2 mg 1 mg at 11/20/20 1748   • naloxone (NARCAN) injection 0.4 mg     • Respiratory Therapy Consult     • Pharmacy Consult Request ...Pain Management Review 1 Each     • dextrose 50% (D50W) injection 50 mL 50 mL at 11/17/20 1329   • enoxaparin (LOVENOX) inj 30 mg 30 mg at 11/23/20 0601   • ondansetron (ZOFRAN) syringe/vial injection 4 mg       ASSESSMENT/PLAN: 49 y.o. female with a  PMHx of myotonic muscular dystrophy admitted on 11/11 for worsening ABD pain, extremity pain and numbness. Sustained a GLF in the ED resulting a cephalohematoma. Developed hypotension, tachycardia, and AMS day 2 of admission. Patient gradually improved in mentation, and appears to be close to her baseline mental and physical status.     # Severe calorie malnutrition  # Poor oral intake   We have discussed different options for artifical nutrition, with the NG tube being the least invasive for current needs. Trial of Cortrak feeding tube was not well tolerated by patient. Met with Palliative care, , patient 11/21 for advanced care planning. Goals of care were discussed. Both the patient and  acknowledge that this may be the patient's new baseline status and she is unable to safely take care of herself at home when her  is at work. Long discussion with patient regarding her need for nutrition if she wants to become stronger. She understands and is willing to try and participate more in PO feeding. She does not want a feeding tube, G tube or J tube.  is in agreement with that plan.   - Speech consult- appreciate recs  - Nutrition consult- appreciate recs  - Palliative care following. POLST completed today  - Patient only taking in a  few mLs at time. Patient pulled her midline IV out 11/22. No continued need for IV at this time. Discontinue Q6hr glucose checks given the fact that patient has not required insulin during this admission for her DM2     # Encephalopathy- resolved  # AMS- improving  Workup has been largely negative up to this point. Possibly due to hypercarbia d/t respiratory acidosis. GLF on admission could be contributing.   Work up: CSF cultures NGTD. TSH WNL, previous drug screens only + for chronic opioid use, sepsis workup negative. MRI showing diffuse atrophy, no acute hemorrhage or ischemic stroke. EEG WNL. HSV neg.   Given slow but gradual improvement, continue to monitor mental status, but it appears that patient is approaching her baseline.   - Neurology consulted without further recs - signed off  - Discharge planning for safety, probably requiring a long term care facility or SNF.   - Continue Seroquel 25mg PO at bedtime for sleep     # Upper ext pain, right  # BL LE pain  Present on admission. Possibly d/t patient's underlying myotonic dystrophy. MRI 11/18 showing diffuse atrophy, gliosis, left retinal detachment. Remaining work up has been largely negative. Consider new onset weakness progression of muscular dystrophy. Episode 11/22 of bilateral loss of sensation in feet per patient. Neuro exam largely normal, no focal deficits were elicited. MRI not indicated at this time, patient appears to be at baseline in terms of generalized weakness and LE tingling.   - Continue to monitor     # Hypernatremia - resolved  # Hypokalemia- resolved  Likely due to poor nutritional status. Patient pulled NG tube out multiple times. Currently, she is tolerating small amounts of oral intake. No NG tube in place.  - Encourage PO intake     # Left retinal detachment   MRI incidentaly discovered left retinal detachment. Patient told  that she is having difficulty seeing out of left eye, but did not report prior to finding.  Ophthalmology was consulted.   - Recommending outpatient follow up      # Acute respiratory acidosis resolved  # Acute respiratory failure resolved   CXR WNL, WBC WNL, ABGs consistent with respiratory acidosis.   Currently on RA and saturating in the high 90s.   - Continue to monitor  - Goal O2 >88%  - Completed regimen of ceftriaxone 11/19     # Hypotension- resolved  # Tachycardia resolved  Believed to be due to sepsis given acute increase in WBC to 17; lactic acid up to 4. BP responded well to fluid bolus. Remains tachycardic overnight.   Etiology: unknown at this time, work up for source of infection largely negative at this point. Consider: dehydration, possibly adrenal insufficiency.   - Continue tele monitoring  - Monitor urinary output     # Abdominal pain- improving  Present on admission, patient states she is still having abd pain. Physical exam reassuring. No reg flag symptoms: no blood in stool, N/V/D or constipation.    Work up: CT ABD WNL; UA negative; RUQ US negative 9/2020  - Continue to monitor     # ADLs   assists with all ADLs. Patient uses walker at home.   Per , patient has been exhibiting an increase in falls lately with unknown head trauma, unable to care for her when he goes to work.   PT/OT recommending SNF. Referral placed.   - Case management to provide updates on SNF referral.      #Type II DM  Blood glucose levels within normal limits in setting of poor PO intake.  - Once daily glucose check  - Patient has not required insulin during this admission      Core Measures:   Fluids: PO  Lines: none  Abx: none  DVT prophylaxis: Lovenox   Code Status: DNR/DNI     Disposition: medical cleared for discharge. Medically stable for transfer off of tele floor    Kimberly Mc MD   PGY-1 Family Medicine Resident   [facility]

## 2020-11-23 NOTE — PROGRESS NOTES
Received report from Tang BASHIR regarding prior 12 hours. Pt asleep in bed, no signs of pain or distress.  at bedside. POC reviewed, all questions answered. Bed locked in lowest position, fall precautions in place. Will continue to monitor.

## 2020-11-23 NOTE — DISCHARGE INSTRUCTIONS
Discharge Instructions    Discharged to group home by medical transportation with escort. Discharged via wheelchair, hospital escort: Yes.  Special equipment needed: Not Applicable    Be sure to schedule a follow-up appointment with your primary care doctor or any specialists as instructed.     Discharge Plan:   Diet Plan: Discussed  Activity Level: Discussed  Confirmed Follow up Appointment: Appointment Scheduled  Confirmed Symptoms Management: Discussed  Medication Reconciliation Updated: Yes  Influenza Vaccine Indication: Patient Refuses    I understand that a diet low in cholesterol, fat, and sodium is recommended for good health. Unless I have been given specific instructions below for another diet, I accept this instruction as my diet prescription.   Other diet: pureed mildly thick liquid diet    Special Instructions: None    · Is patient discharged on Warfarin / Coumadin?   No     Depression / Suicide Risk    As you are discharged from this [facility] facility, it is important to learn how to keep safe from harming yourself.    Recognize the warning signs:  · Abrupt changes in personality, positive or negative- including increase in energy   · Giving away possessions  · Change in eating patterns- significant weight changes-  positive or negative  · Change in sleeping patterns- unable to sleep or sleeping all the time   · Unwillingness or inability to communicate  · Depression  · Unusual sadness, discouragement and loneliness  · Talk of wanting to die  · Neglect of personal appearance   · Rebelliousness- reckless behavior  · Withdrawal from people/activities they love  · Confusion- inability to concentrate     If you or a loved one observes any of these behaviors or has concerns about self-harm, here's what you can do:  · Talk about it- your feelings and reasons for harming yourself  · Remove any means that you might use to hurt yourself (examples: pills, rope, extension cords, firearm)  · Get professional  help from the community (Mental Health, Substance Abuse, psychological counseling)  · Do not be alone:Call your Safe Contact- someone whom you trust who will be there for you.  · Call your local CRISIS HOTLINE 851-3544 or 499-279-1673  · Call your local Children's Mobile Crisis Response Team [place] (086) 402-4953 or www.Infinisource  · Call the toll free National Suicide Prevention Hotlines   · National Suicide Prevention Lifeline 299-216-EGZP (0804)  · National Hope Line Network 800-SUICIDE (734-8015)      Abdominal Pain, Adult    Many things can cause belly (abdominal) pain. Most times, belly pain is not dangerous. Many cases of belly pain can be watched and treated at home. Sometimes belly pain is serious, though. Your doctor will try to find the cause of your belly pain.  Follow these instructions at home:  · Take over-the-counter and prescription medicines only as told by your doctor. Do not take medicines that help you poop (laxatives) unless told to by your doctor.  · Drink enough fluid to keep your pee (urine) clear or pale yellow.  · Watch your belly pain for any changes.  · Keep all follow-up visits as told by your doctor. This is important.  Contact a doctor if:  · Your belly pain changes or gets worse.  · You are not hungry, or you lose weight without trying.  · You are having trouble pooping (constipated) or have watery poop (diarrhea) for more than 2-3 days.  · You have pain when you pee or poop.  · Your belly pain wakes you up at night.  · Your pain gets worse with meals, after eating, or with certain foods.  · You are throwing up and cannot keep anything down.  · You have a fever.  Get help right away if:  · Your pain does not go away as soon as your doctor says it should.  · You cannot stop throwing up.  · Your pain is only in areas of your belly, such as the right side or the left lower part of the belly.  · You have bloody or black poop, or poop that looks like tar.  · You have very bad  pain, cramping, or bloating in your belly.  · You have signs of not having enough fluid or water in your body (dehydration), such as:  ? Dark pee, very little pee, or no pee.  ? Cracked lips.  ? Dry mouth.  ? Sunken eyes.  ? Sleepiness.  ? Weakness.  This information is not intended to replace advice given to you by your health care provider. Make sure you discuss any questions you have with your health care provider.  Document Released: 06/05/2009 Document Revised: 07/07/2017 Document Reviewed: 05/31/2017  [employer] Interactive Patient Education © 2020 [employer]

## 2020-11-23 NOTE — DISCHARGE PLANNING
Anticipated Discharge Disposition: SNF    Action: RN JAMIL discussed referral with Jaclyn at [facility]. Since patient had a lap belt this morning, it will have to be removed for 24 hours before snf will consider accepting patient. Once restraint has been absent for 24 hours, case coordination to send updated notes to [facility] for them to consider patient.    Barriers to Discharge: restraint off for 24 hours, placement    Plan: Case coordination to f/u for absence of restraints for 24 hours to update snf

## 2020-11-23 NOTE — CARE PLAN
Problem: Nutritional:  Goal: Achieve adequate nutritional intake  Description: Patient will consume >50-75% of meals  Outcome: PROGRESSING SLOWER THAN EXPECTED    0-25% of meals consumed, pt refused most meals per ADL's.

## 2020-11-23 NOTE — PALLIATIVE CARE
Spiritual Care Note        Patient's Name: Gayla Escudero   MRN: [id number]    YOB: 1970   Age and Gender: 49 y.o. female   Service Area/Unit: [facility]   Room (and Bed): T834/02   Ethnicity or Nationality:     Primary Language: English   Yazidi/Spiritual Preference: None   Place of Residence: [place]   Medical Diagnoses: upper extremity pain, diffuse, right  bilateral lower extremity pain  abdominal pain  myotonic muscular dystrophy    Code Status: DNAR/DNI    Date of Admission: 11/11/2020   Length of Stay: 10 days        Spiritual Screen Results:  Is your spiritual health or inner well-being important to you as you cope with your medical condition?:   Yes  Would you like to receive a visit from our Spiritual Care team or your own Judaism or spiritual leader?:   No      Visited With:   Patient    Nature of the Visit:   Initial, On shift    Continue Visiting:   Yes    General Visit:   Yes    Referral From/Origin of Request:   Verbal staff, Phone    Observations/Symptoms:   Patient partially sitting up in bed, alert, pleasant, appeared to having difficulty physical forming her words.    Interaction/Conversation:   Patient talked about her reason for being hospitalized, her family, and asked for prayer.    Assessment:   Need -- Seeking Spiritual Assistance and Support    Yazidi Needs:   Prayer    Intended Effects:   Convey a Calming Presence, Lessen Someone's Feelings of Isolation    Interventions:   Compassionate presence, emotional suppoort, reflective listening, prayer.    Outcomes:   Spiritual Comfort    Plan:   Continue to follow      Spiritual Care Provider Information:  Chaplain ROSHAN Rucker  [phone]   [email or web address]

## 2020-11-24 PROBLEM — T17.908A ASPIRATION INTO AIRWAY: Status: RESOLVED | Noted: 2020-01-01 | Resolved: 2020-01-01

## 2020-11-24 PROBLEM — E43 SEVERE PROTEIN-CALORIE MALNUTRITION (HCC): Status: ACTIVE | Noted: 2020-01-01

## 2020-11-24 PROBLEM — R09.02 HYPOXIA: Status: RESOLVED | Noted: 2020-01-01 | Resolved: 2020-01-01

## 2020-11-24 NOTE — PROGRESS NOTES
Pt is axox3, disoriented to place, although she knows she is in [place]. She is anxious and teary. Reporting pain 10/10, PRN oxy 5 given. Pt requesting morphine. Hydro-heat pad applied to back. Plan of care reviewed, patient board updated, environmental safety precautions in place, and frequent rounding in practice.

## 2020-11-24 NOTE — PROGRESS NOTES
2 RN skin check completed with MCKAY Roberts.   Devices in place: SCDs.  Skin assessed under devices: yes.  Confirmed pressure ulcers found: none.  New potential pressure ulcers noted: none.  Wound consult placed:  N/A.      Skin assessment: Heels pink, blanching, slightly boggy and dry.  Sacrum excoriated. Generalized bruising.      The following interventions in place: Barrier cream. Waffle overlay mattress. Pillows for support, positioning and floating heels. Frequent incontinence checks.

## 2020-11-24 NOTE — THERAPY
"Missed Therapy     Patient Name: Gayla Escudero  Age:  49 y.o., Sex:  female  Medical Record #: [id number]  Today's Date: 11/24/2020    Discussed missed therapy with RN . Attempted to see pt X3 today for OT tx. Pt confused, lethargic possible from Morphine and shakng her head \"no\" to all questions asked. CNA/RN stated pt is not following directions, not assisting to help herself with any self care tasks even simple ones like washing face. OT treatment held today .Will attempt next scheduled OT session. Will continue with POC as pt is able to participate. Nursing informed and agreed.         11/24/20 1528   Cognition    Cognition / Consciousness X   Speech/ Communication Delayed Responses;Incomprehensible Words   Level of Consciousness Confused   Ability To Follow Commands Unable to Follow 1 Step Commands  (not able to follow commands today per RN. )   Comments Pt shaking head ,No\"  to all questions asked.  Pt does not appear to be understanding questions.    Interdisciplinary Plan of Care Collaboration   IDT Collaboration with  Nursing;Certified Nursing Assistant   Collaboration Comments Attempted X3 pt unable to cooperate today.        "

## 2020-11-24 NOTE — PROGRESS NOTES
Received report and assumed care of pt. Assessment complete on RA. Pt A&OX4. Pt reports 9/10 abdominal pain, medication given per MAR. Safety sitter at bedside at all times. Encouraging food and fluids. All pt needs met at this time. Safety precautions and hourly rounding in place.

## 2020-11-24 NOTE — PROGRESS NOTES
2 RN skin check complete with MCKAY Miranda  Devices in place: none  Skin assessed under devices: n/a  Confirmed pressure ulcers found: none  New potential pressure ulcers found: none  The following interventions in place: waffle mattress, barrier cream, turns and repositioning, incontinence care    Notes:   Generalized- scattered bruising  Sacrum:  Excoriated, peeling, barrier cream  Heels: dry/flaky/intact

## 2020-11-24 NOTE — PROGRESS NOTES
Pt AOx4, medicated for abdominal pain per MAR. Tele-sitter discontinued and safety sitter at bedside at all times. Pt occasionally placing her legs over the side of the bed and reminded to keep them in bed so she doesn't fall out. Medication crushed and put in applesauce and taken without difficulty. Call light within reach, personal belongings available, bed in lowest position, treaded socks on, and bed alarm on. Hourly rounding in place.

## 2020-11-24 NOTE — CARE PLAN
Problem: Communication  Goal: The ability to communicate needs accurately and effectively will improve  Outcome: PROGRESSING AS EXPECTED  Note: Encouraged pt to voice feelings.     Problem: Safety  Goal: Will remain free from falls  Outcome: NOT MET  Note: Safety sitter at bedside. Pt oriented and updated on POC. Bed alarm on. Bed locked and in lowest position.     Problem: Pain Management  Goal: Pain level will decrease to patient's comfort goal  Outcome: PROGRESSING AS EXPECTED  Note: Reassess pain throughout shift and medicate as indicated.

## 2020-11-24 NOTE — THERAPY
"Physical Therapy   Daily Treatment     Patient Name: Gayla Escudero  Age:  49 y.o., Sex:  female  Medical Record #: [id number]  Today's Date: 11/23/2020     Precautions: Fall Risk, Swallow Precautions ( See Comments)    Assessment    Pt presenting more alert and able to participate more w/ therapy. Pt following more cues today and required less assist. She continues to have a fear falling but then when she felt done standing, she just went limp. Pt was able to perform a couple of stands and progress to side steps along the bed. Pt then getting fatigued and stating \"I can't do more, I'm tachycardiac.\"    Plan    Continue current treatment plan.    DC Equipment Recommendations: Unable to determine at this time  Discharge Recommendations: Recommend post-acute placement for additional physical therapy services prior to discharge home      Subjective    \"Can you just stay and talk?\"     Objective       11/23/20 1216   Precautions   Precautions Fall Risk;Swallow Precautions ( See Comments)   Comments baseline myotonic MD   Gait Analysis   Gait Level Of Assist Moderate Assist   Assistive Device Front Wheel Walker   Distance (Feet) 4   # of Times Distance was Traveled 1   Deviation Bradykinetic;Shuffled Gait   Comments Pt needing FWW management. Pt getting fatigued and went limp and not wanting to assist.   Bed Mobility    Supine to Sit Minimal Assist   Sit to Supine Minimal Assist   Scooting Minimal Assist   Rolling Supervised   Comments Pt needing assist w/ trunk to get upright. Pt w/ poor head control when going to supine.   Functional Mobility   Sit to Stand Minimal Assist   Mobility Pt able to stand but kept stating \"I can't I will fall\" or \"I need the 4WW\"   Short Term Goals    Short Term Goal # 1 Pt will be able to transfer supine<>sit with min A within 6 visits to ensure mobility at home.   Goal Outcome # 1 Progressing as expected   Short Term Goal # 2 Pt will be able to transfer sit<>stand with 4WW and min A " within 6 visits to ensure mobility at home.   Goal Outcome # 2 Progressing as expected   Short Term Goal # 3 Pt will be ambulate 25 ft with FWW and min A within 6 visits to ensure mobility around house.    Goal Outcome # 3 Goal not met

## 2020-11-25 PROBLEM — M79.601 UPPER EXTREMITY PAIN, DIFFUSE, RIGHT: Status: RESOLVED | Noted: 2020-01-01 | Resolved: 2020-01-01

## 2020-11-25 PROBLEM — E16.2 HYPOGLYCEMIA: Status: RESOLVED | Noted: 2020-01-01 | Resolved: 2020-01-01

## 2020-11-25 PROBLEM — G93.40 ENCEPHALOPATHY ACUTE: Status: RESOLVED | Noted: 2020-01-01 | Resolved: 2020-01-01

## 2020-11-25 PROBLEM — R65.20 SEVERE SEPSIS (HCC): Status: RESOLVED | Noted: 2020-01-01 | Resolved: 2020-01-01

## 2020-11-25 PROBLEM — I95.89 HYPOTENSION DUE TO HYPOVOLEMIA: Status: RESOLVED | Noted: 2018-03-29 | Resolved: 2020-01-01

## 2020-11-25 PROBLEM — A41.9 SEVERE SEPSIS (HCC): Status: RESOLVED | Noted: 2020-01-01 | Resolved: 2020-01-01

## 2020-11-25 PROBLEM — E86.1 HYPOTENSION DUE TO HYPOVOLEMIA: Status: RESOLVED | Noted: 2018-03-29 | Resolved: 2020-01-01

## 2020-11-25 NOTE — DIETARY
Nutrition Services Update: following for adequate nutritional intake    Day 12 of admit. Pureed/Mildly Thick diet. ADL documentation shows intake has been extremely poor, pt refusing most meals.    Visited bedside, however pt was sleeping. Pt has a CNA sitting at bedside who reports pt has very poor intake - does not eat much. She states yesterday had 0% of breakfast tray and ~50% of a bowl of soup at lunch. She states that pt is awake/alert during meals, however simply refuses to eat. Asked CNA about supplement (Magic Cup) intake, however CNA was unsure.     Encouraged encouragement of PO intake as able given that pt has POLST on file which documents that tube feeding is not within wishes, therefore nutrition intervention is limited.      Plan/Recommend:   1. Pt to benefit from appetite stimulant - discussed with APRN  2. Magic Cup nutrition supplements BID to optimize intake  3. 1:1 feeding (already in place)  4. Encourage PO intake of meals and supplements  5. Please record intake as % meals consumed   6. RD will continue to monitor     RD to follow

## 2020-11-25 NOTE — PROGRESS NOTES
2 RN skin check complete with MCKAY Parr.   Devices in place: n/a.  Skin assessed under devices: n/a.  Confirmed pressure ulcers found: none.  New potential pressure ulcers noted: none.  The following interventions in place: Waffle overlay, 2 RN skin check, turns, pillows for positioning, barrier cream, incontinence care.    Assessed:   Generalized scattered scars and bruising.  Bilateral heels dry, intact, blanching.  Sacrum excoriated, barrier cream applied.

## 2020-11-25 NOTE — CARE PLAN
Problem: Communication  Goal: The ability to communicate needs accurately and effectively will improve  Outcome: PROGRESSING AS EXPECTED  Note: Encourage pt to voice feelings.     Problem: Safety  Goal: Will remain free from falls  Outcome: NOT MET  Note: Safety sitter at bedside. Pt educated on safety. Call light within reach. Bed locked and in lowest position.      Problem: Pain Management  Goal: Pain level will decrease to patient's comfort goal  Outcome: PROGRESSING AS EXPECTED  Note: Reassess pt's pain throughout shift. Medicate as ordered and indicated.

## 2020-11-25 NOTE — CARE PLAN
Problem: Nutritional:  Goal: Achieve adequate nutritional intake  Description: Patient will consume >50-75% of meals  Outcome: PROGRESSING SLOWER THAN EXPECTED   See dietary note. RD following.

## 2020-11-25 NOTE — PROGRESS NOTES
Received report and assumed care of pt. Assessment complete on RA. Pt A&OX4. Denies any pain or discomfort at this time. Pt currently resting in bed. Safety sitter at bedside at all times. All pt needs met at this time. Safety precautions and hourly rounding in place.

## 2020-11-25 NOTE — DISCHARGE PLANNING
Anticipated Discharge Disposition: Skilled    Action: Discussed patient's needs during IDT rounds, patient has a sitter as she continues to jump out of bed. Has fallen several times, fall risk.     Barriers to Discharge: sitter, patient will need to go 24 hours without a sitter, restraints, or a chemical restraint prior to a skilled acceptance    Plan: continue to monitor for discharge barriers

## 2020-11-25 NOTE — CARE PLAN
Problem: Safety  Goal: Will remain free from injury  Outcome: PROGRESSING AS EXPECTED  Note: Patient on continuous monitoring.     Problem: Infection  Goal: Will remain free from infection  Outcome: PROGRESSING AS EXPECTED

## 2020-11-25 NOTE — THERAPY
"Speech Language Pathology  Daily Treatment     Patient Name: Gayla Escudero  Age:  49 y.o., Sex:  female  Medical Record #: [id number]  Today's Date: 11/24/2020     Precautions  Precautions: Fall Risk, Swallow Precautions ( See Comments)  Comments: baseline myotonic MD    Assessment  Patient seen this date for dysphagia tx session. Patient currently on PU4/MT2 diet and per RN and CNA, patient appears to be tolerating diet without difficulty, but requires feeding d/t continued poor PO intake and CNA reported she threw food all over today when attempting to feed herself. Patient required max education and encouragement to participate in therapy and consume PO trials. Patient consumed PO trials of purees, MTL via cup sip and straw, and thins via straw. Patient presented with no overt s/sx of aspiration on any consistencies trialed. However, PO intake and participation was minimal despite repeated education and cues for further trials. Patient eventually became agitated, stating \"Leave me alone! I just want to sleep.\"     Recommend patient continue PU4/MT2 diet with strict 1:1 feeding. Ok for sips of thins between meals as tolerated. Crush meds in puree. Please encourage PO intake.     Plan  Continue current treatment plan.    Discharge Recommendations: Recommend post-acute placement for additional speech therapy services prior to discharge home     Objective     11/24/20 1556   Precautions   Precautions Fall Risk;Swallow Precautions ( See Comments)   Comments baseline myotonic MD   Vitals   O2 Delivery Device None - Room Air   Dysphagia    Positioning / Behavior Modification Modulate Rate or Bite Size   Other Treatments PO trials of MTL via cup sip and straw, thins via straw, and purees    Diet / Liquid Recommendation Puree (4);Mildly Thick (2) - (Nectar Thick);Other (Comments)  (Ok for sips of thins between meals as tolerated )   Recommended Route of Medication Administration   Medication Administration  Crush all " Medications in Puree   Short Term Goals   Short Term Goal # 1 Patient will consume a PU4/MT2 diet with no overt s/sx of aspiration given 1:1 feeding.    Goal Outcome # 1 Progressing slower than expected   Anticipated Discharge Needs   Discharge Recommendations Recommend post-acute placement for additional speech therapy services prior to discharge home

## 2020-11-25 NOTE — PROGRESS NOTES
Pt on bed, alert and oriented x3-4, able to make needs known. 1:1 sitter in place for continuous monitoring. Bed in lowest and locked position. Impulsive behavior x2 noted. Encouraged to increase fluid intake. Appears to be sleeping with no distress.

## 2020-11-25 NOTE — PROGRESS NOTES
2 RN skin check completed with MCKAY Roberts.   Devices in place: SCDs.  Skin assessed under devices: yes.  Confirmed pressure ulcers found: none.  New potential pressure ulcers noted: none.  Wound consult placed:  N/A.      Skin assessment: Generalized bruising. Heels pink and blanching. Sacrum excoriated.      The following interventions in place: Waffle overlay in place. Barrier cream post hygiene incontinence. Pillow provided on bony prominences.

## 2020-11-25 NOTE — PROGRESS NOTES
"Hospital Medicine Daily Progress Note    Date of Service  11/25/2020    Chief Complaint  Abdominal pain    Hospital Course  Ms. Escudero is a 49-year-old female with PMH significant for muscular dystrophy (diagnosed at 16, JOSÉ, migraines, chronic abdominal pain, chronic opioid dependence, chronic lower extremity weakness, and dysphagia who presented 11/11/2020 with abdominal pain. She reported the pain as 8/10, localized to the .umbilicus and nonradiating.  Presentation she started experiencing acute onset right upper extremity and bilateral lower extremity pain and numbness.  While in the ER she also fell forward out of her chair, striking her forehead without loss of consciousness.  CT head showed possible cephalohematoma.  CT abdomen/pelvis showed no evidence of acute intra-abdominal or pelvic processes.    She had sudden onset of altered mental status.  Glucose was found to be 45 and she was treated with hypoglycemia protocol. Lumbar puncture was unremarkable.  She continued to have altered mental status, hypotension and tachycardia.  She was treated with the sepsis protocol.  X-ray was concerning for atelectasis versus pneumonia.  Covid was negative.  Pro Delbert 0.53, up to 1.98.. All cultures have been negative to date.  Antibiotics were discontinued 11/19.  Neurology was consulted and recommended encephalopathy work-up.  She was started on acyclovir until her work-up came back.  Her HSV work-up, syphilis and HIV were negative and the acyclovir was discontinued.  MRI brain 11/18 with incidental finding left retinal detachment. She was seen by ophthalmology and diagnosed with left dislocated intraocular lens.  Palliative care was consulted for advanced care planning.  Patient does NOT want tube feeding. DNAR/DNI per her request    Interval Problem Update  -remains sleepy. Awakens easily \"I know my family is here, I just cannot see them\".  She tells me she is having strange dreams. I told her that her family is " actually not here and she tells me they are in a different hospital.  She can tell me she is in [place], in the hospital, year 2020 and her name.    -She has remained free from her lapbelt restraint greater than 24 hours.  She continues to require 1:1 sitter for ongoing impulsivity and high fall risk.  I discussed this with her today that we need to be able to be without a sitter over 24 hours in order for her to be able to go to SNF. She reports she will try to be more compliant with asking for help before trying to get out of bed on her own  -Her abdominal pain is controlled.  She has had no narcotics since 11 AM 11/24.  -Ongoing poor appetite.  Discussed with dietary.  I have ordered appetite stimulant today    Consultants/Specialty  -Pulmonary/critical care -signed off  -Neurology -signed off  -[facility] -and off    Code Status  DNAR/DNI    Disposition  SNF    Review of Systems  Review of Systems   Unable to perform ROS: Mental acuity   Gastrointestinal: Positive for abdominal pain.        Physical Exam  Temp:  [36.2 °C (97.2 °F)-36.9 °C (98.4 °F)] 36.6 °C (97.8 °F)  Pulse:  [] 102  Resp:  [18] 18  BP: (102-120)/(66-80) 108/74  SpO2:  [92 %-94 %] 94 %    Physical Exam  Vitals signs and nursing note reviewed. Exam conducted with a chaperone present.   Constitutional:       General: She is sleeping. She is not in acute distress.     Appearance: She is underweight. She is ill-appearing.      Comments: Thin/frail   HENT:      Head: Normocephalic.      Right Ear: Hearing normal.      Left Ear: Hearing normal.      Nose: Nose normal.      Mouth/Throat:      Lips: Pink.      Mouth: Mucous membranes are dry.   Cardiovascular:      Rate and Rhythm: Normal rate and regular rhythm.      Pulses: Normal pulses.      Heart sounds: Normal heart sounds.   Pulmonary:      Effort: Pulmonary effort is normal. No respiratory distress.      Breath sounds: Decreased breath sounds present.   Abdominal:      Palpations:  Abdomen is soft.      Tenderness: There is generalized abdominal tenderness. There is no guarding or rebound.   Musculoskeletal:      Right lower leg: No edema.      Left lower leg: No edema.   Skin:     General: Skin is warm and dry.   Neurological:      Mental Status: She is oriented to person, place, and time and easily aroused. She is lethargic.   Psychiatric:         Judgment: Judgment is impulsive.         Fluids    Intake/Output Summary (Last 24 hours) at 11/25/2020 1448  Last data filed at 11/25/2020 1129  Gross per 24 hour   Intake 110 ml   Output --   Net 110 ml       Laboratory                        Imaging  DX-ABDOMEN FOR TUBE PLACEMENT   Final Result         1.  Nonspecific bowel gas pattern.   2.  Dobbhoff tube tip overlying the expected location of the pylorus or first duodenal segment.   3.  Left basilar atelectasis      MR-BRAIN-WITH & W/O   Final Result      1.  Study is again limited by patient motion.   2.  Mild cerebral atrophy, prominent for the patient's age.   3.  T2 FLAIR hyperintensity in the anterior bilateral frontal lobes probably encephalomalacia/gliosis in could be related to old trauma.   4.  Additional mild nonspecific scattered white matter disease, possibly chronic microvascular ischemic changes.   5.  No acute intracranial hemorrhage or infarct.   6.  Left retinal detachment.      DX-ABDOMEN FOR TUBE PLACEMENT   Final Result         1.  Nonspecific bowel gas pattern.   2.  Dobbhoff tube is coiled within the stomach, the tip terminates overlying the expected location of the gastric fundus.   3.  Left basilar atelectasis      IR-MIDLINE CATHETER INSERTION WO GUIDANCE > AGE 3   Final Result                  Ultrasound-guided midline placement performed by qualified nursing staff    as above.          MR-BRAIN-WITH & W/O   Final Result      1.  Limited exam due to motion artifact.   2.  Diffuse atrophy and white matter microvascular ischemic changes.   3.  No acute stroke or evidence  for intracranial hemorrhage.      DX-CHEST-LIMITED (1 VIEW)   Final Result      No acute cardiopulmonary disease.      EC-ECHOCARDIOGRAM COMPLETE W/O CONT   Final Result      DX-LUMBAR PUNCTURE FOR DIAGNOSIS   Final Result         FLUOROSCOPIC-GUIDED LUMBAR PUNCTURE AS DESCRIBED ABOVE.      IR-MIDLINE CATHETER INSERTION WO GUIDANCE > AGE 3   Final Result                  Ultrasound-guided midline placement performed by qualified nursing staff    as above.          CT-HEAD W/O   Final Result      Normal CT scan of the head without contrast.               INTERPRETING LOCATION:  [address], [place], [address]      [id number] VIEW   Final Result      Unremarkable AP recumbent abdomen.      DX-CHEST-PORTABLE (1 VIEW)   Final Result      Left basilar opacities, consistent with pneumonia.      CT-HEAD W/O   Final Result      No CT evidence of acute infarct, hemorrhage or mass.      CT-ABDOMEN-PELVIS WITH   Final Result      No evidence of acute intra-abdominal or pelvic process.      Probably duplicated right renal collecting system.      CT-HEAD W/O   Final Result      1.  Focal soft tissue swelling right frontal region with minimal underlying increased density adjacent to the periphery of the outer table of the calvarium which could represent a small cephalohematoma.      2.  Periventricular and subcortical white matter density changes which could be related to small vessel ischemia, demyelinization or gliosis. Findings may be slightly more prominent than on previous exam.           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)  Assessment & Plan  -Diagnosed at age 16  -History of chronic abdominal pain with no clear explained etiology prompting multiple ED visits   -Most likely contributing to chronic abdominal pain as well as onset of of extremity symptoms  ESR, CRP within normal limits    Bilateral lower extremity pain- (present on admission)  Assessment & Plan  -seems to be mostly when  she is ambulatory/mobilizing  -Likely a component of deconditioning from muscular dystrophy  -PT OT  -SNF at discharge  -Pain management      Abdominal pain- (present on admission)  Assessment & Plan  -Chronic  -History of pancreatitis  -CT abdomen this hospitalization showed no acute abnormalities.  -Currently controlled without IV narcotics.  Also noted to use normal p.o. narcotics  -Contributing to her poor p.o. intake  -Having bowel movements  -Abdomen slightly tender on exam           Impaired mobility and ADLs- (present on admission)  Assessment & Plan  -Due to muscular dystrophy  -PT OT  -SNF at [place]      Severe protein-calorie malnutrition (HCC)  Assessment & Plan  -Body mass index is 19.16 kg/m².  -Poor appetite due to abdominal pain  -She had core track at one time but has pulled out -he does not want another tube feeding  -SLP recommends PU/MT2 diet with strict 1:1 feeding.  -Added appetite stimulant 11/25  -Encourage p.o. intake  -Supplements  -Food preferences    Electrolyte abnormality  Assessment & Plan  -Normalized after replacement  -Follow labs    Altered mental status  Assessment & Plan  -Likely her new baseline  -Lethargic but arouses easily  -Answer all orientation questions correctly but states she is having bad dreams (?hallucinations)  -Cautious use of narcotics.  Avoid benzodiazepines and hypnotics  -Frequent reorientation  -Sleep hygiene       VTE prophylaxis: Enoxaparin    I have performed a physical exam and reviewed and updated ROS and Assessment/Plan today (11/25/20). In review of the previous note there are no changes except as documented above    Please note that this dictation was created using voice recognition software. I have made every reasonable attempt to correct obvious errors, but there may be errors of grammar and possibly content that I did not discover before finalizing the note.    DUYEN Gregory.

## 2020-11-25 NOTE — PROGRESS NOTES
Hospital Medicine Daily Progress Note    Date of Service  11/24/2020    Chief Complaint  Acute on chronic abdominal pain    Hospital Course  Ms. Escudero is a 49-year-old female with PMH muscular dystrophy (diagnosed at 16), JOSÉ, migraines, chronic abdominal pain, chronic opioid dependence, chronic lower extremity weakness and dysphagia who presented 11/11/2020 with abdominal pain.  She reported the pain as 8/10, localized to the .umbilicus and nonradiating.  Presentation she started experiencing acute onset right upper extremity and bilateral lower extremity pain and numbness.  While in the ER she also fell forward out of her chair, striking her forehead without loss of consciousness.  CT head showed possible cephalohematoma.  CT abdomen/pelvis showed no evidence of acute intra-abdominal or pelvic processes.  She had sudden onset of altered mental status.  Lumbar puncture was unremarkable.  MRI brain 11/18 with incidental finding left retinal detachment.  She was seen by ophthalmology and diagnosed with left dislocated intraocular lens.    Interval Problem Update  -She sleeps when undisturbed.  She awoke easily during my rounds and immediately started crying out in pain, abdominal, 10/10 and is asking for morphine.    -1:1 sitter at bedside for patient safety    Consultants/Specialty  -Ophthalmology  -Pulmonary/critical care  -Neurology  -Palliative care    Code Status  DNAR/DNI    Disposition  SNF    Review of Systems  Review of Systems   Reason unable to perform ROS: Limited due to mental acuity.   Gastrointestinal: Positive for abdominal pain. Negative for nausea and vomiting.        Physical Exam  Temp:  [36.3 °C (97.4 °F)-37.2 °C (98.9 °F)] 36.9 °C (98.4 °F)  Pulse:  [100-108] 103  Resp:  [17-18] 18  BP: (112-120)/(65-75) 120/74  SpO2:  [92 %-99 %] 92 %    Physical Exam  Vitals signs and nursing note reviewed. Exam conducted with a chaperone present.   Constitutional:       General: She is sleeping. She is not  in acute distress.     Appearance: She is underweight. She is ill-appearing.   HENT:      Head: Normocephalic.      Right Ear: Hearing normal.      Left Ear: Hearing normal.      Nose: Nose normal.      Mouth/Throat:      Lips: Pink.      Mouth: Mucous membranes are dry.   Cardiovascular:      Rate and Rhythm: Normal rate and regular rhythm.      Pulses: Normal pulses.      Heart sounds: Normal heart sounds.   Pulmonary:      Effort: Pulmonary effort is normal. No respiratory distress.      Breath sounds: Decreased breath sounds present.   Abdominal:      Palpations: Abdomen is soft.      Tenderness: There is generalized abdominal tenderness. There is no guarding or rebound.   Musculoskeletal:      Right lower leg: No edema.      Left lower leg: No edema.   Skin:     General: Skin is warm and dry.   Neurological:      Mental Status: She is easily aroused. She is lethargic.   Psychiatric:         Judgment: Judgment is impulsive.      Comments: Agitated at times  Impulsive at times  Uncooperative at times         Fluids    Intake/Output Summary (Last 24 hours) at 11/24/2020 1645  Last data filed at 11/24/2020 1516  Gross per 24 hour   Intake 260 ml   Output --   Net 260 ml       Laboratory                        Imaging  DX-ABDOMEN FOR TUBE PLACEMENT   Final Result         1.  Nonspecific bowel gas pattern.   2.  Dobbhoff tube tip overlying the expected location of the pylorus or first duodenal segment.   3.  Left basilar atelectasis      MR-BRAIN-WITH & W/O   Final Result      1.  Study is again limited by patient motion.   2.  Mild cerebral atrophy, prominent for the patient's age.   3.  T2 FLAIR hyperintensity in the anterior bilateral frontal lobes probably encephalomalacia/gliosis in could be related to old trauma.   4.  Additional mild nonspecific scattered white matter disease, possibly chronic microvascular ischemic changes.   5.  No acute intracranial hemorrhage or infarct.   6.  Left retinal detachment.       DX-ABDOMEN FOR TUBE PLACEMENT   Final Result         1.  Nonspecific bowel gas pattern.   2.  Dobbhoff tube is coiled within the stomach, the tip terminates overlying the expected location of the gastric fundus.   3.  Left basilar atelectasis      IR-MIDLINE CATHETER INSERTION WO GUIDANCE > AGE 3   Final Result                  Ultrasound-guided midline placement performed by qualified nursing staff    as above.          MR-BRAIN-WITH & W/O   Final Result      1.  Limited exam due to motion artifact.   2.  Diffuse atrophy and white matter microvascular ischemic changes.   3.  No acute stroke or evidence for intracranial hemorrhage.      DX-CHEST-LIMITED (1 VIEW)   Final Result      No acute cardiopulmonary disease.      EC-ECHOCARDIOGRAM COMPLETE W/O CONT   Final Result      DX-LUMBAR PUNCTURE FOR DIAGNOSIS   Final Result         FLUOROSCOPIC-GUIDED LUMBAR PUNCTURE AS DESCRIBED ABOVE.      IR-MIDLINE CATHETER INSERTION WO GUIDANCE > AGE 3   Final Result                  Ultrasound-guided midline placement performed by qualified nursing staff    as above.          CT-HEAD W/O   Final Result      Normal CT scan of the head without contrast.               INTERPRETING LOCATION:  [address], [place], [address]      [id number] VIEW   Final Result      Unremarkable AP recumbent abdomen.      DX-CHEST-PORTABLE (1 VIEW)   Final Result      Left basilar opacities, consistent with pneumonia.      CT-HEAD W/O   Final Result      No CT evidence of acute infarct, hemorrhage or mass.      CT-ABDOMEN-PELVIS WITH   Final Result      No evidence of acute intra-abdominal or pelvic process.      Probably duplicated right renal collecting system.      CT-HEAD W/O   Final Result      1.  Focal soft tissue swelling right frontal region with minimal underlying increased density adjacent to the periphery of the outer table of the calvarium which could represent a small cephalohematoma.      2.  Periventricular and subcortical white  matter density changes which could be related to small vessel ischemia, demyelinization or gliosis. Findings may be slightly more prominent than on previous exam.           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)  Assessment & Plan  -Diagnosed at age 16  -History of chronic abdominal pain with no clear explained etiology prompting multiple ED visits   -Most likely contributing to chronic abdominal pain as well as onset of of extremity symptoms  ESR, CRP within normal limits    Upper extremity pain, diffuse, right- (present on admission)  Assessment & Plan  -Acute onset of right upper extremity pain and numbness on arrival to ED at similar time of bilateral lower extremity symptoms  -Also continues to have difficulty in characterizing pain which fluctuates between sharp and throbbing per patient  -No focal tenderness or neuro deficits noted as well  -Baseline upper extremity weakness symmetrical on both sides  -CT head negative for signs of infarction, showing possible small cephalohematoma    Plan:  -Baclofen  -Assess pt response and adjust accordingly    Bilateral lower extremity pain- (present on admission)  Assessment & Plan  -Acute onset of bilateral lower extremity pain and numbness on arrival to ED, with no prior history of such events  -Patient had difficulty describing character of pain fluctuating between sharp and throbbing  -No focal muscle tenderness noted on exam or focal neurological deficits  -Baseline lower extremity weakness  -Could be secondary to possible progressive myotonia in setting myotonic muscular dystrophy  -CT head negative for signs of infarction, showing possible small cephalohematoma    Plan:  -Will attempt trial of Baclofen 10 mg TID  -Assess patient response and adjust dosing needed  -Can consider Mexiletene as alternative option if cardiomyopathy can be ruled out  -PT consulted  -OT consulted  SNF referral placed    Abdominal pain- (present on  admission)  Assessment & Plan  -Acute exacerbation of chronic abdominal pain, now located in epigastric region, no associated N/V or diarrhea, now unable to be controlled on home Norco  -History of pancreatitis, otherwise on other visits no clear etiology defined  -CT abdomen negative for acute intra-abdominal process, afebrile, normotensive, tachycardic, requiring 2L O2, no leukocytosis, normal lipase, no electrolyte abnormalities, LFT's wnl  -UA negative for signs of infection  -Last BM this morning  -Remains unclear etiology     Started on pain scale.    Impaired mobility and ADLs- (present on admission)  Assessment & Plan  Seizure, aspiration, fall precautions  PT/OT recommend SNF    Severe protein-calorie malnutrition (HCC)  Assessment & Plan  -Body mass index is 19.16 kg/m².  -Poor appetite due to abdominal pain  -She had core track at one time but has pulled out  -SLP recommends PU/MT2 diet with strict 1:1 feeding.  -Encourage p.o. intake  -Supplements  -Food preferences    Electrolyte abnormality  Assessment & Plan  Acute hypokalemia with potassium of 2.4, mag of 1.7 and Phos of 1.5  Due to patient's inability to take orally I have ordered IV replacement  Repeat labs at 3 AM    Encephalopathy acute  Assessment & Plan  Spleen due to severe sepsis  Random blood sugar in 200s  I have ordered ABG and 1 dose of Narcan  IV fluids and blood cultures ordered    Severe sepsis (HCC)  Assessment & Plan  RESOLVED      Hypoglycemia  Assessment & Plan  Change fluids from LR to D5 LR  Started on hypoglycemia protocol with Accu-Cheks.    Altered mental status  Assessment & Plan  CT head with normal limits.  Likely secondary to hypoglycemia.  She did receive morphine 6 mg IV in the emergency room.       VTE prophylaxis: Enoxaparin    I have performed a physical exam and reviewed and updated ROS and Assessment/Plan today (11/24/20). In review of the previous note there are no changes except as documented above    Please note  that this dictation was created using voice recognition software. I have made every reasonable attempt to correct obvious errors, but there may be errors of grammar and possibly content that I did not discover before finalizing the note.    DUYEN Gregory.

## 2020-11-26 NOTE — PROGRESS NOTES
2 RN skin check completed with MCKAY Roberts.   Devices in place: SCDs.  Skin assessed under devices: yes.  Confirmed pressure ulcers found: none.  New potential pressure ulcers noted: none.  Wound consult placed:  N/A.      Skin assessment: Sacrum discoloration, excoriated. Generalized bruising. Heels pink and blanching.      The following interventions in place: Pillow in place for positioning. Q2 turns. Waffle overlay. Barrier cream on sacrum. Mepilex on heels, pillow for offloading. Incontinence monitoring.

## 2020-11-26 NOTE — PROGRESS NOTES
Assumed care of pt at shift change. Pt is on RA with no signs of acute distress. A&Ox4. Safety sitter by bedside. Pt helped  To bedside commode. Morning medications administered crushed. All comfort measures in place. Call light by bedside. Bed locked and in lowest position. Hourly rounding in place.

## 2020-11-26 NOTE — CARE PLAN
Problem: Safety  Goal: Will remain free from falls  Outcome: PROGRESSING AS EXPECTED  Intervention: Implement fall precautions  Note: Fall precautions in place. Bed in lowest position. Mobility sign on door. Bed-alarm on. Call light within reach. Pt educated regarding fall prevention and states understanding.       Problem: Knowledge Deficit  Goal: Knowledge of disease process/condition, treatment plan, diagnostic tests, and medications will improve  Outcome: PROGRESSING AS EXPECTED  Intervention: Explain information regarding disease process/condition, treatment plan, diagnostic tests, and medications and document in education  Note: Pt potassium of 3.1. Pt refusing to take potassium pill. Education provided and patient is willing to take medication.

## 2020-11-26 NOTE — PROGRESS NOTES
Hospital Medicine Daily Progress Note    Date of Service  11/26/2020    Chief Complaint  Abdominal pain    Hospital Course  Ms. Escudero is a 49-year-old female with PMH significant for muscular dystrophy (diagnosed at 16, JOSÉ, migraines, chronic abdominal pain, chronic opioid dependence, chronic lower extremity weakness, and dysphagia who presented 11/11/2020 with abdominal pain. She reported the pain as 8/10, localized to the .umbilicus and nonradiating.  Presentation she started experiencing acute onset right upper extremity and bilateral lower extremity pain and numbness.  While in the ER she also fell forward out of her chair, striking her forehead without loss of consciousness.  CT head showed possible cephalohematoma.  CT abdomen/pelvis showed no evidence of acute intra-abdominal or pelvic processes.    She had sudden onset of altered mental status.  Glucose was found to be 45 and she was treated with hypoglycemia protocol. Lumbar puncture was unremarkable.  She continued to have altered mental status, hypotension and tachycardia.  She was treated with the sepsis protocol.  X-ray was concerning for atelectasis versus pneumonia.  Covid was negative.  Pro Delbert 0.53, up to 1.98.. All cultures have been negative to date.  Antibiotics were discontinued 11/19.  Neurology was consulted and recommended encephalopathy work-up.  She was started on acyclovir until her work-up came back.  Her HSV work-up, syphilis and HIV were negative and the acyclovir was discontinued.  MRI brain 11/18 with incidental finding left retinal detachment. She was seen by ophthalmology and diagnosed with left dislocated intraocular lens.  Palliative care was consulted for advanced care planning.  Patient does NOT want tube feeding. DNAR/DNI per her request.    Interval Problem Update  11/26 -continues with one-to-one sitter for safety.  She is getting better at using her call light to call for help.  Nursing is getting her up to the bedside  "commode when able.  -Discussed plan of care with her . He is wanting us to try and get her on a every 2 hour toileting schedule. We will also try and get her a bed near a window since she is noted to sleep a lot throughout the day.   is hopeful he can take her home after she is discharged from SNF, however admits he cannot afford caregiving services and has had issues with her falling at home in the past when he is at work causing him to lose his job.    11/25 - remains sleepy. Awakens easily \"I know my family is here, I just cannot see them\".  She tells me she is having strange dreams. I told her that her family is actually not here and she tells me they are in a different hospital.  She can tell me she is in [place], in the hospital, year 2020 and her name.    -She has remained free from her lapbelt restraint greater than 24 hours.  She continues to require 1:1 sitter for ongoing impulsivity and high fall risk.  I discussed this with her today that we need to be able to be without a sitter over 24 hours in order for her to be able to go to SNF. She reports she will try to be more compliant with asking for help before trying to get out of bed on her own  -Her abdominal pain is controlled.  She has had no narcotics since 11 AM 11/24.  -Ongoing poor appetite.  Discussed with dietary.  I have ordered appetite stimulant today    Consultants/Specialty  -Neurology -signed off  -[facility] -signed off  -Pulmonary/critical care -signed off    Code Status  DNAR/DNI    Disposition  SNF    Review of Systems  Review of Systems   Unable to perform ROS: Mental acuity   Constitutional: Positive for malaise/fatigue.   Respiratory: Negative for shortness of breath.    Cardiovascular: Negative for chest pain.   Gastrointestinal: Positive for abdominal pain and diarrhea.   Neurological: Positive for weakness.        Physical Exam  Temp:  [36.4 °C (97.6 °F)-36.8 °C (98.2 °F)] 36.4 °C (97.6 °F)  Pulse:  [] " 99  Resp:  [18] 18  BP: (100-112)/(67-72) 100/72  SpO2:  [92 %-93 %] 92 %    Physical Exam  Vitals signs and nursing note reviewed. Exam conducted with a chaperone present.   Constitutional:       General: She is sleeping. She is not in acute distress.     Appearance: She is underweight. She is ill-appearing.      Comments: Thin/frail   HENT:      Head: Normocephalic.      Right Ear: Hearing normal.      Left Ear: Hearing normal.      Nose: Nose normal.      Mouth/Throat:      Lips: Pink.      Mouth: Mucous membranes are dry.   Cardiovascular:      Rate and Rhythm: Normal rate and regular rhythm.      Pulses: Normal pulses.      Heart sounds: Normal heart sounds.   Pulmonary:      Effort: Pulmonary effort is normal. No respiratory distress.      Breath sounds: Decreased breath sounds present.   Abdominal:      Palpations: Abdomen is soft.      Tenderness: There is generalized abdominal tenderness. There is no guarding or rebound.      Comments: Abdomen less tender today   Musculoskeletal:      Right lower leg: No edema.      Left lower leg: No edema.   Skin:     General: Skin is warm and dry.   Neurological:      Mental Status: She is oriented to person, place, and time and easily aroused.      Motor: Weakness and atrophy present.   Psychiatric:         Mood and Affect: Mood normal.         Behavior: Behavior normal.         Judgment: Judgment is impulsive.         Fluids    Intake/Output Summary (Last 24 hours) at 11/26/2020 1411  Last data filed at 11/26/2020 1200  Gross per 24 hour   Intake 480 ml   Output --   Net 480 ml       Laboratory  Recent Labs     11/26/20  0340   WBC 6.1   RBC 3.10*   HEMOGLOBIN 9.2*   HEMATOCRIT 28.5*   MCV 91.9   MCH 29.7   MCHC 32.3*   RDW 49.8   PLATELETCT 229   MPV 8.7*     Recent Labs     11/26/20  0027   SODIUM 143   POTASSIUM 3.1*   CHLORIDE 107   CO2 31   GLUCOSE 89   BUN 5*   CREATININE 0.35*   CALCIUM 8.5                   Imaging  DX-ABDOMEN FOR TUBE PLACEMENT   Final Result          1.  Nonspecific bowel gas pattern.   2.  Dobbhoff tube tip overlying the expected location of the pylorus or first duodenal segment.   3.  Left basilar atelectasis      MR-BRAIN-WITH & W/O   Final Result      1.  Study is again limited by patient motion.   2.  Mild cerebral atrophy, prominent for the patient's age.   3.  T2 FLAIR hyperintensity in the anterior bilateral frontal lobes probably encephalomalacia/gliosis in could be related to old trauma.   4.  Additional mild nonspecific scattered white matter disease, possibly chronic microvascular ischemic changes.   5.  No acute intracranial hemorrhage or infarct.   6.  Left retinal detachment.      DX-ABDOMEN FOR TUBE PLACEMENT   Final Result         1.  Nonspecific bowel gas pattern.   2.  Dobbhoff tube is coiled within the stomach, the tip terminates overlying the expected location of the gastric fundus.   3.  Left basilar atelectasis      IR-MIDLINE CATHETER INSERTION WO GUIDANCE > AGE 3   Final Result                  Ultrasound-guided midline placement performed by qualified nursing staff    as above.          MR-BRAIN-WITH & W/O   Final Result      1.  Limited exam due to motion artifact.   2.  Diffuse atrophy and white matter microvascular ischemic changes.   3.  No acute stroke or evidence for intracranial hemorrhage.      DX-CHEST-LIMITED (1 VIEW)   Final Result      No acute cardiopulmonary disease.      EC-ECHOCARDIOGRAM COMPLETE W/O CONT   Final Result      DX-LUMBAR PUNCTURE FOR DIAGNOSIS   Final Result         FLUOROSCOPIC-GUIDED LUMBAR PUNCTURE AS DESCRIBED ABOVE.      IR-MIDLINE CATHETER INSERTION WO GUIDANCE > AGE 3   Final Result                  Ultrasound-guided midline placement performed by qualified nursing staff    as above.          CT-HEAD W/O   Final Result      Normal CT scan of the head without contrast.               INTERPRETING LOCATION:  [address], [place], [address]      [id number] VIEW   Final Result      Unremarkable AP  recumbent abdomen.      DX-CHEST-PORTABLE (1 VIEW)   Final Result      Left basilar opacities, consistent with pneumonia.      CT-HEAD W/O   Final Result      No CT evidence of acute infarct, hemorrhage or mass.      CT-ABDOMEN-PELVIS WITH   Final Result      No evidence of acute intra-abdominal or pelvic process.      Probably duplicated right renal collecting system.      CT-HEAD W/O   Final Result      1.  Focal soft tissue swelling right frontal region with minimal underlying increased density adjacent to the periphery of the outer table of the calvarium which could represent a small cephalohematoma.      2.  Periventricular and subcortical white matter density changes which could be related to small vessel ischemia, demyelinization or gliosis. Findings may be slightly more prominent than on previous exam.           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)  Assessment & Plan  -Diagnosed at age 16  -History of chronic abdominal pain with no clear explained etiology prompting multiple ED visits   -Most likely contributing to chronic abdominal pain as well as onset of of extremity symptoms  ESR, CRP within normal limits    Bilateral lower extremity pain- (present on admission)  Assessment & Plan  -seems to be mostly when she is ambulatory/mobilizing  -Likely a component of deconditioning from muscular dystrophy  -PT OT  -SNF at discharge  -Pain management      Abdominal pain- (present on admission)  Assessment & Plan  -Chronic  -History of pancreatitis  -CT abdomen this hospitalization showed no acute abnormalities.  -Currently controlled without IV narcotics.  Also noted to use normal p.o. narcotics  -Contributing to her poor p.o. intake  -Having bowel movements  -Abdomen slightly tender on exam           Impaired mobility and ADLs- (present on admission)  Assessment & Plan  -Due to muscular dystrophy  -PT OT  -SNF at NH      Severe protein-calorie malnutrition (HCC)  Assessment  & Plan  -Body mass index is 19.16 kg/m².  -Poor appetite due to abdominal pain  -She had core track at one time but has pulled out -he does not want another tube feeding  -SLP recommends PU/MT2 diet with strict 1:1 feeding.  -Added appetite stimulant 11/25  -Encourage p.o. intake  -Supplements  -Food preferences    Electrolyte abnormality  Assessment & Plan  -Normalized after replacement  -Follow labs    Altered mental status  Assessment & Plan  -Likely her new baseline  -Lethargic but arouses easily - more alert today  -Answer all orientation questions correctly but states she is having bad dreams (?hallucinations)  -Cautious use of narcotics.  Avoid benzodiazepines and hypnotics  -Frequent reorientation  -Sleep hygiene       VTE prophylaxis: Enoxaparin      I have performed a physical exam and reviewed and updated ROS and Assessment/Plan today (11/26/20). In review of the previous note there are no changes except as documented above    Please note that this dictation was created using voice recognition software. I have made every reasonable attempt to correct obvious errors, but there may be errors of grammar and possibly content that I did not discover before finalizing the note.    DUYEN Gregory.

## 2020-11-26 NOTE — CARE PLAN
Problem: Safety  Goal: Will remain free from falls  Outcome: PROGRESSING AS EXPECTED  Note: Sitter 1:1 in place for fall monitoring.     Problem: Psychosocial Needs:  Goal: Level of anxiety will decrease  Outcome: PROGRESSING AS EXPECTED     Problem: Skin Integrity  Goal: Risk for impaired skin integrity will decrease  11/25/2020 2318 by Cora Roberson R.N.  Outcome: PROGRESSING AS EXPECTED  Note: Frequent incontinence checks to maintain skin integrity.  11/25/2020 2316 by Cora Roberson RKiN.  Outcome: PROGRESSING AS EXPECTED

## 2020-11-26 NOTE — PROGRESS NOTES
Pt on bed, alert and oriented x4. Medications taken crushed with applesauce, tolerated well. Q2 turns continued. 1:1 sitter for fall monitoring. Resting well. No behaviors noted this shift.

## 2020-11-27 NOTE — PROGRESS NOTES
2 RN skin check complete with MCKAY Garrett  Devices in place N/A.  Skin assessed under devices N/A.  Confirmed pressure ulcers found on N/A.  New potential pressure ulcers noted on N/A. Wound consult placed N/A.    Noted generalized bruises; sacral area is excoriated; bilateral heels pink and blanching.      The following interventions in place; provided incontinence care; assisted to turn to sides every 2 hours. On pressure redistribution mattress with waffle overlay.

## 2020-11-27 NOTE — PROGRESS NOTES
Pt stated she is willing to work with PT again, saying she never refused initially and was only sleeping whenever they came by. She says she now wants be woken up for PT.

## 2020-11-27 NOTE — DISCHARGE PLANNING
Anticipated Discharge Disposition: Skilled    Action: Patient is requesting to work with therapies as she wants to go home.  Patient still requires a sitter as she is impulsive/high fall risk.      Barriers to Discharge: restraints, skilled acceptance    Plan: continue to monitor for discharge barriers

## 2020-11-27 NOTE — CARE PLAN
Problem: Communication  Goal: The ability to communicate needs accurately and effectively will improve  Outcome: PROGRESSING AS EXPECTED  Intervention: [place] patient and significant other/support system to call light to alert staff of needs  Note: Pt encouraged to voice feelings.      Problem: Safety  Goal: Will remain free from falls  Outcome: PROGRESSING AS EXPECTED  Intervention: Implement fall precautions  Note: Fall precautions in place. Bed in lowest position. Personal possessions within reach. Mobility sign on door. Bed-alarm on. Call light within reach. Safety sitter by bedside. Pt educated regarding fall prevention and states understanding.

## 2020-11-27 NOTE — CARE PLAN
Problem: Nutritional:  Goal: Achieve adequate nutritional intake  Description: Patient will consume >50-75% of meals  Outcome: PROGRESSING SLOWER THAN EXPECTED     PO with some improvement.  One meal 11/26 she ate 50-75%; today per CNA it has been mostly 25%.  She had been refusing meals, so this is a bit better.  Of note, her weight is up from admit.  Nutrition Representative will continue to see her for ongoing meal and snack preferences.

## 2020-11-27 NOTE — PROGRESS NOTES
Assumed care of pt at shift change. Pt is on RA with no signs of acute distress. A&Ox4. Denies any pain. Pt helped to the bedside commode. All comfort measures in place. Call light and personal belongings by bedside. Bed locked and in lowest position. Hourly rounding in place. PAKO Sesay at bedside

## 2020-11-27 NOTE — PROGRESS NOTES
Hospital Medicine Daily Progress Note    Date of Service  11/27/2020    Chief Complaint  Abdominal Pain    Hospital Course  Ms. Escudero is a 49-year-old female with PMH significant for muscular dystrophy (diagnosed at 16, JOSÉ, migraines, chronic abdominal pain, chronic opioid dependence, chronic lower extremity weakness, and dysphagia who presented 11/11/2020 with abdominal pain. She reported the pain as 8/10, localized to the .umbilicus and nonradiating.  Presentation she started experiencing acute onset right upper extremity and bilateral lower extremity pain and numbness.  While in the ER she also fell forward out of her chair, striking her forehead without loss of consciousness.  CT head showed possible cephalohematoma.  CT abdomen/pelvis showed no evidence of acute intra-abdominal or pelvic processes.    She had sudden onset of altered mental status.  Glucose was found to be 45 and she was treated with hypoglycemia protocol. Lumbar puncture was unremarkable.  She continued to have altered mental status, hypotension and tachycardia.  She was treated with the sepsis protocol.  X-ray was concerning for atelectasis versus pneumonia.  Covid was negative.  Pro Delbert 0.53, up to 1.98.. All cultures have been negative to date.  Antibiotics were discontinued 11/19.  Neurology was consulted and recommended encephalopathy work-up.  She was started on acyclovir until her work-up came back.  Her HSV work-up, syphilis and HIV were negative and the acyclovir was discontinued.  MRI brain 11/18 with incidental finding left retinal detachment. She was seen by ophthalmology and diagnosed with left dislocated intraocular lens.  Palliative care was consulted for advanced care planning.  Patient does NOT want tube feeding. DNAR/DNI per her request.    Interval Problem Update  11/27 -she is eager to work with PT/OT stating she would like to return home versus going to SNF.  -More alert today.  Less impulsive.  Hoping to DC the 1:1  "sitter today or tomorrow.    11/26 -continues with one-to-one sitter for safety.  She is getting better at using her call light to call for help.  Nursing is getting her up to the bedside commode when able.  -Discussed plan of care with her . He is wanting us to try and get her on a every 2 hour toileting schedule. We will also try and get her a bed near a window since she is noted to sleep a lot throughout the day.   is hopeful he can take her home after she is discharged from SNF, however admits he cannot afford caregiving services and has had issues with her falling at home in the past when he is at work causing him to lose his job.     11/25 - remains sleepy. Awakens easily \"I know my family is here, I just cannot see them\".  She tells me she is having strange dreams. I told her that her family is actually not here and she tells me they are in a different hospital.  She can tell me she is in [place], in the hospital, year 2020 and her name.    -She has remained free from her lapbelt restraint greater than 24 hours.  She continues to require 1:1 sitter for ongoing impulsivity and high fall risk.  I discussed this with her today that we need to be able to be without a sitter over 24 hours in order for her to be able to go to SNF. She reports she will try to be more compliant with asking for help before trying to get out of bed on her own  -Her abdominal pain is controlled.  She has had no narcotics since 11 AM 11/24.  -Ongoing poor appetite.  Discussed with dietary.  I have ordered appetite stimulant today.    Consultants/Specialty  -[facility] -signed off  -Pulmonary/critical care -signed off  -Neurology -signed off    Code Status  DNAR/DNI    Disposition  SNF    Review of Systems  Review of Systems   Unable to perform ROS: Mental acuity   Constitutional: Positive for malaise/fatigue.   HENT: Negative.    Eyes: Negative for blurred vision and double vision.   Respiratory: Negative for " shortness of breath.    Cardiovascular: Negative for chest pain.   Gastrointestinal: Positive for abdominal pain. Negative for diarrhea (none today).   Genitourinary: Negative for flank pain and hematuria.   Musculoskeletal: Positive for joint pain. Negative for falls.   Neurological: Positive for weakness.   Psychiatric/Behavioral: The patient is not nervous/anxious and does not have insomnia.    All other systems reviewed and are negative.       Physical Exam  Temp:  [36.6 °C (97.8 °F)-37.2 °C (99 °F)] 37.1 °C (98.8 °F)  Pulse:  [] 99  Resp:  [16-18] 18  BP: ()/(57-73) 105/68  SpO2:  [92 %-98 %] 98 %    Physical Exam  Vitals signs and nursing note reviewed. Exam conducted with a chaperone present.   Constitutional:       General: She is not in acute distress.     Appearance: She is underweight. She is ill-appearing.      Comments: Thin/frail   HENT:      Head: Normocephalic.      Right Ear: Hearing normal.      Left Ear: Hearing normal.      Nose: Nose normal.      Mouth/Throat:      Lips: Pink.      Mouth: Mucous membranes are dry.   Cardiovascular:      Rate and Rhythm: Normal rate and regular rhythm.      Pulses: Normal pulses.      Heart sounds: Normal heart sounds.   Pulmonary:      Effort: Pulmonary effort is normal. No respiratory distress.      Breath sounds: Normal breath sounds. No rhonchi.   Abdominal:      Palpations: Abdomen is soft.      Tenderness: There is no abdominal tenderness. There is no guarding or rebound.   Musculoskeletal:      Right lower leg: No edema.      Left lower leg: No edema.      Comments: Generalized weakness   Skin:     General: Skin is warm and dry.      Nails: There is no clubbing.     Neurological:      Mental Status: She is alert and oriented to person, place, and time.      Motor: Weakness and atrophy present.   Psychiatric:         Mood and Affect: Mood normal.         Behavior: Behavior normal. Behavior is cooperative.         Cognition and Memory: Cognition  normal.         Judgment: Judgment is impulsive (improving).         Fluids    Intake/Output Summary (Last 24 hours) at 11/27/2020 1325  Last data filed at 11/27/2020 0900  Gross per 24 hour   Intake 390 ml   Output --   Net 390 ml       Laboratory  Recent Labs     11/26/20  0340   WBC 6.1   RBC 3.10*   HEMOGLOBIN 9.2*   HEMATOCRIT 28.5*   MCV 91.9   MCH 29.7   MCHC 32.3*   RDW 49.8   PLATELETCT 229   MPV 8.7*     Recent Labs     11/26/20  0027 11/27/20  1059   SODIUM 143 142   POTASSIUM 3.1* 4.2   CHLORIDE 107 110   CO2 31 28   GLUCOSE 89 90   BUN 5* 3*   CREATININE 0.35* 0.24*   CALCIUM 8.5 9.0                   Imaging  IR-US GUIDED PIV   Final Result    Ultrasound-guided PERIPHERAL IV INSERTION performed by    qualified nursing staff as above.      DX-ABDOMEN FOR TUBE PLACEMENT   Final Result         1.  Nonspecific bowel gas pattern.   2.  Dobbhoff tube tip overlying the expected location of the pylorus or first duodenal segment.   3.  Left basilar atelectasis      MR-BRAIN-WITH & W/O   Final Result      1.  Study is again limited by patient motion.   2.  Mild cerebral atrophy, prominent for the patient's age.   3.  T2 FLAIR hyperintensity in the anterior bilateral frontal lobes probably encephalomalacia/gliosis in could be related to old trauma.   4.  Additional mild nonspecific scattered white matter disease, possibly chronic microvascular ischemic changes.   5.  No acute intracranial hemorrhage or infarct.   6.  Left retinal detachment.      DX-ABDOMEN FOR TUBE PLACEMENT   Final Result         1.  Nonspecific bowel gas pattern.   2.  Dobbhoff tube is coiled within the stomach, the tip terminates overlying the expected location of the gastric fundus.   3.  Left basilar atelectasis      IR-MIDLINE CATHETER INSERTION WO GUIDANCE > AGE 3   Final Result                  Ultrasound-guided midline placement performed by qualified nursing staff    as above.          MR-BRAIN-WITH & W/O   Final Result      1.   Limited exam due to motion artifact.   2.  Diffuse atrophy and white matter microvascular ischemic changes.   3.  No acute stroke or evidence for intracranial hemorrhage.      DX-CHEST-LIMITED (1 VIEW)   Final Result      No acute cardiopulmonary disease.      EC-ECHOCARDIOGRAM COMPLETE W/O CONT   Final Result      DX-LUMBAR PUNCTURE FOR DIAGNOSIS   Final Result         FLUOROSCOPIC-GUIDED LUMBAR PUNCTURE AS DESCRIBED ABOVE.      IR-MIDLINE CATHETER INSERTION WO GUIDANCE > AGE 3   Final Result                  Ultrasound-guided midline placement performed by qualified nursing staff    as above.          CT-HEAD W/O   Final Result      Normal CT scan of the head without contrast.               INTERPRETING LOCATION:  [address], [place], [address]      [id number] VIEW   Final Result      Unremarkable AP recumbent abdomen.      DX-CHEST-PORTABLE (1 VIEW)   Final Result      Left basilar opacities, consistent with pneumonia.      CT-HEAD W/O   Final Result      No CT evidence of acute infarct, hemorrhage or mass.      CT-ABDOMEN-PELVIS WITH   Final Result      No evidence of acute intra-abdominal or pelvic process.      Probably duplicated right renal collecting system.      CT-HEAD W/O   Final Result      1.  Focal soft tissue swelling right frontal region with minimal underlying increased density adjacent to the periphery of the outer table of the calvarium which could represent a small cephalohematoma.      2.  Periventricular and subcortical white matter density changes which could be related to small vessel ischemia, demyelinization or gliosis. Findings may be slightly more prominent than on previous exam.           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)  Assessment & Plan  -Diagnosed at age 16  -History of chronic abdominal pain with no clear explained etiology prompting multiple ED visits   -Most likely contributing to chronic abdominal pain as well as onset of of extremity  symptoms  ESR, CRP within normal limits    Bilateral lower extremity pain- (present on admission)  Assessment & Plan  -seems to be mostly when she is ambulatory/mobilizing  -Likely a component of deconditioning from muscular dystrophy  -PT OT  -SNF at discharge  -Pain management      Abdominal pain- (present on admission)  Assessment & Plan  -Chronic  -History of pancreatitis  -CT abdomen this hospitalization showed no acute abnormalities.  -She was asking if we could restart her IV narcotics.  These have been discontinued days ago.  Discussed with the patient today that we are avoiding starting IV narcotics due to her recent altered mental status.  She is agreeable to control with oxycodone  -Contributing to her poor p.o. intake  -Having bowel movements             Impaired mobility and ADLs- (present on admission)  Assessment & Plan  -Due to muscular dystrophy  -PT OT  -SNF at [place]      Severe protein-calorie malnutrition (HCC)  Assessment & Plan  -Body mass index is 19.16 kg/m².  -Poor appetite due to abdominal pain  -She had core track at one time but has pulled out -he does not want another tube feeding  -SLP recommends PU/MT2 diet with strict 1:1 feeding.  -Added appetite stimulant 11/25  -Encourage p.o. intake  -Supplements  -Food preferences    Electrolyte abnormality  Assessment & Plan  -Potassium normalized after replacement  -Phosphorus remains low.  Replacing  -Magnesium has normalized  -A.m. BMP    Altered mental status  Assessment & Plan  -Likely her new baseline  -More alert today.  Oriented x4  -Less dreams/hallucinations today  -Cautious use of narcotics.  Avoid benzodiazepines and hypnotics  -Frequent reorientation  -Sleep hygiene       VTE prophylaxis: Enoxaparin    I have performed a physical exam and reviewed and updated ROS and Assessment/Plan today (11/27/20). In review of the previous note there are no changes except as documented above    Please note that this dictation was created using voice  recognition software. I have made every reasonable attempt to correct obvious errors, but there may be errors of grammar and possibly content that I did not discover before finalizing the note.    DUYEN Gregory.

## 2020-11-28 NOTE — PROGRESS NOTES
Pt found in the floor on her knees by ADAN Angelo. Pt assessed, denies hitting of head and any other injuries. Pee found in the floor. Pt assisted back to bed. Provided incontinence care. Torres hospitalist. Spoke with Dr Carreon and updated with the situation at 2144. Called pt's . Spoke with Adryan at 2145. Sitter in placed. Encouraged the pt to rest and sleep.

## 2020-11-28 NOTE — PROGRESS NOTES
2 RN skin check complete with MCKAY Key  Devices in place N/A.  Skin assessed under devices N/A.  Confirmed pressure ulcers found on N/A.  New potential pressure ulcers noted on N/A. Wound consult placed N/A.    Skin Assessment: generalized bruises  Bilateral ears and elbows; pink and blanching  Sacrum; excoriated/discolored  bilateral heels pink and blanching.       The following interventions in place; provided incontinence care; assisted to turn to sides every 2 hours. On pressure redistribution mattress with waffle overlay.

## 2020-11-28 NOTE — CARE PLAN
Problem: Safety  Goal: Will remain free from falls  Outcome: PROGRESSING AS EXPECTED  Intervention: Implement fall precautions  Note: All fall  precautions in place. Safety sitter by bedside.      Problem: Pain Management  Goal: Pain level will decrease to patient's comfort goal  Outcome: PROGRESSING AS EXPECTED  Intervention: Follow pain managment plan developed in collaboration with patient and Interdisciplinary Team  Note: Pt assessed for pain and medicated PRN per MAR.

## 2020-11-28 NOTE — PROGRESS NOTES
Hospital Medicine Daily Progress Note    Date of Service  11/28/2020    Chief Complaint  Abdominal Pain    Hospital Course  Ms. Escudero is a 49-year-old female with PMH significant for muscular dystrophy (diagnosed at 16, JOSÉ, migraines, chronic abdominal pain, chronic opioid dependence, chronic lower extremity weakness, and dysphagia who presented 11/11/2020 with abdominal pain. She reported the pain as 8/10, localized to the .umbilicus and nonradiating.  Presentation she started experiencing acute onset right upper extremity and bilateral lower extremity pain and numbness.  While in the ER she also fell forward out of her chair, striking her forehead without loss of consciousness.  CT head showed possible cephalohematoma.  CT abdomen/pelvis showed no evidence of acute intra-abdominal or pelvic processes.    She had sudden onset of altered mental status.  Glucose was found to be 45 and she was treated with hypoglycemia protocol. Lumbar puncture was unremarkable.  She continued to have altered mental status, hypotension and tachycardia.  She was treated with the sepsis protocol.  X-ray was concerning for atelectasis versus pneumonia.  Covid was negative.  Pro Delbert 0.53, up to 1.98.. All cultures have been negative to date.  Antibiotics were discontinued 11/19.  Neurology was consulted and recommended encephalopathy work-up.  She was started on acyclovir until her work-up came back.  Her HSV work-up, syphilis and HIV were negative and the acyclovir was discontinued.  MRI brain 11/18 with incidental finding left retinal detachment. She was seen by ophthalmology and diagnosed with left dislocated intraocular lens.  Palliative care was consulted for advanced care planning.  Patient does NOT want tube feeding. DNAR/DNI per her request.    Interval Problem Update  11/28 - asking to be without IV access due to discomfort. Electrolytes have normalized. OK to be without IV access.  -pain controlled with oxycodone. Using  "appropriately. Continues with 1:1 sitter.     11/27 -she is eager to work with PT/OT stating she would like to return home versus going to SNF.  -More alert today.  Less impulsive.  Hoping to DC the 1:1 sitter today or tomorrow.  11/26 -continues with one-to-one sitter for safety.  She is getting better at using her call light to call for help.  Nursing is getting her up to the bedside commode when able.  -Discussed plan of care with her . He is wanting us to try and get her on a every 2 hour toileting schedule. We will also try and get her a bed near a window since she is noted to sleep a lot throughout the day.   is hopeful he can take her home after she is discharged from SNF, however admits he cannot afford caregiving services and has had issues with her falling at home in the past when he is at work causing him to lose his job.  11/25 - remains sleepy. Awakens easily \"I know my family is here, I just cannot see them\".  She tells me she is having strange dreams. I told her that her family is actually not here and she tells me they are in a different hospital.  She can tell me she is in [place], in the hospital, year 2020 and her name.    -She has remained free from her lapbelt restraint greater than 24 hours.  She continues to require 1:1 sitter for ongoing impulsivity and high fall risk.  I discussed this with her today that we need to be able to be without a sitter over 24 hours in order for her to be able to go to SNF. She reports she will try to be more compliant with asking for help before trying to get out of bed on her own  -Her abdominal pain is controlled.  She has had no narcotics since 11 AM 11/24 - Ongoing poor appetite.  Discussed with dietary.  I have ordered appetite stimulant today.    Consultants/Specialty  -[facility] -signed off  -Pulmonary/critical care -signed off  -Neurology -signed off    Code Status  DNAR/DNI    Disposition  SNF    Review of Systems  Review of Systems "   Unable to perform ROS: Mental acuity   Constitutional: Positive for malaise/fatigue.   HENT: Negative.    Eyes: Negative for blurred vision and double vision.   Respiratory: Negative for shortness of breath.    Cardiovascular: Negative for chest pain.   Gastrointestinal: Positive for abdominal pain. Negative for diarrhea (none today).   Genitourinary: Negative for flank pain and hematuria.   Musculoskeletal: Positive for joint pain. Negative for falls.   Neurological: Positive for weakness.   Psychiatric/Behavioral: The patient is not nervous/anxious and does not have insomnia.    All other systems reviewed and are negative.       Physical Exam  Temp:  [36.2 °C (97.2 °F)-37.1 °C (98.7 °F)] 36.8 °C (98.3 °F)  Pulse:  [] 105  Resp:  [16-18] 16  BP: (100-124)/(55-82) 115/82  SpO2:  [93 %-96 %] 94 %    Physical Exam  Vitals signs and nursing note reviewed. Exam conducted with a chaperone present.   Constitutional:       General: She is not in acute distress.     Appearance: She is underweight. She is ill-appearing.      Comments: Thin/frail   HENT:      Head: Normocephalic.      Right Ear: Hearing normal.      Left Ear: Hearing normal.      Nose: Nose normal.      Mouth/Throat:      Lips: Pink.      Mouth: Mucous membranes are dry.   Cardiovascular:      Rate and Rhythm: Normal rate and regular rhythm.      Pulses: Normal pulses.      Heart sounds: Normal heart sounds.   Pulmonary:      Effort: Pulmonary effort is normal. No respiratory distress.      Breath sounds: Normal breath sounds. No rhonchi.   Abdominal:      Palpations: Abdomen is soft.      Tenderness: There is no abdominal tenderness. There is no guarding or rebound.   Musculoskeletal:      Right lower leg: No edema.      Left lower leg: No edema.      Comments: Generalized weakness   Skin:     General: Skin is warm and dry.      Nails: There is no clubbing.     Neurological:      Mental Status: She is alert and oriented to person, place, and time.       Motor: Weakness and atrophy present.   Psychiatric:         Mood and Affect: Mood normal.         Behavior: Behavior normal. Behavior is cooperative.         Cognition and Memory: Cognition normal.         Judgment: Judgment is impulsive (improving).         Fluids    Intake/Output Summary (Last 24 hours) at 11/28/2020 1141  Last data filed at 11/28/2020 0500  Gross per 24 hour   Intake 215 ml   Output --   Net 215 ml       Laboratory  Recent Labs     11/26/20  0340   WBC 6.1   RBC 3.10*   HEMOGLOBIN 9.2*   HEMATOCRIT 28.5*   MCV 91.9   MCH 29.7   MCHC 32.3*   RDW 49.8   PLATELETCT 229   MPV 8.7*     Recent Labs     11/26/20  0027 11/27/20  1059   SODIUM 143 142   POTASSIUM 3.1* 4.2   CHLORIDE 107 110   CO2 31 28   GLUCOSE 89 90   BUN 5* 3*   CREATININE 0.35* 0.24*   CALCIUM 8.5 9.0                   Imaging  IR-US GUIDED PIV   Final Result    Ultrasound-guided PERIPHERAL IV INSERTION performed by    qualified nursing staff as above.      DX-ABDOMEN FOR TUBE PLACEMENT   Final Result         1.  Nonspecific bowel gas pattern.   2.  Dobbhoff tube tip overlying the expected location of the pylorus or first duodenal segment.   3.  Left basilar atelectasis      MR-BRAIN-WITH & W/O   Final Result      1.  Study is again limited by patient motion.   2.  Mild cerebral atrophy, prominent for the patient's age.   3.  T2 FLAIR hyperintensity in the anterior bilateral frontal lobes probably encephalomalacia/gliosis in could be related to old trauma.   4.  Additional mild nonspecific scattered white matter disease, possibly chronic microvascular ischemic changes.   5.  No acute intracranial hemorrhage or infarct.   6.  Left retinal detachment.      DX-ABDOMEN FOR TUBE PLACEMENT   Final Result         1.  Nonspecific bowel gas pattern.   2.  Dobbhoff tube is coiled within the stomach, the tip terminates overlying the expected location of the gastric fundus.   3.  Left basilar atelectasis      IR-MIDLINE CATHETER INSERTION WO  GUIDANCE > AGE 3   Final Result                  Ultrasound-guided midline placement performed by qualified nursing staff    as above.          MR-BRAIN-WITH & W/O   Final Result      1.  Limited exam due to motion artifact.   2.  Diffuse atrophy and white matter microvascular ischemic changes.   3.  No acute stroke or evidence for intracranial hemorrhage.      DX-CHEST-LIMITED (1 VIEW)   Final Result      No acute cardiopulmonary disease.      EC-ECHOCARDIOGRAM COMPLETE W/O CONT   Final Result      DX-LUMBAR PUNCTURE FOR DIAGNOSIS   Final Result         FLUOROSCOPIC-GUIDED LUMBAR PUNCTURE AS DESCRIBED ABOVE.      IR-MIDLINE CATHETER INSERTION WO GUIDANCE > AGE 3   Final Result                  Ultrasound-guided midline placement performed by qualified nursing staff    as above.          CT-HEAD W/O   Final Result      Normal CT scan of the head without contrast.               INTERPRETING LOCATION:  [address], [place], [address]      VO-UULCTEL-1 VIEW   Final Result      Unremarkable AP recumbent abdomen.      DX-CHEST-PORTABLE (1 VIEW)   Final Result      Left basilar opacities, consistent with pneumonia.      CT-HEAD W/O   Final Result      No CT evidence of acute infarct, hemorrhage or mass.      CT-ABDOMEN-PELVIS WITH   Final Result      No evidence of acute intra-abdominal or pelvic process.      Probably duplicated right renal collecting system.      CT-HEAD W/O   Final Result      1.  Focal soft tissue swelling right frontal region with minimal underlying increased density adjacent to the periphery of the outer table of the calvarium which could represent a small cephalohematoma.      2.  Periventricular and subcortical white matter density changes which could be related to small vessel ischemia, demyelinization or gliosis. Findings may be slightly more prominent than on previous exam.           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)  Assessment & Plan  -Diagnosed at age  16  -History of chronic abdominal pain with no clear explained etiology prompting multiple ED visits   -Most likely contributing to chronic abdominal pain as well as onset of of extremity symptoms  -ESR, CRP within normal limits    Bilateral lower extremity pain- (present on admission)  Assessment & Plan  -seems to be mostly when she is ambulatory/mobilizing  -Likely a component of deconditioning from muscular dystrophy  -PT OT  -SNF at discharge  -Pain management      Abdominal pain- (present on admission)  Assessment & Plan  -Chronic  -History of pancreatitis  -CT abdomen this hospitalization showed no acute abnormalities.  -controlled on PO oxycodone.  -Contributing to her poor p.o. intake  -Having bowel movements             Impaired mobility and ADLs- (present on admission)  Assessment & Plan  -Due to muscular dystrophy  -PT OT  -SNF at [place]      Severe protein-calorie malnutrition (HCC)  Assessment & Plan  -Body mass index is 19.16 kg/m².  -Poor appetite due to abdominal pain  -She had core track at one time but has pulled out -he does not want another tube feeding  -SLP recommends PU/MT2 diet with strict 1:1 feeding.  -Added appetite stimulant 11/25  -Encourage p.o. intake  -Supplements  -Food preferences    Electrolyte abnormality  Assessment & Plan  -Potassium normalized after replacement  -Phosphorus remains low.  Replacing  -Magnesium has normalized  -A.m. BMP    Altered mental status  Assessment & Plan  -Likely her new baseline  -More alert today.  Oriented x4  -Less dreams/hallucinations today  -Cautious use of narcotics.  Avoid benzodiazepines and hypnotics  -Frequent reorientation  -Sleep hygiene       VTE prophylaxis: Enoxaparin    I have performed a physical exam and reviewed and updated ROS and Assessment/Plan today (11/28/20). In review of the previous note there are no changes except as documented above    Please note that this dictation was created using voice recognition software. I have made  every reasonable attempt to correct obvious errors, but there may be errors of grammar and possibly content that I did not discover before finalizing the note.    DUYEN Gregory.

## 2020-11-28 NOTE — PROGRESS NOTES
Assumed care of pt at shift change. Pt is on RA with no signs of acute distress. Reports pain to lower abdomen. Pt medicated per MAR. Safety Sitter Lázaro by bedside. POC discussed with patient. All comfort measures in place. Call light and personal belongings by bedside. Bed locked and in lowest position. Hourly rounding in place.

## 2020-11-29 PROBLEM — E86.0 DEHYDRATION: Status: ACTIVE | Noted: 2020-01-01

## 2020-11-29 NOTE — ASSESSMENT & PLAN NOTE
-Per , she has been falling at home  -She has sustained multiple falls this hospitalization  -Continues to require 1:1 sitter  -Fall precautions

## 2020-11-29 NOTE — PROGRESS NOTES
2 RN skin check completed with Tia Rn  Devices in place N/A.  Skin assessed under devices N/A.  Confirmed pressure ulcers found on Left back shoulder.  New potential pressure ulcers noted on N/A. Wound consult placed N/A.    Noted blackish to brown small area in left back shoulder, non blanchable; bilateral elbows pink and blanching; sacral area is pink and blanching; bilateral heels, is pink and blanching    The following interventions in place; assisted to the commode; provided incontinence care; assisted to turn to sides every 2 hours; on pressure redistribution mattress with waffle overlay.

## 2020-11-29 NOTE — ASSESSMENT & PLAN NOTE
-due to poor PO intake  -She is refusing IV fluid hydration.  She says that she would consider IV placement if we restarted her IV narcotics.  -Continue to encourage p.o. intake

## 2020-11-29 NOTE — PROGRESS NOTES
Assumed care of pt at shift change. Pt is on RA, resting in bed with eyes closed .Breathing is even and unlabored, no signs of acute distress. Safety sitter by bedside. Call light and personal belongings by bedside. Bed locked and in lowest position. Hourly rounding in place.

## 2020-11-29 NOTE — CARE PLAN
Problem: Safety  Goal: Will remain free from falls  Outcome: PROGRESSING AS EXPECTED  Intervention: Implement fall precautions  Note: Fall precautions in place. Bed in lowest position. Mobility sign on door. Bed-alarm on. Call light within reach. Pt educated regarding fall prevention and states understanding.  Safety sitter by bedside.      Problem: Skin Integrity  Goal: Risk for impaired skin integrity will decrease  Outcome: PROGRESSING AS EXPECTED  Intervention: Assess risk factors for impaired skin integrity and/or pressure ulcers  Note: Pt is continent, and helped to bedside commode for toiletting needs.  Turned every 2hrs

## 2020-11-29 NOTE — PROGRESS NOTES
Hospital Medicine Daily Progress Note    Date of Service  11/29/2020    Chief Complaint  Abdominal Pain    Hospital Course  Ms. Escudero is a 49-year-old female with PMH significant for muscular dystrophy (diagnosed at 16, JOSÉ, migraines, chronic abdominal pain, chronic opioid dependence, chronic lower extremity weakness, and dysphagia who presented 11/11/2020 with abdominal pain. She reported the pain as 8/10, localized to the .umbilicus and nonradiating.  Presentation she started experiencing acute onset right upper extremity and bilateral lower extremity pain and numbness.  While in the ER she also fell forward out of her chair, striking her forehead without loss of consciousness.  CT head showed possible cephalohematoma.  CT abdomen/pelvis showed no evidence of acute intra-abdominal or pelvic processes.    She had sudden onset of altered mental status.  Glucose was found to be 45 and she was treated with hypoglycemia protocol. Lumbar puncture was unremarkable.  She continued to have altered mental status, hypotension and tachycardia.  She was treated with the sepsis protocol.  X-ray was concerning for atelectasis versus pneumonia.  Covid was negative.  Pro Delbert 0.53, up to 1.98.. All cultures have been negative to date.  Antibiotics were discontinued 11/19.  Neurology was consulted and recommended encephalopathy work-up.  She was started on acyclovir until her work-up came back.  Her HSV work-up, syphilis and HIV were negative and the acyclovir was discontinued.  MRI brain 11/18 with incidental finding left retinal detachment. She was seen by ophthalmology and diagnosed with left dislocated intraocular lens.  Palliative care was consulted for advanced care planning.  Patient does NOT want tube feeding or IV access. DNAR/DNI per her request.    Interval Problem Update  11/29 -ongoing hallucinations/delusions.  She states they are bad dreams.  -She is more lethargic today.  Arouses easily.  Poor p.o. intake with soft  "BPs.  She refuses IV fluid hydration/IV access. Says she would consider allowing IV access if we restarted her IV narcotics  -Disoriented to time and events  -Attempted to DC 1:1 sitter yesterday, however she sustained another fall after getting out of bed by herself.    11/28 - asking to be without IV access due to discomfort. Electrolytes have normalized. OK to be without IV access.  -pain controlled with oxycodone. Using appropriately. Continues with 1:1 sitter.   11/27 -she is eager to work with PT/OT stating she would like to return home versus going to SNF.  -More alert today.  Less impulsive.  Hoping to DC the 1:1 sitter today or tomorrow.  11/26 -continues with one-to-one sitter for safety.  She is getting better at using her call light to call for help.  Nursing is getting her up to the bedside commode when able.  -Discussed plan of care with her . He is wanting us to try and get her on a every 2 hour toileting schedule. We will also try and get her a bed near a window since she is noted to sleep a lot throughout the day.   is hopeful he can take her home after she is discharged from SNF, however admits he cannot afford caregiving services and has had issues with her falling at home in the past when he is at work causing him to lose his job.  11/25 - remains sleepy. Awakens easily \"I know my family is here, I just cannot see them\".  She tells me she is having strange dreams. I told her that her family is actually not here and she tells me they are in a different hospital.  She can tell me she is in [place], in the hospital, year 2020 and her name.    -She has remained free from her lapbelt restraint greater than 24 hours.  She continues to require 1:1 sitter for ongoing impulsivity and high fall risk.  I discussed this with her today that we need to be able to be without a sitter over 24 hours in order for her to be able to go to SNF. She reports she will try to be more compliant with asking for " help before trying to get out of bed on her own  -Her abdominal pain is controlled.  She has had no narcotics since 11 AM 11/24 - Ongoing poor appetite.  Discussed with dietary.  I have ordered appetite stimulant today.    Consultants/Specialty  -[facility] -signed off  -Pulmonary/critical care -signed off  -Neurology -signed off    Code Status  DNAR/DNI    Disposition  SNF    Review of Systems  Review of Systems   Unable to perform ROS: Mental acuity   Constitutional: Positive for malaise/fatigue.   HENT: Negative.    Eyes: Negative for blurred vision and double vision.   Respiratory: Negative for shortness of breath.    Cardiovascular: Negative for chest pain.   Gastrointestinal: Positive for abdominal pain. Negative for diarrhea (none today).   Genitourinary: Negative for flank pain and hematuria.   Musculoskeletal: Positive for joint pain. Negative for falls.   Neurological: Positive for dizziness and weakness.   Psychiatric/Behavioral: Positive for memory loss. The patient is not nervous/anxious and does not have insomnia.    All other systems reviewed and are negative.       Physical Exam  Temp:  [36 °C (96.8 °F)-36.9 °C (98.4 °F)] 36 °C (96.8 °F)  Pulse:  [] 109  Resp:  [16-17] 16  BP: ()/(56-67) 101/67  SpO2:  [90 %-94 %] 94 %    Physical Exam  Vitals signs and nursing note reviewed. Exam conducted with a chaperone present.   Constitutional:       General: She is not in acute distress.     Appearance: She is cachectic. She is ill-appearing.      Comments: Thin/frail  Arouses easily to her name.  Drifts back to sleep easily.   HENT:      Head: Normocephalic.      Right Ear: Hearing normal.      Left Ear: Hearing normal.      Nose: Nose normal.      Mouth/Throat:      Lips: Pink.      Mouth: Mucous membranes are dry.   Cardiovascular:      Rate and Rhythm: Normal rate and regular rhythm.      Pulses: Normal pulses.      Heart sounds: Normal heart sounds.   Pulmonary:      Effort: Pulmonary  effort is normal. No respiratory distress.      Breath sounds: Normal breath sounds. No rhonchi.   Abdominal:      Palpations: Abdomen is soft.      Tenderness: There is no abdominal tenderness. There is no guarding or rebound.   Musculoskeletal:      Right lower leg: No edema.      Left lower leg: No edema.      Comments: Generalized weakness   Skin:     General: Skin is warm and dry.      Nails: There is no clubbing.     Neurological:      Mental Status: She is lethargic and disoriented.      Motor: Weakness and atrophy present.      Gait: Gait abnormal.   Psychiatric:         Attention and Perception: She perceives visual hallucinations.         Mood and Affect: Mood normal.         Behavior: Behavior normal.         Cognition and Memory: Cognition is impaired. She exhibits impaired recent memory.         Judgment: Judgment is impulsive.         Fluids    Intake/Output Summary (Last 24 hours) at 11/29/2020 1334  Last data filed at 11/29/2020 0400  Gross per 24 hour   Intake 220 ml   Output --   Net 220 ml       Laboratory      Recent Labs     11/27/20  1059 11/29/20  0250   SODIUM 142 141   POTASSIUM 4.2 3.9   CHLORIDE 110 106   CO2 28 31   GLUCOSE 90 86   BUN 3* 4*   CREATININE 0.24* 0.28*   CALCIUM 9.0 8.9                   Imaging  IR-US GUIDED PIV   Final Result    Ultrasound-guided PERIPHERAL IV INSERTION performed by    qualified nursing staff as above.      DX-ABDOMEN FOR TUBE PLACEMENT   Final Result         1.  Nonspecific bowel gas pattern.   2.  Dobbhoff tube tip overlying the expected location of the pylorus or first duodenal segment.   3.  Left basilar atelectasis      MR-BRAIN-WITH & W/O   Final Result      1.  Study is again limited by patient motion.   2.  Mild cerebral atrophy, prominent for the patient's age.   3.  T2 FLAIR hyperintensity in the anterior bilateral frontal lobes probably encephalomalacia/gliosis in could be related to old trauma.   4.  Additional mild nonspecific scattered white  matter disease, possibly chronic microvascular ischemic changes.   5.  No acute intracranial hemorrhage or infarct.   6.  Left retinal detachment.      DX-ABDOMEN FOR TUBE PLACEMENT   Final Result         1.  Nonspecific bowel gas pattern.   2.  Dobbhoff tube is coiled within the stomach, the tip terminates overlying the expected location of the gastric fundus.   3.  Left basilar atelectasis      IR-MIDLINE CATHETER INSERTION WO GUIDANCE > AGE 3   Final Result                  Ultrasound-guided midline placement performed by qualified nursing staff    as above.          MR-BRAIN-WITH & W/O   Final Result      1.  Limited exam due to motion artifact.   2.  Diffuse atrophy and white matter microvascular ischemic changes.   3.  No acute stroke or evidence for intracranial hemorrhage.      DX-CHEST-LIMITED (1 VIEW)   Final Result      No acute cardiopulmonary disease.      EC-ECHOCARDIOGRAM COMPLETE W/O CONT   Final Result      DX-LUMBAR PUNCTURE FOR DIAGNOSIS   Final Result         FLUOROSCOPIC-GUIDED LUMBAR PUNCTURE AS DESCRIBED ABOVE.      IR-MIDLINE CATHETER INSERTION WO GUIDANCE > AGE 3   Final Result                  Ultrasound-guided midline placement performed by qualified nursing staff    as above.          CT-HEAD W/O   Final Result      Normal CT scan of the head without contrast.               INTERPRETING LOCATION:  [facility], [place], [address]      [id number] VIEW   Final Result      Unremarkable AP recumbent abdomen.      DX-CHEST-PORTABLE (1 VIEW)   Final Result      Left basilar opacities, consistent with pneumonia.      CT-HEAD W/O   Final Result      No CT evidence of acute infarct, hemorrhage or mass.      CT-ABDOMEN-PELVIS WITH   Final Result      No evidence of acute intra-abdominal or pelvic process.      Probably duplicated right renal collecting system.      CT-HEAD W/O   Final Result      1.  Focal soft tissue swelling right frontal region with minimal underlying increased density adjacent to  the periphery of the outer table of the calvarium which could represent a small cephalohematoma.      2.  Periventricular and subcortical white matter density changes which could be related to small vessel ischemia, demyelinization or gliosis. Findings may be slightly more prominent than on previous exam.           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)  Assessment & Plan  -Diagnosed at age 16  -History of chronic abdominal pain with no clear explained etiology prompting multiple ED visits   -Most likely contributing to chronic abdominal pain as well as onset of of extremity symptoms  -ESR, CRP within normal limits    Bilateral lower extremity pain- (present on admission)  Assessment & Plan  -seems to be mostly when she is ambulatory/mobilizing  -Likely a component of deconditioning from muscular dystrophy  -PT OT  -SNF at discharge  -Pain management      Abdominal pain- (present on admission)  Assessment & Plan  -Chronic, stable  -History of pancreatitis  -CT abdomen this hospitalization showed no acute abnormalities.  -Contributing to her poor p.o. intake  -Having bowel movements             Impaired mobility and ADLs- (present on admission)  Assessment & Plan  -Due to muscular dystrophy  -PT OT  -SNF at DC      Dehydration  Assessment & Plan  -due to poor PO intake  -She is refusing IV fluid hydration.  She says that she would consider IV placement if we restarted her IV narcotics.  -Continue to encourage p.o. intake      Severe protein-calorie malnutrition (HCC)  Assessment & Plan  -Body mass index is 19.16 kg/m².  -Poor appetite -continues to decline  -She had core track at one time but has pulled out -he does not want another tube feeding  -SLP recommends PU/MT2 diet with strict 1:1 feeding.  -Continue Marinol  -Encourage p.o. intake  -Supplements  -Food preferences    Electrolyte abnormality  Assessment & Plan  -Potassium, phosphorus, and magnesium all normalized  -Follow  BMP    Altered mental status  Assessment & Plan  -She continues to be lethargic.  She arouses easily and again is asking for IV narcotics for her abdominal pain.  She drifts back to sleep very easily  -She continues to have hallucinations which she states are bad dreams   -Cautious use of narcotics.  Avoid benzodiazepines and hypnotics  -Continue to hold off on resuming IV narcotics.  -Frequent reorientation  -Sleep hygiene    At high risk for falls- (present on admission)  Assessment & Plan  -Per , she has been falling at home  -She has sustained multiple falls this hospitalization  -Continues to require 1:1 sitter  -Fall precautions       VTE prophylaxis: Enoxaparin    I have performed a physical exam and reviewed and updated ROS and Assessment/Plan today (11/29/20). In review of the previous note there are no changes except as documented above    Please note that this dictation was created using voice recognition software. I have made every reasonable attempt to correct obvious errors, but there may be errors of grammar and possibly content that I did not discover before finalizing the note.    DUYEN Gregory.

## 2020-11-30 NOTE — PROGRESS NOTES
2 RN skin check completed with Hakeem Gerber  Devices in place N/A.  Skin assessed under devices N/A.  Confirmed pressure ulcers found on Left back shoulder.  New potential pressure ulcers noted on N/A. Wound consult placed N/A.     Noted blackish to brown small area in left back shoulder, non blanchable; bilateral elbows pink and blanching; sacral area is pink and blanching; bilateral heels, is pink and blanching     The following interventions in place; assisted to the commode; provided incontinence care; assisted to turn to sides every 2 hours; on pressure redistribution mattress with waffle overlay

## 2020-11-30 NOTE — DISCHARGE PLANNING
Anticipated Discharge Disposition: Skilled Nursing Facility    Action: Patient was discussed today at IDT rounds. Patient continues to have a sitter for impulsive/high fall risk.     Barriers to Discharge: restraints, skilled acceptance.     Plan:  to monitor for discharge barriers.     Addendum, 1539: [facility] stated they would accept patient authorization has been submitted, will take bay. 2 days for authorization.     Sarai Melendez RN,

## 2020-11-30 NOTE — THERAPY
Speech Language Pathology  Daily Treatment     Patient Name: Gayla Escudero  Age:  49 y.o., Sex:  female  Medical Record #: [id number]  Today's Date: 11/30/2020     Precautions  Precautions: Fall Risk, Swallow Precautions ( See Comments)  Comments: baseline myotonic MD    Assessment    Pt was seen for dysphagia tx focused on PO trials as listed below. Pt was alert, self-limiting w/ PO intake but redirectable to participate w/ ongoing encouragement. Pt consumed ~6 oz of blended soup, ~5 bites of soft/bite sized solids, 1 bite of minced/moist solids and ~2 oz thin water. Prolonged inefficient mashing of soft solids noted with pt expectorating residue from pears. Improved oral phase and timeliness of swallow noted w/ minced/moist solids. No s/sx of aspiration occurred with PO intake. Recommend upgrading diet to Minced and Moist Solids (MM5) and Mildly Thick Liquids (MT2) with 1:1 feeding; ok for sips of thins b/w meals as tolerated. Float meds in puree.     Plan    Minced and Moist Solids (MM5) and Mildly Thick Liquids (MT2) with 1:1 feeding; ok for sips of thins b/w meals as tolerated. Float meds in puree.     Continue current treatment plan.    Discharge Recommendations: Recommend post-acute placement for additional speech therapy services prior to discharge home    Subjective    Pt was alert, self-limiting w/ PO intake but redirectable to participate w/ ongoing encouragement.      Objective       11/30/20 1506   Dysphagia    Positioning / Behavior Modification Modulate Rate or Bite Size;Self Monitoring   Other Treatments PO trials of thins via cup, MM5, SB6   Diet / Liquid Recommendation Minced & Moist (5) - (Dysphagia II);Mildly Thick (2) - (Nectar Thick)  (ok for sips of thins b/w meals as tolerated)   Recommended Route of Medication Administration   Medication Administration  Float Whole with Puree   Short Term Goals   Short Term Goal # 1 Patient will consume a PU4/MT2 diet with no overt s/sx of aspiration  given 1:1 feeding.    Goal Outcome # 1 Goal met, new goal added   Short Term Goal # 1 B  Patient will consume meals of Minced/Moist solids and Mildly thick liquids with no s/sx of aspiration given 1:1 feeding.

## 2020-11-30 NOTE — PROGRESS NOTES
Hospital Medicine Daily Progress Note    Date of Service  11/30/2020    Chief Complaint  Abdominal Pain    Hospital Course  Ms. Escudero is a 49-year-old female with PMH significant for muscular dystrophy (diagnosed at 16, JOSÉ, migraines, chronic abdominal pain, chronic opioid dependence, chronic lower extremity weakness, and dysphagia who presented 11/11/2020 with abdominal pain. She reported the pain as 8/10, localized to the .umbilicus and nonradiating.  Presentation she started experiencing acute onset right upper extremity and bilateral lower extremity pain and numbness.  While in the ER she also fell forward out of her chair, striking her forehead without loss of consciousness.  CT head showed possible cephalohematoma.  CT abdomen/pelvis showed no evidence of acute intra-abdominal or pelvic processes.    She had sudden onset of altered mental status.  Glucose was found to be 45 and she was treated with hypoglycemia protocol. Lumbar puncture was unremarkable.  She continued to have altered mental status, hypotension and tachycardia.  She was treated with the sepsis protocol.  X-ray was concerning for atelectasis versus pneumonia.  Covid was negative.  Pro Delbert 0.53, up to 1.98.. All cultures have been negative to date.  Antibiotics were discontinued 11/19. Neurology was consulted and recommended encephalopathy work-up.  She was started on acyclovir until her work-up came back.  Her HSV work-up, syphilis and HIV were negative and the acyclovir was discontinued.  MRI brain 11/18 with incidental finding left retinal detachment. She was seen by ophthalmology and diagnosed with left dislocated intraocular lens.  Palliative care was consulted for advanced care planning.  Patient does NOT want tube feeding or IV access. DNAR/DNI per her request.    Interval Problem Update  11/30 - poor PO intake. Hypotension. IVF hydration. She pulled out her IV. Place midline.   -ongoing lethargy. Oriented to herself and hospital.  "  -continues to require 1:1 sitter for ongoing impulsivity and falls.     11/29 -ongoing hallucinations/delusions.  She states they are bad dreams.  -She is more lethargic today.  Arouses easily.  Poor p.o. intake with soft BPs.  She refuses IV fluid hydration/IV access. Says she would consider allowing IV access if we restarted her IV narcotics  -Disoriented to time and events  -Attempted to DC 1:1 sitter yesterday, however she sustained another fall after getting out of bed by herself.   11/28 - asking to be without IV access due to discomfort. Electrolytes have normalized. OK to be without IV access.  -pain controlled with oxycodone. Using appropriately. Continues with 1:1 sitter.   11/27 -she is eager to work with PT/OT stating she would like to return home versus going to SNF.  -More alert today.  Less impulsive.  Hoping to DC the 1:1 sitter today or tomorrow.  11/26 -continues with one-to-one sitter for safety.  She is getting better at using her call light to call for help.  Nursing is getting her up to the bedside commode when able.  -Discussed plan of care with her . He is wanting us to try and get her on a every 2 hour toileting schedule. We will also try and get her a bed near a window since she is noted to sleep a lot throughout the day.   is hopeful he can take her home after she is discharged from SNF, however admits he cannot afford caregiving services and has had issues with her falling at home in the past when he is at work causing him to lose his job.  11/25 - remains sleepy. Awakens easily \"I know my family is here, I just cannot see them\".  She tells me she is having strange dreams. I told her that her family is actually not here and she tells me they are in a different hospital.  She can tell me she is in [place], in the hospital, year 2020 and her name.    -She has remained free from her lapbelt restraint greater than 24 hours.  She continues to require 1:1 sitter for ongoing " impulsivity and high fall risk.  I discussed this with her today that we need to be able to be without a sitter over 24 hours in order for her to be able to go to SNF. She reports she will try to be more compliant with asking for help before trying to get out of bed on her own  -Her abdominal pain is controlled.  She has had no narcotics since 11 AM 11/24 - Ongoing poor appetite.  Discussed with dietary.  I have ordered appetite stimulant today.    Consultants/Specialty  None    Code Status  DNAR/DNI    Disposition  SNF    Review of Systems  Review of Systems   Unable to perform ROS: Mental acuity (Lethargic. Drifts back to sleep easily.)   Constitutional: Positive for malaise/fatigue.   HENT: Negative.    Eyes: Negative for blurred vision and double vision.   Respiratory: Negative for shortness of breath.    Cardiovascular: Negative for chest pain.   Gastrointestinal: Positive for abdominal pain. Negative for diarrhea (none today).   Genitourinary: Negative for flank pain and hematuria.   Musculoskeletal: Positive for joint pain. Negative for falls.   Neurological: Positive for dizziness and weakness.   Psychiatric/Behavioral: Positive for memory loss. The patient is not nervous/anxious and does not have insomnia.    All other systems reviewed and are negative.       Physical Exam  Temp:  [36.4 °C (97.6 °F)-37 °C (98.6 °F)] 36.4 °C (97.6 °F)  Pulse:  [] 92  Resp:  [18-19] 18  BP: ()/(67-77) 99/68  SpO2:  [92 %-96 %] 92 %    Physical Exam  Vitals signs and nursing note reviewed. Exam conducted with a chaperone present.   Constitutional:       General: She is not in acute distress.     Appearance: She is cachectic. She is ill-appearing.      Comments: Thin/frail  Arouses easily to her name.  Drifts back to sleep easily.   HENT:      Head: Normocephalic.      Right Ear: Hearing normal.      Left Ear: Hearing normal.      Nose: Nose normal.      Mouth/Throat:      Lips: Pink.      Mouth: Mucous membranes  are dry.   Cardiovascular:      Rate and Rhythm: Normal rate and regular rhythm.      Pulses: Normal pulses.      Heart sounds: Normal heart sounds.   Pulmonary:      Effort: Pulmonary effort is normal. No respiratory distress.      Breath sounds: Normal breath sounds. No wheezing or rhonchi.   Abdominal:      Palpations: Abdomen is soft.      Tenderness: There is no abdominal tenderness. There is no guarding or rebound.   Musculoskeletal:      Right lower leg: No edema.      Left lower leg: No edema.      Comments: Generalized weakness   Skin:     General: Skin is warm and dry.      Nails: There is no clubbing.     Neurological:      Mental Status: She is lethargic and disoriented.      Motor: Weakness and atrophy present.      Gait: Gait abnormal.      Comments: Oriented to herself and hospital.   Psychiatric:         Attention and Perception: She perceives visual hallucinations.         Mood and Affect: Mood normal.         Behavior: Behavior normal.         Cognition and Memory: Cognition is impaired. She exhibits impaired recent memory.         Judgment: Judgment is impulsive.         Fluids    Intake/Output Summary (Last 24 hours) at 11/30/2020 1411  Last data filed at 11/30/2020 1122  Gross per 24 hour   Intake 460 ml   Output --   Net 460 ml       Laboratory      Recent Labs     11/29/20  0250   SODIUM 141   POTASSIUM 3.9   CHLORIDE 106   CO2 31   GLUCOSE 86   BUN 4*   CREATININE 0.28*   CALCIUM 8.9                   Imaging  IR-MIDLINE CATHETER INSERTION WO GUIDANCE > AGE 3   Final Result                  Ultrasound-guided midline placement performed by qualified nursing staff    as above.          IR-US GUIDED PIV   Final Result    Ultrasound-guided PERIPHERAL IV INSERTION performed by    qualified nursing staff as above.      DX-ABDOMEN FOR TUBE PLACEMENT   Final Result         1.  Nonspecific bowel gas pattern.   2.  Dobbhoff tube tip overlying the expected location of the pylorus or first duodenal  segment.   3.  Left basilar atelectasis      MR-BRAIN-WITH & W/O   Final Result      1.  Study is again limited by patient motion.   2.  Mild cerebral atrophy, prominent for the patient's age.   3.  T2 FLAIR hyperintensity in the anterior bilateral frontal lobes probably encephalomalacia/gliosis in could be related to old trauma.   4.  Additional mild nonspecific scattered white matter disease, possibly chronic microvascular ischemic changes.   5.  No acute intracranial hemorrhage or infarct.   6.  Left retinal detachment.      DX-ABDOMEN FOR TUBE PLACEMENT   Final Result         1.  Nonspecific bowel gas pattern.   2.  Dobbhoff tube is coiled within the stomach, the tip terminates overlying the expected location of the gastric fundus.   3.  Left basilar atelectasis      IR-MIDLINE CATHETER INSERTION WO GUIDANCE > AGE 3   Final Result                  Ultrasound-guided midline placement performed by qualified nursing staff    as above.          MR-BRAIN-WITH & W/O   Final Result      1.  Limited exam due to motion artifact.   2.  Diffuse atrophy and white matter microvascular ischemic changes.   3.  No acute stroke or evidence for intracranial hemorrhage.      DX-CHEST-LIMITED (1 VIEW)   Final Result      No acute cardiopulmonary disease.      EC-ECHOCARDIOGRAM COMPLETE W/O CONT   Final Result      DX-LUMBAR PUNCTURE FOR DIAGNOSIS   Final Result         FLUOROSCOPIC-GUIDED LUMBAR PUNCTURE AS DESCRIBED ABOVE.      IR-MIDLINE CATHETER INSERTION WO GUIDANCE > AGE 3   Final Result                  Ultrasound-guided midline placement performed by qualified nursing staff    as above.          CT-HEAD W/O   Final Result      Normal CT scan of the head without contrast.               INTERPRETING LOCATION:  [address] [place] [address], [id number]      [id number] VIEW   Final Result      Unremarkable AP recumbent abdomen.      DX-CHEST-PORTABLE (1 VIEW)   Final Result      Left basilar opacities, consistent with pneumonia.       CT-HEAD W/O   Final Result      No CT evidence of acute infarct, hemorrhage or mass.      CT-ABDOMEN-PELVIS WITH   Final Result      No evidence of acute intra-abdominal or pelvic process.      Probably duplicated right renal collecting system.      CT-HEAD W/O   Final Result      1.  Focal soft tissue swelling right frontal region with minimal underlying increased density adjacent to the periphery of the outer table of the calvarium which could represent a small cephalohematoma.      2.  Periventricular and subcortical white matter density changes which could be related to small vessel ischemia, demyelinization or gliosis. Findings may be slightly more prominent than on previous exam.           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)  Assessment & Plan  -Diagnosed at age 16  -History of chronic abdominal pain with no clear explained etiology prompting multiple ED visits   -Most likely contributing to chronic abdominal pain as well as onset of of extremity symptoms  -ESR, CRP within normal limits    Bilateral lower extremity pain- (present on admission)  Assessment & Plan  -seems to be mostly when she is ambulatory/mobilizing  -Likely a component of deconditioning from muscular dystrophy  -PT OT  -SNF at discharge  -Pain management      Abdominal pain- (present on admission)  Assessment & Plan  -Chronic, stable  -History of pancreatitis  -CT abdomen this hospitalization showed no acute abnormalities.  -Contributing to her poor p.o. intake  -Having bowel movements             Impaired mobility and ADLs- (present on admission)  Assessment & Plan  -Due to muscular dystrophy  -PT OT  -SNF at [facility]      Severe protein-calorie malnutrition (HCC)  Assessment & Plan  -Body mass index is 19.16 kg/m².  -Poor appetite -continues to decline  -She had core track at one time but has pulled out -he does not want another tube feeding  -SLP recommends PU/MT2 diet with strict 1:1  feeding.  -Continue Marinol  -Encourage p.o. intake  -Supplements  -Food preferences    Electrolyte abnormality  Assessment & Plan  -Potassium, phosphorus, and magnesium all normalized  -Follow BMP    Altered mental status  Assessment & Plan  -ongoing lethargy. Rouses easily. Oriented to herself and hospital  -impulsive at times resulting in falls  -She continues to have hallucinations which she states are bad dreams   -Cautious use of narcotics.  Avoid benzodiazepines and hypnotics  -Continue to hold off on resuming IV narcotics.  -Frequent reorientation  -Sleep hygiene  -1:1 sitter    At high risk for falls- (present on admission)  Assessment & Plan  -Per , she has been falling at home  -She has sustained multiple falls this hospitalization  -Continues to require 1:1 sitter  -Fall precautions    Hypotension  Assessment & Plan  -likely due to poor PO intake/dehydration  -remains lethargic at her baseline  -does endorse some mild dizziness  -IV fluid hydration         VTE prophylaxis: Enoxaparin    I have performed a physical exam and reviewed and updated ROS and Assessment/Plan today (11/30/20). In review of the previous note there are no changes except as documented above    Please note that this dictation was created using voice recognition software. I have made every reasonable attempt to correct obvious errors, but there may be errors of grammar and possibly content that I did not discover before finalizing the note.    DUYEN Gregory.

## 2020-11-30 NOTE — PROGRESS NOTES
Pt wakes up and removed the IV as seen by CNA sitter. Patient educated and refused to have at this time. Pt had 900 cc of normal saline. Pt encouraged to sleep.

## 2020-12-01 NOTE — PROGRESS NOTES
Assumed pt care at shift change.  Pt resting in bed, c/o abdominal pain, no signs of discomfort or distress.  Discussed POC with pt, education provided regarding the importance of IV fluids and oral intake of food and water.  Safety precautions in place, bed locked and in lowest position, hourly rounding.  No additional needs at this time.

## 2020-12-01 NOTE — PROGRESS NOTES
Medicare Re-certification for Ms. Gayla Escudero I certify that the patient requires continued medically necessary hospital services for the treatment of weakness secondary to muscular dystrophy. Patient will remain in the hospital for an additional 7-14 days. Discharge plan is anticipated for SNF placement once approved.

## 2020-12-01 NOTE — DIETARY
Nutrition Services: Update   Day 18 of admit.  Gayla Escudero is a 49 y.o. female with admitting DX of Abdominal pain    Pt is currently on pureed diet with mildly thick liquids and 1:1 supervision. Pt is receiving magic cups BID. Pt is receiving Marinol. Per [facility] no artificial nutrition or feeding tube. Per chart pt PO varies < 25 - 100%. Nutrition interventions are limited. Will re-screen per department policy.     Wt 11/21: 46 kg via bed scale - no updated wt.     Recommendations/Plan:  1. Encourage intake of meals  2. Document intake of all meals as % taken in ADL's to provide interdisciplinary communication across all shifts.   3. Monitor weight.  4. Nutrition rep will continue to see patient for ongoing meal and snack preferences.    RD to monitor per department policy.

## 2020-12-01 NOTE — PROGRESS NOTES
[facility] Medicine Daily Progress Note    Date of Service  12/1/2020    Chief Complaint  49 y.o. female admitted 11/11/2020 with abdominal pain    Hospital Course  Ms. Escudero is a 49-year-old female with PMH significant for muscular dystrophy (diagnosed at 16, JOSÉ, migraines, chronic abdominal pain, chronic opioid dependence, chronic lower extremity weakness, and dysphagia who presented 11/11/2020 with abdominal pain. She reported the pain as 8/10, localized to the .umbilicus and nonradiating.  Presentation she started experiencing acute onset right upper extremity and bilateral lower extremity pain and numbness.    While in the ER she also fell forward out of her chair, striking her forehead without loss of consciousness.  CT head showed possible cephalohematoma.  CT abdomen/pelvis showed no evidence of acute intra-abdominal or pelvic processes.  She had sudden onset of altered mental status.  Glucose was found to be 45 and she was treated with hypoglycemia protocol. Lumbar puncture was unremarkable.  She continued to have altered mental status, hypotension and tachycardia.  She was treated with the sepsis protocol.  X-ray was concerning for atelectasis versus pneumonia.  Covid was negative.  Pro Delbert 0.53, up to 1.98.. All cultures have been negative to date.  Antibiotics were discontinued 11/19. Neurology was consulted and recommended encephalopathy work-up.  She was started on acyclovir until her work-up came back.  Her HSV work-up, syphilis and HIV were negative and the acyclovir was discontinued.MRI brain 11/18 with incidental finding left retinal detachment. She was seen by ophthalmology and diagnosed with left dislocated intraocular lens.    Palliative care was consulted for advanced care planning.  Patient does NOT want tube feeding or IV access. DNAR/DNI per her request.    Interval Problem Update  Patient was seen and examined at bedside.  Patient was resting comfortably in bed. Palpated abdominal with no  pain. When I asked she had any pain she pointed to her stomach and when I felt her abdomen again, she winced in pain.     Continues to require 1:1 sitter for ongoing impulsivity and falls.    Consultants/Specialty  None    Code Status  DNAR/DNI    Disposition  Pending SNF placement    Review of Systems  Review of Systems   Gastrointestinal: Positive for abdominal pain.   All other systems reviewed and are negative.       Physical Exam  Temp:  [36.8 °C (98.2 °F)-37.3 °C (99.2 °F)] 37 °C (98.6 °F)  Pulse:  [90-94] 94  Resp:  [16-18] 18  BP: (92-98)/(57-64) 96/64  SpO2:  [92 %-95 %] 94 %    Physical Exam  Vitals signs and nursing note reviewed.   Constitutional:       General: She is not in acute distress.     Appearance: Normal appearance. She is cachectic. She is ill-appearing.   HENT:      Head: Normocephalic and atraumatic.      Mouth/Throat:      Mouth: Mucous membranes are dry.      Pharynx: No oropharyngeal exudate.   Eyes:      Extraocular Movements: Extraocular movements intact.      Pupils: Pupils are equal, round, and reactive to light.   Neck:      Musculoskeletal: Normal range of motion. No neck rigidity or muscular tenderness.   Cardiovascular:      Rate and Rhythm: Normal rate and regular rhythm.      Pulses: Normal pulses.      Heart sounds: No murmur. No friction rub. No gallop.    Pulmonary:      Effort: Pulmonary effort is normal. No respiratory distress.      Breath sounds: No wheezing, rhonchi or rales.   Abdominal:      General: Bowel sounds are normal. There is no distension.      Palpations: Abdomen is soft. There is no mass.      Tenderness: There is no abdominal tenderness.   Musculoskeletal: Normal range of motion.         General: No swelling or tenderness.      Right lower leg: No edema.      Left lower leg: No edema.   Skin:     General: Skin is warm and dry.      Capillary Refill: Capillary refill takes less than 2 seconds.      Findings: No erythema or rash.   Neurological:      General:  No focal deficit present.      Mental Status: She is alert and oriented to person, place, and time.      Motor: Weakness present.      Gait: Gait normal.         Fluids    Intake/Output Summary (Last 24 hours) at 12/1/2020 1541  Last data filed at 12/1/2020 1216  Gross per 24 hour   Intake 510 ml   Output --   Net 510 ml       Laboratory  Recent Labs     12/01/20  0822   WBC 3.6*   RBC 3.00*   HEMOGLOBIN 9.0*   HEMATOCRIT 28.9*   MCV 96.3   MCH 30.0   MCHC 31.1*   RDW 59.7*   PLATELETCT 213   MPV 9.7     Recent Labs     11/29/20  0250 12/01/20  0822   SODIUM 141 147*   POTASSIUM 3.9 3.8   CHLORIDE 106 114*   CO2 31 29   GLUCOSE 86 79   BUN 4* 4*   CREATININE 0.28* 0.23*   CALCIUM 8.9 8.7                   Imaging  IR-MIDLINE CATHETER INSERTION WO GUIDANCE > AGE 3   Final Result                  Ultrasound-guided midline placement performed by qualified nursing staff    as above.          IR-US GUIDED PIV   Final Result    Ultrasound-guided PERIPHERAL IV INSERTION performed by    qualified nursing staff as above.      DX-ABDOMEN FOR TUBE PLACEMENT   Final Result         1.  Nonspecific bowel gas pattern.   2.  Dobbhoff tube tip overlying the expected location of the pylorus or first duodenal segment.   3.  Left basilar atelectasis      MR-BRAIN-WITH & W/O   Final Result      1.  Study is again limited by patient motion.   2.  Mild cerebral atrophy, prominent for the patient's age.   3.  T2 FLAIR hyperintensity in the anterior bilateral frontal lobes probably encephalomalacia/gliosis in could be related to old trauma.   4.  Additional mild nonspecific scattered white matter disease, possibly chronic microvascular ischemic changes.   5.  No acute intracranial hemorrhage or infarct.   6.  Left retinal detachment.      DX-ABDOMEN FOR TUBE PLACEMENT   Final Result         1.  Nonspecific bowel gas pattern.   2.  Dobbhoff tube is coiled within the stomach, the tip terminates overlying the expected location of the gastric  fundus.   3.  Left basilar atelectasis      IR-MIDLINE CATHETER INSERTION WO GUIDANCE > AGE 3   Final Result                  Ultrasound-guided midline placement performed by qualified nursing staff    as above.          MR-BRAIN-WITH & W/O   Final Result      1.  Limited exam due to motion artifact.   2.  Diffuse atrophy and white matter microvascular ischemic changes.   3.  No acute stroke or evidence for intracranial hemorrhage.      DX-CHEST-LIMITED (1 VIEW)   Final Result      No acute cardiopulmonary disease.      EC-ECHOCARDIOGRAM COMPLETE W/O CONT   Final Result      DX-LUMBAR PUNCTURE FOR DIAGNOSIS   Final Result         FLUOROSCOPIC-GUIDED LUMBAR PUNCTURE AS DESCRIBED ABOVE.      IR-MIDLINE CATHETER INSERTION WO GUIDANCE > AGE 3   Final Result                  Ultrasound-guided midline placement performed by qualified nursing staff    as above.          CT-HEAD W/O   Final Result      Normal CT scan of the head without contrast.               INTERPRETING LOCATION:  [address] [facility], [facility], [address]      UQ-CHNUMOD-0 VIEW   Final Result      Unremarkable AP recumbent abdomen.      DX-CHEST-PORTABLE (1 VIEW)   Final Result      Left basilar opacities, consistent with pneumonia.      CT-HEAD W/O   Final Result      No CT evidence of acute infarct, hemorrhage or mass.      CT-ABDOMEN-PELVIS WITH   Final Result      No evidence of acute intra-abdominal or pelvic process.      Probably duplicated right renal collecting system.      CT-HEAD W/O   Final Result      1.  Focal soft tissue swelling right frontal region with minimal underlying increased density adjacent to the periphery of the outer table of the calvarium which could represent a small cephalohematoma.      2.  Periventricular and subcortical white matter density changes which could be related to small vessel ischemia, demyelinization or gliosis. Findings may be slightly more prominent than on previous exam.           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)  Assessment & Plan  -Diagnosed at age 16  -History of chronic abdominal pain with no clear explained etiology prompting multiple ED visits   -Most likely contributing to chronic abdominal pain as well as onset of of extremity symptoms  -ESR, CRP within normal limits    Bilateral lower extremity pain- (present on admission)  Assessment & Plan  -seems to be mostly when she is ambulatory/mobilizing  -Likely a component of deconditioning from muscular dystrophy  -PT OT  -SNF at discharge  -Pain management      Abdominal pain- (present on admission)  Assessment & Plan  -Chronic, stable  -History of pancreatitis  -CT abdomen this hospitalization showed no acute abnormalities.  -Contributing to her poor p.o. intake  -Having bowel movements    Impaired mobility and ADLs- (present on admission)  Assessment & Plan  -Due to muscular dystrophy  -PT OT  -SNF at [place]      Severe protein-calorie malnutrition (HCC)  Assessment & Plan  -Body mass index is 19.16 kg/m².  -Poor appetite -continues to decline  -She had core track at one time but has pulled out -he does not want another tube feeding  -SLP recommends PU/MT2 diet with strict 1:1 feeding.  -Continue Marinol  -Encourage p.o. intake  -Supplements  -Food preferences    Electrolyte abnormality  Assessment & Plan  -Potassium, phosphorus, and magnesium all normalized  -Follow BMP    Altered mental status  Assessment & Plan  -ongoing lethargy. Rouses easily. Oriented to herself and hospital  -impulsive at times resulting in falls  -She continues to have hallucinations which she states are bad dreams   -Cautious use of narcotics.  Avoid benzodiazepines and hypnotics  -Continue to hold off on resuming IV narcotics.  -Frequent reorientation  -Sleep hygiene  -1:1 sitter    At high risk for falls- (present on admission)  Assessment & Plan  -Per , she has been falling at home  -She has sustained multiple falls this hospitalization  -Continues  to require 1:1 sitter  -Fall precautions    Hypotension  Assessment & Plan  -likely due to poor PO intake/dehydration  -remains lethargic at her baseline  -does endorse some mild dizziness  - Started on Midodrine 12/1/2020         VTE prophylaxis: SCDs, Lovenox

## 2020-12-01 NOTE — PROGRESS NOTES
2 RN skin check completed with Tami Rn  Devices in place : PIV  Skin assessed under devices : yes  Confirmed pressure ulcers found on Left back shoulder.  New potential pressure ulcers noted on N/A. Wound consult placed N/A.     Noted blackish to brown small area in Left back shoulder, non blanchable; bilateral elbows pink and blanching; sacral area is pink and blanching; bilateral heels, is pink and blanching     The following interventions in place; assisted to the commode; provided incontinence care; assisted to turn to sides every 2 hours; on pressure redistribution mattress with waffle overlay.

## 2020-12-01 NOTE — PROGRESS NOTES
Pt. Received in bed awake, alert x2 only, irritable but redirectable. With complaints of mild pain on her back with heat pack applied and helping relieve some pain. PIV intact. Educated about fall risks and precautions. Pt. With 1:1 sitter for safety as she had fallen recently and she failed telesitter utilization. Needs attended.

## 2020-12-01 NOTE — CARE PLAN
Problem: Pain Management  Goal: Pain level will decrease to patient's comfort goal  Flowsheets (Taken 11/30/2020 2035)  Non Verbal Scale: Calm    PRN pain med on board, pt utilizing heatpack to help alleviate pain.     Problem: Safety  Goal: Will remain free from injury  Outcome: PROGRESSING AS EXPECTED  Goal: Will remain free from falls  Outcome: PROGRESSING AS EXPECTED   1:1 sitter at bedside, bed alarm in place. Pt. On desk bed.

## 2020-12-01 NOTE — CARE PLAN
Problem: Nutritional:  Goal: Achieve adequate nutritional intake  Description: Patient will consume ~50% of meals  Outcome: MET

## 2020-12-02 NOTE — CARE PLAN
Problem: Safety  Goal: Will remain free from falls  Outcome: PROGRESSING AS EXPECTED  Intervention: Implement fall precautions  Note: All safety precautions in place. 1:1 sitter by bedside.      Problem: Pain Management  Goal: Pain level will decrease to patient's comfort goal  Outcome: PROGRESSING AS EXPECTED  Intervention: Follow pain managment plan developed in collaboration with patient and Interdisciplinary Team  Note: Pt assessed for pain regularly and medicated PRN per MAR.

## 2020-12-02 NOTE — CARE PLAN
Problem: Safety  Goal: Will remain free from injury  Outcome: PROGRESSING AS EXPECTED  Goal: Will remain free from falls  Outcome: PROGRESSING AS EXPECTED  1:1 sitter at bedside for safety, ensured bed alarm in place     Problem: Pain Management  Goal: Pain level will decrease to patient's comfort goal  Outcome: PROGRESSING AS EXPECTED  PRn pain med given to alleviate pt. Complaint of pain.

## 2020-12-02 NOTE — PROGRESS NOTES
Hospital Medicine Daily Progress Note    Date of Service  12/2/2020    Chief Complaint  49 y.o. female admitted 11/11/2020 with abdominal pain    Hospital Course  Ms. Escudero is a 49-year-old female with PMH significant for muscular dystrophy (diagnosed at 16, JOSÉ, migraines, chronic abdominal pain, chronic opioid dependence, chronic lower extremity weakness, and dysphagia who presented 11/11/2020 with abdominal pain. She reported the pain as 8/10, localized to the .umbilicus and nonradiating.  Presentation she started experiencing acute onset right upper extremity and bilateral lower extremity pain and numbness.    While in the ER she also fell forward out of her chair, striking her forehead without loss of consciousness.  CT head showed possible cephalohematoma.  CT abdomen/pelvis showed no evidence of acute intra-abdominal or pelvic processes.  She had sudden onset of altered mental status.  Glucose was found to be 45 and she was treated with hypoglycemia protocol. Lumbar puncture was unremarkable.  She continued to have altered mental status, hypotension and tachycardia.  She was treated with the sepsis protocol.  X-ray was concerning for atelectasis versus pneumonia.  Covid was negative.  Pro Delbert 0.53, up to 1.98.. All cultures have been negative to date.  Antibiotics were discontinued 11/19. Neurology was consulted and recommended encephalopathy work-up.  She was started on acyclovir until her work-up came back.  Her HSV work-up, syphilis and HIV were negative and the acyclovir was discontinued.MRI brain 11/18 with incidental finding left retinal detachment. She was seen by ophthalmology and diagnosed with left dislocated intraocular lens.    Palliative care was consulted for advanced care planning.  Patient does NOT want tube feeding or IV access. DNAR/DNI per her request.    Interval Problem Update  Patient was seen and examined at bedside. No acute events overnight. Patient was resting comfortably in bed.  Palpated abdominal with no pain.     Continues to require 1:1 sitter for ongoing impulsivity and falls.    Consultants/Specialty  None    Code Status  DNAR/DNI    Disposition  Pending SNF placement    Review of Systems  Review of Systems   Gastrointestinal: Positive for abdominal pain.   All other systems reviewed and are negative.       Physical Exam  Temp:  [36.4 °C (97.5 °F)-37 °C (98.6 °F)] 36.8 °C (98.2 °F)  Pulse:  [] 102  Resp:  [16-18] 17  BP: ()/(49-73) 88/64  SpO2:  [93 %-95 %] 94 %    Physical Exam  Vitals signs and nursing note reviewed.   Constitutional:       General: She is not in acute distress.     Appearance: Normal appearance. She is cachectic. She is ill-appearing.   HENT:      Head: Normocephalic and atraumatic.      Mouth/Throat:      Mouth: Mucous membranes are dry.      Pharynx: No oropharyngeal exudate.   Eyes:      Extraocular Movements: Extraocular movements intact.      Pupils: Pupils are equal, round, and reactive to light.   Neck:      Musculoskeletal: Normal range of motion. No neck rigidity or muscular tenderness.   Cardiovascular:      Rate and Rhythm: Normal rate and regular rhythm.      Pulses: Normal pulses.      Heart sounds: No murmur. No friction rub. No gallop.    Pulmonary:      Effort: Pulmonary effort is normal. No respiratory distress.      Breath sounds: No wheezing, rhonchi or rales.   Abdominal:      General: Bowel sounds are normal. There is no distension.      Palpations: Abdomen is soft. There is no mass.      Tenderness: There is no abdominal tenderness.   Musculoskeletal: Normal range of motion.         General: No swelling or tenderness.      Right lower leg: No edema.      Left lower leg: No edema.   Skin:     General: Skin is warm and dry.      Capillary Refill: Capillary refill takes less than 2 seconds.      Findings: No erythema or rash.   Neurological:      General: No focal deficit present.      Mental Status: She is alert and oriented to  person, place, and time.      Motor: Weakness present.      Gait: Gait normal.         Fluids    Intake/Output Summary (Last 24 hours) at 12/2/2020 1337  Last data filed at 12/2/2020 1230  Gross per 24 hour   Intake 280 ml   Output --   Net 280 ml       Laboratory  Recent Labs     12/01/20  0822   WBC 3.6*   RBC 3.00*   HEMOGLOBIN 9.0*   HEMATOCRIT 28.9*   MCV 96.3   MCH 30.0   MCHC 31.1*   RDW 59.7*   PLATELETCT 213   MPV 9.7     Recent Labs     12/01/20  0822   SODIUM 147*   POTASSIUM 3.8   CHLORIDE 114*   CO2 29   GLUCOSE 79   BUN 4*   CREATININE 0.23*   CALCIUM 8.7                   Imaging  IR-MIDLINE CATHETER INSERTION WO GUIDANCE > AGE 3   Final Result                  Ultrasound-guided midline placement performed by qualified nursing staff    as above.          IR-US GUIDED PIV   Final Result    Ultrasound-guided PERIPHERAL IV INSERTION performed by    qualified nursing staff as above.      DX-ABDOMEN FOR TUBE PLACEMENT   Final Result         1.  Nonspecific bowel gas pattern.   2.  Dobbhoff tube tip overlying the expected location of the pylorus or first duodenal segment.   3.  Left basilar atelectasis      MR-BRAIN-WITH & W/O   Final Result      1.  Study is again limited by patient motion.   2.  Mild cerebral atrophy, prominent for the patient's age.   3.  T2 FLAIR hyperintensity in the anterior bilateral frontal lobes probably encephalomalacia/gliosis in could be related to old trauma.   4.  Additional mild nonspecific scattered white matter disease, possibly chronic microvascular ischemic changes.   5.  No acute intracranial hemorrhage or infarct.   6.  Left retinal detachment.      DX-ABDOMEN FOR TUBE PLACEMENT   Final Result         1.  Nonspecific bowel gas pattern.   2.  Dobbhoff tube is coiled within the stomach, the tip terminates overlying the expected location of the gastric fundus.   3.  Left basilar atelectasis      IR-MIDLINE CATHETER INSERTION WO GUIDANCE > AGE 3   Final Result                   Ultrasound-guided midline placement performed by qualified nursing staff    as above.          MR-BRAIN-WITH & W/O   Final Result      1.  Limited exam due to motion artifact.   2.  Diffuse atrophy and white matter microvascular ischemic changes.   3.  No acute stroke or evidence for intracranial hemorrhage.      DX-CHEST-LIMITED (1 VIEW)   Final Result      No acute cardiopulmonary disease.      EC-ECHOCARDIOGRAM COMPLETE W/O CONT   Final Result      DX-LUMBAR PUNCTURE FOR DIAGNOSIS   Final Result         FLUOROSCOPIC-GUIDED LUMBAR PUNCTURE AS DESCRIBED ABOVE.      IR-MIDLINE CATHETER INSERTION WO GUIDANCE > AGE 3   Final Result                  Ultrasound-guided midline placement performed by qualified nursing staff    as above.          CT-HEAD W/O   Final Result      Normal CT scan of the head without contrast.               INTERPRETING LOCATION:  [address], [place], [address]      UK-[id number]-1 VIEW   Final Result      Unremarkable AP recumbent abdomen.      DX-CHEST-PORTABLE (1 VIEW)   Final Result      Left basilar opacities, consistent with pneumonia.      CT-HEAD W/O   Final Result      No CT evidence of acute infarct, hemorrhage or mass.      CT-ABDOMEN-PELVIS WITH   Final Result      No evidence of acute intra-abdominal or pelvic process.      Probably duplicated right renal collecting system.      CT-HEAD W/O   Final Result      1.  Focal soft tissue swelling right frontal region with minimal underlying increased density adjacent to the periphery of the outer table of the calvarium which could represent a small cephalohematoma.      2.  Periventricular and subcortical white matter density changes which could be related to small vessel ischemia, demyelinization or gliosis. Findings may be slightly more prominent than on previous exam.           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)  Assessment & Plan  -Diagnosed at age 16  -History of chronic abdominal pain with  no clear explained etiology prompting multiple ED visits   -Most likely contributing to chronic abdominal pain as well as onset of of extremity symptoms  -ESR, CRP within normal limits    Bilateral lower extremity pain- (present on admission)  Assessment & Plan  -seems to be mostly when she is ambulatory/mobilizing  -Likely a component of deconditioning from muscular dystrophy  -PT OT  -SNF at discharge  -Pain management      Abdominal pain- (present on admission)  Assessment & Plan  -Chronic, stable  -History of pancreatitis  -CT abdomen this hospitalization showed no acute abnormalities.  -Contributing to her poor p.o. intake  -Having bowel movements    Impaired mobility and ADLs- (present on admission)  Assessment & Plan  -Due to muscular dystrophy  -PT OT  -SNF at [place]      Severe protein-calorie malnutrition (HCC)  Assessment & Plan  -Body mass index is 19.16 kg/m².  -Poor appetite -continues to decline  -She had core track at one time but has pulled out -he does not want another tube feeding  -SLP recommends PU/MT2 diet with strict 1:1 feeding.  -Continue Marinol  -Encourage p.o. intake  -Supplements  -Food preferences    Electrolyte abnormality  Assessment & Plan  -Potassium, phosphorus, and magnesium all normalized  -Follow BMP    Altered mental status  Assessment & Plan  -ongoing lethargy. Rouses easily. Oriented to herself and hospital  -impulsive at times resulting in falls  -She continues to have hallucinations which she states are bad dreams   -Cautious use of narcotics.  Avoid benzodiazepines and hypnotics  -Continue to hold off on resuming IV narcotics.  -Frequent reorientation  -Sleep hygiene  -1:1 sitter    At high risk for falls- (present on admission)  Assessment & Plan  -Per , she has been falling at home  -She has sustained multiple falls this hospitalization  -Continues to require 1:1 sitter  -Fall precautions    Hypotension  Assessment & Plan  -likely due to poor PO  intake/dehydration  -remains lethargic at her baseline  -does endorse some mild dizziness  - Started on Midodrine 12/1/2020         VTE prophylaxis: SCDs, Lovenox

## 2020-12-02 NOTE — THERAPY
"Occupational Therapy  Daily Treatment     Patient Name: Gayla Escudero  Age:  49 y.o., Sex:  female  Medical Record #: [id number]  Today's Date: 12/1/2020     Precautions: Fall Risk, Swallow Precautions ( See Comments)  Comments: baseline myotonic MD    Assessment    Pt seen for OT tx with spouse present. Effortful participation limited by pts emotional state, very tearful and reactive, crying throughout in response to simple request to complete bed mobility, sock don, and functional transfer. Pt spouse reports pt is \"not usually so dramatic\" asked her to \"stop being difficult\", he was trying to redirect and encourage her participation. Pt calmed briefly and was able to attempt bilateral sock don, spouse reports he usually has to help with the left but not the right. Pt incontinent of urine x2 throughout session, reported \"I need to go\" but did not/was unable to wait for BSC to be provided. Discussed DC plans,  unable to provide 24hr care which renders pt unsafe to DC home in current condition. Will require post-acute placement for aditional strengthening and to increase independence in ADLs prior to DC home.     Plan    Continue current treatment plan.    DC Equipment Recommendations: Unable to determine at this time  Discharge Recommendations: Recommend post-acute placement for additional occupational therapy services prior to discharge home(or 24hr caregiver, spouse works and unable to provide care)    Subjective    \"I did good, right? I tried, you saw me try! I tried!!\" (crying, childlike)     Objective     12/01/20 1605   Precautions   Precautions Fall Risk;Swallow Precautions ( See Comments)   Comments baseline myotonic MD   Cognition    Speech/ Communication Delayed Responses   Level of Consciousness Alert   Ability To Follow Commands 1 Step   Safety Awareness Impaired;Impulsive   New Learning Impaired   Attention Impaired   Sequencing Impaired   Initiation Impaired   Comments pt emotionally " labile, tearful when asked to put forth effort in ADL tasks, childlike   Strength Upper Body   Gross Strength Generalized Weakness, Equal Bilaterally.    Comments hx muscular dystrophy   Upper Body Muscle Tone   Comments low tone thoughout d/t MD   Balance   Weight Shift Sitting Fair   Weight Shift Standing Poor   Skilled Intervention Verbal Cuing;Tactile Cuing;Facilitation;Postural Facilitation   Comments did not appear effortful demonstration of ability   Bed Mobility    Supine to Sit Minimal Assist   Rolling Minimal Assist to Rt.;Minimum Assist to Lt.   Skilled Intervention Tactile Cuing;Verbal Cuing;Facilitation   Comments pt has rolled herself out of bed, is able to roll side to side independently but emotional state and self-limiting behaviors impacted demonstration of real ability   Activities of Daily Living   Eating   (NT)   Grooming   (refused)   Upper Body Dressing Minimal Assist   Lower Body Dressing Maximal Assist  (MaxA encouragment and facilitation to participate)   Toileting Maximal Assist  (pt incontinent of urine, able to complete pericare in supine)   Skilled Intervention Verbal Cuing;Tactile Cuing;Sequencing;Facilitation   Functional Mobility   Sit to Stand Moderate Assist   Bed, Chair, Wheelchair Transfer Moderate Assist   Transfer Method Stand Step   Mobility emotional state limited effortful participation, pt tearful throughout   Skilled Intervention Verbal Cuing;Tactile Cuing;Postural Facilitation;Facilitation   Comments MaxA cues required   Activity Tolerance   Comments limited by emotional state   Short Term Goals   Short Term Goal # 1 Pt will perform LB dressing w/ min a   Goal Outcome # 1 Progressing slower than expected   Short Term Goal # 2 Pt will perform functional t/f  w/ min a    Goal Outcome # 2 Progressing slower than expected   Short Term Goal # 3 Pt will perform toileting task with min a   Goal Outcome # 3 Goal not met   Anticipated Discharge Equipment and Recommendations   DC  Equipment Recommendations Unable to determine at this time   Discharge Recommendations Recommend post-acute placement for additional occupational therapy services prior to discharge home  (or 24hr caregiver, spouse works and unable to provide care)

## 2020-12-02 NOTE — PROGRESS NOTES
Pt. Received in bed awake, orientedx3, disoriented to event. Pt. With complaints of mild to mod pain on her back and abdomen with PRN and scheduled meds on board. High fall risks pt. Precautions in place. 1:1 safety sitter in place.

## 2020-12-02 NOTE — THERAPY
"Physical Therapy   Daily Treatment     Patient Name: Gayla Escudero  Age:  49 y.o., Sex:  female  Medical Record #: [id number]  Today's Date: 12/1/2020     Precautions: Fall Risk, Swallow Precautions ( See Comments)  Comments: baseline myotonic MD    Assessment    Pt seen for follow up PT tx. Pt acting very childish, self limiting, and crying throughout session. Pt required increased time and encouragement to complete all mobility tasks. Pt continues to be limited by weakness, cognition, and decreased activity tolerance.     Spoke with  at bedside.  reported that he works and he is unable to provide 24/7 care for her at current functional level.  is under the impression that pt will be discharging to [facility] in the next 2 days- unsure if this is still possible due to decreased beds available due to COVID.     Spoke with pt about safety while in her room as sitter is a barrier to dc to SNF. Pt asked \"why do I keep falling out of bed?\" therapist explained to patient that she requires assistance with moblity and is not safe to attempt on her own due to high risk of falls. Pt verbalized understanding but likely little carryover based on cognitive barriers during session.    Frequency decreased as pt is progressing slower than expected and self limiting throughout session    Plan    Treatment plan modified to 3 times per week until therapy goals are met for the following treatments:  Bed Mobility, Community Re-integration, Gait Training, Neuro Re-Education / Balance, Self Care/Home Evaluation, Therapeutic Activities and Therapeutic Exercises.    DC Equipment Recommendations: Unable to determine at this time  Discharge Recommendations: Recommend post-acute placement for additional physical therapy services prior to discharge home      Subjective    \"Can I just come home with you?\"       12/01/20 1622   Cognition    Cognition / Consciousness X   Level of Consciousness Alert   Ability To " "Follow Commands 1 Step   Safety Awareness Impaired   New Learning Impaired   Attention Impaired   Sequencing Impaired   Initiation Impaired   Comments crying throughout and very self limiting. Per , pt is not emotionally labile at baseline   Other Treatments   Other Treatments Provided Spoke with  at bedside.  reported that he works and he is unable to provide 24/7 care for her at current functional level.  is under the impression that pt will be discharging to SNF soon- unsure if this is still possible due to COVID. Spoke with pt about safety while in her room as sitter is a barrier to dc to SNF. Pt asked \"why do I keep falling out of bed?\" therapist explained to patient that she requires assistance with moblity and is not safe to attempt on her own due to high risk of falls. Pt verbalized understanding but likly little carryover   Balance   Sitting Balance (Static) Fair -   Sitting Balance (Dynamic) Poor +   Standing Balance (Static) Poor   Standing Balance (Dynamic) Poor -   Weight Shift Sitting Fair   Weight Shift Standing Poor   Skilled Intervention Verbal Cuing;Tactile Cuing;Sequencing;Compensatory Strategies   Comments standing with HHA   Gait Analysis   Gait Level Of Assist Moderate Assist   Assistive Device Hand Held Assist   Distance (Feet) 3   # of Times Distance was Traveled 1   Deviation Bradykinetic;Shuffled Gait   # of Stairs Climbed 0   Weight Bearing Status fwb   Skilled Intervention Verbal Cuing;Tactile Cuing;Compensatory Strategies   Comments transfer bed<>chair with ambulating   Bed Mobility    Supine to Sit Minimal Assist   Sit to Supine Minimal Assist   Scooting Minimal Assist   Rolling Minimal Assist to Rt.;Minimum Assist to Lt.   Skilled Intervention Verbal Cuing;Tactile Cuing;Sequencing;Compensatory Strategies   Comments max cues for sequencing   Functional Mobility   Sit to Stand Moderate Assist   Bed, Chair, Wheelchair Transfer Moderate Assist   Transfer Method " Stand Step   Mobility bed mob, STS, bed<>chair, stepping   Skilled Intervention Verbal Cuing;Tactile Cuing;Sequencing;Compensatory Strategies   Comments cues for safety and sequencing   Short Term Goals    Short Term Goal # 1 Pt will be able to transfer supine<>sit with min A within 6 visits to ensure mobility at home.   Goal Outcome # 1 Goal met, new goal added   Short Term Goal # 1 B  pt will be able to complete bed mobility with SPV in 6tx in order to return home   Goal Outcome  # 1 B Goal not met   Short Term Goal # 2 Pt will be able to transfer sit<>stand with 4WW and min A within 6 visits to ensure mobility at home.   Goal Outcome # 2 Progressing slower than expected   Short Term Goal # 3 Pt will be ambulate 25 ft with FWW and min A within 6 visits to ensure mobility around house.    Goal Outcome # 3 Progressing slower than expected   Anticipated Discharge Equipment and Recommendations   DC Equipment Recommendations Unable to determine at this time   Discharge Recommendations Recommend post-acute placement for additional physical therapy services prior to discharge home

## 2020-12-02 NOTE — PROGRESS NOTES
Assumed care of pt at shift change. Pt is on RA with no signs of acute distress. A&Ox4. Reports mild pain to abdomen. Declines medication. 1:1 sitter by bedside. All comfort measures in place. Call light and personal belongings by bedside. All safety precautions in place.. Hourly rounding in place.

## 2020-12-03 NOTE — PROGRESS NOTES
Received bedside report and assumed care of pt at change of shift. Pt is sleeping in bed, no signs of distress. Sitter at bedside, given report. No other needs at this time. Bed is locked and in lowest position with belongings and call light in reach

## 2020-12-03 NOTE — PROGRESS NOTES
Hospital Medicine Daily Progress Note    Date of Service  12/3/2020    Chief Complaint  49 y.o. female admitted 11/11/2020 with abdominal pain    Hospital Course  Ms. Escudero is a 49-year-old female with PMH significant for muscular dystrophy (diagnosed at 16, JOSÉ, migraines, chronic abdominal pain, chronic opioid dependence, chronic lower extremity weakness, and dysphagia who presented 11/11/2020 with abdominal pain. She reported the pain as 8/10, localized to the .umbilicus and nonradiating.  Presentation she started experiencing acute onset right upper extremity and bilateral lower extremity pain and numbness.    While in the ER she also fell forward out of her chair, striking her forehead without loss of consciousness.  CT head showed possible cephalohematoma.  CT abdomen/pelvis showed no evidence of acute intra-abdominal or pelvic processes.  She had sudden onset of altered mental status.  Glucose was found to be 45 and she was treated with hypoglycemia protocol. Lumbar puncture was unremarkable.  She continued to have altered mental status, hypotension and tachycardia.  She was treated with the sepsis protocol.  X-ray was concerning for atelectasis versus pneumonia.  Covid was negative.  Pro Delbert 0.53, up to 1.98.. All cultures have been negative to date.  Antibiotics were discontinued 11/19. Neurology was consulted and recommended encephalopathy work-up.  She was started on acyclovir until her work-up came back.  Her HSV work-up, syphilis and HIV were negative and the acyclovir was discontinued.MRI brain 11/18 with incidental finding left retinal detachment. She was seen by ophthalmology and diagnosed with left dislocated intraocular lens.    Palliative care was consulted for advanced care planning.  Patient does NOT want tube feeding or IV access. DNAR/DNI per her request.    Interval Problem Update  Patient was seen and examined at bedside. No acute events overnight. Patient was resting comfortably in bed.  Palpated abdominal with no pain.     Continues to require 1:1 sitter for ongoing impulsivity and falls.    Consultants/Specialty  None    Code Status  DNAR/DNI    Disposition  Pending SNF placement    Review of Systems  Review of Systems   Gastrointestinal: Positive for abdominal pain.   All other systems reviewed and are negative.       Physical Exam  Temp:  [36.6 °C (97.8 °F)-37.2 °C (98.9 °F)] 36.7 °C (98.1 °F)  Pulse:  [] 75  Resp:  [17] 17  BP: ()/(45-65) 93/65  SpO2:  [95 %-98 %] 95 %    Physical Exam  Vitals signs and nursing note reviewed.   Constitutional:       General: She is not in acute distress.     Appearance: Normal appearance. She is cachectic. She is ill-appearing.   HENT:      Head: Normocephalic and atraumatic.      Mouth/Throat:      Mouth: Mucous membranes are dry.      Pharynx: No oropharyngeal exudate.   Eyes:      Extraocular Movements: Extraocular movements intact.      Pupils: Pupils are equal, round, and reactive to light.   Neck:      Musculoskeletal: Normal range of motion. No neck rigidity or muscular tenderness.   Cardiovascular:      Rate and Rhythm: Normal rate and regular rhythm.      Pulses: Normal pulses.      Heart sounds: No murmur. No friction rub. No gallop.    Pulmonary:      Effort: Pulmonary effort is normal. No respiratory distress.      Breath sounds: No wheezing, rhonchi or rales.   Abdominal:      General: Bowel sounds are normal. There is no distension.      Palpations: Abdomen is soft. There is no mass.      Tenderness: There is no abdominal tenderness.   Musculoskeletal: Normal range of motion.         General: No swelling or tenderness.      Right lower leg: No edema.      Left lower leg: No edema.   Skin:     General: Skin is warm and dry.      Capillary Refill: Capillary refill takes less than 2 seconds.      Findings: No erythema or rash.   Neurological:      General: No focal deficit present.      Mental Status: She is alert and oriented to person,  place, and time.      Motor: Weakness present.      Gait: Gait normal.         Fluids    Intake/Output Summary (Last 24 hours) at 12/3/2020 1002  Last data filed at 12/2/2020 1700  Gross per 24 hour   Intake 380 ml   Output --   Net 380 ml       Laboratory  Recent Labs     12/01/20  0822   WBC 3.6*   RBC 3.00*   HEMOGLOBIN 9.0*   HEMATOCRIT 28.9*   MCV 96.3   MCH 30.0   MCHC 31.1*   RDW 59.7*   PLATELETCT 213   MPV 9.7     Recent Labs     12/01/20  0822   SODIUM 147*   POTASSIUM 3.8   CHLORIDE 114*   CO2 29   GLUCOSE 79   BUN 4*   CREATININE 0.23*   CALCIUM 8.7                   Imaging  IR-MIDLINE CATHETER INSERTION WO GUIDANCE > AGE 3   Final Result                  Ultrasound-guided midline placement performed by qualified nursing staff    as above.          IR-US GUIDED PIV   Final Result    Ultrasound-guided PERIPHERAL IV INSERTION performed by    qualified nursing staff as above.      DX-ABDOMEN FOR TUBE PLACEMENT   Final Result         1.  Nonspecific bowel gas pattern.   2.  Dobbhoff tube tip overlying the expected location of the pylorus or first duodenal segment.   3.  Left basilar atelectasis      MR-BRAIN-WITH & W/O   Final Result      1.  Study is again limited by patient motion.   2.  Mild cerebral atrophy, prominent for the patient's age.   3.  T2 FLAIR hyperintensity in the anterior bilateral frontal lobes probably encephalomalacia/gliosis in could be related to old trauma.   4.  Additional mild nonspecific scattered white matter disease, possibly chronic microvascular ischemic changes.   5.  No acute intracranial hemorrhage or infarct.   6.  Left retinal detachment.      DX-ABDOMEN FOR TUBE PLACEMENT   Final Result         1.  Nonspecific bowel gas pattern.   2.  Dobbhoff tube is coiled within the stomach, the tip terminates overlying the expected location of the gastric fundus.   3.  Left basilar atelectasis      IR-MIDLINE CATHETER INSERTION WO GUIDANCE > AGE 3   Final Result                   Ultrasound-guided midline placement performed by qualified nursing staff    as above.          MR-BRAIN-WITH & W/O   Final Result      1.  Limited exam due to motion artifact.   2.  Diffuse atrophy and white matter microvascular ischemic changes.   3.  No acute stroke or evidence for intracranial hemorrhage.      DX-CHEST-LIMITED (1 VIEW)   Final Result      No acute cardiopulmonary disease.      EC-ECHOCARDIOGRAM COMPLETE W/O CONT   Final Result      DX-LUMBAR PUNCTURE FOR DIAGNOSIS   Final Result         FLUOROSCOPIC-GUIDED LUMBAR PUNCTURE AS DESCRIBED ABOVE.      IR-MIDLINE CATHETER INSERTION WO GUIDANCE > AGE 3   Final Result                  Ultrasound-guided midline placement performed by qualified nursing staff    as above.          CT-HEAD W/O   Final Result      Normal CT scan of the head without contrast.               INTERPRETING LOCATION:  [address], [place], [address]      AK-JINPIUA-1 VIEW   Final Result      Unremarkable AP recumbent abdomen.      DX-CHEST-PORTABLE (1 VIEW)   Final Result      Left basilar opacities, consistent with pneumonia.      CT-HEAD W/O   Final Result      No CT evidence of acute infarct, hemorrhage or mass.      CT-ABDOMEN-PELVIS WITH   Final Result      No evidence of acute intra-abdominal or pelvic process.      Probably duplicated right renal collecting system.      CT-HEAD W/O   Final Result      1.  Focal soft tissue swelling right frontal region with minimal underlying increased density adjacent to the periphery of the outer table of the calvarium which could represent a small cephalohematoma.      2.  Periventricular and subcortical white matter density changes which could be related to small vessel ischemia, demyelinization or gliosis. Findings may be slightly more prominent than on previous exam.           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)  Assessment & Plan  -Diagnosed at age 16  -History of chronic abdominal pain with no  clear explained etiology prompting multiple ED visits   -Most likely contributing to chronic abdominal pain as well as onset of of extremity symptoms  -ESR, CRP within normal limits    Bilateral lower extremity pain- (present on admission)  Assessment & Plan  -seems to be mostly when she is ambulatory/mobilizing  -Likely a component of deconditioning from muscular dystrophy  -PT OT  -SNF at discharge  -Pain management      Abdominal pain- (present on admission)  Assessment & Plan  -Chronic, stable  -History of pancreatitis  -CT abdomen this hospitalization showed no acute abnormalities.  -Contributing to her poor p.o. intake  -Having bowel movements    Impaired mobility and ADLs- (present on admission)  Assessment & Plan  -Due to muscular dystrophy  -PT OT  -SNF at [facility]      Severe protein-calorie malnutrition (HCC)  Assessment & Plan  -Body mass index is 19.16 kg/m².  -Poor appetite -continues to decline  -She had core track at one time but has pulled out -he does not want another tube feeding  -SLP recommends PU/MT2 diet with strict 1:1 feeding.  -Continue Marinol  -Encourage p.o. intake  -Supplements  -Food preferences    Electrolyte abnormality  Assessment & Plan  -Potassium, phosphorus, and magnesium all normalized  -Follow BMP    Altered mental status  Assessment & Plan  -ongoing lethargy. Rouses easily. Oriented to herself and hospital  -impulsive at times resulting in falls  -She continues to have hallucinations which she states are bad dreams   -Cautious use of narcotics.  Avoid benzodiazepines and hypnotics  -Continue to hold off on resuming IV narcotics.  -Frequent reorientation  -Sleep hygiene  -1:1 sitter    At high risk for falls- (present on admission)  Assessment & Plan  -Per , she has been falling at home  -She has sustained multiple falls this hospitalization  -Continues to require 1:1 sitter  -Fall precautions    Hypotension  Assessment & Plan  -likely due to poor PO  intake/dehydration  -remains lethargic at her baseline  -does endorse some mild dizziness  - Started on Midodrine 12/1/2020         VTE prophylaxis: SCDs, Lovenox

## 2020-12-03 NOTE — CARE PLAN
Problem: Communication  Goal: The ability to communicate needs accurately and effectively will improve  Outcome: PROGRESSING AS EXPECTED     Problem: Pain Management  Goal: Pain level will decrease to patient's comfort goal  Outcome: PROGRESSING AS EXPECTED  Note: Note: Oxycodone given for pain with relief noted.      Problem: Psychosocial Needs:  Goal: Level of anxiety will decrease  Outcome: PROGRESSING SLOWER THAN EXPECTED  Note: Note: Calling out for help. Explain care of plan and expectations.      Problem: Mobility  Goal: Risk for activity intolerance will decrease  Outcome: PROGRESSING SLOWER THAN EXPECTED  Note: Note: Turn Q2H. Encourage patient to help with turns.      Problem: Safety  Goal: Will remain free from injury  Note: Note: Sitter at bedside.

## 2020-12-03 NOTE — PROGRESS NOTES
Alerted by CNA pts BP was 73/50. Pt was assessed and is non-symptomatic however alerted Dr. Drake who put in orders for fluids. Pt updated. No other needs at this time.

## 2020-12-03 NOTE — CARE PLAN
Problem: Psychosocial Needs:  Goal: Level of anxiety will decrease  Outcome: PROGRESSING AS EXPECTED     Problem: Discharge Barriers/Planning  Goal: Patient's continuum of care needs will be met  Outcome: PROGRESSING AS EXPECTED     Problem: Safety  Goal: Will remain free from injury  Outcome: PROGRESSING AS EXPECTED   Reinforced the use of safety sitter to monitor for falls due to impulsivity   Problem: Communication  Goal: The ability to communicate needs accurately and effectively will improve  Outcome: PROGRESSING AS EXPECTED   Provided POC for the day and discussed any concerns

## 2020-12-03 NOTE — PROGRESS NOTES
Pt's spouse by bedside and voiced concerns to this RN over wife's short term memory. He says he feels that her short term memory is getting worse, needing more reorientation than normal, which was not her baseline before hospitalization. He also stated that he has noticed his wife has this new fear of being alone and would like the medical team to investigate a little further or place in appropriate consults for pt.

## 2020-12-03 NOTE — PROGRESS NOTES
AOX4 but forgetful and needs reinforcements. Oxycodone given for abdominal pain with relief noted. Incontinent of urine. Able to make needs known. Sitter at bedside. Appear to be sleeping in between cares.

## 2020-12-03 NOTE — PROGRESS NOTES
2 RN Skin Check    2 RN skin check complete.   Devices in place: N/a.  Skin assessed under devices: N\A.  Confirmed pressure ulcers found on: N/a.  New potential pressure ulcers noted on n/a. Wound consult placed N/A.  The following interventions in place Pillows.    Skin intact with dark pigmentation in between groin and sacrum.

## 2020-12-04 NOTE — CARE PLAN
Problem: Safety  Goal: Will remain free from injury  Outcome: PROGRESSING AS EXPECTED  Note: Sitter at bedside. No fall. Bed alarm on.   Goal: Will remain free from falls  Outcome: PROGRESSING AS EXPECTED     Problem: Skin Integrity  Goal: Risk for impaired skin integrity will decrease  Outcome: PROGRESSING AS EXPECTED  Note: Turn Q2H.

## 2020-12-04 NOTE — PROGRESS NOTES
Pt has been on and off yelling all day. When at bedside pt requests this RN to sit in the room with her. Educated pt I had other tasks to complete and she has a sitter at bedside already. Pt states she does not want that sitter and only wants help from me. Each attempt at leaving the room pt would begin to cry again. Pt states no pain or needs at this time. Sitter at bedside attempting to calm pt

## 2020-12-04 NOTE — THERAPY
"Physical Therapy   Daily Treatment     Patient Name: Gayla Escudero  Age:  49 y.o., Sex:  female  Medical Record #: [id number]  Today's Date: 12/4/2020     Precautions: Fall Risk, Swallow Precautions ( See Comments)    Assessment    Pt was pleasant and agreeable with encouragement and can continue to be self limiting at times. She requires verbal cues for all sequencing as she will immediately report \" I can't do it\" but with tc she is able to initiate mobility. She required Key for bed mobility, able to hold self up at EOB with close SBA. Started with BLE exercises. She was able to complete multiple STS with HHA and Key for balance. Due to myotonic MD pt with inconsistent step length and weight shifting when performing side stepping for gait training along side of bed. She required seated rest break in between due to low endurance and requiring encouragement. Will continue to follow while in house to progress mobility.     Plan    Continue current treatment plan.    DC Equipment Recommendations: Unable to determine at this time  Discharge Recommendations: Recommend post-acute placement for additional physical therapy services prior to discharge home         12/04/20 0959   Other Treatments   Other Treatments Provided STS 4x with HHA and Key for balance, Seated exercises, decreased weakness on LLE requiring AAROM for LAQ , hip flexion and df/pf   Balance   Sitting Balance (Static) Fair -   Sitting Balance (Dynamic) Poor +   Standing Balance (Static) Poor   Standing Balance (Dynamic) Poor -   Weight Shift Sitting Fair   Weight Shift Standing Poor   Skilled Intervention Verbal Cuing   Comments seated close SBA with no lob. Standing with HHA    Gait Analysis   Gait Level Of Assist Moderate Assist   Assistive Device Hand Held Assist   Distance (Feet) 3   # of Times Distance was Traveled 3   Deviation Bradykinetic;Shuffled Gait  (ataxic )   Skilled Intervention Verbal Cuing;Tactile Cuing;Compensatory Strategies "   Comments modA with HHA for side stepping along side of bed. Rest break in between bouts. Pt with inconsistent step and base of support. vc for increasing step height    Bed Mobility    Supine to Sit Minimal Assist   Sit to Supine Minimal Assist   Scooting Minimal Assist   Rolling Minimum Assist to Lt.;Minimal Assist to Rt.   Skilled Intervention Verbal Cuing   Comments multiple log rolling for positioning. Key for trunk shhe was able to initiate BLE to EOB.    Functional Mobility   Sit to Stand Minimal Assist   Bed, Chair, Wheelchair Transfer Refused   Skilled Intervention Verbal Cuing;Tactile Cuing   Comments Key with HHA for STS    Short Term Goals    Short Term Goal # 1 B  pt will be able to complete bed mobility with SPV in 6tx in order to return home   Goal Outcome  # 1 B Goal not met   Short Term Goal # 2 Pt will be able to transfer sit<>stand with 4WW and min A within 6 visits to ensure mobility at home.   Goal Outcome # 2 Progressing slower than expected   Short Term Goal # 3 Pt will be ambulate 25 ft with FWW and min A within 6 visits to ensure mobility around house.    Goal Outcome # 3 Progressing slower than expected

## 2020-12-04 NOTE — PROGRESS NOTES
AOX4 but forgetful and needs reinforcements. Heat pack applied to abdominal. Incontinent of urine. Able to make needs known. Sitter at bedside. Appear to be sleeping in between cares.

## 2020-12-05 NOTE — CARE PLAN
Problem: Safety  Goal: Will remain free from injury  Outcome: PROGRESSING AS EXPECTED   Verified safety sitter at bedside and gave report.   Problem: Discharge Barriers/Planning  Goal: Patient's continuum of care needs will be met  Outcome: PROGRESSING AS EXPECTED  Discussed discharge goals with pt and how to move forward medically. Discussed comfort care options with APN and psych needs pt is exhibiting

## 2020-12-05 NOTE — PROGRESS NOTES
Received bedside report and assumed care of pt at change of shift. Pt is resting in bed with complaints of 10/10 pain in back and abdomen. Medicated per MAR. Pt is still resistant to taking any of the prescribed medications. Asks only for pain medication. Discussed importance of midodrine to help elevate her low BP and she agreed to take the medication only if she can be medicated for pain. No other needs at this time. Bed is locked and in lowest position with water and call light in reach.

## 2020-12-05 NOTE — PROGRESS NOTES
2 RN skin check completed with Hakeem Silva  Devices in place IV  Skin assessed under devices Yes; intact.  Confirmed pressure ulcers found on Left back shoulder.  New potential pressure ulcers noted on N/A. Wound consult placed N/A.     Noted blackish to brown small area in left back shoulder, non blanchable; bilateral elbows pink and blanching; sacral area is pink and blanching; bilateral heels, is pink and blanching     The following interventions in place; assisted to the commode; provided incontinence care; assisted to turn to sides every 2 hours; on pressure redistribution mattress with waffle overlay

## 2020-12-05 NOTE — PROGRESS NOTES
Alert and oriented x3, disoriented to event. Offered fluids and snacks. Safety precautions in placed. Bed in lowest position. Upper side rails up. Treaded socks on. Reinforced the use of call light when needing assistance.   no

## 2020-12-06 NOTE — PROGRESS NOTES
Alert and oriented x3, disoriented to event with Unit clerk as sitter for safety purposes. Offered fluids and snacks. Safety precautions in placed. Bed in lowest position. Upper side rails up. Treaded socks on. Reinforced the use of call light when needing assistance.

## 2020-12-06 NOTE — PROGRESS NOTES
Bedside RN notified me that  would like patient to be placed on comfort care measures. Comfort care orders placed per family's wishes.

## 2020-12-06 NOTE — PROGRESS NOTES
Pt refused to take morning medications. Education provided. Still, pt refused  Offered snacks and drinks. Pt refused. Pt is on continuous fluid of Normal saline to run for 100 cc per hour.

## 2020-12-07 PROBLEM — Z78.9 IMPAIRED MOBILITY AND ADLS: Status: RESOLVED | Noted: 2017-08-07 | Resolved: 2020-01-01

## 2020-12-07 PROBLEM — E87.8 ELECTROLYTE ABNORMALITY: Status: RESOLVED | Noted: 2020-01-01 | Resolved: 2020-01-01

## 2020-12-07 PROBLEM — Z74.09 IMPAIRED MOBILITY AND ADLS: Status: RESOLVED | Noted: 2017-08-07 | Resolved: 2020-01-01

## 2020-12-07 NOTE — PROGRESS NOTES
Received phone call from patient's  Adryan . Adryan stated that he had spoken with someone yesterday about his wife being on comfort care and would like the doctor to place this order. Notified RAKAN Funk.     Alison Lincoln R.N.

## 2020-12-07 NOTE — PROGRESS NOTES
2 RN skin check completed with MCKAY Navarro  Devices in place: PIV on right forearm  Skin assessed under devices Yes; intact.  Confirmed pressure ulcers found on Left upper back/shoulder.  New potential pressure ulcers noted on N/A. Wound consult placed: yes.     Assessment:   Left back/shoulder:  Black/brown small area, non blanchable;   bilateral elbows: dry, and blanching;   Sacral/buttock area: black/brown discoloration, non blanching  bilateral heels: boggy blanching  bilateral ears: intact and blanching    The following interventions in place;  incontinence care; [id number]; pressure redistribution mattress with waffle overlay. Patient refused mepilex, moon boots, and pillows to offload bilateral heels.

## 2020-12-07 NOTE — CARE PLAN
Problem: Safety  Goal: Will remain free from injury  12/7/2020 0034 by Judith De Jesus R.N.  Outcome: PROGRESSING AS EXPECTED  Note: Sitter at bedside   12/7/2020 0033 by Judith De Jesus R.N.  Outcome: PROGRESSING AS EXPECTED  Note: Sitter at bedside      Problem: Skin Integrity  Goal: Risk for impaired skin integrity will decrease  Outcome: PROGRESSING SLOWER THAN EXPECTED  Note: Patient refused mepilex, moon boots, or pillows to offload heels.

## 2020-12-07 NOTE — PROGRESS NOTES
Hospital Medicine TWICE WEEKLY Progress Note    Date of Service  12/7/2020    Chief Complaint  49 y.o. female admitted 11/11/2020 with abdominal pain    Hospital Course  Ms. Escudero is a 49-year-old female with PMH significant for muscular dystrophy (diagnosed at 16, JOSÉ, migraines, chronic abdominal pain, chronic opioid dependence, chronic lower extremity weakness, and dysphagia who presented 11/11/2020 with abdominal pain. She reported the pain as 8/10, localized to the .umbilicus and nonradiating.  Presentation she started experiencing acute onset right upper extremity and bilateral lower extremity pain and numbness.    While in the ER she also fell forward out of her chair, striking her forehead without loss of consciousness.  CT head showed possible cephalohematoma.  CT abdomen/pelvis showed no evidence of acute intra-abdominal or pelvic processes.  She had sudden onset of altered mental status.  Glucose was found to be 45 and she was treated with hypoglycemia protocol. Lumbar puncture was unremarkable.  She continued to have altered mental status, hypotension and tachycardia.  She was treated with the sepsis protocol.  X-ray was concerning for atelectasis versus pneumonia.  Covid was negative.  Pro Delbert 0.53, up to 1.98.. All cultures have been negative to date.  Antibiotics were discontinued 11/19. Neurology was consulted and recommended encephalopathy work-up.  She was started on acyclovir until her work-up came back.  Her HSV work-up, syphilis and HIV were negative and the acyclovir was discontinued.MRI brain 11/18 with incidental finding left retinal detachment. She was seen by ophthalmology and diagnosed with left dislocated intraocular lens.    Palliative care was consulted for advanced care planning.  Patient does NOT want tube feeding or IV access. DNAR/DNI per her request. Transitioned to comfort care on 12/6 per family's wishes.    Interval Problem Update  Lying in bed with eyes closed. Opens eyes to  voice. Denies any c/o of pain or discomfort. No comfort care needs     Consultants/Specialty  None    Code Status  Comfort Care/DNR    Disposition  Pending placement    Review of Systems  Review of Systems   Constitutional: Positive for malaise/fatigue. Negative for chills and weight loss.   Respiratory: Negative for shortness of breath and wheezing.    Cardiovascular: Negative for chest pain and palpitations.   Gastrointestinal: Negative for abdominal pain, nausea and vomiting.   Musculoskeletal: Negative.         Physical Exam  Temp:  [36.1 °C (97 °F)-37.1 °C (98.8 °F)] 36.2 °C (97.1 °F)  Pulse:  [80-82] 80  Resp:  [16] 16  BP: ()/(57-63) 93/57  SpO2:  [93 %-98 %] 98 %    Physical Exam  Vitals signs and nursing note reviewed.   Constitutional:       General: She is not in acute distress.     Appearance: She is cachectic. She is ill-appearing.   HENT:      Head: Normocephalic and atraumatic.      Mouth/Throat:      Mouth: Mucous membranes are dry.      Pharynx: No oropharyngeal exudate.   Eyes:      Extraocular Movements: Extraocular movements intact.      Pupils: Pupils are equal, round, and reactive to light.   Neck:      Musculoskeletal: Normal range of motion. No neck rigidity or muscular tenderness.   Cardiovascular:      Rate and Rhythm: Normal rate and regular rhythm.      Pulses: Normal pulses.      Heart sounds: No murmur. No friction rub. No gallop.    Pulmonary:      Effort: Pulmonary effort is normal. No respiratory distress.      Breath sounds: No wheezing, rhonchi or rales.   Abdominal:      General: Bowel sounds are normal. There is no distension.      Palpations: Abdomen is soft. There is no mass.      Tenderness: There is no abdominal tenderness.   Musculoskeletal: Normal range of motion.         General: No swelling or tenderness.      Right lower leg: No edema.      Left lower leg: No edema.   Skin:     General: Skin is warm and dry.      Capillary Refill: Capillary refill takes less than 2  seconds.      Findings: No erythema or rash.   Neurological:      General: No focal deficit present.      Mental Status: She is lethargic.      Motor: Weakness present.      Gait: Gait normal.         Fluids    Intake/Output Summary (Last 24 hours) at 12/7/2020 1223  Last data filed at 12/7/2020 0800  Gross per 24 hour   Intake 120 ml   Output --   Net 120 ml       Laboratory                        Imaging  IR-MIDLINE CATHETER INSERTION WO GUIDANCE > AGE 3   Final Result                  Ultrasound-guided midline placement performed by qualified nursing staff    as above.          IR-US GUIDED PIV   Final Result    Ultrasound-guided PERIPHERAL IV INSERTION performed by    qualified nursing staff as above.      DX-ABDOMEN FOR TUBE PLACEMENT   Final Result         1.  Nonspecific bowel gas pattern.   2.  Dobbhoff tube tip overlying the expected location of the pylorus or first duodenal segment.   3.  Left basilar atelectasis      MR-BRAIN-WITH & W/O   Final Result      1.  Study is again limited by patient motion.   2.  Mild cerebral atrophy, prominent for the patient's age.   3.  T2 FLAIR hyperintensity in the anterior bilateral frontal lobes probably encephalomalacia/gliosis in could be related to old trauma.   4.  Additional mild nonspecific scattered white matter disease, possibly chronic microvascular ischemic changes.   5.  No acute intracranial hemorrhage or infarct.   6.  Left retinal detachment.      DX-ABDOMEN FOR TUBE PLACEMENT   Final Result         1.  Nonspecific bowel gas pattern.   2.  Dobbhoff tube is coiled within the stomach, the tip terminates overlying the expected location of the gastric fundus.   3.  Left basilar atelectasis      IR-MIDLINE CATHETER INSERTION WO GUIDANCE > AGE 3   Final Result                  Ultrasound-guided midline placement performed by qualified nursing staff    as above.          MR-BRAIN-WITH & W/O   Final Result      1.  Limited exam due to motion artifact.   2.   Diffuse atrophy and white matter microvascular ischemic changes.   3.  No acute stroke or evidence for intracranial hemorrhage.      DX-CHEST-LIMITED (1 VIEW)   Final Result      No acute cardiopulmonary disease.      EC-ECHOCARDIOGRAM COMPLETE W/O CONT   Final Result      DX-LUMBAR PUNCTURE FOR DIAGNOSIS   Final Result         FLUOROSCOPIC-GUIDED LUMBAR PUNCTURE AS DESCRIBED ABOVE.      IR-MIDLINE CATHETER INSERTION WO GUIDANCE > AGE 3   Final Result                  Ultrasound-guided midline placement performed by qualified nursing staff    as above.          CT-HEAD W/O   Final Result      Normal CT scan of the head without contrast.               INTERPRETING LOCATION:  [address], [place], [address]      [id number] VIEW   Final Result      Unremarkable AP recumbent abdomen.      DX-CHEST-PORTABLE (1 VIEW)   Final Result      Left basilar opacities, consistent with pneumonia.      CT-HEAD W/O   Final Result      No CT evidence of acute infarct, hemorrhage or mass.      CT-ABDOMEN-PELVIS WITH   Final Result      No evidence of acute intra-abdominal or pelvic process.      Probably duplicated right renal collecting system.      CT-HEAD W/O   Final Result      1.  Focal soft tissue swelling right frontal region with minimal underlying increased density adjacent to the periphery of the outer table of the calvarium which could represent a small cephalohematoma.      2.  Periventricular and subcortical white matter density changes which could be related to small vessel ischemia, demyelinization or gliosis. Findings may be slightly more prominent than on previous exam.           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)  Assessment & Plan  -Diagnosed at age 16  -History of chronic abdominal pain with no clear explained etiology prompting multiple ED visits   -Most likely contributing to chronic abdominal pain as well as onset of of extremity symptoms  -On comfort care     Bilateral  lower extremity pain- (present on admission)  Assessment & Plan  -seems to be mostly when she is ambulatory/mobilizing  -Likely a component of deconditioning from muscular dystrophy  -Pain management      Abdominal pain- (present on admission)  Assessment & Plan  -Chronic, stable  -History of pancreatitis  -Contributing to her poor p.o. intake  -Having bowel movements  -Reports pain controlled this morning.   -Continue comfort care measures     Severe protein-calorie malnutrition (HCC)  Assessment & Plan  -Body mass index is 19.16 kg/m².  -She had core track at one time but has pulled out -he does not want another tube feeding  -Continue Marinol  -Food preferences  -Comfort care measures     Altered mental status  Assessment & Plan  -ongoing lethargy. Rouses easily. Oriented to herself and hospital  -impulsive at times resulting in falls  -She continues to have hallucinations which she states are bad dreams   -Frequent reorientation  -Sleep hygiene  -1:1 sitter  - continue comfort care measures     At high risk for falls- (present on admission)  Assessment & Plan  -Per , she has been falling at home  -She has sustained multiple falls this hospitalization  -Continues to require 1:1 sitter  -Fall precautions    Hypotension  Assessment & Plan  -likely due to poor PO intake/dehydration  -comfort care measures          VTE prophylaxis: SCDs, Lovenox    I have performed a physical exam and reviewed and updated ROS and Plan today (12/7/2020). In review of previous note, there are no changes except as documented above.

## 2020-12-07 NOTE — PROGRESS NOTES
Received bedside report from RN, pt care assumed, pt assessment complete. No signs of acute distress noted at this time. POC discussed with pt and verbalizes no questions. Pt denies any additional needs at this time. Bed in lowest position, sitter in the room. call light within reach, hourly rounding initiated.

## 2020-12-07 NOTE — PROGRESS NOTES
2 RN Skin Check  2 RN skin check complete with MCKAY Barnes   Devices in place: PIV.  Skin assessed under devices: yes.  Confirmed pressure ulcers found on: Left shoulder.  New potential pressure ulcers noted on Sacrum, buttocks.   Wound consult placed Yes.    Noted sacrum, buttocks area to be discolored, dark pigmentation and purple, extended down to upper posterior thighs.     The following interventions in place: turned and repositioned every 2 hours, waffle cushion in place, incontinence care provided as needed and barrier paste applied. 2 RN skin check completed, photo taken and new LDA opened regarding discoloration to sacrum and wound care consult placed.

## 2020-12-07 NOTE — CARE PLAN
Problem: Communication  Goal: The ability to communicate needs accurately and effectively will improve  Outcome: PROGRESSING AS EXPECTED     Problem: Safety  Goal: Will remain free from injury  Outcome: PROGRESSING AS EXPECTED  Goal: Will remain free from falls  Outcome: PROGRESSING AS EXPECTED     Problem: Psychosocial Needs:  Goal: Level of anxiety will decrease  Outcome: PROGRESSING AS EXPECTED     Problem: Skin Integrity  Goal: Risk for impaired skin integrity will decrease  Outcome: PROGRESSING AS EXPECTED     Problem: Pain Management  Goal: Pain level will decrease to patient's comfort goal  Outcome: PROGRESSING AS EXPECTED

## 2020-12-08 NOTE — PROGRESS NOTES
Hospital Medicine TWICE WEEKLY Progress Note    Date of Service  12/8/2020    Chief Complaint  49 y.o. female admitted 11/11/2020 with abdominal pain    Hospital Course  Ms. Escudero is a 49-year-old female with PMH significant for muscular dystrophy (diagnosed at 16, JOSÉ, migraines, chronic abdominal pain, chronic opioid dependence, chronic lower extremity weakness, and dysphagia who presented 11/11/2020 with abdominal pain. She reported the pain as 8/10, localized to the .umbilicus and nonradiating.  Presentation she started experiencing acute onset right upper extremity and bilateral lower extremity pain and numbness.    While in the ER she also fell forward out of her chair, striking her forehead without loss of consciousness.  CT head showed possible cephalohematoma.  CT abdomen/pelvis showed no evidence of acute intra-abdominal or pelvic processes.  She had sudden onset of altered mental status.  Glucose was found to be 45 and she was treated with hypoglycemia protocol. Lumbar puncture was unremarkable.  She continued to have altered mental status, hypotension and tachycardia.  She was treated with the sepsis protocol.  X-ray was concerning for atelectasis versus pneumonia.  Covid was negative.  Pro Delbert 0.53, up to 1.98.. All cultures have been negative to date.  Antibiotics were discontinued 11/19. Neurology was consulted and recommended encephalopathy work-up.  She was started on acyclovir until her work-up came back.  Her HSV work-up, syphilis and HIV were negative and the acyclovir was discontinued.MRI brain 11/18 with incidental finding left retinal detachment. She was seen by ophthalmology and diagnosed with left dislocated intraocular lens.    Palliative care was consulted for advanced care planning.  Patient does NOT want tube feeding or IV access. DNAR/DNI per her request.     Interval Problem Update  12/8- Called to bedside as patient was unaware of her status being transitioned to comfort care. Long  "discussion with patient and she does not want this. Patient is alert and oriented at this time and is insistent that she wants therapy and blood draws and to improve her strength so she may eventually leave the hospital and not \"be an invalid\". Changed code status and re-ordered therapies. Will continue to monitor.     Consultants/Specialty  None    Code Status  DNAR/DNI    Disposition  Pending placement    Review of Systems  Review of Systems   Constitutional: Positive for malaise/fatigue. Negative for chills and weight loss.   HENT: Negative for congestion and sore throat.    Eyes: Negative for pain and discharge.   Respiratory: Negative for shortness of breath and wheezing.    Cardiovascular: Negative for chest pain and palpitations.   Gastrointestinal: Negative for abdominal pain, nausea and vomiting.   Musculoskeletal: Positive for back pain and myalgias.   Neurological: Negative for dizziness.        Physical Exam  Temp:  [36.4 °C (97.5 °F)-36.7 °C (98 °F)] 36.6 °C (97.8 °F)  Pulse:  [93-94] 93  Resp:  [15-18] 15  BP: ()/(55-66) 110/66  SpO2:  [93 %-98 %] 96 %    Physical Exam  Vitals signs and nursing note reviewed.   Constitutional:       General: She is awake. She is not in acute distress.     Appearance: She is cachectic. She is ill-appearing.   HENT:      Head: Normocephalic and atraumatic.      Mouth/Throat:      Mouth: Mucous membranes are dry.      Pharynx: No oropharyngeal exudate.   Eyes:      Extraocular Movements: Extraocular movements intact.      Pupils: Pupils are equal, round, and reactive to light.   Neck:      Musculoskeletal: Normal range of motion. No neck rigidity or muscular tenderness.   Cardiovascular:      Rate and Rhythm: Normal rate.      Heart sounds: No murmur.   Pulmonary:      Effort: Pulmonary effort is normal. No respiratory distress.      Breath sounds: Normal breath sounds. No decreased breath sounds.   Abdominal:      General: Bowel sounds are normal. There is no " distension.      Palpations: Abdomen is soft. There is no mass.      Tenderness: There is no abdominal tenderness.   Musculoskeletal: Normal range of motion.         General: No swelling or tenderness.   Skin:     General: Skin is warm and dry.      Findings: No erythema or rash.   Neurological:      General: No focal deficit present.      Mental Status: She is alert and oriented to person, place, and time.      Motor: Weakness present.   Psychiatric:         Attention and Perception: Attention normal.         Behavior: Behavior is cooperative.         Fluids    Intake/Output Summary (Last 24 hours) at 12/8/2020 1549  Last data filed at 12/8/2020 0900  Gross per 24 hour   Intake 170 ml   Output --   Net 170 ml       Laboratory                        Imaging  IR-MIDLINE CATHETER INSERTION WO GUIDANCE > AGE 3   Final Result                  Ultrasound-guided midline placement performed by qualified nursing staff    as above.          IR-US GUIDED PIV   Final Result    Ultrasound-guided PERIPHERAL IV INSERTION performed by    qualified nursing staff as above.      DX-ABDOMEN FOR TUBE PLACEMENT   Final Result         1.  Nonspecific bowel gas pattern.   2.  Dobbhoff tube tip overlying the expected location of the pylorus or first duodenal segment.   3.  Left basilar atelectasis      MR-BRAIN-WITH & W/O   Final Result      1.  Study is again limited by patient motion.   2.  Mild cerebral atrophy, prominent for the patient's age.   3.  T2 FLAIR hyperintensity in the anterior bilateral frontal lobes probably encephalomalacia/gliosis in could be related to old trauma.   4.  Additional mild nonspecific scattered white matter disease, possibly chronic microvascular ischemic changes.   5.  No acute intracranial hemorrhage or infarct.   6.  Left retinal detachment.      DX-ABDOMEN FOR TUBE PLACEMENT   Final Result         1.  Nonspecific bowel gas pattern.   2.  Dobbhoff tube is coiled within the stomach, the tip terminates  overlying the expected location of the gastric fundus.   3.  Left basilar atelectasis      IR-MIDLINE CATHETER INSERTION WO GUIDANCE > AGE 3   Final Result                  Ultrasound-guided midline placement performed by qualified nursing staff    as above.          MR-BRAIN-WITH & W/O   Final Result      1.  Limited exam due to motion artifact.   2.  Diffuse atrophy and white matter microvascular ischemic changes.   3.  No acute stroke or evidence for intracranial hemorrhage.      DX-CHEST-LIMITED (1 VIEW)   Final Result      No acute cardiopulmonary disease.      EC-ECHOCARDIOGRAM COMPLETE W/O CONT   Final Result      DX-LUMBAR PUNCTURE FOR DIAGNOSIS   Final Result         FLUOROSCOPIC-GUIDED LUMBAR PUNCTURE AS DESCRIBED ABOVE.      IR-MIDLINE CATHETER INSERTION WO GUIDANCE > AGE 3   Final Result                  Ultrasound-guided midline placement performed by qualified nursing staff    as above.          CT-HEAD W/O   Final Result      Normal CT scan of the head without contrast.               INTERPRETING LOCATION:  [address], [place], [address]      TR-[id number] VIEW   Final Result      Unremarkable AP recumbent abdomen.      DX-CHEST-PORTABLE (1 VIEW)   Final Result      Left basilar opacities, consistent with pneumonia.      CT-HEAD W/O   Final Result      No CT evidence of acute infarct, hemorrhage or mass.      CT-ABDOMEN-PELVIS WITH   Final Result      No evidence of acute intra-abdominal or pelvic process.      Probably duplicated right renal collecting system.      CT-HEAD W/O   Final Result      1.  Focal soft tissue swelling right frontal region with minimal underlying increased density adjacent to the periphery of the outer table of the calvarium which could represent a small cephalohematoma.      2.  Periventricular and subcortical white matter density changes which could be related to small vessel ischemia, demyelinization or gliosis. Findings may be slightly more prominent than on previous exam.            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 intake  -Having bowel movements  -Reports pain controlled this morning.       Severe protein-calorie malnutrition (HCC)  Assessment & Plan  -Body mass index is 19.16 kg/m².  -She had core track at one time but has pulled out -she does not want another tube feeding  -Continue Marinol  -Food preferences  - Modified diet per speech     Altered mental status  Assessment & Plan  -ongoing lethargy. Rouses easily.   -impulsive at times resulting in falls  -She continues to have hallucinations which she states are bad dreams   -Sleep hygiene  -1:1 sitter    On exam today 12/8/2020 patient is alert and oriented and able to make decisions in my opinion      At high risk for falls- (present on admission)  Assessment & Plan  -Per , she has been falling at home  -She has sustained multiple falls this hospitalization  -Continues to require 1:1 sitter  -Fall precautions    Hypotension  Assessment & Plan  -likely due to poor PO intake/dehydration           VTE prophylaxis: SCDs, Lovenox    I have performed a physical exam and reviewed and updated ROS and Plan today (12/8/2020). In review of previous note, there are no changes except as documented above.

## 2020-12-08 NOTE — PROGRESS NOTES
2 RN skin check completed with MCKAY Navarro  Devices in place: PIV on right forearm  Skin assessed under devices Yes; intact.  Confirmed pressure ulcers found on Left upper back/shoulder.  New potential pressure ulcers noted on N/A. Wound consult placed: yes.     Assessment:   Left back/shoulder:  Black/brown small area, non blanchable;   bilateral elbows: dry, and blanching;   Sacral/buttock area: black/brown discoloration, non blanching  bilateral heels: boggy blanching  bilateral ears: intact and blanching     The following interventions in place;  incontinence care; [id number]; pressure redistribution mattress with waffle overlay. pillows to offload bilateral heels, patient refused mepilex on heels. .

## 2020-12-08 NOTE — WOUND TEAM
Wound team consulted for sacrum and left posterior scapula.  Patient laying on bed with waffle overlay. Patient able to turn to the left side for assessment of the sacrum and left posterior scapula.  Hyperpigmentation to skin tissue due to excessive moisture.  Some fragments of the sacrum and posterior thigh has pinpoint looking fungal infection.  Miconazole ordered for sacrum.   Left posterior scapula is resolved.

## 2020-12-08 NOTE — THERAPY
Occupational Therapy Contact Note      Patient Name: Gayla Escudero  Age:  50 y.o., Sex:  female  Medical Record #: [id number]  Today's Date: 12/8/2020 12/08/20 0918   Interdisciplinary Plan of Care Collaboration   Collaboration Comments Pt status has transitioned to comfort care measures only. Will DC acute OT intervention at this time.

## 2020-12-08 NOTE — CARE PLAN
Problem: Communication  Goal: The ability to communicate needs accurately and effectively will improve  Outcome: PROGRESSING AS EXPECTED     Problem: Safety  Goal: Will remain free from injury  Outcome: PROGRESSING AS EXPECTED  Goal: Will remain free from falls  Outcome: PROGRESSING AS EXPECTED     Problem: Skin Integrity  Goal: Risk for impaired skin integrity will decrease  Outcome: PROGRESSING AS EXPECTED     Problem: Pain Management  Goal: Pain level will decrease to patient's comfort goal  Outcome: PROGRESSING AS EXPECTED

## 2020-12-08 NOTE — PROGRESS NOTES
Assumed care of patient at 0700. Patient has been oriented x 3, disoriented to event and drowsy throughout the day. Refused any scheduled medications this shift and denied complaints of pain. Turned and repositioned every 2 hours. Incontinent of bladder. Fall prevention tactics in place, bed alarm on for safety, call light within reach and sitter at bedside.     Alison Lincoln R.N.

## 2020-12-08 NOTE — PROGRESS NOTES
Received bedside report from RN, pt care assumed,  pt assessment complete. Bed in lowest position, sitter at bedside, hourly rounding initiated.

## 2020-12-08 NOTE — CARE PLAN
Problem: Safety  Goal: Will remain free from injury  Outcome: PROGRESSING AS EXPECTED  Note: Sitter at bedside      Problem: Pain Management  Goal: Pain level will decrease to patient's comfort goal  Note: Pain management PRN, see MAR

## 2020-12-08 NOTE — THERAPY
Missed Therapy     Patient Name: Gayla Escudero  Age:  50 y.o., Sex:  female  Medical Record #: [id number]  Today's Date: 12/8/2020    Discussed missed therapy with RN    Pt transitioned to comfort care, PT will be discontinued. Please re-order if GOC change.    Nitza Frost PT, DPT

## 2020-12-09 NOTE — THERAPY
"Occupational Therapy  Daily Treatment     Patient Name: Gayla Escudero  Age:  50 y.o., Sex:  female  Medical Record #: [id number]  Today's Date: 12/9/2020     Precautions: Fall Risk, Swallow Precautions ( See Comments)  Comments: baseline myotonic MD    Assessment    OT POC resumed after \"Comfort Care\" status change reversed per pt request. Pt demonstrated improved participation today, spouse was not present for session. Pt agreeable to seated grooming ADLs and transfer to [facility] for toileting, pt completed pericare without assist. Pt limited by significant weakness, required MaxA for lateral scooting at EOB. Pt will benefit from continued acute OT as well as post-acute placement for additional therapy. Pt will likely require long-term caregiving services.      Plan    Treatment plan modified to 3 times per week until therapy goals are met for the following treatments:  Adaptive Equipment, Self Care/Activities of Daily Living, Therapeutic Activities and Therapeutic Exercises.    DC Equipment Recommendations: Bed Side Commode  Discharge Recommendations: Recommend post-acute placement for additional occupational therapy services prior to discharge home    Subjective    \"I don't want to be comfort care, I want to get better.\"     Objective     12/09/20 0901   Precautions   Precautions Fall Risk;Swallow Precautions ( See Comments)   Comments myotonic MD   Cognition    Speech/ Communication Delayed Responses   Level of Consciousness Alert   Ability To Follow Commands 1 Step   Safety Awareness Impaired;Impulsive   New Learning Impaired   Attention Impaired   Sequencing Impaired   Initiation Impaired   Comments willing to participate   Sitting Upper Body Exercises   Other Exercise lateral scooting, triceps press into bed to lift hips   Comments pt required maxA    Balance   Weight Shift Sitting Fair   Weight Shift Standing Poor   Skilled Intervention Verbal Cuing;Tactile Cuing;Facilitation   Comments poor balance   Bed " Mobility    Supine to Sit Moderate Assist   Sit to Supine Minimal Assist   Scooting Maximal Assist   Rolling Minimal Assist to Rt.;Minimum Assist to Lt.   Skilled Intervention Verbal Cuing;Tactile Cuing;Facilitation;Sequencing   Activities of Daily Living   Eating   (NT)   Grooming Minimal Assist;Seated   Upper Body Dressing Minimal Assist   Lower Body Dressing Maximal Assist   Toileting Minimal Assist  (on BSC)   Skilled Intervention Verbal Cuing;Sequencing;Tactile Cuing;Facilitation   Functional Mobility   Sit to Stand Minimal Assist   Bed, Chair, Wheelchair Transfer Moderate Assist   Toilet Transfers Maximal Assist   Transfer Method Stand Pivot   Mobility transfers only   Skilled Intervention Verbal Cuing;Tactile Cuing;Postural Facilitation;Facilitation   Activity Tolerance   Comments improved participation today   Short Term Goals   Short Term Goal # 1 Pt will perform LB dressing w/ min a   Goal Outcome # 1 Progressing slower than expected   Short Term Goal # 2 Pt will perform functional t/f  w/ min a    Goal Outcome # 2 Progressing slower than expected   Short Term Goal # 3 Pt will perform toileting task with min a   Goal Outcome # 3 Goal met   Anticipated Discharge Equipment and Recommendations   DC Equipment Recommendations Bed Side Commode   Discharge Recommendations Recommend post-acute placement for additional occupational therapy services prior to discharge home

## 2020-12-09 NOTE — THERAPY
Physical Therapy   Daily Treatment     Patient Name: Gayla Escudero  Age:  50 y.o., Sex:  female  Medical Record #: [id number]  Today's Date: 12/9/2020     Precautions: Fall Risk, Swallow Precautions ( See Comments)  Comments: baseline myotonic MD    Assessment    New PT order received, pt no longer comfort care per pts wishes. Pt with improved motivation and participation. Pt participated in bed mobility, transfers, and the initiation of gait training. Pt continues to be significantly limited by weakness, balance, and decreased activity tolerance. PT will cont    Plan    Treatment plan modified to 3 times per week until therapy goals are met for the following treatments:  Bed Mobility, Community Re-integration, Gait Training, Neuro Re-Education / Balance, Self Care/Home Evaluation, Therapeutic Activities and Therapeutic Exercises.    DC Equipment Recommendations: Unable to determine at this time  Discharge Recommendations: Recommend post-acute placement for additional physical therapy services prior to discharge home         12/09/20 0921   Cognition    Level of Consciousness Alert   Comments much more receptive to therapy without  present. Pt with improved command following and less fear of falling   Neuro-Muscular Treatments   Neuro-Muscular Treatments Weight Shift Left;Weight Shift Right;Verbal Cuing;Tactile Cuing;Compensatory Strategies   Comments EOB and standing    Other Treatments   Other Treatments Provided spoke with pt about continued plan of care as she is no longer comfort care. Pt appreciative. Pt with improved safety awareness verblalizign she will not attempt to stand without assist   Balance   Sitting Balance (Static) Fair -   Sitting Balance (Dynamic) Poor +   Standing Balance (Static) Poor -   Standing Balance (Dynamic) Poor -   Weight Shift Sitting Fair   Weight Shift Standing Poor   Skilled Intervention Verbal Cuing;Tactile Cuing;Sequencing   Comments standing with 4WW   Gait Analysis    Gait Level Of Assist Moderate Assist   Assistive Device 4 Wheel Walker   Distance (Feet) 3   # of Times Distance was Traveled 2   Deviation Shuffled Gait;Decreased Base Of Support;Other (Comment)  (B knee hyperextension)   # of Stairs Climbed 0   Weight Bearing Status fwb   Skilled Intervention Verbal Cuing;Tactile Cuing;Sequencing;Compensatory Strategies   Comments max cues for ambulating with physical assist with 4WW management   Bed Mobility    Supine to Sit Moderate Assist   Sit to Supine Minimal Assist   Scooting Moderate Assist   Skilled Intervention Tactile Cuing;Verbal Cuing;Sequencing;Compensatory Strategies   Comments cues fror sequencing and safety   Functional Mobility   Sit to Stand Minimal Assist   Bed, Chair, Wheelchair Transfer Moderate Assist   Toilet Transfers Maximal Assist   Transfer Method Stand Pivot   Mobility in room    Skilled Intervention Verbal Cuing;Tactile Cuing;Sequencing;Compensatory Strategies   Short Term Goals    Short Term Goal # 1 Pt will be able to transfer supine<>sit with min A within 6 visits to ensure mobility at home.   Goal Outcome # 1 Goal met, new goal added   Short Term Goal # 1 B  pt will be able to complete bed mobility with SPV in 6tx in order to return home   Goal Outcome  # 1 B Goal not met   Short Term Goal # 2 Pt will be able to transfer sit<>stand with 4WW and min A within 6 visits to ensure mobility at home.   Goal Outcome # 2 Progressing as expected   Short Term Goal # 3 Pt will be ambulate 25 ft with FWW and min A within 6 visits to ensure mobility around house.    Goal Outcome # 3 Progressing slower than expected   Anticipated Discharge Equipment and Recommendations   DC Equipment Recommendations Unable to determine at this time   Discharge Recommendations Recommend post-acute placement for additional physical therapy services prior to discharge home

## 2020-12-09 NOTE — PROGRESS NOTES
2 RN skin check completed with MCKAY Daniels  Devices in place: PIV   Skin assessed under devices Yes; intact.  Confirmed pressure ulcers found on N/A    New potential pressure ulcers noted on Left upper back/shoulder and sacral/ buttock area.   Wound consult placed: yes.     Assessment:   Left back/shoulder:  Black/brown small area, non blanchable;  bilateral elbows: dry, and blanching;   Sacral/buttock area: black/brown discoloration, non blanching  bilateral heels: boggy blanching  bilateral ears: intact and blanching     The following interventions in place;  incontinence care; [id number]; pressure redistribution mattress with waffle overlay. pillows to offload bilateral heels, patient refused mepilex on heels. .

## 2020-12-09 NOTE — PROGRESS NOTES
Attempted to call and speak with  but unable to reach on phone line. Will attempt again tomorrow.

## 2020-12-10 NOTE — PROGRESS NOTES
Assumed pt care at shift change.  Pt resting in bed, denies pain or discomfort, alert/oriented x3, room air.  Discussed POC with pt, CNA sitter in place for safety.  Safety precautions in place, bed locked and in lowest position, call light within reach.  No further needs at this time.

## 2020-12-10 NOTE — PROGRESS NOTES
Updated hospitalist, Huber Leal, regarding potassium of 3.2. potassium ordered and BMP, mag, and phosphate for lab orders.

## 2020-12-10 NOTE — PROGRESS NOTES
"Received bedside report from RN, pt care assumed, pt assessment complete. 0/10 pain at this time. No signs of acute distress noted at this time. POC discussed and pt seems in a good mood at this time. Does not want to take seroquel, stating that it \"makes her feel sad.\"Bed in lowest position, sitter at bedside.   "

## 2020-12-10 NOTE — THERAPY
"Speech Language Pathology  Daily Treatment     Patient Name: Gayla Escudero  Age:  50 y.o., Sex:  female  Medical Record #: [id number]  Today's Date: 12/10/2020     Precautions  Precautions: Fall Risk, Swallow Precautions ( See Comments)  Comments: baseline myotonic MD    Assessment  Patient seen for diet re-assessment. Per chart, patient was transitioned to comfort care on 12/6 per family's wishes, but this was reversed on 12/8 per patient request. Patient now working with PT/OT/SLP. Diet of minced and moist textures with mildly thick liquids has continued since 11/30 initiation. Patient has presented with minimal appetite, but no difficulty with diet level, per RN/CNA.   Patient agreed to work with SLP, but showed limited endurance. Consumed consecutive cup sips of mildly thick liquid without overt s/s of aspiration. Declined presentations of liquidized and puree textures. Consumed 2 tsp of soft&bite size fruit without overt s/s of aspiration. Mastication slow with mouth open and minimal lingula movement. No oral residue noted, however, unable to thoroughly assess oral cavity due to patient lethargy.   Patient then requesting to discontinue dysphagia therapy and go back to sleep. SLP explained that the patient's diet may be able to be advanced with further participation in therapy, but patient stated, \"I don't want you to change it.\" SLP explained thin liquids and soft&bite size/regular textures to the patient for education. The patient verbalized understanding and repeated, \"I don't want you to change it.\" Dysphagia therapy discontinued due to patient lethargy and request to sleep.     Plan  Continue current diet of MM5/MT2 with 1:1 assistance and medication whole in puree. SLP to continue to follow as patient is appropriate.       Continue current treatment plan.    Discharge Recommendations: Recommend post-acute placement for additional speech therapy services prior to discharge home    Subjective  Patient " seen for diet re-assessment. CNA sitter present.      Objective       12/10/20 1114   Dysphagia    Dysphagia X   Positioning / Behavior Modification Modulate Rate or Bite Size;Effortful Swallow   Diet / Liquid Recommendation Minced & Moist (5) - (Dysphagia II);Mildly Thick (2) - (Nectar Thick)   Nutritional Liquid Intake Rating Scale Thickened beverages (mildly thick unless otherwise specified)   Nutritional Food Intake Rating Scale Total oral diet with multiple consistencies but requiring special preparation or compensations   Nursing Communication Swallow Precaution Sign Posted at Head of Bed   Skilled Intervention Compensatory Strategies;Gestural Cueing   Short Term Goals   Short Term Goal # 1 B  Patient will consume meals of Minced/Moist solids and Mildly thick liquids with no s/sx of aspiration given 1:1 feeding.   Goal Outcome  # 1 B Progressing slower than expected

## 2020-12-11 NOTE — PROGRESS NOTES
At 2246 after the bolus was 129/79 ,patient refused night meds and refused skin check.Bood pressure right now went down to 94/74

## 2020-12-11 NOTE — PROGRESS NOTES
Patient sbp at shift changed was 85,patient is always fatigue,called  and ordered 500 cc ns bolus and infusing right now,discussed to patient the plan of care and sitter at bedside for safety.

## 2020-12-11 NOTE — PROGRESS NOTES
Assumed pt care at shift change.  Pt resting in bed, easily awakened.  Pt alert/oriented x3, denies pain, room air.  Pt refused 2RN skin check.  Education provided regarding the importance.  Discussed POC, encouraged pt to drink fluids and eat meals.  Safety precautions in place, bed locked and in lowest position, call light and personal belongings within reach.  Hourly rounding, CNA sitter for pt safety.  No further needs at this time.

## 2020-12-11 NOTE — CARE PLAN
Problem: Communication  Goal: The ability to communicate needs accurately and effectively will improve  Outcome: PROGRESSING AS EXPECTED  Note: Discussed POC with pt.  Pt stated understanding, however she needs reinforcement of education and information.     Problem: Fluid Volume:  Goal: Will maintain balanced intake and output  Outcome: PROGRESSING SLOWER THAN EXPECTED  Note: Pt encouraged and reminded to drink fluids.

## 2020-12-12 NOTE — PROGRESS NOTES
"VAT arrived to bedside for US PIV insertion per order. Pt refused IV. Bedside RN to bedside, education provided. Pt continues to refuse. Pt expresses desire to be on comfort care, she states that she understands what that means \"no medicines or therapies\". APRN Polo notified and she approves no IV for remainder of day. APRN plan to discuss comfort care status at length with patient tomorrow morning.   "

## 2020-12-12 NOTE — CARE PLAN
Problem: Safety  Goal: Will remain free from injury  Outcome: PROGRESSING AS EXPECTED  Goal: Will remain free from falls  Outcome: PROGRESSING AS EXPECTED     Problem: Safety  Goal: Will remain free from falls  Outcome: PROGRESSING AS EXPECTED     Problem: Psychosocial Needs:  Goal: Level of anxiety will decrease  Outcome: PROGRESSING AS EXPECTED     Problem: Pain Management  Goal: Pain level will decrease to patient's comfort goal  Outcome: PROGRESSING AS EXPECTED

## 2020-12-12 NOTE — PROGRESS NOTES
Bedside report received at change of shift, assumed care of pt. No immediate needs. Bed alarm on. 1:1 safety sitter in place.     Pt is axox 4, fatigued, anxious. PIV site painful, indurated, red. Removed d/t infiltration.  APRN Oplo aware, OK to wait to reinsert IV for a few hours per Polo. Midrodine for hypotension. Plan of care reviewed, patient board updated, environmental safety precautions in place, and frequent rounding in practice.

## 2020-12-12 NOTE — PROGRESS NOTES
Patient is calm and sleeping at shift changed,woke up around 2200 c/opain,pain meds given and scheduled med and tolerated well,reposition mostly every 2 hours,sitter at bedside for safety,fall prevention in placed.

## 2020-12-13 NOTE — CARE PLAN
Problem: Safety  Goal: Will remain free from injury  Outcome: PROGRESSING AS EXPECTED     Problem: Psychosocial Needs:  Goal: Level of anxiety will decrease  Outcome: PROGRESSING AS EXPECTED     Problem: Skin Integrity  Goal: Risk for impaired skin integrity will decrease  Outcome: PROGRESSING AS EXPECTED     Problem: Pain Management  Goal: Pain level will decrease to patient's comfort goal  Outcome: PROGRESSING AS EXPECTED

## 2020-12-13 NOTE — PROGRESS NOTES
Hospital Medicine TWICE WEEKLY Progress Note    Date of Service  12/13/2020    Chief Complaint  49 y.o. female admitted 11/11/2020 with abdominal pain    Hospital Course  Ms. Escudero is a 49-year-old female with PMH significant for muscular dystrophy (diagnosed at 16, JOSÉ, migraines, chronic abdominal pain, chronic opioid dependence, chronic lower extremity weakness, and dysphagia who presented 11/11/2020 with abdominal pain. She reported the pain as 8/10, localized to the .umbilicus and nonradiating.  Presentation she started experiencing acute onset right upper extremity and bilateral lower extremity pain and numbness.    While in the ER she also fell forward out of her chair, striking her forehead without loss of consciousness.  CT head showed possible cephalohematoma.  CT abdomen/pelvis showed no evidence of acute intra-abdominal or pelvic processes.  She had sudden onset of altered mental status.  Glucose was found to be 45 and she was treated with hypoglycemia protocol. Lumbar puncture was unremarkable.  She continued to have altered mental status, hypotension and tachycardia.  She was treated with the sepsis protocol.  X-ray was concerning for atelectasis versus pneumonia.  Covid was negative.  Pro Delbert 0.53, up to 1.98.. All cultures have been negative to date.  Antibiotics were discontinued 11/19. Neurology was consulted and recommended encephalopathy work-up.  She was started on acyclovir until her work-up came back.  Her HSV work-up, syphilis and HIV were negative and the acyclovir was discontinued.MRI brain 11/18 with incidental finding left retinal detachment. She was seen by ophthalmology and diagnosed with left dislocated intraocular lens.    Palliative care was consulted for advanced care planning.  Patient does NOT want tube feeding or IV access. DNAR/DNI per her request.     Interval Problem Update  12/8- Called to bedside as patient was unaware of her status being transitioned to comfort care. Long  "discussion with patient and she does not want this. Patient is alert and oriented at this time and is insistent that she wants therapy and blood draws and to improve her strength so she may eventually leave the hospital and not \"be an invalid\". Changed code status and re-ordered therapies. Will continue to monitor.   12/13-  Patient resting in bed, states she is tired but otherwise ok. Called on patient yesterday afternoon as she did not want to have IV placed for her low blood pressure and decided again that she would like to elect to being Comfort care status. Discussed this with patient this am, at this time she has changed her mind again and would like to be treated. Unclear at this time if patient is capacitated to make decisions as she tends to change her decisions based on situation in the moment and not long term goals. Have asked Psych to help evaluate for capacity. As patient is asking for full treatment this am will place order for US guided IV. Will reach out to  and update him, but patient has asked to speak with him first.     Consultants/Specialty  Palliative Care  Psych     Code Status  DNAR/DNI    Disposition  Pending placement- Psych consult     Review of Systems  Review of Systems   Constitutional: Positive for malaise/fatigue. Negative for chills and weight loss.   HENT: Negative for congestion and sore throat.    Eyes: Negative for pain and discharge.   Respiratory: Negative for shortness of breath and wheezing.    Cardiovascular: Negative for chest pain and palpitations.   Gastrointestinal: Negative for abdominal pain, nausea and vomiting.   Musculoskeletal: Positive for back pain and myalgias.   Neurological: Negative for dizziness.        Physical Exam  Temp:  [36.1 °C (97 °F)-37.2 °C (98.9 °F)] 36.5 °C (97.7 °F)  Pulse:  [70-91] 70  Resp:  [16-17] 17  BP: ()/(55-75) 97/65  SpO2:  [93 %-96 %] 94 %    Physical Exam  Vitals signs and nursing note reviewed.   Constitutional:       " General: She is awake. She is not in acute distress.     Appearance: She is cachectic. She is ill-appearing.   HENT:      Head: Normocephalic and atraumatic.      Mouth/Throat:      Mouth: Mucous membranes are dry.      Pharynx: No oropharyngeal exudate.   Eyes:      Extraocular Movements: Extraocular movements intact.      Pupils: Pupils are equal, round, and reactive to light.   Neck:      Musculoskeletal: Normal range of motion. No neck rigidity or muscular tenderness.   Cardiovascular:      Rate and Rhythm: Normal rate.      Heart sounds: No murmur.   Pulmonary:      Effort: Pulmonary effort is normal. No respiratory distress.      Breath sounds: Normal breath sounds. No decreased breath sounds.   Abdominal:      General: Bowel sounds are normal. There is no distension.      Palpations: Abdomen is soft. There is no mass.      Tenderness: There is no abdominal tenderness.   Musculoskeletal: Normal range of motion.         General: No swelling or tenderness.   Skin:     General: Skin is warm and dry.      Findings: No erythema or rash.   Neurological:      General: No focal deficit present.      Mental Status: She is alert and oriented to person, place, and time.      Motor: Weakness present.   Psychiatric:         Attention and Perception: Attention normal.         Behavior: Behavior is cooperative.     physical exam completed and unchanged from prior     Fluids    Intake/Output Summary (Last 24 hours) at 12/13/2020 0954  Last data filed at 12/13/2020 0748  Gross per 24 hour   Intake 600 ml   Output 0 ml   Net 600 ml       Laboratory                        Imaging  IR-MIDLINE CATHETER INSERTION WO GUIDANCE > AGE 3   Final Result                  Ultrasound-guided midline placement performed by qualified nursing staff    as above.          IR-US GUIDED PIV   Final Result    Ultrasound-guided PERIPHERAL IV INSERTION performed by    qualified nursing staff as above.      DX-ABDOMEN FOR TUBE PLACEMENT   Final Result          1.  Nonspecific bowel gas pattern.   2.  Dobbhoff tube tip overlying the expected location of the pylorus or first duodenal segment.   3.  Left basilar atelectasis      MR-BRAIN-WITH & W/O   Final Result      1.  Study is again limited by patient motion.   2.  Mild cerebral atrophy, prominent for the patient's age.   3.  T2 FLAIR hyperintensity in the anterior bilateral frontal lobes probably encephalomalacia/gliosis in could be related to old trauma.   4.  Additional mild nonspecific scattered white matter disease, possibly chronic microvascular ischemic changes.   5.  No acute intracranial hemorrhage or infarct.   6.  Left retinal detachment.      DX-ABDOMEN FOR TUBE PLACEMENT   Final Result         1.  Nonspecific bowel gas pattern.   2.  Dobbhoff tube is coiled within the stomach, the tip terminates overlying the expected location of the gastric fundus.   3.  Left basilar atelectasis      IR-MIDLINE CATHETER INSERTION WO GUIDANCE > AGE 3   Final Result                  Ultrasound-guided midline placement performed by qualified nursing staff    as above.          MR-BRAIN-WITH & W/O   Final Result      1.  Limited exam due to motion artifact.   2.  Diffuse atrophy and white matter microvascular ischemic changes.   3.  No acute stroke or evidence for intracranial hemorrhage.      DX-CHEST-LIMITED (1 VIEW)   Final Result      No acute cardiopulmonary disease.      EC-ECHOCARDIOGRAM COMPLETE W/O CONT   Final Result      DX-LUMBAR PUNCTURE FOR DIAGNOSIS   Final Result         FLUOROSCOPIC-GUIDED LUMBAR PUNCTURE AS DESCRIBED ABOVE.      IR-MIDLINE CATHETER INSERTION WO GUIDANCE > AGE 3   Final Result                  Ultrasound-guided midline placement performed by qualified nursing staff    as above.          CT-HEAD W/O   Final Result      Normal CT scan of the head without contrast.               INTERPRETING LOCATION:  [address], [place], [address]      [id number] VIEW   Final Result      Unremarkable AP  recumbent abdomen.      DX-CHEST-PORTABLE (1 VIEW)   Final Result      Left basilar opacities, consistent with pneumonia.      CT-HEAD W/O   Final Result      No CT evidence of acute infarct, hemorrhage or mass.      CT-ABDOMEN-PELVIS WITH   Final Result      No evidence of acute intra-abdominal or pelvic process.      Probably duplicated right renal collecting system.      CT-HEAD W/O   Final Result      1.  Focal soft tissue swelling right frontal region with minimal underlying increased density adjacent to the periphery of the outer table of the calvarium which could represent a small cephalohematoma.      2.  Periventricular and subcortical white matter density changes which could be related to small vessel ischemia, demyelinization or gliosis. Findings may be slightly more prominent than on previous exam.           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)  Assessment & Plan  -Diagnosed at age 16  -History of chronic abdominal pain with no clear explained etiology prompting multiple ED visits   -Most likely contributing to chronic abdominal pain as well as onset of of extremity symptoms      Bilateral lower extremity pain- (present on admission)  Assessment & Plan  -seems to be mostly when she is mobilizing  -Likely a component of deconditioning from muscular dystrophy- and continued deconditioning related to refusal to particpate in movement and therapies   -Pain management  Pt/OT      Abdominal pain- (present on admission)  Assessment & Plan  -Chronic, stable  -History of pancreatitis  -Contributing to her poor p.o. intake  -Having bowel movements  -Reports pain controlled this morning.   - Patient tends to refuse to eat then at other times gorges herself causing pain and emesis from overeating.       Severe protein-calorie malnutrition (HCC)  Assessment & Plan  -Body mass index is 19.16 kg/m².  -She had core track at one time but has pulled out -she does not want another  tube feeding  -Continue Marinol  -Food preferences  - Modified diet per speech     Altered mental status  Assessment & Plan  -ongoing lethargy. Rouses easily.   -impulsive at times resulting in falls  -She continues to have hallucinations which she states are bad dreams   -Sleep hygiene  -1:1 sitter    On exam today 12/13/2020 patient is alert and oriented, questioning capacity as patient appears to make decisions in the moment without long term insight taken into consideration  -Psych consult placed         At high risk for falls- (present on admission)  Assessment & Plan  -Per , she has been falling at home  -She has sustained multiple falls this hospitalization  -Continues to require 1:1 sitter  -Fall precautions    Hypotension  Assessment & Plan  -likely due to poor PO intake/dehydration  -Order placed for IV fluids, however patient continues to change her code status multiple times and refuse          VTE prophylaxis: SCDs, Lovenox    I have performed a physical exam and reviewed and updated ROS and Plan today (12/13/2020). In review of previous note, there are no changes except as documented above.

## 2020-12-13 NOTE — PROGRESS NOTES
Bedside report received at change of shift, assumed care of pt. No immediate needs. Bed alarm on. 1:1 safety sitter in place.                Pt is axox 4, fatigued. Midrodine for hypotension. Good appetite this morning. Denies pain at this time. Plan of care reviewed, patient board updated, environmental safety precautions in place, and frequent rounding in practice

## 2020-12-13 NOTE — PROGRESS NOTES
AOX4. Denies pain. Patient refuse cares and turns at times. Bed alarm on. Sitter at door side. Incontinent of urine. Appear to be sleeping in between cares.

## 2020-12-13 NOTE — CONSULTS
PSYCHIATRY CONSULT TEAM NOTE: Consult received for capacity to make code status decisions.     Per chart review and discussion with treating APRN, the patient has been frequently changing her mind on code status, most recently multiple times within a 24 hour period. It appears at one point (unclear if this is still the case), the patient was believed to be incapacitated as her  was allowed to change her code status to comfort care. Unclear who determined she was incapacitated at that time. Nevertheless, the patient reported she was unaware of this change on 12/8 and requested it be changed back to DNAR/DNI. She was determined at that time to be able to make that decision and her code status was returned to DNAR/DNI. There are notes indicating that the patient can be impulsive and her memory can be impaired however she also has a history of encephalopathy which could explain her presentation at the time those notes were filed. She has not undergone cognitive testing yet. Therefore, will ask APRN to request this to assist in capacity decision especially given the capacity question. Discussed plan for APRN. Will assess patient on or before Monday, 12/14/2020.     Thank you.

## 2020-12-14 NOTE — CONSULTS
"BRIEF PSYCHIATRIC CONSULT NOTE: patient seen and assessed, She was alert and oriented. She asked this writer several times to leave however when encouraged to engage in the interview, she would. The patient knows what brought her to the hospital and that she is at risk of falling. However, she cannot tell me what her medical team is recommending. More importantly, when asked to describe comfort care, she states only that it means \"Ill lie around and do nothing.\" This writer explained it further stating it would mean she would not receive treatment for something like an infection and the patient repeated it back. It is unclear to this writer if she would have been able to explain the concept of comfort care without this direction. Per chart review and APRN, the patient has had comfort care explained to her multiple times. Furthermore, when asked why she continues to change her mind about going on comfort care, she stated, \"I'm afraid\" When asked to explain this further, she states, \"of falling.\" Asked why this is leading to her changing her mind about code status, she just states again that she is afraid of falling. She cannot explain the reasoning as to why this is equaling her changing her code status. Finally, as this writer was leaving the room (because the patient asked her to), she stated, \"I want the therapies!\" However, per chart review, she refused physical therapy today.    Given the above stated information, specifically her inability to explain her reasoning for why she frequently changes her mind about code status and the disconnect between her stated desire and her behavior, at this time, the patient IS NOT CAPACITATED to make code status decisions.    Per nursing and treatment team, there are some concerns regarding the patient  acting as her healthcare POA. If these concerns persist, recommend requesting a bioethics meeting to discuss options.       Full note to follow.               "

## 2020-12-14 NOTE — PROGRESS NOTES
Bedside report received at change of shift, assumed care of pt. No immediate needs. Bed alarm on. 1:1 safety sitter in place.                Pt is axox 4, fatigued, forgetful, agitated. Midrodine for hypotension. Good appetite this morning. Denies pain at this time. Plan of care reviewed, patient board updated, environmental safety precautions in place, and frequent rounding in practice.

## 2020-12-14 NOTE — PROGRESS NOTES
2 RN skin check completed with MCKAY Foley  Devices in place: PIV   Skin assessed under devices Yes  Confirmed pressure ulcers found on N/A   New potential pressure ulcers: n/a  Wound consult placed: yes.     Assessment:   Left back/shoulder:  Black/brown small area, non blanchable;  bilateral elbows: dry, and blanching;   Sacral/buttock area: black/brown discoloration, non blanching  bilateral heels: boggy blanching     The following interventions in place;  incontinence care; [id number]; pressure redistribution mattress with waffle overlay, gabby cream

## 2020-12-14 NOTE — CONSULTS
"PSYCHOLOGICAL CONSULTATION:  Reason for admission: Abdominal pain  Reason for consult: capacity to make code status decisions  Requesting Physician: RAKAN Mistry    Legal status: Legal Status: N/A Not capacitated to make code status decisions.     Chief Complaint: \"I'll lie around and do nothing.\"    HPI: Gayla Escudero is a 50 y.o. female with a psychiatric history of panic attacks who presented to the hospital BIB self with abdominal pain. She has a medical history significant for myotonic muscular dystrophy diagnosed at age 16 which is thought to be the source of her chronic abdominal pain. She was also found to have severe malnutrition and hypotension. While in the emergency room, she fell forward out of her wheelchair and hit her forehead. Even though she denied loss of consciousness, experienced sudden onset of altered mental status. She was found to have low glucose which was treated. However, she continued to experience encephalopathy with impulsivity and hallucinations. Currently, she requires a 1:1 safety sitter.  The consult to assess the patient's capacity to make code status decisions was placed after the patient made numerous changes to her code status within 24 hours. The patient also reported not knowing that her code status at one point had been changed to comfort care by her . The patient at that time was believed to be capacitated by her medical team and was allowed to change her code status back to DNR/DNI. However, as stated above, she then stated a desire to change her code status several times again. See chart for more details.    Today, the patient presented with a very tired affect and reported a congruent mood. She was alert and oriented. She asked this writer several times to leave during the course of the interview. However, with encouragement and explanation for why this writer was speaking with her, she answered some questions. Her thought process was somewhat " "linear however her logic was difficult to follow. Speech was somewhat slurred however clear when asked to repeat herself. Judgement and reasoning were impaired as explained below.    The patient was able to explain what brought her to the hospital (\"pain\") and that she is at risk of falling. However, she was not able to explain her treatment plan and recommended discharge plan. More importantly, when asked to describe comfort care, she stated only that it means \"Ill lie around and do nothing.\" She could not explain it further when asked (e.g. no treatment for medical illness such as an infection.) Per APRN and chart review, comfort care has been explained to the patient several times including by palliative care. This writer explained comfort care again stating it would mean she would not receive treatment for something like an infection. The patient then repeated this back to the writer. It is unclear to this writer if she would have been able to explain the concept of comfort care without this direction. Furthermore, when asked why she continues to change her mind about going on comfort care, she only stated, \"I'm afraid.\" When asked to explain this further, she stated, \"of falling.\" Asked why this is leading to her changing her mind about code status, she stated again that she is afraid of falling. She was not able to explain the reasoning as to why her fear of falling was leading to a frequent change in code status. Finally, as this writer was leaving the room (because the patient asked her to), she stated, \"I want the therapies!\" and fell back asleep. This writer acknowledged this request. However, per chart review, the patient refused physical therapy today.    Risk Assessment: current symptoms as reported by pt.  Suicidal Thoughts: Did not report, reports future focus including wanting \"therapies.\"  Plan to Commit Suicide: Did not report, reports future focus including wanting \"therapies.\"  Intent to Commit " "Suicide: Did not report, reports future focus including wanting \"therapies.\"  History of Suicidal Thoughts: Did not report, reports future focus including wanting \"therapies.\"  History of Suicide Attempts: Did not report, reports future focus including wanting \"therapies.\"    Homicidal Thoughts: Did not report, none stated  Plan to Hurt Others: Did not report, none stated  Intent to Hurt Others: Did not report, none stated  History of Homicidal Thoughts or Actions: Did not report, none stated    Self-Harm Thoughts: Denies  Self-Harm Actions: Denies    Psychiatric Review of Systems:current symptoms as reported by pt.  Depression: Denies   Michelle: Denies   Anxiety/Panic Attacks: Endorses \"fear of falling\"; hx of panic attacks. None noted today.  PTSD symptom: Did not report, none stated   Psychosis: Did not report, however is documented to have hallucinations that she calls \"dreams.\" Unclear if these are the result of encephalopathy, psychotic process, and/or twilight sleep states        Medical Review of Systems: as reported by pt. All systems reviewed. Only those found to be + are noted below. All others are negative.   Neurological:    TBIs: Did not report, hx in chart. see hpi too   SZs:Did not report, nothing in chart   Strokes:Did not report, nothing in chart   Other: encephalopathy  Other medical symptoms:   Thyroid:Did not report, nothing inc adams   Diabetes: Did not report, nothing in chart   Cardiovascular disease: Did not report, nothing in chart    Past Psychiatric Hx: Only documented hx is panic attacks however this was not diagnosed by a mental health professional. Appears it was diagnosed by a PCP and patient was referred to outpatient mental health. No documentation of whether or not she attended outpatient mental health. Due to patient presentation, was not able to ask the patient directly about this.     Family Psychiatric Hx: none noted    Social Hx:   Social History     Socioeconomic History   • " Marital status:      Spouse name: Not on file   • Number of children: Not on file   • Years of education: Not on file   • Highest education level: Not on file   Occupational History   • Not on file   Social Needs   • Financial resource strain: Not on file   • Food insecurity     Worry: Not on file     Inability: Not on file   • Transportation needs     Medical: Not on file     Non-medical: Not on file   Tobacco Use   • Smoking status: Never Smoker   • Smokeless tobacco: Never Used   Substance and Sexual Activity   • Alcohol use: No   • Drug use: No   • Sexual activity: Yes     Partners: Male   Lifestyle   • Physical activity     Days per week: Not on file     Minutes per session: Not on file   • Stress: Not on file   Relationships   • Social connections     Talks on phone: Not on file     Gets together: Not on file     Attends Bahai service: Not on file     Active member of club or organization: Not on file     Attends meetings of clubs or organizations: Not on file     Relationship status: Not on file   • Intimate partner violence     Fear of current or ex partner: Not on file     Emotionally abused: Not on file     Physically abused: Not on file     Forced sexual activity: Not on file   Other Topics Concern   • Not on file   Social History Narrative   • Not on file        Drug/Alcohol/Tobacco Hx:   Drugs: Did not report, none noted   Prescription Medication Abuse: Did not report, none noted   Alcohol: Did not report, none noted   Tobacco: Did not report, none noted    Medical Hx: Chart reviewed. Only those findings of potential interest to psychiatry are noted below:  Past Medical History:   Diagnosis Date   • Anemia    • Cataract    • Heart murmur    • Muscular disease    • Muscular dystrophy (HCC)    • JOSÉ on CPAP      Medical Conditions:     Allergies: Codeine and Tramadol  Medications (currently prescribed at [facility]):    Current Facility-Administered Medications:   •  midodrine (PROAMATINE) tablet 5  "mg, 5 mg, Oral, TID WITH MEALS, Renny Salguero M.D., 5 mg at 12/14/20 1202  •  miconazole 2%-zinc oxide (Soren) topical cream, , Topical, BID, Rossy Cuellar A.P.R.N., Given at 12/13/20 0436  •  atropine 1 % ophthalmic solution 2 Drop, 2 Drop, Sublingual, Q4HRS PRN, Melanie Funk A.P.R.N.  •  haloperidol lactate (HALDOL) injection 3 mg, 3 mg, Intravenous, Q4HRS PRN, Kendall Carreon M.D., 3 mg at 12/05/20 2202  •  lidocaine (LIDODERM) 5 % 1 Patch, 1 Patch, Transdermal, Q24HR, Germaine Drake D.O., 1 Patch at 12/14/20 1203  •  dronabinol (MARINOL) capsule 5 mg, 5 mg, Oral, BEFORE LUNCH AND DINNER, Isis Murillo A.P.R.N., 5 mg at 12/14/20 1202  •  oxyCODONE immediate-release (ROXICODONE) tablet 5 mg, 5 mg, Oral, Q8HRS PRN, Isis Murillo A.P.R.N., 5 mg at 12/14/20 0820  •  oxyCODONE immediate release (ROXICODONE) tablet 10 mg, 10 mg, Oral, Q8HRS PRN, Isis Murillo A.P.R.N., 10 mg at 12/13/20 1943  •  QUEtiapine (Seroquel) tablet 12.5 mg, 12.5 mg, Oral, Nightly, Kimberly Mc M.D., 12.5 mg at 12/13/20 1938  •  acetaminophen (TYLENOL) tablet 500-1,000 mg, 500-1,000 mg, Oral, Q6HRS PRN, Kimberly Mc M.D., 1,000 mg at 12/14/20 1202  •  Notify, , , Once **AND** glucose 4 g chewable tablet 16 g, 16 g, Oral, Q15 MIN PRN **AND** dextrose 50% (D50W) injection 50 mL, 50 mL, Intravenous, Q15 MIN PRN, Kimberly Mc M.D.  •  dicyclomine (BENTYL) tablet 20 mg, 20 mg, Oral, Q6HRS PRN, Kimberly Mc M.D., 20 mg at 11/30/20 1322  •  naloxone (NARCAN) injection 0.4 mg, 0.4 mg, Intravenous, Q2 MIN PRN, Dalila Scherer M.D.  •  Respiratory Therapy Consult, , Nebulization, Continuous RT, Berenice Denny M.D.  Labs:  No results found for this or any previous visit (from the past 24 hour(s)).       Cranial Imaging Hx: MR of the brain on 11/17/2020: \"1.  Study is again limited by patient motion.  2.  Mild cerebral atrophy, prominent for the patient's age.  3.  T2 FLAIR hyperintensity in the anterior " "bilateral frontal lobes probably encephalomalacia/gliosis in could be related to old trauma.  4.  Additional mild nonspecific scattered white matter disease, possibly chronic microvascular ischemic changes.  5.  No acute intracranial hemorrhage or infarct.  6.  Left retinal detachment.\"    Psychiatric Examination:  Vitals: Blood pressure (!) 95/64, pulse 85, temperature 37.1 °C (98.8 °F), temperature source Temporal, resp. rate 16, height 1.549 m (5' 1\"), weight 46 kg (101 lb 6.6 oz), SpO2 95 %, not currently breastfeeding.  Musculoskeletal: normal psychomotor activity given diagnosis, no tics or unusual mannerisms noted  Appearance and Eye Contact: appropriate dress and grooming. Behavior is calm, cooperative,  Poor eye contact  Attention/Alertness: Alert  Thought Process: Linear  Not logical  Thought Content: No psychotic processes noted  Speech: Slurred- baseline  Mood: \"tired\"            Affect: very tired         SI/HI: Did not report, does report future orientation    Memory: Recent and remote memory appear possibly impaired.    Orientation: alert, oriented to person, place and time  Insight into symptoms: poor  Judgement into symptoms:poor    Neuropsychological Testing:   Not formally tested. I have requested a cognitive evaluation from SLP. Awaiting results. Will speak to it in a separate note when available.           ASSESSMENT: Given the above stated information, specifically her inability to explain her reasoning for why she frequently changes her mind about code status and the disconnect between her stated desire and her behavior, at this time, the patient IS NOT CAPACITATED to make code status decisions.    With that said, capacity abilities are not final and can change over time. If a change in her ability to reason is suspected, capacity can be reevaluated.     Per nursing and treatment team, there are some concerns regarding the patient  acting as her healthcare POA. If these concerns " persist, recommend requesting a bioethics meeting to discuss options.     DSM5 Diagnostic Considerations:   Unspecified Anxiety Disorder    Rule Out:  Major Neurocognitive Disorder      PLAN:  Not capacitated to make code status decisions    Recommend ethics consult if concerns about healthcare POA persist     Records reviewed: yes    Discussed patient with other provider: yes, APRN    Signing off- however will speak to cognitive evaluation results in a separate note once available. Also please feel free to re-consult if a change in capacity is suspected. Will be glad to reassess if needed.      Thank you for the consult.    Katy Tran, Ph.D.

## 2020-12-14 NOTE — PROGRESS NOTES
2 RN skin check completed with MCKAY Galarza  Devices in place: PIV   Skin assessed under devices Yes  Confirmed pressure ulcers found on N/A   New potential pressure ulcers: n/a    Assessment:   Left back/shoulder:  Black/brown small area, non blanchable;  bilateral elbows: dry, and blanching;   Sacral/buttock area: black/brown discoloration, non blanching  bilateral heels: boggy blanching     The following interventions in place;  incontinence care; F4ycjdo; pressure redistribution mattress with waffle overlay, gabby cream

## 2020-12-14 NOTE — PROGRESS NOTES
AOX4. Oxycodone give for back pain with relief noted. Patient refuse cares and turns at times. Bed alarm on. Sitter at door side. Incontinent of urine. Appear to be sleeping in between cares.

## 2020-12-15 PROBLEM — R29.818 NEUROCOGNITIVE DEFICITS: Status: ACTIVE | Noted: 2020-01-01

## 2020-12-15 PROBLEM — R41.89 NEUROCOGNITIVE DEFICITS: Status: ACTIVE | Noted: 2020-01-01

## 2020-12-15 NOTE — PROGRESS NOTES
Hospital Medicine TWICE WEEKLY Progress Note    Date of Service  12/15/2020    Chief Complaint  49 y.o. female admitted 11/11/2020 with abdominal pain    Hospital Course  Ms. Escudero is a 50-year-old female with PMH significant for muscular dystrophy (diagnosed at 16, JOSÉ, migraines, chronic abdominal pain, chronic opioid dependence, chronic lower extremity weakness, and dysphagia who presented 11/11/2020 with abdominal pain. She reported the pain as 8/10, localized to the .umbilicus and nonradiating. She also c/o acute onset right upper extremity and bilateral lower extremity pain and numbness.    While in the ER she also fell forward out of her chair, striking her forehead without loss of consciousness.  CT head showed possible cephalohematoma.  CT abdomen/pelvis showed no evidence of acute intra-abdominal or pelvic processes.  She had sudden onset of altered mental status.  Glucose was found to be 45 and she was treated with hypoglycemia protocol. Lumbar puncture was unremarkable.  She continued to have altered mental status, hypotension and tachycardia.  She was treated with the sepsis protocol.  X-ray was concerning for atelectasis versus pneumonia.  Covid was negative.  Pro Delbert 0.53, up to 1.98.. All cultures have been negative to date.  Antibiotics were discontinued 11/19. Neurology was consulted and recommended encephalopathy work-up.  She was started on acyclovir until her work-up came back.  Her HSV work-up, syphilis and HIV were negative and the acyclovir was discontinued.MRI brain 11/18 with incidental finding left retinal detachment. She was seen by ophthalmology and diagnosed with left dislocated intraocular lens.    Palliative care was consulted for advanced care planning.  Patient does NOT want tube feeding or IV access. DNAR/DNI per her request.     Interval Problem Update  12/13-  Patient resting in bed, states she is tired but otherwise ok. Called on patient yesterday afternoon as she did not want  to have IV placed for her low blood pressure and decided again that she would like to elect to being Comfort care status. Discussed this with patient this am, at this time she has changed her mind again and would like to be treated. Unclear at this time if patient is capacitated to make decisions as she tends to change her decisions based on situation in the moment and not long term goals. Have asked Psych to help evaluate for capacity. As patient is asking for full treatment this am will place order for US guided IV. Will reach out to  and update him, but patient has asked to speak with him first.     12/15 Psychology feels the patient lacks capacity to make code status decisions.  Vital signs stable.  No labs or imaging.  Tolerating p.o. No urinary output documented as patient is incontinent.  Multiple voids documented. SLP paras waldron significant deficits. Patient will need supervision at [place].    Consultants/Specialty  Palliative Care  Psych    Code Status  DNAR/DNI    Disposition  Pending placement- Psych consult     Review of Systems  Review of Systems   Constitutional: Positive for malaise/fatigue. Negative for chills and weight loss.   HENT: Negative for congestion and sore throat.    Eyes: Negative for pain and discharge.   Respiratory: Negative for shortness of breath and wheezing.    Cardiovascular: Negative for chest pain and palpitations.   Gastrointestinal: Negative for abdominal pain, nausea and vomiting.   Musculoskeletal: Positive for back pain and myalgias.   Neurological: Negative for dizziness.        Physical Exam  Temp:  [36.4 °C (97.5 °F)-36.6 °C (97.8 °F)] 36.4 °C (97.5 °F)  Pulse:  [69-82] 82  Resp:  [16-17] 17  BP: (86-95)/(56-65) 95/65  SpO2:  [92 %-99 %] 98 %    Physical Exam  Vitals signs and nursing note reviewed.   Constitutional:       General: She is awake. She is not in acute distress.     Appearance: She is cachectic. She is ill-appearing.   HENT:      Head: Normocephalic and  atraumatic.      Mouth/Throat:      Mouth: Mucous membranes are dry.      Pharynx: No oropharyngeal exudate.   Eyes:      Extraocular Movements: Extraocular movements intact.      Pupils: Pupils are equal, round, and reactive to light.   Neck:      Musculoskeletal: Normal range of motion. No neck rigidity or muscular tenderness.   Cardiovascular:      Rate and Rhythm: Normal rate.      Heart sounds: No murmur.   Pulmonary:      Effort: Pulmonary effort is normal. No respiratory distress.      Breath sounds: Normal breath sounds. No decreased breath sounds.   Abdominal:      General: Bowel sounds are normal. There is no distension.      Palpations: Abdomen is soft. There is no mass.      Tenderness: There is no abdominal tenderness.   Musculoskeletal: Normal range of motion.         General: No swelling or tenderness.   Skin:     General: Skin is warm and dry.      Findings: No erythema or rash.   Neurological:      General: No focal deficit present.      Mental Status: She is alert and oriented to person, place, and time.      Motor: Weakness present.   Psychiatric:         Attention and Perception: Attention normal.         Behavior: Behavior is cooperative.     physical exam completed and unchanged from prior     Fluids    Intake/Output Summary (Last 24 hours) at 12/15/2020 0855  Last data filed at 12/15/2020 0735  Gross per 24 hour   Intake 170 ml   Output no documentation   Net 170 ml       Laboratory                        Imaging       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)  Assessment & Plan  -Diagnosed at age 16  -History of chronic abdominal pain with no clear explained etiology prompting multiple ED visits   -Most likely contributing to chronic abdominal pain as well as onset of of extremity symptoms      Bilateral lower extremity pain- (present on admission)  Assessment & Plan  -seems to be mostly when she is mobilizing  -Likely a component of deconditioning from  muscular dystrophy- and continued deconditioning related to refusal to particpate in movement and therapies   -Pain management  Pt/OT      Abdominal pain- (present on admission)  Assessment & Plan  -Chronic, stable  -History of pancreatitis  -Contributing to her poor p.o. intake  -Having bowel movements  -Reports pain controlled this morning.   - Patient tends to refuse to eat then at other times gorges herself causing pain and emesis from overeating.       Neurocognitive deficits  Assessment & Plan  SLP eval w deficits in multiple domains  24/7 supervision upon discharge      Severe protein-calorie malnutrition (HCC)  Assessment & Plan  -Body mass index is 19.16 kg/m².  -She had core track at one time but has pulled out -she does not want another tube feeding  -Continue Marinol  -Food preferences  - Modified diet per speech     Altered mental status  Assessment & Plan  -ongoing lethargy. Rouses easily.   -impulsive at times resulting in falls  -She continues to have hallucinations which she states are bad dreams   -Sleep hygiene  -1:1 sitter  -Psych following due to capacity question; they feel she is incapacitated for CODE STATUS decisions      At high risk for falls- (present on admission)  Assessment & Plan  -Per , she has been falling at home  -She has sustained multiple falls this hospitalization  -Continues to require 1:1 sitter  -Fall precautions    Hypotension  Assessment & Plan  -likely due to poor PO intake/dehydration  -Order placed for IV fluids, however patient continues to change her code status multiple times and refuse  -Continue midodrine         VTE prophylaxis: SCDs, Lovenox    I have performed a physical exam and reviewed and updated ROS and Plan today (12/15/2020). In review of previous note, there are no changes except as documented above.     I certify that the patient requires continued medically necessary hospital services for the treatment of altered mental status. The patient will  remain in the hospital for the foreseeable future.  Discharge may or may not occur in the next 20 days due to ongoing discharge delays due to no medically acceptable discharge option.

## 2020-12-15 NOTE — PROGRESS NOTES
Pharmacy Pharmacotherapy Consult for LOS >30 days    Admit Date: 11/11/2020      Medications were reviewed for appropriateness and ongoing need.     Current Facility-Administered Medications   Medication Dose Route Frequency Provider Last Rate Last Admin   • glucose 4 g chewable tablet 16 g  16 g Oral Q15 MIN PRN Rossy Cuellar, A.P.R.N.        And   • dextrose 50% (D50W) injection 50 mL  50 mL Intravenous Q15 MIN PRN Rossy Cuellar, A.P.R.N.       • midodrine (PROAMATINE) tablet 5 mg  5 mg Oral TID WITH MEALS Rossy Cuellar, A.P.R.N.   5 mg at 12/15/20 0804   • miconazole 2%-zinc oxide (Soren) topical cream   Topical BID Rossy Cuellar, A.P.R.N.   Given at 12/15/20 0414   • atropine 1 % ophthalmic solution 2 Drop  2 Drop Sublingual Q4HRS PRN Rossy Cuellar, A.P.R.N.       • haloperidol lactate (HALDOL) injection 3 mg  3 mg Intravenous Q4HRS PRN Rossy Cuellar, A.P.R.N.   3 mg at 12/05/20 2202   • lidocaine (LIDODERM) 5 % 1 Patch  1 Patch Transdermal Q24HR Rossy Cuellar, A.P.R.N.   1 Patch at 12/14/20 1203   • dronabinol (MARINOL) capsule 5 mg  5 mg Oral BEFORE LUNCH AND DINNER Rossy Cuellar, A.P.R.N.   5 mg at 12/14/20 1649   • oxyCODONE immediate-release (ROXICODONE) tablet 5 mg  5 mg Oral Q8HRS PRN Rossy Cuellar, A.P.R.N.   5 mg at 12/14/20 0820   • oxyCODONE immediate release (ROXICODONE) tablet 10 mg  10 mg Oral Q8HRS PRN Rossy Cuellar, A.P.R.N.   10 mg at 12/13/20 1943   • QUEtiapine (Seroquel) tablet 12.5 mg  12.5 mg Oral Nightly Rossy Cuellar, A.P.R.N.   12.5 mg at 12/14/20 2140   • acetaminophen (TYLENOL) tablet 500-1,000 mg  500-1,000 mg Oral Q6HRS PRN Rossy HOLMAN Polo, A.P.R.N.   1,000 mg at 12/14/20 1202   • dicyclomine (BENTYL) tablet 20 mg  20 mg Oral Q6HRS PRN Rossy Xaviers, A.P.R.N.   20 mg at 11/30/20 1322   • naloxone (NARCAN) injection 0.4 mg  0.4 mg Intravenous Q2 MIN PRN Dalila Scherer M.D.       • Respiratory Therapy Consult   Nebulization Continuous RT Berenice WILKERSON  LUCIO Denny           Recommendations:  Per chart review, all current medications appear safe and appropriate.  No recommendations at this time, will reorder medication panel per protocol.    Josué Murillo, JosrD, BCPS

## 2020-12-15 NOTE — ASSESSMENT & PLAN NOTE
SLP paras waldron deficits in multiple domains  INCAPACITATED to make decisions   Intermittent anxiety and agitation requiring sitter.  This may affect ability to place patient.  Continue Seroquel daily for agitation

## 2020-12-15 NOTE — THERAPY
"Speech Language Pathology   Initial Assessment     Patient Name: Gayla Escudero  AGE:  50 y.o., SEX:  female  Medical Record #: [id number]  Today's Date: 12/15/2020     Precautions  Precautions: (P) Fall Risk, Swallow Precautions ( See Comments)  Comments: (P) baseline myotonic     Assessment    Patient is 49 y.o. female who was admitted to [facility] on 20 for abdominal pain. 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 Additional factors influencing patient status/progress: hx of muscular dystrophy.       Pt was referred for a cognitive-linguistic assessment. Per MD verbal report and chart notes from Psychological consult on 20, Pt with difficulty making decisions, specifically in regards to code status. Refer to full psychological report for details.   Pt alert and oriented to name, , place, city, month and year with cues given for date. Pt was restless, anxious and required mod-max cues to maintain attention to task and complete tasks. Pt verbalized eagerness to complete assessment,  however demonstrated poor tolerance as she asked multiple times \"how much longer\" \" I can't do it\". Although when offered to discontinue with assessment and resume at a later time Pt insisted in participating.   Pt completed a selection of assessment tasks from the Cognistat (the Neurobehavioral Cognitive Status Examination) in addition to informal measures. Pt with self reported poor vision and unable to complete reading/writing tasks. Pt with moderate dysarthria, however intelligible when cued to reduce rate of speech. Limited testing completed revealed deficits in the areas of attention, memory , reasoning/problem solving. Pt's performance further affected by pt's anxiety/poor tolerance/compliance with demands of assessment. Pt was unable to provide details of home situation/assistance needed. Pt unable to recall any of her medications and reported " "that her  \"helps with that\". Pt demonstrated overall poor insight into deficits. Pt would benefit from cognitive-linguistic treatment/further assessment. Pt with limited participation and tolerance for full standardized cognitive-linguistic assessment at this time.       Plan    Recommendations:   1) Continue SLP services to address dysphagia and cognition.     2) Recommend 24/7 assistance/supervision following medical discharge.     Recommend Speech Therapy 3 times per week until therapy goals are met for the following treatments:  Cognitive-Linguistic Training/Dysphagia tx.    Discharge Recommendations: (P) Recommend post-acute placement for additional speech therapy services prior to discharge home       Objective       12/15/20 0616   Initial Contact Note    Initial Contact Note  Order Received and Verified, Speech Therapy Evaluation in Progress with Full Report to Follow.   Precautions   Precautions Fall Risk;Swallow Precautions ( See Comments)   Comments baseline myotonic    Pain 0 - 10 Group   Therapist Pain Assessment 0;Nurse Notified;Post Activity Pain Same as Prior to Activity   Non Verbal Descriptors   Non Verbal Scale  Restlessness   Prior Living Situation   Prior Services None   Housing / Facility Motel   Lives with - Patient's Self Care Capacity Spouse   Prior Level Of Function   Communication Impaired   Swallow Unknown   Dentition Intact   Dentures None   Hearing Within Functional Limits for Evaluation   Hearing Aid None   Vision Wears Corrective Lenses  (Not present. Impaired vision/unable to complete reading/writ)   Patient's Primary Language English   Occupation (Pre-Hospital Vocational) Retired Due To Disability   Verbal Expression   Verbal Expression / Aphasia Eval (WDL) X   Dysarthria Minimal (4)  (Min-mod)   Skilled Intervention Compensatory Strategies   Auditory Comprehension   Auditory Comprehension (WDL) WDL   Comments y/n question, following 3 step directions    Reading Comprehension "   Comments Unable to assess d/t poor vision    Written Expression   Comments unable to assess d/t poor vision    Cognitive-Linguistic   Cognitive-Linguistic (WDL) X   Level of Consciousness Alert   Orientation Level Not Oriented to Day   Sustained Attention Minimal (4)  (Poor activity tolerance )   Short Term Memory Moderate (3)  (Delayed recall of words (0/4) improved with isra cues )   Simple Reasoning / Problem Solving Moderate (3)  (Verbal problem solving)   Abstract Reasoning Moderate (3)  (Categorization 50 % acc)   Auditory Math   (Unable )   Skilled Intervention Verbal Cueing   Social / Pragmatic Communication   Social / Pragmatic Communication WDL   Outcome Measures   Outcome Measures Utilized   (Portions of Cognistat/ additional informal tasks )   Patient / Family Goals   Patient / Family Goal #1 I just want to sleep   Goal #1 Outcome Progressing slower than expected   Short Term Goals   Short Term Goal # 3 Pt will sustain attention to a 5 minute task given minimal verbal cues    Short Term Goal # 4 Pt will generate solutions to problem solving tasks related to safety with 80 % accuracy and min cues

## 2020-12-15 NOTE — PROGRESS NOTES
Received report and assumed care of pt. Assessment complete on RA. Pt A&OX4, forgetful at times. Denies any pain or discomfort at this time. 1:1 sitter at bedside at all times. All pt needs met at this time. Safety precautions and hourly rounding in place.

## 2020-12-15 NOTE — PROGRESS NOTES
Patient sleeping in bed,assumed care given , at bedside,denies pain,sitter at bedside,repositioning ,fall prevention in placed.

## 2020-12-15 NOTE — DISCHARGE PLANNING
Anticipated Discharge Disposition: TBD    Action: Patient continues to have a sitter due to impulsivity. Dr. Tran assessed patient on 12/14/20 and deemed the patient is not capacitated to make code status. Has requested a bioethics meeting to discuss code status.  A cognitive evaluation with requested.     Barriers to Discharge: placement/no sitter for 24 hours    Plan: continue to monitor for discharge barriers

## 2020-12-16 NOTE — PROGRESS NOTES
2 RN skin check completed with Tianna RN.   Devices in place: PIV .  Skin assessed under devices: yes.  Confirmed pressure ulcers found on: none .  New potential pressure ulcers noted on: none. Wound consult placed: n/a.    Skin Assessment:  Bilateral elbows dry, intact, and blanching  Bilateral heels dry, intact, blanching  Sacrum intact, brown discoloration noted  Left upper back bruising    The following interventions in place: 2 RN skin check, pillows in use for support and repositioning, q2 turns, waffle mattress, incontinence care, barrier cream

## 2020-12-16 NOTE — PROGRESS NOTES
Pt A&Ox4, sometimes forgetful on RA. Pt restless and anxious. Pt reported pain, managed with PRN medication.Safety sitter at bedside at all times, safety precautions in place.

## 2020-12-16 NOTE — THERAPY
"Occupational Therapy  Daily Treatment     Patient Name: Gayla Escudero  Age:  50 y.o., Sex:  female  Medical Record #: [id number]  Today's Date: 12/16/2020     Precautions  Precautions: Fall Risk, Swallow Precautions ( See Comments)  Comments: bushra VAUGHN    Assessment    Pt seen for OT tx today, continues to be limited by cognition deficits, impaired insight into deficits, at times child like behaviors despite education on need to engage in functional activities such as STS(wouldn't wb through legs at times), poor understanding of cause and effect relation despite vocalizing wanting to go home with  and need to be able to t/f self with as little assist as possible. Will continue to follow but appears to be nearing her plateau with therapy and likely will LTC if spouse is unable to provide 24/7 caregiving as this level.      Plan    Continue current treatment plan.    DC Equipment Recommendations: Unable to determine at this time, Bed Side Commode  Discharge Recommendations: Recommend post-acute placement for additional occupational therapy services prior to discharge home       Objective       12/16/20 0951   Cognition    Cognition / Consciousness X   Speech/ Communication Delayed Responses   Level of Consciousness Alert   Ability To Follow Commands 1 Step   Safety Awareness Impaired;Impulsive   New Learning Impaired   Attention Impaired   Sequencing Impaired   Initiation Impaired   Comments step by step directions required, behaviors also limits participation in ADLs. verbalzing \"let me lay back down\"   Other Treatments   Other Treatments Provided Pt educated on importance of participating in therapy and giving good effort. Vocalizing she wants to participate in therapy to d/c home with , but is impulsive and at times insight into deficits limits participation   Balance   Sitting Balance (Static) Fair -   Sitting Balance (Dynamic) Poor +   Standing Balance (Static) Poor   Standing Balance " (Dynamic) Poor -   Weight Shift Sitting Fair   Weight Shift Standing Poor   Skilled Intervention Verbal Cuing;Tactile Cuing;Sequencing;Postural Facilitation   Comments constant close cga required d/t impulsivity   Bed Mobility    Supine to Sit Minimal Assist   Sit to Supine Moderate Assist  (for LE management and 2/2 behaviors)   Scooting Minimal Assist  (seated eob )   Skilled Intervention Verbal Cuing;Sequencing;Compensatory Strategies   Comments raised hob and cues for sequencing   Activities of Daily Living   Eating   (NT)   Grooming   (declined)   Lower Body Dressing Maximal Assist  (to don/doff socks, declined to attempt self in bed )   Toileting Moderate Assist  (assists with pericare but poor quaility)   Skilled Intervention Verbal Cuing;Tactile Cuing;Sequencing;Compensatory Strategies   Functional Mobility   Sit to Stand Moderate Assist   Bed, Chair, Wheelchair Transfer Moderate Assist   Toilet Transfers Moderate Assist   Mobility bed mobility, eob<>BSC, STS eob x3 trials and side stepping   Comments cues required for encouragement and sequencing throughout. HHA of 2 people given   Short Term Goals   Short Term Goal # 1 Pt will perform LB dressing w/ min a   Goal Outcome # 1 Progressing slower than expected   Short Term Goal # 2 Pt will perform functional t/f  w/ min a    Goal Outcome # 2 Progressing as expected   Short Term Goal # 3 Pt will perform toileting task with min a   Goal Outcome # 3 Progressing as expected   Anticipated Discharge Equipment and Recommendations   DC Equipment Recommendations Unable to determine at this time;Bed Side Commode

## 2020-12-16 NOTE — THERAPY
"Physical Therapy   Daily Treatment     Patient Name: Gayla Escudero  Age:  50 y.o., Sex:  female  Medical Record #: [id number]  Today's Date: 12/16/2020     Precautions: Fall Risk, Swallow Precautions ( See Comments)  Comments: myotonic MD    Assessment    Pt seen for follow up PT tx. Pt cooperative and willing to work with therapy. Pt continues to require assistance with all functional mobility and is significantly limited by self limiting behaviors and safety awareness deficits. PT will cont while in acute    Plan    Continue current treatment plan.    DC Equipment Recommendations: Unable to determine at this time  Discharge Recommendations: Recommend post-acute placement for additional physical therapy services prior to discharge home         12/16/20 0985   Cognition    Cognition / Consciousness X   Speech/ Communication Delayed Responses   Level of Consciousness Alert   Ability To Follow Commands 1 Step   Safety Awareness Impaired;Impulsive   New Learning Impaired   Attention Impaired   Sequencing Impaired   Initiation Impaired   Comments pt crying on and off during session asking PT \"do you still love me?'   Neuro-Muscular Treatments   Neuro-Muscular Treatments Weight Shift Left;Weight Shift Right;Verbal Cuing;Tactile Cuing;Anterior weight shift;Compensatory Strategies   Comments standing and sitting   Other Treatments   Other Treatments Provided pt verbalized goal of returning home with her    Gait Analysis   Gait Level Of Assist Moderate Assist   Assistive Device Hand Held Assist   Distance (Feet) 3   # of Times Distance was Traveled 1   Deviation Decreased Base Of Support;Increased Base Of Support;Other (Comment)  (knee hyperextension)   # of Stairs Climbed 0   Weight Bearing Status fwb   Skilled Intervention Verbal Cuing;Tactile Cuing;Sequencing;Compensatory Strategies   Comments feaful and behavioral   Bed Mobility    Supine to Sit Minimal Assist   Sit to Supine Moderate Assist   Scooting " Minimal Assist   Rolling Minimal Assist to Rt.;Minimum Assist to Lt.   Skilled Intervention Verbal Cuing;Tactile Cuing;Sequencing;Compensatory Strategies   Comments use of rails   Functional Mobility   Sit to Stand Moderate Assist   Bed, Chair, Wheelchair Transfer Moderate Assist   Toilet Transfers Moderate Assist   Transfer Method Stand Step   Mobility in room   Skilled Intervention Verbal Cuing;Tactile Cuing;Sequencing;Compensatory Strategies   Comments cues for participation   Short Term Goals    Short Term Goal # 1 Pt will be able to transfer supine<>sit with min A within 6 visits to ensure mobility at home.   Goal Outcome # 1 Goal met, new goal added   Short Term Goal # 1 B  pt will be able to complete bed mobility with SPV in 6tx in order to return home   Goal Outcome  # 1 B Goal not met   Short Term Goal # 2 Pt will be able to transfer sit<>stand with 4WW and min A within 6 visits to ensure mobility at home.   Goal Outcome # 2 Progressing slower than expected   Short Term Goal # 3 Pt will be ambulate 25 ft with FWW and min A within 6 visits to ensure mobility around house.    Goal Outcome # 3 Progressing slower than expected   Anticipated Discharge Equipment and Recommendations   DC Equipment Recommendations Unable to determine at this time   Discharge Recommendations Recommend post-acute placement for additional physical therapy services prior to discharge home

## 2020-12-16 NOTE — CARE PLAN
Problem: Safety  Goal: Will remain free from injury  Outcome: PROGRESSING AS EXPECTED  Intervention: Provide assistance with mobility  Flowsheets  Taken 12/16/2020 1017  Assistance: Assistance of Two or More  Taken 12/16/2020 0840  Ambulation Tolerance: General Weakness  Note: Pt will not sustain new injuries during shift. Safety precautions in place - bed locked in lowest position, bed alarm on, call light within reach, sitter in place.     Problem: Skin Integrity  Goal: Risk for impaired skin integrity will decrease  Outcome: PROGRESSING AS EXPECTED  Intervention: Assess risk factors for impaired skin integrity and/or pressure ulcers  Note: Pt receiving 2 RN skin checks and q2 turns. Incontinence care provided.

## 2020-12-16 NOTE — PROGRESS NOTES
Update:  Long discussion with  this afternoon after patient has been deemed not able to make decisions regarding code status.  states patient is significantly declining and as much as it pains him to do it he feels comfort care is the best course. Patient has been continually refusing therapies even though she states she wants them and continues to request to change to comfort care status.  is requesting we do not tell patient that he has made decision for her, as he feels she does not remember short term and will continue to change her request based on situation. I feel it is entirely reasonable given patient's progressive illness and rapid decline to change code status and make patient comfortable.  is requesting to be called and allowed to come see patient despite hour if patient appears imminent. Code status and medications adjusted. No further lab work, therapies or treatments to prolong life at this time.

## 2020-12-16 NOTE — PROGRESS NOTES
Assumed care given at shift changed,tried to feed patient food from dinner,unfortunately she only ate 4 spoon of the food and gave nectar juice  And tolerated it well,educated patient to drink fluids for hydration,patient need reinforcement,sitter at bedside for safety.

## 2020-12-16 NOTE — PROGRESS NOTES
Patient refused skin assessment except buttock and sacrum black /brown discoloration not blanching with cream applied.

## 2020-12-17 NOTE — PROGRESS NOTES
Received report from phil RN and assumed care of pt. Pt currently resting in bed. Requesting pain medication and repositioning. Aox3.    Call light within reach and fall precautions in place. Will monitor

## 2020-12-17 NOTE — DISCHARGE PLANNING
Anticipated Discharge Disposition: TBD    Action: Patient continues to need a sitter due to impulsivity/fall risk.  Spouse has changed patient code status to comfort care on 12/16/2020.     Barriers to Discharge: placement/sitter    Plan: continue to monitor for discharge barriers

## 2020-12-17 NOTE — CONSULTS
PSYCHOLOGY BRIEF NOTE: Patient not seen. Chart reviewed, specifically speech and language cognitive assessment. Patient has attention, memory, and reasoning/problem solving deficits. Concerning findings include the patient's inability to describe her home situation and the assistance she needs at home, especially given her high risk of falling. She also could not recall the medications she takes. Her insight into her deficits was poor.    Given how she presented during the capacity assessment completed by this writer on 12/14/2020 (see note) and the above stated information, it remains this writer's opinion that the patient lacks capacity to make code status decisions.     Plan: signing off. Please feel free to re-consult this writer if needed.

## 2020-12-17 NOTE — CARE PLAN
"  Problem: Safety  Goal: Will remain free from falls  Outcome: PROGRESSING AS EXPECTED     Problem: Psychosocial Needs:  Goal: Level of anxiety will decrease  Outcome: PROGRESSING AS EXPECTED  Note: Pt continues to seem anxious and constantly repeats \"I'm falling, don't let me fall.\" Pt ensured that she will not fall and is in the middle of the bed. Side rails up per pt request.     Problem: Skin Integrity  Goal: Risk for impaired skin integrity will decrease  Outcome: PROGRESSING AS EXPECTED  Note: Pt requests to be repositioned ~q2h for comfort.     Problem: Pain Management  Goal: Pain level will decrease to patient's comfort goal  Outcome: PROGRESSING AS EXPECTED  Note: Prn morphine given x1 for c/o back pain.     Problem: Safety - Medical Restraint  Goal: Remains free of injury from restraints (Restraint for Interference with Medical Device)  Description: INTERVENTIONS:  1. Determine that other, less restrictive measures have been tried or would not be effective before applying the restraint  2. Evaluate the patient's condition at the time of restraint application  3. Inform patient/family regarding the reason for restraint  4. Q2H: Monitor safety, psychosocial status, comfort, nutrition and hydration  Outcome: MET  Goal: Free from restraint(s) (Restraint for Interference with Medical Device)  Description: INTERVENTIONS:  1. ONCE/SHIFT or MINIMUM Q12H: Assess and document the continuing need for restraints  2. Q24H: Continued use of restraint requires LIP to perform face to face examination and written order  3. Identify and implement measures to help patient regain control  Outcome: MET     "

## 2020-12-18 NOTE — CARE PLAN
Problem: Safety  Goal: Will remain free from injury  Outcome: PROGRESSING AS EXPECTED  Note: Pt on telesitter monitoring. Bed in low and locked position. Hourly rounding in place.     Problem: Infection  Goal: Will remain free from infection  Outcome: PROGRESSING AS EXPECTED  Note: PIV dressing clean, dry and intact. No s/sx of infection.

## 2020-12-18 NOTE — PROGRESS NOTES
Hospital Medicine TWICE WEEKLY Progress Note    Date of Service  12/18/2020    Chief Complaint  49 y.o. female admitted 11/11/2020 with abdominal pain    Hospital Course  Ms. Escudero is a 50-year-old female with PMH significant for muscular dystrophy (diagnosed at 16, JOSÉ, migraines, chronic abdominal pain, chronic opioid dependence, chronic lower extremity weakness, and dysphagia who presented 11/11/2020 with abdominal pain. She reported the pain as 8/10, localized to the .umbilicus and nonradiating. She also c/o acute onset right upper extremity and bilateral lower extremity pain and numbness.    While in the ER she also fell forward out of her chair, striking her forehead without loss of consciousness.  CT head showed possible cephalohematoma.  CT abdomen/pelvis showed no evidence of acute intra-abdominal or pelvic processes.  She had sudden onset of altered mental status.  Glucose was found to be 45 and she was treated with hypoglycemia protocol. Lumbar puncture was unremarkable.  She continued to have altered mental status, hypotension and tachycardia.  She was treated with the sepsis protocol.  X-ray was concerning for atelectasis versus pneumonia.  Covid was negative.  Pro Delbert 0.53, up to 1.98.. All cultures have been negative to date.  Antibiotics were discontinued 11/19. Neurology was consulted and recommended encephalopathy work-up.  She was started on acyclovir until her work-up came back.  Her HSV work-up, syphilis and HIV were negative and the acyclovir was discontinued.MRI brain 11/18 with incidental finding left retinal detachment. She was seen by ophthalmology and diagnosed with left dislocated intraocular lens.    Palliative care was consulted for advanced care planning.  Patient does NOT want tube feeding or IV access. DNAR/DNI per her request.     Interval Problem Update  12/13-  Patient resting in bed, states she is tired but otherwise ok. Called on patient yesterday afternoon as she did not want  to have IV placed for her low blood pressure and decided again that she would like to elect to being Comfort care status. Discussed this with patient this am, at this time she has changed her mind again and would like to be treated. Unclear at this time if patient is capacitated to make decisions as she tends to change her decisions based on situation in the moment and not long term goals. Have asked Psych to help evaluate for capacity. As patient is asking for full treatment this am will place order for US guided IV. Will reach out to  and update him, but patient has asked to speak with him first.     12/18- Patient laying in bed, moaning out stating her back hurts. Will adjust pain medications. Code status changed to Comfort Care on 12/16. Possible discharge on hospice, SW looking into options.     Consultants/Specialty  Palliative Care  Psych    Code Status  Comfort Care/DNR    Disposition  Comfort Care- Possible hospice discharge      Review of Systems  Review of Systems   Constitutional: Positive for malaise/fatigue. Negative for chills and weight loss.   HENT: Negative for congestion and sore throat.    Eyes: Negative for pain and discharge.   Respiratory: Negative for shortness of breath and wheezing.    Cardiovascular: Negative for chest pain and palpitations.   Gastrointestinal: Negative for abdominal pain, nausea and vomiting.   Musculoskeletal: Positive for back pain and myalgias.   Neurological: Negative for dizziness.        Physical Exam  Temp:  [36.6 °C (97.8 °F)-37.4 °C (99.4 °F)] 36.6 °C (97.8 °F)  Pulse:  [] 98  Resp:  [16-18] 18  BP: ()/(58-60) 90/59  SpO2:  [91 %-96 %] 96 %    Physical Exam  Vitals signs and nursing note reviewed.   Constitutional:       General: She is awake. She is not in acute distress.     Appearance: She is cachectic. She is ill-appearing.   HENT:      Head: Normocephalic and atraumatic.      Mouth/Throat:      Mouth: Mucous membranes are dry.       Pharynx: No oropharyngeal exudate.   Eyes:      Extraocular Movements: Extraocular movements intact.      Pupils: Pupils are equal, round, and reactive to light.   Neck:      Musculoskeletal: Normal range of motion. No neck rigidity or muscular tenderness.   Cardiovascular:      Rate and Rhythm: Normal rate.      Heart sounds: No murmur.   Pulmonary:      Effort: Pulmonary effort is normal. No respiratory distress.      Breath sounds: Normal breath sounds. No decreased breath sounds.   Abdominal:      General: Bowel sounds are normal. There is no distension.      Palpations: Abdomen is soft. There is no mass.      Tenderness: There is no abdominal tenderness.   Musculoskeletal: Normal range of motion.         General: No swelling or tenderness.   Skin:     General: Skin is warm and dry.      Findings: No erythema or rash.   Neurological:      General: No focal deficit present.      Mental Status: She is alert and oriented to person, place, and time.      Motor: Weakness present.   Psychiatric:         Attention and Perception: Attention normal.         Behavior: Behavior is cooperative.         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)  Assessment & Plan  -Diagnosed at age 16  -History of chronic abdominal pain with no clear explained etiology prompting multiple ED visits   -Most likely contributing to chronic abdominal pain as well as onset of of extremity symptoms  -Comfort Care       Bilateral lower extremity pain- (present on admission)  Assessment & Plan  -seems to be mostly when she is mobilizing  -Likely a component of deconditioning from muscular dystrophy- and continued deconditioning related to refusal to particpate in movement and therapies   -Pain  management  -COMFORT CARE       Abdominal pain- (present on admission)  Assessment & Plan  -Chronic, stable  -History of pancreatitis  -Contributing to her poor p.o. intake  -Having bowel movements  -Reports pain controlled this morning.   - Patient tends to refuse to eat then at other times gorges herself causing pain and emesis from overeating.       Neurocognitive deficits  Assessment & Plan  SLP paras waldron deficits in multiple domains  INCAPACITATED to make decisions       Severe protein-calorie malnutrition (HCC)  Assessment & Plan  -Body mass index is 19.16 kg/m².  -She had core track at one time but has pulled out -she does not want another tube feeding  -Continue Marinol  -Food preferences  - Modified diet per speech     Altered mental status  Assessment & Plan  -ongoing lethargy. Rouses easily.   -impulsive at times resulting in falls  -She continues to have hallucinations which she states are bad dreams   -Sleep hygiene  -1:1 sitter  -Psych -she is incapacitated for CODE STATUS decisions  _comfort care       At high risk for falls- (present on admission)  Assessment & Plan  -Per , she has been falling at home  -She has sustained multiple falls this hospitalization  -Continues to require 1:1 sitter  -Fall precautions    Hypotension  Assessment & Plan  -likely due to poor PO intake/dehydration  -Comfort Care            VTE prophylaxis: SCDs, Lovenox    I have performed a physical exam and reviewed and updated ROS and Plan today (12/18/2020). In review of previous note, there are no changes except as documented above.     I certify that the patient requires continued medically necessary hospital services for the treatment of altered mental status. The patient will remain in the hospital for the foreseeable future.  Discharge may or may not occur in the next 20 days due to ongoing discharge delays due to no medically acceptable discharge option.    DUYEN López.

## 2020-12-18 NOTE — PROGRESS NOTES
Pt A&Ox4, forgetful at times. Pt reported pain and became anxious at beginning of shift, pt was repositioned, provided a warm pack, and provided medication per MAR. No signs of acute distress observed at the moment, pt is currently sleeping.  at bedside. Safety precautions and safety sitter in place. Will continue with hourly rounding.

## 2020-12-18 NOTE — PROGRESS NOTES
Pt alert and oriented x4, with some confusion. Medication adminstered as ordered, with thickened liquid, no swallowing difficulty noted. 1:1 staff supervision in place for safety precaution. Pt appears to be sleeping with no distress noted. Pt refused 2 RN skin checks.

## 2020-12-18 NOTE — CARE PLAN
Problem: Safety  Goal: Will remain free from injury  Outcome: PROGRESSING AS EXPECTED  Intervention: Provide assistance with mobility  Flowsheets (Taken 12/18/2020 1030 by Daisy Pinedo, C.N.A.)  Assistance: Assistance of Two or More  Ambulation Tolerance: Tolerates Poorly  Note: Safety precautions in place - treaded socks on, bed locked in lowest position, safety sitter at bedside, patient encouraged to call for assistance.     Problem: Pain Management  Goal: Pain level will decrease to patient's comfort goal  Outcome: PROGRESSING AS EXPECTED  Intervention: Follow pain managment plan developed in collaboration with patient and Interdisciplinary Team  Note: Pt receiving PRN medication per MAR for pain management

## 2020-12-19 NOTE — PROGRESS NOTES
Pt alert and oriented x4, on bed, resting comfortably. Due medication given orally with no difficulty swallowing. 1:1 sitter continued. Room transfer tolerated well. Incontinence checks done. Turned Q2 hours. Currently no behavior noted. Will continue to monitor.

## 2020-12-19 NOTE — PROGRESS NOTES
Pt A&Ox4, forgetful at times. Reported back pain and became anxious, managed with medications per MAR and repositioning. Pt refused skin assessment and stated she wanted to sleep. Pt currently sleeping. No signs of acute distress observed.  at bedside. Safety precautions in place.

## 2020-12-19 NOTE — PROGRESS NOTES
2 RN skin check completed with MCKAY Hutson.   Devices in place: none.  Skin assessed under devices: n/a.  Confirmed pressure ulcers found: none.  New potential pressure ulcers noted: none.  Wound consult placed:  n/a.      Skin assessment: Ears and nares intact. No open wounds. Heels and elbows blanching. Sacrum with dark discoloration, intact.      The following interventions in place: Waffle overlay, pillows for positioning and offloading. Q2 hour turns. Frequent incontinence checks and linen change. 2 RN skin checks.

## 2020-12-19 NOTE — CARE PLAN
Problem: Safety  Goal: Will remain free from falls  Outcome: PROGRESSING AS EXPECTED     Problem: Knowledge Deficit  Goal: Knowledge of disease process/condition, treatment plan, diagnostic tests, and medications will improve  Outcome: PROGRESSING AS EXPECTED     Problem: Skin Integrity  Goal: Risk for impaired skin integrity will decrease  Outcome: PROGRESSING AS EXPECTED

## 2020-12-19 NOTE — PROGRESS NOTES
Assumed care of pt at shift change. Pt is on RA with no signs of acute distress. A&Ox4. Denies any pain 1:1 sitter by bedside. All comfort measures in place. Call light and personal belongings by bedside. All safety precautions in place.. Hourly rounding in place.

## 2020-12-19 NOTE — CARE PLAN
Problem: Safety  Goal: Will remain free from injury  Outcome: PROGRESSING AS EXPECTED  Note: 1:1 sitter in place. Bed in low and locked position. Hourly rounding in place.     Problem: Skin Integrity  Goal: Risk for impaired skin integrity will decrease  Outcome: PROGRESSING AS EXPECTED  Note: Pt turned Q2 hours. Frequent incontinence checks. 2 RN skin checks.

## 2020-12-20 NOTE — PROGRESS NOTES
Patient A/O, in bed for duration of shift, RA, BP soft at beginning of shift 90/57 which seems to be pt trend. Pt remains on COMFORT CARE. Medicated per MAR for c/o pain. Bed low/locked. 1:1 sitter remains at bedside. Pt had a fall on 11-27-20. Bed low/locked. Hourly rounding in place. No signs of acute distress.

## 2020-12-20 NOTE — CARE PLAN
Problem: Safety  Goal: Will remain free from falls  Outcome: PROGRESSING AS EXPECTED     Problem: Knowledge Deficit  Goal: Knowledge of disease process/condition, treatment plan, diagnostic tests, and medications will improve  Outcome: PROGRESSING AS EXPECTED     Problem: Skin Integrity  Goal: Risk for impaired skin integrity will decrease  12/19/2020 1800 by Sarah Wilks V R.N.  Outcome: PROGRESSING AS EXPECTED  12/19/2020 1539 by Sarah Wilks V R.N.  Outcome: PROGRESSING AS EXPECTED  Intervention: Implement precautions to protect skin integrity in collaboration with the interdisciplinary team  Note: Pt turned and positioned every two hours. Incontinence care provided as needed.       Problem: Pain Management  Goal: Pain level will decrease to patient's comfort goal  Outcome: PROGRESSING AS EXPECTED  Intervention: Follow pain managment plan developed in collaboration with patient and Interdisciplinary Team  Note: Pt assessed for pain regularly and medicated PRN per MAR.

## 2020-12-20 NOTE — PROGRESS NOTES
Assumed care of pt at shift change. Pt is on RA with no signs of acute distress. Sleeping in bed with eyes closed. Breathing is even and unlabored. 1:1 sitter by bedside. All comfort measures in place. Call light and personal belongings by bedside. All safety precautions in place.. Hourly rounding in place.

## 2020-12-21 NOTE — PROGRESS NOTES
Received report from day shift RN and assumed care of patient. Assessment completed. Pt is currently sitting up in bed with  at bedside, A&Ox4, deemed incapacitated, on RA, BP low at 94/59. Reports pain at IV site, but IV is clean, dry, and intact. Scheduled seroquel given per MAR. Bed is in lowest, locked position, call bell and belongings are in reach. Hourly rounding in place. No further needs at this time.

## 2020-12-21 NOTE — DISCHARGE PLANNING
Anticipated Discharge Disposition: TBD    Action: Patient continues to require a sitter due to her impulsivity.  PT/OT while they continue to work with her, feel patient is close to her base line.  Both are  recommending 24/7 supervision.  Patient takes all medications.     Barriers to Discharge: patient has a sitter, no income for long term care.      Plan: When patient no longer needs a sitter or on restraints can re-submit referrals for long term skilled placement.

## 2020-12-22 NOTE — PROGRESS NOTES
I certify that the patient requires continued medically necessary hospital services for the treatment of altered mental status. The patient will remain in the hospital for the foreseeable future.  Discharge may or may not occur in the next 20 days due to ongoing discharge delays due to no medically acceptable discharge option.

## 2020-12-22 NOTE — PROGRESS NOTES
Received report from day shift RN and assumed care of patient. Patient refused assessment and 2 RN skin check. Pt is currently laying in bed, A&Ox4, on RA, VSS. Denies pain or discomfort. Scheduled seroquel given per MAR. Sitter in place for safety. Bed is in lowest, locked position, call bell and belongings are in reach. Hourly rounding in place. No further needs at this time.

## 2020-12-22 NOTE — PALLIATIVE CARE
PALLIATIVE CARE FOLLOW-UP:    Given her comfort care status, pt would be appropriate for hospice at SNF or . Unfortunately still requires sitter.    Discussed with/Updated:    SHAHID Bradley    Plan:  Palliative Care to continue to follow, provide support, and help facilitate decision-making as needed.    Thank you for allowing Palliative Care to support this pt and her family.  Contact [id number] for additional assistance, patient status change, questions or concerns.

## 2020-12-22 NOTE — CARE PLAN
Problem: Communication  Goal: The ability to communicate needs accurately and effectively will improve  Outcome: PROGRESSING AS EXPECTED     Problem: Safety  Goal: Will remain free from injury  Outcome: PROGRESSING AS EXPECTED   No significant changes this shift. BP low, MD notified. No new orders.

## 2020-12-22 NOTE — CARE PLAN
Problem: Knowledge Deficit  Goal: Knowledge of disease process/condition, treatment plan, diagnostic tests, and medications will improve  Outcome: PROGRESSING AS EXPECTED     Problem: Communication  Goal: The ability to communicate needs accurately and effectively will improve  Outcome: PROGRESSING SLOWER THAN EXPECTED     Problem: Psychosocial Needs:  Goal: Level of anxiety will decrease  Outcome: PROGRESSING SLOWER THAN EXPECTED   Pt very agitated today, management w/ breathing exercises and medication

## 2020-12-23 NOTE — PROGRESS NOTES
Received report from day shift RN and assumed care of patient. Pt is currently resting in bed, A&Ox4, on RA, VSS. Denies pain or discomfort. Refusing scheduled medication, assessment, and 2 RN skin check. Sitter in room for safety. Bed is in lowest, locked position, call bell and belongings are in reach. Hourly rounding in place. No further needs at this time.

## 2020-12-24 NOTE — PROGRESS NOTES
Received report from day shift RN and assumed care of patient. Assessment completed, POC discussed. Pt is currently resting in bed with  at bedside, A&Ox4 but confused, on RA, BP low; this is patient's baseline. Sitter in place for safety. Pt reports pain in back but denies intervention. Bed is in lowest, locked position, call bell and belongings are in reach. Hourly rounding in place. No further needs at this time.

## 2020-12-24 NOTE — PROGRESS NOTES
Hospital Medicine TWICE WEEKLY Progress Note    Date of Service  12/23/2020    Chief Complaint  49 y.o. female admitted 11/11/2020 with abdominal pain    Hospital Course  Ms. Escudero is a 50-year-old female with PMH significant for muscular dystrophy (diagnosed at 16, JOSÉ, migraines, chronic abdominal pain, chronic opioid dependence, chronic lower extremity weakness, and dysphagia who presented 11/11/2020 with abdominal pain. She reported the pain as 8/10, localized to the .umbilicus and nonradiating. She also c/o acute onset right upper extremity and bilateral lower extremity pain and numbness.    While in the ER she also fell forward out of her chair, striking her forehead without loss of consciousness.  CT head showed possible cephalohematoma.  CT abdomen/pelvis showed no evidence of acute intra-abdominal or pelvic processes.  She had sudden onset of altered mental status.  Glucose was found to be 45 and she was treated with hypoglycemia protocol. Lumbar puncture was unremarkable.  She continued to have altered mental status, hypotension and tachycardia.  She was treated with the sepsis protocol.  X-ray was concerning for atelectasis versus pneumonia.  Covid was negative.  Pro Delbert 0.53, up to 1.98.. All cultures have been negative to date.  Antibiotics were discontinued 11/19. Neurology was consulted and recommended encephalopathy work-up.  She was started on acyclovir until her work-up came back.  Her HSV work-up, syphilis and HIV were negative and the acyclovir was discontinued.MRI brain 11/18 with incidental finding left retinal detachment. She was seen by ophthalmology and diagnosed with left dislocated intraocular lens.    Palliative care was consulted for advanced care planning.  Patient does NOT want tube feeding or IV access. DNAR/DNI per her request.     Interval Problem Update  12/23:  Patient is reporting she is having diarrhea, although this is not accurate according to bedside staff.  She is trying to  increase her oral intake.  She is also requesting PT.  She still requires sitter.  Increase frequency of seroquel to assist with anxiety.      Consultants/Specialty  Palliative Care  Psych    Code Status  Comfort Care/DNR    Disposition  Comfort Care- Possible hospice discharge.  Need to be able to remove sitter prior to placement.       Review of Systems  Review of Systems   Constitutional: Positive for malaise/fatigue. Negative for chills and weight loss.   HENT: Negative for congestion and sore throat.    Eyes: Negative for pain and discharge.   Respiratory: Negative for shortness of breath and wheezing.    Cardiovascular: Negative for chest pain and palpitations.   Gastrointestinal: Positive for diarrhea. Negative for abdominal pain, constipation, nausea and vomiting.   Musculoskeletal: Positive for back pain and myalgias.   Neurological: Positive for weakness. Negative for dizziness.   Psychiatric/Behavioral: The patient is nervous/anxious.         Physical Exam  Temp:  [36.3 °C (97.3 °F)] 36.3 °C (97.3 °F)  Pulse:  [90] 90  Resp:  [16] 16  BP: (85)/(55) 85/55  SpO2:  [99 %] 99 %    Physical Exam  Vitals signs and nursing note reviewed.   Constitutional:       General: She is awake. She is not in acute distress.     Appearance: She is cachectic. She is ill-appearing.   HENT:      Head: Normocephalic and atraumatic.      Nose: Nose normal.      Mouth/Throat:      Mouth: Mucous membranes are dry.      Pharynx: No oropharyngeal exudate.   Eyes:      Conjunctiva/sclera: Conjunctivae normal.   Neck:      Musculoskeletal: Normal range of motion. No neck rigidity or muscular tenderness.   Cardiovascular:      Rate and Rhythm: Normal rate.      Heart sounds: No murmur.   Pulmonary:      Effort: Pulmonary effort is normal. No respiratory distress.      Breath sounds: Normal breath sounds. No decreased breath sounds.   Abdominal:      General: Bowel sounds are normal. There is no distension.      Palpations: Abdomen is  soft. There is no mass.      Tenderness: There is no abdominal tenderness.   Musculoskeletal: Normal range of motion.         General: No swelling or tenderness.   Skin:     General: Skin is warm and dry.      Findings: No erythema or rash.   Neurological:      General: No focal deficit present.      Mental Status: She is alert and oriented to person, place, and time.      Motor: Weakness present.   Psychiatric:         Attention and Perception: Attention normal.         Mood and Affect: Mood is anxious.         Behavior: Behavior is cooperative.         Cognition and Memory: Cognition is impaired.         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  Order placed.   -Comfort Care       Bilateral lower extremity pain- (present on admission)  Assessment & Plan  -seems to be mostly when she is mobilizing  -Likely a component of deconditioning from muscular dystrophy- and continued deconditioning related to refusal to particpate in movement and therapies   -Pain management  -COMFORT CARE       Abdominal pain- (present on admission)  Assessment & Plan  -Chronic, stable  -History of pancreatitis  -Contributing to her poor p.o. intake  -Having bowel movements  -Reports pain controlled this morning.   - Patient tends to refuse to eat then at other times gorges herself causing pain and emesis from overeating.       Neurocognitive deficits  Assessment & Plan  SLP eval w deficits in multiple  domains  INCAPACITATED to make decisions   Intermittent anxiety and agitation requiring sitter.  This may affect ability to place patient.  Increase seroquel to BID.        Severe protein-calorie malnutrition (HCC)  Assessment & Plan  -Body mass index is 19.16 kg/m².  -She had core track at one time but has pulled out -she does not want another tube feeding  -Continue Marinol  -Food preferences  - Modified diet per speech     Altered mental status  Assessment & Plan  -ongoing lethargy. Rouses easily.   -impulsive at times resulting in falls  -She continues to have hallucinations which she states are bad dreams   -Sleep hygiene  -1:1 sitter  -Psych -she is incapacitated for CODE STATUS decisions  _comfort care       At high risk for falls- (present on admission)  Assessment & Plan  -Per , she has been falling at home  -She has sustained multiple falls this hospitalization  -Continues to require 1:1 sitter  -Fall precautions    Hypotension  Assessment & Plan  -likely due to poor PO intake/dehydration  -Comfort Care            VTE prophylaxis: SCDs, Lovenox    I have performed a physical exam and reviewed and updated ROS and Plan today (12/23/2020). In review of previous note, there are no changes except as documented above.     DUYEN Frank.

## 2020-12-24 NOTE — THERAPY
"Physical Therapy   Daily Treatment     Patient Name: Gayla Escudero  Age:  50 y.o., Sex:  female  Medical Record #: [id number]  Today's Date: 12/24/2020     Precautions: Fall Risk, Swallow Precautions ( See Comments)    Assessment    Patient with improved participation today. She required verbal cueing throughout session to use her words instead of crying and moaning. She required mod A for all mobility; she was able to perform stand step transfer with 4WW and mod A. Will continue to follow.    Plan    Continue current treatment plan.    DC Equipment Recommendations: Unable to determine at this time(4WW in room)  Discharge Recommendations: Recommend post-acute placement for additional physical therapy services prior to discharge home(unless social supports able to provide assist)      Subjective    \"I want to walk again.\"     Objective       12/24/20 0934   Total Time Spent   Total Time Spent (Mins) 17   Charge Group   Charges  Yes   PT Therapeutic Activities 1   Precautions   Precautions Fall Risk;Swallow Precautions ( See Comments)   Comments myotonic MD   Vitals   O2 (LPM) 0   O2 Delivery Device None - Room Air   Pain 0 - 10 Group   Therapist Pain Assessment   (no pain complaint during session)   Cognition    Cognition / Consciousness X   Speech/ Communication Delayed Responses   Level of Consciousness Alert   Ability To Follow Commands 1 Step   Safety Awareness Impaired;Impulsive   New Learning Impaired   Attention Impaired   Comments patient crying at times and difficult to understand. Poor insight into impairments and function   Passive ROM Lower Body   Passive ROM Lower Body X   Comments not formally tested but patient appears to have plantarflexion contractures, is able to stand but B ankles not to neutral position   Active ROM Lower Body    Active ROM Lower Body  X   Comments limited by motor control, weakness   Strength Lower Body   Lower Body Strength  X   Comments not formally tested, gross BLE " weakness, reliant on terminal knee extension in standing to maintain upright   Sensation Lower Body   Lower Extremity Sensation   Not Tested   Lower Body Muscle Tone   Lower Body Muscle Tone  Not Tested   Balance   Sitting Balance (Static) Fair -   Sitting Balance (Dynamic) Poor -   Standing Balance (Static) Poor -   Standing Balance (Dynamic) Poor -   Weight Shift Sitting Fair   Weight Shift Standing Fair   Skilled Intervention Compensatory Strategies;Facilitation;Sequencing;Tactile Cuing;Verbal Cuing   Comments poor trunk control, near fall with anterior weight shift in sitting and requiring therapist to prevent fall. TKE in BLE standing and with slight posterior lean   Gait Analysis   Gait Level Of Assist Unable to Participate   Comments stand step transfer with 4WW   Bed Mobility    Supine to Sit Moderate Assist   Sit to Supine   (NT, left in chair)   Functional Mobility   Sit to Stand Moderate Assist   Bed, Chair, Wheelchair Transfer Moderate Assist   Transfer Method Stand Step   Skilled Intervention Compensatory Strategies;Facilitation;Postural Facilitation;Sequencing;Tactile Cuing;Verbal Cuing   How much difficulty does the patient currently have...   Turning over in bed (including adjusting bedclothes, sheets and blankets)? 1   Sitting down on and standing up from a chair with arms (e.g., wheelchair, bedside commode, etc.) 1   Moving from lying on back to sitting on the side of the bed? 1   How much help from another person does the patient currently need...   Moving to and from a bed to a chair (including a wheelchair)? 2   Need to walk in a hospital room? 2   Climbing 3-5 steps with a railing? 1   6 clicks Mobility Score 8   Activity Tolerance   Sitting in Chair left in chair   Sitting Edge of Bed 6 min   Standing 3-4 min   Comments limited by weakness, behavior   Short Term Goals    Short Term Goal # 1 Pt will be able to transfer supine<>sit with min A within 6 visits to ensure mobility at home.   Goal  Outcome # 1 goal not met   Short Term Goal # 1 B  pt will be able to complete bed mobility with SPV in 6tx in order to return home   Goal Outcome  # 1 B Goal not met   Short Term Goal # 2 Pt will be able to transfer sit<>stand with 4WW and min A within 6 visits to ensure mobility at home.   Goal Outcome # 2 Goal not met   Short Term Goal # 3 Pt will be ambulate 25 ft with FWW and min A within 6 visits to ensure mobility around house.    Goal Outcome # 3 Goal not met   Anticipated Discharge Equipment and Recommendations   DC Equipment Recommendations Unable to determine at this time  (4WW in room)   Discharge Recommendations Recommend post-acute placement for additional physical therapy services prior to discharge home  (unless social supports able to provide assist)   Interdisciplinary Plan of Care Collaboration   IDT Collaboration with  Nursing   Patient Position at End of Therapy Seated;Call Light within Reach;Tray Table within Reach;Phone within Reach;Other (Comments)  (sitter at bedside)   Collaboration Comments RN aware of visit, response   Session Information   Date / Session Number  12/24-4 (1/3, 12/30)   Priority 2

## 2020-12-26 NOTE — CARE PLAN
Problem: Communication  Goal: The ability to communicate needs accurately and effectively will improve  Outcome: PROGRESSING AS EXPECTED     Problem: Safety  Goal: Will remain free from injury  Outcome: PROGRESSING AS EXPECTED  Goal: Will remain free from falls  Outcome: PROGRESSING AS EXPECTED     Problem: Infection  Goal: Will remain free from infection  Outcome: PROGRESSING AS EXPECTED     Problem: Venous Thromboembolism (VTW)/Deep Vein Thrombosis (DVT) Prevention:  Goal: Patient will participate in Venous Thrombosis (VTE)/Deep Vein Thrombosis (DVT)Prevention Measures  Outcome: PROGRESSING AS EXPECTED     Problem: Bowel/Gastric:  Goal: Normal bowel function is maintained or improved  Outcome: PROGRESSING AS EXPECTED  Goal: Will not experience complications related to bowel motility  Outcome: PROGRESSING AS EXPECTED     Problem: Knowledge Deficit  Goal: Knowledge of disease process/condition, treatment plan, diagnostic tests, and medications will improve  Outcome: PROGRESSING AS EXPECTED  Goal: Knowledge of the prescribed therapeutic regimen will improve  Outcome: PROGRESSING AS EXPECTED     Problem: Discharge Barriers/Planning  Goal: Patient's continuum of care needs will be met  Outcome: PROGRESSING AS EXPECTED     Problem: Psychosocial Needs:  Goal: Level of anxiety will decrease  Outcome: PROGRESSING AS EXPECTED     Problem: Skin Integrity  Goal: Risk for impaired skin integrity will decrease  Outcome: PROGRESSING AS EXPECTED     Problem: Pain Management  Goal: Pain level will decrease to patient's comfort goal  Outcome: PROGRESSING AS EXPECTED     Problem: Respiratory:  Goal: Respiratory status will improve  Outcome: PROGRESSING AS EXPECTED     Problem: Mobility  Goal: Risk for activity intolerance will decrease  Outcome: PROGRESSING AS EXPECTED     Problem: Fluid Volume:  Goal: Will maintain balanced intake and output  Outcome: PROGRESSING AS EXPECTED     Problem: Urinary Elimination:  Goal: Ability to  reestablish a normal urinary elimination pattern will improve  Outcome: PROGRESSING AS EXPECTED

## 2020-12-26 NOTE — PROGRESS NOTES
Received bedside report from RN, pt care assumed. Pt AAOx4, 0/10 pain at this time. Bed in lowest position, sitter in the room,  at bedside, call light within reach, hourly rounding initiated.

## 2020-12-27 NOTE — PROGRESS NOTES
Received bedside report and accepted care of patient.    Pt is currently A/Ox4, room air with no visible or stated sign of distress, discomfort, or pain. Pt currently denies pain at this time. Pt currently has a 1:1 sitter for safety purposes. Pt repositioned.    Bed alarm in place, controls on and bed in locked and lowest position. Call light and personal possessions within reach. Patient educated about use of call light and verbalized understanding.

## 2020-12-27 NOTE — PROGRESS NOTES
Received bedside report from RN, pt care assumed, 0/10 pain at this time. Bed in lowest position, sitter at bedside, hourly rounding initiated.

## 2020-12-27 NOTE — PROGRESS NOTES
Hospital Medicine TWICE WEEKLY Progress Note    Date of Service  12/27/2020    Chief Complaint  49 y.o. female admitted 11/11/2020 with abdominal pain    Hospital Course  Ms. Escudero is a 50-year-old female with PMH significant for muscular dystrophy (diagnosed at 16, JOSÉ, migraines, chronic abdominal pain, chronic opioid dependence, chronic lower extremity weakness, and dysphagia who presented 11/11/2020 with abdominal pain. She reported the pain as 8/10, localized to the .umbilicus and nonradiating. She also c/o acute onset right upper extremity and bilateral lower extremity pain and numbness.    While in the ER she also fell forward out of her chair, striking her forehead without loss of consciousness.  CT head showed possible cephalohematoma.  CT abdomen/pelvis showed no evidence of acute intra-abdominal or pelvic processes.  She had sudden onset of altered mental status.  Glucose was found to be 45 and she was treated with hypoglycemia protocol. Lumbar puncture was unremarkable.  She continued to have altered mental status, hypotension and tachycardia.  She was treated with the sepsis protocol.  X-ray was concerning for atelectasis versus pneumonia.  Covid was negative.  Pro Delbert 0.53, up to 1.98.. All cultures have been negative to date.  Antibiotics were discontinued 11/19. Neurology was consulted and recommended encephalopathy work-up.  She was started on acyclovir until her work-up came back.  Her HSV work-up, syphilis and HIV were negative and the acyclovir was discontinued.MRI brain 11/18 with incidental finding left retinal detachment. She was seen by ophthalmology and diagnosed with left dislocated intraocular lens.    Palliative care was consulted for advanced care planning.  Patient does NOT want tube feeding or IV access. DNAR/DNI per her request.     Interval Problem Update  12/23:  Patient is reporting she is having diarrhea, although this is not accurate according to bedside staff.  She is trying to  increase her oral intake.  She is also requesting PT.  She still requires sitter.  Increase frequency of seroquel to assist with anxiety.    12/27:  Resting in bed.  She is calm on my evaluation.  Per staff she continues to be persistently anxious.  Will increase seroquel dose.  No other acute needs at this time.     Consultants/Specialty  Palliative Care  Psych    Code Status  Comfort Care/DNR    Disposition  Comfort Care- Possible hospice discharge.  Need to be able to remove sitter prior to placement.       Review of Systems  Review of Systems   Constitutional: Positive for malaise/fatigue. Negative for chills and weight loss.   HENT: Negative for congestion and sore throat.    Eyes: Negative for pain and discharge.   Respiratory: Negative for shortness of breath and wheezing.    Cardiovascular: Negative for chest pain and palpitations.   Gastrointestinal: Positive for diarrhea. Negative for abdominal pain, constipation, nausea and vomiting.   Musculoskeletal: Positive for back pain and myalgias.   Neurological: Positive for weakness. Negative for dizziness.   Psychiatric/Behavioral: The patient is nervous/anxious.         Physical Exam  Temp:  [37.2 °C (98.9 °F)] 37.2 °C (98.9 °F)  Pulse:  [] 71  Resp:  [17-18] 18  BP: (84-94)/(58-66) 94/66  SpO2:  [92 %-94 %] 94 %    Physical Exam  Vitals signs and nursing note reviewed.   Constitutional:       General: She is awake. She is not in acute distress.     Appearance: She is cachectic. She is ill-appearing.   HENT:      Head: Normocephalic and atraumatic.      Nose: Nose normal.      Mouth/Throat:      Mouth: Mucous membranes are dry.      Pharynx: No oropharyngeal exudate.   Eyes:      Conjunctiva/sclera: Conjunctivae normal.   Neck:      Musculoskeletal: Normal range of motion. No neck rigidity or muscular tenderness.   Cardiovascular:      Rate and Rhythm: Normal rate.      Heart sounds: No murmur.   Pulmonary:      Effort: Pulmonary effort is normal. No  respiratory distress.      Breath sounds: Normal breath sounds. No decreased breath sounds.   Abdominal:      General: Bowel sounds are normal. There is no distension.      Palpations: Abdomen is soft. There is no mass.      Tenderness: There is no abdominal tenderness.   Musculoskeletal: Normal range of motion.         General: No swelling or tenderness.   Skin:     General: Skin is warm and dry.      Findings: No erythema or rash.   Neurological:      General: No focal deficit present.      Mental Status: She is alert and oriented to person, place, and time.      Motor: Weakness present.   Psychiatric:         Attention and Perception: Attention normal.         Mood and Affect: Mood is anxious.         Behavior: Behavior is cooperative.         Cognition and Memory: Cognition is impaired.         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     -Comfort Care       Bilateral lower extremity pain- (present on admission)  Assessment & Plan  -seems to be mostly when she is mobilizing  -Likely a component of deconditioning from muscular dystrophy- and continued deconditioning related to refusal to particpate in movement and therapies   -Pain management  -COMFORT CARE       Abdominal pain- (present on admission)  Assessment & Plan  -Chronic, stable  -History of pancreatitis  -Contributing to her poor p.o. intake  -Having bowel movements  -Reports pain controlled this morning.   - Patient tends to  refuse to eat then at other times gorges herself causing pain and emesis from overeating.       Neurocognitive deficits  Assessment & Plan  SLP jonathanal w deficits in multiple domains  INCAPACITATED to make decisions   Intermittent anxiety and agitation requiring sitter.  This may affect ability to place patient.  Increase seroquel to BID.        Severe protein-calorie malnutrition (HCC)  Assessment & Plan  -Body mass index is 19.16 kg/m².  -She had core track at one time but has pulled out -she does not want another tube feeding  -Continue Marinol  -Food preferences  - Modified diet per speech     Altered mental status  Assessment & Plan  -ongoing lethargy. Rouses easily.   -impulsive at times resulting in falls  -She continues to have hallucinations which she states are bad dreams   -Sleep hygiene  -1:1 sitter  -Psych -she is incapacitated for CODE STATUS decisions  _comfort care       At high risk for falls- (present on admission)  Assessment & Plan  -Per , she has been falling at home  -She has sustained multiple falls this hospitalization  -Continues to require 1:1 sitter  -Fall precautions    Hypotension  Assessment & Plan  -likely due to poor PO intake/dehydration  -Comfort Care            VTE prophylaxis: SCDs, Lovenox    I have performed a physical exam and reviewed and updated ROS and Plan today (12/27/2020). In review of previous note, there are no changes except as documented above.     DUYEN Frank.

## 2020-12-27 NOTE — PROGRESS NOTES
2 RN skin check complete with MCKAY Schultz.   Devices in place: None.  Skin assessed under devices N/A.  Confirmed pressure ulcers found on: None.  New potential pressure ulcers noted on: None.   Wound consult placed: Wound has already seen patient.    The following interventions in place:  2 RN skin check   [id number]  Offloading heels     Assessment:   Bilateral ears: intact blanching  Bilateral heels: boggy, dry and blanching   Sacrum/buttock: dark discoloration.   Back lateral: circular discoloration, slow to key   Bilateral elbows: dry and blanching.

## 2020-12-28 NOTE — PROGRESS NOTES
Enedina has a 1:1 sitter at bedside. I am covering for PSA while he is at lunch. Patient is laying in bed awake. I will continue to monitor.

## 2020-12-28 NOTE — PROGRESS NOTES
Received bedside report from RN, pt care assumed, Elise. Bed in lowest position, sitter at bedside for safety,  at bedside. hourly rounding initiated.

## 2020-12-29 NOTE — PROGRESS NOTES
Received bedside report from RN, pt care assumed. Pt AAOx4, 0/10 pain at this time. Sitter at bedside.  at bedside. Her  stated feeling overwhelmed with patients anxiety.  Bed in lowest position, hourly rounding initiated.

## 2020-12-29 NOTE — DISCHARGE PLANNING
Anticipated Discharge Disposition: TBD, no income for long term care.    Action: Patient with PMH significant for muscular dystrophy, JOSÉ, migrains, chronic abdominal pain, chronic opioid dependence, chronic lower extremity weakness, and dysphagia. Palliative care was consulted for advanced care planning. Patient does not want tube feeding or IV access. DNAR/DNI per her request.       Patient continues to have sitter due to impulsivity. PT/OT have evaluated patient and work with her. It is documented that patient is at her baseline, recommending 24/7 supervision. Patient takes all medications.     Barriers to Discharge: Cost of Care/patient has a sitter.    Plan:  to follow, when patient is no longer needs a sitter or on restraints, we can re-submit referrals for long term skilled placement.    Sarai Melendez RN,

## 2020-12-29 NOTE — PROGRESS NOTES
Received bedside report and accepted care of patient.    Pt is currently A/Ox4, room air with no visible or stated sign of distress, discomfort, or pain. Pt currently denies pain at this time. Pt currently has a 1:1 sitter for safety purposes. Pt repositioned. Pt tends to have small outbursts from time to time.    Bed alarm in place, controls on and bed in locked and lowest position. Call light and personal possessions within reach. Patient educated about use of call light and verbalized understanding.

## 2020-12-30 NOTE — PROGRESS NOTES
Assumed care of patient at 0700. Received bedside report from noc RN. Patient resting comfortably in bed sleeping. No signs of distress. Bed is in low and locked position. Non-slip socks are in place. Call light is within reach.  1:1 sitter at bedside.

## 2020-12-30 NOTE — PROGRESS NOTES
Received report from day-shift RN and assumed care of pt at 1900. Assessment performed. Pt is A&Ox4. Pt crying and states feeling anxious. PRN medication given. Family at bedside. Pt assisted with repositioning. 1:1 sitter ar bedside for safety. Bed side report given to sitter.     Crisis charting performed.

## 2020-12-31 PROBLEM — I95.9 HYPOTENSION: Status: RESOLVED | Noted: 2018-03-29 | Resolved: 2020-01-01

## 2020-12-31 NOTE — PROGRESS NOTES
Hospital Medicine TWICE WEEKLY Progress Note    Date of Service  12/31/2020    Chief Complaint  49 y.o. female admitted 11/11/2020 with abdominal pain    Hospital Course  Ms. Escudero is a 50-year-old female with PMH significant for muscular dystrophy (diagnosed at 16, JOSÉ, migraines, chronic abdominal pain, chronic opioid dependence, chronic lower extremity weakness, and dysphagia who presented 11/11/2020 with abdominal pain. She reported the pain as 8/10, localized to the .umbilicus and nonradiating. She also c/o acute onset right upper extremity and bilateral lower extremity pain and numbness.    While in the ER she also fell forward out of her chair, striking her forehead without loss of consciousness.  CT head showed possible cephalohematoma.  CT abdomen/pelvis showed no evidence of acute intra-abdominal or pelvic processes.  She had sudden onset of altered mental status.  Glucose was found to be 45 and she was treated with hypoglycemia protocol. Lumbar puncture was unremarkable.  She continued to have altered mental status, hypotension and tachycardia.  She was treated with the sepsis protocol.  X-ray was concerning for atelectasis versus pneumonia.  Covid was negative.  Pro Delbert 0.53, up to 1.98.. All cultures have been negative to date.  Antibiotics were discontinued 11/19. Neurology was consulted and recommended encephalopathy work-up.  She was started on acyclovir until her work-up came back.  Her HSV work-up, syphilis and HIV were negative and the acyclovir was discontinued.MRI brain 11/18 with incidental finding left retinal detachment. She was seen by ophthalmology and diagnosed with left dislocated intraocular lens.    Palliative care was consulted for advanced care planning. Made comfort care per patient's and 's wishes.     Interval Problem Update  12/31: Continues to require 1:1 sitter due to impulsivity and safety. Abdominal pain improved with morphine. No comfort needs at this time.      Consultants/Specialty  Palliative Care  Psych    Code Status  Comfort Care/DNR    Disposition  Comfort Care- Possible hospice discharge.  Need to be able to remove sitter prior to placement.       Review of Systems  Review of Systems   Constitutional: Positive for malaise/fatigue. Negative for chills and weight loss.   Respiratory: Negative for shortness of breath and wheezing.    Cardiovascular: Negative for chest pain and palpitations.   Gastrointestinal: Positive for abdominal pain and nausea. Negative for constipation and vomiting.   Musculoskeletal: Positive for back pain and myalgias.   Neurological: Positive for weakness. Negative for dizziness.        Physical Exam  Temp:  [36.4 °C (97.6 °F)-37.2 °C (99 °F)] 36.4 °C (97.6 °F)  Pulse:  [90-91] 90  Resp:  [16-18] 18  BP: (102-109)/(70-78) 102/70  SpO2:  [96 %-99 %] 96 %    Physical Exam  Vitals signs and nursing note reviewed.   Constitutional:       General: She is not in acute distress.     Appearance: She is cachectic. She is ill-appearing.   HENT:      Head: Normocephalic and atraumatic.      Nose: Nose normal.      Mouth/Throat:      Mouth: Mucous membranes are dry.      Pharynx: No oropharyngeal exudate.   Neck:      Musculoskeletal: Normal range of motion. No neck rigidity or muscular tenderness.   Cardiovascular:      Rate and Rhythm: Normal rate.      Heart sounds: No murmur.   Pulmonary:      Effort: Pulmonary effort is normal. No respiratory distress.      Breath sounds: Normal breath sounds. No decreased breath sounds.   Abdominal:      General: There is no distension.      Palpations: There is no mass.      Tenderness: There is no abdominal tenderness.   Musculoskeletal:         General: No swelling or tenderness.   Skin:     General: Skin is warm and dry.      Findings: No erythema or rash.   Neurological:      General: No focal deficit present.      Mental Status: She is oriented to person, place, and time. She is lethargic.      Motor:  Weakness present.   Psychiatric:         Cognition and Memory: Cognition is impaired. Memory is impaired.         Judgment: Judgment is impulsive.         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)  Assessment & Plan  -Diagnosed at age 16  -History of chronic abdominal pain with no clear explained etiology prompting multiple ED visits   -Most likely contributing to chronic abdominal pain as well as onset of of extremity symptoms  -Comfort Care       Bilateral lower extremity pain- (present on admission)  Assessment & Plan  -seems to be mostly when she is mobilizing  -Likely a component of deconditioning from muscular dystrophy- and continued deconditioning related to refusal to particpate in movement and therapies   -Pain management  -COMFORT CARE       Abdominal pain- (present on admission)  Assessment & Plan  -Chronic, stable  -History of pancreatitis  -Contributing to her poor p.o. intake  -Having bowel movements  -Reports pain controlled this morning.   - Patient tends to refuse to eat then at other times gorges herself causing pain and emesis from overeating.       Neurocognitive deficits  Assessment & Plan  SLP eval w deficits in multiple domains  INCAPACITATED to make decisions   Intermittent anxiety and agitation requiring sitter.  This may affect ability to place patient.  Continue Seroquel daily for agitation      Severe protein-calorie malnutrition (HCC)  Assessment & Plan  -Body mass index is 19.16 kg/m².  -Food preferences  -comfort care    Altered mental status  Assessment & Plan  -ongoing lethargy. Rouses easily.   -impulsive at times resulting in falls  -She continues to have hallucinations which she states are bad dreams   -Sleep hygiene  -1:1  sitter        At high risk for falls- (present on admission)  Assessment & Plan  -Per , she has been falling at home  -She has sustained multiple falls this hospitalization  -Continues to require 1:1 sitter  -Fall precautions       VTE prophylaxis: Comfort care    I have performed a physical exam and reviewed and updated ROS and Plan today (12/31/2020). In review of previous note, there are no changes except as documented above.

## 2020-12-31 NOTE — CARE PLAN
Problem: Psychosocial Needs:  Goal: Level of anxiety will decrease  Outcome: PROGRESSING SLOWER THAN EXPECTED     Problem: Skin Integrity  Goal: Risk for impaired skin integrity will decrease  Outcome: PROGRESSING SLOWER THAN EXPECTED

## 2020-12-31 NOTE — PROGRESS NOTES
Assumed care of pt from day RN. Pt lying in bed A&Ox3, disoriented to time, pt moaning and crying, c/o pain 8/10 in abdomen, pt medicated with PRN Morphine 10 mg per MAR, PRN Ativan 0.5 mg given for anxiety. Pt has a 1:1 sitter at bedside for safety. Call light and belongings within reach, all needs met at this time.

## 2021-01-01 VITALS
HEART RATE: 89 BPM | BODY MASS INDEX: 16.69 KG/M2 | WEIGHT: 88.4 LBS | OXYGEN SATURATION: 96 % | TEMPERATURE: 98.6 F | HEIGHT: 61 IN | RESPIRATION RATE: 16 BRPM | DIASTOLIC BLOOD PRESSURE: 75 MMHG | SYSTOLIC BLOOD PRESSURE: 107 MMHG

## 2021-01-01 LAB
COVID ORDER STATUS COVID19: NORMAL
SARS-COV-2 RNA RESP QL NAA+PROBE: NOTDETECTED
SPECIMEN SOURCE: NORMAL

## 2021-01-01 PROCEDURE — 700101 HCHG RX REV CODE 250: Performed by: NURSE PRACTITIONER

## 2021-01-01 PROCEDURE — 770006 HCHG ROOM/CARE - MED/SURG/GYN SEMI*

## 2021-01-01 PROCEDURE — 700102 HCHG RX REV CODE 250 W/ 637 OVERRIDE(OP): Performed by: NURSE PRACTITIONER

## 2021-01-01 PROCEDURE — 770001 HCHG ROOM/CARE - MED/SURG/GYN PRIV*

## 2021-01-01 PROCEDURE — A9270 NON-COVERED ITEM OR SERVICE: HCPCS | Performed by: NURSE PRACTITIONER

## 2021-01-01 PROCEDURE — U0003 INFECTIOUS AGENT DETECTION BY NUCLEIC ACID (DNA OR RNA); SEVERE ACUTE RESPIRATORY SYNDROME CORONAVIRUS 2 (SARS-COV-2) (CORONAVIRUS DISEASE [COVID-19]), AMPLIFIED PROBE TECHNIQUE, MAKING USE OF HIGH THROUGHPUT TECHNOLOGIES AS DESCRIBED BY CMS-2020-01-R: HCPCS

## 2021-01-01 PROCEDURE — U0005 INFEC AGEN DETEC AMPLI PROBE: HCPCS

## 2021-01-01 PROCEDURE — C9803 HOPD COVID-19 SPEC COLLECT: HCPCS | Performed by: NURSE PRACTITIONER

## 2021-01-01 PROCEDURE — 99231 SBSQ HOSP IP/OBS SF/LOW 25: CPT | Performed by: NURSE PRACTITIONER

## 2021-01-01 RX ORDER — LORAZEPAM 2 MG/ML
INJECTION INTRAMUSCULAR
Status: DISCONTINUED
Start: 2021-01-01 | End: 2021-01-01

## 2021-01-01 RX ORDER — LORAZEPAM 2 MG/ML
0.5 CONCENTRATE ORAL ONCE
Status: COMPLETED | OUTPATIENT
Start: 2021-01-01 | End: 2021-01-01

## 2021-01-01 RX ADMIN — MORPHINE SULFATE 5 MG: 10 SOLUTION ORAL at 23:42

## 2021-01-01 RX ADMIN — LORAZEPAM 0.5 MG: 2 SOLUTION, CONCENTRATE ORAL at 22:02

## 2021-01-01 RX ADMIN — LIDOCAINE 1 PATCH: 50 PATCH TOPICAL at 13:59

## 2021-01-01 RX ADMIN — LORAZEPAM 0.5 MG: 2 SOLUTION, CONCENTRATE ORAL at 14:17

## 2021-01-01 RX ADMIN — LORAZEPAM 0.5 MG: 2 SOLUTION, CONCENTRATE ORAL at 15:59

## 2021-01-01 RX ADMIN — LORAZEPAM 0.5 MG: 2 SOLUTION, CONCENTRATE ORAL at 17:42

## 2021-01-01 RX ADMIN — QUETIAPINE FUMARATE 25 MG: 25 TABLET ORAL at 20:02

## 2021-01-01 RX ADMIN — QUETIAPINE FUMARATE 25 MG: 25 TABLET ORAL at 22:02

## 2021-01-01 RX ADMIN — LORAZEPAM 0.5 MG: 2 SOLUTION, CONCENTRATE ORAL at 13:29

## 2021-01-01 RX ADMIN — LORAZEPAM 0.5 MG: 2 SOLUTION, CONCENTRATE ORAL at 04:19

## 2021-01-01 RX ADMIN — QUETIAPINE FUMARATE 25 MG: 25 TABLET ORAL at 20:36

## 2021-01-01 RX ADMIN — LORAZEPAM 0.5 MG: 2 SOLUTION, CONCENTRATE ORAL at 20:53

## 2021-01-01 RX ADMIN — LORAZEPAM 0.5 MG: 2 SOLUTION, CONCENTRATE ORAL at 02:26

## 2021-01-01 RX ADMIN — LORAZEPAM 0.5 MG: 2 SOLUTION, CONCENTRATE ORAL at 01:12

## 2021-01-01 RX ADMIN — LORAZEPAM 0.5 MG: 2 SOLUTION, CONCENTRATE ORAL at 21:40

## 2021-01-01 RX ADMIN — MORPHINE SULFATE 10 MG: 10 SOLUTION ORAL at 05:29

## 2021-01-01 RX ADMIN — MORPHINE SULFATE 10 MG: 10 SOLUTION ORAL at 02:51

## 2021-01-01 RX ADMIN — LORAZEPAM 0.5 MG: 2 SOLUTION, CONCENTRATE ORAL at 02:57

## 2021-01-01 RX ADMIN — ACETAMINOPHEN 500 MG: 500 TABLET ORAL at 20:02

## 2021-01-01 RX ADMIN — MORPHINE SULFATE 10 MG: 10 SOLUTION ORAL at 20:38

## 2021-01-01 RX ADMIN — MORPHINE SULFATE 10 MG: 10 SOLUTION ORAL at 19:37

## 2021-01-01 RX ADMIN — MORPHINE SULFATE 5 MG: 10 SOLUTION ORAL at 15:59

## 2021-01-01 RX ADMIN — QUETIAPINE FUMARATE 25 MG: 25 TABLET ORAL at 19:39

## 2021-01-01 RX ADMIN — LORAZEPAM 0.5 MG: 2 SOLUTION, CONCENTRATE ORAL at 15:24

## 2021-01-01 RX ADMIN — MORPHINE SULFATE 10 MG: 10 SOLUTION ORAL at 20:11

## 2021-01-01 RX ADMIN — QUETIAPINE FUMARATE 25 MG: 25 TABLET ORAL at 20:12

## 2021-01-01 RX ADMIN — ACETAMINOPHEN 500 MG: 500 TABLET ORAL at 19:58

## 2021-01-01 RX ADMIN — LIDOCAINE 1 PATCH: 50 PATCH TOPICAL at 11:07

## 2021-01-01 RX ADMIN — LORAZEPAM 0.5 MG: 2 SOLUTION, CONCENTRATE ORAL at 08:28

## 2021-01-01 RX ADMIN — QUETIAPINE FUMARATE 25 MG: 25 TABLET ORAL at 20:53

## 2021-01-01 RX ADMIN — LORAZEPAM 0.5 MG: 2 SOLUTION, CONCENTRATE ORAL at 08:07

## 2021-01-01 RX ADMIN — MORPHINE SULFATE 10 MG: 10 SOLUTION ORAL at 19:39

## 2021-01-01 RX ADMIN — LORAZEPAM 0.5 MG: 2 SOLUTION, CONCENTRATE ORAL at 02:54

## 2021-01-01 RX ADMIN — MORPHINE SULFATE 10 MG: 10 SOLUTION ORAL at 20:27

## 2021-01-01 RX ADMIN — LORAZEPAM 0.5 MG: 2 SOLUTION, CONCENTRATE ORAL at 16:05

## 2021-01-01 RX ADMIN — QUETIAPINE FUMARATE 25 MG: 25 TABLET ORAL at 19:58

## 2021-01-01 RX ADMIN — MORPHINE SULFATE 10 MG: 10 SOLUTION ORAL at 06:31

## 2021-01-01 RX ADMIN — QUETIAPINE FUMARATE 25 MG: 25 TABLET ORAL at 21:11

## 2021-01-01 RX ADMIN — LORAZEPAM 0.5 MG: 2 SOLUTION, CONCENTRATE ORAL at 17:16

## 2021-01-01 RX ADMIN — LORAZEPAM 0.5 MG: 2 SOLUTION, CONCENTRATE ORAL at 17:59

## 2021-01-01 RX ADMIN — MORPHINE SULFATE 10 MG: 10 SOLUTION ORAL at 18:09

## 2021-01-01 RX ADMIN — QUETIAPINE FUMARATE 25 MG: 25 TABLET ORAL at 19:36

## 2021-01-01 RX ADMIN — LORAZEPAM 0.5 MG: 2 SOLUTION, CONCENTRATE ORAL at 17:00

## 2021-01-01 RX ADMIN — QUETIAPINE FUMARATE 25 MG: 25 TABLET ORAL at 20:27

## 2021-01-01 RX ADMIN — LIDOCAINE 1 PATCH: 50 PATCH TOPICAL at 17:59

## 2021-01-01 RX ADMIN — MORPHINE SULFATE 10 MG: 10 SOLUTION ORAL at 08:07

## 2021-01-01 RX ADMIN — MORPHINE SULFATE 10 MG: 10 SOLUTION ORAL at 23:59

## 2021-01-01 RX ADMIN — LORAZEPAM 0.5 MG: 2 SOLUTION, CONCENTRATE ORAL at 12:02

## 2021-01-01 RX ADMIN — LIDOCAINE 1 PATCH: 50 PATCH TOPICAL at 12:00

## 2021-01-01 RX ADMIN — LORAZEPAM 0.5 MG: 2 SOLUTION, CONCENTRATE ORAL at 15:54

## 2021-01-01 ASSESSMENT — ENCOUNTER SYMPTOMS
VOMITING: 0
MYALGIAS: 0
SHORTNESS OF BREATH: 0
PALPITATIONS: 0
CHILLS: 0
DIZZINESS: 0
MYALGIAS: 0
WHEEZING: 0
PALPITATIONS: 0
ABDOMINAL PAIN: 0
ABDOMINAL PAIN: 0
CONSTIPATION: 0
BACK PAIN: 0
SHORTNESS OF BREATH: 0
DIZZINESS: 0
NAUSEA: 0
WEIGHT LOSS: 0
NAUSEA: 0
CHILLS: 0
WEIGHT LOSS: 0
WHEEZING: 0
WEAKNESS: 0
WEAKNESS: 0
BACK PAIN: 0
CONSTIPATION: 0
VOMITING: 0

## 2021-01-01 ASSESSMENT — PAIN SCALES - WONG BAKER
WONGBAKER_NUMERICALRESPONSE: DOESN'T HURT AT ALL
WONGBAKER_NUMERICALRESPONSE: DOESN'T HURT AT ALL

## 2021-01-01 ASSESSMENT — PAIN DESCRIPTION - PAIN TYPE
TYPE: ACUTE PAIN
TYPE: ACUTE PAIN;CHRONIC PAIN

## 2021-01-01 NOTE — CARE PLAN
Problem: Infection  Goal: Will remain free from infection  Outcome: PROGRESSING AS EXPECTED  Note: No s/s of infection noted. Afebrile.     Problem: Psychosocial Needs:  Goal: Level of anxiety will decrease  Flowsheets (Taken 1/1/2021 1356)  Patient Behaviors: Flat Affect  Note: Patient resting most of the day. Safety sitter assisting with ADLs and helping feed.

## 2021-01-01 NOTE — PROGRESS NOTES
Report received.  Assumed Care at 0715.  Patient in bed, safety sitter CNA  at bedside. CNA is assisting with ADLs. Responds appropriately. Patient cries and screams for needs at times, but calm after needs met. Plan of care discussed.  Call light and belongings within reach, treaded slipper socks on, bed in lowest position. Hourly round in place.

## 2021-01-01 NOTE — THERAPY
Occupational Therapy Contact Note    Patient Name: Gayla Escudero  Age:  50 y.o., Sex:  female  Medical Record #: [id number]  Today's Date: 12/31/2020 12/31/20 0905   Interdisciplinary Plan of Care Collaboration   Collaboration Comments Pt has transitioned to comfort care measures only. Will DC acute OT intervention at this time.

## 2021-01-01 NOTE — PROGRESS NOTES
Assumed care of pt from day RN. Pt lying in bed A&Ox3, disoriented to time, pt restless and moaning, pt denies any pain at this time. Pt has a 1:1 sitter at bedside for safety. Call light and belongings within reach, all needs met at this time.

## 2021-01-01 NOTE — PROGRESS NOTES
Late entry 1000: Patient with 1:1 sitter at bedside. She was very restful this morning which seams to be her baseline. She requires a lot of comforting and 1:1 care. She denies pain but appears to be in pain at times. She is oriented though she isn't to time. She required care for all feeding, toileting, and other ADLs. Educated patient about PRN medications available to her. Bed is locked and in low position, hourly rounding in place, sitter at bedside.     Late entry 1100. Discussed with Nurse practitioner regarding patient order for PT. Patient is comfort care. She need a lot of encouragement to do anything for herself. She is very weak. Discussed doing ROM with patient v PT. Per APRN ok to discontinue.

## 2021-01-02 NOTE — CARE PLAN
Problem: Communication  Goal: The ability to communicate needs accurately and effectively will improve  Outcome: PROGRESSING AS EXPECTED     Problem: Safety  Goal: Will remain free from injury  Outcome: PROGRESSING AS EXPECTED  Note: Fall precautions in place. Bed in lowest position. Non-skid socks in place. Personal possessions within reach. Mobility sign on door. Bed-alarm on. Call light within reach. Pt educated regarding fall prevention and states understanding.

## 2021-01-02 NOTE — PROGRESS NOTES
2 RN skin check complete with Zabrina BASHIR.   Devices in place: None.  Skin assessed under devices:  N/A  Confirmed pressure ulcers found:  No  New potential pressure ulcers noted:  No  Wound consult placed:  N/A    The following interventions in place:  2 RN skin checks, 2 HR turns, incontinence protection in place, barrier cream, extra pillows for positioning.    Skin Assessment:    Ears:  Brown, intact, blanching  BUE: Brown, intact, blanching  Torso: Dry, broqn, intact, blanching  Abd: Dry, brown, intact, blanching  Sienna area: Dark Brown, intact, dry  BLE: Dry, brown, intact, blanching   Bilateral Heels:  Dry, brown, blanching. intact   none

## 2021-01-02 NOTE — CARE PLAN
Problem: Safety  Goal: Will remain free from injury  Outcome: PROGRESSING AS EXPECTED  Note: Safety precaution in effect. Non skid socks in use. Call light within reach. Reminded patient to call for assist. Hourly rounds in effect. Verbalized understanding.     Problem: Infection  Goal: Will remain free from infection  Outcome: PROGRESSING AS EXPECTED  Note: Implement standard precaution. Hand washing every encounter & before & after patient care. Verbalized understanding.     Problem: Knowledge Deficit  Goal: Knowledge of disease process/condition, treatment plan, diagnostic tests, and medications will improve  Outcome: PROGRESSING AS EXPECTED  Note: Discussed Plan of care. Questions answered. Verbalized understanding.      Problem: Pain Management  Goal: Pain level will decrease to patient's comfort goal  Outcome: PROGRESSING AS EXPECTED  Note: Educated on pain scale. Encouraged to verbalize pain. Will medicate as per MAR.                 Safety sitter at the bedside. On Comfort measures. POC discussed with spouse over the phone.

## 2021-01-02 NOTE — PROGRESS NOTES
Bedside report received. Pt is currently on comfort care, is A&O X3, fearful, agitated and anxious. Pt keeps trying to get out of bed  But can be re-oriented to stop action.  Pt is scared and needs reassurance that she is safe.  Pt is not exhibiting or verbalizing she is in pain at this time.  This RN will keep monitoring her for pain. Pt is bedridden and requires assistance to toilet.  Pt is  POC discussed with pt; all questions answered at this time.

## 2021-01-02 NOTE — PROGRESS NOTES
0700 Patient's in bed. Bedside report received from CLAIRE Holt RN's at the beginning of the shift. Safety sitter at the bedside.    0830 Patient's in bed. Noted confusion, re oriented.  Q 2 turn in effect. Fall Protocol in effect. Call light within reach. Reminded patient to call for assist. Assessment completed. No distress noted. On comfort measures. Safety sitter at the bedside.    0950 Patient's in bed. No distress noted.    1015 Received a call from patient's spouse. POC discussed. Questions answered.     1115 Patient's in bed. No distress noted.     1300 1:1 feed with lunch by Primary Nurse. Patient only ate few bites and few sips of water and apple juice.    1525 Patient's in bed. No distress noted.      1726 Medicated with Ativan (see MAR) for agitation. Spouse visiting.     1930   Patient's in bed. No changes in status. Bedside report given to Oncjose alberto RANGEL RN (Rossy).

## 2021-01-03 NOTE — CARE PLAN
Problem: Safety  Goal: Will remain free from falls  Outcome: PROGRESSING AS EXPECTED  Patient is aware that she has to ask for help and communicate when she needs something; assured that someone is always there with her.      Problem: Pain Management  Goal: Pain level will decrease to patient's comfort goal  Outcome: PROGRESSING AS EXPECTED  Medications for pain are given as ordered and needed according to the patients communication of pain.      Problem: Psychosocial Needs:  Goal: Level of anxiety will decrease  Outcome: PROGRESSING SLOWER THAN EXPECTED  Patient became increasingly anxious through the night; administered medication to help and she is sleeping comfortably.

## 2021-01-04 NOTE — PROGRESS NOTES
Pt was transfer to [facility], bedside report given to Mu, questions wre answer, care was transfer

## 2021-01-04 NOTE — CARE PLAN
Problem: Safety  Goal: Will remain free from injury  Outcome: PROGRESSING AS EXPECTED       Problem: Psychosocial Needs:  Goal: Level of anxiety will decrease  Outcome: PROGRESSING SLOWER THAN EXPECTED     Problem: Safety  Goal: Will remain free from falls  Outcome: PROGRESSING AS EXPECTED

## 2021-01-04 NOTE — PROGRESS NOTES
Bedside report received.  Assessment complete.  A&O x 1 to persons. Sitter present.  Pt has intermittent anxiety/restlessness.  Lorazepam given PRN per orders.  Patient calls appropriately.  Patient with two person assist.  Generalized weakness noted. Bed alarm not used a this time.   Patient denies pain.  Denies N&V. Tolerating minced moist diet.  void, flatus, BM without issue. Incontinence.  Patient denies SOB.  SCD's refused.  Patient is on comfort measures.  Review plan with of care with patient. Call light and personal belongings with in reach. Hourly rounding in place. All needs met at this time.

## 2021-01-04 NOTE — PROGRESS NOTES
Pt A&Ox1 to self only. Tearful. Asking staff to call  for her.  called RN to remove phone from pt. Pt restless last night, prn pain medication given. Safety sitter in place. Q2 turns in place. Prn incontinence checks in place.

## 2021-01-04 NOTE — PROGRESS NOTES
Received bedside report from night RN, pt care assumed, VSS, pt assessment complete. Pt AAOx1, with no s/s of pain at this time. No signs of acute distress noted at this time. Pt denies any additional needs at this time. Bed locked/in lowest position, pt educated on fall risk and verbalized understanding, call light within reach, hourly rounding initiated.

## 2021-01-04 NOTE — PROGRESS NOTES
Hospital Medicine TWICE WEEKLY Progress Note    Date of Service  1/4/2021    Chief Complaint  49 y.o. female admitted 11/11/2020 with abdominal pain    Hospital Course  Ms. Escudero is a 50-year-old female with PMH significant for muscular dystrophy (diagnosed at 16, JOSÉ, migraines, chronic abdominal pain, chronic opioid dependence, chronic lower extremity weakness, and dysphagia who presented 11/11/2020 with abdominal pain. She reported the pain as 8/10, localized to the .umbilicus and nonradiating. She also c/o acute onset right upper extremity and bilateral lower extremity pain and numbness.    While in the ER she also fell forward out of her chair, striking her forehead without loss of consciousness.  CT head showed possible cephalohematoma.  CT abdomen/pelvis showed no evidence of acute intra-abdominal or pelvic processes.  She had sudden onset of altered mental status.  Glucose was found to be 45 and she was treated with hypoglycemia protocol. Lumbar puncture was unremarkable.  She continued to have altered mental status, hypotension and tachycardia.  She was treated with the sepsis protocol.  X-ray was concerning for atelectasis versus pneumonia.  Covid was negative.  Pro Delbert 0.53, up to 1.98.. All cultures have been negative to date.  Antibiotics were discontinued 11/19. Neurology was consulted and recommended encephalopathy work-up.  She was started on acyclovir until her work-up came back.  Her HSV work-up, syphilis and HIV were negative and the acyclovir was discontinued.MRI brain 11/18 with incidental finding left retinal detachment. She was seen by ophthalmology and diagnosed with left dislocated intraocular lens.    Palliative care was consulted for advanced care planning. Made comfort care per patient's and 's wishes.     Interval Problem Update  12/31: Continues to require 1:1 sitter due to impulsivity and safety. Abdominal pain improved with morphine. No comfort needs at this time.     1/4:  "Lying in bed with eyes closed in no acute distress this morning. She responded \" I'm fine\" to all of my questions. Still requiring 1:1 sitter.     Consultants/Specialty  Palliative Care  Psych    Code Status  Comfort Care/DNR    Disposition  Comfort Care- Possible hospice discharge.  Need to be able to remove sitter prior to placement.       Review of Systems  Review of Systems   Constitutional: Positive for malaise/fatigue. Negative for chills and weight loss.   Respiratory: Negative for shortness of breath and wheezing.    Cardiovascular: Negative for chest pain and palpitations.   Gastrointestinal: Negative for abdominal pain, constipation, nausea and vomiting.   Musculoskeletal: Negative for back pain and myalgias.   Neurological: Negative for dizziness and weakness.        Physical Exam  Temp:  [36.6 °C (97.8 °F)-36.8 °C (98.2 °F)] 36.6 °C (97.8 °F)  Pulse:  [94-98] 94  Resp:  [15-16] 16  BP: (87-89)/(67-68) 89/68  SpO2:  [93 %] 93 %    Physical Exam  Vitals signs and nursing note reviewed.   Constitutional:       General: She is not in acute distress.     Appearance: She is cachectic. She is ill-appearing.   HENT:      Head: Normocephalic and atraumatic.      Nose: Nose normal.      Mouth/Throat:      Mouth: Mucous membranes are dry.      Pharynx: No oropharyngeal exudate.   Neck:      Musculoskeletal: Normal range of motion. No neck rigidity or muscular tenderness.   Cardiovascular:      Rate and Rhythm: Normal rate.      Heart sounds: No murmur.   Pulmonary:      Effort: Pulmonary effort is normal. No respiratory distress.      Breath sounds: Normal breath sounds. No decreased breath sounds.   Abdominal:      General: There is no distension.      Palpations: There is no mass.      Tenderness: There is no abdominal tenderness.   Musculoskeletal:         General: No swelling or tenderness.   Skin:     General: Skin is warm and dry.      Findings: No erythema or rash.   Neurological:      General: No focal " deficit present.      Mental Status: She is oriented to person, place, and time. She is lethargic.      Motor: Weakness present.   Psychiatric:         Cognition and Memory: Cognition is impaired. Memory is impaired.         Judgment: Judgment is impulsive.         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)  Assessment & Plan  -Diagnosed at age 16  -History of chronic abdominal pain with no clear explained etiology prompting multiple ED visits   -Most likely contributing to chronic abdominal pain as well as onset of of extremity symptoms  -Comfort Care       Bilateral lower extremity pain- (present on admission)  Assessment & Plan  -seems to be mostly when she is mobilizing  -Likely a component of deconditioning from muscular dystrophy- and continued deconditioning related to refusal to particpate in movement and therapies   -Pain management  -COMFORT CARE       Abdominal pain- (present on admission)  Assessment & Plan  -Chronic, stable  -History of pancreatitis  -Contributing to her poor p.o. intake  -Having bowel movements  -Reports pain controlled this morning.   - Patient tends to refuse to eat then at other times gorges herself causing pain and emesis from overeating.       Neurocognitive deficits  Assessment & Plan  SLP eval w deficits in multiple domains  INCAPACITATED to make decisions   Intermittent anxiety and agitation requiring sitter.  This may affect ability to place patient.  Continue Seroquel daily for agitation      Severe protein-calorie malnutrition (HCC)  Assessment & Plan  -Body mass index is 19.16 kg/m².  -Food preferences  -comfort care    Altered mental status  Assessment & Plan  -ongoing lethargy. Rouses easily.   -impulsive at times resulting in falls  -She continues to have hallucinations which she states are bad dreams   -Sleep  hygiene  -1:1 sitter        At high risk for falls- (present on admission)  Assessment & Plan  -Per , she has been falling at home  -She has sustained multiple falls this hospitalization  -Continues to require 1:1 sitter  -Fall precautions       VTE prophylaxis: Comfort care    I have performed a physical exam and reviewed and updated ROS and Plan today (1/4/2021). In review of previous note, there are no changes except as documented above.

## 2021-01-05 NOTE — PROGRESS NOTES
Pt A&Ox1 to self only. 1:1 sitter. Tearful. Asking staff to call  for her.  called RN to remove phone from pt.  Safety sitter in place. Q2 turns in place. Prn incontinence checks in place.

## 2021-01-05 NOTE — PROGRESS NOTES
2 RN skin check complete.Tia RN  Devices in place Waffle cushion in placed.  Skin assessed under devices NA  Confirmed pressure ulcers found on NONE  New potential pressure ulcers noted on NONE  The following interventions in place ;waffle cushion mattress,turning,clean linen    Bilateral elbows intact  Bilateral groin intact  Bilateral heels dry,pink and blanching  Sacrum intact and blanching

## 2021-01-05 NOTE — CARE PLAN
Problem: Communication  Goal: The ability to communicate needs accurately and effectively will improve  Outcome: PROGRESSING AS EXPECTED     Problem: Safety  Goal: Will remain free from injury  Outcome: PROGRESSING AS EXPECTED     Problem: Safety  Goal: Will remain free from falls  Outcome: PROGRESSING AS EXPECTED     Problem: Psychosocial Needs:  Goal: Level of anxiety will decrease  Outcome: PROGRESSING AS EXPECTED   Patient is calm and compliant

## 2021-01-05 NOTE — PROGRESS NOTES
Assumed care given at shift changed,room air,calmed and compliant with a sitter at bedside,pain med given,v/s stable,call light and personal belongings within reach and bed in low position.

## 2021-01-06 NOTE — PROGRESS NOTES
Patient is alert to self,assumed care given at shift changed with a sitter at bedside for safety,repositioned patient often times,call light and personal belongings within reach and bd in low position,pain med given and heat pack applied.

## 2021-01-06 NOTE — PROGRESS NOTES
Pt A&Ox1 to self only. 1:1 sitter. Tearful. Safety sitter in place. Q2 turns in place. Prn incontinence checks in place.

## 2021-01-06 NOTE — DISCHARGE PLANNING
Anticipated Discharge Disposition: Skilled    Action: patient continues to have a sitter due to impulsivity. Orientated to self only, incontinent, meals feed by staff. Spouse continues to visit    Barriers to Discharge: sitter, placement    Plan: continue to monitor for discharge barriers

## 2021-01-06 NOTE — PROGRESS NOTES
Patient is anxious and irritable,talked to patient no prevent anxiety refused education and  ativan po given

## 2021-01-06 NOTE — PROGRESS NOTES
2 RN skin check complete.Cristela BASHIR  Devices in place Waffle cushion in placed.  Skin assessed under devices NA  Confirmed pressure ulcers found on NONE  New potential pressure ulcers noted on NONE  The following interventions in place ;waffle cushion mattress,turning,clean linen     Bilateral elbows intact  Bilateral groin intact  Bilateral heels dry,pink and blanching  Sacrum intact and blanching

## 2021-01-07 NOTE — PROGRESS NOTES
Assumed care of pt from day RN. Pt lying in bed, A&Ox2, disoriented to time and event. Pt denies any pain at this time but requests a hot pack for her stomach. Pt extremely fearful and crying out, pt given PRN Ativan per MAR. Emotional support given. Pt has safety sitter at bedside. All needs met at this time.

## 2021-01-08 NOTE — PROGRESS NOTES
2 RN skin check complete with MCKAY Church  Devices in place N/A.  Skin assessed under devices N/A.  Confirmed pressure ulcers found on N/A.  New potential pressure ulcers noted on N/A. Wound consult placed N/A.    Skin is intact. No open wounds noted.      The following interventions in place; assisted to turn to sides; on pressure redistribution mattress with waffle overlay

## 2021-01-08 NOTE — PROGRESS NOTES
Pt A&Ox2. RA. Pt currently resting in bed. Safety sitter at bedside at all times. All pt needs met at this time.

## 2021-01-08 NOTE — PROGRESS NOTES
Alert and oriented x2 disoriented to event and time. Offered fluids and snacks. Safety precautions in placed. Bed in lowest position. Upper side rails up. Treaded socks on. Reinforced the use of call light when needing assistance.

## 2021-01-09 NOTE — PROGRESS NOTES
[facility] Medicine TWICE WEEKLY Progress Note    Date of Service  1/8/2021    Chief Complaint  admitted 11/11/2020 with abdominal pain    Hospital Course  Ms. Escudero is a 50-year-old female with PMH significant for muscular dystrophy (diagnosed at 16, JOSÉ, migraines, chronic abdominal pain, chronic opioid dependence, chronic lower extremity weakness, and dysphagia who presented 11/11/2020 with abdominal pain. She reported the pain as 8/10, localized to the umbilicus and nonradiating. She also c/o acute onset right upper extremity and bilateral lower extremity pain and numbness.    While in the ER she also fell forward out of her chair, striking her forehead without loss of consciousness.  CT head showed possible cephalohematoma.  CT abdomen/pelvis showed no evidence of acute intra-abdominal or pelvic processes.  She had sudden onset of altered mental status.  Glucose was found to be 45 and she was treated with hypoglycemia protocol. Lumbar puncture was unremarkable.  She continued to have altered mental status, hypotension and tachycardia.  She was treated with the sepsis protocol.  X-ray was concerning for atelectasis versus pneumonia.  Covid was negative.  Pro Delbert 0.53, up to 1.98.. All cultures have been negative to date.  Antibiotics were discontinued 11/19. Neurology was consulted and recommended encephalopathy work-up.  She was started on acyclovir until her work-up came back.  Her HSV work-up, syphilis and HIV were negative and the acyclovir was discontinued. MRI brain 11/18 with incidental finding left retinal detachment. She was seen by ophthalmology and diagnosed with left dislocated intraocular lens.    Palliative care was consulted for advanced care planning. Made comfort care per patient's and 's wishes.     Interval Problem Update  Patient is lying in bed, sleeping, easily aroused.  She denies pain and wishes to be left alone so she can sleep.    -Continues to require 1:1 sitter due to  impulsivity and safety.     Consultants/Specialty  Palliative Care  Psych    Code Status  Comfort Care/DNR    Disposition  Comfort Care- Possible hospice discharge.  Need to be able to remove sitter prior to placement.       Review of Systems  Review of Systems   Constitutional: Positive for malaise/fatigue. Negative for chills and weight loss.   Respiratory: Negative for shortness of breath and wheezing.    Cardiovascular: Negative for chest pain and palpitations.   Gastrointestinal: Negative for abdominal pain, constipation, nausea and vomiting.   Musculoskeletal: Negative for back pain and myalgias.   Neurological: Negative for dizziness and weakness.        Physical Exam  Temp:  [36.8 °C (98.2 °F)-37.3 °C (99.1 °F)] 37.3 °C (99.1 °F)  Pulse:  [] 102  Resp:  [15-16] 16  BP: (100-102)/(56-66) 102/66  SpO2:  [95 %-97 %] 95 %    Physical Exam  Vitals signs and nursing note reviewed.   Constitutional:       General: She is not in acute distress.     Appearance: She is cachectic. She is ill-appearing.   HENT:      Head: Normocephalic and atraumatic.      Nose: Nose normal.      Mouth/Throat:      Mouth: Mucous membranes are dry.      Pharynx: No oropharyngeal exudate.   Neck:      Musculoskeletal: Normal range of motion. No neck rigidity or muscular tenderness.   Cardiovascular:      Rate and Rhythm: Normal rate.      Heart sounds: No murmur.   Pulmonary:      Effort: Pulmonary effort is normal. No respiratory distress.      Breath sounds: Normal breath sounds. No decreased breath sounds.   Abdominal:      General: There is no distension.      Palpations: There is no mass.      Tenderness: There is no abdominal tenderness.   Musculoskeletal:         General: No swelling or tenderness.   Skin:     General: Skin is warm and dry.      Findings: No erythema or rash.   Neurological:      General: No focal deficit present.      Mental Status: She is oriented to person, place, and time. She is lethargic.      Motor:  Weakness present.   Psychiatric:         Cognition and Memory: Cognition is impaired. Memory is impaired.         Judgment: Judgment is impulsive.     All systems reviewed and exam is unchanged from prior exam.      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)  Assessment & Plan  -Diagnosed at age 16  -History of chronic abdominal pain with no clear explained etiology prompting multiple ED visits   -Most likely contributing to chronic abdominal pain as well as onset of of extremity symptoms  -Comfort Care     Bilateral lower extremity pain- (present on admission)  Assessment & Plan  -seems to be mostly when she is mobilizing  -Likely a component of deconditioning from muscular dystrophy- and continued deconditioning related to refusal to particpate in movement and therapies   -Pain management  -COMFORT CARE     Abdominal pain- (present on admission)  Assessment & Plan  -Chronic, stable  -History of pancreatitis  -Contributing to her poor p.o. intake  -Having bowel movements  -Reports pain controlled.   - Patient tends to refuse to eat then at other times gorges herself causing pain and emesis from overeating.     Neurocognitive deficits  Assessment & Plan  SLP eval w deficits in multiple domains  INCAPACITATED to make decisions   Intermittent anxiety and agitation requiring sitter.  This may affect ability to place patient.  Continue Seroquel daily for agitation    Severe protein-calorie malnutrition (HCC)  Assessment & Plan  -Body mass index is 19.16 kg/m².  -Food preferences  -comfort care    Altered mental status  Assessment & Plan  -ongoing lethargy. Rouses easily.   -impulsive at times resulting in falls  -She continues to have hallucinations which she states are bad  dreams   -Sleep hygiene  -1:1 sitter    At high risk for falls- (present on admission)  Assessment & Plan  -Per , she has been falling at home  -She has sustained multiple falls this hospitalization  -Continues to require 1:1 sitter  -Fall precautions       VTE prophylaxis: Comfort care    I have performed a physical exam and reviewed and updated ROS and Plan today (1/8/2021). In review of previous note, there are no changes except as documented above.     THANH Matamoros    I certify that the patient requires continued medically necessary hospital services for the treatment of Myotonic muscular dystrophy (HCC) and will remain in the hospital for a minimum of 15 days.  Discharge plan is to be dependent on whether patient continues to require a sitter. If she no longer needs one, she may go to a skilled nursing facility.

## 2021-01-09 NOTE — PROGRESS NOTES
2 RN skin check complete with MCKAY Archibald  Devices in place N/A.  Skin assessed under devices N/A.  Confirmed pressure ulcers found on N/A.  New potential pressure ulcers noted on N/A. Wound consult placed N/A.     Skin is intact. No open wounds noted.        The following interventions in place; assisted to turn to sides; on pressure redistribution mattress with waffle overlay

## 2021-01-11 NOTE — PROGRESS NOTES
Attempted to call the , still not available. Voice message was sent earlier to call back regarding pt's update.

## 2021-01-11 NOTE — PROGRESS NOTES
Patient refused 2RN skin check, educated regarding importance.  Education reinforced by MCKAY Orourke.  Patient continues to refuse and wanted to sleep

## 2021-01-11 NOTE — PROGRESS NOTES
This writer was able to get in contact with pt's , Adryan, around 8:45 this morning. He arrived to hospital around 9:30 AM. Therapeutic communication used to convey pt's status as  and condolences expressed to pt's . Susi called to bedside to provide further spiritual comfort in this time. Follow up to tissue bank indicated pt is not an eligible candidate for tissue donation, family made aware. Traction notified for pickup of body.

## 2021-01-11 NOTE — PROGRESS NOTES
Alert and oriented x1, only to self.  is at bedside. Offered fluids and snacks. Safety precautions in placed. Bed in lowest position. Upper side rails up. Treaded socks on. Reinforced the use of call light when needing assistance.

## 2021-01-11 NOTE — PROGRESS NOTES
Spiritual Care Note    Patient Information     Patient's Name: Gayla Escudero   MRN: [id number]    YOB: 1970   Age and Gender: 50 y.o. female   Service Area: Med/Neph   Room (and Bed): [place] [address]   Ethnicity or Nationality:     Primary Language: English   Amish/Spiritual preference: None   Place of Residence: [place]   Family/Friends/Others Present: Yes   Clinical Team Present: No   Medical Diagnosis(-es)/Procedure(s): Myotonic muscular dystrophy   Code Status: Comfort Care/DNR    Date of Admission: 11/11/2020   Length of Stay: 59 days        Spiritual Care Provider Information:  Name of Spiritual Care Provider: Sahil Kuhn  Title of Spiritual Care Provider:   Phone Number: [phone]  E-mail: [email or web address]  Total time : 20 minutes    Spiritual Screen Results:    Gen Nursing        Palliative Care  PC Amish/Spiritual Screening  Is your spiritual health or inner well-being important to you as you cope with your medical condition?: Yes  Would you like to receive a visit from our Spiritual Care team or your own Rastafarian or spiritual leader?: No      Encounter/Request Information    Encounter/Request Type   Visited With: Patient, Family    Nature of the Visit: Initial, On shift    Crisis Visit: Death    Referral From/ Origin of Request: Verbal staff    Religous Needs/Values    Amish Needs Visit  Amish Needs: Prayer    Ritual Needs Visit  Ritual Needs: [place]    Spiritual Assessment     Spiritual Care Encounters  Observations/Symptoms: Other (Comment)(Pt has passed early this morning)    Interacton/Conversation:  spent time with Pt's  to console his grief.  spoke a prayer and gave a blessing over Pt and Pt's family    Assessment: Need    Need: Seeking Spiritual Assistance and Support    Intended Effects: Helping Someone Feel Comforted, Journeying With Someone in Grief Process, Promote a Sense of Peace    Interventions:  Compassionate Presence, Active Listening, Prayer, [place]    Outcomes: Spiritual Comfort    Plan: No Further Visits    Notes:

## 2021-01-11 NOTE — PROGRESS NOTES
"Pt was seen on bed from the door by this RN at 0400, with CNA sitter at bedside. Pt is not agitated, not shouting and not moving. At 0404, CNA sitter asked to have break by this RN. At 0405, this RN takes over sitting with the patient for safety purposes. Pt was assessed and no signs of breathing. Pulse checked - none. Charge nurse Frances aware and verified. Pronounced death by this RN and charge nurse Daniels at 0408. Called spouse - hector , not available, left voice message to call back this RN regarding update with the patient. Paged hospitalist. Spoke at 0426 with Dr. Afshin Bruce and updated. Called coroners office. Spoke with Lesly at 0431. Pt is not on coroners case per Lesly. Called [facility] at 0433. Spoke with Singh Isidro and updated regarding the pt. Singh said \"I will call you back within an hour to let you know if the pt is an organ donor or not. Expiration navigator completed. Charge nurse Frances aware. Pt was cleaned and bagged.  "

## 2021-01-12 NOTE — DISCHARGE SUMMARY
Death Summary    Cause of Death  Cardiopulmonary arrest due to multiorgan failure due to severe protein calorie malnutrition due to encephalopathy. Contributing: head trauma, multiple falls, myotonic muscular dystrophy, chronic abdominal pain, chronic opioid dependence, chronic lower extremity weakness, and dysphagia     Comorbid Conditions at the Time of Death  Principal Problem:    Myotonic muscular dystrophy (HCC) POA: Yes  Active Problems:    Abdominal pain POA: Yes    Bilateral lower extremity pain POA: Yes    At high risk for falls POA: Yes    Altered mental status POA: Unknown    Severe protein-calorie malnutrition (HCC) POA: Unknown    Neurocognitive deficits POA: Unknown  Resolved Problems:    Upper extremity pain, diffuse, right POA: Yes    Impaired mobility and ADLs POA: Yes    Hypotension POA: Unknown    Hypoglycemia POA: Unknown    Severe sepsis (HCC) POA: Unknown    Encephalopathy acute POA: Unknown    Electrolyte abnormality POA: Unknown    Hypoxia POA: Unknown    Aspiration into airway POA: Unknown      History of Presenting Illness and Hospital Course  Ms. Escudero was a 50-year-old female with PMH significant for muscular dystrophy (diagnosed at 16), JOSÉ, migraines, chronic abdominal pain, chronic opioid dependence, chronic lower extremity weakness, neurocognitive deficits, heart murmur, anemia, pancreatitis and dysphagia who presented 11/11/2020 with abdominal pain. She reported the pain as 8/10, localized to the umbilicus and nonradiating. She also complained of acute onset right upper extremity and bilateral lower extremity pain and numbness.    While in the ER she also fell forward out of her chair, striking her forehead without loss of consciousness.  CT head showed possible cephalohematoma.  CT abdomen/pelvis showed no evidence of acute intra-abdominal or pelvic processes.  She had sudden onset of altered mental status.  Glucose was found to be 45 and she was treated with hypoglycemia  protocol. Lumbar puncture was unremarkable.  She continued to have altered mental status, hypotension and tachycardia.  She was treated with the sepsis protocol.  X-ray was concerning for atelectasis versus pneumonia.  Covid was negative.  Pro Delbert 0.53, up to 1.98.. All cultures have been negative to date.  Antibiotics were discontinued 11/19. Neurology was consulted and recommended encephalopathy work-up.  She was started on acyclovir until her work-up came back.  Her HSV work-up, syphilis and HIV were negative and the acyclovir was discontinued. MRI brain 11/18 with incidental finding left retinal detachment. She was seen by ophthalmology and diagnosed with left dislocated intraocular lens.  She was found to have poor nutritional intake and a coretrak was placed. The patient subsequently removed it herself.  Palliative care was consulted for advanced care planning. She was made comfort care per patient's and 's wishes.   She had episodes of anxiety, improved with Seroquel. She stopped eating and drinking and became more lethargic as time went on. On 1/11/21, a sitter was at bedside and reported no agitation, shouting or movement. On assessment by RN, Ms. Escudero was found to be not breathing and pulseless.      DUYEN Matamoros.    Death Date: 01/11/21   Death Time: 0408         Pronounced By (RN1): Katy Eagle  Pronounced By (RN2): Sharad Sanchez

## 2021-04-10 NOTE — PROGRESS NOTES
Pt refusing 2 RN Skin check, Charge RN notified.   89 y.o. female with h/o A-fib not on anticoagulants (falls), CHF EF 35-40%, HLD, moderate to severe mitral regurgitation, s/p R hip replacement, who not does ambulate independently at baseline    # AMS due to metabolic  Encephalopathy    - UTI treated w/o improvement   - no acute CVA with chronic mirovascular ischemic changes and volume loss   - neg procalcitonin level indicationg of unlikely infectious etiology   - hypercapnea improved with BIPAP without change in MS   - more alert today    # Hx of atrial fibrillation not on AC    - C/w ASA / STATIN    # Rt Frontal Meningioma   - Incidental seen last scan in 2019 slightly larger  - no immediate intervention warranted     # PAF  not on A/c   - po metoprolol     # acute on chronic systolic CHF   - resolved on IV lasix - stopped as pt is clinically intravascularly depleted    # ARF with hypovolemic hypernatremia - likely secondary to intravascular depletion   - D5W, improved    # Severe protein-calorie malnutrition - due to lethargy   - corpak at family request to improve nutritional status with TF - explained risk and benefits   - resume clears if tolerates (bedside swallow eval with RN)    Overall poor prognosis as pt remains with PVC despite of diuresis and intravascular depletion also with acute hypercapnic respiratory failure and difficulty tolerating BIPAP.  Pt needs to be restrained in order to continue with BIPAP. Started on D5W for hypernatremia, changed to IV meds, no po intake.  As per family wants everything done.

## 2022-03-15 NOTE — THERAPY
Physical Therapy   Daily Treatment     Patient Name: Gayla Escudero  Age:  50 y.o., Sex:  female  Medical Record #: [id number]  Today's Date: 12/14/2020     Precautions: Fall Risk, Swallow Precautions ( See Comments)  Comments: baseline myotonic MD    Assessment    Pt seen for follow up PT tx. Pt reported feeling tired and fearful of falling. Pt continuously asking not to ambulate today. Pt continues to require mod assist with bed mobility and transfers. As pt fatigues, pt requires max assist for transfer off of bedside commode. Pt provided with cues throughout for sequencing and safety. Pt continues to demonstrate some impulsive as well as self limiting behaviors. PT will cont while in acute.     Plan    Continue current treatment plan.    DC Equipment Recommendations: Unable to determine at this time  Discharge Recommendations: Recommend post-acute placement for additional physical therapy services prior to discharge home         12/14/20 0911   Cognition    Cognition / Consciousness X   Level of Consciousness Alert   Ability To Follow Commands 1 Step   Safety Awareness Impaired;Impulsive   New Learning Impaired   Attention Impaired   Sequencing Impaired   Initiation Impaired   Comments crying throughout session but redirectable   Neuro-Muscular Treatments   Neuro-Muscular Treatments Weight Shift Left;Weight Shift Right;Verbal Cuing;Tactile Cuing;Anterior weight shift;Compensatory Strategies   Comments EOB and standing   Other Treatments   Other Treatments Provided pt required extended time between transfers due to faitgue and crying. Pt thankful for PT at end of session   Balance   Sitting Balance (Static) Fair -   Sitting Balance (Dynamic) Poor +   Standing Balance (Static) Poor   Standing Balance (Dynamic) Poor -   Weight Shift Sitting Fair   Weight Shift Standing Poor   Skilled Intervention Verbal Cuing;Sequencing;Compensatory Strategies   Comments standing with HHA x2   Gait Analysis   Gait Level Of  Assist Moderate Assist   Assistive Device Hand Held Assist   Distance (Feet) 3   # of Times Distance was Traveled 1   Deviation Shuffled Gait;Decreased Base Of Support   # of Stairs Climbed 0   Weight Bearing Status fwb   Skilled Intervention Verbal Cuing;Tactile Cuing;Sequencing;Compensatory Strategies   Comments self limiting and fearful of ambulating   Bed Mobility    Supine to Sit Moderate Assist   Sit to Supine Moderate Assist   Scooting Moderate Assist   Rolling Minimum Assist to Lt.;Minimal Assist to Rt.   Skilled Intervention Verbal Cuing;Sequencing;Compensatory Strategies   Comments cues for increased participation   Functional Mobility   Sit to Stand Moderate Assist   Bed, Chair, Wheelchair Transfer Moderate Assist   Toilet Transfers Maximal Assist   Transfer Method Stand Step   Mobility bed mob, STS, bed<>BSC, STS, bed mob   Skilled Intervention Tactile Cuing;Verbal Cuing;Compensatory Strategies   Comments self limiting   Short Term Goals    Short Term Goal # 1 Pt will be able to transfer supine<>sit with min A within 6 visits to ensure mobility at home.   Goal Outcome # 1 Goal met, new goal added   Short Term Goal # 1 B  pt will be able to complete bed mobility with SPV in 6tx in order to return home   Goal Outcome  # 1 B Goal not met   Short Term Goal # 2 Pt will be able to transfer sit<>stand with 4WW and min A within 6 visits to ensure mobility at home.   Goal Outcome # 2 Progressing slower than expected   Short Term Goal # 3 Pt will be ambulate 25 ft with FWW and min A within 6 visits to ensure mobility around house.    Goal Outcome # 3 Progressing slower than expected   Anticipated Discharge Equipment and Recommendations   DC Equipment Recommendations Unable to determine at this time   Discharge Recommendations Recommend post-acute placement for additional physical therapy services prior to discharge home          No

## 2022-06-30 NOTE — PROGRESS NOTES
Alert and oriented x3, disoriented to event, with PSA sitter at bedside for safety. Pt is very impulsive and agitated. Encouraged to rest and sleep. Safety precautions in placed. Bed in lowest position. Upper side rails up. Treaded socks on. Reinforced the use of call light when needing assistance.   4550ft

## 2023-01-27 NOTE — OP THERAPY EVALUATION
"  Outpatient Physical Therapy  WHEELCHAIR EVALUATION    [facility] [address]  [address]  [facility] [address]  Phone:  [phone]  Fax:  [phone]    Date of Evaluation: 06/07/2018    Patient Name: Gayla Escudero  YOB: 1970  MRN: [id number]   Height: 1.549 m (5' 1\") Weight: 45.4 kg (100 lb)     Referring Provider: Christal Sanchez D.O.  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 Escudero is a very pleasant 47-year-old woman who presents with her  (Adryan) for mobility evaluation.  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  Since then she has experienced the sequela from the progression of this genetic neuromuscular condition.  Most notably this has included progressive gross weakness with decreasing functional mobility, bilateral cataracts status post removal and restrictive lung disease for which she is on nightly CPAP.       Current level of functional mobility / ADLs:     Mrs. Escudero lives in an apartment with her .  The apartment has 4 steps to enter.  The management is considering modifying with a ramp.  She reports her functional status has declined significantly in the last 6 months. Prior to the decline she was ambulating without an AD.  About 6 mo ago, she began using a SPC and quickly moved to a 4WW due to increasing fall rate.  She presents today with the 4WW.  She is able to ambulate into the clinic with her 's assistance to navigate corners.      Mrs. Escudero is home most of the day due to inability to ambulate community distances " without excessive fatigue.  She needs assistance in and out of the shower, but can complete grooming indep with the use of a shower chair.  Does need Key for dressing.  Her  completes all cooking, cleaning, laundry and transport to and from MD appts.  Mrs. Escudero states she does try to help with dishes, but does from seated in her 4WW.  She never stands longer than 5 min due to fatigue and must hold onto a surface. Mrs. Escudero is able to ambulate short distances between rooms indep, but does report positive incontinence 2-3x/week due to inability to get to the restroom in a timely manor.      Lower extremity edema and pressure ulcers     Negative for lower extremity edema and pressure ulcers    Pain     Current pain ratin    At best pain ratin    At worst pain ratin    Location: Bilateral thighs/knees with gait (R>L)    Pain quality: aching    Pain timing: during gait.    Progression: worsening    Living situation     Lives with: spouse or partner    Assistive device history:    Current assistive device(s) used: single point cane and four-wheeled walker    Fall history:    Recent fall: recent fall    Fall history details: Positive fall history (daily) prior to using the 4WW.  Reports no falls in the last month.       Active Range of Motion:   Upper extremity (left):     Shoulder flexion: Below functional limits    Shoulder extension: Below functional limits    Shoulder abduction: Below functional limits  Upper extremity (right):     Shoulder flexion: Below functional limits    Shoulder extension: Below functional limits    Shoulder abduction: Below functional limits  Lower extremity (left):     Hip flexion: Below functional limits    Hip extension: Below functional limits    Hip abduction: Below functional limits    Knee flexion: Below functional limits    Knee extension: Below functional limits    Ankle dorsiflexion: Below functional limits    Ankle plantar flexion: Below functional  limits  Lower extremity (right):     Hip flexion: Below functional limits    Hip extension: Below functional limits    Hip abduction:Below functional limits    Knee flexion: Below functional limits    Knee extension: Below functional limits    Ankle dorsiflexion: Below functional limits    Ankle plantar flexion: Below functional limits  Active range of motion comments: Lack of AROM is due to weakness    Passive Range of Motion:   Passive range of motion comments: PROM is WNL in all joints.  R knee shows hyperextension to 25 deg related to genu recurvatum in gait and lack of quad control.     Strength:   Strength comments: Bilateral UE and LE strength is grossly 3/5, except shoulder ER, hip abd, knee ext, ankle DF 2/5    Power  is 10# bilaterally- avg of 3 trials.     Tone:     Left upper extremity muscle tone: Hypotonic    Right upper extremity muscle tone: Hypotonic    Left lower extremity muscle tone: Hypotonic    Right lower extremity muscle tone: Hypotonic    Sensation   Upper extremity (left):     Light touch: Intact  Upper extremity (right):     Light touch: Intact  Lower extremity (left):     Light touch: Intact  Lower extremity (right):     Light touch: Intact    Coordination   Upper extremity (left):     Fine motor: Within functional limits    Rapid alternating movements: Within functional limits  Upper extremity (right):     Fine motor: Within functional limits    Rapid alternating movements: Within functional limits    Postural Control (Balance)     Sitting (static): Fair    Sitting (dynamic): Fair -    Standing (static): Poor +    Standing (dynamic): Poor -    Weight shift (sitting): Fair    Weight shift (standing): Poor    Ambulation     Ambulation with assistive device: contact guard assist    Ambulation without assistive device: unable    Walking speed: below functional limits    Stride length: below functional limits    Base of support: normal    Additional ambulation details: Severe  hyperextension of R>L knees due to lacking quad control.  Able to ambulate 100 feet before fatigue.  Pt took and standing rest break and attempted to walk further.  Knees buckled causing LOB and dependent assist to stop a fall and return to chair.      STS is maxA    Equipment recommendations   Type/Model     Recommendation: power wheelchair    Power wheelchair type: Group 2    Justification:     Mrs. Escudero would benefit from a standard power wheelchair with a captain's seat to support neutral posture, a swing-away joystick to allow safe access to surfaces and for ease of transferring, and height adjustable armrests to assist with proper posture and position. Her current walker is not sufficient for providing enough stability to overcome her severe lower body weakness and creates increased risk of falls and injury.  She demonstrates insufficient upper body strength to propel an optimally configured manual chair without excessive fatigue.  A power scooter will not provide enough lateral support for balance control and will limit future modifications if her condition continues to progress.  The power wheelchair will assist with performing mobility-related activities within a reasonable amount of time, without undue fatigue and decrease risk for falling. Without the use of this equipment, Mrs. Escudero will be limited in mobility throughout her home, will be at risk for falls and will have difficulty performing mobility-related activities.      The patient has been determined to be independent and safe with the above equipment. The Physical Therapist who performed this evaluation has no financial relationship with the vendor involved in the evaluation. Thank you in advance for the consideration of this patient's medical needs. If you have and questions regarding this equipment please call me at [phone].      Seating System:    Recommendation(s) for power wheelchair: captain's seat  Arm Rests:    Recommendation:  flip back height-adjustable arm rests      Functional Limitation G-Codes and Severity Modifiers  Lower Extremity Functional Scale Total: 6.25   Current: Mobility: Current (): CM   Goal: Mobility: Goal (): CM   Discharge: Mobility: Discharge (): CM     Electronically signed by Astrid Frank, PT, DPT on 6/7/2018    Referring provider co-signature:  I have reviewed this evaluation and recommendation(s) and my co-signature certifies my agreement with the contents.    Physician Signature: ________________________________ Date: ______________      denies drug  use/caffeine

## 2023-03-10 NOTE — CARE PLAN
rec'd report from MCKAY Hayes and assumed care of this pt.  Pt appears to be resting well w/no ss of distress noted at this time. RR E/U. Safety measures in place, call light w/in reach.    normal...

## 2023-04-10 NOTE — THERAPY
Physical Therapy   Daily Treatment     Patient Name: Gayla Escudero  Age:  49 y.o., Sex:  female  Medical Record #: [id number]  Today's Date: 11/27/2020     Precautions: Full Weight Bearing Left Lower Extremity, Swallow Precautions ( See Comments)    Assessment    Pt was pleasant and receptive to participate in therapy session. She presents with delayed responses and extra time for processing. Pt required Key for trunk to reach EOB She is able to hold herself up at EOB with no physical assist. Educated on performing supine exercises for strengthening. She required tc for proper form. She completed multiple STS with HHA and Key. She was able to side step with modA and vc for jenae and step length. Will continue to follow while in house to progress mobility.     Plan    Continue current treatment plan.    DC Equipment Recommendations: Unable to determine at this time  Discharge Recommendations: Recommend post-acute placement for additional physical therapy services prior to discharge home       11/27/20 1200   Other Treatments   Other Treatments Provided supine exercises with tc and vc for correct form    Balance   Sitting Balance (Static) Fair   Sitting Balance (Dynamic) Fair -   Standing Balance (Static) Poor +   Standing Balance (Dynamic) Poor   Weight Shift Sitting Fair   Weight Shift Standing Poor   Comments seated eob with no physical assist. standing with Key and HHA    Gait Analysis   Gait Level Of Assist Moderate Assist   Assistive Device Hand Held Assist   Distance (Feet) 3   # of Times Distance was Traveled 1   Skilled Intervention Verbal Cuing   Comments side stepping 2x with seated rest break in between. Mod A with HHA ,    Bed Mobility    Supine to Sit Minimal Assist   Sit to Supine Minimal Assist   Scooting Minimal Assist   Comments hob elevated, Key for trunk to reach EOB and Key for BLE to return    Functional Mobility   Sit to Stand Minimal Assist   Skilled Intervention Verbal Cuing;Tactile  Cuing;Facilitation   Comments STS 3x with hha and Key. Vc for jenae and posture.    Short Term Goals    Short Term Goal # 1 Pt will be able to transfer supine<>sit with min A within 6 visits to ensure mobility at home.   Goal Outcome # 1 goal not met   Short Term Goal # 2 Pt will be able to transfer sit<>stand with 4WW and min A within 6 visits to ensure mobility at home.   Goal Outcome # 2 Goal not met   Short Term Goal # 3 Pt will be ambulate 25 ft with FWW and min A within 6 visits to ensure mobility around house.    Goal Outcome # 3 Goal not met        [place]